# Patient Record
Sex: FEMALE | Race: WHITE
[De-identification: names, ages, dates, MRNs, and addresses within clinical notes are randomized per-mention and may not be internally consistent; named-entity substitution may affect disease eponyms.]

---

## 2017-05-04 ENCOUNTER — HOSPITAL ENCOUNTER (OUTPATIENT)
Dept: HOSPITAL 8 - CVU | Age: 57
Discharge: HOME | End: 2017-05-04
Attending: PHYSICIAN ASSISTANT
Payer: COMMERCIAL

## 2017-05-04 DIAGNOSIS — J45.909: ICD-10-CM

## 2017-05-04 DIAGNOSIS — I50.30: Primary | ICD-10-CM

## 2017-05-04 DIAGNOSIS — I48.91: ICD-10-CM

## 2017-05-04 DIAGNOSIS — I34.0: ICD-10-CM

## 2017-05-04 DIAGNOSIS — I35.0: ICD-10-CM

## 2017-05-04 DIAGNOSIS — I51.7: ICD-10-CM

## 2017-05-04 DIAGNOSIS — E11.9: ICD-10-CM

## 2017-05-04 PROCEDURE — 93306 TTE W/DOPPLER COMPLETE: CPT

## 2017-07-27 ENCOUNTER — HOSPITAL ENCOUNTER (EMERGENCY)
Dept: HOSPITAL 8 - ED | Age: 57
Discharge: HOME | End: 2017-07-27
Payer: COMMERCIAL

## 2017-07-27 VITALS — DIASTOLIC BLOOD PRESSURE: 57 MMHG | SYSTOLIC BLOOD PRESSURE: 108 MMHG

## 2017-07-27 VITALS — BODY MASS INDEX: 41.77 KG/M2 | WEIGHT: 275.58 LBS | HEIGHT: 68 IN

## 2017-07-27 DIAGNOSIS — R73.9: ICD-10-CM

## 2017-07-27 DIAGNOSIS — I48.92: Primary | ICD-10-CM

## 2017-07-27 DIAGNOSIS — E87.1: ICD-10-CM

## 2017-07-27 DIAGNOSIS — I48.91: ICD-10-CM

## 2017-07-27 LAB
BUN SERPL-MCNC: 10 MG/DL (ref 7–18)
IS PT STATUS REG ER OR PRE ER?: YES

## 2017-07-27 PROCEDURE — 85610 PROTHROMBIN TIME: CPT

## 2017-07-27 PROCEDURE — 99152 MOD SED SAME PHYS/QHP 5/>YRS: CPT

## 2017-07-27 PROCEDURE — 83735 ASSAY OF MAGNESIUM: CPT

## 2017-07-27 PROCEDURE — 80048 BASIC METABOLIC PNL TOTAL CA: CPT

## 2017-07-27 PROCEDURE — 99153 MOD SED SAME PHYS/QHP EA: CPT

## 2017-07-27 PROCEDURE — 36415 COLL VENOUS BLD VENIPUNCTURE: CPT

## 2017-07-27 PROCEDURE — 82040 ASSAY OF SERUM ALBUMIN: CPT

## 2017-07-27 PROCEDURE — 85025 COMPLETE CBC W/AUTO DIFF WBC: CPT

## 2017-07-27 PROCEDURE — 71010: CPT

## 2017-07-27 PROCEDURE — 84484 ASSAY OF TROPONIN QUANT: CPT

## 2017-07-27 PROCEDURE — 93005 ELECTROCARDIOGRAM TRACING: CPT

## 2017-08-17 ENCOUNTER — HOSPITAL ENCOUNTER (OUTPATIENT)
Dept: HOSPITAL 8 - CFH | Age: 57
Discharge: HOME | End: 2017-08-17
Attending: INTERNAL MEDICINE
Payer: COMMERCIAL

## 2017-08-17 VITALS — SYSTOLIC BLOOD PRESSURE: 129 MMHG | DIASTOLIC BLOOD PRESSURE: 75 MMHG

## 2017-08-17 DIAGNOSIS — I51.7: ICD-10-CM

## 2017-08-17 DIAGNOSIS — I87.8: ICD-10-CM

## 2017-08-17 DIAGNOSIS — J81.1: ICD-10-CM

## 2017-08-17 DIAGNOSIS — I48.91: Primary | ICD-10-CM

## 2017-08-17 LAB
AST SERPL-CCNC: 65 U/L (ref 15–37)
BUN SERPL-MCNC: 8 MG/DL (ref 7–18)
HCT VFR BLD CALC: 38.8 % (ref 34.6–47.8)
HGB BLD-MCNC: 11.9 G/DL (ref 11.7–16.4)
WBC # BLD AUTO: 10.4 X10^3/UL (ref 3.4–10)

## 2017-08-17 PROCEDURE — 75572 CT HRT W/3D IMAGE: CPT

## 2017-08-17 PROCEDURE — 71020: CPT

## 2017-08-18 ENCOUNTER — HOSPITAL ENCOUNTER (OUTPATIENT)
Dept: HOSPITAL 8 - CACL | Age: 57
Setting detail: OBSERVATION
LOS: 1 days | Discharge: HOME | End: 2017-08-19
Attending: INTERNAL MEDICINE | Admitting: INTERNAL MEDICINE
Payer: COMMERCIAL

## 2017-08-18 VITALS — WEIGHT: 279.55 LBS | HEIGHT: 68 IN | BODY MASS INDEX: 42.37 KG/M2

## 2017-08-18 VITALS — DIASTOLIC BLOOD PRESSURE: 65 MMHG | SYSTOLIC BLOOD PRESSURE: 99 MMHG

## 2017-08-18 DIAGNOSIS — I48.91: Primary | ICD-10-CM

## 2017-08-18 DIAGNOSIS — I48.92: ICD-10-CM

## 2017-08-18 PROCEDURE — 93656 COMPRE EP EVAL ABLTJ ATR FIB: CPT

## 2017-08-18 PROCEDURE — 93312 ECHO TRANSESOPHAGEAL: CPT

## 2017-08-18 PROCEDURE — 93613 INTRACARDIAC EPHYS 3D MAPG: CPT

## 2017-08-18 PROCEDURE — 85025 COMPLETE CBC W/AUTO DIFF WBC: CPT

## 2017-08-18 PROCEDURE — 80053 COMPREHEN METABOLIC PANEL: CPT

## 2017-08-18 PROCEDURE — 93655 ICAR CATH ABLTJ DSCRT ARRHYT: CPT

## 2017-08-18 PROCEDURE — 93005 ELECTROCARDIOGRAM TRACING: CPT

## 2017-08-18 PROCEDURE — 96372 THER/PROPH/DIAG INJ SC/IM: CPT

## 2017-08-18 PROCEDURE — 93325 DOPPLER ECHO COLOR FLOW MAPG: CPT

## 2017-08-18 PROCEDURE — C1730 CATH, EP, 19 OR FEW ELECT: HCPCS

## 2017-08-18 PROCEDURE — G0378 HOSPITAL OBSERVATION PER HR: HCPCS

## 2017-08-18 PROCEDURE — 93308 TTE F-UP OR LMTD: CPT

## 2017-08-18 PROCEDURE — 93662 INTRACARDIAC ECG (ICE): CPT

## 2017-08-18 PROCEDURE — 85730 THROMBOPLASTIN TIME PARTIAL: CPT

## 2017-08-18 PROCEDURE — C1731 CATH, EP, 20 OR MORE ELEC: HCPCS

## 2017-08-18 PROCEDURE — 36415 COLL VENOUS BLD VENIPUNCTURE: CPT

## 2017-08-18 PROCEDURE — C1894 INTRO/SHEATH, NON-LASER: HCPCS

## 2017-08-18 PROCEDURE — 93321 DOPPLER ECHO F-UP/LMTD STD: CPT

## 2017-08-18 PROCEDURE — 93624 EP F-UP STUDY PACG&REC: CPT

## 2017-08-18 PROCEDURE — 85610 PROTHROMBIN TIME: CPT

## 2017-08-18 PROCEDURE — C1732 CATH, EP, DIAG/ABL, 3D/VECT: HCPCS

## 2017-08-18 PROCEDURE — C1766 INTRO/SHEATH,STRBLE,NON-PEEL: HCPCS

## 2017-08-18 PROCEDURE — 82962 GLUCOSE BLOOD TEST: CPT

## 2017-08-18 PROCEDURE — C1893 INTRO/SHEATH, FIXED,NON-PEEL: HCPCS

## 2017-08-18 RX ADMIN — INSULIN DETEMIR SCH UNITS: 100 INJECTION, SOLUTION SUBCUTANEOUS at 21:00

## 2017-08-18 RX ADMIN — INSULIN ASPART SCH UNITS: 100 INJECTION, SOLUTION INTRAVENOUS; SUBCUTANEOUS at 22:46

## 2017-08-18 RX ADMIN — INSULIN DETEMIR SCH UNITS: 100 INJECTION, SOLUTION SUBCUTANEOUS at 22:08

## 2017-08-18 RX ADMIN — HYDROMORPHONE HYDROCHLORIDE PRN MG: 1 INJECTION, SOLUTION INTRAMUSCULAR; INTRAVENOUS; SUBCUTANEOUS at 13:10

## 2017-08-18 RX ADMIN — OXYCODONE HYDROCHLORIDE PRN MG: 5 TABLET ORAL at 22:46

## 2017-08-18 RX ADMIN — HYDROMORPHONE HYDROCHLORIDE PRN MG: 1 INJECTION, SOLUTION INTRAMUSCULAR; INTRAVENOUS; SUBCUTANEOUS at 13:19

## 2017-08-18 RX ADMIN — GABAPENTIN SCH MG: 300 CAPSULE ORAL at 22:13

## 2017-08-19 VITALS — SYSTOLIC BLOOD PRESSURE: 100 MMHG | DIASTOLIC BLOOD PRESSURE: 64 MMHG

## 2017-08-19 VITALS — SYSTOLIC BLOOD PRESSURE: 107 MMHG | DIASTOLIC BLOOD PRESSURE: 70 MMHG

## 2017-08-19 RX ADMIN — OXYCODONE HYDROCHLORIDE PRN MG: 5 TABLET ORAL at 04:54

## 2017-08-19 RX ADMIN — INSULIN ASPART SCH UNITS: 100 INJECTION, SOLUTION INTRAVENOUS; SUBCUTANEOUS at 09:07

## 2017-08-19 RX ADMIN — GABAPENTIN SCH MG: 300 CAPSULE ORAL at 09:06

## 2018-08-03 ENCOUNTER — HOSPITAL ENCOUNTER (OUTPATIENT)
Dept: HOSPITAL 8 - CFH | Age: 58
Discharge: HOME | End: 2018-08-03
Attending: INTERNAL MEDICINE
Payer: COMMERCIAL

## 2018-08-03 DIAGNOSIS — I45.10: ICD-10-CM

## 2018-08-03 DIAGNOSIS — I20.9: Primary | ICD-10-CM

## 2018-08-03 PROCEDURE — A9502 TC99M TETROFOSMIN: HCPCS

## 2018-08-03 PROCEDURE — 78452 HT MUSCLE IMAGE SPECT MULT: CPT

## 2018-08-03 PROCEDURE — C9898 INPNT STAY RADIOLABELED ITEM: HCPCS

## 2018-09-19 ENCOUNTER — HOSPITAL ENCOUNTER (INPATIENT)
Dept: HOSPITAL 8 - ED | Age: 58
LOS: 2 days | Discharge: HOME | DRG: 69 | End: 2018-09-21
Attending: FAMILY MEDICINE | Admitting: FAMILY MEDICINE
Payer: COMMERCIAL

## 2018-09-19 VITALS — WEIGHT: 293 LBS | HEIGHT: 68 IN | BODY MASS INDEX: 44.41 KG/M2

## 2018-09-19 DIAGNOSIS — E66.01: ICD-10-CM

## 2018-09-19 DIAGNOSIS — Z88.2: ICD-10-CM

## 2018-09-19 DIAGNOSIS — M81.0: ICD-10-CM

## 2018-09-19 DIAGNOSIS — Z91.018: ICD-10-CM

## 2018-09-19 DIAGNOSIS — E03.9: ICD-10-CM

## 2018-09-19 DIAGNOSIS — M19.90: ICD-10-CM

## 2018-09-19 DIAGNOSIS — G56.02: ICD-10-CM

## 2018-09-19 DIAGNOSIS — Z79.4: ICD-10-CM

## 2018-09-19 DIAGNOSIS — D50.9: ICD-10-CM

## 2018-09-19 DIAGNOSIS — E55.9: ICD-10-CM

## 2018-09-19 DIAGNOSIS — J45.909: ICD-10-CM

## 2018-09-19 DIAGNOSIS — Z82.5: ICD-10-CM

## 2018-09-19 DIAGNOSIS — R09.02: ICD-10-CM

## 2018-09-19 DIAGNOSIS — Z82.49: ICD-10-CM

## 2018-09-19 DIAGNOSIS — Z96.653: ICD-10-CM

## 2018-09-19 DIAGNOSIS — Z83.3: ICD-10-CM

## 2018-09-19 DIAGNOSIS — G47.33: ICD-10-CM

## 2018-09-19 DIAGNOSIS — Z90.710: ICD-10-CM

## 2018-09-19 DIAGNOSIS — L03.116: ICD-10-CM

## 2018-09-19 DIAGNOSIS — Z88.5: ICD-10-CM

## 2018-09-19 DIAGNOSIS — K21.9: ICD-10-CM

## 2018-09-19 DIAGNOSIS — D63.8: ICD-10-CM

## 2018-09-19 DIAGNOSIS — G45.9: Primary | ICD-10-CM

## 2018-09-19 DIAGNOSIS — R47.81: ICD-10-CM

## 2018-09-19 DIAGNOSIS — I48.91: ICD-10-CM

## 2018-09-19 DIAGNOSIS — E11.42: ICD-10-CM

## 2018-09-19 DIAGNOSIS — Z88.8: ICD-10-CM

## 2018-09-19 DIAGNOSIS — I50.30: ICD-10-CM

## 2018-09-19 DIAGNOSIS — M79.7: ICD-10-CM

## 2018-09-19 DIAGNOSIS — Z80.6: ICD-10-CM

## 2018-09-19 DIAGNOSIS — D68.69: ICD-10-CM

## 2018-09-19 DIAGNOSIS — Z86.718: ICD-10-CM

## 2018-09-19 DIAGNOSIS — G81.94: ICD-10-CM

## 2018-09-19 DIAGNOSIS — Z88.7: ICD-10-CM

## 2018-09-19 LAB
APTT BLD: 47 SECONDS (ref 25–31)
BASOPHILS # BLD AUTO: 0.14 X10^3/UL (ref 0–0.1)
BASOPHILS NFR BLD AUTO: 2 % (ref 0–1)
EOSINOPHIL # BLD AUTO: 0.09 X10^3/UL (ref 0–0.4)
EOSINOPHIL NFR BLD AUTO: 1 % (ref 1–7)
ERYTHROCYTE [DISTWIDTH] IN BLOOD BY AUTOMATED COUNT: 23.1 % (ref 9.6–15.2)
INR PPP: 2.63 (ref 0.93–1.1)
LYMPHOCYTES # BLD AUTO: 1.5 X10^3/UL (ref 1–3.4)
LYMPHOCYTES NFR BLD AUTO: 20 % (ref 22–44)
MCH RBC QN AUTO: 20.9 PG (ref 27–34.8)
MCHC RBC AUTO-ENTMCNC: 30.6 G/DL (ref 32.4–35.8)
MCV RBC AUTO: 68.3 FL (ref 80–100)
MD: (no result)
MONOCYTES # BLD AUTO: 0.68 X10^3/UL (ref 0.2–0.8)
MONOCYTES NFR BLD AUTO: 9 % (ref 2–9)
NEUTROPHILS # BLD AUTO: 5.03 X10^3/UL (ref 1.8–6.8)
NEUTROPHILS NFR BLD AUTO: 68 % (ref 42–75)
PLATELET # BLD AUTO: 369 X10^3/UL (ref 130–400)
PMV BLD AUTO: 8.2 FL (ref 7.4–10.4)
PROTHROMBIN TIME: 26.8 SECONDS (ref 9.6–11.5)
RBC # BLD AUTO: 3.56 X10^6/UL (ref 3.82–5.3)

## 2018-09-19 PROCEDURE — 36430 TRANSFUSION BLD/BLD COMPNT: CPT

## 2018-09-19 PROCEDURE — 83036 HEMOGLOBIN GLYCOSYLATED A1C: CPT

## 2018-09-19 PROCEDURE — 83550 IRON BINDING TEST: CPT

## 2018-09-19 PROCEDURE — 85014 HEMATOCRIT: CPT

## 2018-09-19 PROCEDURE — 71045 X-RAY EXAM CHEST 1 VIEW: CPT

## 2018-09-19 PROCEDURE — 70450 CT HEAD/BRAIN W/O DYE: CPT

## 2018-09-19 PROCEDURE — 86923 COMPATIBILITY TEST ELECTRIC: CPT

## 2018-09-19 PROCEDURE — 85610 PROTHROMBIN TIME: CPT

## 2018-09-19 PROCEDURE — 36415 COLL VENOUS BLD VENIPUNCTURE: CPT

## 2018-09-19 PROCEDURE — 83540 ASSAY OF IRON: CPT

## 2018-09-19 PROCEDURE — 80053 COMPREHEN METABOLIC PANEL: CPT

## 2018-09-19 PROCEDURE — 93005 ELECTROCARDIOGRAM TRACING: CPT

## 2018-09-19 PROCEDURE — 85025 COMPLETE CBC W/AUTO DIFF WBC: CPT

## 2018-09-19 PROCEDURE — 86900 BLOOD TYPING SEROLOGIC ABO: CPT

## 2018-09-19 PROCEDURE — 93880 EXTRACRANIAL BILAT STUDY: CPT

## 2018-09-19 PROCEDURE — 82962 GLUCOSE BLOOD TEST: CPT

## 2018-09-19 PROCEDURE — 85730 THROMBOPLASTIN TIME PARTIAL: CPT

## 2018-09-19 PROCEDURE — 86850 RBC ANTIBODY SCREEN: CPT

## 2018-09-19 PROCEDURE — 80047 BASIC METABLC PNL IONIZED CA: CPT

## 2018-09-19 PROCEDURE — 80061 LIPID PANEL: CPT

## 2018-09-19 PROCEDURE — 82728 ASSAY OF FERRITIN: CPT

## 2018-09-19 PROCEDURE — 70551 MRI BRAIN STEM W/O DYE: CPT

## 2018-09-19 PROCEDURE — 85018 HEMOGLOBIN: CPT

## 2018-09-19 PROCEDURE — P9016 RBC LEUKOCYTES REDUCED: HCPCS

## 2018-09-20 VITALS — SYSTOLIC BLOOD PRESSURE: 94 MMHG | DIASTOLIC BLOOD PRESSURE: 57 MMHG

## 2018-09-20 VITALS — SYSTOLIC BLOOD PRESSURE: 104 MMHG | DIASTOLIC BLOOD PRESSURE: 60 MMHG

## 2018-09-20 VITALS — DIASTOLIC BLOOD PRESSURE: 61 MMHG | SYSTOLIC BLOOD PRESSURE: 93 MMHG

## 2018-09-20 VITALS — DIASTOLIC BLOOD PRESSURE: 70 MMHG | SYSTOLIC BLOOD PRESSURE: 115 MMHG

## 2018-09-20 VITALS — DIASTOLIC BLOOD PRESSURE: 52 MMHG | SYSTOLIC BLOOD PRESSURE: 99 MMHG

## 2018-09-20 VITALS — SYSTOLIC BLOOD PRESSURE: 80 MMHG | DIASTOLIC BLOOD PRESSURE: 44 MMHG

## 2018-09-20 VITALS — DIASTOLIC BLOOD PRESSURE: 49 MMHG | SYSTOLIC BLOOD PRESSURE: 85 MMHG

## 2018-09-20 VITALS — DIASTOLIC BLOOD PRESSURE: 55 MMHG | SYSTOLIC BLOOD PRESSURE: 93 MMHG

## 2018-09-20 VITALS — SYSTOLIC BLOOD PRESSURE: 105 MMHG | DIASTOLIC BLOOD PRESSURE: 61 MMHG

## 2018-09-20 VITALS — SYSTOLIC BLOOD PRESSURE: 95 MMHG | DIASTOLIC BLOOD PRESSURE: 60 MMHG

## 2018-09-20 VITALS — DIASTOLIC BLOOD PRESSURE: 64 MMHG | SYSTOLIC BLOOD PRESSURE: 100 MMHG

## 2018-09-20 VITALS — SYSTOLIC BLOOD PRESSURE: 107 MMHG | DIASTOLIC BLOOD PRESSURE: 61 MMHG

## 2018-09-20 VITALS — DIASTOLIC BLOOD PRESSURE: 52 MMHG | SYSTOLIC BLOOD PRESSURE: 85 MMHG

## 2018-09-20 VITALS — DIASTOLIC BLOOD PRESSURE: 50 MMHG | SYSTOLIC BLOOD PRESSURE: 96 MMHG

## 2018-09-20 LAB
% IRON SATURATION: 3 % (ref 20–55)
ALBUMIN SERPL-MCNC: 2.5 G/DL (ref 3.4–5)
ALP SERPL-CCNC: 109 U/L (ref 45–117)
ALT SERPL-CCNC: 19 U/L (ref 12–78)
ANION GAP SERPL CALC-SCNC: 3 MMOL/L (ref 5–15)
BASOPHILS # BLD AUTO: 0.02 X10^3/UL (ref 0–0.1)
BASOPHILS NFR BLD AUTO: 0 % (ref 0–1)
BILIRUB SERPL-MCNC: 0.8 MG/DL (ref 0.2–1)
CALCIUM SERPL-MCNC: 8 MG/DL (ref 8.5–10.1)
CHLORIDE SERPL-SCNC: 99 MMOL/L (ref 98–107)
CREAT SERPL-MCNC: 0.83 MG/DL (ref 0.55–1.02)
EOSINOPHIL # BLD AUTO: 0.22 X10^3/UL (ref 0–0.4)
EOSINOPHIL NFR BLD AUTO: 3 % (ref 1–7)
ERYTHROCYTE [DISTWIDTH] IN BLOOD BY AUTOMATED COUNT: 23.4 % (ref 9.6–15.2)
EST. AVERAGE GLUCOSE BLD GHB EST-MCNC: 169 MG/DL (ref 0–126)
HBA1C MFR BLD: 7.5 % (ref 4.2–6.3)
IRON LEVEL: 18 MCG/DL (ref 50–170)
LYMPHOCYTES # BLD AUTO: 1.48 X10^3/UL (ref 1–3.4)
LYMPHOCYTES NFR BLD AUTO: 23 % (ref 22–44)
MCH RBC QN AUTO: 21.6 PG (ref 27–34.8)
MCHC RBC AUTO-ENTMCNC: 31.5 G/DL (ref 32.4–35.8)
MCV RBC AUTO: 68.4 FL (ref 80–100)
MD: (no result)
MONOCYTES # BLD AUTO: 0.58 X10^3/UL (ref 0.2–0.8)
MONOCYTES NFR BLD AUTO: 9 % (ref 2–9)
NEUTROPHILS # BLD AUTO: 4.18 X10^3/UL (ref 1.8–6.8)
NEUTROPHILS NFR BLD AUTO: 65 % (ref 42–75)
PLATELET # BLD AUTO: 362 X10^3/UL (ref 130–400)
PMV BLD AUTO: 8.1 FL (ref 7.4–10.4)
PROT SERPL-MCNC: 6.7 G/DL (ref 6.4–8.2)
RBC # BLD AUTO: 3.43 X10^6/UL (ref 3.82–5.3)
TIBC SERPL-MCNC: 551 MCG/DL (ref 250–450)

## 2018-09-20 PROCEDURE — 5A09357 ASSISTANCE WITH RESPIRATORY VENTILATION, LESS THAN 24 CONSECUTIVE HOURS, CONTINUOUS POSITIVE AIRWAY PRESSURE: ICD-10-PCS | Performed by: FAMILY MEDICINE

## 2018-09-20 PROCEDURE — 30233N1 TRANSFUSION OF NONAUTOLOGOUS RED BLOOD CELLS INTO PERIPHERAL VEIN, PERCUTANEOUS APPROACH: ICD-10-PCS | Performed by: FAMILY MEDICINE

## 2018-09-20 RX ADMIN — MAGNESIUM OXIDE SCH MG: 400 TABLET ORAL at 09:00

## 2018-09-20 RX ADMIN — CLINDAMYCIN IN 5 PERCENT DEXTROSE SCH MLS/HR: 12 INJECTION, SOLUTION INTRAVENOUS at 01:27

## 2018-09-20 RX ADMIN — MONTELUKAST SODIUM SCH MG: 10 TABLET ORAL at 20:12

## 2018-09-20 RX ADMIN — PANTOPRAZOLE SODIUM SCH MG: 40 TABLET, DELAYED RELEASE ORAL at 01:45

## 2018-09-20 RX ADMIN — FLUTICASONE FUROATE AND VILANTEROL TRIFENATATE SCH PUFF: 100; 25 POWDER RESPIRATORY (INHALATION) at 10:55

## 2018-09-20 RX ADMIN — OXYCODONE HYDROCHLORIDE PRN MG: 5 TABLET ORAL at 11:44

## 2018-09-20 RX ADMIN — MONTELUKAST SODIUM SCH MG: 10 TABLET ORAL at 01:45

## 2018-09-20 RX ADMIN — LEVOTHYROXINE SODIUM SCH MCG: 112 TABLET ORAL at 09:55

## 2018-09-20 RX ADMIN — INSULIN LISPRO SCH UNITS: 100 INJECTION, SOLUTION INTRAVENOUS; SUBCUTANEOUS at 07:00

## 2018-09-20 RX ADMIN — INSULIN LISPRO SCH UNITS: 100 INJECTION, SOLUTION INTRAVENOUS; SUBCUTANEOUS at 16:00

## 2018-09-20 RX ADMIN — SPIRONOLACTONE SCH MG: 100 TABLET ORAL at 09:54

## 2018-09-20 RX ADMIN — INSULIN LISPRO SCH UNITS: 100 INJECTION, SOLUTION INTRAVENOUS; SUBCUTANEOUS at 11:00

## 2018-09-20 RX ADMIN — OXYCODONE HYDROCHLORIDE PRN MG: 5 TABLET ORAL at 01:27

## 2018-09-20 RX ADMIN — GABAPENTIN SCH MG: 300 CAPSULE ORAL at 01:45

## 2018-09-20 RX ADMIN — GABAPENTIN SCH MG: 300 CAPSULE ORAL at 20:12

## 2018-09-20 RX ADMIN — CLINDAMYCIN IN 5 PERCENT DEXTROSE SCH MLS/HR: 12 INJECTION, SOLUTION INTRAVENOUS at 09:54

## 2018-09-20 RX ADMIN — INSULIN GLARGINE SCH UNITS: 100 INJECTION, SOLUTION SUBCUTANEOUS at 00:30

## 2018-09-20 RX ADMIN — INSULIN GLARGINE SCH UNITS: 100 INJECTION, SOLUTION SUBCUTANEOUS at 20:12

## 2018-09-20 RX ADMIN — INSULIN LISPRO SCH UNITS: 100 INJECTION, SOLUTION INTRAVENOUS; SUBCUTANEOUS at 20:12

## 2018-09-20 RX ADMIN — OXYCODONE HYDROCHLORIDE PRN MG: 5 TABLET ORAL at 20:38

## 2018-09-20 RX ADMIN — OXYCODONE HYDROCHLORIDE PRN MG: 5 TABLET ORAL at 17:11

## 2018-09-20 RX ADMIN — WARFARIN SODIUM SCH MG: 2 TABLET ORAL at 01:46

## 2018-09-20 RX ADMIN — WARFARIN SODIUM SCH MG: 2 TABLET ORAL at 20:11

## 2018-09-20 RX ADMIN — INSULIN LISPRO SCH UNITS: 100 INJECTION, SOLUTION INTRAVENOUS; SUBCUTANEOUS at 01:00

## 2018-09-20 RX ADMIN — PANTOPRAZOLE SODIUM SCH MG: 40 TABLET, DELAYED RELEASE ORAL at 20:12

## 2018-09-20 RX ADMIN — FUROSEMIDE SCH MG: 20 TABLET ORAL at 09:54

## 2018-09-20 RX ADMIN — FEBUXOSTAT SCH MG: 40 TABLET ORAL at 20:09

## 2018-09-20 RX ADMIN — Medication SCH TAB: at 09:55

## 2018-09-20 RX ADMIN — GABAPENTIN SCH MG: 300 CAPSULE ORAL at 09:55

## 2018-09-20 RX ADMIN — RIZATRIPTAN BENZOATE SCH MG: 10 TABLET ORAL at 10:56

## 2018-09-20 RX ADMIN — ERGOCALCIFEROL SCH UNITS: 1.25 CAPSULE, LIQUID FILLED ORAL at 00:30

## 2018-09-20 RX ADMIN — CLINDAMYCIN IN 5 PERCENT DEXTROSE SCH MLS/HR: 12 INJECTION, SOLUTION INTRAVENOUS at 16:27

## 2018-09-20 RX ADMIN — PROMETHAZINE HYDROCHLORIDE SCH MG: 25 SUPPOSITORY RECTAL at 09:00

## 2018-09-20 RX ADMIN — FEBUXOSTAT SCH MG: 40 TABLET ORAL at 01:43

## 2018-09-20 RX ADMIN — OXYCODONE HYDROCHLORIDE PRN MG: 5 TABLET ORAL at 06:45

## 2018-09-20 RX ADMIN — ASPIRIN 81 MG SCH MG: 81 TABLET ORAL at 09:56

## 2018-09-20 RX ADMIN — GABAPENTIN SCH MG: 300 CAPSULE ORAL at 16:27

## 2018-09-21 VITALS — SYSTOLIC BLOOD PRESSURE: 125 MMHG | DIASTOLIC BLOOD PRESSURE: 69 MMHG

## 2018-09-21 VITALS — SYSTOLIC BLOOD PRESSURE: 117 MMHG | DIASTOLIC BLOOD PRESSURE: 63 MMHG

## 2018-09-21 VITALS — DIASTOLIC BLOOD PRESSURE: 59 MMHG | SYSTOLIC BLOOD PRESSURE: 97 MMHG

## 2018-09-21 LAB
CHOL/HDL RATIO: 6.5
HDL CHOL %: 15 % (ref 28–40)
HDL CHOLESTEROL (DIRECT): 20 MG/DL (ref 40–60)
INR PPP: 2.56 (ref 0.93–1.1)
LDL CHOLESTEROL,CALCULATED: 86 MG/DL (ref 54–169)
LDLC/HDLC SERPL: 4.3 {RATIO} (ref 0.5–3)
PROTHROMBIN TIME: 26.1 SECONDS (ref 9.6–11.5)
TRIGL SERPL-MCNC: 120 MG/DL (ref 50–200)
VLDLC SERPL CALC-MCNC: 24 MG/DL (ref 0–25)

## 2018-09-21 RX ADMIN — OXYCODONE HYDROCHLORIDE PRN MG: 5 TABLET ORAL at 08:16

## 2018-09-21 RX ADMIN — MAGNESIUM OXIDE SCH MG: 400 TABLET ORAL at 08:06

## 2018-09-21 RX ADMIN — OXYCODONE HYDROCHLORIDE PRN MG: 5 TABLET ORAL at 03:51

## 2018-09-21 RX ADMIN — ERGOCALCIFEROL SCH UNITS: 1.25 CAPSULE, LIQUID FILLED ORAL at 08:04

## 2018-09-21 RX ADMIN — CLINDAMYCIN IN 5 PERCENT DEXTROSE SCH MLS/HR: 12 INJECTION, SOLUTION INTRAVENOUS at 08:03

## 2018-09-21 RX ADMIN — LEVOTHYROXINE SODIUM SCH MCG: 112 TABLET ORAL at 08:05

## 2018-09-21 RX ADMIN — ASPIRIN 81 MG SCH MG: 81 TABLET ORAL at 08:07

## 2018-09-21 RX ADMIN — GABAPENTIN SCH MG: 300 CAPSULE ORAL at 08:07

## 2018-09-21 RX ADMIN — Medication SCH TAB: at 08:05

## 2018-09-21 RX ADMIN — INSULIN LISPRO SCH UNITS: 100 INJECTION, SOLUTION INTRAVENOUS; SUBCUTANEOUS at 08:01

## 2018-09-21 RX ADMIN — OXYCODONE HYDROCHLORIDE PRN MG: 5 TABLET ORAL at 00:20

## 2018-09-21 RX ADMIN — PROMETHAZINE HYDROCHLORIDE SCH MG: 25 SUPPOSITORY RECTAL at 08:08

## 2018-09-21 RX ADMIN — OXYCODONE HYDROCHLORIDE PRN MG: 5 TABLET ORAL at 12:22

## 2018-09-21 RX ADMIN — INSULIN LISPRO SCH UNITS: 100 INJECTION, SOLUTION INTRAVENOUS; SUBCUTANEOUS at 11:30

## 2018-09-21 RX ADMIN — FUROSEMIDE SCH MG: 20 TABLET ORAL at 08:08

## 2018-09-21 RX ADMIN — RIZATRIPTAN BENZOATE SCH MG: 10 TABLET ORAL at 08:06

## 2018-09-21 RX ADMIN — SPIRONOLACTONE SCH MG: 100 TABLET ORAL at 08:05

## 2018-09-21 RX ADMIN — FLUTICASONE FUROATE AND VILANTEROL TRIFENATATE SCH PUFF: 100; 25 POWDER RESPIRATORY (INHALATION) at 08:03

## 2018-09-21 RX ADMIN — CLINDAMYCIN IN 5 PERCENT DEXTROSE SCH MLS/HR: 12 INJECTION, SOLUTION INTRAVENOUS at 00:20

## 2018-12-13 ENCOUNTER — HOSPITAL ENCOUNTER (OUTPATIENT)
Facility: MEDICAL CENTER | Age: 58
End: 2018-12-13
Attending: PODIATRIST
Payer: COMMERCIAL

## 2018-12-13 PROCEDURE — 87205 SMEAR GRAM STAIN: CPT

## 2018-12-13 PROCEDURE — 87070 CULTURE OTHR SPECIMN AEROBIC: CPT

## 2018-12-14 LAB
GRAM STN SPEC: NORMAL
SIGNIFICANT IND 70042: NORMAL
SITE SITE: NORMAL
SOURCE SOURCE: NORMAL

## 2018-12-16 LAB
BACTERIA WND AEROBE CULT: NORMAL
GRAM STN SPEC: NORMAL
SIGNIFICANT IND 70042: NORMAL
SITE SITE: NORMAL
SOURCE SOURCE: NORMAL

## 2019-04-21 ENCOUNTER — HOSPITAL ENCOUNTER (INPATIENT)
Dept: HOSPITAL 8 - ED | Age: 59
LOS: 1 days | Discharge: HOME | DRG: 563 | End: 2019-04-22
Attending: INTERNAL MEDICINE | Admitting: INTERNAL MEDICINE
Payer: COMMERCIAL

## 2019-04-21 VITALS — BODY MASS INDEX: 44.41 KG/M2 | HEIGHT: 68 IN | WEIGHT: 293 LBS

## 2019-04-21 VITALS — SYSTOLIC BLOOD PRESSURE: 96 MMHG | DIASTOLIC BLOOD PRESSURE: 68 MMHG

## 2019-04-21 DIAGNOSIS — D68.59: ICD-10-CM

## 2019-04-21 DIAGNOSIS — Z90.49: ICD-10-CM

## 2019-04-21 DIAGNOSIS — Z86.73: ICD-10-CM

## 2019-04-21 DIAGNOSIS — E11.40: ICD-10-CM

## 2019-04-21 DIAGNOSIS — Z82.5: ICD-10-CM

## 2019-04-21 DIAGNOSIS — M79.7: ICD-10-CM

## 2019-04-21 DIAGNOSIS — Z79.01: ICD-10-CM

## 2019-04-21 DIAGNOSIS — Z90.710: ICD-10-CM

## 2019-04-21 DIAGNOSIS — G47.33: ICD-10-CM

## 2019-04-21 DIAGNOSIS — S42.202A: Primary | ICD-10-CM

## 2019-04-21 DIAGNOSIS — E03.9: ICD-10-CM

## 2019-04-21 DIAGNOSIS — Z88.2: ICD-10-CM

## 2019-04-21 DIAGNOSIS — E11.65: ICD-10-CM
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
## 2019-04-21 DIAGNOSIS — Z82.49: ICD-10-CM

## 2019-04-21 DIAGNOSIS — E66.01: ICD-10-CM

## 2019-04-21 DIAGNOSIS — Z96.653: ICD-10-CM

## 2019-04-21 DIAGNOSIS — S42.92XA: ICD-10-CM

## 2019-04-21 DIAGNOSIS — Z83.3: ICD-10-CM

## 2019-04-21 DIAGNOSIS — D50.9: ICD-10-CM

## 2019-04-21 DIAGNOSIS — I48.91: ICD-10-CM

## 2019-04-21 DIAGNOSIS — E27.40: ICD-10-CM

## 2019-04-21 DIAGNOSIS — M81.0: ICD-10-CM
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
## 2019-04-21 DIAGNOSIS — G89.4: ICD-10-CM
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
## 2019-04-21 DIAGNOSIS — G45.9: ICD-10-CM
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
## 2019-04-21 DIAGNOSIS — Z88.0: ICD-10-CM

## 2019-04-21 DIAGNOSIS — Z88.7: ICD-10-CM

## 2019-04-21 DIAGNOSIS — J45.909: ICD-10-CM

## 2019-04-21 DIAGNOSIS — I10: ICD-10-CM

## 2019-04-21 DIAGNOSIS — E87.1: ICD-10-CM

## 2019-04-21 DIAGNOSIS — K21.9: ICD-10-CM

## 2019-04-21 DIAGNOSIS — Z88.8: ICD-10-CM

## 2019-04-21 DIAGNOSIS — Z80.6: ICD-10-CM

## 2019-04-21 DIAGNOSIS — D68.51: ICD-10-CM

## 2019-04-21 LAB
ALBUMIN SERPL-MCNC: 3.2 G/DL (ref 3.4–5)
ALP SERPL-CCNC: 139 U/L (ref 45–117)
ALT SERPL-CCNC: 43 U/L (ref 12–78)
ANION GAP SERPL CALC-SCNC: 4 MMOL/L (ref 5–15)
APTT BLD: 36 SECONDS (ref 25–31)
BASOPHILS # BLD AUTO: 0.03 X10^3/UL (ref 0–0.1)
BASOPHILS NFR BLD AUTO: 0 % (ref 0–1)
BILIRUB SERPL-MCNC: 0.4 MG/DL (ref 0.2–1)
CALCIUM SERPL-MCNC: 8.7 MG/DL (ref 8.5–10.1)
CHLORIDE SERPL-SCNC: 102 MMOL/L (ref 98–107)
CREAT SERPL-MCNC: 0.82 MG/DL (ref 0.55–1.02)
EOSINOPHIL # BLD AUTO: 0.1 X10^3/UL (ref 0–0.4)
EOSINOPHIL NFR BLD AUTO: 1 % (ref 1–7)
ERYTHROCYTE [DISTWIDTH] IN BLOOD BY AUTOMATED COUNT: 21.6 % (ref 9.6–15.2)
EST. AVERAGE GLUCOSE BLD GHB EST-MCNC: 220 MG/DL (ref 0–126)
HBA1C MFR BLD: 9.3 % (ref 4.2–6.3)
INR PPP: 2.41 (ref 0.93–1.1)
LYMPHOCYTES # BLD AUTO: 1.2 X10^3/UL (ref 1–3.4)
LYMPHOCYTES NFR BLD AUTO: 16 % (ref 22–44)
MCH RBC QN AUTO: 21.4 PG (ref 27–34.8)
MCHC RBC AUTO-ENTMCNC: 30.2 G/DL (ref 32.4–35.8)
MCV RBC AUTO: 70.8 FL (ref 80–100)
MD: NO
MONOCYTES # BLD AUTO: 0.53 X10^3/UL (ref 0.2–0.8)
MONOCYTES NFR BLD AUTO: 7 % (ref 2–9)
NEUTROPHILS # BLD AUTO: 5.64 X10^3/UL (ref 1.8–6.8)
NEUTROPHILS NFR BLD AUTO: 75 % (ref 42–75)
PLATELET # BLD AUTO: 225 X10^3/UL (ref 130–400)
PMV BLD AUTO: 9.4 FL (ref 7.4–10.4)
PROT SERPL-MCNC: 7.5 G/DL (ref 6.4–8.2)
PROTHROMBIN TIME: 24.5 SECONDS (ref 9.6–11.5)
RBC # BLD AUTO: 4.46 X10^6/UL (ref 3.82–5.3)

## 2019-04-21 PROCEDURE — 85025 COMPLETE CBC W/AUTO DIFF WBC: CPT

## 2019-04-21 PROCEDURE — 83550 IRON BINDING TEST: CPT

## 2019-04-21 PROCEDURE — 82728 ASSAY OF FERRITIN: CPT

## 2019-04-21 PROCEDURE — 85730 THROMBOPLASTIN TIME PARTIAL: CPT

## 2019-04-21 PROCEDURE — 99285 EMERGENCY DEPT VISIT HI MDM: CPT

## 2019-04-21 PROCEDURE — 83540 ASSAY OF IRON: CPT

## 2019-04-21 PROCEDURE — 5A09357 ASSISTANCE WITH RESPIRATORY VENTILATION, LESS THAN 24 CONSECUTIVE HOURS, CONTINUOUS POSITIVE AIRWAY PRESSURE: ICD-10-PCS | Performed by: HOSPITALIST

## 2019-04-21 PROCEDURE — 85610 PROTHROMBIN TIME: CPT

## 2019-04-21 PROCEDURE — 96374 THER/PROPH/DIAG INJ IV PUSH: CPT

## 2019-04-21 PROCEDURE — 83036 HEMOGLOBIN GLYCOSYLATED A1C: CPT

## 2019-04-21 PROCEDURE — 36415 COLL VENOUS BLD VENIPUNCTURE: CPT

## 2019-04-21 PROCEDURE — 80048 BASIC METABOLIC PNL TOTAL CA: CPT

## 2019-04-21 PROCEDURE — 80053 COMPREHEN METABOLIC PANEL: CPT

## 2019-04-21 PROCEDURE — 82962 GLUCOSE BLOOD TEST: CPT

## 2019-04-21 PROCEDURE — 71045 X-RAY EXAM CHEST 1 VIEW: CPT

## 2019-04-21 RX ADMIN — GABAPENTIN SCH MG: 300 CAPSULE ORAL at 22:00

## 2019-04-21 RX ADMIN — LACTOBACILLUS TAB SCH TAB: TAB at 22:01

## 2019-04-21 RX ADMIN — OXYCODONE HYDROCHLORIDE PRN MG: 5 TABLET ORAL at 19:29

## 2019-04-21 RX ADMIN — INSULIN LISPRO SCH UNITS: 100 INJECTION, SOLUTION INTRAVENOUS; SUBCUTANEOUS at 22:03

## 2019-04-21 RX ADMIN — OXYCODONE HYDROCHLORIDE SCH MG: 5 TABLET ORAL at 21:59

## 2019-04-21 RX ADMIN — ACETAMINOPHEN SCH MG: 325 TABLET, FILM COATED ORAL at 17:00

## 2019-04-21 RX ADMIN — KETOROLAC TROMETHAMINE PRN MG: 30 INJECTION, SOLUTION INTRAMUSCULAR at 18:19

## 2019-04-21 RX ADMIN — SODIUM CHLORIDE SCH MLS/HR: 0.9 INJECTION, SOLUTION INTRAVENOUS at 18:32

## 2019-04-21 NOTE — NUR
pt refusing more than 2L o2. pt's family has brought bipap to improve sats. 
report to jovana haynes. pt ready for transport.

## 2019-04-21 NOTE — NUR
PT BIB EMS AFTER MECHANICAL GLF TODAY.  AND SON AT BEDSIDE.  
STATES SHE TRIPPED OVER A RUG AT [facility] AND FELL ONTO LEFT SHOULDER. PAIN TO 
ANTERIOR LEFT SHOULDER RAD TO LEFT ARM AND LEFT NECK. CMS INTACT. CONNECTED TO 
MONITORS. VSS. EDMD ASSESSMENT COMPLETE. AWAITING ORDERS.

## 2019-04-21 NOTE — NUR
during medication administration, pt became verbally abusive toward nurse and 
. pt requested that  tighten coban and  did as asked 
two separate times. pt then became upset with this rn, stating "What's your 
name? As soon as I'm better, I'm gonna punch you in the arm!" pt was told by 
this rn that threats to staff are unacceptable and will not be tolerated. 
charge rn notified and present to speak with pt. pt clearly educated that any 
threats are unacceptable and any further threats will be handled by security. 
pt medicated per mar. no further needs at this time. vss.  at bedside.

## 2019-04-21 NOTE — NUR
pts  (pain management md) stated that pt needed an extra dose of 
gabapentin. md notified and orders received. ptmedicated per mar. pt states she 
needs to go to the restroom. pt currently "woozy" from medications and unsteady 
on feet. pt offered and declined bedpan until she gets more pain medication. 
sling being applied at this time. pt yelling out and made tech stop application 
of sling. pt refusing all interventions until she gets more pain medication. pt 
requesting and provided with ice packs. md notified. awaiting orders.

## 2019-04-22 VITALS — DIASTOLIC BLOOD PRESSURE: 53 MMHG | SYSTOLIC BLOOD PRESSURE: 101 MMHG

## 2019-04-22 VITALS — SYSTOLIC BLOOD PRESSURE: 95 MMHG | DIASTOLIC BLOOD PRESSURE: 58 MMHG

## 2019-04-22 VITALS — SYSTOLIC BLOOD PRESSURE: 104 MMHG | DIASTOLIC BLOOD PRESSURE: 54 MMHG

## 2019-04-22 VITALS — DIASTOLIC BLOOD PRESSURE: 59 MMHG | SYSTOLIC BLOOD PRESSURE: 113 MMHG

## 2019-04-22 LAB
% IRON SATURATION: 6 % (ref 20–55)
ANION GAP SERPL CALC-SCNC: 6 MMOL/L (ref 5–15)
BASOPHILS # BLD AUTO: 0.02 X10^3/UL (ref 0–0.1)
BASOPHILS NFR BLD AUTO: 0 % (ref 0–1)
CALCIUM SERPL-MCNC: 8.2 MG/DL (ref 8.5–10.1)
CHLORIDE SERPL-SCNC: 103 MMOL/L (ref 98–107)
CREAT SERPL-MCNC: 0.93 MG/DL (ref 0.55–1.02)
EOSINOPHIL # BLD AUTO: 0.17 X10^3/UL (ref 0–0.4)
EOSINOPHIL NFR BLD AUTO: 3 % (ref 1–7)
ERYTHROCYTE [DISTWIDTH] IN BLOOD BY AUTOMATED COUNT: 22.4 % (ref 9.6–15.2)
IRON LEVEL: 25 MCG/DL (ref 50–170)
LYMPHOCYTES # BLD AUTO: 1.95 X10^3/UL (ref 1–3.4)
LYMPHOCYTES NFR BLD AUTO: 28 % (ref 22–44)
MCH RBC QN AUTO: 21.9 PG (ref 27–34.8)
MCHC RBC AUTO-ENTMCNC: 30.9 G/DL (ref 32.4–35.8)
MCV RBC AUTO: 71 FL (ref 80–100)
MD: (no result)
MONOCYTES # BLD AUTO: 0.61 X10^3/UL (ref 0.2–0.8)
MONOCYTES NFR BLD AUTO: 9 % (ref 2–9)
NEUTROPHILS # BLD AUTO: 4.32 X10^3/UL (ref 1.8–6.8)
NEUTROPHILS NFR BLD AUTO: 61 % (ref 42–75)
PLATELET # BLD AUTO: 196 X10^3/UL (ref 130–400)
PMV BLD AUTO: 9.5 FL (ref 7.4–10.4)
RBC # BLD AUTO: 4.05 X10^6/UL (ref 3.82–5.3)
TIBC SERPL-MCNC: 405 MCG/DL (ref 250–450)

## 2019-04-22 RX ADMIN — OXYCODONE HYDROCHLORIDE PRN MG: 5 TABLET ORAL at 05:04

## 2019-04-22 RX ADMIN — OXYCODONE HYDROCHLORIDE PRN MG: 5 TABLET ORAL at 14:44

## 2019-04-22 RX ADMIN — HYDROMORPHONE HYDROCHLORIDE PRN MG: 2 INJECTION INTRAMUSCULAR; INTRAVENOUS; SUBCUTANEOUS at 10:38

## 2019-04-22 RX ADMIN — SODIUM CHLORIDE SCH MLS/HR: 0.9 INJECTION, SOLUTION INTRAVENOUS at 05:03

## 2019-04-22 RX ADMIN — ACETAMINOPHEN SCH MG: 325 TABLET, FILM COATED ORAL at 11:40

## 2019-04-22 RX ADMIN — KETOROLAC TROMETHAMINE PRN MG: 30 INJECTION, SOLUTION INTRAMUSCULAR at 00:21

## 2019-04-22 RX ADMIN — INSULIN LISPRO SCH UNITS: 100 INJECTION, SOLUTION INTRAVENOUS; SUBCUTANEOUS at 11:54

## 2019-04-22 RX ADMIN — INSULIN LISPRO SCH UNITS: 100 INJECTION, SOLUTION INTRAVENOUS; SUBCUTANEOUS at 08:05

## 2019-04-22 RX ADMIN — LACTOBACILLUS TAB SCH TAB: TAB at 08:05

## 2019-04-22 RX ADMIN — ACETAMINOPHEN SCH MG: 325 TABLET, FILM COATED ORAL at 05:00

## 2019-04-22 RX ADMIN — GABAPENTIN SCH MG: 300 CAPSULE ORAL at 08:15

## 2019-04-22 RX ADMIN — ACETAMINOPHEN SCH MG: 325 TABLET, FILM COATED ORAL at 00:22

## 2019-04-22 RX ADMIN — OXYCODONE HYDROCHLORIDE SCH MG: 5 TABLET ORAL at 08:16

## 2019-04-22 RX ADMIN — KETOROLAC TROMETHAMINE PRN MG: 30 INJECTION, SOLUTION INTRAMUSCULAR at 14:43

## 2019-04-22 RX ADMIN — HYDROMORPHONE HYDROCHLORIDE PRN MG: 2 INJECTION INTRAMUSCULAR; INTRAVENOUS; SUBCUTANEOUS at 12:01

## 2019-11-15 DIAGNOSIS — Z01.810 PRE-OPERATIVE CARDIOVASCULAR EXAMINATION: ICD-10-CM

## 2019-11-15 DIAGNOSIS — Z01.812 PRE-OPERATIVE LABORATORY EXAMINATION: ICD-10-CM

## 2019-11-15 LAB
ANION GAP SERPL CALC-SCNC: 7 MMOL/L (ref 0–11.9)
BUN SERPL-MCNC: 8 MG/DL (ref 8–22)
CALCIUM SERPL-MCNC: 10.6 MG/DL (ref 8.5–10.5)
CHLORIDE SERPL-SCNC: 101 MMOL/L (ref 96–112)
CO2 SERPL-SCNC: 30 MMOL/L (ref 20–33)
CREAT SERPL-MCNC: 0.61 MG/DL (ref 0.5–1.4)
EKG IMPRESSION: NORMAL
GLUCOSE SERPL-MCNC: 151 MG/DL (ref 65–99)
POTASSIUM SERPL-SCNC: 4.3 MMOL/L (ref 3.6–5.5)
SODIUM SERPL-SCNC: 138 MMOL/L (ref 135–145)

## 2019-11-15 PROCEDURE — 93005 ELECTROCARDIOGRAM TRACING: CPT

## 2019-11-15 PROCEDURE — 36415 COLL VENOUS BLD VENIPUNCTURE: CPT

## 2019-11-15 PROCEDURE — 93010 ELECTROCARDIOGRAM REPORT: CPT | Performed by: INTERNAL MEDICINE

## 2019-11-15 PROCEDURE — 80048 BASIC METABOLIC PNL TOTAL CA: CPT

## 2019-11-15 RX ORDER — GABAPENTIN 300 MG/1
600 CAPSULE ORAL 3 TIMES DAILY
Status: ON HOLD | COMMUNITY
End: 2024-01-05

## 2019-11-15 RX ORDER — CLONAZEPAM 0.25 MG/1
1 TABLET, ORALLY DISINTEGRATING ORAL
Status: ON HOLD | COMMUNITY
End: 2019-11-27

## 2019-11-15 RX ORDER — PROMETHAZINE HYDROCHLORIDE 25 MG/1
25 TABLET ORAL EVERY 6 HOURS PRN
Status: ON HOLD | COMMUNITY
End: 2022-08-22

## 2019-11-15 RX ORDER — RIZATRIPTAN BENZOATE 10 MG/1
10 TABLET, ORALLY DISINTEGRATING ORAL
COMMUNITY
End: 2023-05-12

## 2019-11-27 ENCOUNTER — APPOINTMENT (OUTPATIENT)
Dept: RADIOLOGY | Facility: MEDICAL CENTER | Age: 59
DRG: 483 | End: 2019-11-27
Attending: SURGERY
Payer: COMMERCIAL

## 2019-11-27 ENCOUNTER — HOSPITAL ENCOUNTER (INPATIENT)
Facility: MEDICAL CENTER | Age: 59
LOS: 3 days | DRG: 483 | End: 2019-11-30
Attending: SURGERY | Admitting: SURGERY
Payer: COMMERCIAL

## 2019-11-27 ENCOUNTER — ANESTHESIA (OUTPATIENT)
Dept: SURGERY | Facility: MEDICAL CENTER | Age: 59
DRG: 483 | End: 2019-11-27
Payer: COMMERCIAL

## 2019-11-27 ENCOUNTER — ANESTHESIA EVENT (OUTPATIENT)
Dept: SURGERY | Facility: MEDICAL CENTER | Age: 59
DRG: 483 | End: 2019-11-27
Payer: COMMERCIAL

## 2019-11-27 DIAGNOSIS — Z87.81 HISTORY OF LEFT SHOULDER FRACTURE: ICD-10-CM

## 2019-11-27 PROBLEM — G89.18 POSTOPERATIVE PAIN: Status: ACTIVE | Noted: 2019-11-27

## 2019-11-27 LAB
GLUCOSE BLD-MCNC: 135 MG/DL (ref 65–99)
GLUCOSE BLD-MCNC: 190 MG/DL (ref 65–99)
GLUCOSE BLD-MCNC: 227 MG/DL (ref 65–99)
GLUCOSE BLD-MCNC: 252 MG/DL (ref 65–99)

## 2019-11-27 PROCEDURE — 73020 X-RAY EXAM OF SHOULDER: CPT | Mod: LT

## 2019-11-27 PROCEDURE — 700102 HCHG RX REV CODE 250 W/ 637 OVERRIDE(OP): Performed by: PHYSICIAN ASSISTANT

## 2019-11-27 PROCEDURE — 501838 HCHG SUTURE GENERAL: Performed by: SURGERY

## 2019-11-27 PROCEDURE — 160029 HCHG SURGERY MINUTES - 1ST 30 MINS LEVEL 4: Performed by: SURGERY

## 2019-11-27 PROCEDURE — 0LS40ZZ REPOSITION LEFT UPPER ARM TENDON, OPEN APPROACH: ICD-10-PCS | Performed by: SURGERY

## 2019-11-27 PROCEDURE — A9270 NON-COVERED ITEM OR SERVICE: HCPCS | Performed by: SURGERY

## 2019-11-27 PROCEDURE — 82962 GLUCOSE BLOOD TEST: CPT | Mod: 91

## 2019-11-27 PROCEDURE — 73060 X-RAY EXAM OF HUMERUS: CPT | Mod: LT

## 2019-11-27 PROCEDURE — 160041 HCHG SURGERY MINUTES - EA ADDL 1 MIN LEVEL 4: Performed by: SURGERY

## 2019-11-27 PROCEDURE — 500423 HCHG DRESSING, ABD COMBINE: Performed by: SURGERY

## 2019-11-27 PROCEDURE — 700111 HCHG RX REV CODE 636 W/ 250 OVERRIDE (IP)

## 2019-11-27 PROCEDURE — 770001 HCHG ROOM/CARE - MED/SURG/GYN PRIV*

## 2019-11-27 PROCEDURE — 160048 HCHG OR STATISTICAL LEVEL 1-5: Performed by: SURGERY

## 2019-11-27 PROCEDURE — C1713 ANCHOR/SCREW BN/BN,TIS/BN: HCPCS | Performed by: SURGERY

## 2019-11-27 PROCEDURE — 700101 HCHG RX REV CODE 250: Performed by: ANESTHESIOLOGY

## 2019-11-27 PROCEDURE — 160022 HCHG BLOCK: Performed by: SURGERY

## 2019-11-27 PROCEDURE — A4565 SLINGS: HCPCS | Performed by: SURGERY

## 2019-11-27 PROCEDURE — 94660 CPAP INITIATION&MGMT: CPT

## 2019-11-27 PROCEDURE — 160035 HCHG PACU - 1ST 60 MINS PHASE I: Performed by: SURGERY

## 2019-11-27 PROCEDURE — 502000 HCHG MISC OR IMPLANTS RC 0278: Performed by: SURGERY

## 2019-11-27 PROCEDURE — 700105 HCHG RX REV CODE 258: Performed by: ANESTHESIOLOGY

## 2019-11-27 PROCEDURE — 500026 HCHG ARTHROWAND COVAC: Performed by: SURGERY

## 2019-11-27 PROCEDURE — 0RRK00Z REPLACEMENT OF LEFT SHOULDER JOINT WITH REVERSE BALL AND SOCKET SYNTHETIC SUBSTITUTE, OPEN APPROACH: ICD-10-PCS | Performed by: SURGERY

## 2019-11-27 PROCEDURE — 700111 HCHG RX REV CODE 636 W/ 250 OVERRIDE (IP): Performed by: ANESTHESIOLOGY

## 2019-11-27 PROCEDURE — 160009 HCHG ANES TIME/MIN: Performed by: SURGERY

## 2019-11-27 PROCEDURE — 160036 HCHG PACU - EA ADDL 30 MINS PHASE I: Performed by: SURGERY

## 2019-11-27 PROCEDURE — A9270 NON-COVERED ITEM OR SERVICE: HCPCS | Performed by: PHYSICIAN ASSISTANT

## 2019-11-27 PROCEDURE — 700105 HCHG RX REV CODE 258: Performed by: SURGERY

## 2019-11-27 PROCEDURE — 160002 HCHG RECOVERY MINUTES (STAT): Performed by: SURGERY

## 2019-11-27 PROCEDURE — 0PHG04Z INSERTION OF INTERNAL FIXATION DEVICE INTO LEFT HUMERAL SHAFT, OPEN APPROACH: ICD-10-PCS | Performed by: SURGERY

## 2019-11-27 PROCEDURE — 500864 HCHG NEEDLE, SPINAL 18G: Performed by: SURGERY

## 2019-11-27 PROCEDURE — 700102 HCHG RX REV CODE 250 W/ 637 OVERRIDE(OP): Performed by: SURGERY

## 2019-11-27 PROCEDURE — 700111 HCHG RX REV CODE 636 W/ 250 OVERRIDE (IP): Performed by: SURGERY

## 2019-11-27 PROCEDURE — 0PBB0ZZ EXCISION OF LEFT CLAVICLE, OPEN APPROACH: ICD-10-PCS | Performed by: SURGERY

## 2019-11-27 PROCEDURE — A4450 NON-WATERPROOF TAPE: HCPCS | Performed by: SURGERY

## 2019-11-27 PROCEDURE — 502578 HCHG PACK, TOTAL HIP: Performed by: SURGERY

## 2019-11-27 PROCEDURE — 500562 HCHG FIBERWIRE: Performed by: SURGERY

## 2019-11-27 PROCEDURE — 501445 HCHG STAPLER, SKIN DISP: Performed by: SURGERY

## 2019-11-27 DEVICE — IMPLANTABLE DEVICE: Type: IMPLANTABLE DEVICE | Site: SHOULDER | Status: FUNCTIONAL

## 2019-11-27 DEVICE — CABLE ACC 2.0MM COCR W/CLAMP: Type: IMPLANTABLE DEVICE | Site: SHOULDER | Status: FUNCTIONAL

## 2019-11-27 DEVICE — K-WIRE PERI-LOCK DISPOSABLE 2.0MM X 150MM (6TX6+2TX4+1TX4=48) (6EA/BX): Type: IMPLANTABLE DEVICE | Site: SHOULDER | Status: FUNCTIONAL

## 2019-11-27 RX ORDER — CEFAZOLIN SODIUM 1 G/3ML
INJECTION, POWDER, FOR SOLUTION INTRAMUSCULAR; INTRAVENOUS PRN
Status: DISCONTINUED | OUTPATIENT
Start: 2019-11-27 | End: 2019-11-27 | Stop reason: SURG

## 2019-11-27 RX ORDER — PHENYLEPHRINE HCL IN 0.9% NACL 0.5 MG/5ML
SYRINGE (ML) INTRAVENOUS PRN
Status: DISCONTINUED | OUTPATIENT
Start: 2019-11-27 | End: 2019-11-27 | Stop reason: SURG

## 2019-11-27 RX ORDER — HYDRALAZINE HYDROCHLORIDE 20 MG/ML
5 INJECTION INTRAMUSCULAR; INTRAVENOUS
Status: DISCONTINUED | OUTPATIENT
Start: 2019-11-27 | End: 2019-11-27 | Stop reason: HOSPADM

## 2019-11-27 RX ORDER — OXYCODONE HYDROCHLORIDE 10 MG/1
10 TABLET ORAL
Status: DISCONTINUED | OUTPATIENT
Start: 2019-11-27 | End: 2019-11-28

## 2019-11-27 RX ORDER — DEXAMETHASONE SODIUM PHOSPHATE 4 MG/ML
INJECTION, SOLUTION INTRA-ARTICULAR; INTRALESIONAL; INTRAMUSCULAR; INTRAVENOUS; SOFT TISSUE PRN
Status: DISCONTINUED | OUTPATIENT
Start: 2019-11-27 | End: 2019-11-27 | Stop reason: SURG

## 2019-11-27 RX ORDER — TRANEXAMIC ACID 100 MG/ML
INJECTION, SOLUTION INTRAVENOUS PRN
Status: DISCONTINUED | OUTPATIENT
Start: 2019-11-27 | End: 2019-11-27 | Stop reason: SURG

## 2019-11-27 RX ORDER — ROCURONIUM BROMIDE 10 MG/ML
INJECTION, SOLUTION INTRAVENOUS PRN
Status: DISCONTINUED | OUTPATIENT
Start: 2019-11-27 | End: 2019-11-27 | Stop reason: SURG

## 2019-11-27 RX ORDER — HYDROMORPHONE HYDROCHLORIDE 1 MG/ML
0.2 INJECTION, SOLUTION INTRAMUSCULAR; INTRAVENOUS; SUBCUTANEOUS
Status: DISCONTINUED | OUTPATIENT
Start: 2019-11-27 | End: 2019-11-27 | Stop reason: HOSPADM

## 2019-11-27 RX ORDER — BUDESONIDE AND FORMOTEROL FUMARATE DIHYDRATE 160; 4.5 UG/1; UG/1
1 AEROSOL RESPIRATORY (INHALATION) 2 TIMES DAILY
Status: DISCONTINUED | OUTPATIENT
Start: 2019-11-27 | End: 2019-11-28

## 2019-11-27 RX ORDER — OXYCODONE HCL 5 MG/5 ML
5 SOLUTION, ORAL ORAL
Status: DISCONTINUED | OUTPATIENT
Start: 2019-11-27 | End: 2019-11-27 | Stop reason: HOSPADM

## 2019-11-27 RX ORDER — SUMATRIPTAN 25 MG/1
100 TABLET, FILM COATED ORAL
Status: DISCONTINUED | OUTPATIENT
Start: 2019-11-27 | End: 2019-11-30 | Stop reason: HOSPADM

## 2019-11-27 RX ORDER — SODIUM CHLORIDE, SODIUM LACTATE, POTASSIUM CHLORIDE, CALCIUM CHLORIDE 600; 310; 30; 20 MG/100ML; MG/100ML; MG/100ML; MG/100ML
INJECTION, SOLUTION INTRAVENOUS CONTINUOUS
Status: ACTIVE | OUTPATIENT
Start: 2019-11-27 | End: 2019-11-27

## 2019-11-27 RX ORDER — OXYCODONE HCL 5 MG/5 ML
10 SOLUTION, ORAL ORAL
Status: DISCONTINUED | OUTPATIENT
Start: 2019-11-27 | End: 2019-11-27 | Stop reason: HOSPADM

## 2019-11-27 RX ORDER — AMOXICILLIN 250 MG
1 CAPSULE ORAL
Status: DISCONTINUED | OUTPATIENT
Start: 2019-11-27 | End: 2019-11-30 | Stop reason: HOSPADM

## 2019-11-27 RX ORDER — MEPERIDINE HYDROCHLORIDE 25 MG/ML
12.5 INJECTION INTRAMUSCULAR; INTRAVENOUS; SUBCUTANEOUS
Status: DISCONTINUED | OUTPATIENT
Start: 2019-11-27 | End: 2019-11-27 | Stop reason: HOSPADM

## 2019-11-27 RX ORDER — LIDOCAINE HYDROCHLORIDE 20 MG/ML
INJECTION, SOLUTION EPIDURAL; INFILTRATION; INTRACAUDAL; PERINEURAL PRN
Status: DISCONTINUED | OUTPATIENT
Start: 2019-11-27 | End: 2019-11-27 | Stop reason: SURG

## 2019-11-27 RX ORDER — METOPROLOL TARTRATE 1 MG/ML
INJECTION, SOLUTION INTRAVENOUS PRN
Status: DISCONTINUED | OUTPATIENT
Start: 2019-11-27 | End: 2019-11-27 | Stop reason: SURG

## 2019-11-27 RX ORDER — FEBUXOSTAT 40 MG/1
80 TABLET, FILM COATED ORAL
Status: DISCONTINUED | OUTPATIENT
Start: 2019-11-27 | End: 2019-11-30 | Stop reason: HOSPADM

## 2019-11-27 RX ORDER — HYDROMORPHONE HYDROCHLORIDE 1 MG/ML
0.5 INJECTION, SOLUTION INTRAMUSCULAR; INTRAVENOUS; SUBCUTANEOUS
Status: DISCONTINUED | OUTPATIENT
Start: 2019-11-27 | End: 2019-11-27 | Stop reason: HOSPADM

## 2019-11-27 RX ORDER — LEVOTHYROXINE SODIUM 112 UG/1
112 TABLET ORAL DAILY
Status: DISCONTINUED | OUTPATIENT
Start: 2019-11-28 | End: 2019-11-30 | Stop reason: HOSPADM

## 2019-11-27 RX ORDER — ALBUTEROL SULFATE 90 UG/1
2 AEROSOL, METERED RESPIRATORY (INHALATION) 2 TIMES DAILY PRN
Status: DISCONTINUED | OUTPATIENT
Start: 2019-11-27 | End: 2019-11-30 | Stop reason: HOSPADM

## 2019-11-27 RX ORDER — CLINDAMYCIN PHOSPHATE 150 MG/ML
INJECTION, SOLUTION INTRAVENOUS PRN
Status: DISCONTINUED | OUTPATIENT
Start: 2019-11-27 | End: 2019-11-27 | Stop reason: SURG

## 2019-11-27 RX ORDER — BISACODYL 10 MG
10 SUPPOSITORY, RECTAL RECTAL
Status: DISCONTINUED | OUTPATIENT
Start: 2019-11-27 | End: 2019-11-30 | Stop reason: HOSPADM

## 2019-11-27 RX ORDER — AMOXICILLIN 250 MG
1 CAPSULE ORAL NIGHTLY
Status: DISCONTINUED | OUTPATIENT
Start: 2019-11-27 | End: 2019-11-30 | Stop reason: HOSPADM

## 2019-11-27 RX ORDER — SPIRONOLACTONE 50 MG/1
50 TABLET, FILM COATED ORAL
Status: DISCONTINUED | OUTPATIENT
Start: 2019-11-27 | End: 2019-11-30 | Stop reason: HOSPADM

## 2019-11-27 RX ORDER — BUPIVACAINE HYDROCHLORIDE 5 MG/ML
INJECTION, SOLUTION EPIDURAL; INTRACAUDAL PRN
Status: DISCONTINUED | OUTPATIENT
Start: 2019-11-27 | End: 2019-11-27 | Stop reason: SURG

## 2019-11-27 RX ORDER — OXYCODONE HYDROCHLORIDE 10 MG/1
10 TABLET ORAL EVERY 4 HOURS PRN
Qty: 40 TAB | Refills: 0 | Status: SHIPPED | OUTPATIENT
Start: 2019-11-27 | End: 2019-12-04

## 2019-11-27 RX ORDER — ACETAMINOPHEN 500 MG
1000 TABLET ORAL EVERY 6 HOURS
Status: DISCONTINUED | OUTPATIENT
Start: 2019-11-27 | End: 2019-11-30 | Stop reason: HOSPADM

## 2019-11-27 RX ORDER — OMEPRAZOLE 20 MG/1
20 CAPSULE, DELAYED RELEASE ORAL EVERY EVENING
Status: DISCONTINUED | OUTPATIENT
Start: 2019-11-27 | End: 2019-11-30 | Stop reason: HOSPADM

## 2019-11-27 RX ORDER — DOCUSATE SODIUM 100 MG/1
100 CAPSULE, LIQUID FILLED ORAL 2 TIMES DAILY
Status: DISCONTINUED | OUTPATIENT
Start: 2019-11-27 | End: 2019-11-30 | Stop reason: HOSPADM

## 2019-11-27 RX ORDER — PROMETHAZINE HYDROCHLORIDE 25 MG/1
25 TABLET ORAL EVERY 6 HOURS PRN
Status: DISCONTINUED | OUTPATIENT
Start: 2019-11-27 | End: 2019-11-30 | Stop reason: HOSPADM

## 2019-11-27 RX ORDER — ONDANSETRON 2 MG/ML
4 INJECTION INTRAMUSCULAR; INTRAVENOUS EVERY 4 HOURS PRN
Status: DISCONTINUED | OUTPATIENT
Start: 2019-11-27 | End: 2019-11-30 | Stop reason: HOSPADM

## 2019-11-27 RX ORDER — MELOXICAM 7.5 MG/1
7.5 TABLET ORAL DAILY
Status: DISCONTINUED | OUTPATIENT
Start: 2019-11-27 | End: 2019-11-30 | Stop reason: HOSPADM

## 2019-11-27 RX ORDER — DIPHENHYDRAMINE HYDROCHLORIDE 50 MG/ML
25 INJECTION INTRAMUSCULAR; INTRAVENOUS EVERY 6 HOURS PRN
Status: DISCONTINUED | OUTPATIENT
Start: 2019-11-27 | End: 2019-11-30 | Stop reason: HOSPADM

## 2019-11-27 RX ORDER — DIPHENHYDRAMINE HYDROCHLORIDE 50 MG/ML
12.5 INJECTION INTRAMUSCULAR; INTRAVENOUS
Status: DISCONTINUED | OUTPATIENT
Start: 2019-11-27 | End: 2019-11-27 | Stop reason: HOSPADM

## 2019-11-27 RX ORDER — MAGNESIUM SULFATE HEPTAHYDRATE 40 MG/ML
INJECTION, SOLUTION INTRAVENOUS PRN
Status: DISCONTINUED | OUTPATIENT
Start: 2019-11-27 | End: 2019-11-27 | Stop reason: SURG

## 2019-11-27 RX ORDER — HYDROMORPHONE HYDROCHLORIDE 1 MG/ML
0.4 INJECTION, SOLUTION INTRAMUSCULAR; INTRAVENOUS; SUBCUTANEOUS
Status: DISCONTINUED | OUTPATIENT
Start: 2019-11-27 | End: 2019-11-27 | Stop reason: HOSPADM

## 2019-11-27 RX ORDER — OXYCODONE HYDROCHLORIDE 5 MG/1
10 TABLET ORAL EVERY 6 HOURS PRN
Status: DISCONTINUED | OUTPATIENT
Start: 2019-11-27 | End: 2019-11-28

## 2019-11-27 RX ORDER — ONDANSETRON 2 MG/ML
4 INJECTION INTRAMUSCULAR; INTRAVENOUS
Status: COMPLETED | OUTPATIENT
Start: 2019-11-27 | End: 2019-11-27

## 2019-11-27 RX ORDER — LIDOCAINE 50 MG/G
1-4 PATCH TOPICAL EVERY 12 HOURS
Status: ON HOLD | COMMUNITY
End: 2022-08-22

## 2019-11-27 RX ORDER — ONDANSETRON 2 MG/ML
INJECTION INTRAMUSCULAR; INTRAVENOUS PRN
Status: DISCONTINUED | OUTPATIENT
Start: 2019-11-27 | End: 2019-11-27 | Stop reason: SURG

## 2019-11-27 RX ORDER — GABAPENTIN 300 MG/1
600 CAPSULE ORAL 3 TIMES DAILY
Status: DISCONTINUED | OUTPATIENT
Start: 2019-11-27 | End: 2019-11-30 | Stop reason: HOSPADM

## 2019-11-27 RX ORDER — HALOPERIDOL 5 MG/ML
1 INJECTION INTRAMUSCULAR EVERY 6 HOURS PRN
Status: DISCONTINUED | OUTPATIENT
Start: 2019-11-27 | End: 2019-11-30 | Stop reason: HOSPADM

## 2019-11-27 RX ORDER — OXYCODONE HYDROCHLORIDE 5 MG/1
5 TABLET ORAL
Status: DISCONTINUED | OUTPATIENT
Start: 2019-11-27 | End: 2019-11-28

## 2019-11-27 RX ORDER — SODIUM CHLORIDE, SODIUM LACTATE, POTASSIUM CHLORIDE, CALCIUM CHLORIDE 600; 310; 30; 20 MG/100ML; MG/100ML; MG/100ML; MG/100ML
INJECTION, SOLUTION INTRAVENOUS CONTINUOUS
Status: DISCONTINUED | OUTPATIENT
Start: 2019-11-27 | End: 2019-11-27 | Stop reason: HOSPADM

## 2019-11-27 RX ORDER — OXYCODONE HYDROCHLORIDE 10 MG/1
10 TABLET ORAL EVERY 6 HOURS PRN
Qty: 40 TAB | Refills: 0 | Status: SHIPPED | OUTPATIENT
Start: 2019-11-27 | End: 2019-12-04

## 2019-11-27 RX ORDER — LEVALBUTEROL TARTRATE 45 UG/1
2 AEROSOL, METERED ORAL 4 TIMES DAILY PRN
Status: ON HOLD | COMMUNITY
End: 2024-01-05

## 2019-11-27 RX ORDER — POLYETHYLENE GLYCOL 3350 17 G/17G
1 POWDER, FOR SOLUTION ORAL 2 TIMES DAILY PRN
Status: DISCONTINUED | OUTPATIENT
Start: 2019-11-27 | End: 2019-11-30 | Stop reason: HOSPADM

## 2019-11-27 RX ORDER — DEXMEDETOMIDINE HYDROCHLORIDE 100 UG/ML
INJECTION, SOLUTION INTRAVENOUS PRN
Status: DISCONTINUED | OUTPATIENT
Start: 2019-11-27 | End: 2019-11-27 | Stop reason: SURG

## 2019-11-27 RX ORDER — LIDOCAINE HYDROCHLORIDE 10 MG/ML
INJECTION, SOLUTION EPIDURAL; INFILTRATION; INTRACAUDAL; PERINEURAL
Status: COMPLETED
Start: 2019-11-27 | End: 2019-11-27

## 2019-11-27 RX ORDER — MONTELUKAST SODIUM 10 MG/1
10 TABLET ORAL EVERY EVENING
Status: DISCONTINUED | OUTPATIENT
Start: 2019-11-27 | End: 2019-11-30 | Stop reason: HOSPADM

## 2019-11-27 RX ORDER — DEXAMETHASONE SODIUM PHOSPHATE 4 MG/ML
4 INJECTION, SOLUTION INTRA-ARTICULAR; INTRALESIONAL; INTRAMUSCULAR; INTRAVENOUS; SOFT TISSUE
Status: DISCONTINUED | OUTPATIENT
Start: 2019-11-27 | End: 2019-11-30 | Stop reason: HOSPADM

## 2019-11-27 RX ORDER — SODIUM CHLORIDE, SODIUM GLUCONATE, SODIUM ACETATE, POTASSIUM CHLORIDE AND MAGNESIUM CHLORIDE 526; 502; 368; 37; 30 MG/100ML; MG/100ML; MG/100ML; MG/100ML; MG/100ML
INJECTION, SOLUTION INTRAVENOUS
Status: DISCONTINUED | OUTPATIENT
Start: 2019-11-27 | End: 2019-11-27 | Stop reason: SURG

## 2019-11-27 RX ORDER — PANTOPRAZOLE SODIUM 40 MG/1
40 TABLET, DELAYED RELEASE ORAL EVERY EVENING
COMMUNITY
End: 2023-06-20

## 2019-11-27 RX ORDER — CEFAZOLIN SODIUM 2 G/100ML
2 INJECTION, SOLUTION INTRAVENOUS EVERY 8 HOURS
Status: COMPLETED | OUTPATIENT
Start: 2019-11-27 | End: 2019-11-28

## 2019-11-27 RX ORDER — OXYCODONE HYDROCHLORIDE 10 MG/1
10 TABLET ORAL EVERY 4 HOURS PRN
Qty: 28 TAB | Refills: 0 | Status: SHIPPED | OUTPATIENT
Start: 2019-11-27 | End: 2019-11-27 | Stop reason: SDUPTHER

## 2019-11-27 RX ORDER — HALOPERIDOL 5 MG/ML
1 INJECTION INTRAMUSCULAR
Status: DISCONTINUED | OUTPATIENT
Start: 2019-11-27 | End: 2019-11-27 | Stop reason: HOSPADM

## 2019-11-27 RX ORDER — ENEMA 19; 7 G/133ML; G/133ML
1 ENEMA RECTAL
Status: DISCONTINUED | OUTPATIENT
Start: 2019-11-27 | End: 2019-11-30 | Stop reason: HOSPADM

## 2019-11-27 RX ORDER — HYDROMORPHONE HYDROCHLORIDE 2 MG/ML
INJECTION, SOLUTION INTRAMUSCULAR; INTRAVENOUS; SUBCUTANEOUS PRN
Status: DISCONTINUED | OUTPATIENT
Start: 2019-11-27 | End: 2019-11-27 | Stop reason: SURG

## 2019-11-27 RX ORDER — SCOLOPAMINE TRANSDERMAL SYSTEM 1 MG/1
1 PATCH, EXTENDED RELEASE TRANSDERMAL
Status: DISCONTINUED | OUTPATIENT
Start: 2019-11-27 | End: 2019-11-30 | Stop reason: HOSPADM

## 2019-11-27 RX ADMIN — ONDANSETRON 4 MG: 2 INJECTION INTRAMUSCULAR; INTRAVENOUS at 13:29

## 2019-11-27 RX ADMIN — LIDOCAINE HYDROCHLORIDE 5 ML: 10 INJECTION, SOLUTION EPIDURAL; INFILTRATION; INTRACAUDAL at 09:08

## 2019-11-27 RX ADMIN — Medication 100 MCG: at 11:54

## 2019-11-27 RX ADMIN — FEBUXOSTAT 80 MG: 40 TABLET, FILM COATED ORAL at 20:54

## 2019-11-27 RX ADMIN — HYDROMORPHONE HYDROCHLORIDE 0.5 MG: 1 INJECTION, SOLUTION INTRAMUSCULAR; INTRAVENOUS; SUBCUTANEOUS at 14:57

## 2019-11-27 RX ADMIN — ACETAMINOPHEN 1000 MG: 500 TABLET ORAL at 17:18

## 2019-11-27 RX ADMIN — DEXMEDETOMIDINE HYDROCHLORIDE 20 MCG: 100 INJECTION, SOLUTION INTRAVENOUS at 11:49

## 2019-11-27 RX ADMIN — Medication 100 MCG: at 11:59

## 2019-11-27 RX ADMIN — PHENYLEPHRINE HYDROCHLORIDE 30 MCG/MIN: 10 INJECTION INTRAVENOUS at 12:15

## 2019-11-27 RX ADMIN — CEFAZOLIN 3 G: 330 INJECTION, POWDER, FOR SOLUTION INTRAMUSCULAR; INTRAVENOUS at 10:58

## 2019-11-27 RX ADMIN — FENTANYL CITRATE 50 MCG: 0.05 INJECTION, SOLUTION INTRAMUSCULAR; INTRAVENOUS at 14:50

## 2019-11-27 RX ADMIN — INSULIN HUMAN 5 UNITS: 100 INJECTION, SOLUTION PARENTERAL at 21:09

## 2019-11-27 RX ADMIN — DEXAMETHASONE SODIUM PHOSPHATE 4 MG: 4 INJECTION, SOLUTION INTRA-ARTICULAR; INTRALESIONAL; INTRAMUSCULAR; INTRAVENOUS; SOFT TISSUE at 11:23

## 2019-11-27 RX ADMIN — FENTANYL CITRATE 50 MCG: 0.05 INJECTION, SOLUTION INTRAMUSCULAR; INTRAVENOUS at 14:40

## 2019-11-27 RX ADMIN — ONDANSETRON 4 MG: 2 INJECTION INTRAMUSCULAR; INTRAVENOUS at 14:35

## 2019-11-27 RX ADMIN — BUPIVACAINE HYDROCHLORIDE 18 ML: 5 INJECTION, SOLUTION EPIDURAL; INTRACAUDAL; PERINEURAL at 10:34

## 2019-11-27 RX ADMIN — OMEPRAZOLE 20 MG: 20 CAPSULE, DELAYED RELEASE ORAL at 17:18

## 2019-11-27 RX ADMIN — HYDROMORPHONE HYDROCHLORIDE 1 MG: 2 INJECTION, SOLUTION INTRAMUSCULAR; INTRAVENOUS; SUBCUTANEOUS at 11:00

## 2019-11-27 RX ADMIN — ROCURONIUM BROMIDE 100 MG: 10 INJECTION, SOLUTION INTRAVENOUS at 10:58

## 2019-11-27 RX ADMIN — TRANEXAMIC ACID 1000 MG: 100 INJECTION, SOLUTION INTRAVENOUS at 12:30

## 2019-11-27 RX ADMIN — GABAPENTIN 600 MG: 300 CAPSULE ORAL at 17:20

## 2019-11-27 RX ADMIN — INSULIN HUMAN 3 UNITS: 100 INJECTION, SOLUTION PARENTERAL at 17:35

## 2019-11-27 RX ADMIN — METOPROLOL TARTRATE 2.5 MG: 5 INJECTION, SOLUTION INTRAVENOUS at 11:29

## 2019-11-27 RX ADMIN — MONTELUKAST 10 MG: 10 TABLET, FILM COATED ORAL at 23:15

## 2019-11-27 RX ADMIN — TRANEXAMIC ACID 1000 MG: 100 INJECTION, SOLUTION INTRAVENOUS at 10:58

## 2019-11-27 RX ADMIN — DEXMEDETOMIDINE HYDROCHLORIDE 10 MCG: 100 INJECTION, SOLUTION INTRAVENOUS at 12:18

## 2019-11-27 RX ADMIN — Medication 100 MCG: at 12:12

## 2019-11-27 RX ADMIN — OXYCODONE HYDROCHLORIDE 10 MG: 10 TABLET ORAL at 17:31

## 2019-11-27 RX ADMIN — DEXMEDETOMIDINE HYDROCHLORIDE 10 MCG: 100 INJECTION, SOLUTION INTRAVENOUS at 13:26

## 2019-11-27 RX ADMIN — SODIUM CHLORIDE, SODIUM GLUCONATE, SODIUM ACETATE, POTASSIUM CHLORIDE AND MAGNESIUM CHLORIDE: 526; 502; 368; 37; 30 INJECTION, SOLUTION INTRAVENOUS at 12:47

## 2019-11-27 RX ADMIN — DEXAMETHASONE SODIUM PHOSPHATE 2 MG: 4 INJECTION, SOLUTION INTRA-ARTICULAR; INTRALESIONAL; INTRAMUSCULAR; INTRAVENOUS; SOFT TISSUE at 10:34

## 2019-11-27 RX ADMIN — PROPOFOL 200 MG: 10 INJECTION, EMULSION INTRAVENOUS at 10:58

## 2019-11-27 RX ADMIN — GABAPENTIN 600 MG: 300 CAPSULE ORAL at 23:15

## 2019-11-27 RX ADMIN — LIDOCAINE HYDROCHLORIDE 60 MG: 20 INJECTION, SOLUTION EPIDURAL; INFILTRATION; INTRACAUDAL at 10:58

## 2019-11-27 RX ADMIN — OXYCODONE HYDROCHLORIDE 10 MG: 10 TABLET ORAL at 20:54

## 2019-11-27 RX ADMIN — Medication 100 MCG: at 11:35

## 2019-11-27 RX ADMIN — SUGAMMADEX 200 MG: 100 INJECTION, SOLUTION INTRAVENOUS at 13:29

## 2019-11-27 RX ADMIN — HYDROMORPHONE HYDROCHLORIDE 1 MG: 2 INJECTION, SOLUTION INTRAMUSCULAR; INTRAVENOUS; SUBCUTANEOUS at 13:28

## 2019-11-27 RX ADMIN — Medication 400 MG: at 17:31

## 2019-11-27 RX ADMIN — MAGNESIUM SULFATE IN WATER 4 G: 40 INJECTION, SOLUTION INTRAVENOUS at 11:26

## 2019-11-27 RX ADMIN — CEFAZOLIN SODIUM 2 G: 2 INJECTION, SOLUTION INTRAVENOUS at 18:25

## 2019-11-27 RX ADMIN — CLINDAMYCIN PHOSPHATE 900 MG: 150 INJECTION, SOLUTION INTRAMUSCULAR; INTRAVENOUS at 10:58

## 2019-11-27 RX ADMIN — ACETAMINOPHEN 1000 MG: 500 TABLET ORAL at 23:15

## 2019-11-27 RX ADMIN — SODIUM CHLORIDE, POTASSIUM CHLORIDE, SODIUM LACTATE AND CALCIUM CHLORIDE: 600; 310; 30; 20 INJECTION, SOLUTION INTRAVENOUS at 09:08

## 2019-11-27 RX ADMIN — DEXMEDETOMIDINE HYDROCHLORIDE 10 MCG: 100 INJECTION, SOLUTION INTRAVENOUS at 12:36

## 2019-11-27 RX ADMIN — HYDROMORPHONE HYDROCHLORIDE 0.5 MG: 1 INJECTION, SOLUTION INTRAMUSCULAR; INTRAVENOUS; SUBCUTANEOUS at 14:43

## 2019-11-27 ASSESSMENT — LIFESTYLE VARIABLES
HOW MANY TIMES IN THE PAST YEAR HAVE YOU HAD 5 OR MORE DRINKS IN A DAY: 0
CONSUMPTION TOTAL: NEGATIVE
HAVE YOU EVER FELT YOU SHOULD CUT DOWN ON YOUR DRINKING: NO
TOTAL SCORE: 0
EVER_SMOKED: NEVER
AVERAGE NUMBER OF DAYS PER WEEK YOU HAVE A DRINK CONTAINING ALCOHOL: 0
HAVE PEOPLE ANNOYED YOU BY CRITICIZING YOUR DRINKING: NO
EVER FELT BAD OR GUILTY ABOUT YOUR DRINKING: NO
ON A TYPICAL DAY WHEN YOU DRINK ALCOHOL HOW MANY DRINKS DO YOU HAVE: 0
ALCOHOL_USE: YES
TOTAL SCORE: 0
EVER HAD A DRINK FIRST THING IN THE MORNING TO STEADY YOUR NERVES TO GET RID OF A HANGOVER: NO
TOTAL SCORE: 0

## 2019-11-27 ASSESSMENT — COGNITIVE AND FUNCTIONAL STATUS - GENERAL
PERSONAL GROOMING: A LITTLE
DRESSING REGULAR UPPER BODY CLOTHING: A LITTLE
SUGGESTED CMS G CODE MODIFIER MOBILITY: CK
MOBILITY SCORE: 19
SUGGESTED CMS G CODE MODIFIER DAILY ACTIVITY: CK
CLIMB 3 TO 5 STEPS WITH RAILING: A LITTLE
HELP NEEDED FOR BATHING: A LITTLE
MOVING TO AND FROM BED TO CHAIR: A LITTLE
MOVING FROM LYING ON BACK TO SITTING ON SIDE OF FLAT BED: A LITTLE
WALKING IN HOSPITAL ROOM: A LITTLE
DAILY ACTIVITIY SCORE: 19
STANDING UP FROM CHAIR USING ARMS: A LITTLE
TOILETING: A LITTLE
DRESSING REGULAR LOWER BODY CLOTHING: A LITTLE

## 2019-11-27 ASSESSMENT — COPD QUESTIONNAIRES
IN THE PAST 12 MONTHS DO YOU DO LESS THAN YOU USED TO BECAUSE OF YOUR BREATHING PROBLEMS: STRONGLY AGREE
COPD SCREENING SCORE: 8
DURING THE PAST 4 WEEKS HOW MUCH DID YOU FEEL SHORT OF BREATH: MOST  OR ALL OF THE TIME
HAVE YOU SMOKED AT LEAST 100 CIGARETTES IN YOUR ENTIRE LIFE: NO/DON'T KNOW
DO YOU EVER COUGH UP ANY MUCUS OR PHLEGM?: YES, EVERY DAY

## 2019-11-27 ASSESSMENT — PATIENT HEALTH QUESTIONNAIRE - PHQ9
SUM OF ALL RESPONSES TO PHQ9 QUESTIONS 1 AND 2: 3
6. FEELING BAD ABOUT YOURSELF - OR THAT YOU ARE A FAILURE OR HAVE LET YOURSELF OR YOUR FAMILY DOWN: NOT AL ALL
7. TROUBLE CONCENTRATING ON THINGS, SUCH AS READING THE NEWSPAPER OR WATCHING TELEVISION: SEVERAL DAYS
4. FEELING TIRED OR HAVING LITTLE ENERGY: NEARLY EVERY DAY
1. LITTLE INTEREST OR PLEASURE IN DOING THINGS: MORE THAN HALF THE DAYS
9. THOUGHTS THAT YOU WOULD BE BETTER OFF DEAD, OR OF HURTING YOURSELF: NOT AT ALL
5. POOR APPETITE OR OVEREATING: NEARLY EVERY DAY
8. MOVING OR SPEAKING SO SLOWLY THAT OTHER PEOPLE COULD HAVE NOTICED. OR THE OPPOSITE, BEING SO FIGETY OR RESTLESS THAT YOU HAVE BEEN MOVING AROUND A LOT MORE THAN USUAL: SEVERAL DAYS
3. TROUBLE FALLING OR STAYING ASLEEP OR SLEEPING TOO MUCH: NOT AT ALL
SUM OF ALL RESPONSES TO PHQ QUESTIONS 1-9: 11
2. FEELING DOWN, DEPRESSED, IRRITABLE, OR HOPELESS: SEVERAL DAYS

## 2019-11-27 ASSESSMENT — PAIN SCALES - GENERAL: PAIN_LEVEL: 4

## 2019-11-27 NOTE — ANESTHESIA PROCEDURE NOTES
Airway  Date/Time: 11/27/2019 11:00 AM  Performed by: Wilson Salmeron M.D.  Authorized by: Wilson Salmeron M.D.     Location:  OR  Urgency:  Elective  Difficult Airway: No    Indications for Airway Management:  Anesthesia  Spontaneous Ventilation: absent    Sedation Level:  Deep  Preoxygenated: Yes    Patient Position:  Sniffing  Mask Difficulty Assessment:  2 - vent by mask + OA or adjuvant +/- NMBA  Final Airway Type:  Endotracheal airway  Final Endotracheal Airway:  ETT  Cuffed: Yes    Technique Used for Successful ETT Placement:  Direct laryngoscopy  Insertion Site:  Oral  Blade Type:  Liz  Laryngoscope Blade/Videolaryngoscope Blade Size:  4  ETT Size (mm):  7.0  Measured from:  Lips  ETT to Lips (cm):  22  Placement Verified by: auscultation and capnometry    Cormack-Lehane Classification:  Grade IIa - partial view of glottis  Number of Attempts at Approach:  1

## 2019-11-27 NOTE — OR NURSING
1505-pt sleepy but arousable, moans with pain at times but rates pain 4-5, dressings CDI, post op xray done, discharge criteria met

## 2019-11-27 NOTE — ANESTHESIA PROCEDURE NOTES
Peripheral Block  Date/Time: 11/27/2019 10:32 AM  Performed by: Wilson Salmeron M.D.  Authorized by: Wilson Salmeron M.D.     Patient Location:  Pre-op  Start Time:  11/27/2019 10:32 AM  End Time:  11/27/2019 10:36 AM  Reason for Block: at surgeon's request and post-op pain management    patient identified, IV checked, site marked, risks and benefits discussed, surgical consent, monitors and equipment checked, pre-op evaluation and timeout performed    Patient Position:  Supine  Prep: ChloraPrep    Monitoring:  Heart rate, continuous pulse ox and cardiac monitor  Block Region:  Upper Extremity  Upper Extremity - Block Type:  BRACHIAL PLEXUS block, Interscalene approach    Laterality:  Left  Procedures: ultrasound guided  Image captured, interpreted and electronically stored.  Local Infiltration:  Lidocaine  Strength:  1 %  Dose:  3 ml  Block Type:  Single-shot  Needle Length:  100mm  Needle Gauge:  21 G  Needle Localization:  Ultrasound guidance  Injection Assessment:  Negative aspiration for heme, no paresthesia on injection, incremental injection and local visualized surrounding nerve on ultrasound  Evidence of intravascular injection: No     US Guided Interscalene Brachial Plexus Block   US transducer placed on the neck in oblique plane approximately at the level of C6.  Anterior and Middle Scalene (MSM) muscles identified with nerve trunks identified between the muscles.  Needle inserted lateral to probe and advanced under direct visualization through the MSM into a perineural position.  After negative aspiration, LA injected with ease and visualized surrounding the nerve trunks.

## 2019-11-27 NOTE — ANESTHESIA PREPROCEDURE EVALUATION
Insulin dependent DMII (poor control, HbA1c 9.8, surgeon aware), morbid obesity with BMI 46, hx of afib (currently sinus, has had two ablations). Off Eliquis for over 48 hours    Relevant Problems   ANESTHESIA   (+) Sleep apnea      PULMONARY   (+) Asthma      CARDIAC   (+) Atrial fibrillation (HCC)   (+) Varicose vein       Physical Exam    Airway   Mallampati: II  TM distance: >3 FB  Neck ROM: full       Cardiovascular - normal exam  Rhythm: regular  Rate: normal  (-) murmur     Dental - normal exam         Pulmonary - normal exam  Breath sounds clear to auscultation     Abdominal    Neurological - normal exam                 Anesthesia Plan    ASA 3   ASA physical status 3 criteria: diabetes - poorly controlled and morbid obesity - BMI greater than or equal to 40    Plan - general and peripheral nerve block     Peripheral nerve block will be post-op pain control  Airway plan will be ETT        Induction: intravenous    Postoperative Plan: Postoperative administration of opioids is intended.    Pertinent diagnostic labs and testing reviewed    Informed Consent:    Anesthetic plan and risks discussed with patient.    Use of blood products discussed with: patient whom consented to blood products.

## 2019-11-27 NOTE — ANESTHESIA POSTPROCEDURE EVALUATION
Patient: Jenifer Ramos    Procedure Summary     Date:  11/27/19 Room / Location:  [place] [address] / SURGERY [facility]    Anesthesia Start:  1053 Anesthesia Stop:  1401    Procedures:       ARTHROPLASTY, SHOULDER, TOTAL-REVERSE (Left Shoulder)      EXCISION, CLAVICLE, DISTAL-RESECTION (Shoulder) Diagnosis:  (SHOULDER PAIN LEFT)    Surgeon:  Hugo Beltran M.D. Responsible Provider:  Wilson Salmeron M.D.    Anesthesia Type:  general, peripheral nerve block ASA Status:  3          Final Anesthesia Type: general, peripheral nerve block  Last vitals  BP   Blood Pressure: (!) 99/56    Temp   36.4 °C (97.5 °F)    Pulse   Pulse: 80   Resp   12    SpO2   93 %      Anesthesia Post Evaluation    Patient location during evaluation: PACU  Patient participation: complete - patient participated  Level of consciousness: awake and alert  Pain score: 4    Airway patency: patent  Anesthetic complications: no  Cardiovascular status: hemodynamically stable  Respiratory status: acceptable  Hydration status: euvolemic    PONV: none

## 2019-11-27 NOTE — ANESTHESIA TIME REPORT
Anesthesia Start and Stop Event Times     Date Time Event    11/27/2019 1022 Ready for Procedure     1053 Anesthesia Start     1401 Anesthesia Stop        Responsible Staff  11/27/19    Name Role Begin End    Wilson Salmeron M.D. Anesth 1053 1401        Preop Diagnosis (Free Text):  Pre-op Diagnosis     SHOULDER PAIN LEFT        Preop Diagnosis (Codes):    Post op Diagnosis  Proximal humerus fracture  closed proximal humerus fracture, left    Premium Reason  Non-Premium    Comments:

## 2019-11-28 LAB
GLUCOSE BLD-MCNC: 193 MG/DL (ref 65–99)
GLUCOSE BLD-MCNC: 212 MG/DL (ref 65–99)
GLUCOSE BLD-MCNC: 220 MG/DL (ref 65–99)
GLUCOSE BLD-MCNC: 224 MG/DL (ref 65–99)

## 2019-11-28 PROCEDURE — 770001 HCHG ROOM/CARE - MED/SURG/GYN PRIV*

## 2019-11-28 PROCEDURE — 700102 HCHG RX REV CODE 250 W/ 637 OVERRIDE(OP): Performed by: SURGERY

## 2019-11-28 PROCEDURE — 700111 HCHG RX REV CODE 636 W/ 250 OVERRIDE (IP): Performed by: SURGERY

## 2019-11-28 PROCEDURE — 94660 CPAP INITIATION&MGMT: CPT

## 2019-11-28 PROCEDURE — 82962 GLUCOSE BLOOD TEST: CPT | Mod: 91

## 2019-11-28 PROCEDURE — A9270 NON-COVERED ITEM OR SERVICE: HCPCS | Performed by: SURGERY

## 2019-11-28 RX ORDER — OXYCODONE HYDROCHLORIDE 10 MG/1
10 TABLET ORAL EVERY 6 HOURS PRN
Status: DISCONTINUED | OUTPATIENT
Start: 2019-11-28 | End: 2019-11-28

## 2019-11-28 RX ORDER — OXYCODONE HYDROCHLORIDE 10 MG/1
10 TABLET ORAL EVERY 6 HOURS PRN
Status: DISCONTINUED | OUTPATIENT
Start: 2019-11-28 | End: 2019-11-30 | Stop reason: HOSPADM

## 2019-11-28 RX ORDER — GABAPENTIN 300 MG/1
900 CAPSULE ORAL 3 TIMES DAILY
Status: DISCONTINUED | OUTPATIENT
Start: 2019-11-28 | End: 2019-11-28

## 2019-11-28 RX ORDER — GABAPENTIN 300 MG/1
900 CAPSULE ORAL ONCE
Status: COMPLETED | OUTPATIENT
Start: 2019-11-28 | End: 2019-11-28

## 2019-11-28 RX ADMIN — ACETAMINOPHEN 1000 MG: 500 TABLET ORAL at 05:35

## 2019-11-28 RX ADMIN — OXYCODONE HYDROCHLORIDE 10 MG: 10 TABLET ORAL at 15:42

## 2019-11-28 RX ADMIN — INSULIN HUMAN 3 UNITS: 100 INJECTION, SOLUTION PARENTERAL at 13:03

## 2019-11-28 RX ADMIN — OXYCODONE HYDROCHLORIDE 10 MG: 10 TABLET ORAL at 05:36

## 2019-11-28 RX ADMIN — ACETAMINOPHEN 1000 MG: 500 TABLET ORAL at 18:16

## 2019-11-28 RX ADMIN — INSULIN HUMAN 2 UNITS: 100 INJECTION, SOLUTION PARENTERAL at 17:54

## 2019-11-28 RX ADMIN — FENTANYL CITRATE 50 MCG: 50 INJECTION, SOLUTION INTRAMUSCULAR; INTRAVENOUS at 20:20

## 2019-11-28 RX ADMIN — GABAPENTIN 600 MG: 300 CAPSULE ORAL at 18:16

## 2019-11-28 RX ADMIN — OMEPRAZOLE 20 MG: 20 CAPSULE, DELAYED RELEASE ORAL at 18:16

## 2019-11-28 RX ADMIN — MONTELUKAST 10 MG: 10 TABLET, FILM COATED ORAL at 18:16

## 2019-11-28 RX ADMIN — ACETAMINOPHEN 1000 MG: 500 TABLET ORAL at 22:30

## 2019-11-28 RX ADMIN — LEVOTHYROXINE SODIUM 112 MCG: 112 TABLET ORAL at 05:35

## 2019-11-28 RX ADMIN — INSULIN HUMAN 3 UNITS: 100 INJECTION, SOLUTION PARENTERAL at 08:29

## 2019-11-28 RX ADMIN — GABAPENTIN 900 MG: 300 CAPSULE ORAL at 22:30

## 2019-11-28 RX ADMIN — ACETAMINOPHEN 1000 MG: 500 TABLET ORAL at 11:50

## 2019-11-28 RX ADMIN — GABAPENTIN 600 MG: 300 CAPSULE ORAL at 08:34

## 2019-11-28 RX ADMIN — OXYCODONE HYDROCHLORIDE 10 MG: 10 TABLET ORAL at 08:34

## 2019-11-28 RX ADMIN — INSULIN HUMAN 3 UNITS: 100 INJECTION, SOLUTION PARENTERAL at 20:29

## 2019-11-28 RX ADMIN — FEBUXOSTAT 80 MG: 40 TABLET, FILM COATED ORAL at 20:20

## 2019-11-28 RX ADMIN — OXYCODONE HYDROCHLORIDE 10 MG: 10 TABLET ORAL at 11:50

## 2019-11-28 RX ADMIN — CEFAZOLIN SODIUM 2 G: 2 INJECTION, SOLUTION INTRAVENOUS at 06:36

## 2019-11-28 RX ADMIN — OXYCODONE HYDROCHLORIDE 10 MG: 10 TABLET ORAL at 18:43

## 2019-11-28 NOTE — CARE PLAN
Problem: Safety  Goal: Will remain free from injury  Outcome: PROGRESSING AS EXPECTED     Problem: Infection  Goal: Will remain free from infection  Outcome: PROGRESSING AS EXPECTED     Problem: Venous Thromboembolism (VTW)/Deep Vein Thrombosis (DVT) Prevention:  Goal: Patient will participate in Venous Thrombosis (VTE)/Deep Vein Thrombosis (DVT)Prevention Measures  Outcome: PROGRESSING AS EXPECTED     Problem: Bowel/Gastric:  Goal: Normal bowel function is maintained or improved  Outcome: PROGRESSING AS EXPECTED     Problem: Pain Management  Goal: Pain level will decrease to patient's comfort goal  Outcome: PROGRESSING AS EXPECTED     Problem: Respiratory:  Goal: Respiratory status will improve  Outcome: PROGRESSING AS EXPECTED  Note:   Pt on home Bipap at noc

## 2019-11-28 NOTE — FLOWSHEET NOTE
Respiratory Therapy Update                                                   O2 (LPM): 0 (11/28/19 0900)       Breath Sounds  RUL Breath Sounds: Clear (11/28/19 0840)  RML Breath Sounds: Clear;Diminished (11/28/19 0840)  RLL Breath Sounds: Clear;Diminished (11/28/19 0840)  DANIELLE Breath Sounds: Clear (11/28/19 0840)  LLL Breath Sounds: Clear;Diminished (11/28/19 0840)        Events/Summary/Plan: CPAP off at this time. (11/28/19 0840)

## 2019-11-28 NOTE — CARE PLAN
Problem: Communication  Goal: The ability to communicate needs accurately and effectively will improve  Outcome: PROGRESSING AS EXPECTED     Problem: Safety  Goal: Will remain free from injury  Outcome: PROGRESSING AS EXPECTED  Goal: Will remain free from falls  Outcome: PROGRESSING AS EXPECTED     Problem: Discharge Barriers/Planning  Goal: Patient's continuum of care needs will be met  Outcome: PROGRESSING AS EXPECTED     Problem: Pain Management  Goal: Pain level will decrease to patient's comfort goal  Outcome: PROGRESSING AS EXPECTED

## 2019-11-28 NOTE — RESPIRATORY CARE
COPD EDUCATION by COPD CLINICAL EDUCATOR  11/28/2019 at 10:07 AM by Natalee Pierre     Patient reviewed by COPD education team. Patient does not have a history or diagnosis of COPD and is a non-smoker (never), therefore does not qualify for the COPD program. (Asthma [facility])

## 2019-11-28 NOTE — PROGRESS NOTES
"   Orthopaedic PA Progress Note    Interval changes:did well overnight  ROS - Patient denies any new issues. No chest pain, dyspnea, or fever.  Pain well controlled.    /71   Pulse 72   Temp 36.9 °C (98.4 °F) (Axillary)   Resp 16   Ht 1.676 m (5' 6\")   Wt 49.8 kg (109 lb 12.6 oz)   SpO2 95%     Patient seen and examined  No acute distress  Breathing non labored  RRR  Surgical dressing is clean, dry, and intact. Patient clearly fires forearm flexors, forearm extensors, and moves all five fingers without issue . Sensation is intact to light touch throughout median, ulnar, and radial nerve distributions. Strong and palpable radial pulses with capillary refill less than 2 seconds. No arm or hand discomfort.      Active Hospital Problems    Diagnosis   • Postoperative pain [G89.18]       Assessment/Plan:  Doing well POD#1 S/P Left RTSA with distal clavicle excision and humeral plating  Wt bearing status - NWB ANN  PT/OT-initiated  Wound care:dressing left in place  Drains - 100/70 last two checks  Llamas-no  Sutures/Staples out- 10-14 days post operatively  Antibiotics: complete  DVT Prophylaxis- TEDS/SCDs/Foot pumps.   Future Procedures - none planned  Case Coordination for Discharge Planning - Disposition check drain this afternoon, remove when output decreased, currently titrating oxycodone. Will D/W RN. Pt would like to DC home today if drainage decreases satisafactorily      "

## 2019-11-29 LAB
GLUCOSE BLD-MCNC: 162 MG/DL (ref 65–99)
GLUCOSE BLD-MCNC: 180 MG/DL (ref 65–99)
GLUCOSE BLD-MCNC: 191 MG/DL (ref 65–99)
GLUCOSE BLD-MCNC: 193 MG/DL (ref 65–99)

## 2019-11-29 PROCEDURE — 770001 HCHG ROOM/CARE - MED/SURG/GYN PRIV*

## 2019-11-29 PROCEDURE — A9270 NON-COVERED ITEM OR SERVICE: HCPCS | Performed by: PHYSICIAN ASSISTANT

## 2019-11-29 PROCEDURE — 94660 CPAP INITIATION&MGMT: CPT

## 2019-11-29 PROCEDURE — 700102 HCHG RX REV CODE 250 W/ 637 OVERRIDE(OP): Performed by: SURGERY

## 2019-11-29 PROCEDURE — A9270 NON-COVERED ITEM OR SERVICE: HCPCS | Performed by: SURGERY

## 2019-11-29 PROCEDURE — 82962 GLUCOSE BLOOD TEST: CPT | Mod: 91

## 2019-11-29 PROCEDURE — 700102 HCHG RX REV CODE 250 W/ 637 OVERRIDE(OP): Performed by: PHYSICIAN ASSISTANT

## 2019-11-29 RX ADMIN — GABAPENTIN 600 MG: 300 CAPSULE ORAL at 07:55

## 2019-11-29 RX ADMIN — LEVOTHYROXINE SODIUM 112 MCG: 112 TABLET ORAL at 04:44

## 2019-11-29 RX ADMIN — OMEPRAZOLE 20 MG: 20 CAPSULE, DELAYED RELEASE ORAL at 17:22

## 2019-11-29 RX ADMIN — INSULIN HUMAN 2 UNITS: 100 INJECTION, SOLUTION PARENTERAL at 12:31

## 2019-11-29 RX ADMIN — FEBUXOSTAT 80 MG: 40 TABLET, FILM COATED ORAL at 20:48

## 2019-11-29 RX ADMIN — OXYCODONE HYDROCHLORIDE 15 MG: 10 TABLET ORAL at 07:55

## 2019-11-29 RX ADMIN — OXYCODONE HYDROCHLORIDE 15 MG: 10 TABLET ORAL at 17:23

## 2019-11-29 RX ADMIN — OXYCODONE HYDROCHLORIDE 15 MG: 10 TABLET ORAL at 20:42

## 2019-11-29 RX ADMIN — PROMETHAZINE HYDROCHLORIDE 25 MG: 25 TABLET ORAL at 10:55

## 2019-11-29 RX ADMIN — INSULIN HUMAN 2 UNITS: 100 INJECTION, SOLUTION PARENTERAL at 08:01

## 2019-11-29 RX ADMIN — MONTELUKAST 10 MG: 10 TABLET, FILM COATED ORAL at 17:22

## 2019-11-29 RX ADMIN — ACETAMINOPHEN 1000 MG: 500 TABLET ORAL at 04:44

## 2019-11-29 RX ADMIN — INSULIN HUMAN 2 UNITS: 100 INJECTION, SOLUTION PARENTERAL at 17:33

## 2019-11-29 RX ADMIN — ACETAMINOPHEN 1000 MG: 500 TABLET ORAL at 17:23

## 2019-11-29 RX ADMIN — OXYCODONE HYDROCHLORIDE 15 MG: 10 TABLET ORAL at 14:19

## 2019-11-29 RX ADMIN — ACETAMINOPHEN 1000 MG: 500 TABLET ORAL at 10:56

## 2019-11-29 RX ADMIN — GABAPENTIN 600 MG: 300 CAPSULE ORAL at 17:23

## 2019-11-29 RX ADMIN — INSULIN HUMAN 2 UNITS: 100 INJECTION, SOLUTION PARENTERAL at 20:39

## 2019-11-29 RX ADMIN — OXYCODONE HYDROCHLORIDE 15 MG: 10 TABLET ORAL at 10:55

## 2019-11-29 RX ADMIN — OXYCODONE HYDROCHLORIDE 15 MG: 10 TABLET ORAL at 02:59

## 2019-11-29 NOTE — CARE PLAN
Problem: Communication  Goal: The ability to communicate needs accurately and effectively will improve  Outcome: PROGRESSING AS EXPECTED     Problem: Safety  Goal: Will remain free from injury  Outcome: PROGRESSING AS EXPECTED  Goal: Will remain free from falls  Outcome: PROGRESSING AS EXPECTED  Note:   Bed locked in lowest position with two side rails up. Frequent rounding done     Problem: Pain Management  Goal: Pain level will decrease to patient's comfort goal  Outcome: PROGRESSING AS EXPECTED  Note:   Patient complaining of pain. PRN pain medication is available

## 2019-11-29 NOTE — CARE PLAN
Problem: Safety  Goal: Will remain free from falls  Outcome: PROGRESSING AS EXPECTED   Fall precautions in place. Patient encouraged to use call light to alert staff of needs and before getting out of bed. Patient verbalized understanding. Call light and personal belongings within reach. Hourly rounding in place.     Problem: Respiratory:  Goal: Respiratory status will improve  Outcome: PROGRESSING AS EXPECTED   CPAP in use at night time.     Problem: Pain Management  Goal: Pain level will decrease to patient's comfort goal  Outcome: PROGRESSING SLOWER THAN EXPECTED   Patient reporting uncontrolled pain despite current pharmacological interventions. Paged Ortho on-call and obtained new orders. Patient updated on change to scheduled and PRN pain medications.

## 2019-11-29 NOTE — DIETARY
Brief Nutrition Note: Pt noted to have low BMI, discussed with RN and RN to enter new wt as current wt likely incorrect. Per MD notes, pt with morbid obesity. Wt likely entered as lbs instead of Kg.  No poor PO/wt loss noted in admit screen.     Plan/Rec: RN to get new wts. RD available PRN.

## 2019-11-29 NOTE — OP REPORT
DATE OF SERVICE:  11/27/2019    SURGEON:  Hugo Beltran MD.    ASSISTANT:  Paulino Garnica PA-C    PREOPERATIVE DIAGNOSES:  1.  Left proximal humerus 4-part fracture malunion.  2.  Left shoulder acromioclavicular joint arthropathy.  3.  Left shoulder biceps tendinopathy.  4.  Impending humeral shaft fracture (between stems of the total elbow   arthroplasty and the reverse total shoulder arthroplasty).    POSTOPERATIVE DIAGNOSES:  1.  Left proximal humerus 4-part fracture malunion.  2.  Left shoulder acromioclavicular joint arthropathy.  3.  Left shoulder biceps tendinopathy.  4.  Impending humeral shaft fracture (between stems of the total elbow   arthroplasty and the reverse total shoulder arthroplasty).    PROCEDURES:  1.  Left reverse total shoulder arthroplasty.  2.  Left distal clavicle resection (partial claviculectomy).  3.  Left open biceps tenodesis.  4.  Left humeral diaphysis prophylactic plating for impending fracture from a   weak spot at the diaphysis between the 2 implants.    IMPLANTS:  1.  Integra Titan reverse total shoulder arthroplasty system, size 12 pressfit   stem with a standard body and a 3 mm polyethylene, and a 38x5 mm lateralized   glenosphere with 13 mm baseplate with multiple locking and nonlocking screws.  2.  A 150x4.5 mm cerclage plate from [employer] with 4 cerclage wires.    ANESTHESIOLOGIST:  Wilson Salmeron MD    ANESTHESIA:  General with upper extremity nerve block for pain control.    COMPLICATIONS:  None noted.    DRAINS:  Hemovac x1.    SPECIMENS:  None.    ESTIMATED BLOOD LOSS:  400 mL.    INDICATIONS:  The patient is a 59-year-old female well-known to me through my   outpatient clinic for the above-mentioned diagnosis.  She believes and agrees   she has failed conservative treatment and is a candidate for the   above-mentioned procedure.  Prior to the procedure, she understood the risks,   benefits and alternatives to surgery.  She understood the  risks to include,   but not be limited to, the risk of infection requiring repeat surgery,   bleeding requiring blood transfusion, nerve, blood vessel, tendon injury   requiring repair, chronic pain, periprosthetic fracture, subsidence, loosening   or dislocation requiring revision surgery, DVT, pulmonary embolism, heart   attack, stroke, and even death.  Patient states despite these risks, she   wished to proceed with surgery.  She also understood the high risk my   experience of a humeral shaft fracture the stress riser between the 2   implants, she therefore elected for prophylactic humerus fracture plating.    DESCRIPTION OF PROCEDURE:  Patient was met in the preoperative holding area.    Left upper extremity was initialed as correct operative site.  She had an   opportunity to ask questions, all questions were answered and informed consent   was obtained.     The patient was brought to the operating room and laid supine on the operating   room table.  All bony and dependent prominences were well padded.  Upper   extremity nerve block and general anesthesia were induced without   complication.  Ancef and clindamycin were administered for infection   prophylaxis as well as tranexamic acid for hemostasis.  She was placed in   well-padded beach chair position.  Left upper extremity was prepped and draped   in the usual sterile fashion.  A formal time-out was performed, in which all   parties agreed upon the correct patient, procedure, and operative site.  I   began the procedure by making a curvilinear incision in the anterior aspect of   the shoulder extending proximally over the AC joint and distally to the   distal third of the anterior arm.  I used Bovie cautery to achieve hemostasis.    I identified cephalic vein, retracted medially, cauterized lateral branches   to help mobilize the shoulder.  I released the upper third of pectoralis major   tendon.  Deep to this, I identified the biceps tendon, which was    tendinopathic at the level of the bicipital groove and the fracture malunion.    To reduce pain from biceps tendinopathy, as well as to prevent painful   subluxation of the joint, I performed open biceps tenodesis, tenodesing the   tendon at resting tension of the upper half pectoralis major tendon with 0   Ethibond suture.  I then performed a subscapularis peel-off of the malunited   lesser trochanter.  I used saw approximately 30 degrees of retroversion,   excised the head and the lesser tuberosity.  I used an osteotome to mobilize   the greater tuberosity fragment with the supraspinatus and infraspinatus   tendons still attached to it, tagged it for later repair.  I then placed   glenoid retractors while palpating and protecting the axillary nerve.  I   circumferentially excised the labrum.  I placed the guidewire, drilled for the   PEG baseplate with multiple locking and nonlocking screws, and placed the   glenosphere, which had excellent fixation of the scapula.  I turned my   attention back to the humerus, broached up to a size 12, which had excellent   pressfit and filled the cement restrictor from the total elbow arthroplasty,   stopped at this point and placed the 12 stem standard body reduced the   shoulder, trialed polyethylenes up to a size 3, which had excellent range of   motion and stability.  I then repaired the small fragment of greater   tuberosity, posterior aspect of the implant and repaired subscapularis.  The   anterior holes in the implant with the shoulder reduced.  I took elbow through   a range of motion, there was excellent stability and range of motion.  I then   turned my attention to the humeral diaphysis for the impending shaft   fracture.  I performed brachialis split, identified the radial nerve and   placed plate continuously verifying with the radial nerve was in a safe   location, placed 4 cerclage wires around the diaphysis through the plate   confirmed under fluoroscopy,  bridge the impending fracture site which had   overall alignment without evidence of hardware complication and copiously   irrigated the wound with normal saline.  I then subperiosteally released the   anterior approximately 25% of the deltoid off of the distal clavicle, AC joint   and acromion using oscillating saw to remove approximately 1 cm of the distal   clavicle where there was bone-on-bone arthritis.  I then made drill holes in   the acromion and the distal clavicle and repair the deltoid back to its origin   with #2 FiberWire suture, and then copiously irrigated the wound, placed 1   deep Hemovac drain, closed the skin with 2-0 Monocryl suture, skin stapler,   all covered with Xeroform, 4x4s, Ioban and sling.  The patient awoke from   anesthesia without known complication, was transferred in a stable condition   to PACU where there were no immediate post-term complaints.      POSTOPERATIVE PLAN:  The patient will be admitted for pain control, likely   discharged home on postoperative day #1 or 2 when pain is controlled.       ____________________________________     MD BRADEN Joya / DIVINE    DD:  11/29/2019 11:57:20  DT:  11/29/2019 12:39:03    D#:  [id number]  Job#:  [id number]

## 2019-11-29 NOTE — PROGRESS NOTES
"   Orthopaedic PA Progress Note    Interval changes:L radial nerve palsy, no discomfort, wrist splint placed.     ROS - Patient denies any new issues. No chest pain, dyspnea, or fever.  Pain well controlled.    /63   Pulse 81   Temp 36.4 °C (97.5 °F) (Temporal)   Resp 16   Ht 1.676 m (5' 6\")   Wt 49.8 kg (109 lb 12.6 oz)   SpO2 (!) 86%     Patient seen and examined  No acute distress  Breathing non labored  RRR  L shoulder dressing dry. Sanguinous output HV drain 100mL since 0600  No sensation anatomical snuffbox. Can make fist, difficulty with extending fingers, cannot extend wrist or thumb.     Active Hospital Problems    Diagnosis   • Postoperative pain [G89.18]     Assessment/Plan:  Doing well POD#1 S/P Left RTSA with distal clavicle excision and humeral plating. Diabetic, with residual left radial nerve palsy. Exam findings d/w Dr. Beltran, expected return of function in 1-6 months discussed with patient.  Wt bearing status - SANDRA JUAREZ  PT/OT-initiated  Wound care:dressing left in place  Drains - 100/70 last two checks  Llamas-no  Sutures/Staples out- 10-14 days post operatively  Antibiotics: complete  DVT Prophylaxis- TEDS/SCDs/Foot pumps.   Future Procedures - none planned  Case Coordination for Discharge Planning - Ttitrating oxycodone. Will D/W RN. Pt would like to DC home tomorrow if drainage decreases satisafactorily. Hold anticoag meds, encourage ambulation.    "

## 2019-11-29 NOTE — PROGRESS NOTES
"Patient reporting uncontrolled pain despite current pharmacological interventions and requesting increase in current pain medication dosage. Patient says she thinks it is mostly \"nerve pain\" from nerve block wearing off. Notified Joselin BHAKTA and received orders for Gabapentin, 900 mg, PO, once and Oxycodone, 15 mg, PO, every 3 hours PRN. Received instruction from Joselin to try 900 mg of Gabapentin once and resume 600 mg of Gabapentin 3 times daily in the morning. Read back orders and Joselin verified. Patient updated.   "

## 2019-11-29 NOTE — DISCHARGE PLANNING
Anticipated Discharge Disposition: Home    Action: Upon assessment, pt with no needs, pt to discharge home.    Barriers to Discharge: Medical Clearance    Plan: F/U with medical team, pt        Care Transition Team Assessment      Orientation : Oriented x 4  Information Given By: Patient  Informant's Name: Jenifer Ramos  Who is responsible for making decisions for patient? : Patient    Readmission Evaluation  Is this a readmission?: No    Elopement Risk  Legal Hold: No  Ambulatory or Self Mobile in Wheelchair: Yes  Disoriented: No  Psychiatric Symptoms: None  History of Wandering: No  Elopement this Admit: No  Vocalizing Wanting to Leave: No  Displays Behaviors, Body Language Wanting to Leave: No-Not at Risk for Elopement  Elopement Risk: Not at Risk for Elopement    Interdisciplinary Discharge Planning  Patient or legal guardian wants to designate a caregiver (see row info): No    Vision / Hearing Impairment  Vision Impairment : Yes  Right Eye Vision: Wears Glasses  Left Eye Vision: Wears Glasses  Hearing Impairment : No    Advance Directive  Advance Directive?: None    Domestic Abuse  Have you ever been the victim of abuse or violence?: No  Physical Abuse or Sexual Abuse: No  Verbal Abuse or Emotional Abuse: No    Anticipated Discharge Information  Anticipated discharge disposition: Home  Discharge Address: On File  Discharge Contact Phone Number: On File

## 2019-11-30 VITALS
SYSTOLIC BLOOD PRESSURE: 124 MMHG | RESPIRATION RATE: 17 BRPM | BODY MASS INDEX: 47.09 KG/M2 | OXYGEN SATURATION: 97 % | WEIGHT: 293 LBS | HEART RATE: 70 BPM | TEMPERATURE: 98.4 F | DIASTOLIC BLOOD PRESSURE: 67 MMHG | HEIGHT: 66 IN

## 2019-11-30 LAB
ANION GAP SERPL CALC-SCNC: 7 MMOL/L (ref 0–11.9)
BUN SERPL-MCNC: 11 MG/DL (ref 8–22)
CALCIUM SERPL-MCNC: 8.4 MG/DL (ref 8.5–10.5)
CHLORIDE SERPL-SCNC: 101 MMOL/L (ref 96–112)
CO2 SERPL-SCNC: 28 MMOL/L (ref 20–33)
CREAT SERPL-MCNC: 0.64 MG/DL (ref 0.5–1.4)
ERYTHROCYTE [DISTWIDTH] IN BLOOD BY AUTOMATED COUNT: 64 FL (ref 35.9–50)
GLUCOSE BLD-MCNC: 148 MG/DL (ref 65–99)
GLUCOSE BLD-MCNC: 209 MG/DL (ref 65–99)
GLUCOSE SERPL-MCNC: 183 MG/DL (ref 65–99)
HCT VFR BLD AUTO: 33.7 % (ref 37–47)
HGB BLD-MCNC: 9.8 G/DL (ref 12–16)
MCH RBC QN AUTO: 24.3 PG (ref 27–33)
MCHC RBC AUTO-ENTMCNC: 29.1 G/DL (ref 33.6–35)
MCV RBC AUTO: 83.4 FL (ref 81.4–97.8)
PLATELET # BLD AUTO: 143 K/UL (ref 164–446)
PMV BLD AUTO: 10.9 FL (ref 9–12.9)
POTASSIUM SERPL-SCNC: 3.9 MMOL/L (ref 3.6–5.5)
RBC # BLD AUTO: 4.04 M/UL (ref 4.2–5.4)
SODIUM SERPL-SCNC: 136 MMOL/L (ref 135–145)
WBC # BLD AUTO: 9.1 K/UL (ref 4.8–10.8)

## 2019-11-30 PROCEDURE — 700102 HCHG RX REV CODE 250 W/ 637 OVERRIDE(OP): Performed by: SURGERY

## 2019-11-30 PROCEDURE — A9270 NON-COVERED ITEM OR SERVICE: HCPCS | Performed by: SURGERY

## 2019-11-30 PROCEDURE — 36415 COLL VENOUS BLD VENIPUNCTURE: CPT

## 2019-11-30 PROCEDURE — 80048 BASIC METABOLIC PNL TOTAL CA: CPT

## 2019-11-30 PROCEDURE — 85027 COMPLETE CBC AUTOMATED: CPT

## 2019-11-30 PROCEDURE — 82962 GLUCOSE BLOOD TEST: CPT

## 2019-11-30 PROCEDURE — 94660 CPAP INITIATION&MGMT: CPT

## 2019-11-30 RX ADMIN — ACETAMINOPHEN 1000 MG: 500 TABLET ORAL at 05:48

## 2019-11-30 RX ADMIN — ACETAMINOPHEN 1000 MG: 500 TABLET ORAL at 00:14

## 2019-11-30 RX ADMIN — GABAPENTIN 600 MG: 300 CAPSULE ORAL at 08:26

## 2019-11-30 RX ADMIN — OXYCODONE HYDROCHLORIDE 15 MG: 10 TABLET ORAL at 00:15

## 2019-11-30 RX ADMIN — OXYCODONE HYDROCHLORIDE 15 MG: 10 TABLET ORAL at 08:21

## 2019-11-30 RX ADMIN — OXYCODONE HYDROCHLORIDE 15 MG: 10 TABLET ORAL at 12:07

## 2019-11-30 RX ADMIN — INSULIN HUMAN 3 UNITS: 100 INJECTION, SOLUTION PARENTERAL at 12:06

## 2019-11-30 RX ADMIN — OXYCODONE HYDROCHLORIDE 15 MG: 10 TABLET ORAL at 04:09

## 2019-11-30 RX ADMIN — ACETAMINOPHEN 1000 MG: 500 TABLET ORAL at 12:07

## 2019-11-30 RX ADMIN — GABAPENTIN 600 MG: 300 CAPSULE ORAL at 00:14

## 2019-11-30 RX ADMIN — LEVOTHYROXINE SODIUM 112 MCG: 112 TABLET ORAL at 05:48

## 2019-11-30 NOTE — DISCHARGE SUMMARY
DISCHARGE SUMMARY    PATIENTS NAME: Jenifer Ramos    MRN: [id number]  CSN: [id number]    ADMIT DATE:  11/27/2019  ADMIT MD: Hugo Beltran M.D.    DISCHARGE DATE: 11/30/2019  DISCHARGE DIAGNOSIS:Status post S/P Left RTSA with distal clavicle excision and humeral plating.  DISCHARGE MD: Hugo Beltran M.D.    REASON FOR ADMISSION:Elective left shoulder joint replacement.    PRINCIPAL DIAGNOSIS:Severe arthritis left shoulder    SECONDARY DIAGNOSIS:postoperative radial nerve palsy    PROCEDURES: Left Reverse Total Shoulder Arthroplasty with distal clavicle excision and humeral plating by Dr. Hugo Beltran M.D. on 11/27/2019    CONSULTATIONS: Hugo Beltran M.D.     HOSPITAL COURSE: Patient is a pleasant 59 year old lady who has previously undergone right shoulder and left elbow joint replacement. She consulted Dr. Beltran for Orthopaedics, who felt that the nature of the patient's arthritic musculoskeletal injuries necessitated surgical intervention.  After explaining the indications, risks, benefits, and alternatives the patient wished to proceed with surgery. The patient was taken to the OR for the above mentioned procedure.  Postoperative drainage was moderate yet diminishing steadily. The radial nerve had been exposed during surgery to ensure there would be no entrapment during plating. She has a left radial nerve palsy which is expected to resolve. Jenifer Ramos has done well with mobilization and pain has been well controlled with oral medications. Wound care instructions were given, patient's questions answered, and Jenifer Ramos is ready for discharge to home at this time.     DISCHARGE LOCATION: [place], [place]    DVT PROPHYLAXIS:she may resume her oral anticoagulants  ANTIBIOTICS:complete  MEDICATIONS:   Current Outpatient Medications   Medication Sig Dispense Refill   • apixaban (ELIQUIS) 5mg Tab Take 1 Tab by mouth 2 Times a Day. 1  Tab 0   • oxyCODONE immediate release (ROXICODONE) 10 MG immediate release tablet Take 1 Tab by mouth every 6 hours as needed for up to 7 days. 40 Tab 0   • oxyCODONE immediate release (ROXICODONE) 10 MG immediate release tablet Take 1 Tab by mouth every four hours as needed for Moderate Pain for up to 7 days. 40 Tab 0     WEIGHT BEARING STATUS:no weight bearing left upper extremity. She has a sling and a wrist sp[lint for comfort and to prevent flexion contracture    FOLLOW UP: 10-14 days post operatively with Dr. Hugo Beltran M.D.

## 2019-11-30 NOTE — PROGRESS NOTES
Received bedside report from NOC RN. Patient laying in bed and stated she wishes to get up into the chair. Call light is within reach and no other needs at this time.

## 2019-11-30 NOTE — PROGRESS NOTES
Doing better. Mobilizing  Finger extension improving. Still limited thumb ext  Drain decreasing.   Pull drain and discharge planning

## 2019-11-30 NOTE — DISCHARGE INSTRUCTIONS
Discharge Instructions    Discharged to home by car with relative. Discharged via wheelchair, hospital escort: Yes.  Special equipment needed: Not Applicable    Be sure to schedule a follow-up appointment with your primary care doctor or any specialists as instructed.     Discharge Plan:   Influenza Vaccine Indication: Patient Refuses    I understand that a diet low in cholesterol, fat, and sodium is recommended for good health. Unless I have been given specific instructions below for another diet, I accept this instruction as my diet prescription.   Other diet: Diabetic diet    Special Instructions: Discharge instructions for the Orthopedic Patient    Follow up with Primary Care Physician within 2 weeks of discharge to home, regarding:  Review of medications and diagnostic testing.  Surveillance for medical complications.  Workup and treatment of osteoporosis, if appropriate.     -Is this a Joint Replacement patient? No    -Is this patient being discharged with medication to prevent blood clots?  No    · Is patient discharged on Warfarin / Coumadin?   No       Shoulder Joint Replacement  Shoulder joint replacement is surgery to replace damaged parts of the shoulder joint with artificial parts (prostheses). Two parts may be used to replace this joint:  · The humeral component replaces the head of the upper arm bone (humerus). This is a rounded ball that is attached to a stem that fits into the humerus.  · The glenoid component replaces the socket (glenoid depression).  The prostheses are usually made of metal and plastic. Depending on the damage to your shoulder, the surgeon may replace just the humeral head (hemiarthroplasty) or replace both the humeral head and the glenoid (total shoulder replacement). The surrounding muscles and tendons hold the prosthetic parts in place.  This procedure may be done to relieve joint pain or to treat severe shoulder fractures or arthritis. This surgery may be done if other  non-surgical treatments have not worked.  Tell a health care provider about:  · Any allergies you have.  · All medicines you are taking, including vitamins, herbs, eye drops, creams, and over-the-counter medicines.  · Any problems you or family members have had with anesthetic medicines.  · Any blood disorders you have.  · Any surgeries you have had.  · Any medical conditions you have.  · Whether you are pregnant or may be pregnant.  What are the risks?  Generally, this is a safe procedure. However, problems may occur, including:  · Infection.  · Bleeding.  · Allergic reactions to medicines.  · Damage to other structures, organs, or nerves.  · Fracture of the upper arm bone during or after surgery.  · Instability of the shoulder after surgery.  · Loosening of the glenoid component over time.  · Unusual bone growth.  · Failure of bone healing after surgery.  What happens before the procedure?  Medicines  · Ask your health care provider about:  ¨ Changing or stopping your regular medicines. This is especially important if you are taking diabetes medicines or blood thinners.  ¨ Taking medicines such as aspirin and ibuprofen. These medicines can thin your blood. Do not take these medicines before your procedure if your health care provider instructs you not to.  · You may be given antibiotic medicine to help prevent infection.  Staying hydrated   Follow instructions from your health care provider about hydration, which may include:  · Up to 2 hours before the procedure - you may continue to drink clear liquids, such as water, clear fruit juice, black coffee, and plain tea.  Eating and drinking restrictions   Follow instructions from your health care provider about eating and drinking, which may include:  · 8 hours before the procedure - stop eating heavy meals or foods such as meat, fried foods, or fatty foods.  · 6 hours before the procedure - stop eating light meals or foods, such as toast or cereal.  · 6 hours before  the procedure - stop drinking milk or drinks that contain milk.  · 2 hours before the procedure - stop drinking clear liquids.  General instructions  · Plan to have someone take you home from the hospital or clinic.  · Plan to have someone with you for 24 hours after the procedure. It is also recommended that you have someone to assist you at home for the first few weeks after the procedure.  · Do not use any products that contain nicotine or tobacco, such as cigarettes and e-cigarettes. If you need help quitting, ask your healthcare provider.  · Ask your health care provider how your surgical site will be marked or identified.  What happens during the procedure?  · To reduce your risk of infection:  ¨ Your health care team will wash or sanitize their hands.  ¨ Your skin will be washed with soap.  ¨ Hair may be removed from the surgical area.  · An IV tube will be inserted into one of your veins.  · You will be given one or more of the following:  ¨ A medicine to help you relax (sedative).  ¨ A medicine to make you fall asleep (general anesthetic).  ¨ A medicine that is injected into your shoulder area to numb everything around the injection site (regional anesthetic).  · An incision will be made on the front of the shoulder from the collarbone (clavicle) to the point where the shoulder muscle (deltoid) attaches to the upper arm bone.  · The upper arm bone will be removed from the socket to expose the ball-like end of the upper arm.  · The center cavity of the humerus bone will be cleaned and enlarged to create a hollow area that matches the shape of the implant stem. The top end of the bone will be smoothed so the stem will be level with the bone surface when it is inserted.  · If the ball of the prosthesis is a separate piece, the proper size will be selected and attached.  · If the socket portion of the joint is healthy and the surrounding muscles are in good condition, the surgeon may decide not to replace it.  However, if the socket needs to be replaced:  ¨ The surgeon will prepare the socket surface by removing the remaining damaged cartilage.  ¨ The socket bone will be gently reshaped to fit the implant.  ¨ The glenoid component will be implanted and cemented into position.  · The arm bone, with its new artificial head, will be replaced in the socket. The surgeon will reattach the supporting tendons and close the incision with sutures or stitches.  · A bandage (dressing) will be placed over your incision.  · Your arm will be placed in a sling or immobilizer, and a support pillow will be placed under your elbow.  · Tubes will be placed to remove excess drainage. These are usually removed after a couple of days.  The procedure may vary among health care providers and hospitals.  What happens after the procedure?  · Your blood pressure, heart rate, breathing rate, and blood oxygen level will be monitored until the medicines you were given have worn off.  · Your arm will be numb if you were given a regional anesthetic. This may last until the next day.  · You will be given pain medicine as needed.  · Your arm and shoulder will be stiff and bruised. This will improve over time.  · An icing device will be placed around your shoulder. This helps to control pain and swelling.  · Your arm will be in a sling or immobilizer. You will need to wear this for 2-4 weeks after surgery or as told by your health care provider.  · Your health care team may begin to show you exercises for your shoulder.  · Do not use your arm to push yourself up in bed or from a chair.  · Do not lift anything that is heavier than a cup of coffee.  · Do not drive for 24 hours if you were given a sedative. Ask your health care provider when it is safe for you to drive.  Summary  · Shoulder joint replacement is surgery to replace damaged parts of the shoulder joint with artificial parts (prostheses).  · The surgeon may replace just the humeral head  (hemiarthroplasty) or replace both the humeral head and the glenoid (total shoulder replacement), based on the damage to your shoulder.  · Taking medicine, icing the painful area, and doing exercises as told by your health care provider will help control your shoulder pain, swelling, and stiffness after surgery.  · After the procedure, do not lift anything that is heavier than a cup of coffee and do not use your arm to push yourself up in bed or from a chair.  This information is not intended to replace advice given to you by your health care provider. Make sure you discuss any questions you have with your health care provider.  Document Released: 09/15/2004 Document Revised: 10/02/2017 Document Reviewed: 10/02/2017  [employer] Interactive Patient Education © 2017 [employer]      Depression / Suicide Risk    As you are discharged from this [facility] facility, it is important to learn how to keep safe from harming yourself.    Recognize the warning signs:  · Abrupt changes in personality, positive or negative- including increase in energy   · Giving away possessions  · Change in eating patterns- significant weight changes-  positive or negative  · Change in sleeping patterns- unable to sleep or sleeping all the time   · Unwillingness or inability to communicate  · Depression  · Unusual sadness, discouragement and loneliness  · Talk of wanting to die  · Neglect of personal appearance   · Rebelliousness- reckless behavior  · Withdrawal from people/activities they love  · Confusion- inability to concentrate     If you or a loved one observes any of these behaviors or has concerns about self-harm, here's what you can do:  · Talk about it- your feelings and reasons for harming yourself  · Remove any means that you might use to hurt yourself (examples: pills, rope, extension cords, firearm)  · Get professional help from the community (Mental Health, Substance Abuse, psychological counseling)  · Do not be alone:Call  your Safe Contact- someone whom you trust who will be there for you.  · Call your local CRISIS HOTLINE 251-3290 or 456-920-7167  · Call your local Children's Mobile Crisis Response Team [place] (809) 264-2046 or www.[employer]  · Call the toll free National Suicide Prevention Hotlines   · National Suicide Prevention Lifeline 021-329-PYXI (1270)  · National "[employer] Line Network 800-SUICIDE (924-8626)

## 2021-03-15 DIAGNOSIS — Z23 NEED FOR VACCINATION: ICD-10-CM

## 2022-07-20 ENCOUNTER — HOSPITAL ENCOUNTER (INPATIENT)
Facility: MEDICAL CENTER | Age: 62
LOS: 1 days | DRG: 292 | End: 2022-07-22
Attending: EMERGENCY MEDICINE | Admitting: INTERNAL MEDICINE
Payer: COMMERCIAL

## 2022-07-20 ENCOUNTER — APPOINTMENT (OUTPATIENT)
Dept: RADIOLOGY | Facility: MEDICAL CENTER | Age: 62
DRG: 292 | End: 2022-07-20
Attending: EMERGENCY MEDICINE
Payer: COMMERCIAL

## 2022-07-20 DIAGNOSIS — J45.21 MILD INTERMITTENT ASTHMA WITH ACUTE EXACERBATION: ICD-10-CM

## 2022-07-20 DIAGNOSIS — R06.02 SOB (SHORTNESS OF BREATH): ICD-10-CM

## 2022-07-20 PROBLEM — R07.9 CHEST PAIN: Status: ACTIVE | Noted: 2022-07-20

## 2022-07-20 LAB
ALBUMIN SERPL BCP-MCNC: 3.6 G/DL (ref 3.2–4.9)
ALBUMIN/GLOB SERPL: 0.9 G/DL
ALP SERPL-CCNC: 111 U/L (ref 30–99)
ALT SERPL-CCNC: 10 U/L (ref 2–50)
ANION GAP SERPL CALC-SCNC: 12 MMOL/L (ref 7–16)
AST SERPL-CCNC: 24 U/L (ref 12–45)
BASOPHILS # BLD AUTO: 0.6 % (ref 0–1.8)
BASOPHILS # BLD: 0.05 K/UL (ref 0–0.12)
BILIRUB SERPL-MCNC: 0.8 MG/DL (ref 0.1–1.5)
BUN SERPL-MCNC: 16 MG/DL (ref 8–22)
CALCIUM SERPL-MCNC: 9.5 MG/DL (ref 8.4–10.2)
CHLORIDE SERPL-SCNC: 90 MMOL/L (ref 96–112)
CO2 SERPL-SCNC: 30 MMOL/L (ref 20–33)
COMMENT 1642: NORMAL
CREAT SERPL-MCNC: 0.75 MG/DL (ref 0.5–1.4)
D DIMER PPP IA.FEU-MCNC: 0.6 UG/ML (FEU) (ref 0–0.5)
EKG IMPRESSION: NORMAL
EOSINOPHIL # BLD AUTO: 0.24 K/UL (ref 0–0.51)
EOSINOPHIL NFR BLD: 3 % (ref 0–6.9)
ERYTHROCYTE [DISTWIDTH] IN BLOOD BY AUTOMATED COUNT: 60.1 FL (ref 35.9–50)
FLUAV RNA SPEC QL NAA+PROBE: NEGATIVE
FLUBV RNA SPEC QL NAA+PROBE: NEGATIVE
GFR SERPLBLD CREATININE-BSD FMLA CKD-EPI: 89 ML/MIN/1.73 M 2
GLOBULIN SER CALC-MCNC: 4.1 G/DL (ref 1.9–3.5)
GLUCOSE SERPL-MCNC: 89 MG/DL (ref 65–99)
HCT VFR BLD AUTO: 34 % (ref 37–47)
HGB BLD-MCNC: 9.3 G/DL (ref 12–16)
IMM GRANULOCYTES # BLD AUTO: 0.02 K/UL (ref 0–0.11)
IMM GRANULOCYTES NFR BLD AUTO: 0.2 % (ref 0–0.9)
LYMPHOCYTES # BLD AUTO: 1.62 K/UL (ref 1–4.8)
LYMPHOCYTES NFR BLD: 20 % (ref 22–41)
MAGNESIUM SERPL-MCNC: 2.3 MG/DL (ref 1.5–2.5)
MCH RBC QN AUTO: 20.3 PG (ref 27–33)
MCHC RBC AUTO-ENTMCNC: 27.4 G/DL (ref 33.6–35)
MCV RBC AUTO: 74.2 FL (ref 81.4–97.8)
MONOCYTES # BLD AUTO: 0.73 K/UL (ref 0–0.85)
MONOCYTES NFR BLD AUTO: 9 % (ref 0–13.4)
NEUTROPHILS # BLD AUTO: 5.43 K/UL (ref 2–7.15)
NEUTROPHILS NFR BLD: 67.2 % (ref 44–72)
NRBC # BLD AUTO: 0.02 K/UL
NRBC BLD-RTO: 0.2 /100 WBC
NT-PROBNP SERPL IA-MCNC: 2476 PG/ML (ref 0–125)
PLATELET # BLD AUTO: 291 K/UL (ref 164–446)
PMV BLD AUTO: 10.2 FL (ref 9–12.9)
POTASSIUM SERPL-SCNC: 3.2 MMOL/L (ref 3.6–5.5)
PROT SERPL-MCNC: 7.7 G/DL (ref 6–8.2)
RBC # BLD AUTO: 4.58 M/UL (ref 4.2–5.4)
RSV RNA SPEC QL NAA+PROBE: NEGATIVE
SARS-COV-2 RNA RESP QL NAA+PROBE: NOTDETECTED
SODIUM SERPL-SCNC: 132 MMOL/L (ref 135–145)
SPECIMEN SOURCE: NORMAL
TROPONIN T SERPL-MCNC: 28 NG/L (ref 6–19)
WBC # BLD AUTO: 8.1 K/UL (ref 4.8–10.8)

## 2022-07-20 PROCEDURE — 80053 COMPREHEN METABOLIC PANEL: CPT

## 2022-07-20 PROCEDURE — 96374 THER/PROPH/DIAG INJ IV PUSH: CPT

## 2022-07-20 PROCEDURE — 36415 COLL VENOUS BLD VENIPUNCTURE: CPT

## 2022-07-20 PROCEDURE — 700111 HCHG RX REV CODE 636 W/ 250 OVERRIDE (IP): Performed by: EMERGENCY MEDICINE

## 2022-07-20 PROCEDURE — 85379 FIBRIN DEGRADATION QUANT: CPT

## 2022-07-20 PROCEDURE — 71275 CT ANGIOGRAPHY CHEST: CPT

## 2022-07-20 PROCEDURE — 94640 AIRWAY INHALATION TREATMENT: CPT

## 2022-07-20 PROCEDURE — 83735 ASSAY OF MAGNESIUM: CPT

## 2022-07-20 PROCEDURE — 94760 N-INVAS EAR/PLS OXIMETRY 1: CPT

## 2022-07-20 PROCEDURE — 99220 PR INITIAL OBSERVATION CARE,LEVL III: CPT | Performed by: INTERNAL MEDICINE

## 2022-07-20 PROCEDURE — 85025 COMPLETE CBC W/AUTO DIFF WBC: CPT

## 2022-07-20 PROCEDURE — 700117 HCHG RX CONTRAST REV CODE 255: Performed by: EMERGENCY MEDICINE

## 2022-07-20 PROCEDURE — 84484 ASSAY OF TROPONIN QUANT: CPT

## 2022-07-20 PROCEDURE — 700101 HCHG RX REV CODE 250: Performed by: EMERGENCY MEDICINE

## 2022-07-20 PROCEDURE — 83550 IRON BINDING TEST: CPT

## 2022-07-20 PROCEDURE — 96375 TX/PRO/DX INJ NEW DRUG ADDON: CPT

## 2022-07-20 PROCEDURE — 99285 EMERGENCY DEPT VISIT HI MDM: CPT

## 2022-07-20 PROCEDURE — 93005 ELECTROCARDIOGRAM TRACING: CPT | Performed by: EMERGENCY MEDICINE

## 2022-07-20 PROCEDURE — 700102 HCHG RX REV CODE 250 W/ 637 OVERRIDE(OP): Performed by: EMERGENCY MEDICINE

## 2022-07-20 PROCEDURE — A9270 NON-COVERED ITEM OR SERVICE: HCPCS | Performed by: EMERGENCY MEDICINE

## 2022-07-20 PROCEDURE — 71045 X-RAY EXAM CHEST 1 VIEW: CPT

## 2022-07-20 PROCEDURE — G0378 HOSPITAL OBSERVATION PER HR: HCPCS

## 2022-07-20 PROCEDURE — 0241U HCHG SARS-COV-2 COVID-19 NFCT DS RESP RNA 4 TRGT MIC: CPT

## 2022-07-20 PROCEDURE — 83540 ASSAY OF IRON: CPT

## 2022-07-20 PROCEDURE — C9803 HOPD COVID-19 SPEC COLLECT: HCPCS | Performed by: EMERGENCY MEDICINE

## 2022-07-20 PROCEDURE — 83880 ASSAY OF NATRIURETIC PEPTIDE: CPT

## 2022-07-20 RX ORDER — OXYCODONE AND ACETAMINOPHEN 7.5; 325 MG/1; MG/1
1 TABLET ORAL ONCE
Status: COMPLETED | OUTPATIENT
Start: 2022-07-20 | End: 2022-07-20

## 2022-07-20 RX ORDER — IPRATROPIUM BROMIDE AND ALBUTEROL SULFATE 2.5; .5 MG/3ML; MG/3ML
3 SOLUTION RESPIRATORY (INHALATION) ONCE
Status: DISPENSED | OUTPATIENT
Start: 2022-07-20 | End: 2022-07-21

## 2022-07-20 RX ORDER — POTASSIUM CHLORIDE 20 MEQ/1
40 TABLET, EXTENDED RELEASE ORAL DAILY
Status: DISCONTINUED | OUTPATIENT
Start: 2022-07-21 | End: 2022-07-20

## 2022-07-20 RX ORDER — LEVALBUTEROL INHALATION SOLUTION 0.63 MG/3ML
0.63 SOLUTION RESPIRATORY (INHALATION) ONCE
Status: COMPLETED | OUTPATIENT
Start: 2022-07-20 | End: 2022-07-20

## 2022-07-20 RX ORDER — METHYLPREDNISOLONE SODIUM SUCCINATE 125 MG/2ML
125 INJECTION, POWDER, LYOPHILIZED, FOR SOLUTION INTRAMUSCULAR; INTRAVENOUS ONCE
Status: COMPLETED | OUTPATIENT
Start: 2022-07-20 | End: 2022-07-20

## 2022-07-20 RX ORDER — POTASSIUM CHLORIDE 20 MEQ/1
40 TABLET, EXTENDED RELEASE ORAL ONCE
Status: COMPLETED | OUTPATIENT
Start: 2022-07-20 | End: 2022-07-20

## 2022-07-20 RX ORDER — FUROSEMIDE 10 MG/ML
20 INJECTION INTRAMUSCULAR; INTRAVENOUS ONCE
Status: COMPLETED | OUTPATIENT
Start: 2022-07-20 | End: 2022-07-20

## 2022-07-20 RX ADMIN — FUROSEMIDE 20 MG: 10 INJECTION, SOLUTION INTRAMUSCULAR; INTRAVENOUS at 23:44

## 2022-07-20 RX ADMIN — POTASSIUM CHLORIDE 40 MEQ: 1500 TABLET, EXTENDED RELEASE ORAL at 23:44

## 2022-07-20 RX ADMIN — OXYCODONE AND ACETAMINOPHEN 1 TABLET: 7.5; 325 TABLET ORAL at 21:01

## 2022-07-20 RX ADMIN — METHYLPREDNISOLONE SODIUM SUCCINATE 125 MG: 125 INJECTION, POWDER, FOR SOLUTION INTRAMUSCULAR; INTRAVENOUS at 19:49

## 2022-07-20 RX ADMIN — LEVALBUTEROL HYDROCHLORIDE 0.63 MG: 0.63 SOLUTION RESPIRATORY (INHALATION) at 20:39

## 2022-07-20 RX ADMIN — IOHEXOL 80 ML: 350 INJECTION, SOLUTION INTRAVENOUS at 22:01

## 2022-07-21 ENCOUNTER — APPOINTMENT (OUTPATIENT)
Dept: RADIOLOGY | Facility: MEDICAL CENTER | Age: 62
DRG: 292 | End: 2022-07-21
Attending: INTERNAL MEDICINE
Payer: COMMERCIAL

## 2022-07-21 ENCOUNTER — APPOINTMENT (OUTPATIENT)
Dept: CARDIOLOGY | Facility: MEDICAL CENTER | Age: 62
DRG: 292 | End: 2022-07-21
Attending: INTERNAL MEDICINE
Payer: COMMERCIAL

## 2022-07-21 PROBLEM — R06.00 DYSPNEA: Status: ACTIVE | Noted: 2022-07-21

## 2022-07-21 PROBLEM — D50.9 MICROCYTIC ANEMIA: Status: ACTIVE | Noted: 2022-07-21

## 2022-07-21 PROBLEM — K21.9 GERD (GASTROESOPHAGEAL REFLUX DISEASE): Status: ACTIVE | Noted: 2022-07-21

## 2022-07-21 PROBLEM — R91.1 PULMONARY NODULE: Status: ACTIVE | Noted: 2022-07-21

## 2022-07-21 PROBLEM — E87.6 HYPOKALEMIA: Status: ACTIVE | Noted: 2022-07-21

## 2022-07-21 PROBLEM — E03.9 HYPOTHYROIDISM: Status: ACTIVE | Noted: 2022-07-21

## 2022-07-21 PROBLEM — E87.1 HYPONATREMIA: Status: ACTIVE | Noted: 2022-07-21

## 2022-07-21 PROBLEM — K74.60 CIRRHOSIS (HCC): Status: ACTIVE | Noted: 2022-07-21

## 2022-07-21 PROBLEM — R06.02 SOB (SHORTNESS OF BREATH): Status: ACTIVE | Noted: 2022-07-21

## 2022-07-21 LAB
ANION GAP SERPL CALC-SCNC: 10 MMOL/L (ref 7–16)
BUN SERPL-MCNC: 17 MG/DL (ref 8–22)
CALCIUM SERPL-MCNC: 9.3 MG/DL (ref 8.4–10.2)
CHLORIDE SERPL-SCNC: 92 MMOL/L (ref 96–112)
CO2 SERPL-SCNC: 31 MMOL/L (ref 20–33)
CREAT SERPL-MCNC: 0.78 MG/DL (ref 0.5–1.4)
ERYTHROCYTE [DISTWIDTH] IN BLOOD BY AUTOMATED COUNT: 61.7 FL (ref 35.9–50)
EST. AVERAGE GLUCOSE BLD GHB EST-MCNC: 137 MG/DL
GFR SERPLBLD CREATININE-BSD FMLA CKD-EPI: 85 ML/MIN/1.73 M 2
GLUCOSE BLD STRIP.AUTO-MCNC: 190 MG/DL (ref 65–99)
GLUCOSE BLD STRIP.AUTO-MCNC: 200 MG/DL (ref 65–99)
GLUCOSE BLD STRIP.AUTO-MCNC: 214 MG/DL (ref 65–99)
GLUCOSE BLD STRIP.AUTO-MCNC: 252 MG/DL (ref 65–99)
GLUCOSE SERPL-MCNC: 239 MG/DL (ref 65–99)
HBA1C MFR BLD: 6.4 % (ref 4–5.6)
HCT VFR BLD AUTO: 33.5 % (ref 37–47)
HGB BLD-MCNC: 8.9 G/DL (ref 12–16)
IRON SATN MFR SERPL: 5 % (ref 15–55)
IRON SERPL-MCNC: 19 UG/DL (ref 40–170)
LV EJECT FRACT  99904: 60
LV EJECT FRACT MOD 2C 99903: 59.75
LV EJECT FRACT MOD 4C 99902: 77.93
LV EJECT FRACT MOD BP 99901: 69.22
MCH RBC QN AUTO: 20 PG (ref 27–33)
MCHC RBC AUTO-ENTMCNC: 26.6 G/DL (ref 33.6–35)
MCV RBC AUTO: 75.3 FL (ref 81.4–97.8)
PLATELET # BLD AUTO: 239 K/UL (ref 164–446)
PMV BLD AUTO: 10.6 FL (ref 9–12.9)
POTASSIUM SERPL-SCNC: 4 MMOL/L (ref 3.6–5.5)
PROCALCITONIN SERPL-MCNC: 0.26 NG/ML
RBC # BLD AUTO: 4.45 M/UL (ref 4.2–5.4)
SODIUM SERPL-SCNC: 133 MMOL/L (ref 135–145)
TIBC SERPL-MCNC: 413 UG/DL (ref 250–450)
TROPONIN T SERPL-MCNC: 20 NG/L (ref 6–19)
UIBC SERPL-MCNC: 394 UG/DL (ref 110–370)
WBC # BLD AUTO: 6 K/UL (ref 4.8–10.8)

## 2022-07-21 PROCEDURE — 770020 HCHG ROOM/CARE - TELE (206)

## 2022-07-21 PROCEDURE — A9502 TC99M TETROFOSMIN: HCPCS

## 2022-07-21 PROCEDURE — 93306 TTE W/DOPPLER COMPLETE: CPT

## 2022-07-21 PROCEDURE — 82962 GLUCOSE BLOOD TEST: CPT | Mod: 91

## 2022-07-21 PROCEDURE — 94640 AIRWAY INHALATION TREATMENT: CPT

## 2022-07-21 PROCEDURE — 700102 HCHG RX REV CODE 250 W/ 637 OVERRIDE(OP): Performed by: INTERNAL MEDICINE

## 2022-07-21 PROCEDURE — 94760 N-INVAS EAR/PLS OXIMETRY 1: CPT

## 2022-07-21 PROCEDURE — 93306 TTE W/DOPPLER COMPLETE: CPT | Mod: 26 | Performed by: INTERNAL MEDICINE

## 2022-07-21 PROCEDURE — 96365 THER/PROPH/DIAG IV INF INIT: CPT

## 2022-07-21 PROCEDURE — 85027 COMPLETE CBC AUTOMATED: CPT

## 2022-07-21 PROCEDURE — 96375 TX/PRO/DX INJ NEW DRUG ADDON: CPT

## 2022-07-21 PROCEDURE — A9270 NON-COVERED ITEM OR SERVICE: HCPCS | Performed by: INTERNAL MEDICINE

## 2022-07-21 PROCEDURE — 84484 ASSAY OF TROPONIN QUANT: CPT

## 2022-07-21 PROCEDURE — 700101 HCHG RX REV CODE 250: Performed by: INTERNAL MEDICINE

## 2022-07-21 PROCEDURE — 83036 HEMOGLOBIN GLYCOSYLATED A1C: CPT

## 2022-07-21 PROCEDURE — 99232 SBSQ HOSP IP/OBS MODERATE 35: CPT | Performed by: INTERNAL MEDICINE

## 2022-07-21 PROCEDURE — 80048 BASIC METABOLIC PNL TOTAL CA: CPT

## 2022-07-21 PROCEDURE — 76705 ECHO EXAM OF ABDOMEN: CPT

## 2022-07-21 PROCEDURE — 78452 HT MUSCLE IMAGE SPECT MULT: CPT | Mod: 26 | Performed by: INTERNAL MEDICINE

## 2022-07-21 PROCEDURE — 94660 CPAP INITIATION&MGMT: CPT

## 2022-07-21 PROCEDURE — 700111 HCHG RX REV CODE 636 W/ 250 OVERRIDE (IP): Performed by: INTERNAL MEDICINE

## 2022-07-21 PROCEDURE — 700105 HCHG RX REV CODE 258: Performed by: INTERNAL MEDICINE

## 2022-07-21 PROCEDURE — 93018 CV STRESS TEST I&R ONLY: CPT | Performed by: INTERNAL MEDICINE

## 2022-07-21 PROCEDURE — 84145 PROCALCITONIN (PCT): CPT

## 2022-07-21 RX ORDER — BISACODYL 10 MG
10 SUPPOSITORY, RECTAL RECTAL
Status: DISCONTINUED | OUTPATIENT
Start: 2022-07-21 | End: 2022-07-22 | Stop reason: HOSPADM

## 2022-07-21 RX ORDER — FUROSEMIDE 10 MG/ML
40 INJECTION INTRAMUSCULAR; INTRAVENOUS DAILY
Status: DISCONTINUED | OUTPATIENT
Start: 2022-07-21 | End: 2022-07-22 | Stop reason: HOSPADM

## 2022-07-21 RX ORDER — AMOXICILLIN 250 MG
2 CAPSULE ORAL 2 TIMES DAILY
Status: DISCONTINUED | OUTPATIENT
Start: 2022-07-21 | End: 2022-07-22 | Stop reason: HOSPADM

## 2022-07-21 RX ORDER — ONDANSETRON 2 MG/ML
4 INJECTION INTRAMUSCULAR; INTRAVENOUS EVERY 4 HOURS PRN
Status: DISCONTINUED | OUTPATIENT
Start: 2022-07-21 | End: 2022-07-22 | Stop reason: HOSPADM

## 2022-07-21 RX ORDER — OMEPRAZOLE 20 MG/1
20 CAPSULE, DELAYED RELEASE ORAL DAILY
Status: DISCONTINUED | OUTPATIENT
Start: 2022-07-21 | End: 2022-07-22 | Stop reason: HOSPADM

## 2022-07-21 RX ORDER — LEVALBUTEROL TARTRATE 45 UG/1
2 AEROSOL, METERED ORAL 2 TIMES DAILY PRN
Status: DISCONTINUED | OUTPATIENT
Start: 2022-07-21 | End: 2022-07-21

## 2022-07-21 RX ORDER — ASPIRIN 325 MG
325 TABLET ORAL DAILY
Status: DISCONTINUED | OUTPATIENT
Start: 2022-07-21 | End: 2022-07-22 | Stop reason: HOSPADM

## 2022-07-21 RX ORDER — ENOXAPARIN SODIUM 100 MG/ML
40 INJECTION SUBCUTANEOUS DAILY
Status: DISCONTINUED | OUTPATIENT
Start: 2022-07-21 | End: 2022-07-21

## 2022-07-21 RX ORDER — ASPIRIN 300 MG/1
300 SUPPOSITORY RECTAL DAILY
Status: DISCONTINUED | OUTPATIENT
Start: 2022-07-21 | End: 2022-07-22 | Stop reason: HOSPADM

## 2022-07-21 RX ORDER — OXYCODONE HYDROCHLORIDE AND ACETAMINOPHEN 5; 325 MG/1; MG/1
1 TABLET ORAL EVERY 4 HOURS PRN
Status: DISCONTINUED | OUTPATIENT
Start: 2022-07-21 | End: 2022-07-22 | Stop reason: HOSPADM

## 2022-07-21 RX ORDER — ASPIRIN 81 MG/1
324 TABLET, CHEWABLE ORAL DAILY
Status: DISCONTINUED | OUTPATIENT
Start: 2022-07-21 | End: 2022-07-22 | Stop reason: HOSPADM

## 2022-07-21 RX ORDER — FEBUXOSTAT 40 MG/1
80 TABLET, FILM COATED ORAL
Status: DISCONTINUED | OUTPATIENT
Start: 2022-07-21 | End: 2022-07-22 | Stop reason: HOSPADM

## 2022-07-21 RX ORDER — FUROSEMIDE 40 MG/1
40 TABLET ORAL 2 TIMES DAILY
Status: DISCONTINUED | OUTPATIENT
Start: 2022-07-21 | End: 2022-07-22 | Stop reason: HOSPADM

## 2022-07-21 RX ORDER — ONDANSETRON 4 MG/1
4 TABLET, ORALLY DISINTEGRATING ORAL EVERY 4 HOURS PRN
Status: DISCONTINUED | OUTPATIENT
Start: 2022-07-21 | End: 2022-07-22 | Stop reason: HOSPADM

## 2022-07-21 RX ORDER — METOLAZONE 5 MG/1
5 TABLET ORAL DAILY
COMMUNITY
End: 2023-05-12

## 2022-07-21 RX ORDER — LEVALBUTEROL INHALATION SOLUTION 0.63 MG/3ML
0.63 SOLUTION RESPIRATORY (INHALATION)
Status: DISCONTINUED | OUTPATIENT
Start: 2022-07-21 | End: 2022-07-22

## 2022-07-21 RX ORDER — FEBUXOSTAT 80 MG/1
80 TABLET, FILM COATED ORAL
Status: DISCONTINUED | OUTPATIENT
Start: 2022-07-21 | End: 2022-07-21

## 2022-07-21 RX ORDER — ACETAMINOPHEN 325 MG/1
650 TABLET ORAL EVERY 6 HOURS PRN
Status: DISCONTINUED | OUTPATIENT
Start: 2022-07-21 | End: 2022-07-22 | Stop reason: HOSPADM

## 2022-07-21 RX ORDER — PRAVASTATIN SODIUM 20 MG
10 TABLET ORAL NIGHTLY
Status: DISCONTINUED | OUTPATIENT
Start: 2022-07-21 | End: 2022-07-22 | Stop reason: HOSPADM

## 2022-07-21 RX ORDER — METOLAZONE 5 MG/1
5 TABLET ORAL DAILY
Status: DISCONTINUED | OUTPATIENT
Start: 2022-07-21 | End: 2022-07-22 | Stop reason: HOSPADM

## 2022-07-21 RX ORDER — PROMETHAZINE HYDROCHLORIDE 25 MG/1
12.5-25 TABLET ORAL EVERY 4 HOURS PRN
Status: DISCONTINUED | OUTPATIENT
Start: 2022-07-21 | End: 2022-07-22 | Stop reason: HOSPADM

## 2022-07-21 RX ORDER — LABETALOL HYDROCHLORIDE 5 MG/ML
10 INJECTION, SOLUTION INTRAVENOUS EVERY 4 HOURS PRN
Status: DISCONTINUED | OUTPATIENT
Start: 2022-07-21 | End: 2022-07-22 | Stop reason: HOSPADM

## 2022-07-21 RX ORDER — FUROSEMIDE 20 MG/1
20 TABLET ORAL 2 TIMES DAILY
Status: ON HOLD | COMMUNITY
End: 2023-12-30

## 2022-07-21 RX ORDER — OXYCODONE AND ACETAMINOPHEN 7.5; 325 MG/1; MG/1
1 TABLET ORAL EVERY 4 HOURS PRN
COMMUNITY
End: 2023-05-12

## 2022-07-21 RX ORDER — PROCHLORPERAZINE EDISYLATE 5 MG/ML
5-10 INJECTION INTRAMUSCULAR; INTRAVENOUS EVERY 4 HOURS PRN
Status: DISCONTINUED | OUTPATIENT
Start: 2022-07-21 | End: 2022-07-22 | Stop reason: HOSPADM

## 2022-07-21 RX ORDER — PROMETHAZINE HYDROCHLORIDE 25 MG/1
12.5-25 SUPPOSITORY RECTAL EVERY 4 HOURS PRN
Status: DISCONTINUED | OUTPATIENT
Start: 2022-07-21 | End: 2022-07-22 | Stop reason: HOSPADM

## 2022-07-21 RX ORDER — LEVOTHYROXINE SODIUM 112 UG/1
112 TABLET ORAL DAILY
Status: DISCONTINUED | OUTPATIENT
Start: 2022-07-21 | End: 2022-07-22 | Stop reason: HOSPADM

## 2022-07-21 RX ORDER — POLYETHYLENE GLYCOL 3350 17 G/17G
1 POWDER, FOR SOLUTION ORAL
Status: DISCONTINUED | OUTPATIENT
Start: 2022-07-21 | End: 2022-07-22 | Stop reason: HOSPADM

## 2022-07-21 RX ORDER — POTASSIUM CHLORIDE 20 MEQ/1
40 TABLET, EXTENDED RELEASE ORAL 2 TIMES DAILY
Status: DISCONTINUED | OUTPATIENT
Start: 2022-07-21 | End: 2022-07-22 | Stop reason: HOSPADM

## 2022-07-21 RX ORDER — BUDESONIDE AND FORMOTEROL FUMARATE DIHYDRATE 160; 4.5 UG/1; UG/1
2 AEROSOL RESPIRATORY (INHALATION)
Status: DISCONTINUED | OUTPATIENT
Start: 2022-07-21 | End: 2022-07-21 | Stop reason: ALTCHOICE

## 2022-07-21 RX ORDER — PRAVASTATIN SODIUM 10 MG
10 TABLET ORAL NIGHTLY
COMMUNITY
End: 2023-05-12

## 2022-07-21 RX ORDER — MONTELUKAST SODIUM 10 MG/1
10 TABLET ORAL EVERY EVENING
Status: DISCONTINUED | OUTPATIENT
Start: 2022-07-21 | End: 2022-07-22 | Stop reason: HOSPADM

## 2022-07-21 RX ORDER — OXYCODONE AND ACETAMINOPHEN 7.5; 325 MG/1; MG/1
1 TABLET ORAL EVERY 4 HOURS PRN
Status: DISCONTINUED | OUTPATIENT
Start: 2022-07-21 | End: 2022-07-22 | Stop reason: HOSPADM

## 2022-07-21 RX ORDER — INSULIN LISPRO 100 [IU]/ML
40-80 INJECTION, SOLUTION INTRAVENOUS; SUBCUTANEOUS SEE ADMIN INSTRUCTIONS
Status: ON HOLD | COMMUNITY
End: 2023-12-30

## 2022-07-21 RX ORDER — DEXTROSE MONOHYDRATE 25 G/50ML
25 INJECTION, SOLUTION INTRAVENOUS
Status: DISCONTINUED | OUTPATIENT
Start: 2022-07-21 | End: 2022-07-21

## 2022-07-21 RX ORDER — AMINOPHYLLINE 25 MG/ML
100 INJECTION, SOLUTION INTRAVENOUS
Status: DISCONTINUED | OUTPATIENT
Start: 2022-07-21 | End: 2022-07-22 | Stop reason: HOSPADM

## 2022-07-21 RX ORDER — REGADENOSON 0.08 MG/ML
0.4 INJECTION, SOLUTION INTRAVENOUS
Status: COMPLETED | OUTPATIENT
Start: 2022-07-21 | End: 2022-07-21

## 2022-07-21 RX ORDER — VERAPAMIL HYDROCHLORIDE 80 MG/1
40 TABLET ORAL 4 TIMES DAILY PRN
COMMUNITY
End: 2023-05-12

## 2022-07-21 RX ORDER — GABAPENTIN 300 MG/1
600 CAPSULE ORAL 3 TIMES DAILY
Status: DISCONTINUED | OUTPATIENT
Start: 2022-07-21 | End: 2022-07-22 | Stop reason: HOSPADM

## 2022-07-21 RX ORDER — PANTOPRAZOLE SODIUM 40 MG/1
40 TABLET, DELAYED RELEASE ORAL EVERY EVENING
Status: DISCONTINUED | OUTPATIENT
Start: 2022-07-21 | End: 2022-07-21

## 2022-07-21 RX ORDER — PREDNISONE 20 MG/1
40 TABLET ORAL DAILY
Status: DISCONTINUED | OUTPATIENT
Start: 2022-07-21 | End: 2022-07-22 | Stop reason: HOSPADM

## 2022-07-21 RX ADMIN — LEVALBUTEROL HYDROCHLORIDE 0.63 MG: 0.63 SOLUTION RESPIRATORY (INHALATION) at 07:07

## 2022-07-21 RX ADMIN — POTASSIUM CHLORIDE 40 MEQ: 1500 TABLET, EXTENDED RELEASE ORAL at 14:59

## 2022-07-21 RX ADMIN — OXYCODONE AND ACETAMINOPHEN 1 TABLET: 7.5; 325 TABLET ORAL at 21:12

## 2022-07-21 RX ADMIN — OMEPRAZOLE 20 MG: 20 CAPSULE, DELAYED RELEASE ORAL at 20:55

## 2022-07-21 RX ADMIN — OXYCODONE AND ACETAMINOPHEN 1 TABLET: 7.5; 325 TABLET ORAL at 14:59

## 2022-07-21 RX ADMIN — LEVALBUTEROL HYDROCHLORIDE 0.63 MG: 0.63 SOLUTION RESPIRATORY (INHALATION) at 19:12

## 2022-07-21 RX ADMIN — LEVALBUTEROL HYDROCHLORIDE 0.63 MG: 0.63 SOLUTION RESPIRATORY (INHALATION) at 23:08

## 2022-07-21 RX ADMIN — GABAPENTIN 600 MG: 300 CAPSULE ORAL at 17:23

## 2022-07-21 RX ADMIN — PREDNISONE 40 MG: 20 TABLET ORAL at 15:00

## 2022-07-21 RX ADMIN — FUROSEMIDE 40 MG: 10 INJECTION, SOLUTION INTRAMUSCULAR; INTRAVENOUS at 05:30

## 2022-07-21 RX ADMIN — MONTELUKAST 10 MG: 10 TABLET, FILM COATED ORAL at 20:56

## 2022-07-21 RX ADMIN — PRAVASTATIN SODIUM 10 MG: 20 TABLET ORAL at 20:56

## 2022-07-21 RX ADMIN — SODIUM CHLORIDE 125 MG: 9 INJECTION, SOLUTION INTRAVENOUS at 17:54

## 2022-07-21 RX ADMIN — POTASSIUM CHLORIDE 40 MEQ: 1500 TABLET, EXTENDED RELEASE ORAL at 20:57

## 2022-07-21 RX ADMIN — APIXABAN 5 MG: 5 TABLET, FILM COATED ORAL at 17:23

## 2022-07-21 RX ADMIN — FEBUXOSTAT 80 MG: 40 TABLET, FILM COATED ORAL at 20:55

## 2022-07-21 RX ADMIN — LEVOTHYROXINE SODIUM 112 MCG: 0.11 TABLET ORAL at 05:30

## 2022-07-21 RX ADMIN — APIXABAN 5 MG: 5 TABLET, FILM COATED ORAL at 03:22

## 2022-07-21 RX ADMIN — OXYCODONE AND ACETAMINOPHEN 1 TABLET: 5; 325 TABLET ORAL at 05:30

## 2022-07-21 RX ADMIN — GABAPENTIN 600 MG: 300 CAPSULE ORAL at 03:22

## 2022-07-21 RX ADMIN — LEVALBUTEROL HYDROCHLORIDE 0.63 MG: 0.63 SOLUTION RESPIRATORY (INHALATION) at 14:20

## 2022-07-21 RX ADMIN — REGADENOSON 0.4 MG: 0.08 INJECTION, SOLUTION INTRAVENOUS at 10:00

## 2022-07-21 RX ADMIN — FUROSEMIDE 40 MG: 40 TABLET ORAL at 17:23

## 2022-07-21 ASSESSMENT — LIFESTYLE VARIABLES
TOTAL SCORE: 0
HAVE PEOPLE ANNOYED YOU BY CRITICIZING YOUR DRINKING: NO
CONSUMPTION TOTAL: NEGATIVE
EVER HAD A DRINK FIRST THING IN THE MORNING TO STEADY YOUR NERVES TO GET RID OF A HANGOVER: NO
TOTAL SCORE: 0
HOW MANY TIMES IN THE PAST YEAR HAVE YOU HAD 5 OR MORE DRINKS IN A DAY: 0
ALCOHOL_USE: NO
EVER FELT BAD OR GUILTY ABOUT YOUR DRINKING: NO
HAVE YOU EVER FELT YOU SHOULD CUT DOWN ON YOUR DRINKING: NO
ON A TYPICAL DAY WHEN YOU DRINK ALCOHOL HOW MANY DRINKS DO YOU HAVE: 0
TOTAL SCORE: 0
AVERAGE NUMBER OF DAYS PER WEEK YOU HAVE A DRINK CONTAINING ALCOHOL: 0

## 2022-07-21 ASSESSMENT — PATIENT HEALTH QUESTIONNAIRE - PHQ9
3. TROUBLE FALLING OR STAYING ASLEEP OR SLEEPING TOO MUCH: NEARLY EVERY DAY
1. LITTLE INTEREST OR PLEASURE IN DOING THINGS: NOT AT ALL
8. MOVING OR SPEAKING SO SLOWLY THAT OTHER PEOPLE COULD HAVE NOTICED. OR THE OPPOSITE, BEING SO FIGETY OR RESTLESS THAT YOU HAVE BEEN MOVING AROUND A LOT MORE THAN USUAL: NOT AT ALL
2. FEELING DOWN, DEPRESSED, IRRITABLE, OR HOPELESS: SEVERAL DAYS
SUM OF ALL RESPONSES TO PHQ9 QUESTIONS 1 AND 2: 1
9. THOUGHTS THAT YOU WOULD BE BETTER OFF DEAD, OR OF HURTING YOURSELF: NOT AT ALL
SUM OF ALL RESPONSES TO PHQ QUESTIONS 1-9: 7
6. FEELING BAD ABOUT YOURSELF - OR THAT YOU ARE A FAILURE OR HAVE LET YOURSELF OR YOUR FAMILY DOWN: NOT AL ALL
7. TROUBLE CONCENTRATING ON THINGS, SUCH AS READING THE NEWSPAPER OR WATCHING TELEVISION: NOT AT ALL
4. FEELING TIRED OR HAVING LITTLE ENERGY: NEARLY EVERY DAY
5. POOR APPETITE OR OVEREATING: NOT AT ALL

## 2022-07-21 ASSESSMENT — COGNITIVE AND FUNCTIONAL STATUS - GENERAL
MOBILITY SCORE: 24
DAILY ACTIVITIY SCORE: 24
SUGGESTED CMS G CODE MODIFIER MOBILITY: CH
SUGGESTED CMS G CODE MODIFIER DAILY ACTIVITY: CH

## 2022-07-21 ASSESSMENT — ENCOUNTER SYMPTOMS
NAUSEA: 0
WEAKNESS: 1
DIZZINESS: 0
CHILLS: 0
TINGLING: 0
SHORTNESS OF BREATH: 1
DEPRESSION: 0
CONSTIPATION: 0
SPUTUM PRODUCTION: 0
PALPITATIONS: 0
STRIDOR: 0
FALLS: 0
LOSS OF CONSCIOUSNESS: 0
DIARRHEA: 0
HEADACHES: 0
MYALGIAS: 0
FEVER: 0
VOMITING: 0
COUGH: 0
ABDOMINAL PAIN: 0

## 2022-07-21 ASSESSMENT — FIBROSIS 4 INDEX: FIB4 SCORE: 2.03

## 2022-07-21 ASSESSMENT — COPD QUESTIONNAIRES
DO YOU EVER COUGH UP ANY MUCUS OR PHLEGM?: YES, EVERY DAY
COPD SCREENING SCORE: 6
HAVE YOU SMOKED AT LEAST 100 CIGARETTES IN YOUR ENTIRE LIFE: NO/DON'T KNOW
DURING THE PAST 4 WEEKS HOW MUCH DID YOU FEEL SHORT OF BREATH: SOME OF THE TIME

## 2022-07-21 ASSESSMENT — PAIN DESCRIPTION - PAIN TYPE
TYPE: ACUTE PAIN;CHRONIC PAIN
TYPE: CHRONIC PAIN;ACUTE PAIN
TYPE: ACUTE PAIN;CHRONIC PAIN

## 2022-07-21 NOTE — FLOWSHEET NOTE
07/21/22 0217   Patient History   Pulmonary Diagnosis asthma   Home O2 No   Nocturnal CPAP Yes   Nocturnal CPAP Oxygen liter flow 2   Home Treatments/Frequency Yes   MDI 1/Frequency xopenex PRN   MDI 2/Frequency trelogy QAM   Sleep Apnea Screening   Have you had a sleep study? Yes   Have you been diagnosed with sleep apnea? Yes   Do you use a CPAP/BIPAP/AUTOPAP? Yes   Machine Home Setting   (patient does not know)   COPD Risk Screening   Do you have a history of COPD? No   COPD Population Screener   During the past 4 weeks, how much did you feel short of breath? 1   Do you ever cough up any mucus or phlegm? 2   In the past 12 months, you do less than you used to because of your breathing problems 1   Have you smoked at least 100 cigarettes in your entire life? 0   How old are you? 2   COPD Screening Score 6   COPD Coordinator Recommended Yes   Protocol Pathways   Protocol Pathways Bronchodilator Protocol   Bronchodilator Protocol   Med Order With RCP Yes - Initial Order by MD for Therapy - Follow Order for 24 Hours, Reassess Patient   Is this an exacerbation of COPD/Asthma? Yes - Bronchodilators Q4 hours for 24 hours   Bronchodilator Indications History / Diagnosis   Breath Sounds Criteria 1    Benefit Criteria 1    Pulse Criteria 1    Respiratory Rate Criteria 1    S.O.B. Criteria 2    Criteria Total 6   Point Values 4-6 - QID and PRN   Bronchodilator Goals/Outcome Diminished Wheezing and Volume of Air Movement Increased

## 2022-07-21 NOTE — ASSESSMENT & PLAN NOTE
- Atypical chest pain, there is a slight troponin elevation  -Continue to trend troponin, repeat EKG and monitor on patient on telemetry  - abnormal stress test vs artifact  - echo ef: normal , grade 2 diastolic dysfunction  Cardiology consulted

## 2022-07-21 NOTE — ASSESSMENT & PLAN NOTE
- Patient has been given potassium in the ER  -She will be given IV Lasix, will give increased oral potassium as well  -Repeat BMP in the morning

## 2022-07-21 NOTE — FLOWSHEET NOTE
07/21/22 1422   Vital Signs   Pulse 84   Respiration 16   Respiratory Assessment   Respiratory Pattern Within Normal Limits   Level of Consciousness Alert   Breath Sounds   RUL Breath Sounds Diminished;Fine Crackles   RML Breath Sounds Diminished   RLL Breath Sounds Diminished   DANIELLE Breath Sounds Diminished;Fine Crackles   LLL Breath Sounds Diminished   Oxygen   O2 (LPM) 2   O2 Delivery Device Silicone Nasal Cannula

## 2022-07-21 NOTE — H&P
Hospital Medicine History & Physical Note    Date of Service  7/20/2022    Primary Care Physician  Adrián Duckworth M.D.    Consultants  None    Specialist Names: None    Code Status  Full Code    Chief Complaint  Chief Complaint   Patient presents with   • Sent by MD     New dx of CHF, PCP sent to ED for elevated BNP   • Hypoxemia     Home o2 was in the mid 70's    • Weakness       History of Presenting Illness  Jenifer Ramos is a 62 y.o. female who presented 7/20/2022 with shortness of breath.  Patient states this is been ongoing since December, has had a work-up but does not have a diagnosis.  She did have labs recently, was called because she was told she had evidence of heart failure, apparently an elevated BNP.  Patient did have a FERNANDO in May at [facility] with a preserved ejection fraction.  Patient also complains of upper bilateral chest pain that is described as an ache, moderate in severity, this is worse with exertion.  She also was told her hemoglobin is lower than previous.  She has been complaining of weakness.  She does chronically have lower extremity edema, states this has been worsening, she feels her medications are not working as well as previously.  She states she has a fatty liver but not cirrhosis.  I did discuss the case including labs and imaging with the ER physician.    I discussed the plan of care with patient.    Review of Systems  Review of Systems   Constitutional: Negative for chills, fever and malaise/fatigue.   HENT: Negative for congestion.    Respiratory: Positive for shortness of breath. Negative for cough, sputum production and stridor.    Cardiovascular: Positive for chest pain and leg swelling. Negative for palpitations.   Gastrointestinal: Negative for abdominal pain, constipation, diarrhea, nausea and vomiting.   Genitourinary: Negative for dysuria and urgency.   Musculoskeletal: Negative for falls and myalgias.   Neurological: Positive for weakness. Negative  More alert, denies pain, states nausea improved, taking po dressing remains intact.   for dizziness, tingling, loss of consciousness and headaches.   Psychiatric/Behavioral: Negative for depression and suicidal ideas.   All other systems reviewed and are negative.      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    Surgical History   has a past surgical history that includes hysterectomy, total abdominal (2003); oophorectomy; tonsillectomy; cholecystectomy; fusion, spine, lumbar, plif; other cardiac surgery; other orthopedic surgery (2018); knee arthroplasty total (Left, 2013); knee arthroplasty total (Right, 2013); pr reconstr total shoulder implant (Left, 11/27/2019); and clavicle distal excision (11/27/2019).     Family History  family history includes Heart Disease (age of onset: 75) in her mother; Hypertension in her sister; Lung Disease (age of onset: 69) in her father.   Family history reviewed with patient. There is family history that is pertinent to the chief complaint.     Social History   reports that she has never smoked. She has never used smokeless tobacco. She reports previous alcohol use. She reports that she does not use drugs.    Allergies  Allergies   Allergen Reactions   • Azithromycin Anaphylaxis   • Erythromycin Anaphylaxis   • Other Misc Anaphylaxis     Flu vaccine   • Penicillins Anaphylaxis     As a child   • Pistachio Anaphylaxis   • Sulfa Drugs Anaphylaxis   • Tetraglycine Anaphylaxis   • Tape Rash and Itching     tegaderm ok  Blisters with others   • Atorvastatin      Extreme joint pain   • Chlorhexidine Rash     blistering   • Lamisil [Terbinafine Hcl]      Pancreatitis    • Morphine      Not effective   • Other Drug       Long acting benzodiazepines only single dose otherwise combative    • Physostigmine      Abdomen extreme swelling   • Zanaflex [Tizanidine Hcl]      Falling asleep mid sentence       Medications  Prior to Admission Medications   Prescriptions Last Dose Informant Patient Reported? Taking?   Fluticasone Furoate-Vilanterol (BREO ELLIPTA) 200-25 MCG/INH AEROSOL POWDER, BREATH ACTIVATED  Patient Yes No   Sig: Inhale 1 Puff by mouth every day.   IRON PO  Patient Yes No   Sig: Take 1 Tab by mouth every day.   Insulin Degludec (TRESIBA FLEXTOUCH) 200 UNIT/ML Solution Pen-injector  Patient Yes No   Sig: Inject 276 Units as instructed every evening.   apixaban (ELIQUIS) 5mg Tab   No No   Sig: Take 1 Tab by mouth 2 Times a Day.   febuxostat (ULORIC) 80 MG TABS  Patient Yes No   Sig: Take 80 mg by mouth every bedtime.   gabapentin (NEURONTIN) 300 MG Cap  Patient Yes No   Sig: Take 600 mg by mouth 3 times a day.   insulin aspart (NOVOLOG) 100 UNIT/ML Solution  Patient Yes No   Sig: Inject 40-80 Units as instructed 2 Times a Day.   levalbuterol (XOPENEX HFA) 45 MCG/ACT inhaler  Patient Yes No   Sig: Inhale 2 Puffs by mouth 2 times a day as needed for Shortness of Breath.   levothyroxine (SYNTHROID) 112 MCG Tab  Patient Yes No   Sig: Take 112 mcg by mouth every day.   lidocaine (LIDODERM) 5 % Patch  Patient Yes No   Sig: Apply 1-4 Patches to skin as directed every 12 hours.   montelukast (SINGULAIR) 10 MG TABS  Patient Yes No   Sig: Take 10 mg by mouth every evening.   pantoprazole (PROTONIX) 40 MG Tablet Delayed Response  Patient Yes No   Sig: Take 40 mg by mouth every evening.   promethazine (PHENERGAN) 25 MG Tab  Patient Yes No   Sig: Take 25 mg by mouth every 6 hours as needed for Nausea/Vomiting.   rizatriptan (MAXALT-MLT) 10 MG disintegrating tablet  Patient Yes No   Sig: Take 10 mg by mouth Once PRN for Migraine.   spironolactone (ALDACTONE) 50 MG Tab  Patient Yes No   Sig: Take 50 mg by mouth 1 time daily as needed.       Facility-Administered Medications: None       Physical Exam  Temp:  [36.4 °C (97.5 °F)] 36.4 °C (97.5 °F)  Pulse:  [75-85] 82  Resp:  [24-44] 44  BP: ()/() 98/47  SpO2:  [82 %-98 %] 96 %  Blood Pressure: (!) 98/47   Temperature: 36.4 °C (97.5 °F)   Pulse: 82   Respiration: (!) 44   Pulse Oximetry: 96 %       Physical Exam  Vitals and nursing note reviewed.   Constitutional:       General: She is not in acute distress.     Appearance: She is well-developed. She is obese. She is not diaphoretic.   HENT:      Head: Normocephalic and atraumatic.      Right Ear: External ear normal.      Left Ear: External ear normal.      Nose: Nose normal. No congestion or rhinorrhea.      Mouth/Throat:      Mouth: Mucous membranes are moist.      Pharynx: No oropharyngeal exudate.   Eyes:      General:         Right eye: No discharge.         Left eye: No discharge.      Extraocular Movements: Extraocular movements intact.   Neck:      Trachea: No tracheal deviation.   Cardiovascular:      Rate and Rhythm: Normal rate and regular rhythm.      Heart sounds: Murmur heard.     No friction rub. No gallop.   Pulmonary:      Effort: Pulmonary effort is normal. No respiratory distress.      Breath sounds: No stridor. Decreased breath sounds present. No wheezing or rales.   Chest:      Chest wall: No tenderness.   Abdominal:      General: Bowel sounds are normal. There is no distension.      Palpations: Abdomen is soft.      Tenderness: There is no abdominal tenderness.   Musculoskeletal:         General: No tenderness. Normal range of motion.      Cervical back: Normal range of motion and neck supple.      Right lower leg: Edema present.      Left lower leg: Edema present.   Lymphadenopathy:      Cervical: No cervical adenopathy.   Skin:     General: Skin is warm and dry.      Findings: No erythema or rash.   Neurological:      General: No focal deficit present.      Mental Status: She is alert and oriented to person, place,  and time.      Cranial Nerves: No cranial nerve deficit.   Psychiatric:         Mood and Affect: Mood normal.         Behavior: Behavior normal.         Thought Content: Thought content normal.         Judgment: Judgment normal.         Laboratory:  Recent Labs     07/20/22 1922   WBC 8.1   RBC 4.58   HEMOGLOBIN 9.3*   HEMATOCRIT 34.0*   MCV 74.2*   MCH 20.3*   MCHC 27.4*   RDW 60.1*   PLATELETCT 291   MPV 10.2     Recent Labs     07/20/22 1922   SODIUM 132*   POTASSIUM 3.2*   CHLORIDE 90*   CO2 30   GLUCOSE 89   BUN 16   CREATININE 0.75   CALCIUM 9.5     Recent Labs     07/20/22 1922   ALTSGPT 10   ASTSGOT 24   ALKPHOSPHAT 111*   TBILIRUBIN 0.8   GLUCOSE 89         Recent Labs     07/20/22 1922   NTPROBNP 2476*         Recent Labs     07/20/22 1922   TROPONINT 28*       Imaging:  CT-CTA CHEST PULMONARY ARTERY W/ RECONS   Final Result         1.  No pulmonary embolus appreciated.   2.  Ground glass pulmonary opacities suggests atypical infiltrates or edema.   3.  Enlarged main pulmonary artery, consider pulmonary arterial hypertension.   4.  Mild mediastinal, retroperitoneal, and right hilar adenopathy, workup and evaluation for causes of adenopathy recommended as clinically appropriate.   5.  Cardiomegaly   6.  Nodular hepatic contour most likely related to cirrhosis   7.  Pulmonary nodule, see nodule follow-up recommendations below.      Fleischner Society pulmonary nodule recommendations:   Low Risk: No routine follow-up      High Risk: Optional CT at 12 months      Comments: Nodules less than 6 mm do not require routine follow-up, but certain patients at high risk with suspicious nodule morphology, upper lobe location, or both may warrant 12-month follow-up.      Low Risk - Minimal or absent history of smoking and of other known risk factors.      High Risk - History of smoking or of other known risk factors.      Note: These recommendations do not apply to lung cancer screening, patients with  immunosuppression, or patients with known primary cancer.      Fleischner Society 2017 Guidelines for Management of Incidentally Detected Pulmonary Nodules in Adults         DX-CHEST-PORTABLE (1 VIEW)   Final Result         1.  No acute cardiopulmonary disease.   2.  Cardiomegaly          X-Ray:  I have personally reviewed the images and compared with prior images.    Assessment/Plan:  Justification for Admission Status  I anticipate this patient is appropriate for observation status at this time because Chest pain work-up, Lasix for worsening edema and iron studies    Patient will need a Telemetry bed on MEDICAL service .  The need is secondary to chest pain work-up including trending troponin.    * Chest pain- (present on admission)  Assessment & Plan  - Atypical chest pain, there is a slight troponin elevation  -Continue to trend troponin, repeat EKG and monitor on patient on telemetry  -If no worsening, obtain a stress test in the morning  -I do think patient warrants admission at this time, she does have multiple complaints as well as multiple disease processes however she is at risk for cardiac disease  -At this time, I do not anticipate hospital stay of greater than 2 midnights    Dyspnea- (present on admission)  Assessment & Plan  - Patient has multiple potential causes for dyspnea including asthma exacerbation   -pulmonary edema from possible CHF, she did have an echocardiogram, transesophageal in May at [facility], attempt to get records of this echocardiogram, if records were able to be obtained, I do not think she needs a repeat and she does not want a repeat at this time  -She also has sleep apnea, continue home BiPAP however if this is not well treated could certainly be worsening her symptoms  -I think pneumonia is less likely, await procalcitonin but no antibiotics at this time  -Continuous pulse ox monitoring, adjust oxygen as needed  -Start IV Lasix    Asthma with exacerbation- (present on  admission)  Assessment & Plan  - Certainly contributing to her shortness of breath  -Possibly due to pulmonary edema  -I do not see any sign of pneumonia, obtain procalcitonin  -No antibiotics at this time  -Patient was given IV steroids in the ER, start oral prednisone  -Good sats on low flow oxygen, I am unsure what her oxygen saturation was when she was placed on oxygen    Hypokalemia- (present on admission)  Assessment & Plan  - Patient has been given potassium in the ER  -She will be given IV Lasix, will give increased oral potassium as well  -Repeat BMP in the morning    Hyponatremia- (present on admission)  Assessment & Plan  - Mild, likely due to fluid overload  -Start IV Lasix    Microcytic anemia- (present on admission)  Assessment & Plan  - Patient states her hemoglobin had been stable, now with a recent 0.5 decrease  -I doubt this is increasing her shortness of breath much however we do not have recent labs, if there was a larger drop this certainly could be contributing  -Obtain iron panel    GERD (gastroesophageal reflux disease)- (present on admission)  Assessment & Plan  - Continue home PPI    Hypothyroidism- (present on admission)  Assessment & Plan  - Continue home Synthroid at 112 mcg  -No recent TSH    Cirrhosis (HCC)- (present on admission)  Assessment & Plan  - Patient states she has had a diagnosis of fatty liver but denies cirrhosis  -Cirrhosis is likely noted on CT scan, obtain right upper quadrant ultrasound for further evaluation  -Certainly could potentially be worsening her lower extremity edema    Pulmonary nodule- (present on admission)  Assessment & Plan  - Outpatient follow-up    Atrial fibrillation (HCC)- (present on admission)  Assessment & Plan  - Currently in sinus rhythm  -Continue home Eliquis    DM (diabetes mellitus) (HCC)- (present on admission)  Assessment & Plan  - Patient is well controlled on her home medications  -Currently she is not hyperglycemic, start insulin  sliding scale, she is at high risk of developing hyperglycemia due to steroid use  -Home meds are high-dose, I am not going to start any high-dose scheduled medications at this time for fear of hypoglycemia    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

## 2022-07-21 NOTE — PROGRESS NOTES
Pt refusing to allow RN to take home medications to pharmacy. Pt states she wants her medicatioms to be kept in the room, but denies using them at thi time. MD made aware pt refusing to allow pharmacy to keep meds stored at this time.

## 2022-07-21 NOTE — ED NOTES
PT given here home dose of pain meds for chronic pain. Updated pt that she is currently awaiting a CT scan.  Pt denies any other needs.

## 2022-07-21 NOTE — PROGRESS NOTES
Pt tolerated lizy scan well. C/O slight HA post procedure. No SOB, No CP reported during procedure.

## 2022-07-21 NOTE — FLOWSHEET NOTE
07/21/22 0708   Events/Summary/Plan   Events/Summary/Plan SVN given   Vital Signs   Pulse 80   Respiration 20   Pulse Oximetry 88 %   $ Pulse Oximetry (Spot Check) Yes   Respiratory Assessment   Respiratory Pattern Within Normal Limits   Level of Consciousness Alert   Chest Exam   Work Of Breathing / Effort Within Normal Limits   Breath Sounds   RUL Breath Sounds Crackles   RML Breath Sounds Diminished;Crackles   RLL Breath Sounds Diminished   DANIELLE Breath Sounds Clear   LLL Breath Sounds Diminished   Oxygen   O2 (LPM) 2   O2 Delivery Device Room air w/o2 available  (placed on 2 lpm)   Non-Invasive Ventilation [employer]   Nocturnal CPAP or BIPAP BIPAP - Home Unit  (off unit at this time.)   $ System Evaluation Yes   Settings (If Known) internally  set

## 2022-07-21 NOTE — PROGRESS NOTES
[facility] Medicine Daily Progress Note    Date of Service  7/21/2022    Chief Complaint  Jenifer Ramos is a 62 y.o. female admitted 7/20/2022 with weakness, SOB    Hospital Course  Jenifer Ramos is a 62 y.o. female who presented 7/20/2022 with shortness of breath.  Patient states this is been ongoing since December, has had a work-up but does not have a diagnosis.  She did have labs recently, was called because she was told she had evidence of heart failure, apparently an elevated BNP.  Patient did have a FERNANDO in May at [facility] with a preserved ejection fraction.  Patient also complains of upper bilateral chest pain that is described as an ache, moderate in severity, this is worse with exertion.  She also was told her hemoglobin is lower than previous.  She has been complaining of weakness.  She does chronically have lower extremity edema, states this has been worsening, she feels her medications are not working as well as previously.  She states she has a fatty liver but not cirrhosis.  I did discuss the case including labs and imaging with the ER physician.    Interval Problem Update  I had a very long and multiple discussion with the patient regarding her medical condition, updating ordered test results and imaging result and answering all the questions that she has.  She said her last A1c was 6.4 and she has understanding that we are not going to aggressively treat her blood sugar with insulin at this point.  Due to her possible abnormal stress test result I consulted cardiology who will see her later today or tomorrow.  She stated that she cannot take beta-blockers due to her history of asthma exacerbation with that medication.    I have discussed this patient's plan of care and discharge plan at IDT rounds today with Case Management, Nursing, Nursing leadership, and other members of the IDT team.    Consultants/Specialty  cardiology    Code Status  Full Code    Disposition  Patient is not  medically cleared for discharge.   Anticipate discharge to to home with close outpatient follow-up.  I have placed the appropriate orders for post-discharge needs.    Review of Systems  ROS     Physical Exam  Temp:  [36.4 °C (97.5 °F)-37 °C (98.6 °F)] 36.6 °C (97.9 °F)  Pulse:  [75-85] 83  Resp:  [14-44] 14  BP: ()/() 119/52  SpO2:  [82 %-98 %] 90 %    Physical Exam    Fluids  No intake or output data in the 24 hours ending 07/21/22 1344    Laboratory  Recent Labs     07/20/22 1922 07/21/22  0438   WBC 8.1 6.0   RBC 4.58 4.45   HEMOGLOBIN 9.3* 8.9*   HEMATOCRIT 34.0* 33.5*   MCV 74.2* 75.3*   MCH 20.3* 20.0*   MCHC 27.4* 26.6*   RDW 60.1* 61.7*   PLATELETCT 291 239   MPV 10.2 10.6     Recent Labs     07/20/22 1922 07/21/22  0438   SODIUM 132* 133*   POTASSIUM 3.2* 4.0   CHLORIDE 90* 92*   CO2 30 31   GLUCOSE 89 239*   BUN 16 17   CREATININE 0.75 0.78   CALCIUM 9.5 9.3                   Imaging  US-RUQ   Final Result      1. Echogenic heterogeneous liver, likely hepatocellular disease.         EC-ECHOCARDIOGRAM COMPLETE W/O CONT   Final Result      NM-CARDIAC STRESS TEST   Final Result      CT-CTA CHEST PULMONARY ARTERY W/ RECONS   Final Result         1.  No pulmonary embolus appreciated.   2.  Ground glass pulmonary opacities suggests atypical infiltrates or edema.   3.  Enlarged main pulmonary artery, consider pulmonary arterial hypertension.   4.  Mild mediastinal, retroperitoneal, and right hilar adenopathy, workup and evaluation for causes of adenopathy recommended as clinically appropriate.   5.  Cardiomegaly   6.  Nodular hepatic contour most likely related to cirrhosis   7.  Pulmonary nodule, see nodule follow-up recommendations below.      Fleischner Society pulmonary nodule recommendations:   Low Risk: No routine follow-up      High Risk: Optional CT at 12 months      Comments: Nodules less than 6 mm do not require routine follow-up, but certain patients at high risk with suspicious nodule  morphology, upper lobe location, or both may warrant 12-month follow-up.      Low Risk - Minimal or absent history of smoking and of other known risk factors.      High Risk - History of smoking or of other known risk factors.      Note: These recommendations do not apply to lung cancer screening, patients with immunosuppression, or patients with known primary cancer.      Fleischner Society 2017 Guidelines for Management of Incidentally Detected Pulmonary Nodules in Adults         DX-CHEST-PORTABLE (1 VIEW)   Final Result         1.  No acute cardiopulmonary disease.   2.  Cardiomegaly           Assessment/Plan  * Chest pain- (present on admission)  Assessment & Plan  - Atypical chest pain, there is a slight troponin elevation  -Continue to trend troponin, repeat EKG and monitor on patient on telemetry  - abnormal stress test vs artifact  - echo ef: normal , grade 2 diastolic dysfunction  Cardiology consulted    Dyspnea- (present on admission)  Assessment & Plan  - Patient has multiple potential causes for dyspnea including asthma exacerbation   -pulmonary edema from possible CHF, she did have an echocardiogram, transesophageal in May at [facility], attempt to get records of this echocardiogram, if records were able to be obtained, I do not think she needs a repeat and she does not want a repeat at this time  -She also has sleep apnea, continue home BiPAP however if this is not well treated could certainly be worsening her symptoms  -I think pneumonia is less likely, await procalcitonin but no antibiotics at this time  -Continuous pulse ox monitoring, adjust oxygen as needed  -Start IV Lasix  Need pulmonary outpatient follow-up    Hypokalemia- (present on admission)  Assessment & Plan  - Patient has been given potassium in the ER  -She will be given IV Lasix, will give increased oral potassium as well  -Repeat BMP in the morning    Hyponatremia- (present on admission)  Assessment & Plan  - Mild, likely due to  fluid overload  -Start IV Lasix    Microcytic anemia- (present on admission)  Assessment & Plan  Iron deficiency anemia  On supplement    GERD (gastroesophageal reflux disease)- (present on admission)  Assessment & Plan  - Continue home PPI    Hypothyroidism- (present on admission)  Assessment & Plan  - Continue home Synthroid at 112 mcg  -No recent TSH    Cirrhosis (HCC)- (present on admission)  Assessment & Plan  - Patient states she has had a diagnosis of fatty liver but denies cirrhosis  -Cirrhosis is likely noted on CT scan, obtain right upper quadrant ultrasound for further evaluation  Ultrasound pending    Pulmonary nodule- (present on admission)  Assessment & Plan  - Outpatient follow-up    Atrial fibrillation (HCC)- (present on admission)  Assessment & Plan  - Currently in sinus rhythm  -Continue home Eliquis    DM (diabetes mellitus) (HCC)- (present on admission)  Assessment & Plan  - Patient is well controlled on her home medications  -Currently she is not hyperglycemic, start insulin sliding scale, she is at high risk of developing hyperglycemia due to steroid use  -Home meds are high-dose, I am not going to start any high-dose scheduled medications at this time for fear of hypoglycemia    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 In review of yesterday's note (7/20/2022), there are no changes except as documented above.

## 2022-07-21 NOTE — ED PROVIDER NOTES
ED Provider Note    CHIEF COMPLAINT  Chief Complaint   Patient presents with   • Sent by MD     New dx of CHF, PCP sent to ED for elevated BNP   • Hypoxemia     Home o2 was in the mid 70's    • Weakness   • Blood in Sputum       HPI  Jenifer Ramos is a 62 y.o. female who presents with a chief complaint of shortness of breath, worsening edema in bilateral lower extremities, hypoxemia with O2 sat of 74% at home on BiPAP, generalized weakness, and hemoptysis that has been intermittently and persistently ongoing since November of last year.  She had an chest x-ray ordered and performed in May of this year and was called yesterday by her primary care physician due to concerns for pulmonary edema and heart failure.  She intermittently has taken Lasix and spironolactone but is not having any improvement in her lower extremity swelling.  She has significant and severe shortness of breath with chest pain that radiates between her shoulder blades.  This is also been ongoing since November of past year.  She is now coughing up bloody sputum with blood clots.  She is on Eliquis for history of blood clots in the context of factor V Leiden and notes complete compliance.  She denies fevers, abdominal pain, nausea, vomiting.  She also reports that she has had an anemia that has been intermittent and has ultimately required transfusion.  Her most recent labs were performed this past week at which time her hemoglobin was a little above 9.  Her primary care physician sent her here for evaluation and further work-up with likely hospitalization.    REVIEW OF SYSTEMS  See HPI for further details.  Chest pain.  Shortness of breath.  Hypoxemia.  Weakness.  Anemia.  Hemoptysis.  All other systems are negative.     PAST MEDICAL HISTORY   has a past medical history of Arrhythmia, Arthritis, ASTHMA (4/24/2013), Back pain (4/24/2013), Blood clotting disorder (HCC), Disorder of thyroid, DM (diabetes mellitus) ([facility]) (4/24/2013), Factor V  deficiency (HCC) (04/24/2013), Palpitations (4/24/2013), Pneumonia (1999), Psychiatric problem, Sleep apnea (4/24/2013), Snoring, Urinary incontinence, and Varicose vein (4/24/2013).    SOCIAL HISTORY  Social History     Tobacco Use   • Smoking status: Never Smoker   • Smokeless tobacco: Never Used   Substance and Sexual Activity   • Alcohol use: Not Currently   • Drug use: No   • Sexual activity: Not on file       SURGICAL HISTORY   has a past surgical history that includes hysterectomy, total abdominal (2003); oophorectomy; tonsillectomy; cholecystectomy; fusion, spine, lumbar, plif; other cardiac surgery; other orthopedic surgery (2018); knee arthroplasty total (Left, 2013); knee arthroplasty total (Right, 2013); reconstr total shoulder implant (Left, 11/27/2019); and clavicle distal excision (11/27/2019).    CURRENT MEDICATIONS  Home Medications     Reviewed by Maki Suarez R.N. (Registered Nurse) on 07/20/22 at 1703  Med List Status: Not Addressed   Medication Last Dose Status   apixaban (ELIQUIS) 5mg Tab  Active   febuxostat (ULORIC) 80 MG TABS  Active   Fluticasone Furoate-Vilanterol (BREO ELLIPTA) 200-25 MCG/INH AEROSOL POWDER, BREATH ACTIVATED  Active   gabapentin (NEURONTIN) 300 MG Cap  Active   insulin aspart (NOVOLOG) 100 UNIT/ML Solution  Active   Insulin Degludec (TRESIBA FLEXTOUCH) 200 UNIT/ML Solution Pen-injector  Active   IRON PO  Active   levalbuterol (XOPENEX HFA) 45 MCG/ACT inhaler  Active   levothyroxine (SYNTHROID) 112 MCG Tab  Active   lidocaine (LIDODERM) 5 % Patch  Active   montelukast (SINGULAIR) 10 MG TABS  Active   pantoprazole (PROTONIX) 40 MG Tablet Delayed Response  Active   promethazine (PHENERGAN) 25 MG Tab  Active   rizatriptan (MAXALT-MLT) 10 MG disintegrating tablet  Active   spironolactone (ALDACTONE) 50 MG Tab  Active                ALLERGIES  Allergies   Allergen Reactions   • Azithromycin Anaphylaxis   • Erythromycin Anaphylaxis   • Other Misc Anaphylaxis     Flu vaccine  "  • Penicillins Anaphylaxis     As a child   • Pistachio Anaphylaxis   • Sulfa Drugs Anaphylaxis   • Tetraglycine Anaphylaxis   • Tape Rash and Itching     tegaderm ok  Blisters with others   • Atorvastatin      Extreme joint pain   • Chlorhexidine Rash     blistering   • Lamisil [Terbinafine Hcl]      Pancreatitis    • Morphine      Not effective   • Other Drug      Long acting benzodiazepines only single dose otherwise combative    • Physostigmine      Abdomen extreme swelling   • Zanaflex [Tizanidine Hcl]      Falling asleep mid sentence       PHYSICAL EXAM  VITAL SIGNS: /67   Pulse 85   Temp 36.4 °C (97.5 °F) (Temporal)   Resp (!) 24   Ht 1.727 m (5' 8\")   Wt (!) 144 kg (318 lb)   SpO2 97%   BMI 48.35 kg/m²    Pulse ox interpretation: I interpret this pulse ox as normal.  Constitutional: Alert in no apparent distress.  HENT: No signs of trauma, Bilateral external ears normal, Nose normal.  Moist mucous membranes.  Eyes: Pupils are equal and reactive, Conjunctiva normal, Non-icteric.   Neck: Normal range of motion, No tenderness, Supple, No stridor.   Lymphatic: No lymphadenopathy noted.   Cardiovascular: Regular rate and rhythm, no murmurs. Pulses symmetrical.  Thorax & Lungs: Decreased breath sounds bilaterally, No respiratory distress, faint expiratory wheezing in left lung, No chest tenderness.   Abdomen: Bowel sounds normal, Soft, No tenderness, No masses, No pulsatile masses. No peritoneal signs.  Skin: Warm, Dry, No erythema, No rash.   Back: Normal alignment.  Extremities: Pitting edema in bilateral lower extremities, no cyanosis.  Musculoskeletal: No major deformities noted.   Neurologic: Alert, No focal deficits noted.   Psychiatric: Affect normal, Judgment normal, Mood normal.     DIAGNOSTIC STUDIES / PROCEDURES    LABS  Results for orders placed or performed during the hospital encounter of 07/20/22   EC-ECHOCARDIOGRAM COMPLETE W/O CONT   Result Value Ref Range    Eject.Frac. MOD BP 69.22 "     Eject.Frac. MOD 4C 77.93     Eject.Frac. MOD 2C 59.75     Left Ventrical Ejection Fraction 60    CBC WITH DIFFERENTIAL   Result Value Ref Range    WBC 8.1 4.8 - 10.8 K/uL    RBC 4.58 4.20 - 5.40 M/uL    Hemoglobin 9.3 (L) 12.0 - 16.0 g/dL    Hematocrit 34.0 (L) 37.0 - 47.0 %    MCV 74.2 (L) 81.4 - 97.8 fL    MCH 20.3 (L) 27.0 - 33.0 pg    MCHC 27.4 (L) 33.6 - 35.0 g/dL    RDW 60.1 (H) 35.9 - 50.0 fL    Platelet Count 291 164 - 446 K/uL    MPV 10.2 9.0 - 12.9 fL    Neutrophils-Polys 67.20 44.00 - 72.00 %    Lymphocytes 20.00 (L) 22.00 - 41.00 %    Monocytes 9.00 0.00 - 13.40 %    Eosinophils 3.00 0.00 - 6.90 %    Basophils 0.60 0.00 - 1.80 %    Immature Granulocytes 0.20 0.00 - 0.90 %    Nucleated RBC 0.20 /100 WBC    Neutrophils (Absolute) 5.43 2.00 - 7.15 K/uL    Lymphs (Absolute) 1.62 1.00 - 4.80 K/uL    Monos (Absolute) 0.73 0.00 - 0.85 K/uL    Eos (Absolute) 0.24 0.00 - 0.51 K/uL    Baso (Absolute) 0.05 0.00 - 0.12 K/uL    Immature Granulocytes (abs) 0.02 0.00 - 0.11 K/uL    NRBC (Absolute) 0.02 K/uL   Comp Metabolic Panel   Result Value Ref Range    Sodium 132 (L) 135 - 145 mmol/L    Potassium 3.2 (L) 3.6 - 5.5 mmol/L    Chloride 90 (L) 96 - 112 mmol/L    Co2 30 20 - 33 mmol/L    Anion Gap 12.0 7.0 - 16.0    Glucose 89 65 - 99 mg/dL    Bun 16 8 - 22 mg/dL    Creatinine 0.75 0.50 - 1.40 mg/dL    Calcium 9.5 8.4 - 10.2 mg/dL    AST(SGOT) 24 12 - 45 U/L    ALT(SGPT) 10 2 - 50 U/L    Alkaline Phosphatase 111 (H) 30 - 99 U/L    Total Bilirubin 0.8 0.1 - 1.5 mg/dL    Albumin 3.6 3.2 - 4.9 g/dL    Total Protein 7.7 6.0 - 8.2 g/dL    Globulin 4.1 (H) 1.9 - 3.5 g/dL    A-G Ratio 0.9 g/dL   TROPONIN   Result Value Ref Range    Troponin T 28 (H) 6 - 19 ng/L   D-DIMER   Result Value Ref Range    D-Dimer Screen 0.60 (H) 0.00 - 0.50 ug/mL (FEU)   proBrain Natriuretic Peptide, NT   Result Value Ref Range    NT-proBNP 2476 (H) 0 - 125 pg/mL   DIFFERENTIAL COMMENT   Result Value Ref Range    Comments-Diff see below     ESTIMATED GFR   Result Value Ref Range    GFR (CKD-EPI) 89 >60 mL/min/1.73 m 2   CoV-2, FLU A/B, and RSV by PCR (2-4 Hours CEPHEID) : Collect NP swab in VTM    Specimen: Respirate   Result Value Ref Range    Influenza virus A RNA Negative Negative    Influenza virus B, PCR Negative Negative    RSV, PCR Negative Negative    SARS-CoV-2 by PCR NotDetected     SARS-CoV-2 Source NP Swab    MAGNESIUM   Result Value Ref Range    Magnesium 2.3 1.5 - 2.5 mg/dL   IRON/TOTAL IRON BIND   Result Value Ref Range    Iron 19 (L) 40 - 170 ug/dL    Total Iron Binding 413 250 - 450 ug/dL    Unsat Iron Binding 394 (H) 110 - 370 ug/dL    % Saturation 5 (L) 15 - 55 %   CBC without Differential   Result Value Ref Range    WBC 6.0 4.8 - 10.8 K/uL    RBC 4.45 4.20 - 5.40 M/uL    Hemoglobin 8.9 (L) 12.0 - 16.0 g/dL    Hematocrit 33.5 (L) 37.0 - 47.0 %    MCV 75.3 (L) 81.4 - 97.8 fL    MCH 20.0 (L) 27.0 - 33.0 pg    MCHC 26.6 (L) 33.6 - 35.0 g/dL    RDW 61.7 (H) 35.9 - 50.0 fL    Platelet Count 239 164 - 446 K/uL    MPV 10.6 9.0 - 12.9 fL   Basic Metabolic Panel (BMP)   Result Value Ref Range    Sodium 133 (L) 135 - 145 mmol/L    Potassium 4.0 3.6 - 5.5 mmol/L    Chloride 92 (L) 96 - 112 mmol/L    Co2 31 20 - 33 mmol/L    Glucose 239 (H) 65 - 99 mg/dL    Bun 17 8 - 22 mg/dL    Creatinine 0.78 0.50 - 1.40 mg/dL    Calcium 9.3 8.4 - 10.2 mg/dL    Anion Gap 10.0 7.0 - 16.0   TROPONIN   Result Value Ref Range    Troponin T 20 (H) 6 - 19 ng/L   ESTIMATED GFR   Result Value Ref Range    GFR (CKD-EPI) 85 >60 mL/min/1.73 m 2   PROCALCITONIN   Result Value Ref Range    Procalcitonin 0.26 (H) <0.25 ng/mL   EKG   Result Value Ref Range    Report       [facility] Emergency Dept.    Test Date:  2022  Pt Name:    LEONEL LINDER               Department: [facility]  MRN:        [id number]                      Room:       -ROOM 9  Gender:     Female                       Technician: [id number]  :        [id number]                    Requested By:ERIC MUNSON  Order #:    [id number]                    Reading MD: Eric Munson MD    Measurements  Intervals                                Axis  Rate:       74                           P:          40  NV:         187                          QRS:        161  QRSD:       169                          T:          -10  QT:         441  QTc:        490    Interpretive Statements  Sinus rhythm  Probable left atrial enlargement  Nonspecific intraventricular conduction delay  ST depr, consider ischemia, inferior leads  Baseline wander in lead(s) II,III,aVF  Compared to ECG 11/15/2019 15:44:45  Intraventricular conduction delay now present  Possible ischemia now present  Right bun dle-branch block no longer present  Electronically Signed On 7- 22:27:00 PDT by Eric Munson MD     POCT glucose device results   Result Value Ref Range    POC Glucose, Blood 252 (H) 65 - 99 mg/dL   POCT glucose device results   Result Value Ref Range    POC Glucose, Blood 200 (H) 65 - 99 mg/dL     RADIOLOGY  US-RUQ   Final Result      1. Echogenic heterogeneous liver, likely hepatocellular disease.         EC-ECHOCARDIOGRAM COMPLETE W/O CONT   Final Result      NM-CARDIAC STRESS TEST   Final Result      CT-CTA CHEST PULMONARY ARTERY W/ RECONS   Final Result         1.  No pulmonary embolus appreciated.   2.  Ground glass pulmonary opacities suggests atypical infiltrates or edema.   3.  Enlarged main pulmonary artery, consider pulmonary arterial hypertension.   4.  Mild mediastinal, retroperitoneal, and right hilar adenopathy, workup and evaluation for causes of adenopathy recommended as clinically appropriate.   5.  Cardiomegaly   6.  Nodular hepatic contour most likely related to cirrhosis   7.  Pulmonary nodule, see nodule follow-up recommendations below.      Fleischner Society pulmonary nodule recommendations:   Low Risk: No routine follow-up      High Risk: Optional CT at 12 months      Comments: Nodules less than 6 mm  do not require routine follow-up, but certain patients at high risk with suspicious nodule morphology, upper lobe location, or both may warrant 12-month follow-up.      Low Risk - Minimal or absent history of smoking and of other known risk factors.      High Risk - History of smoking or of other known risk factors.      Note: These recommendations do not apply to lung cancer screening, patients with immunosuppression, or patients with known primary cancer.      Fleischner Society 2017 Guidelines for Management of Incidentally Detected Pulmonary Nodules in Adults         DX-CHEST-PORTABLE (1 VIEW)   Final Result         1.  No acute cardiopulmonary disease.   2.  Cardiomegaly        COURSE & MEDICAL DECISION MAKING  Pertinent Labs & Imaging studies reviewed. (See chart for details)  This is a 62 year old female sent by her PMD due to concerns for CHF exacerbation. Arrives tachypneic but AF with otherwise normal vital signs. She appears well hydrated and non-toxic. Physical exam reveals decreased breath sounds bilaterally with faint expiratory wheezes in the left lung. She does have pitting edema in her bilateral lower extremities.     Patient given a duoneb and dose of solumedrol. She does have IDDM but the benefits of the solumedrol outweigh risks given shortness of breath.    She has no leukocytosis or left shift. She is anemic with H/H of 9.3/34.0. She denies any melena or hematochezia. Metabolic panel demonstrates hyponatremia to 132, hypokalemia to 3.2, chloride of 90. She otherwise has normal renal and liver function. Magnesium obtained because of patient's persistent hypokalemia and is normal.     Patient given a dose of KDur.    Troponin and BNP are both elevated to 28/2476 respectively. Patient given a dose of lasix.    D-dimer obtained given reports of chest pain and hemoptysis; it is elevated to 0.60 and CTA chest was performed which thankfully did not reveal PE but multiple other incidental findings were  noted. Concern for potential atypical infection vs fluid overload. Given physical exam findings, lack of fever, normal WBC count, and elevated BNP will defer antibiotics at this time.    Patient reevaluated at bedside. She is resting comfortably and feels improvement following breathing treatment and solumedrol. We discussed labs/imaging results.    She will benefit from hospitalization for additional workup and management. Discussed with hospitalist and admitted in guarded condition.    FINAL IMPRESSION  1. Elevated troponin  2. Elevated BNP  3. Cirrhosis  4. Shortness of breath      Electronically signed by: Corbin Kemp M.D., 7/20/2022 7:07 PM

## 2022-07-21 NOTE — DIETARY
NUTRITION SERVICES: BMI - Pt with BMI >40 (=Body mass index is 48.35 kg/m².), Class III obesity. Wt stated on admit. Note pt w/ 4+ BLE edema per flowsheets. Weight loss counseling not appropriate in acute care setting.     RECOMMEND - If appropriate at DC please refer to outpatient nutrition services for weight management.   Please obtain measured wt as able.    RD available PRN.

## 2022-07-21 NOTE — ASSESSMENT & PLAN NOTE
- Patient states she has had a diagnosis of fatty liver but denies cirrhosis  -Cirrhosis is likely noted on CT scan, obtain right upper quadrant ultrasound for further evaluation  Ultrasound pending

## 2022-07-21 NOTE — ED TRIAGE NOTES
"Chief Complaint   Patient presents with   • Sent by MD     New dx of CHF, PCP sent to ED for elevated BNP   • Hypoxemia     Home o2 was in the mid 70's    • Weakness   • Blood in Sputum     /67   Pulse 85   Temp 36.4 °C (97.5 °F) (Temporal)   Resp (!) 24   Ht 1.727 m (5' 8\")   Wt (!) 144 kg (318 lb)   SpO2 97%   BMI 48.35 kg/m²     Pt was placed on 2L in triage  "

## 2022-07-21 NOTE — ASSESSMENT & PLAN NOTE
- Certainly contributing to her shortness of breath  -No antibiotics at this time  -Patient was given IV steroids in the ER, start oral prednisone  -Good sats on low flow oxygen, I am unsure what her oxygen saturation was when she was placed on oxygen  -Inhaler and nebulizer

## 2022-07-21 NOTE — ASSESSMENT & PLAN NOTE
- Patient is well controlled on her home medications  -Currently she is not hyperglycemic, start insulin sliding scale, she is at high risk of developing hyperglycemia due to steroid use  -Home meds are high-dose, I am not going to start any high-dose scheduled medications at this time for fear of hypoglycemia

## 2022-07-21 NOTE — ASSESSMENT & PLAN NOTE
- Patient has multiple potential causes for dyspnea including asthma exacerbation   -pulmonary edema from possible CHF, she did have an echocardiogram, transesophageal in May at [facility], attempt to get records of this echocardiogram, if records were able to be obtained, I do not think she needs a repeat and she does not want a repeat at this time  -She also has sleep apnea, continue home BiPAP however if this is not well treated could certainly be worsening her symptoms  -I think pneumonia is less likely, await procalcitonin but no antibiotics at this time  -Continuous pulse ox monitoring, adjust oxygen as needed  -Start IV Lasix  Need pulmonary outpatient follow-up

## 2022-07-22 VITALS
HEIGHT: 68 IN | RESPIRATION RATE: 18 BRPM | BODY MASS INDEX: 44.41 KG/M2 | OXYGEN SATURATION: 94 % | SYSTOLIC BLOOD PRESSURE: 102 MMHG | TEMPERATURE: 97.4 F | DIASTOLIC BLOOD PRESSURE: 50 MMHG | HEART RATE: 88 BPM | WEIGHT: 293 LBS

## 2022-07-22 LAB
ANION GAP SERPL CALC-SCNC: 11 MMOL/L (ref 7–16)
BUN SERPL-MCNC: 22 MG/DL (ref 8–22)
CALCIUM SERPL-MCNC: 9.7 MG/DL (ref 8.4–10.2)
CHLORIDE SERPL-SCNC: 92 MMOL/L (ref 96–112)
CO2 SERPL-SCNC: 32 MMOL/L (ref 20–33)
CREAT SERPL-MCNC: 0.78 MG/DL (ref 0.5–1.4)
GFR SERPLBLD CREATININE-BSD FMLA CKD-EPI: 85 ML/MIN/1.73 M 2
GLUCOSE BLD STRIP.AUTO-MCNC: 114 MG/DL (ref 65–99)
GLUCOSE BLD STRIP.AUTO-MCNC: 226 MG/DL (ref 65–99)
GLUCOSE SERPL-MCNC: 95 MG/DL (ref 65–99)
POTASSIUM SERPL-SCNC: 3.5 MMOL/L (ref 3.6–5.5)
SODIUM SERPL-SCNC: 135 MMOL/L (ref 135–145)

## 2022-07-22 PROCEDURE — 94640 AIRWAY INHALATION TREATMENT: CPT

## 2022-07-22 PROCEDURE — 94760 N-INVAS EAR/PLS OXIMETRY 1: CPT

## 2022-07-22 PROCEDURE — 700102 HCHG RX REV CODE 250 W/ 637 OVERRIDE(OP): Performed by: INTERNAL MEDICINE

## 2022-07-22 PROCEDURE — 94660 CPAP INITIATION&MGMT: CPT

## 2022-07-22 PROCEDURE — 99239 HOSP IP/OBS DSCHRG MGMT >30: CPT | Performed by: INTERNAL MEDICINE

## 2022-07-22 PROCEDURE — 82962 GLUCOSE BLOOD TEST: CPT | Mod: 91

## 2022-07-22 PROCEDURE — 700101 HCHG RX REV CODE 250: Performed by: INTERNAL MEDICINE

## 2022-07-22 PROCEDURE — A9270 NON-COVERED ITEM OR SERVICE: HCPCS | Performed by: INTERNAL MEDICINE

## 2022-07-22 PROCEDURE — 700111 HCHG RX REV CODE 636 W/ 250 OVERRIDE (IP): Performed by: INTERNAL MEDICINE

## 2022-07-22 PROCEDURE — 80048 BASIC METABOLIC PNL TOTAL CA: CPT

## 2022-07-22 RX ORDER — LEVALBUTEROL INHALATION SOLUTION 0.63 MG/3ML
0.63 SOLUTION RESPIRATORY (INHALATION)
Status: DISCONTINUED | OUTPATIENT
Start: 2022-07-22 | End: 2022-07-22 | Stop reason: HOSPADM

## 2022-07-22 RX ADMIN — LEVOTHYROXINE SODIUM 112 MCG: 0.11 TABLET ORAL at 05:37

## 2022-07-22 RX ADMIN — FUROSEMIDE 40 MG: 40 TABLET ORAL at 05:38

## 2022-07-22 RX ADMIN — FUROSEMIDE 40 MG: 10 INJECTION, SOLUTION INTRAMUSCULAR; INTRAVENOUS at 05:36

## 2022-07-22 RX ADMIN — GABAPENTIN 600 MG: 300 CAPSULE ORAL at 05:37

## 2022-07-22 RX ADMIN — PREDNISONE 40 MG: 20 TABLET ORAL at 05:37

## 2022-07-22 RX ADMIN — POTASSIUM CHLORIDE 40 MEQ: 1500 TABLET, EXTENDED RELEASE ORAL at 05:37

## 2022-07-22 RX ADMIN — APIXABAN 5 MG: 5 TABLET, FILM COATED ORAL at 05:38

## 2022-07-22 RX ADMIN — METOLAZONE 5 MG: 5 TABLET ORAL at 05:37

## 2022-07-22 RX ADMIN — OXYCODONE AND ACETAMINOPHEN 1 TABLET: 7.5; 325 TABLET ORAL at 02:42

## 2022-07-22 RX ADMIN — LEVALBUTEROL HYDROCHLORIDE 0.63 MG: 0.63 SOLUTION RESPIRATORY (INHALATION) at 14:39

## 2022-07-22 RX ADMIN — LEVALBUTEROL HYDROCHLORIDE 0.63 MG: 0.63 SOLUTION RESPIRATORY (INHALATION) at 07:41

## 2022-07-22 RX ADMIN — LEVALBUTEROL HYDROCHLORIDE 0.63 MG: 0.63 SOLUTION RESPIRATORY (INHALATION) at 11:08

## 2022-07-22 ASSESSMENT — PAIN DESCRIPTION - PAIN TYPE
TYPE: ACUTE PAIN

## 2022-07-22 ASSESSMENT — CHA2DS2 SCORE
SEX: FEMALE
HYPERTENSION: YES
PRIOR STROKE OR TIA OR THROMBOEMBOLISM: NO
VASCULAR DISEASE: NO
AGE 75 OR GREATER: NO
CHF OR LEFT VENTRICULAR DYSFUNCTION: NO
AGE 65 TO 74: NO
CHA2DS2 VASC SCORE: 3
DIABETES: YES

## 2022-07-22 NOTE — PROGRESS NOTES
Telemetry Shift Summary    Rhythm NSR/ST  HR Range   Ectopy rPAC  Measurements .20/.14/.40        Normal Values  Rhythm SR  HR Range    Measurements 0.12-0.20 / 0.06-0.10  / 0.30-0.52

## 2022-07-22 NOTE — DISCHARGE INSTRUCTIONS
Special Equipment    You are being discharged with the following special equipment:  Oxygen        HF Patient Discharge Instructions  Monitor your weight daily, and maintain a weight chart, to track your weight changes.   Activity as tolerated, unless your Doctor has ordered otherwise. Other activity order: Diabetic/ Low Na.  Follow a low fat, low cholesterol, low salt diet unless instructed otherwise by your Doctor. Read the labels on the back of food products and track your intake of fat, cholesterol and salt.   Fluid Restriction Yes. If a Fluid Restriction has been ordered by your Doctor, measure fluids with a measuring cup to ensure that you are not exceeding the restriction.   No smoking.  Oxygen Yes. If your Doctor has ordered that you wear Oxygen at home, it is important to wear it as ordered.  Did you receive an explanation from staff on the importance of taking each of your medications and why it is necessary to stay on the medications the physician/care provider has ordered? Yes  Do you have any questions concerning how to manage your heart failure and what to do should you have any increased signs and symptoms after you go home? No  Do you feel like your heart failure care team involved you in the care treatment plan and allowed you to make decisions regarding your care while in the hospital and addressed any discharge needs you might have? Yes    See the educational handout provided at discharge for more information on monitoring your daily weight, activity and diet. This also explains more about Heart Failure, symptoms of a flare-up and some of the tests that you have undergone.     Warning Signs of a Flare-Up include:  Swelling in the ankles or lower legs.  Shortness of breath, while at rest, or while doing normal activities.   Shortness of breath at night when in bed, or coughing in bed.   Requiring more pillows to sleep at night, or needing to sit up at night to sleep.  Feeling weak, dizzy or  fatigued.     When to call your Doctor:  Call [facility] about questions regarding the discharge instructions you were given [phone].  (Discharge Unit Med/tele)  Call your Primary Care Physician or Cardiologist if:   You experience any pain radiating to your jaw or neck.  You have any difficulty breathing.  You experience weight gain of 2 lbs in a day or 5 lbs in a week.   You feel any palpitations or irregular heartbeats.  You become dizzy or lose consciousness.   If you have had an angiogram or had a pacemaker or AICD placed, and experience:  Bleeding, drainage or swelling at the surgical / puncture site.  Fever greater than 100.0 F  Shock from internal defibrillator.  Cool and / or numb extremities.

## 2022-07-22 NOTE — FACE TO FACE
Face to Face Note  -  Durable Medical Equipment    Mert Arce M.D. - NPI: [id number]  I certify that this patient is under my care and that they had a durable medical equipment(DME)face to face encounter by myself that meets the physician DME face-to-face encounter requirements with this patient on:    Date of encounter:   Patient:                    MRN:                       YOB: 2024  Jenifer Ramos  [id number]  1960     The encounter with the patient was in whole, or in part, for the following medical condition, which is the primary reason for durable medical equipment:  Asthma    I certify that, based on my findings, the following durable medical equipment is medically necessary:  Oxygen.    HOME O2 Saturation Measurements:(Values must be present for Home Oxygen orders)  Room air sat at rest: 84  Room air sat with amb: 84  With liters of O2: 3, O2 sat at rest with O2: 96  With Liters of O2: 3, O2 sat with amb with O2 : 91  Is the patient mobile?: Yes    My Clinical findings support the need for the above equipment due to:  Hypoxia    Supporting Symptoms: hypoxia    If patient feels more short of breath, they can go up to 6 liters per minute and contact healthcare provider.

## 2022-07-22 NOTE — CARE PLAN
The patient is Stable - Low risk of patient condition declining or worsening    Shift Goals  Clinical Goals: Monitor tele, pain management, ACHS  Patient Goals: Modify diet for morning, pain management, medication dosage adjustments    Progress made toward(s) clinical / shift goals:  Pt Sinus Rhythm on the monitor, checking blood glucose per order protocol. Modified diet to allow for caffeine since stress test complete, per patient wishes.    Patient is not progressing towards the following goals: Pt requiring pharmacological intervention for pain management, wishing for adjustments to Uloric dosage, insists on managing blood glucose with own home insulin. Requiring 2L NC to maintain oxygenation saturation above 90%.      Problem: Pain - Standard  Goal: Alleviation of pain or a reduction in pain to the patient’s comfort goal  7/21/2022 2248 by Rosenda Brito, R.N.  Outcome: Not Progressing  7/21/2022 2248 by Rosenda Brito, R.N.  Outcome: Not Progressing     Problem: Hemodynamics  Goal: Patient's hemodynamics, fluid balance and neurologic status will be stable or improve  Outcome: Not Progressing     Problem: Respiratory  Goal: Patient will achieve/maintain optimum respiratory ventilation and gas exchange  Outcome: Not Progressing

## 2022-07-22 NOTE — FLOWSHEET NOTE
07/22/22 1439   Vital Signs   Pulse 88   Respiration 18   Breath Sounds   RUL Breath Sounds Clear;Diminished   RML Breath Sounds Clear;Diminished   RLL Breath Sounds Diminished   DANIELLE Breath Sounds Clear;Diminished   LLL Breath Sounds Diminished   Oxygen   O2 (LPM) 2   O2 Delivery Device Silicone Nasal Cannula

## 2022-07-22 NOTE — PROGRESS NOTES
12 hour chart pain check complete    Telemetry Shift Summary    Rhythm Sinus Rhythm  HR Range 72-97  Ectopy rare PAC rare PVC  Measurements .20/.14/.44        Normal Values  Rhythm SR  HR Range    Measurements 0.12-0.20 / 0.06-0.10  / 0.30-0.52

## 2022-07-22 NOTE — PROGRESS NOTES
Telemetry Shift Summary     Rhythm: SR  Rate:   Measurements: .18/.18/.38  Ectopy (reported by Monitor Tech): r PAC, r PVC     Normal Values  Rhythm: Sinus  HR:   Measurements: 0.12-0.20/0.06-0.10/0.30-0.52

## 2022-07-22 NOTE — PROGRESS NOTES
Dr. Arce spoke with patient at bedside and wrote discharge orders. Pt educated on importance of follow up appointments, patient educated on heart failure packet  and taking medications as prescribed. Pt verbalized understanding of instructions with no further questions at this time.  Pt escorted off unit by staff via wheelchair on 3 L nasal canula with belongings in hand.

## 2022-07-22 NOTE — CARE PLAN
Problem: Bronchoconstriction  Goal: Improve in air movement and diminished wheezing  Description: Target End Date:  2 to 3 days    1.  Implement inhaled treatments  2.  Evaluate and manage medication effects  Outcome: Progressing      Complete Blood Count + Automated Diff (09.27.21 @ 13:55)    WBC Count: 13.25 K/uL    RBC Count: 4.07 M/uL    Hemoglobin: 10.0 g/dL    Hematocrit: 32.3 %    Mean Cell Volume: 79.4 fL    Mean Cell Hemoglobin: 24.6 pg    Mean Cell Hemoglobin Conc: 31.0 g/dL    Red Cell Distrib Width: 15.9 %    Platelet Count - Automated: 235 K/uL    Auto Neutrophil #: 11.18 K/uL    Auto Lymphocyte #: 1.72 K/uL    Auto Monocyte #: 0.12 K/uL    Auto Eosinophil #: 0.23 K/uL    Auto Basophil #: 0.00 K/uL    Auto Neutrophil %: 84.4: Differential percentages must be correlated with absolute numbers for  clinical significance. %    Auto Lymphocyte %: 13.0 %    Auto Monocyte %: 0.9 %    Auto Eosinophil %: 1.7 %    Auto Basophil %: 0.0 %    Comprehensive Metabolic Panel (09.27.21 @ 13:55)    Sodium, Serum: 135 mmol/L    Potassium, Serum: 3.8 mmol/L    Chloride, Serum: 100 mmol/L    Carbon Dioxide, Serum: 13 mmol/L    Anion Gap, Serum: 22 mmol/L    Blood Urea Nitrogen, Serum: 6 mg/dL    Creatinine, Serum: <0.5 mg/dL    Glucose, Serum: 276 mg/dL    Calcium, Total Serum: 10.1 mg/dL    Protein Total, Serum: 7.2 g/dL    Albumin, Serum: 4.7 g/dL    Bilirubin Total, Serum: 0.2 mg/dL    Alkaline Phosphatase, Serum: 207 U/L    Aspartate Aminotransferase (AST/SGOT): 36 U/L    Alanine Aminotransferase (ALT/SGPT): 11 U/L

## 2022-07-22 NOTE — WOUND TEAM
Wound team consulted for right foot (plantar) wound that had been previously dressed by patient's physician prior to admission to hospital. Per patient, her next appointment with her physician is on Monday 7/25. Dressing intact at this time. Pt to follow up with her doctor. Consult completed. Please re-consult wound team as needed.

## 2022-07-22 NOTE — FLOWSHEET NOTE
07/22/22 0742   Events/Summary/Plan   Events/Summary/Plan SVN and DPI given   Vital Signs   Pulse 74   Respiration 18   Pulse Oximetry 90 %   $ Pulse Oximetry (Spot Check) Yes   Respiratory Assessment   Respiratory Pattern Within Normal Limits   Level of Consciousness Alert   Chest Exam   Work Of Breathing / Effort Shallow   Breath Sounds   RUL Breath Sounds Fine Crackles   RML Breath Sounds Diminished   RLL Breath Sounds Diminished   DANIELLE Breath Sounds Fine Crackles   LLL Breath Sounds Diminished   Secretions   Cough Dry;Non Productive;Strong   How Sputum Obtained Cough on Request   Oxygen   O2 Delivery Device Room air w/o2 available  (placed back on BiPAP after tx)   Non-Invasive Ventilation [facility] [employer]   Nocturnal CPAP or BIPAP CPAP - Home Unit   $ System Evaluation Yes   Settings (If Known) 10   FiO2 or LPM 2

## 2022-07-22 NOTE — DISCHARGE SUMMARY
Discharge Summary    CHIEF COMPLAINT ON ADMISSION  Chief Complaint   Patient presents with   • Sent by MD     New dx of CHF, PCP sent to ED for elevated BNP   • Hypoxemia     Home o2 was in the mid 70's    • Weakness   • Blood in Sputum       Reason for Admission  Low oxygen, weak     Admission Date  7/20/2022    CODE STATUS  Full Code    HPI & HOSPITAL COURSE  Jenifer Ramos is a 62 y.o. female who presented 7/20/2022 with shortness of breath.  Patient states this is been ongoing since December, has had a work-up but does not have a diagnosis.  She did have labs recently, was called because she was told she had evidence of heart failure, apparently an elevated BNP.  Patient did have a FERNANDO in May at [facility] with a preserved ejection fraction.  Patient also complains of upper bilateral chest pain that is described as an ache, moderate in severity, this is worse with exertion.  She also was told her hemoglobin is lower than previous.  She has been complaining of weakness.  She does chronically have lower extremity edema, states this has been worsening, she feels her medications are not working as well as previously.  She states she has a fatty liver but not cirrhosis.  I did discuss the case including labs and imaging with the ER physician.    I had a very long and multiple discussion with the patient regarding her medical condition, updating ordered test results and imaging result and answering all the questions that she has.  She said her last A1c was 6.4 and she has understanding that we are not going to aggressively treat her blood sugar with insulin at this point.  Due to her possible abnormal stress test result.   She stated that she cannot take beta-blockers due to her history of asthma exacerbation with that medication.  Echocardiogram was done and showed normal ejection fraction.  Please see above for detailed report.  I discussed with cardiology Dr. Bender regarding her stress test result and  echocardiogram and she recommended the patient to follow-up with [facility] and no intervention is needed at this time.  She is feeling better today with her shortness of breath.  Satting well on room air at rest.  But she required 3 L of oxygen with activity.  In addition she mentioned that whenever she tried to move around and walk inside the house she got short of breath and her oxygen goes down.  I think she is going to need home O2 with activity from now onward.  She need to follow-up with PCP closely as well as cardiology.  It would be beneficial for her to follow-up with pulmonary as well.  I have multiple long discussion with the patient regarding her lifestyle changes, to be more active instead of being an active and basically sedentary lifestyle.  To encourage her to walk with PT OT including hydrotherapy to help her lose weight not only that we will have her with her chronic back pain I think it will also help her with her medical and physical wellbeing in the long run.  She understand the conversation.  He also get CTA of the chest done and showed no pulmonary embolism but showed some infiltrate.  She is afebrile and hemodynamically stable.  In terms of her iron deficiency anemia she was instructed to continue to take iron's supplement and follow-up with PCP as an outpatient.    Please call [phone] to schedule PCP appointment for patient.    Required specialty appointments include:     As below    Therefore, she is discharged in fair and stable condition to home with close outpatient follow-up.    The patient met 2-midnight criteria for an inpatient stay at the time of discharge.    Discharge Date  07/22/22      FOLLOW UP ITEMS POST DISCHARGE      DISCHARGE DIAGNOSES  Principal Problem:    Chest pain POA: Yes  Active Problems:    Asthma with exacerbation POA: Yes    Factor V deficiency (HCC) POA: Yes      Overview: On Coumadin    DM (diabetes mellitus) (HCC) POA: Yes    Atrial  fibrillation (HCC) POA: Yes    Pulmonary nodule POA: Yes    Cirrhosis (HCC) POA: Yes    Hypothyroidism POA: Yes    GERD (gastroesophageal reflux disease) POA: Yes    Microcytic anemia POA: Yes    Hyponatremia POA: Yes    Hypokalemia POA: Yes    Dyspnea POA: Yes    SOB (shortness of breath) POA: Yes  Resolved Problems:    * No resolved hospital problems. *      FOLLOW UP  No future appointments.  Adrián Duckworth M.D.  [address] [place] [address]  [phone]    Follow up in 1 week(s)      cardiologist from [facility]    Follow up in 1 week(s)        MEDICATIONS ON DISCHARGE     Medication List      CONTINUE taking these medications      Instructions   apixaban 5mg Tabs  Commonly known as: Eliquis   Doctor's comments: Patient has already, patient to restart this Friday pm.  Take 1 Tab by mouth 2 Times a Day.  Dose: 5 mg     febuxostat 80 MG Tabs  Commonly known as: ULORIC   Take 40 mg by mouth at bedtime.  Dose: 40 mg     Fluticasone Furoate-Vilanterol 200-25 MCG/INH Aepb  Commonly known as: BREO   Inhale 1 Puff by mouth every day.  Dose: 1 Puff     furosemide 40 MG Tabs  Commonly known as: LASIX   Take 40 mg by mouth 2 times a day.  Dose: 40 mg     gabapentin 300 MG Caps  Commonly known as: NEURONTIN   Take 600 mg by mouth 2 times a day.  Dose: 600 mg     Insulin Degludec 200 UNIT/ML Sopn   Inject 276 Units under the skin every day.  Dose: 276 Units     insulin lispro 100 UNIT/ML  Commonly known as: HumaLOG   Inject  under the skin 3 times a day before meals.     IRON PO   Take 1 Tab by mouth every day.  Dose: 1 Tablet     levalbuterol 45 MCG/ACT inhaler  Commonly known as: XOPENEX HFA   Inhale 2 Puffs by mouth 2 times a day as needed for Shortness of Breath.  Dose: 2 Puff     levothyroxine 112 MCG Tabs  Commonly known as: SYNTHROID   Take 112 mcg by mouth every day.  Dose: 112 mcg     lidocaine 5 % Ptch  Commonly known as: LIDODERM   Apply 1-4 Patches to skin as directed every 12  hours.  Dose: 1-4 Patch     metOLazone 5 MG Tabs  Commonly known as: ZAROXOLYN   Take 5 mg by mouth every day.  Dose: 5 mg     montelukast 10 MG Tabs  Commonly known as: SINGULAIR   Take 10 mg by mouth every evening.  Dose: 10 mg     NovoLOG 100 UNIT/ML Soln  Generic drug: insulin aspart   Inject 40-80 Units as instructed 2 Times a Day.  Dose: 40-80 Units     oxyCODONE-acetaminophen 7.5-325 MG per tablet  Commonly known as: PERCOCET   Take 1 Tablet by mouth every four hours as needed.  Dose: 1 Tablet     pantoprazole 40 MG Tbec  Commonly known as: PROTONIX   Take 40 mg by mouth every evening.  Dose: 40 mg     pravastatin 10 MG Tabs  Commonly known as: PRAVACHOL   Take 10 mg by mouth every evening.  Dose: 10 mg     promethazine 25 MG Tabs  Commonly known as: PHENERGAN   Take 25 mg by mouth every 6 hours as needed for Nausea/Vomiting.  Dose: 25 mg     rizatriptan 10 MG disintegrating tablet  Commonly known as: MAXALT-MLT   Take 10 mg by mouth Once PRN for Migraine.  Dose: 10 mg     spironolactone 50 MG Tabs  Commonly known as: ALDACTONE   Take 50 mg by mouth 1 time daily as needed.  Dose: 50 mg     verapamil 80 MG Tabs  Commonly known as: ISOPTIN   Take 40 mg by mouth 4 times a day as needed.  Dose: 40 mg            Allergies  Allergies   Allergen Reactions   • Azithromycin Anaphylaxis   • Erythromycin Anaphylaxis   • Other Misc Anaphylaxis     Flu vaccine   • Penicillins Anaphylaxis     As a child   • Pistachio Anaphylaxis   • Sulfa Drugs Anaphylaxis   • Tetraglycine Anaphylaxis   • Tape Rash and Itching     tegaderm ok  Blisters with others   • Albuterol Palpitations   • Atorvastatin      Extreme joint pain   • Chlorhexidine Rash     blistering   • Lamisil [Terbinafine Hcl]      Pancreatitis    • Morphine      Not effective   • Other Drug      Long acting benzodiazepines only single dose otherwise combative    • Physostigmine      Abdomen extreme swelling   • Zanaflex [Tizanidine Hcl]      Falling asleep mid  sentence       DIET  Orders Placed This Encounter   Procedures   • Diet Order Diet: Consistent CHO (Diabetic)     Standing Status:   Standing     Number of Occurrences:   1     Order Specific Question:   Diet:     Answer:   Consistent CHO (Diabetic) [4]       ACTIVITY  As tolerated.  Weight bearing as tolerated    CONSULTATIONS      PROCEDURES      LABORATORY  Lab Results   Component Value Date    SODIUM 135 07/22/2022    POTASSIUM 3.5 (L) 07/22/2022    CHLORIDE 92 (L) 07/22/2022    CO2 32 07/22/2022    GLUCOSE 95 07/22/2022    BUN 22 07/22/2022    CREATININE 0.78 07/22/2022        Lab Results   Component Value Date    WBC 6.0 07/21/2022    HEMOGLOBIN 8.9 (L) 07/21/2022    HEMATOCRIT 33.5 (L) 07/21/2022    PLATELETCT 239 07/21/2022        Total time of the discharge process exceeds 36 minutes.

## 2022-07-22 NOTE — PROGRESS NOTES
Pt refusing to allow home meds to be taken to pharmacy, insists on keeping them at bedside. MD aware. Insists on using home insulin to manage blood glucose. Finger sticks still permissible by patient, results in chart.

## 2022-07-22 NOTE — DISCHARGE PLANNING
Case Management Discharge Planning    Admission Date: 7/20/2022  GMLOS: 3  ALOS: 1    6-Clicks ADL Score: 24  6-Clicks Mobility Score: 24      Anticipated Discharge Dispo:      DME Needed: Yes    Action(s) Taken: Choice obtained, Discussed pt during IDT rounds. Pt needing updated oxygen order. Order placed by MD, sent choice for [facility] for referral to be sent. Pt currently on service with [facility].    1300: KRISTYN RN called [facility], pt has $1000 in collections, pt will need to call [facility] to discuss prior to [facility] bring portable tank. [facility] reviewed pts order, will need corrections. KRISTYN RN reached out to MD and asked for O2 LPM to list as 3L. Pts previous BL 2 L.     1404: KRISTYN RN discussed oxygen with pt and pts , per MR. Ramos son is bring full tank to  pt. Updated oxygen order sent.     Escalations Completed: DME Company    Medically Clear: Yes    Next Steps: Case management to follow up medical team with any other needs    Barriers to Discharge: DME    Is the patient up for discharge tomorrow: No

## 2022-07-22 NOTE — FLOWSHEET NOTE
07/22/22 1109   Events/Summary/Plan   Events/Summary/Plan SVN given   Vital Signs   Pulse 80   Respiration 20   Respiratory Assessment   Respiratory Pattern Within Normal Limits   Level of Consciousness Alert   Breath Sounds   RUL Breath Sounds Diminished;Clear   RML Breath Sounds Diminished   RLL Breath Sounds Diminished   DANIELLE Breath Sounds Diminished;Clear   LLL Breath Sounds Diminished   Oxygen   O2 (LPM) 2   O2 Delivery Device Silicone Nasal Cannula

## 2022-08-09 PROCEDURE — 99358 PROLONG SERVICE W/O CONTACT: CPT | Performed by: PHYSICAL MEDICINE & REHABILITATION

## 2022-08-11 ENCOUNTER — HOSPITAL ENCOUNTER (INPATIENT)
Facility: REHABILITATION | Age: 62
LOS: 11 days | DRG: 640 | End: 2022-08-22
Attending: PHYSICAL MEDICINE & REHABILITATION | Admitting: PHYSICAL MEDICINE & REHABILITATION
Payer: COMMERCIAL

## 2022-08-11 PROBLEM — E86.0 DEHYDRATION: Status: ACTIVE | Noted: 2022-08-11

## 2022-08-11 LAB — GLUCOSE BLD STRIP.AUTO-MCNC: 112 MG/DL (ref 65–99)

## 2022-08-11 PROCEDURE — 82962 GLUCOSE BLOOD TEST: CPT

## 2022-08-11 PROCEDURE — 0241U HCHG SARS-COV-2 COVID-19 NFCT DS RESP RNA 4 TRGT MIC: CPT

## 2022-08-11 PROCEDURE — 770010 HCHG ROOM/CARE - REHAB SEMI PRIVAT*

## 2022-08-11 PROCEDURE — 700102 HCHG RX REV CODE 250 W/ 637 OVERRIDE(OP): Performed by: PHYSICAL MEDICINE & REHABILITATION

## 2022-08-11 PROCEDURE — A9270 NON-COVERED ITEM OR SERVICE: HCPCS | Performed by: PHYSICAL MEDICINE & REHABILITATION

## 2022-08-11 PROCEDURE — 99223 1ST HOSP IP/OBS HIGH 75: CPT | Performed by: PHYSICAL MEDICINE & REHABILITATION

## 2022-08-11 PROCEDURE — 94760 N-INVAS EAR/PLS OXIMETRY 1: CPT

## 2022-08-11 RX ORDER — OMEPRAZOLE 20 MG/1
20 CAPSULE, DELAYED RELEASE ORAL DAILY
Status: DISCONTINUED | OUTPATIENT
Start: 2022-08-12 | End: 2022-08-22 | Stop reason: HOSPADM

## 2022-08-11 RX ORDER — FUROSEMIDE 40 MG/1
40 TABLET ORAL 2 TIMES DAILY
Status: DISCONTINUED | OUTPATIENT
Start: 2022-08-11 | End: 2022-08-15

## 2022-08-11 RX ORDER — TRAZODONE HYDROCHLORIDE 50 MG/1
50 TABLET ORAL
Status: DISCONTINUED | OUTPATIENT
Start: 2022-08-11 | End: 2022-08-22 | Stop reason: HOSPADM

## 2022-08-11 RX ORDER — ONDANSETRON 4 MG/1
4 TABLET, ORALLY DISINTEGRATING ORAL 4 TIMES DAILY PRN
Status: DISCONTINUED | OUTPATIENT
Start: 2022-08-11 | End: 2022-08-22 | Stop reason: HOSPADM

## 2022-08-11 RX ORDER — INSULIN LISPRO 100 [IU]/ML
20-40 INJECTION, SOLUTION INTRAVENOUS; SUBCUTANEOUS
Status: DISCONTINUED | OUTPATIENT
Start: 2022-08-11 | End: 2022-08-12

## 2022-08-11 RX ORDER — OXYCODONE HYDROCHLORIDE 5 MG/1
5 TABLET ORAL
Status: DISCONTINUED | OUTPATIENT
Start: 2022-08-11 | End: 2022-08-22 | Stop reason: HOSPADM

## 2022-08-11 RX ORDER — OXYCODONE HYDROCHLORIDE 10 MG/1
10 TABLET ORAL
Status: DISCONTINUED | OUTPATIENT
Start: 2022-08-11 | End: 2022-08-22 | Stop reason: HOSPADM

## 2022-08-11 RX ORDER — BISACODYL 10 MG
10 SUPPOSITORY, RECTAL RECTAL
Status: DISCONTINUED | OUTPATIENT
Start: 2022-08-11 | End: 2022-08-22 | Stop reason: HOSPADM

## 2022-08-11 RX ORDER — ECHINACEA PURPUREA EXTRACT 125 MG
2 TABLET ORAL PRN
Status: DISCONTINUED | OUTPATIENT
Start: 2022-08-11 | End: 2022-08-22 | Stop reason: HOSPADM

## 2022-08-11 RX ORDER — MONTELUKAST SODIUM 10 MG/1
10 TABLET ORAL EVERY EVENING
Status: DISCONTINUED | OUTPATIENT
Start: 2022-08-11 | End: 2022-08-22 | Stop reason: HOSPADM

## 2022-08-11 RX ORDER — POLYETHYLENE GLYCOL 3350 17 G/17G
1 POWDER, FOR SOLUTION ORAL
Status: DISCONTINUED | OUTPATIENT
Start: 2022-08-11 | End: 2022-08-22 | Stop reason: HOSPADM

## 2022-08-11 RX ORDER — GABAPENTIN 300 MG/1
600 CAPSULE ORAL 2 TIMES DAILY
Status: DISCONTINUED | OUTPATIENT
Start: 2022-08-11 | End: 2022-08-15

## 2022-08-11 RX ORDER — AMOXICILLIN 250 MG
2 CAPSULE ORAL 2 TIMES DAILY
Status: DISCONTINUED | OUTPATIENT
Start: 2022-08-11 | End: 2022-08-22 | Stop reason: HOSPADM

## 2022-08-11 RX ORDER — ONDANSETRON 2 MG/ML
4 INJECTION INTRAMUSCULAR; INTRAVENOUS 4 TIMES DAILY PRN
Status: DISCONTINUED | OUTPATIENT
Start: 2022-08-11 | End: 2022-08-22 | Stop reason: HOSPADM

## 2022-08-11 RX ORDER — ALUMINA, MAGNESIA, AND SIMETHICONE 2400; 2400; 240 MG/30ML; MG/30ML; MG/30ML
20 SUSPENSION ORAL
Status: DISCONTINUED | OUTPATIENT
Start: 2022-08-11 | End: 2022-08-22 | Stop reason: HOSPADM

## 2022-08-11 RX ORDER — PRAVASTATIN SODIUM 20 MG
10 TABLET ORAL NIGHTLY
Status: DISCONTINUED | OUTPATIENT
Start: 2022-08-11 | End: 2022-08-22 | Stop reason: HOSPADM

## 2022-08-11 RX ORDER — FEBUXOSTAT 40 MG/1
40 TABLET, FILM COATED ORAL
Status: DISCONTINUED | OUTPATIENT
Start: 2022-08-11 | End: 2022-08-17

## 2022-08-11 RX ORDER — ACETAMINOPHEN 325 MG/1
650 TABLET ORAL EVERY 4 HOURS PRN
Status: DISCONTINUED | OUTPATIENT
Start: 2022-08-11 | End: 2022-08-22 | Stop reason: HOSPADM

## 2022-08-11 RX ORDER — LEVALBUTEROL INHALATION SOLUTION 1.25 MG/3ML
1.25 SOLUTION RESPIRATORY (INHALATION)
Status: DISCONTINUED | OUTPATIENT
Start: 2022-08-11 | End: 2022-08-22 | Stop reason: HOSPADM

## 2022-08-11 RX ORDER — POLYVINYL ALCOHOL 14 MG/ML
1 SOLUTION/ DROPS OPHTHALMIC PRN
Status: DISCONTINUED | OUTPATIENT
Start: 2022-08-11 | End: 2022-08-22 | Stop reason: HOSPADM

## 2022-08-11 RX ORDER — HYDRALAZINE HYDROCHLORIDE 25 MG/1
25 TABLET, FILM COATED ORAL EVERY 8 HOURS PRN
Status: DISCONTINUED | OUTPATIENT
Start: 2022-08-11 | End: 2022-08-22 | Stop reason: HOSPADM

## 2022-08-11 RX ORDER — LEVOTHYROXINE SODIUM 112 UG/1
112 TABLET ORAL DAILY
Status: DISCONTINUED | OUTPATIENT
Start: 2022-08-12 | End: 2022-08-16

## 2022-08-11 RX ORDER — RIZATRIPTAN BENZOATE 10 MG/1
10 TABLET, ORALLY DISINTEGRATING ORAL
Status: DISCONTINUED | OUTPATIENT
Start: 2022-08-11 | End: 2022-08-22 | Stop reason: HOSPADM

## 2022-08-11 RX ADMIN — GABAPENTIN 600 MG: 300 CAPSULE ORAL at 22:47

## 2022-08-11 RX ADMIN — MONTELUKAST 10 MG: 10 TABLET, FILM COATED ORAL at 21:39

## 2022-08-11 RX ADMIN — FUROSEMIDE 40 MG: 40 TABLET ORAL at 21:39

## 2022-08-11 RX ADMIN — OXYCODONE HYDROCHLORIDE 10 MG: 10 TABLET ORAL at 22:46

## 2022-08-11 RX ADMIN — DOCUSATE SODIUM 50 MG AND SENNOSIDES 8.6 MG 2 TABLET: 8.6; 5 TABLET, FILM COATED ORAL at 21:38

## 2022-08-11 RX ADMIN — PRAVASTATIN SODIUM 10 MG: 20 TABLET ORAL at 21:39

## 2022-08-11 RX ADMIN — APIXABAN 5 MG: 5 TABLET, FILM COATED ORAL at 21:38

## 2022-08-11 RX ADMIN — FEBUXOSTAT 40 MG: 40 TABLET, FILM COATED ORAL at 21:42

## 2022-08-11 RX ADMIN — OXYCODONE HYDROCHLORIDE 10 MG: 10 TABLET ORAL at 13:35

## 2022-08-11 RX ADMIN — GABAPENTIN 600 MG: 300 CAPSULE ORAL at 13:33

## 2022-08-11 RX ADMIN — RIZATRIPTAN BENZOATE 10 MG: 10 TABLET, ORALLY DISINTEGRATING ORAL at 16:11

## 2022-08-11 ASSESSMENT — PATIENT HEALTH QUESTIONNAIRE - PHQ9
SUM OF ALL RESPONSES TO PHQ9 QUESTIONS 1 AND 2: 0
2. FEELING DOWN, DEPRESSED, IRRITABLE, OR HOPELESS: NOT AT ALL
1. LITTLE INTEREST OR PLEASURE IN DOING THINGS: NOT AT ALL
1. LITTLE INTEREST OR PLEASURE IN DOING THINGS: NOT AT ALL
2. FEELING DOWN, DEPRESSED, IRRITABLE, OR HOPELESS: NOT AT ALL
SUM OF ALL RESPONSES TO PHQ9 QUESTIONS 1 AND 2: 0

## 2022-08-11 ASSESSMENT — LIFESTYLE VARIABLES
HAVE YOU EVER FELT YOU SHOULD CUT DOWN ON YOUR DRINKING: NO
ON A TYPICAL DAY WHEN YOU DRINK ALCOHOL HOW MANY DRINKS DO YOU HAVE: 0
EVER FELT BAD OR GUILTY ABOUT YOUR DRINKING: NO
AVERAGE NUMBER OF DAYS PER WEEK YOU HAVE A DRINK CONTAINING ALCOHOL: 0
TOTAL SCORE: 0
HAVE PEOPLE ANNOYED YOU BY CRITICIZING YOUR DRINKING: NO
EVER HAD A DRINK FIRST THING IN THE MORNING TO STEADY YOUR NERVES TO GET RID OF A HANGOVER: NO
ALCOHOL_USE: NO
HOW MANY TIMES IN THE PAST YEAR HAVE YOU HAD 5 OR MORE DRINKS IN A DAY: 0
CONSUMPTION TOTAL: NEGATIVE
TOTAL SCORE: 0
TOTAL SCORE: 0

## 2022-08-11 ASSESSMENT — PAIN DESCRIPTION - PAIN TYPE: TYPE: CHRONIC PAIN

## 2022-08-11 ASSESSMENT — FIBROSIS 4 INDEX: FIB4 SCORE: 2.03

## 2022-08-11 NOTE — FLOWSHEET NOTE
08/11/22 1211   Patient History   Pulmonary Diagnosis MARCI,ASTHMA   Procedures Relevant to Respiratory Status DEHYDRATION   Home O2 Yes   Oxygen liter flow 2   Oxygen at night only No   Nocturnal CPAP Yes   Nocturnal CPAP Pressures 10CM   Nocturnal CPAP Oxygen liter flow 2   Home Treatments/Frequency Yes   SVN 1/Frequency PRN   DPI 1/Frequency DAILY   Sleep Apnea Screening   Have you had a sleep study? Yes   Have you been diagnosed with sleep apnea? Yes   Do you use a CPAP/BIPAP/AUTOPAP? Yes   Machine Home Setting 10   CONSULT COMPLETE

## 2022-08-11 NOTE — FLOWSHEET NOTE
08/11/22 1216   Vital Signs   $ Pulse Oximetry (Spot Check) Yes   Respiratory Assessment   Level of Consciousness Alert   Chest Exam   Work Of Breathing / Effort Within Normal Limits   Breath Sounds   RUL Breath Sounds Diminished   RML Breath Sounds Diminished   RLL Breath Sounds Diminished   DANIELLE Breath Sounds Diminished   LLL Breath Sounds Diminished   Secretions   Cough Non Productive   Oxygen   O2 Delivery Device Silicone Nasal Cannula

## 2022-08-11 NOTE — PROGRESS NOTES
2 RN skin check done with renetta Mas a medium risk does not wish to have a waffle overlay due to pain in back. Picture documented of left diabetic foot ulcer, pt reports it is reoccurring over the last 4 years.

## 2022-08-11 NOTE — PROGRESS NOTES
-----------Non-Face-to-Face------------  Prolonged non-face-to-face time was spent on 8/9/22 from 1525 to 1558 by this reviewer.  This time included medical chart review, medication review, and decision making for the appropriateness of transfer to inpatient rehabilitation.  Please see PM&R Chart Review Consult from below by this provider for detailed summation of the medical chart and the reasoning for current recommendations. In addition to the above, time was spent coordinating care with case management as well as transitional care coordination team in the acceptance and transfer of the patient to the inpatient rehabilitation facility setting. Reviewed faxed records from [facility]. Meets medical criteria. Pending open bed.     Patient is a 62 y.o. with a PMH of shoulder/back pain, DM2, Factor V Leiden on Eliquis, CHF, Hyponatremia, and hypokalemia who presented on 8/2/22 with weakness and falls..  Patient was recently discharged from [facility] on 7/22/22 for SOB and weight gain. Her BNP was elevated but her TTE showed preserved ejection fracture. She was diuresed and discharged on 7/22/22.  She then presented to [facility] after falling at home and in ED found to be dehydrated and having orthostatic hypotension. She was also noted to have ssevere hyponatremia. She was given IV fluids and her sodium has improved. Patient no longer having orthostatic hypotension.     She was previously living at home with 1 step to enter; living independently with her .  She was evaluated by PT and was [place] for transfer and [place] for mobility. Patient was evaluated by OT and was modA for mobility.

## 2022-08-12 PROBLEM — R09.89 LABILE BLOOD PRESSURE: Status: ACTIVE | Noted: 2022-08-12

## 2022-08-12 PROBLEM — D64.9 ANEMIA: Status: ACTIVE | Noted: 2022-08-12

## 2022-08-12 PROBLEM — R60.0 LOWER EXTREMITY EDEMA: Status: ACTIVE | Noted: 2022-08-12

## 2022-08-12 PROBLEM — U07.1 COVID-19: Status: ACTIVE | Noted: 2022-08-12

## 2022-08-12 LAB
25(OH)D3 SERPL-MCNC: 29 NG/ML (ref 30–100)
ALBUMIN SERPL BCP-MCNC: 3.4 G/DL (ref 3.2–4.9)
ALBUMIN/GLOB SERPL: 1.1 G/DL
ALP SERPL-CCNC: 106 U/L (ref 30–99)
ALT SERPL-CCNC: 12 U/L (ref 2–50)
ANION GAP SERPL CALC-SCNC: 9 MMOL/L (ref 7–16)
ANISOCYTOSIS BLD QL SMEAR: ABNORMAL
AST SERPL-CCNC: 25 U/L (ref 12–45)
BASOPHILS # BLD AUTO: 0 % (ref 0–1.8)
BASOPHILS # BLD: 0 K/UL (ref 0–0.12)
BILIRUB SERPL-MCNC: 0.8 MG/DL (ref 0.1–1.5)
BUN SERPL-MCNC: 7 MG/DL (ref 8–22)
CALCIUM SERPL-MCNC: 9.8 MG/DL (ref 8.5–10.5)
CHLORIDE SERPL-SCNC: 103 MMOL/L (ref 96–112)
CO2 SERPL-SCNC: 29 MMOL/L (ref 20–33)
CREAT SERPL-MCNC: 0.68 MG/DL (ref 0.5–1.4)
DACRYOCYTES BLD QL SMEAR: NORMAL
EOSINOPHIL # BLD AUTO: 0.19 K/UL (ref 0–0.51)
EOSINOPHIL NFR BLD: 3.5 % (ref 0–6.9)
ERYTHROCYTE [DISTWIDTH] IN BLOOD BY AUTOMATED COUNT: 62.5 FL (ref 35.9–50)
EST. AVERAGE GLUCOSE BLD GHB EST-MCNC: 123 MG/DL
GFR SERPLBLD CREATININE-BSD FMLA CKD-EPI: 97 ML/MIN/1.73 M 2
GLOBULIN SER CALC-MCNC: 3.2 G/DL (ref 1.9–3.5)
GLUCOSE BLD STRIP.AUTO-MCNC: 138 MG/DL (ref 65–99)
GLUCOSE BLD STRIP.AUTO-MCNC: 140 MG/DL (ref 65–99)
GLUCOSE BLD STRIP.AUTO-MCNC: 142 MG/DL (ref 65–99)
GLUCOSE BLD STRIP.AUTO-MCNC: 155 MG/DL (ref 65–99)
GLUCOSE SERPL-MCNC: 139 MG/DL (ref 65–99)
HBA1C MFR BLD: 5.9 % (ref 4–5.6)
HCT VFR BLD AUTO: 37.6 % (ref 37–47)
HGB BLD-MCNC: 10.3 G/DL (ref 12–16)
HYPOCHROMIA BLD QL SMEAR: ABNORMAL
LYMPHOCYTES # BLD AUTO: 0.99 K/UL (ref 1–4.8)
LYMPHOCYTES NFR BLD: 18.4 % (ref 22–41)
MACROCYTES BLD QL SMEAR: ABNORMAL
MANUAL DIFF BLD: NORMAL
MCH RBC QN AUTO: 21.5 PG (ref 27–33)
MCHC RBC AUTO-ENTMCNC: 27.4 G/DL (ref 33.6–35)
MCV RBC AUTO: 78.7 FL (ref 81.4–97.8)
MICROCYTES BLD QL SMEAR: ABNORMAL
MONOCYTES # BLD AUTO: 0.48 K/UL (ref 0–0.85)
MONOCYTES NFR BLD AUTO: 8.8 % (ref 0–13.4)
MORPHOLOGY BLD-IMP: NORMAL
NEUTROPHILS # BLD AUTO: 3.74 K/UL (ref 2–7.15)
NEUTROPHILS NFR BLD: 69.3 % (ref 44–72)
NRBC # BLD AUTO: 0 K/UL
NRBC BLD-RTO: 0 /100 WBC
OVALOCYTES BLD QL SMEAR: NORMAL
PLATELET # BLD AUTO: 258 K/UL (ref 164–446)
PLATELET BLD QL SMEAR: NORMAL
PMV BLD AUTO: 10.1 FL (ref 9–12.9)
POIKILOCYTOSIS BLD QL SMEAR: NORMAL
POTASSIUM SERPL-SCNC: 4.3 MMOL/L (ref 3.6–5.5)
PROT SERPL-MCNC: 6.6 G/DL (ref 6–8.2)
RBC # BLD AUTO: 4.78 M/UL (ref 4.2–5.4)
RBC BLD AUTO: PRESENT
SODIUM SERPL-SCNC: 141 MMOL/L (ref 135–145)
TSH SERPL DL<=0.005 MIU/L-ACNC: 1.51 UIU/ML (ref 0.38–5.33)
WBC # BLD AUTO: 5.4 K/UL (ref 4.8–10.8)

## 2022-08-12 PROCEDURE — 85025 COMPLETE CBC W/AUTO DIFF WBC: CPT

## 2022-08-12 PROCEDURE — 92523 SPEECH SOUND LANG COMPREHEN: CPT

## 2022-08-12 PROCEDURE — 700102 HCHG RX REV CODE 250 W/ 637 OVERRIDE(OP): Performed by: PHYSICAL MEDICINE & REHABILITATION

## 2022-08-12 PROCEDURE — 82306 VITAMIN D 25 HYDROXY: CPT

## 2022-08-12 PROCEDURE — 85007 BL SMEAR W/DIFF WBC COUNT: CPT

## 2022-08-12 PROCEDURE — 99223 1ST HOSP IP/OBS HIGH 75: CPT | Performed by: HOSPITALIST

## 2022-08-12 PROCEDURE — 94760 N-INVAS EAR/PLS OXIMETRY 1: CPT

## 2022-08-12 PROCEDURE — A9270 NON-COVERED ITEM OR SERVICE: HCPCS | Performed by: PHYSICAL MEDICINE & REHABILITATION

## 2022-08-12 PROCEDURE — 82962 GLUCOSE BLOOD TEST: CPT

## 2022-08-12 PROCEDURE — 97162 PT EVAL MOD COMPLEX 30 MIN: CPT

## 2022-08-12 PROCEDURE — 83036 HEMOGLOBIN GLYCOSYLATED A1C: CPT

## 2022-08-12 PROCEDURE — 97166 OT EVAL MOD COMPLEX 45 MIN: CPT

## 2022-08-12 PROCEDURE — 770010 HCHG ROOM/CARE - REHAB SEMI PRIVAT*

## 2022-08-12 PROCEDURE — 36415 COLL VENOUS BLD VENIPUNCTURE: CPT

## 2022-08-12 PROCEDURE — 84443 ASSAY THYROID STIM HORMONE: CPT

## 2022-08-12 PROCEDURE — 94640 AIRWAY INHALATION TREATMENT: CPT

## 2022-08-12 PROCEDURE — 80053 COMPREHEN METABOLIC PANEL: CPT

## 2022-08-12 PROCEDURE — 97530 THERAPEUTIC ACTIVITIES: CPT

## 2022-08-12 PROCEDURE — 97535 SELF CARE MNGMENT TRAINING: CPT

## 2022-08-12 PROCEDURE — 99233 SBSQ HOSP IP/OBS HIGH 50: CPT | Performed by: PHYSICAL MEDICINE & REHABILITATION

## 2022-08-12 PROCEDURE — 8E0ZXY6 ISOLATION: ICD-10-PCS | Performed by: PHYSICAL MEDICINE & REHABILITATION

## 2022-08-12 RX ORDER — INSULIN LISPRO 100 [IU]/ML
2-12 INJECTION, SOLUTION INTRAVENOUS; SUBCUTANEOUS
Status: DISCONTINUED | OUTPATIENT
Start: 2022-08-12 | End: 2022-08-22 | Stop reason: HOSPADM

## 2022-08-12 RX ORDER — VITAMIN B COMPLEX
1000 TABLET ORAL DAILY
Status: DISCONTINUED | OUTPATIENT
Start: 2022-08-13 | End: 2022-08-19

## 2022-08-12 RX ADMIN — OXYCODONE HYDROCHLORIDE 10 MG: 10 TABLET ORAL at 02:58

## 2022-08-12 RX ADMIN — MONTELUKAST 10 MG: 10 TABLET, FILM COATED ORAL at 21:08

## 2022-08-12 RX ADMIN — DOCUSATE SODIUM 50 MG AND SENNOSIDES 8.6 MG 2 TABLET: 8.6; 5 TABLET, FILM COATED ORAL at 21:08

## 2022-08-12 RX ADMIN — FUROSEMIDE 40 MG: 40 TABLET ORAL at 09:20

## 2022-08-12 RX ADMIN — PRAVASTATIN SODIUM 10 MG: 20 TABLET ORAL at 21:07

## 2022-08-12 RX ADMIN — OXYCODONE HYDROCHLORIDE 10 MG: 10 TABLET ORAL at 09:20

## 2022-08-12 RX ADMIN — FUROSEMIDE 40 MG: 40 TABLET ORAL at 21:08

## 2022-08-12 RX ADMIN — DOCUSATE SODIUM 50 MG AND SENNOSIDES 8.6 MG 2 TABLET: 8.6; 5 TABLET, FILM COATED ORAL at 09:20

## 2022-08-12 RX ADMIN — LEVOTHYROXINE SODIUM 112 MCG: 112 TABLET ORAL at 09:21

## 2022-08-12 RX ADMIN — FEBUXOSTAT 40 MG: 40 TABLET, FILM COATED ORAL at 21:00

## 2022-08-12 RX ADMIN — OXYCODONE HYDROCHLORIDE 10 MG: 10 TABLET ORAL at 21:24

## 2022-08-12 RX ADMIN — OXYCODONE HYDROCHLORIDE 10 MG: 10 TABLET ORAL at 12:34

## 2022-08-12 RX ADMIN — INSULIN LISPRO 2 UNITS: 100 INJECTION, SOLUTION INTRAVENOUS; SUBCUTANEOUS at 21:00

## 2022-08-12 RX ADMIN — APIXABAN 5 MG: 5 TABLET, FILM COATED ORAL at 09:20

## 2022-08-12 RX ADMIN — OMEPRAZOLE 20 MG: 20 CAPSULE, DELAYED RELEASE ORAL at 09:20

## 2022-08-12 RX ADMIN — APIXABAN 5 MG: 5 TABLET, FILM COATED ORAL at 21:08

## 2022-08-12 RX ADMIN — GABAPENTIN 600 MG: 300 CAPSULE ORAL at 11:47

## 2022-08-12 ASSESSMENT — GAIT ASSESSMENTS
DEVIATION: ANTALGIC;STEP TO;BRADYKINETIC;DECREASED HEEL STRIKE;DECREASED TOE OFF
DISTANCE (FEET): 10
ASSISTIVE DEVICE: FRONT WHEEL WALKER
GAIT LEVEL OF ASSIST: MINIMAL ASSIST

## 2022-08-12 ASSESSMENT — BRIEF INTERVIEW FOR MENTAL STATUS (BIMS)
WHAT YEAR IS IT: CORRECT
ASKED TO RECALL BED: YES, NO CUE REQUIRED
ASKED TO RECALL BLUE: YES, NO CUE REQUIRED
WHAT MONTH IS IT: ACCURATE WITHIN 5 DAYS
INITIAL REPETITION OF BED BLUE SOCK - FIRST ATTEMPT: 3
WHAT MONTH IS IT: ACCURATE WITHIN 5 DAYS
INITIAL REPETITION OF BED BLUE SOCK - FIRST ATTEMPT: 3
ASKED TO RECALL BLUE: YES, NO CUE REQUIRED
WHAT DAY OF THE WEEK IS IT: CORRECT
BIMS SUMMARY SCORE: 15
ASKED TO RECALL SOCK: YES, NO CUE REQUIRED
WHAT DAY OF THE WEEK IS IT: CORRECT
ASKED TO RECALL SOCK: YES, NO CUE REQUIRED
ASKED TO RECALL BED: YES, NO CUE REQUIRED
BIMS SUMMARY SCORE: 15
WHAT YEAR IS IT: CORRECT

## 2022-08-12 ASSESSMENT — PAIN DESCRIPTION - PAIN TYPE
TYPE: CHRONIC PAIN

## 2022-08-12 ASSESSMENT — PATIENT HEALTH QUESTIONNAIRE - PHQ9
1. LITTLE INTEREST OR PLEASURE IN DOING THINGS: NOT AT ALL
SUM OF ALL RESPONSES TO PHQ9 QUESTIONS 1 AND 2: 0
2. FEELING DOWN, DEPRESSED, IRRITABLE, OR HOPELESS: NOT AT ALL

## 2022-08-12 ASSESSMENT — ACTIVITIES OF DAILY LIVING (ADL)
BED_CHAIR_WHEELCHAIR_TRANSFER_DESCRIPTION: ADAPTIVE EQUIPMENT;INCREASED TIME;INITIAL PREPARATION FOR TASK;REQUIRES LIFT;SET-UP OF EQUIPMENT;SUPERVISION FOR SAFETY;VERBAL CUEING
TOILETING_LEVEL_OF_ASSIST_DESCRIPTION: ASSIST FOR HYGIENE;ASSIST TO PULL PANTS UP;ASSIST FOR STANDING BALANCE;GRAB BAR;INCREASED TIME;INITIAL PREPARATION FOR TASK;SET-UP OF EQUIPMENT;SUPERVISION FOR SAFETY;VERBAL CUEING
TOILETING: INDEPENDENT
TOILET_TRANSFER_DESCRIPTION: GRAB BAR;INCREASED TIME;INITIAL PREPARATION FOR TASK;REQUIRES LIFT;SET-UP OF EQUIPMENT;SUPERVISION FOR SAFETY;VERBAL CUEING

## 2022-08-12 NOTE — CARE PLAN
Problem: Pain - Standard  Goal: Alleviation of pain or a reduction in pain to the patient’s comfort goal  Note: Medicated per request for back/leg pain, 6/10.Repositioned with pillows for comfort.Will continue to monitor and assess pain level and medicate as needed.     Problem: Fall Risk - Rehab  Goal: Patient will remain free from falls  Note: Sophia Smith Fall risk Assessment Score: 8    Low fall risk interventions   - Call light within reach   - Yellow  socks   - Belongings within reach   - Bed in the lowest position     Use of call light encouraged to make needs known.Will continue to monitor and assess needs and safety.

## 2022-08-12 NOTE — ASSESSMENT & PLAN NOTE
She is chronically on 2 liters of [place] oxygen and remains on it at [facility].  Presented to [facility] with increasing SOB  Trop: unremarkable  Echo (7/21/22): EF 65%, GII-DD, inferior wall hypokinesis  Nuc Stress Test (7/21): mormal left ventricular systolic function, EF 65%; inferior wall hypokinesis  7/20/22 CTA: no PE, but ground glass opacifications with possible edema.  TSH ok (8/12)  BNP: 2476 (7/20) -- no other values in Epic for comparison  On O2 supplementation  Diuresis as able.  Pt to follow up with Cardio as out patient

## 2022-08-12 NOTE — ASSESSMENT & PLAN NOTE
Has hx  Mostly pedal swelling  BNP: 2476 (7/20) -- no other values in Epic for comparison  On Lasix: 40 mg bid and repeat BMP reveals low K at 2.9 thus KCl 40 mEq BID replaced K. She had previously been on Aldactone 50 mg which was stopped on her last admission to [facility].  Plan: recheck BMP in 2-3 days. Add magnesium oxide 400 mg daily.  7/21/2022 echocardiogram shows preserved EF but grade 2 diastolic dysfunction and inability to diagnose right ventricular systolic pressure.  Suspect she may have a component of pulmonary hypertension given her BIPAP qhs for MARCI.  Has IVCF and prior DVTs.  Likely chronic edema from factor V leiden deficiency hypercoagulable state.  Now on eliquis.  8/20 restarted aldactone 50mg po daily with lasix 40 bid.  Repeat BMP on 8/22 to see if needs additional lasix.

## 2022-08-12 NOTE — THERAPY
"Speech Language Pathology   Initial Assessment     Patient Name: Jenifer Ramos  AGE:  62 y.o., SEX:  female  Medical Record #: [id number]  Today's Date: 8/12/2022     Subjective    Per H&P \"Patient is a 62 y.o. with a PMH of shoulder/back pain, DM2, Factor V Leiden on Eliquis, CHF, Hyponatremia, and hypokalemia who presented on 8/2/22 with weakness and falls..  Patient was recently discharged from [facility] on 7/22/22 for SOB and weight gain. Her BNP was elevated but her TTE showed preserved ejection fracture. She was diuresed and discharged on 7/22/22.  She then presented to [facility] after falling at home and in ED found to be dehydrated and having orthostatic hypotension. She was also noted to have severe hyponatremia. She was given IV fluids and her sodium has improved. Patient no longer having orthostatic hypotension.      Patient tolerated transfer to [facility]. She reports she is fine with walker to the bathroom and is upset that she was not given one. Discussed that per CNA she was very heavy assist and had poor balance so she will have to be cleared by therapy. She denies pain. Denies NVD. Denies SOB. She reports they diuresed her by almost 40-60 lbs of water weight.\"     Objective       08/12/22 0901   Evaluation Charges   Charges Yes   SLP Speech Language Evaluation Speech Sound Language Comprehension   SLP Total Time Spent   SLP Individual Total Time Spent (Mins) 60   Precautions   Precautions Fall Risk   Comments 2L O2 at baseline   Prior Level Of Function   Communication Within Functional Limits   Hearing Within Functional Limits for Evaluation   Hearing Aid None   Vision Wears Corrective Lenses;Reading    Patient's Primary Language English   Occupation (Pre-Hospital Vocational) Not Employed   Receptive Language / Auditory Comprehension   Receptive Language / Auditory Comprehension WDL   Expressive Language   Expressive Language (WDL) WDL   Reading Comprehension    Reading Comprehension (WDL) WDL   Written Language " "Expression   Written Language Expression (WDL) WDL   Cognition   Cognitive-Linguistic (WDL) WDL   ABS (Agitated Behavior Scale)   Agitated Behavior Scale Performed No   Cognitive Pattern Assessment   Cognitive Pattern Assessment Used BIMS   Brief Interview for Mental Status (BIMS)   Repetition of Three Words (First Attempt) 3   Temporal Orientation: Year Correct   Temporal Orientation: Month Accurate within 5 days   Temporal Orientation: Day Correct   Recall: \"Sock\" Yes, no cue required   Recall: \"Blue\" Yes, no cue required   Recall: \"Bed\" Yes, no cue required   BIMS Summary Score 15   Social / Pragmatic Communication   Social / Pragmatic Communication X   Attention to Social Cues Minimal (4)   Functional Level of Assist   Comprehension Independent   Expression Independent   Social Interaction Supervision   Social Interaction Description Increased time   Problem Solving Independent   Memory Independent   Outcome Measures   Outcome Measures Utilized SCCAN   SCCAN (Scales of Cognitive and Communicative Ability for Neurorehabilitation)   Oral Expression - Raw Score 19   Oral Expression - Scale Performance Score 100   Orientation - Raw Score 12   Orientation - Scale Performance Score 100   Memory - Raw Score 18   Memory - Scale Performance Score 95   Speech Comprehension - Raw Score 13   Speech Comprehension - Scale Performance Score 100   Reading Comprehension - Raw Score 12   Reading Comprehension - Scale Performance Score 100   Writing - Raw Score 6   Writing - Scale Performance Score 86   Attention - Raw Score 15   Attention - Scale Performance Score 94   Problem Solving - Raw Score 22   Problem Solving - Scale Performance Score 96   SCCAN Total Raw Score 91   SCCAN Degree of Severity Typical Functioning   Current Discharge Plan   Current Discharge Plan Return to Prior Living Situation   Benefit   Therapy Benefit Patient would benefit from Inpatient Rehab Speech-Language Pathology to address above identified " "deficits.   Interdisciplinary Plan of Care Collaboration   IDT Collaboration with  Nursing;Physician   Patient Position at End of Therapy Seated;Call Light within Reach;Tray Table within Reach   Collaboration Comments Nursing administered medications during this evaluation, discussed recommendation for no further ST with MD   Strengths & Barriers   Strengths Able to follow instructions;Alert and oriented;Effective communication skills;Good carryover of learning;Willingly participates in therapeutic activities;Supportive family   Barriers Fatigue;Pain   Speech Language Pathologist Assigned   Assigned SLP / Extension NO ST 8/12       Assessment    Patient is 62 y.o. female with a diagnosis of dehydration and severe hyponatremia.  Additional factors influencing patient status/progress (ie: cognitive factors, co-morbidities, social support, etc): Pain, cognition is typically functioning.      Cognitive evaluation completed using the SCCAN.  Pt achieved an overall score of 91/94 indicating pt's cognition is \"Typically Functioning\".  Pt reported no changes in her cognition since she has been in the hospital.  Pt perseverative during this evaluation on her medications (timing of her medications, dosage of her medications) and frustrations about being in the hospital.  Pt demonstrated good knowledge of her current medications and demonstrated the ability to correctly administer insulin with nursing during this assessment.  No further ST recommended to target cognition at this time.      Plan  Recommend Speech Therapy  0  minutes per day  0  days per week for 0 days for the following treatments:  No further ST warranted for cognition.     Speech Therapy Problems (Active)       There are no active problems.             "

## 2022-08-12 NOTE — IDT DISCHARGE PLANNING
CASE MANAGEMENT INITIAL ASSESSMENT    Admit Date:  8/11/2022     CM reviewed the medical record and will meet with the patient to  discuss role of case management , discharge planning, and  team conference.       Diagnosis: Dehydration [E86.0]    Co-morbidities:   Patient Active Problem List    Diagnosis Date Noted    Dehydration 08/11/2022    Pulmonary nodule 07/21/2022    Cirrhosis (HCC) 07/21/2022    Hypothyroidism 07/21/2022    GERD (gastroesophageal reflux disease) 07/21/2022    Microcytic anemia 07/21/2022    Hyponatremia 07/21/2022    Hypokalemia 07/21/2022    Dyspnea 07/21/2022    SOB (shortness of breath) 07/21/2022    Chest pain 07/20/2022    Postoperative pain 11/27/2019    Atrial fibrillation ([facility]) 01/12/2015    Back pain 04/24/2013    Asthma with exacerbation 04/24/2013    Varicose vein 04/24/2013    Palpitations 04/24/2013    Sleep apnea 04/24/2013    Factor V deficiency ([facility]) 04/24/2013    DM (diabetes mellitus) ([facility]) 04/24/2013     Prior Living Situation:       Prior Level of Function:       Support Systems:  Primary : Demetrio Ramos  Other support systems:        Previous Services Utilized:        Other Information:        Primary Payor Source: Other (Comments) ([employer])  Primary Care Practitioner : Adrián Duckworth    Patient / Family Goal:       Plan:  1. Continue to follow patient through hospitalization and provide discharge planning in collaboration with patient, family, physicians and ancillary services.     2. Utilize community resources to ensure a safe discharge.

## 2022-08-12 NOTE — DIETARY
NUTRITION SERVICES: BMI - Pt with BMI >40 (=Body mass index is 42.74 kg/m².), Class III obesity. Weight loss counseling not appropriate in acute care setting. RECOMMEND - If appropriate at DC please refer to outpatient nutrition services for weight management.

## 2022-08-12 NOTE — ASSESSMENT & PLAN NOTE
Monitor vitals  Note: BP dipped lower with SBP 90 on am of 8/12  BP remains low on diuretics.  1800 fluid restrictions.

## 2022-08-12 NOTE — ASSESSMENT & PLAN NOTE
Hba1c: 5.9 (8/12)  Monitor accuchecks  On Tresiba (home med)  Note: pt has her home meds here at the Rehab  Note: home meds include Tresiab and Lispro 20-40 units bid (per SS)  Diabetic diet

## 2022-08-12 NOTE — THERAPY
Physical Therapy   Initial Evaluation     Patient Name: Jenifer Ramos  Age:  62 y.o., Sex:  female  Medical Record #: [id number]  Today's Date: 8/12/2022     Subjective    Pt up in wc, willing to participate     Objective       08/12/22 1031   PT Charge Group   Charges Yes   PT Therapeutic Activities 1   PT Evaluation PT Evaluation Mod   PT Total Time Spent   PT Individual Total Time Spent (Mins) 60   Prior Living Situation   Prior Services Intermittent Physical Support for ADL Per Family   Housing / Facility 1 Story House   Steps Into Home 1   Equipment Owned Front-Wheel Walker;4-Wheel Walker;Wheelchair   Lives with - Patient's Self Care Capacity Spouse;Adult Children   Prior Level of Functional Mobility   Bed Mobility Independent   Transfer Status Independent   Ambulation Independent   Distance Ambulation (Feet)   (household)   Assistive Devices Used Front-Wheel Walker   Stairs Independent  (1 step access to home)   Comments pt reports functional household independence prior to ~ May of this year   Passive ROM Lower Body   Passive ROM Lower Body X   Gross Passive ROM Gross Passive Range of Motion Impaired,  but Appears Adequate for Functional Mobility.   Active ROM Lower Body    Active ROM Lower Body  X   Gross Active ROM Gross Active Range of Motion Impaired,  but Appears Adequate for Functional Mobility.   Strength Lower Body   Lower Body Strength  X   Gross Strength Generalized Weakness, Equal Bilaterally   Comments Grossly 4- at ankles, 4+ at knees/hips   Sensation Lower Body   Lower Extremity Sensation   X   Rt Lower Extremity Light Touch Impaired   Rt Lower Extremity Proprioception Impaired   Lt Lower Extremity Light Touch Impaired   Lt Lower Extremity Proprioception Impaired   Balance Assessment   Sitting Balance (Static) Fair   Sitting Balance (Dynamic) Fair -   Standing Balance (Static) Poor +   Standing Balance (Dynamic) Poor +   Weight Shift Sitting Fair   Weight Shift Standing Poor   Bed Mobility     Supine to Sit Minimal Assist   Sit to Supine Moderate Assist   Sit to Stand Minimal Assist   Scooting Minimal Assist   Rolling Unable to Participate  (L shoulder pain)   Roll Left and Right   Reason if not Attempted Medical concerns   CARE Score - Roll Left and Right 88   Roll Left and Right Discharge Goal   Discharge Goal 4   Sit to Lying   Assistance Needed Physical assistance   Physical Assistance Level 51%-75%   CARE Score - Sit to Lying 2   Sit to Lying Discharge Goal   Discharge Goal 4   Lying to Sitting on Side of Bed   Assistance Needed Physical assistance   Physical Assistance Level 26%-50%   CARE Score - Lying to Sitting on Side of Bed 3   Lying to Sitting on Side of Bed Discharge Goal   Discharge Goal 4   Sit to Stand   Assistance Needed Adaptive equipment;Physical assistance   Physical Assistance Level 51%-75%   CARE Score - Sit to Stand 2   Sit to Stand Discharge Goal   Discharge Goal 4   Chair/Bed-to-Chair Transfer   Assistance Needed Adaptive equipment;Physical assistance   Physical Assistance Level 51%-75%   CARE Score - Chair/Bed-to-Chair Transfer 2   Chair/Bed-to-Chair Transfer Discharge Goal   Discharge Goal 4   Car Transfer   Reason if not Attempted Safety concerns   CARE Score - Car Transfer 88   Car Transfer Discharge Goal   Discharge Goal 4   Walk 10 Feet   Assistance Needed Adaptive equipment;Physical assistance   Physical Assistance Level 26%-50%   CARE Score - Walk 10 Feet 3   Walk 10 Feet Discharge Goal   Discharge Goal 4   Walk 50 Feet with Two Turns   Reason if not Attempted Safety concerns   CARE Score - Walk 50 Feet with Two Turns 88   Walk 50 Feet with Two Turns Discharge Goal   Discharge Goal 4   Walk 150 Feet   Reason if not Attempted Safety concerns   CARE Score - Walk 150 Feet 88   Walk 150 Feet Discharge Goal   Discharge Goal 88   Walking 10 Feet on Uneven Surfaces   Reason if not Attempted Safety concerns   CARE Score - Walking 10 Feet on Uneven Surfaces 88   Walking 10 Feet  on Uneven Surfaces Discharge Goal   Discharge Goal 88   1 Step (Curb)   Reason if not Attempted Safety concerns   CARE Score - 1 Step (Curb) 88   1 Step (Curb) Discharge Goal   Discharge Goal 4   4 Steps   Reason if not Attempted Activity not applicable   CARE Score - 4 Steps 9   4 Steps Discharge Goal   Discharge Goal 9   12 Steps   Reason if not Attempted Activity not applicable   CARE Score - 12 Steps 9   12 Steps Discharge Goal   Discharge Goal 9   Picking Up Object   Reason if not Attempted Safety concerns   CARE Score - Picking Up Object 88   Picking Up Object Discharge Goal   Discharge Goal 4   Wheel 50 Feet with Two Turns   Reason if not Attempted Activity not applicable   CARE Score - Wheel 50 Feet with Two Turns 9   Wheel 50 Feet with Two Turns Discharge Goal   Discharge Goal 9   Wheel 150 Feet   Reason if not Attempted Activity not applicable   CARE Score - Wheel 150 Feet 9   Wheel 150 Feet Discharge Goal   Discharge Goal 9   Gait Functional Level of Assist    Gait Level Of Assist Minimal Assist   Assistive Device Front Wheel Walker   Distance (Feet) 10   # of Times Distance was Traveled 2   Deviation Antalgic;Step To;Bradykinetic;Decreased Heel Strike;Decreased Toe Off   Wheelchair Functional Level of Assist   Wheelchair Assist Total Assist   Stairs Functional Level of Assist   Level of Assist with Stairs Unable to Participate   Transfer Functional Level of Assist   Bed, Chair, Wheelchair Transfer Moderate Assist   Bed Chair Wheelchair Transfer Description Adaptive equipment;Assist with two limbs;Increased time;Requires lift;Verbal cueing   Problem List    Problems Pain;Impaired Bed Mobility;Impaired Transfers;Impaired Ambulation;Functional ROM Deficit;Functional Strength Deficit;Impaired Balance;Decreased Activity Tolerance   Precautions   Precautions Fall Risk   Current Discharge Plan   Current Discharge Plan Return to Prior Living Situation   Interdisciplinary Plan of Care Collaboration   Patient  Position at End of Therapy In Bed;Call Light within Reach;Tray Table within Reach;Phone within Reach   Benefit   Therapy Benefit Patient Would Benefit from Inpatient Rehabilitation Physical Therapy to Maximize Functional [place] with ADLs, IADLs and Mobility.   Strengths & Barriers   Strengths Able to follow instructions;Adequate strength;Motivated for self care and independence;Pleasant and cooperative;Supportive family;Willingly participates in therapeutic activities   Barriers Decreased endurance;Fatigue;Generalized weakness;Home accessibility;Impaired activity tolerance;Impaired balance;Limited mobility;Pain  (LUE weakness/ pain and prior shoulder replacement)     Pt oriented to rehab/ PT and POC.  Pt ambulated with FWW and min A 10 ft x 2 with wc in tow, bradykinetic throughout and difficulty with LLE advancement/ poor LUE .    Assessment  Patient is 62 y.o. female with a diagnosis of debility, admitted 8/2 with weakness/falls 2* dehydration and orthostasis.  Recent Covid + test as well. Additional factors influencing patient status / progress (ie: cognitive factors, co-morbidities, social support, etc): include recent hospitalization in July 2* hyponatremia/ hypokalemia.  PMH for L shoulder replacement/ pain, back pain, DM, Factor V Leiden, and CHF. Pt reports independent household mobility prior to recent medical events, but son or spouse can assist as needed. Pt presents to rehab with generalized debility/ weakness, LUE pain, limited ROM/flexibility and requires min/mod A for all mobility with FWW for transfers, sit<>stand, and gait, min/mod A for bed mobility and requires significant amount of time to complete mobility tasks. Pt should benefit from rehab PT to maximize strength/ endurance and functional mobility for d/c home with supportive family and anticiapte home health PT    Plan  Recommend Physical Therapy  minutes per day 5-6 days per week for 2 weeks for the following treatments:  PT  "Group Therapy, PT Gait Training, PT Self Care/Home Eval, PT Therapeutic Exercises, PT Neuro Re-Ed/Balance, PT Therapeutic Activity, PT Manual Therapy, and PT Evaluation.    Passport items to be completed:  Get in/out of bed safely, in/out of a vehicle, safely use mobility device, walk or wheel around home/community, navigate up and down stairs, show how to get up/down from the ground, ensure home is accessible, demonstrate HEP, complete caregiver training    Goals:  Long term and short term goals have been discussed with patient and they are in agreement.    Physical Therapy Problems (Active)       Problem: Mobility       Dates: Start: 08/12/22         Goal: STG-Within one week, patient will ambulate household distance CGA with FWW 25 ft x 2       Dates: Start: 08/12/22            Goal: STG-Within one week, patient will ambulate up/down a curb min A with FWW for 4-6\" rise       Dates: Start: 08/12/22               Problem: Mobility Transfers       Dates: Start: 08/12/22         Goal: STG-Within one week, patient will perform bed mobility min A with bed controls as needed       Dates: Start: 08/12/22            Goal: STG-Within one week, patient will sit to stand CGA to FWW       Dates: Start: 08/12/22            Goal: STG-Within one week, patient will transfer bed to chair CGA with FWW       Dates: Start: 08/12/22               Problem: PT-Long Term Goals       Dates: Start: 08/12/22         Goal: LTG-By discharge, patient will ambulate SPV / modified independent with FWW x 50 ft       Dates: Start: 08/12/22            Goal: LTG-By discharge, patient will transfer one surface to another SPV/ modified independent with FWW       Dates: Start: 08/12/22            Goal: LTG-By discharge, patient will transfer in/out of a car SBA/ CGA with FWW       Dates: Start: 08/12/22            Goal: LTG-By discharge, patient will navigate 4-6\" step with FWW and SBA/ CGA       Dates: Start: 08/12/22              "

## 2022-08-12 NOTE — H&P
REHABILITATION HISTORY AND PHYSICAL/POST ADMISSION PHYSICAL EVALUATION    Date of Admission: 8/11/2022  Jenifer Ramos  RH03/02    [facility] Code / Diagnosis to Support: 0016 - Debility (Non-Cardiac, Non-Pulmonary)  Etiologic Diagnosis: Dehydration; hyponatremia    CC: Decreased mobility, decreased balance    HPI:  Patient is a 62 y.o. with a PMH of shoulder/back pain, DM2, Factor V Leiden on Eliquis, CHF, Hyponatremia, and hypokalemia who presented on 8/2/22 with weakness and falls..  Patient was recently discharged from [facility] on 7/22/22 for SOB and weight gain. Her BNP was elevated but her TTE showed preserved ejection fracture. She was diuresed and discharged on 7/22/22.  She then presented to [facility] after falling at home and in ED found to be dehydrated and having orthostatic hypotension. She was also noted to have severe hyponatremia. She was given IV fluids and her sodium has improved. Patient no longer having orthostatic hypotension.      Patient tolerated transfer to [facility]. She reports she is fine with walker to the bathroom and is upset that she was not given one. Discussed that per CNA she was very heavy assist and had poor balance so she will have to be cleared by therapy. She denies pain. Denies NVD. Denies SOB. She reports they diuresed her by almost 40-60 lbs of water weight.    REVIEW OF SYSTEMS:     Comprehensive 14 point ROS was reviewed and all were negative except as noted elsewhere in this document.     PMH:  Past Medical History:   Diagnosis Date    Arrhythmia     hx a-fib    Arthritis     generalized    ASTHMA 4/24/2013    Back pain 4/24/2013    Blood clotting disorder (HCC)     Disorder of thyroid     DM (diabetes mellitus) ([facility]) 4/24/2013    insulin    Factor V deficiency ([facility]) 04/24/2013    Palpitations 4/24/2013    Pneumonia 1999    Psychiatric problem     depression    Sleep apnea 4/24/2013    bipap    Snoring     Urinary incontinence     Varicose vein 4/24/2013       PSH:  Past Surgical  History:   Procedure Laterality Date    PB RECONSTR TOTAL SHOULDER IMPLANT Left 11/27/2019    Procedure: ARTHROPLASTY, SHOULDER, TOTAL-REVERSE;  Surgeon: Hugo Beltran M.D.;  Location: SURGERY [facility];  Service: Orthopedics    CLAVICLE DISTAL EXCISION  11/27/2019    Procedure: EXCISION, CLAVICLE, DISTAL-RESECTION;  Surgeon: Hugo Beltran M.D.;  Location: SURGERY [facility];  Service: Orthopedics    OTHER ORTHOPEDIC SURGERY  2018    elbow    KNEE ARTHROPLASTY TOTAL Left 2013    KNEE ARTHROPLASTY TOTAL Right 2013    HYSTERECTOMY, TOTAL ABDOMINAL  2003    CHOLECYSTECTOMY      FUSION, SPINE, LUMBAR, PLIF      OOPHORECTOMY      OTHER CARDIAC SURGERY      ablation    TONSILLECTOMY         FAMILY HISTORY:  Family History   Problem Relation Age of Onset    Heart Disease Mother 75        atrial fibrillation    Lung Disease Father 69        astma, DM, lukemia    Hypertension Sister          MEDICATIONS:  Current Facility-Administered Medications   Medication Dose    apixaban (ELIQUIS) tablet 5 mg  5 mg    febuxostat (ULORIC) tablet 40 mg  40 mg    furosemide (LASIX) tablet 40 mg  40 mg    gabapentin (NEURONTIN) capsule 600 mg  600 mg    [START ON 8/12/2022] Insulin Degludec SOPN 276 Units  276 Units    [START ON 8/12/2022] levothyroxine (SYNTHROID) tablet 112 mcg  112 mcg    montelukast (SINGULAIR) tablet 10 mg  10 mg    pravastatin (PRAVACHOL) tablet 10 mg  10 mg    rizatriptan (MAXALT-MLT) disintegrating tablet 10 mg  10 mg    Respiratory Therapy Consult      Pharmacy Consult Request ...Pain Management Review 1 Each  1 Each    hydrALAZINE (APRESOLINE) tablet 25 mg  25 mg    acetaminophen (Tylenol) tablet 650 mg  650 mg    senna-docusate (PERICOLACE or SENOKOT S) 8.6-50 MG per tablet 2 Tablet  2 Tablet    And    polyethylene glycol/lytes (MIRALAX) PACKET 1 Packet  1 Packet    And    magnesium hydroxide (MILK OF MAGNESIA) suspension 30 mL  30 mL    And    bisacodyl (DULCOLAX) suppository 10 mg  10  "mg    [START ON 8/12/2022] omeprazole (PRILOSEC) capsule 20 mg  20 mg    artificial tears ophthalmic solution 1 Drop  1 Drop    benzocaine-menthol (Cepacol) lozenge 1 Lozenge  1 Lozenge    mag hydrox-al hydrox-simeth (MAALOX PLUS ES or MYLANTA DS) suspension 20 mL  20 mL    ondansetron (ZOFRAN ODT) dispertab 4 mg  4 mg    Or    ondansetron (ZOFRAN) syringe/vial injection 4 mg  4 mg    traZODone (DESYREL) tablet 50 mg  50 mg    sodium chloride (OCEAN) 0.65 % nasal spray 2 Spray  2 Spray    oxyCODONE immediate-release (ROXICODONE) tablet 5 mg  5 mg    Or    oxyCODONE immediate release (ROXICODONE) tablet 10 mg  10 mg    levalbuterol (XOPENEX) 1.25 MG/3ML nebulizer solution 1.25 mg  1.25 mg    [START ON 8/12/2022] Fluticasone-Umeclidin-Vilant 200-62.5-25 MCG/INH AEPB 1 Puff  1 Puff    insulin lispro (AdmeLOG Solostar) injection PEN  20-40 Units       ALLERGIES:  Azithromycin, Erythromycin, Other misc, Penicillins, Pistachio, Sulfa drugs, Tetraglycine, Tape, Albuterol, Atorvastatin, Chlorhexidine, Lamisil [terbinafine hcl], Morphine, Other drug, Physostigmine, and Zanaflex [tizanidine hcl]    PSYCHOSOCIAL HISTORY:  Living Site:  Home  Living With:  spouse  Caregiver's availability:  Not Applicable  Number of stairs:   1  Substance use history:  Denies tobacco or illicit substances    LEVEL OF FUNCTION PRIOR TO DISABILTY:  She was previously living at home with 1 step to enter; living independently with her .  She reportedly has a sedentary lifestyle.     LEVEL OF FUNCTION PRIOR TO ADMISSION to [facility]:  She was evaluated by PT and was [place] for transfer and [place] for mobility. Patient was evaluated by OT and was modA for mobility.     CURRENT LEVEL OF FUNCTION:   Same as level of function prior to admission to [facility]    PHYSICAL EXAM:     VITAL SIGNS:   height is 1.727 m (5' 8\") and weight is 127 kg (281 lb 1.4 oz) (abnormal). Her oral temperature is 36.6 °C (97.9 °F). " Her blood pressure is 130/60 and her pulse is 74. Her respiration is 18 and oxygen saturation is 96%.     GENERAL: No apparent distress  HEENT: Normocephalic/atraumatic, EOMI, and PERRL  CARDIAC: Regular rate and rhythm, normal S1, S2   LUNGS: Clear to auscultation   ABDOMINAL: bowel sounds present, soft, and nontender    EXTREMITIES: no contractures, spasticity, or edema.  No calf tenderness bilaterally, 2+ bilateral DP/PT pulses.  NEURO:  Mental status:  A&Ox4 (person, place, date, situation) answers questions appropriately; perseverative   Speech: fluent, no aphasia or dysarthria    Motor:  5/5 BUE except L shoulder limited due to previous injury  4/5 B HF   Sensory: intact to light touch through out  DTRs: 2+ in bilateral biceps and patellar tendons      RADIOLOGY:  No recent imaging    LABS:             Pending admission labs      PRIMARY REHAB DIAGNOSIS:    This patient is a 62 y.o. female admitted for acute inpatient rehabilitation with Dehydration.    IMPAIRMENTS:   ADLs/IADLs  Mobility    SECONDARY DIAGNOSIS/MEDICAL CO-MORBIDITIES AFFECTING FUNCTION:  dCHF/HTN  DM2 with hyperglycemia  Cirrhosis   Hyponatremia  Asthma   HLD  Left shoulder injury  Hx of Gout  Hypothyroidism  Morbid Obesity due to excess calories  Factor 5 Leiden/DVT ppx    RELEVANT CHANGES SINCE PREADMISSION EVALUATION:    Status unchanged    The patient's rehabilitation potential is Good  The patient's medical prognosis is fair    PLAN:   Discussion and Recommendations:   1. The patient requires an acute inpatient rehabilitation program with a coordinated program of care at an intensity and frequency not available at a lower level of care. This recommendation is substantiated by the patient's medical physicians who recommend that the patient's intervention and assessment of medical issues needs to be done at an acute level of care for patient's safety and maximum outcome.   2. A coordinated program of care will be supplied by an  interdisciplinary team of physical therapy, occupational therapy, rehab physician, rehab nursing, and, if needed, speech therapy and rehab psychology. Rehab team presents a patient-specific rehabilitation and education program concentrating on prevention of future problems related to accessibility, mobility, skin, bowel, bladder, sexuality, and psychosocial and medical/surgical problems.   3. Need for Rehabilitation Physician: The rehab physician will be evaluating the patient on a multi-weekly basis to help coordinate the program of care. The rehab physician communicates between medical physicians, therapists, and nurses to maximize the patient's potential outcome. Specific areas in which the rehab physician will be providing daily assessment include the following:   A. Assessing the patient's heart rate and blood pressure response (vitals monitoring) to activity and making adjustments in medications or conservative measures as needed.   B. The rehab physician will be assessing the frequency at which the program can be increased to allow the patient to reach optimal functional outcome.   C. The rehab physician will also provide assessments in daily skin care, especially in light of patient's impairments in mobility.   D. The rehab physician will provide special expertise in understanding how to work with functional impairment and recommend appropriate interventions, compensatory techniques, and education that will facilitate the patient's outcome.   4. Rehab R.N.   The rehab RN will be working with patient to carry over in room mobility and activities of daily living when the patient is not in 3 hours of skilled therapy. Rehab nursing will be working in conjunction with rehab physician to address all the medical issues above and continue to assess laboratory work and discuss abnormalities with the treating physicians, assess vitals, and response to activity, and discuss and report abnormalities with the rehab  physician. Rehab RN will also continue daily skin care, supervise bladder/bowel program, instruct in medication administration, and ensure patient safety.   5. Rehab Therapy: Therapies to treat at intensity and frequency of (may change after completion of evaluation by all therapeutic disciplines):       PT:  Physical therapy to address mobility, transfer, gait training and evaluation for adaptive equipment needs 1 and 1/2 hour/day at least 5 days/week for the duration of the ELOS (see below)       OT:  Occupational therapy to address ADLs, self-care, home management training, functional mobility/transfers and assistive device evaluation, and community re-integration 1 and 1/2 hour/day at least 5 days/week for the duration of the ELOS (see below).     Medical management / Rehabilitation Issues/ Adverse Potential as part of rehabilitation plan     REHABILITATION ISSUES/ADVERSE POTENTIAL:  1.  Debility - Patient with CHF exacerbation with fluid overload then over diuresis causing dehydration and orthostatic hypotension. Patient demonstrates functional deficits in strength, balance, coordination, and ADL's. Patient is admitted to [facility] for comprehensive rehabilitation therapy as described below.   Rehabilitation nursing monitors bowel and bladder control, educates on medication administration, co-morbidities and monitors patient safety.    2.  Neurostimulants: None at this time but continue to assess daily for need to initiate should status change.    3.  DVT prophylaxis:  Patient is on Eliquis for anticoagulation upon transfer. Encourage OOB. Monitor daily for signs and symptoms of DVT including but not limited to swelling and pain to prevent the development of DVT that may interfere with therapies.    4.  GI prophylaxis:  On prilosec to prevent gastritis/dyspepsia which may interfere with therapies.    5.  Pain: No issues with pain currently / Controlled with APAP/Gabapentin/Oxycodone  PRN    6.  Nutrition/Dysphagia: Dietician monitors nutrient intake, recommend supplements prn and provide nutrition education to pt/family to promote optimal nutrition for wound healing/recovery.     7.  Bladder/bowel:  Start bowel and bladder program as described below, to prevent constipation, urinary retention (which may lead to UTI), and urinary incontinence (which will impact upon pt's functional independence).   - Post void bladder scans, I&O cath for PVRs >400  - up to commode after meal     8.  Skin/dermal ulcer prophylaxis: Monitor for new skin conditions with q.2 h. turns as required to prevent the development of skin breakdown.     9.  Cognition/Behavior: As needed psychologist provides adjustment counseling to illness and psychosocial barriers that may be potential barriers to rehabilitation.     10. Respiratory therapy: RT performs O2 management prn, breathing retraining, pulmonary hygiene and bronchospasm management prn to optimize participation in therapies.     MEDICAL CO-MORBIDITIES/ADVERSE POTENTIAL AFFECTING FUNCTION:   Debility - Patient with CHF exacerbation with fluid overload then over diuresis causing dehydration and orthostatic hypotension.  -PT and OT for mobility and ADLs    dCHF/HTN - Patient on Lasix 40 mg BID. Will monitor     DM2 with hyperglycemia - Patient on Insulin pre meal and 276 U degludec     Cirrhosis - Noted in problem list from US last month    Hyponatremia - Check AM CMP    Asthma - Patient on Singulair and inhaler    HLD - Patient on Pravastatin 10 mg daily    Left shoulder injury - Patient with replacement in the past.     Hx of Gout - On Uloric on transfer    Hypothyroidism - Patient on Levothyroxine 112 mcg    Morbid Obesity due to excess calories - BMI of 42.7 on admission, meets medical criteria. Dietitian to consult    Factor 5 Leiden/DVT ppx - Patient on Eliquis 5 mg BID at baseline.     I personally performed a complete drug regimen review and no potential  clinically significant medication issues were identified.     Pt was seen today for 72 min, and entire time spent in face-to-face contact was >50% in counseling and coordination of care as detailed in A/P above.        GOALS/EXPECTED LEVEL OF FUNCTION BASED ON CURRENT MEDICAL AND FUNCTIONAL STATUS (may change based on patient's medical status and rate of impairment recovery):  Transfers:   Modified Independent  Mobility/Gait:   Modified Independent  ADL's:   Modified Independent    DISPOSITION: Discharge to pre-morbid independent living setting with the supportive care of patient's signigicant other.    ELOS: 10-14 days

## 2022-08-12 NOTE — FLOWSHEET NOTE
08/12/22 0642   Events/Summary/Plan   Events/Summary/Plan o2 spot check   Vital Signs   Pulse 78   Respiration 12   Pulse Oximetry 98 %   $ Pulse Oximetry (Spot Check) Yes   Chest Exam   Work Of Breathing / Effort Within Normal Limits   Breath Sounds   RUL Breath Sounds Diminished   RML Breath Sounds Diminished   RLL Breath Sounds Diminished   DANIELLE Breath Sounds Diminished   LLL Breath Sounds Diminished   Oxygen   O2 (LPM) 2   O2 Delivery Device Silicone Nasal Cannula;CPAP

## 2022-08-12 NOTE — CONSULTS
DATE OF SERVICE:  8/12/2022    REQUESTING PHYSICIAN:  Orlando Brand MD    CHIEF COMPLAINT / REASON FOR CONSULTATION:   Diabetes  Edema  Covid (+)  Labile BP    HISTORY OF PRESENT ILLNESS:  This is a 63 y/o  with a PMH significant for diabetes, shoulder/back pain, Factor V Leiden on Eliquis, and diastolic heart failure who originally presented to [facility] on 7/20 with for SOB.  Her BNP was elevated but her TTE showed preserved ejection fracture.  She was diuresed and discharged on 7/22/22.  She then presented to [facility] with weakness and falling at home.  In the ED she was found to be dehydrated and having orthostatic hypotension.  She was also noted to have severe hyponatremia.  She was given IV fluids and her sodium improved.  She was no longer found to have orthostatic hypotension.    Because of the patient's weakness and debility, Rehab was consulted, evaluated the patient, and was deemed a good Rehab candidate.  The patient was transferred over to the Rehab facility on 8/11/2022.      The patient denies fever, chills, nausea, vomiting, headaches, blurry vision, or chest pain.    REVIEW OF SYSTEMS: All review of systems are negative pre AMA and CMS criteria except for that stated in the HPI.    PAST MEDICAL HISTORY:  Past Medical History:   Diagnosis Date    Arrhythmia     hx a-fib    Arthritis     generalized    ASTHMA 4/24/2013    Back pain 4/24/2013    Blood clotting disorder (HCC)     Disorder of thyroid     DM (diabetes mellitus) ([facility]) 4/24/2013    insulin    Factor V deficiency (HCC) 04/24/2013    Palpitations 4/24/2013    Pneumonia 1999    Psychiatric problem     depression    Sleep apnea 4/24/2013    bipap    Snoring     Urinary incontinence     Varicose vein 4/24/2013       PAST SURGICAL HISTORY:  Past Surgical History:   Procedure Laterality Date    PB RECONSTR TOTAL SHOULDER IMPLANT Left 11/27/2019    Procedure: ARTHROPLASTY, SHOULDER, TOTAL-REVERSE;  Surgeon: Hugo Beltran M.D.;  Location: SURGERY  [facility];  Service: Orthopedics    CLAVICLE DISTAL EXCISION  11/27/2019    Procedure: EXCISION, CLAVICLE, DISTAL-RESECTION;  Surgeon: Hugo Beltran M.D.;  Location: SURGERY [facility];  Service: Orthopedics    OTHER ORTHOPEDIC SURGERY  2018    elbow    KNEE ARTHROPLASTY TOTAL Left 2013    KNEE ARTHROPLASTY TOTAL Right 2013    HYSTERECTOMY, TOTAL ABDOMINAL  2003    CHOLECYSTECTOMY      FUSION, SPINE, LUMBAR, PLIF      OOPHORECTOMY      OTHER CARDIAC SURGERY      ablation    TONSILLECTOMY         Allergies   Allergen Reactions    Azithromycin Anaphylaxis    Erythromycin Anaphylaxis    Other Misc Anaphylaxis     Flu vaccine    Penicillins Anaphylaxis     As a child    Pistachio Anaphylaxis    Sulfa Drugs Anaphylaxis    Tetraglycine Anaphylaxis    Tape Rash and Itching     tegaderm ok  Blisters with others    Albuterol Palpitations    Atorvastatin      Extreme joint pain    Chlorhexidine Rash     blistering    Lamisil [Terbinafine Hcl]      Pancreatitis     Morphine      Not effective    Other Drug      Long acting benzodiazepines only single dose otherwise combative     Physostigmine      Abdomen extreme swelling    Zanaflex [Tizanidine Hcl]      Falling asleep mid sentence       CURRENT MEDICATIONS:    Current Facility-Administered Medications:     apixaban    febuxostat    furosemide    gabapentin    Insulin Degludec    levothyroxine    montelukast    pravastatin    rizatriptan    Respiratory Therapy Consult    Pharmacy Consult Request    hydrALAZINE    acetaminophen    senna-docusate **AND** polyethylene glycol/lytes **AND** magnesium hydroxide **AND** bisacodyl    omeprazole    artificial tears    benzocaine-menthol    mag hydrox-al hydrox-simeth    ondansetron **OR** ondansetron    traZODone    sodium chloride    oxyCODONE immediate-release **OR** oxyCODONE immediate-release    levalbuterol    Fluticasone-Umeclidin-Vilant    insulin lispro    Social History     Socioeconomic History    Marital  status:    Tobacco Use    Smoking status: Never    Smokeless tobacco: Never   Substance and Sexual Activity    Alcohol use: Not Currently    Drug use: No       FAMILY HISTORY:  was reviewed and is not pertinent to this consultation.    PHYSICAL EXAMINATION:  VITAL SIGNS:  Temp is 98.1, blood pressure is 90//60, heart rate is 79, respiratory rate is 12.  GENERAL:  Patient was lying in bed in no distress.  Obese.  HEENT:  Pupils were equal, round and reactive to light and accomodation.  Oral mucosa was pink and moist.  NECK:  Soft.  Supple.  No JVD.  HEART:  Regular rate and rhythm.  Normal S1 and S2.  There was a murmur noted.  LUNGS:  Are clear to auscultation bilaterally.  ABDOMEN:  Soft, non tender, non distended.  Bowels sound were positive in all four quadrants.  EXTREMITIES:  No clubbing, cyanosis.  There was noted lower extremity edema - mostly pedal.  NEUROLOGIC:  Cranial nerves two through twelve were grossly intact.    LABS:  Lab Results   Component Value Date/Time    SODIUM 141 08/12/2022 06:19 AM    POTASSIUM 4.3 08/12/2022 06:19 AM    CHLORIDE 103 08/12/2022 06:19 AM    CO2 29 08/12/2022 06:19 AM    GLUCOSE 139 (H) 08/12/2022 06:19 AM    BUN 7 (L) 08/12/2022 06:19 AM    CREATININE 0.68 08/12/2022 06:19 AM      Lab Results   Component Value Date/Time    WBC 5.4 08/12/2022 06:19 AM    RBC 4.78 08/12/2022 06:19 AM    HEMOGLOBIN 10.3 (L) 08/12/2022 06:19 AM    HEMATOCRIT 37.6 08/12/2022 06:19 AM    MCV 78.7 (L) 08/12/2022 06:19 AM    MCH 21.5 (L) 08/12/2022 06:19 AM    MCHC 27.4 (L) 08/12/2022 06:19 AM    MPV 10.1 08/12/2022 06:19 AM    NEUTSPOLYS 69.30 08/12/2022 06:19 AM    LYMPHOCYTES 18.40 (L) 08/12/2022 06:19 AM    MONOCYTES 8.80 08/12/2022 06:19 AM    EOSINOPHILS 3.50 08/12/2022 06:19 AM    BASOPHILS 0.00 08/12/2022 06:19 AM    HYPOCHROMIA 1+ 08/12/2022 06:19 AM    ANISOCYTOSIS 2+ (A) 08/12/2022 06:19 AM      No results found for: PROTHROMBTM, INR       COVID-19  Covid (+) on 8/11 at the  Rehab    Anemia  Hb improved to 10.3 (8/12)  Will get Fe levels (was low in the past)  Monitor    DM (diabetes mellitus) (HCC)  Hba1c: 5.9 (8/12)  -138  On Tresiba (home med)  Will start accuchecks  Note: pt has her home meds here at the Rehab  Note: home meds include Tresiab and Lispro 20-40 units bid (per SS)  Cont to monitor    Dyspnea  Presented to [facility] [place] [place] with increasing SOB  Trop: unremarkable  Echo (7/21): EF 65%, GII-DD, inferior wall hypokinesis  Nuc Stress Test (7/21): mormal left ventricular systolic function, EF 65%; inferior wall hypokinesis  CTA: no PE  TSH ok (8/12)  BNP: 2476 (7/20)  On O2 supplementation  Pt to follow up with Cardio as out patient  Note: per D/C sum -- along with her obesity, she has a sedentary lifestyle at home and was encouraged to have more activity and lose weight    Hypothyroidism  TSH: ok (8/12)  On Synthroid    Labile blood pressure  BP labile  2nd to taking Oxycodone 10 mg on a regular basis  2nd to Lasix  Monitor for now    Lower extremity edema  Has hx  BNP: 2476 (7/20)  On Lasix: 40 mg bid  Not on any K+ supplements  K+: 4.3 (8/12)      This case has been discussed with the attending Physiatrist.    Thank you for the consultation.  Will follow the patient with you.

## 2022-08-12 NOTE — THERAPY
Occupational Therapy   Initial Evaluation     Patient Name: Jenifer Ramos  Age:  62 y.o., Sex:  female  Medical Record #: [id number]  Today's Date: 8/12/2022     Subjective    Pt resting in bed upon arrival, agreeable to OT evaluation.      Objective     08/12/22 0701   OT Charge Group   OT Self Care / ADL 2   OT Evaluation OT Evaluation Mod   OT Total Time Spent   OT Individual Total Time Spent (Mins) 75   Prior Living Situation   Prior Services Intermittent Physical Support for ADL Per Family   Housing / Facility 1 Story House   Steps Into Home 1   Steps In Home 0   Bathroom Set up Walk In Shower;Shower Chair   Equipment Owned Front-Wheel Walker;4-Wheel Walker;Tub / Shower Seat;Raised Toilet Seat Without Arms;Hand Held Shower;Sock Aid;Reacher;Oxygen  (adjustable bed)   Lives with - Patient's Self Care Capacity Spouse;Adult Children   Prior Level of ADL Function   Self Feeding Independent   Grooming / Hygiene Independent   Bathing Requires Assist   Dressing Requires Assist   Toileting Independent   Prior Level of IADL Function   Medication Management Independent   Laundry Dependent   Kitchen Mobility Independent   Finances Dependent   Home Management Requires Assist   Shopping Requires Assist   Prior Level Of Mobility Supervision With Device in Home   Driving / Transportation Relatives / Others Provide Transportation   Occupation (Pre-Hospital Vocational) Not Employed   Prior Functioning: Everyday Activities   Self Care Needed some help   Indoor Mobility (Ambulation) Needed some help   Stairs Not applicable   Functional Cognition Unknown   Prior Device Use Walker   Cognition    Level of Consciousness Alert   ABS (Agitated Behavior Scale)   Agitated Behavior Scale Performed No   Cognitive Pattern Assessment   Cognitive Pattern Assessment Used BIMS   Brief Interview for Mental Status (BIMS)   Repetition of Three Words (First Attempt) 3   Temporal Orientation: Year Correct   Temporal Orientation: Month Accurate  "within 5 days   Temporal Orientation: Day Correct   Recall: \"Sock\" Yes, no cue required   Recall: \"Blue\" Yes, no cue required   Recall: \"Bed\" Yes, no cue required   BIMS Summary Score 15   Vision Screen   Vision Not tested   Passive ROM Upper Body   Passive ROM Upper Body Not Tested   Comments LUE visibly impaired at baseline   Active ROM Upper Body   Active ROM Upper Body  X   Comments RUE WFL, LUE impaired at baseline   Strength Upper Body   Upper Body Strength  X   Comments Generalized weakness bilaterally, LUE nonfunctional   Sensation Upper Body   Upper Extremity Sensation  Not Tested   Upper Body Muscle Tone   Upper Body Muscle Tone  WDL   Balance Assessment   Sitting Balance (Static) Fair   Sitting Balance (Dynamic) Poor +   Standing Balance (Static) Poor +   Standing Balance (Dynamic) Poor   Weight Shift Sitting Poor   Weight Shift Standing Fair   Bed Mobility    Supine to Sit Contact Guard Assist  (HOBE all the way, reports she has an adjustable bed at home)   Sit to Supine   (NT - pt up in w/c at end of session)   Sit to Stand Minimal Assist   Scooting Contact Guard Assist  (minimal scooting ability)   Eating   Assistance Needed Set-up / clean-up   Physical Assistance Level No physical assistance   CARE Score - Eating 5   Eating Discharge Goal   Discharge Goal 6   Oral Hygiene   Assistance Needed Set-up / clean-up   Physical Assistance Level No physical assistance   CARE Score - Oral Hygiene 5   Oral Hygiene Discharge Goal   Discharge Goal 6   Shower/Bathe Self   Assistance Needed Physical assistance   Physical Assistance Level 51%-75%   CARE Score - Shower/Bathe Self 2   Shower/Bathe Self Discharge Goal   Discharge Goal 3   Upper Body Dressing   Assistance Needed Physical assistance   Physical Assistance Level 25% or less   CARE Score - Upper Body Dressing 3   Upper Body Dressing Discharge Goal   Discharge Goal 6   Lower Body Dressing   Assistance Needed Physical assistance   Physical Assistance Level 76% " or more   CARE Score - Lower Body Dressing 2   Lower Body Dressing Discharge Goal   Discharge Goal 3   Putting On/Taking Off Footwear   Assistance Needed Physical assistance   Physical Assistance Level Total assistance   CARE Score - Putting On/Taking Off Footwear 1   Putting On/Taking Off Footwear Discharge Goal   Discharge Goal 3   Toileting Hygiene   Assistance Needed Physical assistance   Physical Assistance Level 51%-75%   CARE Score - Toileting Hygiene 2   Toileting Hygiene Discharge Goal   Discharge Goal 4   Toilet Transfer   Assistance Needed Physical assistance   Physical Assistance Level 26%-50%   CARE Score - Toilet Transfer 3   Toilet Transfer Discharge Goal   Discharge Goal 4   Hearing, Speech, and Vision   Ability to Hear Adequate   Ability to See in Adequate Light Adequate   Expression of Ideas and Wants Without difficulty   Understanding Verbal and Non-Verbal Content Understands   Functional Level of Assist   Eating Supervision  (setup)   Eating Description Set-up of equipment or meal/tube feeding   Grooming Supervision   Grooming Description Initial preparation for task;Seated in wheelchair at sink;Increased time   Bathing Maximal Assist  (only able to wash LUE and lower abdomen due to LUE ROM and poor forward flexion to wash LEs)   Bathing Description Grab bar;Hand held shower;Tub bench;Assit with back;Assit wtih lower extremities;Assit with perineal;Increased time;Initial preparation for task;Set-up of equipment;Supervision for safety;Verbal cueing   Upper Body Dressing Minimal Assist  (assist to pull sleeve up LUE)   Upper Body Dressing Description Assit with threading arms through sleeves;Increased time;Initial preparation for task;Set-up of equipment;Supervision for safety;Verbal cueing   Lower Body Dressing Maximal Assist  (assist to thread BLEs through and pull pants up, able to pull pants down)   Lower Body Dressing Description Grab bar;Assist with threading into pant leg;Increased  time;Initial preparation for task;Set-up of equipment;Supervision for safety;Verbal cueing   Toileting Maximal Assist   Toileting Description Assist for hygiene;Assist to pull pants up;Assist for standing balance;Grab bar;Increased time;Initial preparation for task;Set-up of equipment;Supervision for safety;Verbal cueing   Bed, Chair, Wheelchair Transfer Minimal Assist  (stand step with FWW EOB to w/c, bed elevated)   Bed Chair Wheelchair Transfer Description Adaptive equipment;Increased time;Initial preparation for task;Requires lift;Set-up of equipment;Supervision for safety;Verbal cueing   Toilet Transfers Moderate Assist  (increased difficulty standing off low surface, SPT w/c <> toilet via GB)   Toilet Transfer Description Grab bar;Increased time;Initial preparation for task;Requires lift;Set-up of equipment;Supervision for safety;Verbal cueing   Tub / Shower Transfers Moderate Assist  (SPT w/c <> tub bench via GB)   Tub Shower Transfer Description Grab bar;Shower bench;Increased time;Initial preparation for task;Requires lift;Set-up of equipment;Verbal cueing;Supervision for safety   Problem List   Problem List Decreased Active Daily Living Skills;Decreased Homemaking Skills;Decreased Upper Extremity Strength Right;Decreased Upper Extremity Strength Left;Decreased Upper Extremity AROM Left;Decreased Upper Extremity PROM Left;Decreased Functional Mobility;Decreased Activity Tolerance;Impaired Postural Control / Balance   Precautions   Precautions Fall Risk   Comments 2L O2 at baseline   Current Discharge Plan   Current Discharge Plan Return to Prior Living Situation   Benefit    Therapy Benefit Patient Would Benefit from Inpatient Rehab Occupational Therapy to Maximize [place] with ADLs, IADLs and Functional Mobility.   Interdisciplinary Plan of Care Collaboration   IDT Collaboration with  Physical Therapist   Patient Position at End of Therapy Seated;Chair Alarm On;Call Light within Reach;Tray Table  within Reach;Phone within Reach   Collaboration Comments CLOF   Equipment Needs   Assistive Device / DME Grab Bars In Shower / Tub;Grab Bars By Toilet   Adaptive Equipment Long Handled Sponge  (owns a reacher and sock aid)   Strengths & Barriers   Strengths Able to follow instructions;Manages pain appropriately;Motivated for self care and independence;Supportive family;Willingly participates in therapeutic activities   Barriers Decreased endurance;Generalized weakness;Impaired activity tolerance;Impaired balance;Limited mobility     Assessment  Patient is a 62 y.o. female with a diagnosis of dehydration and debility after presenting to [facility] after falling and found to be dehydrated with orthostatic hypotension. Pt had recently been discharged from [facility] [place] on 7/22/22 after presenting with SOB and weight gain and found to have elevated BNP but TTE was normal. Additional factors influencing patient status / progress (ie: cognitive factors, co-morbidities, social support, etc): PMHx includes shoulder/back pain, DM2, Factor V Leiden on Eliquis, CHF, Hyponatremia, and hypokalemia.     Pt lives with her spouse, who is a pain mgmt anesthesiologist, and her adult son in a [facility] in [place]. Pt reports the bathroom has a WIS with a shower chair but no grab bars. Pt reports she required assist for some ADLs and most IADLs at baseline and that her son or  is always home with her.     During OT eval, pt presented with generalized weakness, impaired activity tolerance, limited mobility, impaired balance, impaired LUE function, and required redirection throughout session for being perseverative at times. Pt required maxA for bathing, toileting and LBD secondary to impaired balance, poor forward flexion, impaired LUE function and body habitus. Pt presents below her baseline level of function and will benefit from inpatient occupational therapy services to maximize safety and independence in ADLs, transfers and related functional  mobility prior to d/c home with support of family.     Plan  Recommend Occupational Therapy  minutes per day 5-6 days per week for 10-14 days for the following treatments:  OT Group Therapy, OT Self Care/ADL, OT Manual Ther Technique, OT Neuro Re-Ed/Balance, OT Therapeutic Activity, and OT Therapeutic Exercise.    Passport items to be completed:  Perform bathroom transfers, complete dressing, complete feeding, get ready for the day, prepare a simple meal, participate in household tasks, adapt home for safety needs, demonstrate home exercise program, complete caregiver training     Goals:  Long term and short term goals have been discussed with patient and they are in agreement.    Occupational Therapy Goals (Active)       Problem: Bathing       Dates: Start: 08/12/22         Goal: STG-Within one week, patient will bathe with Lupillo and use of AE PRN.        Dates: Start: 08/12/22               Problem: Dressing       Dates: Start: 08/12/22         Goal: STG-Within one week, patient will dress UB with setup and supervision.        Dates: Start: 08/12/22            Goal: STG-Within one week, patient will dress LB with with modA and use of AE PRN.        Dates: Start: 08/12/22               Problem: Functional Transfers       Dates: Start: 08/12/22         Goal: STG-Within one week, patient will transfer to toilet with Lupillo and use of AE PRN.        Dates: Start: 08/12/22               Problem: OT Long Term Goals       Dates: Start: 08/12/22         Goal: LTG-By discharge, patient will complete basic self care tasks with Lupillo to modified independence and use of AE PRN.        Dates: Start: 08/12/22            Goal: LTG-By discharge, patient will perform bathroom transfers with supervision/SBA to modified independence and use of AE PRN.        Dates: Start: 08/12/22               Problem: Toileting       Dates: Start: 08/12/22         Goal: STG-Within one week, patient will complete toileting tasks with Lupillo and use  of AE PRN.        Dates: Start: 08/12/22

## 2022-08-13 LAB
GLUCOSE BLD STRIP.AUTO-MCNC: 109 MG/DL (ref 65–99)
GLUCOSE BLD STRIP.AUTO-MCNC: 131 MG/DL (ref 65–99)
GLUCOSE BLD STRIP.AUTO-MCNC: 140 MG/DL (ref 65–99)
GLUCOSE BLD STRIP.AUTO-MCNC: 157 MG/DL (ref 65–99)

## 2022-08-13 PROCEDURE — 94760 N-INVAS EAR/PLS OXIMETRY 1: CPT

## 2022-08-13 PROCEDURE — 99232 SBSQ HOSP IP/OBS MODERATE 35: CPT | Performed by: HOSPITALIST

## 2022-08-13 PROCEDURE — 97530 THERAPEUTIC ACTIVITIES: CPT

## 2022-08-13 PROCEDURE — A9270 NON-COVERED ITEM OR SERVICE: HCPCS | Performed by: PHYSICAL MEDICINE & REHABILITATION

## 2022-08-13 PROCEDURE — 82962 GLUCOSE BLOOD TEST: CPT | Mod: 91

## 2022-08-13 PROCEDURE — 770010 HCHG ROOM/CARE - REHAB SEMI PRIVAT*

## 2022-08-13 PROCEDURE — 97116 GAIT TRAINING THERAPY: CPT

## 2022-08-13 PROCEDURE — 97535 SELF CARE MNGMENT TRAINING: CPT

## 2022-08-13 PROCEDURE — 700102 HCHG RX REV CODE 250 W/ 637 OVERRIDE(OP): Performed by: PHYSICAL MEDICINE & REHABILITATION

## 2022-08-13 PROCEDURE — 94640 AIRWAY INHALATION TREATMENT: CPT

## 2022-08-13 RX ADMIN — GABAPENTIN 600 MG: 300 CAPSULE ORAL at 11:33

## 2022-08-13 RX ADMIN — OXYCODONE HYDROCHLORIDE 10 MG: 10 TABLET ORAL at 16:10

## 2022-08-13 RX ADMIN — FUROSEMIDE 40 MG: 40 TABLET ORAL at 21:26

## 2022-08-13 RX ADMIN — GABAPENTIN 600 MG: 300 CAPSULE ORAL at 23:52

## 2022-08-13 RX ADMIN — DOCUSATE SODIUM 50 MG AND SENNOSIDES 8.6 MG 2 TABLET: 8.6; 5 TABLET, FILM COATED ORAL at 09:54

## 2022-08-13 RX ADMIN — LEVOTHYROXINE SODIUM 112 MCG: 112 TABLET ORAL at 09:54

## 2022-08-13 RX ADMIN — DOCUSATE SODIUM 50 MG AND SENNOSIDES 8.6 MG 2 TABLET: 8.6; 5 TABLET, FILM COATED ORAL at 21:22

## 2022-08-13 RX ADMIN — PRAVASTATIN SODIUM 10 MG: 20 TABLET ORAL at 21:21

## 2022-08-13 RX ADMIN — OMEPRAZOLE 20 MG: 20 CAPSULE, DELAYED RELEASE ORAL at 09:54

## 2022-08-13 RX ADMIN — APIXABAN 5 MG: 5 TABLET, FILM COATED ORAL at 21:21

## 2022-08-13 RX ADMIN — ACETAMINOPHEN 650 MG: 325 TABLET ORAL at 16:09

## 2022-08-13 RX ADMIN — MONTELUKAST 10 MG: 10 TABLET, FILM COATED ORAL at 21:21

## 2022-08-13 RX ADMIN — Medication 1000 UNITS: at 09:54

## 2022-08-13 RX ADMIN — FEBUXOSTAT 40 MG: 40 TABLET, FILM COATED ORAL at 21:00

## 2022-08-13 RX ADMIN — GABAPENTIN 600 MG: 300 CAPSULE ORAL at 00:26

## 2022-08-13 RX ADMIN — APIXABAN 5 MG: 5 TABLET, FILM COATED ORAL at 09:54

## 2022-08-13 RX ADMIN — FUROSEMIDE 40 MG: 40 TABLET ORAL at 09:54

## 2022-08-13 RX ADMIN — TRAZODONE HYDROCHLORIDE 50 MG: 50 TABLET ORAL at 21:22

## 2022-08-13 RX ADMIN — OXYCODONE HYDROCHLORIDE 10 MG: 10 TABLET ORAL at 21:21

## 2022-08-13 ASSESSMENT — ENCOUNTER SYMPTOMS
NAUSEA: 0
HALLUCINATIONS: 0
BLURRED VISION: 0
HEADACHES: 0
VOMITING: 0
PALPITATIONS: 0
FEVER: 0
SHORTNESS OF BREATH: 0
DIZZINESS: 0

## 2022-08-13 ASSESSMENT — ACTIVITIES OF DAILY LIVING (ADL)
BED_CHAIR_WHEELCHAIR_TRANSFER_DESCRIPTION: ADAPTIVE EQUIPMENT;INCREASED TIME;SUPERVISION FOR SAFETY;VERBAL CUEING;INITIAL PREPARATION FOR TASK
TOILET_TRANSFER_DESCRIPTION: GRAB BAR;INCREASED TIME;SUPERVISION FOR SAFETY;VERBAL CUEING
BED_CHAIR_WHEELCHAIR_TRANSFER_DESCRIPTION: ADAPTIVE EQUIPMENT;INITIAL PREPARATION FOR TASK;SET-UP OF EQUIPMENT;SUPERVISION FOR SAFETY;VERBAL CUEING

## 2022-08-13 ASSESSMENT — GAIT ASSESSMENTS
GAIT LEVEL OF ASSIST: CONTACT GUARD ASSIST
ASSISTIVE DEVICE: FRONT WHEEL WALKER
DISTANCE (FEET): 10
DEVIATION: ANTALGIC;STEP TO;DECREASED HEEL STRIKE;DECREASED TOE OFF;BRADYKINETIC

## 2022-08-13 ASSESSMENT — PAIN DESCRIPTION - PAIN TYPE: TYPE: CHRONIC PAIN

## 2022-08-13 NOTE — CARE PLAN
The patient is Stable - Low risk of patient condition declining or worsening         Progress made toward(s) clinical / shift goals:    Problem: Skin Integrity  Goal: Skin integrity is maintained or improved  Outcome: Progressing     Problem: Psychosocial  Goal: Patient's level of anxiety will decrease  Outcome: Progressing     Problem: Bladder / Voiding  Goal: Patient will establish and maintain regular urinary output  Outcome: Progressing     Problem: Mobility  Goal: Patient's capacity to carry out activities will improve  Outcome: Progressing

## 2022-08-13 NOTE — THERAPY
Physical Therapy   Daily Treatment     Patient Name: Jenifer Ramos  Age:  62 y.o., Sex:  female  Medical Record #: [id number]  Today's Date: 8/13/2022     Precautions  Precautions: Fall Risk  Comments: 2L O2 at baseline    Subjective    Patient is seated up in chair, is very pleasant. 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 Willing to participate with therapy.     Objective       08/13/22 1301   PT Charge Group   PT Gait Training 3   PT Therapeutic Activities 1   PT Total Time Spent   PT Individual Total Time Spent (Mins) 60   Gait Functional Level of Assist    Gait Level Of Assist Contact Guard Assist   Assistive Device Front Wheel Walker   Distance (Feet) 10   # of Times Distance was Traveled 3   Deviation Antalgic;Step To;Decreased Heel Strike;Decreased Toe Off;Bradykinetic   Transfer Functional Level of Assist   Bed, Chair, Wheelchair Transfer Contact Guard Assist   Bed Chair Wheelchair Transfer Description Adaptive equipment;Increased time;Supervision for safety;Verbal cueing;Initial preparation for task   Toilet Transfers Minimal Assist   Toilet Transfer Description Grab bar;Increased time;Supervision for safety;Verbal cueing   Sitting Lower Body Exercises   Sitting Lower Body Exercises Yes   Long Arc Quad 1 set of 10   Marching 1 set of 10   Comments reviewed two seated leg exercises, limited due to time   Interdisciplinary Plan of Care Collaboration   IDT Collaboration with  Occupational Therapist;Nursing   Patient Position at End of Therapy Seated;Chair Alarm On;Call Light within Reach;Tray Table within Reach;Phone within Reach   Collaboration Comments CLOF, toileting     Brushed her teeth/ washed hands at sink after toileting, requires only set up assist    Assessment    Patient requires seated rest breaks with ambulation due to instability in L knee. Good endurance with gait, will likely be able to tolerate multiple bouts of gait with more time in session.     Strengths: Able  "to follow instructions, Adequate strength, Motivated for self care and independence, Pleasant and cooperative, Supportive family, Willingly participates in therapeutic activities  Barriers: Decreased endurance, Fatigue, Generalized weakness, Home accessibility, Impaired activity tolerance, Impaired balance, Limited mobility, Pain (LUE weakness/ pain and prior shoulder replacement)    Plan    Standing balance, seated/ standing exercises, gait with FWW    Passport items to be completed:  Get in/out of bed safely, in/out of a vehicle, safely use mobility device, walk or wheel around home/community, navigate up and down stairs, show how to get up/down from the ground, ensure home is accessible, demonstrate HEP, complete caregiver training    Physical Therapy Problems (Active)       Problem: Mobility       Dates: Start: 08/12/22         Goal: STG-Within one week, patient will ambulate household distance CGA with FWW 25 ft x 2       Dates: Start: 08/12/22            Goal: STG-Within one week, patient will ambulate up/down a curb min A with FWW for 4-6\" rise       Dates: Start: 08/12/22               Problem: Mobility Transfers       Dates: Start: 08/12/22         Goal: STG-Within one week, patient will perform bed mobility min A with bed controls as needed       Dates: Start: 08/12/22            Goal: STG-Within one week, patient will sit to stand CGA to FWW       Dates: Start: 08/12/22            Goal: STG-Within one week, patient will transfer bed to chair CGA with FWW       Dates: Start: 08/12/22               Problem: PT-Long Term Goals       Dates: Start: 08/12/22         Goal: LTG-By discharge, patient will ambulate SPV / modified independent with FWW x 50 ft       Dates: Start: 08/12/22            Goal: LTG-By discharge, patient will transfer one surface to another SPV/ modified independent with FWW       Dates: Start: 08/12/22            Goal: LTG-By discharge, patient will transfer in/out of a car SBA/ CGA with " "FWW       Dates: Start: 08/12/22            Goal: LTG-By discharge, patient will navigate 4-6\" step with FWW and SBA/ CGA       Dates: Start: 08/12/22              "

## 2022-08-13 NOTE — PROGRESS NOTES
"Rehab Progress Note     Encounter Date: 8/12/2022    CC: Decreased mobility, COVID+    Interval Events (Subjective)  Patient sitting up in room. She reports she is doing well. She was COVID + on screen. Discussed 10 day quarantine and doing exercise in room. Discussed about admission labs including anemia and mild vitamin D deficiency. Discussed would start supplement. Discussed hospitalist consult to manage her CHF and DM. Denies NVD.     Objective:  VITAL SIGNS: /66   Pulse 87   Temp 36.7 °C (98.1 °F) (Oral)   Resp 18   Ht 1.727 m (5' 8\")   Wt (!) 127 kg (281 lb 1.4 oz)   SpO2 96%   BMI 42.74 kg/m²   Gen: NAD  Psych: Mood and affect appropriate  CV: RRR, left hand swelling  Resp: CTAB, no upper airway sounds  Abd: NTND  Neuro: AOx4, following commands    Recent Results (from the past 72 hour(s))   COV-2, FLU A/B, AND RSV BY PCR (2-4 HOURS CEPHEID): Collect NP swab in VTM    Collection Time: 08/11/22 12:00 PM    Specimen: Respirate   Result Value Ref Range    Influenza virus A RNA Negative Negative    Influenza virus B, PCR Negative Negative    RSV, PCR Negative Negative    SARS-CoV-2 by PCR DETECTED (AA)     SARS-CoV-2 Source NP Swab    POCT glucose device results    Collection Time: 08/11/22  5:05 PM   Result Value Ref Range    POC Glucose, Blood 112 (H) 65 - 99 mg/dL   CBC with Differential    Collection Time: 08/12/22  6:19 AM   Result Value Ref Range    WBC 5.4 4.8 - 10.8 K/uL    RBC 4.78 4.20 - 5.40 M/uL    Hemoglobin 10.3 (L) 12.0 - 16.0 g/dL    Hematocrit 37.6 37.0 - 47.0 %    MCV 78.7 (L) 81.4 - 97.8 fL    MCH 21.5 (L) 27.0 - 33.0 pg    MCHC 27.4 (L) 33.6 - 35.0 g/dL    RDW 62.5 (H) 35.9 - 50.0 fL    Platelet Count 258 164 - 446 K/uL    MPV 10.1 9.0 - 12.9 fL    Neutrophils-Polys 69.30 44.00 - 72.00 %    Lymphocytes 18.40 (L) 22.00 - 41.00 %    Monocytes 8.80 0.00 - 13.40 %    Eosinophils 3.50 0.00 - 6.90 %    Basophils 0.00 0.00 - 1.80 %    Nucleated RBC 0.00 /100 WBC    Neutrophils " (Absolute) 3.74 2.00 - 7.15 K/uL    Lymphs (Absolute) 0.99 (L) 1.00 - 4.80 K/uL    Monos (Absolute) 0.48 0.00 - 0.85 K/uL    Eos (Absolute) 0.19 0.00 - 0.51 K/uL    Baso (Absolute) 0.00 0.00 - 0.12 K/uL    NRBC (Absolute) 0.00 K/uL    Hypochromia 1+     Anisocytosis 2+ (A)     Macrocytosis 1+     Microcytosis 1+    Comp Metabolic Panel (CMP)    Collection Time: 08/12/22  6:19 AM   Result Value Ref Range    Sodium 141 135 - 145 mmol/L    Potassium 4.3 3.6 - 5.5 mmol/L    Chloride 103 96 - 112 mmol/L    Co2 29 20 - 33 mmol/L    Anion Gap 9.0 7.0 - 16.0    Glucose 139 (H) 65 - 99 mg/dL    Bun 7 (L) 8 - 22 mg/dL    Creatinine 0.68 0.50 - 1.40 mg/dL    Calcium 9.8 8.5 - 10.5 mg/dL    AST(SGOT) 25 12 - 45 U/L    ALT(SGPT) 12 2 - 50 U/L    Alkaline Phosphatase 106 (H) 30 - 99 U/L    Total Bilirubin 0.8 0.1 - 1.5 mg/dL    Albumin 3.4 3.2 - 4.9 g/dL    Total Protein 6.6 6.0 - 8.2 g/dL    Globulin 3.2 1.9 - 3.5 g/dL    A-G Ratio 1.1 g/dL   HEMOGLOBIN A1C    Collection Time: 08/12/22  6:19 AM   Result Value Ref Range    Glycohemoglobin 5.9 (H) 4.0 - 5.6 %    Est Avg Glucose 123 mg/dL   TSH with Reflex to FT4    Collection Time: 08/12/22  6:19 AM   Result Value Ref Range    TSH 1.510 0.380 - 5.330 uIU/mL   Vitamin D, 25-hydroxy (blood)    Collection Time: 08/12/22  6:19 AM   Result Value Ref Range    25-Hydroxy   Vitamin D 25 29 (L) 30 - 100 ng/mL   ESTIMATED GFR    Collection Time: 08/12/22  6:19 AM   Result Value Ref Range    GFR (CKD-EPI) 97 >60 mL/min/1.73 m 2   MORPHOLOGY    Collection Time: 08/12/22  6:19 AM   Result Value Ref Range    RBC Morphology Present     Poikilocytosis 1+     Ovalocytes 1+     Tear Drop Cells 1+    PERIPHERAL SMEAR REVIEW    Collection Time: 08/12/22  6:19 AM   Result Value Ref Range    Peripheral Smear Review see below    DIFFERENTIAL MANUAL    Collection Time: 08/12/22  6:19 AM   Result Value Ref Range    Manual Diff Status PERFORMED    PLATELET ESTIMATE    Collection Time: 08/12/22  6:19 AM    Result Value Ref Range    Plt Estimation Normal    POCT glucose device results    Collection Time: 08/12/22  8:08 AM   Result Value Ref Range    POC Glucose, Blood 138 (H) 65 - 99 mg/dL   POCT glucose device results    Collection Time: 08/12/22 11:46 AM   Result Value Ref Range    POC Glucose, Blood 140 (H) 65 - 99 mg/dL   POCT glucose device results    Collection Time: 08/12/22  5:27 PM   Result Value Ref Range    POC Glucose, Blood 142 (H) 65 - 99 mg/dL       Current Facility-Administered Medications   Medication Frequency    insulin lispro (AdmeLOG,HumaLOG) injection 4X/DAY ACHS    apixaban (ELIQUIS) tablet 5 mg BID    febuxostat (ULORIC) tablet 40 mg QHS    furosemide (LASIX) tablet 40 mg BID    gabapentin (NEURONTIN) capsule 600 mg BID    Insulin Degludec SOPN 276 Units DAILY    levothyroxine (SYNTHROID) tablet 112 mcg DAILY    montelukast (SINGULAIR) tablet 10 mg Q EVENING    pravastatin (PRAVACHOL) tablet 10 mg Nightly    rizatriptan (MAXALT-MLT) disintegrating tablet 10 mg Once PRN    Respiratory Therapy Consult Continuous RT    Pharmacy Consult Request ...Pain Management Review 1 Each PHARMACY TO DOSE    hydrALAZINE (APRESOLINE) tablet 25 mg Q8HRS PRN    acetaminophen (Tylenol) tablet 650 mg Q4HRS PRN    senna-docusate (PERICOLACE or SENOKOT S) 8.6-50 MG per tablet 2 Tablet BID    And    polyethylene glycol/lytes (MIRALAX) PACKET 1 Packet QDAY PRN    And    magnesium hydroxide (MILK OF MAGNESIA) suspension 30 mL QDAY PRN    And    bisacodyl (DULCOLAX) suppository 10 mg QDAY PRN    omeprazole (PRILOSEC) capsule 20 mg DAILY    artificial tears ophthalmic solution 1 Drop PRN    benzocaine-menthol (Cepacol) lozenge 1 Lozenge Q2HRS PRN    mag hydrox-al hydrox-simeth (MAALOX PLUS ES or MYLANTA DS) suspension 20 mL Q2HRS PRN    ondansetron (ZOFRAN ODT) dispertab 4 mg 4X/DAY PRN    Or    ondansetron (ZOFRAN) syringe/vial injection 4 mg 4X/DAY PRN    traZODone (DESYREL) tablet 50 mg QHS PRN    sodium chloride  (OCEAN) 0.65 % nasal spray 2 Spray PRN    oxyCODONE immediate-release (ROXICODONE) tablet 5 mg Q3HRS PRN    Or    oxyCODONE immediate release (ROXICODONE) tablet 10 mg Q3HRS PRN    levalbuterol (XOPENEX) 1.25 MG/3ML nebulizer solution 1.25 mg Q4H PRN (RT)    Fluticasone-Umeclidin-Vilant 200-62.5-25 MCG/INH AEPB 1 Puff DAILY       Orders Placed This Encounter   Procedures    Diet Order Diet: Consistent CHO (Diabetic)     Standing Status:   Standing     Number of Occurrences:   1     Order Specific Question:   Diet:     Answer:   Consistent CHO (Diabetic) [4]       Assessment:  Active Hospital Problems    Diagnosis     Anemia     Lower extremity edema     Labile blood pressure     COVID-19     Dyspnea     Hypothyroidism     DM (diabetes mellitus) ([facility])        Medical Decision Making and Plan:   Debility - Patient with CHF exacerbation with fluid overload then over diuresis causing dehydration and orthostatic hypotension.  -PT and OT for mobility and ADLs     dCHF/HTN - Patient on Lasix 40 mg BID. Will monitor   -Consult hospitalist     DM2 with hyperglycemia - Patient on Insulin pre meal and 276 U degludec  -Consult hospitalist     Cirrhosis - Noted in problem list from US last month     Hyponatremia - Check AM CMP - repeat WNL. Consult hospitalist     Asthma - Patient on Singulair and inhaler     HLD - Patient on Pravastatin 10 mg daily     Left shoulder injury - Patient with replacement in the past.      Hx of Gout - On Uloric on transfer     Hypothyroidism - Patient on Levothyroxine 112 mcg     Vitamin D deficiency - mild at 29. Start 1000 U     COVID + - on screen. No s/s. Special isolation.     Morbid Obesity due to excess calories - BMI of 42.7 on admission, meets medical criteria. Dietitian to consult     Factor 5 Leiden/DVT ppx - Patient on Eliquis 5 mg BID at baseline.     Total time:  36 minutes.  I spent greater than 50% of the time for patient care and coordination on this date, including unit/floor time,  and face-to-face time with the patient as per assessment and plan above.  Discussion included COVID 19+, DM2, CHF, admission labs, and consult hospitalist. Patient's case was discussed face to face with Hospitalist on [facility] floor.     Doron Brand M.D.

## 2022-08-13 NOTE — PROGRESS NOTES
Received shift report and assumed care of patient.  Patient awake, calm and stable, currently positioned in bed for comfort and safety; call light within reach.  Patient states that her pain is tolerable at this time. Patient is on 2L 02 NC. Will continue to monitor.

## 2022-08-13 NOTE — THERAPY
"Occupational Therapy  Daily Treatment     Patient Name: Jenifer Ramos  Age:  62 y.o., Sex:  female  Medical Record #: [id number]  Today's Date: 8/13/2022     Precautions  Precautions: (P) Fall Risk  Comments: (P) 2L O2 at baseline         Subjective    \"I get a lot of help from my  and son at home. They help me with a lot.\"    \"Yeah, lets make sure we don't forget my seatbelt. In case I fall out of my chair and can hang myself. I don't plan on going out that way. As long as I know I can get home.\" Comments were reported to pt's nurse Avila for follow up. Pt was left with positive attitude at end of session.     Objective       08/13/22 1001   OT Charge Group   OT Self Care / ADL 2   OT Therapy Activity 2   OT Total Time Spent   OT Individual Total Time Spent (Mins) 60   Precautions   Precautions Fall Risk   Comments 2L O2 at baseline   Vitals   O2 (LPM) 2   O2 Delivery Device Silicone Nasal Cannula   Pain   Intervention Repositioned;Rest   Pain 0 - 10 Group   Location Knee;Arm   Location Orientation Left   Description Aching   Comfort Goal Comfort with Movement;Perform Activity   Therapist Pain Assessment Nurse Notified;Post Activity Pain Same as Prior to Activity   Cognition    Level of Consciousness Alert   Functional Level of Assist   Upper Body Dressing Contact Guard Assist   Upper Body Dressing Description Assist with pulling shirt over head;Increased time;Initial preparation for task;Assist device equipment;Set-up of equipment;Supervision for safety;Verbal cueing  (Pt completed clothing retrieval with reacher at Min A level for balance from bag on floor with FWW for support. Incidental touching for adjustment once donned on L-side with pullover shirt.)   Lower Body Dressing Moderate Assist   Lower Body Dressing Description Assistive devices;Reacher;Assist with threading into pant leg;Increased time;Initial preparation for task;Set-up of equipment;Supervision for safety;Verbal cueing  (Educated on use " of reacher with Min A for threading seated EOB. Assist for hip hike on L-side only. Pt able to complete all doffing of pant, sock, shoes. Total A for donning sock/shoes due to end of session.)   Bed, Chair, Wheelchair Transfer Minimal Assist   Bed Chair Wheelchair Transfer Description Adaptive equipment;Initial preparation for task;Set-up of equipment;Supervision for safety;Verbal cueing  (FWW stand step wc<>EOB)   Interdisciplinary Plan of Care Collaboration   IDT Collaboration with  Nursing   Collaboration Comments re: CLOF, pain in LLE and LUE, and subjective comments.     Pt engaged in w/c mobility education short distances in room to promote ind with room accessibility, overall strength, and endurance for self-care. Pt utilized RUE only on push rim due to LUE neurological and pain limitations with cues for sequencing BLE and RUE for tight turns and break operation. Placed a supportive pillow under LUE in w/c with mild relief reported.     Pt completed short in room functional mobility with FWW at CGA level for ~10-12ft with 1x seated rest break due to fatigue, but was not SOB on 2L O2.    Discussed having positive self-talk during stay to motivate pt in participation of self-care tasks, as pt would only discuss her limitations with all tasks attempted. Pt very receptive to change in directive by end of session, and left in positive and happy mindset.    Assessment    Pt required multiple breaks due to fatigue between tasks, but open to engagement in ADLs and mobility, and tolerated session fairly. Pt required cues to return to task at times due to verbosity as well.  Strengths: Able to follow instructions, Manages pain appropriately, Motivated for self care and independence, Supportive family, Willingly participates in therapeutic activities  Barriers: Decreased endurance, Generalized weakness, Impaired activity tolerance, Impaired balance, Limited mobility    Plan    Trial use of sock aide and LHSH, overall  strength and endurance training, standing balance/tolerance.    Occupational Therapy Goals (Active)       Problem: Bathing       Dates: Start: 08/12/22         Goal: STG-Within one week, patient will bathe with Lupillo and use of AE PRN.        Dates: Start: 08/12/22               Problem: Dressing       Dates: Start: 08/12/22         Goal: STG-Within one week, patient will dress UB with setup and supervision.        Dates: Start: 08/12/22            Goal: STG-Within one week, patient will dress LB with with modA and use of AE PRN.        Dates: Start: 08/12/22               Problem: Functional Transfers       Dates: Start: 08/12/22         Goal: STG-Within one week, patient will transfer to toilet with Lupillo and use of AE PRN.        Dates: Start: 08/12/22               Problem: OT Long Term Goals       Dates: Start: 08/12/22         Goal: LTG-By discharge, patient will complete basic self care tasks with Lupillo to modified independence and use of AE PRN.        Dates: Start: 08/12/22            Goal: LTG-By discharge, patient will perform bathroom transfers with supervision/SBA to modified independence and use of AE PRN.        Dates: Start: 08/12/22               Problem: Toileting       Dates: Start: 08/12/22         Goal: STG-Within one week, patient will complete toileting tasks with Lupillo and use of AE PRN.        Dates: Start: 08/12/22

## 2022-08-13 NOTE — PROGRESS NOTES
[facility] Medicine Daily Progress Note    Date of Service  8/13/2022    Chief Complaint:  Diabetes  Edema  Covid (+)  Labile BP    Interval History:  Had some sweatiness earlier this am but BS were ok -- ? Etiology.    Review of Systems  Review of Systems   Constitutional:  Negative for fever.   Eyes:  Negative for blurred vision.   Respiratory:  Negative for shortness of breath.    Cardiovascular:  Negative for palpitations.   Gastrointestinal:  Negative for nausea and vomiting.   Neurological:  Negative for dizziness and headaches.   Psychiatric/Behavioral:  Negative for hallucinations.       Physical Exam  Temp:  [36.7 °C (98 °F)-36.7 °C (98.1 °F)] 36.7 °C (98 °F)  Pulse:  [65-87] 76  Resp:  [12-18] 12  BP: (105-110)/(57-66) 110/57  SpO2:  [94 %-96 %] 96 %    Physical Exam  Vitals and nursing note reviewed.   Constitutional:       General: She is not in acute distress.  HENT:      Mouth/Throat:      Mouth: Mucous membranes are moist.      Pharynx: Oropharynx is clear.   Eyes:      General: No scleral icterus.  Neck:      Vascular: No JVD.   Cardiovascular:      Rate and Rhythm: Normal rate and regular rhythm.      Heart sounds: Murmur heard.   Pulmonary:      Effort: Pulmonary effort is normal.      Breath sounds: No wheezing or rales.   Abdominal:      General: Bowel sounds are normal.      Palpations: Abdomen is soft.   Musculoskeletal:      Cervical back: No rigidity.      Right lower leg: Edema present.      Left lower leg: Edema present.      Comments: Mostly pedal swelling.   Skin:     General: Skin is warm and dry.   Neurological:      Mental Status: She is alert and oriented to person, place, and time.   Psychiatric:         Mood and Affect: Mood normal.         Behavior: Behavior normal.       Fluids    Intake/Output Summary (Last 24 hours) at 8/13/2022 1127  Last data filed at 8/13/2022 0900  Gross per 24 hour   Intake 100 ml   Output --   Net 100 ml       Laboratory  Recent Labs     08/12/22  0619   WBC  5.4   RBC 4.78   HEMOGLOBIN 10.3*   HEMATOCRIT 37.6   MCV 78.7*   MCH 21.5*   MCHC 27.4*   RDW 62.5*   PLATELETCT 258   MPV 10.1     Recent Labs     08/12/22  0619   SODIUM 141   POTASSIUM 4.3   CHLORIDE 103   CO2 29   GLUCOSE 139*   BUN 7*   CREATININE 0.68   CALCIUM 9.8                   Imaging    Assessment/Plan  COVID-19  Assessment & Plan  Covid (+) on 8/11 at the Rehab  Asymptomatic    Labile blood pressure  Assessment & Plan  BP better recently  Note: BP dipped lower with SBP 90 on am of 8/12  2nd to taking Oxycodone 10 mg on a regular basis  2nd to Lasix  Encouraging fluid intake  Note: suggested lowering Lasix dose but pt would like to keep the Lasix bid for now  Monitor for now    Lower extremity edema  Assessment & Plan  Has hx  Mostly pedal swelling  BNP: 2476 (7/20) -- no other values in Epic for comparison  On Lasix: 40 mg bid  Not on any K+ supplements  Note: suggested lowering Lasix dose but pt would like to keep the Lasix bid for now  K+: 4.3 (8/12)    Anemia  Assessment & Plan  Hb improved to 10.3 (8/12)  Will get Fe levels (was low in the past)  Monitor    Dyspnea- (present on admission)  Assessment & Plan  Presented to [facility] with increasing SOB  Trop: unremarkable  Echo (7/21): EF 65%, GII-DD, inferior wall hypokinesis  Nuc Stress Test (7/21): mormal left ventricular systolic function, EF 65%; inferior wall hypokinesis  CTA: no PE  TSH ok (8/12)  BNP: 2476 (7/20) -- no other values in Epic for comparison  On O2 supplementation  Pt to follow up with Cardio as out patient  Note: per D/C sum -- along with her obesity, she has a sedentary lifestyle at home and was encouraged to have more activity and lose weight    Hypothyroidism- (present on admission)  Assessment & Plan  TSH: ok (8/12)  On Synthroid    DM (diabetes mellitus) (HCC)- (present on admission)  Assessment & Plan  Hba1c: 5.9 (8/12)  -155  On Tresiba (home med)  Note: pt has her home meds here at the Rehab  Note: home meds include  Tresiab and Lispro 20-40 units bid (per SS)  Cont to monitor

## 2022-08-13 NOTE — FLOWSHEET NOTE
08/13/22 0729   Events/Summary/Plan   Events/Summary/Plan o2 spot check,cpap,dpi   Vital Signs   Pulse 76   Respiration 12   Pulse Oximetry 96 %   $ Pulse Oximetry (Spot Check) Yes   Respiratory Assessment   Level of Consciousness Alert   Chest Exam   Work Of Breathing / Effort Within Normal Limits   Breath Sounds   RUL Breath Sounds Diminished   RML Breath Sounds Diminished   RLL Breath Sounds Diminished   DANIELLE Breath Sounds Diminished   LLL Breath Sounds Diminished   Oxygen   O2 (LPM) 2   O2 Delivery Device Silicone Nasal Cannula

## 2022-08-14 LAB
GLUCOSE BLD STRIP.AUTO-MCNC: 120 MG/DL (ref 65–99)
GLUCOSE BLD STRIP.AUTO-MCNC: 126 MG/DL (ref 65–99)
GLUCOSE BLD STRIP.AUTO-MCNC: 133 MG/DL (ref 65–99)
GLUCOSE BLD STRIP.AUTO-MCNC: 151 MG/DL (ref 65–99)

## 2022-08-14 PROCEDURE — A9270 NON-COVERED ITEM OR SERVICE: HCPCS | Performed by: PHYSICAL MEDICINE & REHABILITATION

## 2022-08-14 PROCEDURE — 99232 SBSQ HOSP IP/OBS MODERATE 35: CPT | Performed by: HOSPITALIST

## 2022-08-14 PROCEDURE — 94760 N-INVAS EAR/PLS OXIMETRY 1: CPT

## 2022-08-14 PROCEDURE — 97116 GAIT TRAINING THERAPY: CPT

## 2022-08-14 PROCEDURE — 82962 GLUCOSE BLOOD TEST: CPT | Mod: 91

## 2022-08-14 PROCEDURE — 94640 AIRWAY INHALATION TREATMENT: CPT

## 2022-08-14 PROCEDURE — 770010 HCHG ROOM/CARE - REHAB SEMI PRIVAT*

## 2022-08-14 PROCEDURE — 700102 HCHG RX REV CODE 250 W/ 637 OVERRIDE(OP): Performed by: PHYSICAL MEDICINE & REHABILITATION

## 2022-08-14 PROCEDURE — 97530 THERAPEUTIC ACTIVITIES: CPT

## 2022-08-14 PROCEDURE — 97110 THERAPEUTIC EXERCISES: CPT

## 2022-08-14 RX ADMIN — DOCUSATE SODIUM 50 MG AND SENNOSIDES 8.6 MG 2 TABLET: 8.6; 5 TABLET, FILM COATED ORAL at 09:59

## 2022-08-14 RX ADMIN — APIXABAN 5 MG: 5 TABLET, FILM COATED ORAL at 20:33

## 2022-08-14 RX ADMIN — FUROSEMIDE 40 MG: 40 TABLET ORAL at 20:32

## 2022-08-14 RX ADMIN — OXYCODONE HYDROCHLORIDE 10 MG: 10 TABLET ORAL at 20:32

## 2022-08-14 RX ADMIN — FEBUXOSTAT 40 MG: 40 TABLET, FILM COATED ORAL at 21:00

## 2022-08-14 RX ADMIN — TRAZODONE HYDROCHLORIDE 50 MG: 50 TABLET ORAL at 20:33

## 2022-08-14 RX ADMIN — GABAPENTIN 600 MG: 300 CAPSULE ORAL at 11:54

## 2022-08-14 RX ADMIN — FUROSEMIDE 40 MG: 40 TABLET ORAL at 09:59

## 2022-08-14 RX ADMIN — MONTELUKAST 10 MG: 10 TABLET, FILM COATED ORAL at 20:32

## 2022-08-14 RX ADMIN — APIXABAN 5 MG: 5 TABLET, FILM COATED ORAL at 09:59

## 2022-08-14 RX ADMIN — PRAVASTATIN SODIUM 10 MG: 20 TABLET ORAL at 20:33

## 2022-08-14 RX ADMIN — Medication 1000 UNITS: at 10:00

## 2022-08-14 RX ADMIN — OMEPRAZOLE 20 MG: 20 CAPSULE, DELAYED RELEASE ORAL at 09:59

## 2022-08-14 RX ADMIN — OXYCODONE HYDROCHLORIDE 10 MG: 10 TABLET ORAL at 09:58

## 2022-08-14 RX ADMIN — LEVOTHYROXINE SODIUM 112 MCG: 112 TABLET ORAL at 10:00

## 2022-08-14 RX ADMIN — DOCUSATE SODIUM 50 MG AND SENNOSIDES 8.6 MG 2 TABLET: 8.6; 5 TABLET, FILM COATED ORAL at 20:32

## 2022-08-14 RX ADMIN — HYDRALAZINE HYDROCHLORIDE 25 MG: 25 TABLET, FILM COATED ORAL at 09:59

## 2022-08-14 ASSESSMENT — PAIN DESCRIPTION - PAIN TYPE
TYPE: CHRONIC PAIN
TYPE: CHRONIC PAIN

## 2022-08-14 ASSESSMENT — ENCOUNTER SYMPTOMS
DIZZINESS: 0
FEVER: 0
BLURRED VISION: 0
COUGH: 0
NERVOUS/ANXIOUS: 0
DIARRHEA: 0

## 2022-08-14 ASSESSMENT — ACTIVITIES OF DAILY LIVING (ADL)
BED_CHAIR_WHEELCHAIR_TRANSFER_DESCRIPTION: ADAPTIVE EQUIPMENT;INCREASED TIME;SET-UP OF EQUIPMENT;SUPERVISION FOR SAFETY
TOILET_TRANSFER_DESCRIPTION: GRAB BAR;ADAPTIVE EQUIPMENT;INCREASED TIME;SUPERVISION FOR SAFETY

## 2022-08-14 ASSESSMENT — GAIT ASSESSMENTS
DISTANCE (FEET): 20
ASSISTIVE DEVICE: FRONT WHEEL WALKER
DEVIATION: ANTALGIC;BRADYKINETIC;DECREASED HEEL STRIKE;DECREASED TOE OFF
GAIT LEVEL OF ASSIST: CONTACT GUARD ASSIST

## 2022-08-14 NOTE — CARE PLAN
Problem: Knowledge Deficit - Standard  Goal: Patient and family/care givers will demonstrate understanding of plan of care, disease process/condition, diagnostic tests and medications  Outcome: Progressing     Problem: Skin Integrity  Goal: Skin integrity is maintained or improved  Outcome: Progressing   The patient is Stable - Low risk of patient condition declining or worsening    Shift Goals  Patient Goals: sleep    Progress made toward(s) clinical / shift goals:  patient is resting in bed no distress noted    Patient is not progressing towards the following goals:

## 2022-08-14 NOTE — CARE PLAN
Problem: Knowledge Deficit - Standard  Goal: Patient and family/care givers will demonstrate understanding of plan of care, disease process/condition, diagnostic tests and medications  Outcome: Progressing   Pt education given regarding plan of care, pt shows some understanding, will continue to reinforce education and continue to monitor.         Problem: Fall Risk - Rehab  Goal: Patient will remain free from falls  Outcome: Progressing   Pt education given regarding fall precautions AND safety measures, pt shows good understanding, has not attempted to self transfer this shift, will continue to reinforce education and continue to monitor.

## 2022-08-14 NOTE — FLOWSHEET NOTE
08/14/22 0946   Events/Summary/Plan   Events/Summary/Plan spot check done dpi given does not want mdi at this time, does need help with bipap setup   Skin Inspection Respiratory Device Intact   Vital Signs   Pulse 94   Respiration 18   Pulse Oximetry 98 %   $ Pulse Oximetry (Spot Check) Yes   Respiratory Assessment   Respiratory Pattern Within Normal Limits   Level of Consciousness Alert   Chest Exam   Work Of Breathing / Effort Within Normal Limits   Breath Sounds   RUL Breath Sounds Diminished   RML Breath Sounds Diminished   RLL Breath Sounds Diminished   DANIELLE Breath Sounds Diminished   LLL Breath Sounds Diminished   Oxygen   O2 (LPM) 2   O2 Delivery Device Nasal Cannula   Non-Invasive Ventilation [facility]   Nocturnal CPAP or BIPAP BIPAP - Home Unit  (pt did not wear bipap last night becasue she needed help setting it up.)   FiO2 or LPM 2

## 2022-08-14 NOTE — PROGRESS NOTES
[facility] Medicine Daily Progress Note    Date of Service  8/14/2022    Chief Complaint:  Diabetes  Edema  Covid (+)  Labile BP    Interval History:  No complaints.  Doing ok.    Review of Systems  Review of Systems   Constitutional:  Negative for fever.   Eyes:  Negative for blurred vision.   Respiratory:  Negative for cough.    Cardiovascular:  Negative for chest pain.   Gastrointestinal:  Negative for diarrhea.   Musculoskeletal:  Negative for joint pain.   Neurological:  Negative for dizziness.   Psychiatric/Behavioral:  The patient is not nervous/anxious.       Physical Exam  Temp:  [36.5 °C (97.7 °F)-36.7 °C (98 °F)] 36.5 °C (97.7 °F)  Pulse:  [68-94] 94  Resp:  [16-18] 18  BP: (101-119)/(60-72) 101/60  SpO2:  [97 %-98 %] 98 %    Physical Exam  Vitals and nursing note reviewed.   Constitutional:       Appearance: She is not diaphoretic.   HENT:      Mouth/Throat:      Pharynx: No oropharyngeal exudate or posterior oropharyngeal erythema.   Eyes:      Extraocular Movements: Extraocular movements intact.   Neck:      Vascular: No carotid bruit or JVD.   Cardiovascular:      Rate and Rhythm: Normal rate and regular rhythm.      Heart sounds: Murmur heard.   Pulmonary:      Effort: Pulmonary effort is normal.      Breath sounds: No wheezing or rales.   Abdominal:      General: There is no distension.      Palpations: Abdomen is soft.      Tenderness: There is no abdominal tenderness.   Musculoskeletal:      Right lower leg: Edema present.      Left lower leg: Edema present.      Comments: Mostly pedal swelling.   Skin:     General: Skin is warm and dry.   Neurological:      Mental Status: She is alert and oriented to person, place, and time.   Psychiatric:         Mood and Affect: Mood normal.         Behavior: Behavior normal.       Fluids    Intake/Output Summary (Last 24 hours) at 8/14/2022 1214  Last data filed at 8/14/2022 0016  Gross per 24 hour   Intake 120 ml   Output --   Net 120 ml  "        Laboratory  Recent Labs     08/12/22  0619   WBC 5.4   RBC 4.78   HEMOGLOBIN 10.3*   HEMATOCRIT 37.6   MCV 78.7*   MCH 21.5*   MCHC 27.4*   RDW 62.5*   PLATELETCT 258   MPV 10.1       Recent Labs     08/12/22  0619   SODIUM 141   POTASSIUM 4.3   CHLORIDE 103   CO2 29   GLUCOSE 139*   BUN 7*   CREATININE 0.68   CALCIUM 9.8                     Imaging    Assessment/Plan  COVID-19  Assessment & Plan  Covid (+) on 8/11 at the Rehab  Asymptomatic    Labile blood pressure  Assessment & Plan  BP ok recently  Note: BP dipped lower with SBP 90 on am of 8/12  2nd to taking Oxycodone 10 mg on a regular basis  2nd to Lasix  Encouraging fluid intake  Note: suggested lowering Lasix dose but pt would like to keep the Lasix bid for now  Monitor for now    Lower extremity edema  Assessment & Plan  Has hx  Mostly pedal swelling  BNP: 2476 (7/20) -- no other values in Epic for comparison  On Lasix: 40 mg bid  Not on any K+ supplements  Note: suggested lowering Lasix dose but pt would like to keep the Lasix bid for now  K+: 4.3 (8/12)    Anemia  Assessment & Plan  Hb improved to 10.3 (8/12)  Will get Fe levels (was low in the past)  Monitor    Dyspnea- (present on admission)  Assessment & Plan  Presented to [facility] with increasing SOB  Trop: unremarkable  Echo (7/21): EF 65%, GII-DD, inferior wall hypokinesis  Nuc Stress Test (7/21): mormal left ventricular systolic function, EF 65%; inferior wall hypokinesis  CTA: no PE  TSH ok (8/12)  BNP: 2476 (7/20) -- no other values in Epic for comparison  On O2 supplementation  Pt to follow up with Cardio as out patient  Note: per D/C sum -- along with her obesity, she has a sedentary lifestyle at home and was encouraged to have more activity and lose weight  Per Cardiology note (5/5/22): \"She has been short of breath progressively over the past year. She thinks is been especially severe over the past few months.                                                  When she tries to exert " "herself she becomes profoundly short of breath and she notes in the pulse elevates.\"     Hypothyroidism- (present on admission)  Assessment & Plan  TSH: ok (8/12)  On Synthroid    DM (diabetes mellitus) (HCC)- (present on admission)  Assessment & Plan  Hba1c: 5.9 (8/12)  BS ok  On Tresiba (home med)  Note: pt has her home meds here at the Rehab  Note: home meds include Tresiab and Lispro 20-40 units bid (per SS)  Cont to monitor      "

## 2022-08-14 NOTE — THERAPY
Physical Therapy   Daily Treatment     Patient Name: Jenifer Ramos  Age:  62 y.o., Sex:  female  Medical Record #: [id number]  Today's Date: 8/14/2022     Precautions  Precautions: Fall Risk  Comments: 2L O2 at baseline    Subjective    Patient pleasant and motivated to participate in therapy. Reports soreness behind L knee, no pain to the touch or redness/warmth.      Objective       08/14/22 1301   PT Charge Group   PT Gait Training 1   PT Therapeutic Exercise 1   PT Therapeutic Activities 2   PT Total Time Spent   PT Individual Total Time Spent (Mins) 60   Gait Functional Level of Assist    Gait Level Of Assist Contact Guard Assist  (WC in tow by this therapist)   Assistive Device Front Wheel Walker   Distance (Feet) 20  (+ 2 x 15)   # of Times Distance was Traveled 2   Deviation Antalgic;Bradykinetic;Decreased Heel Strike;Decreased Toe Off  (increased time spent in double stance)   Transfer Functional Level of Assist   Bed, Chair, Wheelchair Transfer Contact Guard Assist   Bed Chair Wheelchair Transfer Description Adaptive equipment;Increased time;Set-up of equipment;Supervision for safety  (Stand step with FWW)   Toilet Transfers Minimal Assist   Toilet Transfer Description Grab bar;Adaptive equipment;Increased time;Supervision for safety  (grab bar used WC to toilet; FWW used for toilet to WC)   Sitting Lower Body Exercises   Long Arc Quad 1 set of 10;Bilateral  (AAROM with gait belt for L LE due to hamstring pain/tightness; used gait belt for passive knee extension stretch as well)   Bed Mobility    Sit to Supine Moderate Assist  (B LE management)   Sit to Stand Minimal Assist   Interdisciplinary Plan of Care Collaboration   IDT Collaboration with  Nursing   Patient Position at End of Therapy In Bed;Bed Alarm On;Call Light within Reach;Tray Table within Reach;Phone within Reach   Collaboration Comments Collaborated with RN re: meds prior to therapy       Assessment    Patient pleasant and motivated to  "participate in gait training this afternoon. No evidence of L knee instability with gait but patient reports it is difficult to achieve full knee extension in stance due pain/soreness. She requires significant rest breaks between bouts of walking due to decreased endurance/activity tolerance as well as cuing to stay on task throughout treatment.     Strengths: Able to follow instructions, Adequate strength, Motivated for self care and independence, Pleasant and cooperative, Supportive family, Willingly participates in therapeutic activities  Barriers: Decreased endurance, Fatigue, Generalized weakness, Home accessibility, Impaired activity tolerance, Impaired balance, Limited mobility, Pain (LUE weakness/ pain and prior shoulder replacement)    Plan    Standing balance, seated/ standing exercises, gait with FWW     Passport items to be completed:  Get in/out of bed safely, in/out of a vehicle, safely use mobility device, walk or wheel around home/community, navigate up and down stairs, show how to get up/down from the ground, ensure home is accessible, demonstrate HEP, complete caregiver training    Physical Therapy Problems (Active)       Problem: Mobility       Dates: Start: 08/12/22         Goal: STG-Within one week, patient will ambulate household distance CGA with FWW 25 ft x 2       Dates: Start: 08/12/22            Goal: STG-Within one week, patient will ambulate up/down a curb min A with FWW for 4-6\" rise       Dates: Start: 08/12/22               Problem: Mobility Transfers       Dates: Start: 08/12/22         Goal: STG-Within one week, patient will perform bed mobility min A with bed controls as needed       Dates: Start: 08/12/22            Goal: STG-Within one week, patient will sit to stand CGA to FWW       Dates: Start: 08/12/22            Goal: STG-Within one week, patient will transfer bed to chair CGA with FWW       Dates: Start: 08/12/22               Problem: PT-Long Term Goals       Dates: Start: " "08/12/22         Goal: LTG-By discharge, patient will ambulate SPV / modified independent with FWW x 50 ft       Dates: Start: 08/12/22            Goal: LTG-By discharge, patient will transfer one surface to another SPV/ modified independent with FWW       Dates: Start: 08/12/22            Goal: LTG-By discharge, patient will transfer in/out of a car SBA/ CGA with FWW       Dates: Start: 08/12/22            Goal: LTG-By discharge, patient will navigate 4-6\" step with FWW and SBA/ CGA       Dates: Start: 08/12/22              "

## 2022-08-14 NOTE — PROGRESS NOTES
CONSENT FOR CONVALESCENT PLASMA TRANSFUSION OBTAINED FROM PATIENT'S SPOUSE(
DERIK ARMENDARIZ) VIA PHONE, VERIFIED WITH ARNOLDO PAULINO. Patient refused HS insulin sliding scale - FS of 157.

## 2022-08-15 LAB
GLUCOSE BLD STRIP.AUTO-MCNC: 129 MG/DL (ref 65–99)
GLUCOSE BLD STRIP.AUTO-MCNC: 144 MG/DL (ref 65–99)
GLUCOSE BLD STRIP.AUTO-MCNC: 90 MG/DL (ref 65–99)
GLUCOSE BLD STRIP.AUTO-MCNC: 93 MG/DL (ref 65–99)
GLUCOSE BLD STRIP.AUTO-MCNC: 96 MG/DL (ref 65–99)

## 2022-08-15 PROCEDURE — 97535 SELF CARE MNGMENT TRAINING: CPT

## 2022-08-15 PROCEDURE — 770010 HCHG ROOM/CARE - REHAB SEMI PRIVAT*

## 2022-08-15 PROCEDURE — 700102 HCHG RX REV CODE 250 W/ 637 OVERRIDE(OP): Performed by: PHYSICAL MEDICINE & REHABILITATION

## 2022-08-15 PROCEDURE — 97116 GAIT TRAINING THERAPY: CPT

## 2022-08-15 PROCEDURE — 94640 AIRWAY INHALATION TREATMENT: CPT

## 2022-08-15 PROCEDURE — 97110 THERAPEUTIC EXERCISES: CPT

## 2022-08-15 PROCEDURE — A9270 NON-COVERED ITEM OR SERVICE: HCPCS | Performed by: PHYSICAL MEDICINE & REHABILITATION

## 2022-08-15 PROCEDURE — 82962 GLUCOSE BLOOD TEST: CPT | Mod: 91

## 2022-08-15 PROCEDURE — 99232 SBSQ HOSP IP/OBS MODERATE 35: CPT | Performed by: PHYSICAL MEDICINE & REHABILITATION

## 2022-08-15 PROCEDURE — 97530 THERAPEUTIC ACTIVITIES: CPT

## 2022-08-15 PROCEDURE — 94760 N-INVAS EAR/PLS OXIMETRY 1: CPT

## 2022-08-15 PROCEDURE — 99232 SBSQ HOSP IP/OBS MODERATE 35: CPT | Performed by: HOSPITALIST

## 2022-08-15 RX ORDER — GABAPENTIN 300 MG/1
600 CAPSULE ORAL 2 TIMES DAILY
Status: DISCONTINUED | OUTPATIENT
Start: 2022-08-15 | End: 2022-08-22 | Stop reason: HOSPADM

## 2022-08-15 RX ORDER — FUROSEMIDE 40 MG/1
40 TABLET ORAL
Status: DISCONTINUED | OUTPATIENT
Start: 2022-08-15 | End: 2022-08-20

## 2022-08-15 RX ORDER — LORATADINE 10 MG/1
10 TABLET ORAL
Status: DISCONTINUED | OUTPATIENT
Start: 2022-08-15 | End: 2022-08-22 | Stop reason: HOSPADM

## 2022-08-15 RX ADMIN — LORATADINE 10 MG: 10 TABLET ORAL at 18:43

## 2022-08-15 RX ADMIN — GABAPENTIN 600 MG: 300 CAPSULE ORAL at 08:59

## 2022-08-15 RX ADMIN — Medication 1000 UNITS: at 08:59

## 2022-08-15 RX ADMIN — DOCUSATE SODIUM 50 MG AND SENNOSIDES 8.6 MG 2 TABLET: 8.6; 5 TABLET, FILM COATED ORAL at 20:58

## 2022-08-15 RX ADMIN — FUROSEMIDE 40 MG: 40 TABLET ORAL at 08:59

## 2022-08-15 RX ADMIN — FUROSEMIDE 40 MG: 40 TABLET ORAL at 15:54

## 2022-08-15 RX ADMIN — OMEPRAZOLE 20 MG: 20 CAPSULE, DELAYED RELEASE ORAL at 08:59

## 2022-08-15 RX ADMIN — LEVOTHYROXINE SODIUM 112 MCG: 112 TABLET ORAL at 08:59

## 2022-08-15 RX ADMIN — MONTELUKAST 10 MG: 10 TABLET, FILM COATED ORAL at 21:00

## 2022-08-15 RX ADMIN — PRAVASTATIN SODIUM 10 MG: 20 TABLET ORAL at 21:00

## 2022-08-15 RX ADMIN — GABAPENTIN 600 MG: 300 CAPSULE ORAL at 00:51

## 2022-08-15 RX ADMIN — DOCUSATE SODIUM 50 MG AND SENNOSIDES 8.6 MG 2 TABLET: 8.6; 5 TABLET, FILM COATED ORAL at 08:59

## 2022-08-15 RX ADMIN — OXYCODONE 5 MG: 5 TABLET ORAL at 18:42

## 2022-08-15 RX ADMIN — FEBUXOSTAT 40 MG: 40 TABLET, FILM COATED ORAL at 21:03

## 2022-08-15 RX ADMIN — APIXABAN 5 MG: 5 TABLET, FILM COATED ORAL at 08:59

## 2022-08-15 RX ADMIN — APIXABAN 5 MG: 5 TABLET, FILM COATED ORAL at 20:59

## 2022-08-15 RX ADMIN — GABAPENTIN 600 MG: 300 CAPSULE ORAL at 20:59

## 2022-08-15 ASSESSMENT — GAIT ASSESSMENTS
DISTANCE (FEET): 20
DEVIATION: ANTALGIC;BRADYKINETIC;DECREASED HEEL STRIKE;DECREASED TOE OFF
GAIT LEVEL OF ASSIST: CONTACT GUARD ASSIST
ASSISTIVE DEVICE: FRONT WHEEL WALKER

## 2022-08-15 ASSESSMENT — ACTIVITIES OF DAILY LIVING (ADL)
BED_CHAIR_WHEELCHAIR_TRANSFER_DESCRIPTION: ADAPTIVE EQUIPMENT;INCREASED TIME;SET-UP OF EQUIPMENT;SUPERVISION FOR SAFETY;VERBAL CUEING
BED_CHAIR_WHEELCHAIR_TRANSFER_DESCRIPTION: ADAPTIVE EQUIPMENT;INCREASED TIME;SET-UP OF EQUIPMENT;VERBAL CUEING
TOILET_TRANSFER_DESCRIPTION: GRAB BAR;INCREASED TIME;SET-UP OF EQUIPMENT
BED_CHAIR_WHEELCHAIR_TRANSFER_DESCRIPTION: ADAPTIVE EQUIPMENT;INCREASED TIME;SET-UP OF EQUIPMENT
TOILET_TRANSFER_DESCRIPTION: ADAPTIVE EQUIPMENT;GRAB BAR;INCREASED TIME;INITIAL PREPARATION FOR TASK;SET-UP OF EQUIPMENT;SUPERVISION FOR SAFETY;VERBAL CUEING
TOILETING_LEVEL_OF_ASSIST_DESCRIPTION: ASSIST TO PULL PANTS UP;GRAB BAR;INCREASED TIME;INITIAL PREPARATION FOR TASK;SET-UP OF EQUIPMENT;SUPERVISION FOR SAFETY;VERBAL CUEING
TOILETING_LEVEL_OF_ASSIST_DESCRIPTION: ASSIST TO PULL PANTS UP;ASSIST FOR STANDING BALANCE;GRAB BAR;INCREASED TIME;INITIAL PREPARATION FOR TASK;SET-UP OF EQUIPMENT;SUPERVISION FOR SAFETY;VERBAL CUEING

## 2022-08-15 ASSESSMENT — ENCOUNTER SYMPTOMS
VOMITING: 0
NERVOUS/ANXIOUS: 0
CHILLS: 0
FEVER: 0
DIARRHEA: 0
SHORTNESS OF BREATH: 0
ABDOMINAL PAIN: 0
NAUSEA: 0

## 2022-08-15 NOTE — THERAPY
Occupational Therapy  Daily Treatment     Patient Name: Jenifer Ramos  Age:  62 y.o., Sex:  female  Medical Record #: [id number]  Today's Date: 8/15/2022     Precautions  Precautions: Fall Risk  Comments: 2L O2 at baseline         Subjective    Pt resting in bed upon arrival, agreeable to OT session.      Objective     08/15/22 1431   OT Charge Group   OT Self Care / ADL 1   OT Therapy Activity 1   OT Total Time Spent   OT Individual Total Time Spent (Mins) 30   Functional Level of Assist   Grooming Standby Assist;Standing  (standing at sink with FWW for hand hygiene)   Grooming Description Adaptive equipment;Increased time;Set-up of equipment;Standing at sink;Supervision for safety;Verbal cueing   Lower Body Dressing   (totalA to don/doff shoes seated EOB)   Toileting Minimal Assist  (assist to pull pants up in back, verbal cues for encouragement)   Toileting Description Assist to pull pants up;Assist for standing balance;Grab bar;Increased time;Initial preparation for task;Set-up of equipment;Supervision for safety;Verbal cueing  (has toileting aid from home to assist with hygiene)   Bed, Chair, Wheelchair Transfer Minimal Assist   Bed Chair Wheelchair Transfer Description Adaptive equipment;Increased time;Initial preparation for task;Set-up of equipment;Requires lift;Supervision for safety;Verbal cueing   Toilet Transfers Minimal Assist  (anxious with FWW approach)   Toilet Transfer Description Adaptive equipment;Grab bar;Increased time;Requires lift;Set-up of equipment;Supervision for safety;Verbal cueing   Bed Mobility    Supine to Sit Standby Assist  (increased time and encouragement, HOBE and use of bed rail)   Sit to Supine Moderate Assist  (assist for LEs)   Interdisciplinary Plan of Care Collaboration   Patient Position at End of Therapy In Bed;Call Light within Reach;Tray Table within Reach;Phone within Reach     Increased time for bed mobility supine -> sit with encouragement to trial sitting up on  "her own with use of bed features.      Functional mobility with FWW bedside <> bathroom with FWW, CGA and increased time due to generalized weakness and knee instability.     Assessment    Pt tolerated OT session well focused on mobility to/from the bathroom with FWW and toileting. This morning, pt was frustrated she has not been able to ambulate to the bathroom outside of therapy and has to take the wheelchair. Educated pt that she has not been stable enough to ambulate without therapy and that nursing will do what they are most comfortable with for her safety. This afternoon, pt was fairly unsteady and felt like her knees were going to buckle and pt stated \"I understand now why I can't walk to the bathroom\" referring to outside of therapy sessions. Pt can be self-limiting needing encouragement while at the same time stating \"I need to practice this if I'm going to go home.\"     Strengths: Able to follow instructions, Manages pain appropriately, Motivated for self care and independence, Supportive family, Willingly participates in therapeutic activities  Barriers: Decreased endurance, Generalized weakness, Impaired activity tolerance, Impaired balance, Limited mobility    Plan    Trial use of sock aide and LHSH, overall strength and endurance training, standing balance/tolerance.    Passport items to be completed:  Perform bathroom transfers, complete dressing, complete feeding, get ready for the day, prepare a simple meal, participate in household tasks, adapt home for safety needs, demonstrate home exercise program, complete caregiver training     Occupational Therapy Goals (Active)       Problem: Bathing       Dates: Start: 08/12/22         Goal: STG-Within one week, patient will bathe with Lupillo and use of AE PRN.        Dates: Start: 08/12/22               Problem: Dressing       Dates: Start: 08/12/22         Goal: STG-Within one week, patient will dress UB with setup and supervision.        Dates: Start: " 08/12/22            Goal: STG-Within one week, patient will dress LB with with modA and use of AE PRN.        Dates: Start: 08/12/22               Problem: Functional Transfers       Dates: Start: 08/12/22         Goal: STG-Within one week, patient will transfer to toilet with Lupillo and use of AE PRN.        Dates: Start: 08/12/22               Problem: OT Long Term Goals       Dates: Start: 08/12/22         Goal: LTG-By discharge, patient will complete basic self care tasks with Lupillo to modified independence and use of AE PRN.        Dates: Start: 08/12/22            Goal: LTG-By discharge, patient will perform bathroom transfers with supervision/SBA to modified independence and use of AE PRN.        Dates: Start: 08/12/22               Problem: Toileting       Dates: Start: 08/12/22         Goal: STG-Within one week, patient will complete toileting tasks with Lupillo and use of AE PRN.        Dates: Start: 08/12/22

## 2022-08-15 NOTE — FLOWSHEET NOTE
08/15/22 0832   Events/Summary/Plan   Events/Summary/Plan SpO2 check, DPI given   Vital Signs   Pulse 86   Respiration 16   Pulse Oximetry 96 %   $ Pulse Oximetry (Spot Check) Yes   Respiratory Assessment   Respiratory Pattern Within Normal Limits   Level of Consciousness Alert   Chest Exam   Work Of Breathing / Effort Within Normal Limits   Oxygen   O2 (LPM) 2   O2 Delivery Device Nasal Cannula   Non-Invasive Ventilation MARCI Group   Nocturnal CPAP or BIPAP CPAP - Home Unit   Settings (If Known) BiPAP IPAP-14,  EPAP-10   FiO2 or LPM 2

## 2022-08-15 NOTE — THERAPY
Physical Therapy   Daily Treatment     Patient Name: Jenifer Ramos  Age:  62 y.o., Sex:  female  Medical Record #: [id number]  Today's Date: 8/15/2022     Precautions  Precautions: Fall Risk  Comments: 2L O2 at baseline    Subjective    Pt up in wc, willing to participate     Objective       08/15/22 1100   PT Charge Group   PT Gait Training 2   PT Therapeutic Exercise 1   PT Therapeutic Activities 1   PT Total Time Spent   PT Individual Total Time Spent (Mins) 60   Gait Functional Level of Assist    Gait Level Of Assist Contact Guard Assist  (wc in tow)   Assistive Device Front Wheel Walker   Distance (Feet) 20   # of Times Distance was Traveled 4   Deviation Antalgic;Bradykinetic;Decreased Heel Strike;Decreased Toe Off   Transfer Functional Level of Assist   Bed, Chair, Wheelchair Transfer Contact Guard Assist   Bed Chair Wheelchair Transfer Description Adaptive equipment;Increased time;Set-up of equipment;Verbal cueing   Toilet Transfers Contact Guard Assist   Toilet Transfer Description Grab bar;Increased time;Set-up of equipment   Standing Lower Body Exercises   Standing Lower Body Exercises Yes   Hip Flexion 1 set of 10   Hip Extension 1 set of 10   Hip Abduction 1 set of 10   Comments pt completed standing exercises as  forward stepping/ side stepping/ back stepping with UE support at bed rail.   Bed Mobility    Sit to Supine Minimal Assist   Sit to Stand Contact Guard Assist       Assessment    Pt demonstrated improved activity tolerance and increased speed of movement with all mobility tasks today compared to initial assessment but remains with generalized debility/ weakness and LLE / LUE [ain.  Strengths: Able to follow instructions, Adequate strength, Motivated for self care and independence, Pleasant and cooperative, Supportive family, Willingly participates in therapeutic activities  Barriers: Decreased endurance, Fatigue, Generalized weakness, Home accessibility, Impaired activity tolerance,  "Impaired balance, Limited mobility, Pain (LUE weakness/ pain and prior shoulder replacement)    Plan    Standing balance, seated/ standing exercises, gait with FWW     Passport items to be completed:  Get in/out of bed safely, in/out of a vehicle, safely use mobility device, walk or wheel around home/community, navigate up and down stairs, show how to get up/down from the ground, ensure home is accessible, demonstrate HEP, complete caregiver training        Physical Therapy Problems (Active)       Problem: Mobility       Dates: Start: 08/12/22         Goal: STG-Within one week, patient will ambulate household distance CGA with FWW 25 ft x 2       Dates: Start: 08/12/22            Goal: STG-Within one week, patient will ambulate up/down a curb min A with FWW for 4-6\" rise       Dates: Start: 08/12/22               Problem: Mobility Transfers       Dates: Start: 08/12/22         Goal: STG-Within one week, patient will perform bed mobility min A with bed controls as needed       Dates: Start: 08/12/22            Goal: STG-Within one week, patient will sit to stand CGA to FWW       Dates: Start: 08/12/22            Goal: STG-Within one week, patient will transfer bed to chair CGA with FWW       Dates: Start: 08/12/22               Problem: PT-Long Term Goals       Dates: Start: 08/12/22         Goal: LTG-By discharge, patient will ambulate SPV / modified independent with FWW x 50 ft       Dates: Start: 08/12/22            Goal: LTG-By discharge, patient will transfer one surface to another SPV/ modified independent with FWW       Dates: Start: 08/12/22            Goal: LTG-By discharge, patient will transfer in/out of a car SBA/ CGA with FWW       Dates: Start: 08/12/22            Goal: LTG-By discharge, patient will navigate 4-6\" step with FWW and SBA/ CGA       Dates: Start: 08/12/22              "

## 2022-08-15 NOTE — THERAPY
Physical Therapy   Daily Treatment     Patient Name: Jenifer Ramos  Age:  62 y.o., Sex:  female  Medical Record #: [id number]  Today's Date: 8/15/2022     Precautions  Precautions: Fall Risk  Comments: 2L O2 at baseline    Subjective    Pt up in wc, appears tired but willing to participate     Objective       08/15/22 1300   PT Charge Group   PT Therapeutic Exercise 2   PT Total Time Spent   PT Individual Total Time Spent (Mins) 30   Transfer Functional Level of Assist   Bed, Chair, Wheelchair Transfer Minimal Assist   Bed Chair Wheelchair Transfer Description Adaptive equipment;Increased time;Set-up of equipment   Sitting Lower Body Exercises   Ankle Pumps 3 sets of 10   Hip Abduction 3 sets of 10   Hip Adduction 3 sets of 10   Long Arc Quad 3 sets of 10   Marching 3 sets of 10   Hamstring Curl 3 sets of 10   Bed Mobility    Sit to Supine Moderate Assist   Sit to Stand Minimal Assist       Assessment    Pt fatigued this pm but completed strength training exercises as noted. Required assist for sit<>stand and BLE's with sit>supine due to fatigue.    Strengths: Able to follow instructions, Adequate strength, Motivated for self care and independence, Pleasant and cooperative, Supportive family, Willingly participates in therapeutic activities  Barriers: Decreased endurance, Fatigue, Generalized weakness, Home accessibility, Impaired activity tolerance, Impaired balance, Limited mobility, Pain (LUE weakness/ pain and prior shoulder replacement)    Plan    Standing balance, seated/ standing exercises, gait with FWW     Passport items to be completed:  Get in/out of bed safely, in/out of a vehicle, safely use mobility device, walk or wheel around home/community, navigate up and down stairs, show how to get up/down from the ground, ensure home is accessible, demonstrate HEP, complete caregiver training      Physical Therapy Problems (Active)       Problem: Mobility       Dates: Start: 08/12/22         Goal: STG-Within  Placed punctal plugs BLL. "one week, patient will ambulate household distance CGA with FWW 25 ft x 2       Dates: Start: 08/12/22            Goal: STG-Within one week, patient will ambulate up/down a curb min A with FWW for 4-6\" rise       Dates: Start: 08/12/22               Problem: Mobility Transfers       Dates: Start: 08/12/22         Goal: STG-Within one week, patient will perform bed mobility min A with bed controls as needed       Dates: Start: 08/12/22            Goal: STG-Within one week, patient will sit to stand CGA to FWW       Dates: Start: 08/12/22            Goal: STG-Within one week, patient will transfer bed to chair CGA with FWW       Dates: Start: 08/12/22               Problem: PT-Long Term Goals       Dates: Start: 08/12/22         Goal: LTG-By discharge, patient will ambulate SPV / modified independent with FWW x 50 ft       Dates: Start: 08/12/22            Goal: LTG-By discharge, patient will transfer one surface to another SPV/ modified independent with FWW       Dates: Start: 08/12/22            Goal: LTG-By discharge, patient will transfer in/out of a car SBA/ CGA with FWW       Dates: Start: 08/12/22            Goal: LTG-By discharge, patient will navigate 4-6\" step with FWW and SBA/ CGA       Dates: Start: 08/12/22              "

## 2022-08-15 NOTE — CARE PLAN
Problem: Knowledge Deficit - Standard  Goal: Patient and family/care givers will demonstrate understanding of plan of care, disease process/condition, diagnostic tests and medications  Outcome: Progressing     Problem: Skin Integrity  Goal: Skin integrity is maintained or improved  Outcome: Progressing   The patient is Stable - Low risk of patient condition declining or worsening    Shift Goals  Patient Goals: sleep well, pain control    Progress made toward(s) clinical / shift goals:  Patient is sleeping in bed no distress noted    Patient is not progressing towards the following goals:

## 2022-08-15 NOTE — THERAPY
Occupational Therapy  Daily Treatment     Patient Name: Jenifer Ramos  Age:  62 y.o., Sex:  female  Medical Record #: [id number]  Today's Date: 8/15/2022     Precautions  Precautions: Fall Risk  Comments: 2L O2 at baseline         Subjective    Pt heading to bathroom with CNA upon arrival, agreeable to OT session.      Objective     08/15/22 0701   OT Charge Group   OT Self Care / ADL 1   OT Therapy Activity 3   OT Total Time Spent   OT Individual Total Time Spent (Mins) 60   Functional Level of Assist   Grooming Supervision;Seated  (setup for oral care and hand hygiene seated at sink)   Grooming Description Increased time;Initial preparation for task;Seated in wheelchair at sink   Toileting Minimal Assist  (assist to pull pants up in back)   Toileting Description Assist to pull pants up;Grab bar;Increased time;Initial preparation for task;Set-up of equipment;Supervision for safety;Verbal cueing   Bed, Chair, Wheelchair Transfer Contact Guard Assist   Bed Chair Wheelchair Transfer Description Adaptive equipment;Increased time;Set-up of equipment;Supervision for safety;Verbal cueing   Toilet Transfers Minimal Assist  (x3 during session, variable between Lupillo-CGA)   Toilet Transfer Description Adaptive equipment;Grab bar;Increased time;Initial preparation for task;Set-up of equipment;Supervision for safety;Verbal cueing   Sitting Lower Body Exercises   Sit to Stand 1 set of 10  (from EOB with FWW and CGA. Verbal cues for sequencing and increased time between each stand due to impaired endurance)   Interdisciplinary Plan of Care Collaboration   Patient Position at End of Therapy Seated;Self Releasing Lap Belt Applied;Call Light within Reach;Tray Table within Reach;Phone within Reach     Functional mobility with FWW and CGA, w/c follow from bedside <> toilet with two toilet transfers completed in bathroom for transfer training.      Functional mobility ~10' after 1x10 STS training.     Assessment    Pt requires  increased time for all mobility, transfers and STS as well as verbal cues for attention to tasks as pt is tangential. Discussed focusing on what she can do vs cannot do as pt can be hyper focused on her limitations. Demo'd decreased GRIS this session compared to previous session with this therapist. LUE impairment and LLE pain are barriers to pt's mobility and transfers at this time.   Strengths: Able to follow instructions, Manages pain appropriately, Motivated for self care and independence, Supportive family, Willingly participates in therapeutic activities  Barriers: Decreased endurance, Generalized weakness, Impaired activity tolerance, Impaired balance, Limited mobility    Plan    Trial use of sock aide and LHSH, overall strength and endurance training, standing balance/tolerance.    Passport items to be completed:  Perform bathroom transfers, complete dressing, complete feeding, get ready for the day, prepare a simple meal, participate in household tasks, adapt home for safety needs, demonstrate home exercise program, complete caregiver training     Occupational Therapy Goals (Active)       Problem: Bathing       Dates: Start: 08/12/22         Goal: STG-Within one week, patient will bathe with Lupillo and use of AE PRN.        Dates: Start: 08/12/22               Problem: Dressing       Dates: Start: 08/12/22         Goal: STG-Within one week, patient will dress UB with setup and supervision.        Dates: Start: 08/12/22            Goal: STG-Within one week, patient will dress LB with with modA and use of AE PRN.        Dates: Start: 08/12/22               Problem: Functional Transfers       Dates: Start: 08/12/22         Goal: STG-Within one week, patient will transfer to toilet with Lupillo and use of AE PRN.        Dates: Start: 08/12/22               Problem: OT Long Term Goals       Dates: Start: 08/12/22         Goal: LTG-By discharge, patient will complete basic self care tasks with Lupillo to modified  independence and use of AE PRN.        Dates: Start: 08/12/22            Goal: LTG-By discharge, patient will perform bathroom transfers with supervision/SBA to modified independence and use of AE PRN.        Dates: Start: 08/12/22               Problem: Toileting       Dates: Start: 08/12/22         Goal: STG-Within one week, patient will complete toileting tasks with Lupillo and use of AE PRN.        Dates: Start: 08/12/22

## 2022-08-15 NOTE — PROGRESS NOTES
[facility] Medicine Daily Progress Note    Date of Service  8/15/2022    Chief Complaint:  Diabetes  Edema  Covid (+)  Labile BP    Interval History:  No complaints.  Doing ok.    Review of Systems  Review of Systems   Constitutional:  Negative for chills and fever.   Respiratory:  Negative for shortness of breath.    Cardiovascular:  Negative for chest pain.   Gastrointestinal:  Negative for abdominal pain, diarrhea, nausea and vomiting.   Psychiatric/Behavioral:  The patient is not nervous/anxious.       Physical Exam  Temp:  [36.7 °C (98 °F)-36.8 °C (98.2 °F)] 36.8 °C (98.2 °F)  Pulse:  [68-86] 86  Resp:  [16-18] 16  BP: ()/(54-61) 101/61  SpO2:  [95 %-98 %] 96 %    Physical Exam  Vitals and nursing note reviewed.   Constitutional:       Appearance: Normal appearance.   HENT:      Head: Atraumatic.   Eyes:      Conjunctiva/sclera: Conjunctivae normal.      Pupils: Pupils are equal, round, and reactive to light.   Neck:      Vascular: No JVD.   Cardiovascular:      Rate and Rhythm: Normal rate and regular rhythm.      Heart sounds: Murmur heard.   Pulmonary:      Effort: Pulmonary effort is normal.      Breath sounds: No stridor. No wheezing or rales.   Abdominal:      General: There is no distension.      Palpations: Abdomen is soft.      Tenderness: There is no abdominal tenderness.   Musculoskeletal:      Cervical back: Normal range of motion and neck supple.      Right lower leg: Edema present.      Left lower leg: Edema present.      Comments: Mostly pedal swelling.   Skin:     General: Skin is warm and dry.      Findings: No rash.   Neurological:      Mental Status: She is alert and oriented to person, place, and time.   Psychiatric:         Mood and Affect: Mood normal.         Behavior: Behavior normal.       Fluids    Intake/Output Summary (Last 24 hours) at 8/15/2022 1210  Last data filed at 8/15/2022 1000  Gross per 24 hour   Intake 360 ml   Output --   Net 360 ml         Laboratory                 "            Imaging    Assessment/Plan  COVID-19  Assessment & Plan  Covid (+) on 8/11 at the Rehab  Asymptomatic    Labile blood pressure  Assessment & Plan  BP ok recently  Note: BP dipped lower with SBP 90 on am of 8/12  2nd to taking Oxycodone 10 mg on a regular basis  2nd to Lasix  Encouraging fluid intake  Note: suggested lowering Lasix dose but pt would like to keep the Lasix bid for now  Monitor for now    Lower extremity edema  Assessment & Plan  Has hx  Mostly pedal swelling  BNP: 2476 (7/20) -- no other values in Epic for comparison  On Lasix: 40 mg bid  Not on any K+ supplements  Note: suggested lowering Lasix dose but pt would like to keep the Lasix bid for now  K+: 4.3 (8/12)    Anemia  Assessment & Plan  Hb improved to 10.3 (8/12)  Will get Fe levels (was low in the past)  Monitor    Dyspnea- (present on admission)  Assessment & Plan  Presented to [facility] with increasing SOB  Trop: unremarkable  Echo (7/21): EF 65%, GII-DD, inferior wall hypokinesis  Nuc Stress Test (7/21): mormal left ventricular systolic function, EF 65%; inferior wall hypokinesis  CTA: no PE  TSH ok (8/12)  BNP: 2476 (7/20) -- no other values in Epic for comparison  On O2 supplementation  Pt to follow up with Cardio as out patient  Note: per D/C sum -- along with her obesity, she has a sedentary lifestyle at home and was encouraged to have more activity and lose weight  Per Cardiology note (5/5/22): \"She has been short of breath progressively over the past year. She thinks is been especially severe over the past few months.                                                  When she tries to exert herself she becomes profoundly short of breath and she notes in the pulse elevates.\"     Hypothyroidism- (present on admission)  Assessment & Plan  TSH: ok (8/12)  On Synthroid    DM (diabetes mellitus) (HCC)- (present on admission)  Assessment & Plan  Hba1c: 5.9 (8/12)  BS ok  On Tresiba (home med)  Note: pt has her home meds here at the " Rehab  Note: home meds include Tresiab and Lispro 20-40 units bid (per SS)  Cont to monitor

## 2022-08-15 NOTE — PROGRESS NOTES
"Rehab Progress Note     Encounter Date: 8/15/2022    CC: Decreased mobility, COVID+    Interval Events (Subjective)  Patient sitting up in room. She reports that she has worsened allergies. Discussed would schedule Claritin, she would prefer PRN as she does not always need it. Otherwise she would like more frequent blood sugar checks if her sugars are below 90 because she reports she has hypoglycemia easily. Denies NVD.     Objective:  VITAL SIGNS: /61   Pulse 86   Temp 36.8 °C (98.2 °F) (Oral)   Resp 16   Ht 1.727 m (5' 8\")   Wt (!) 127 kg (281 lb 1.4 oz)   SpO2 96%   BMI 42.74 kg/m²   Gen: NAD  Psych: Mood and affect appropriate  CV: RRR, no edema  Resp: CTAB, no upper airway sounds  Abd: NTND  Neuro: AOx4, following commands    Recent Results (from the past 72 hour(s))   POCT glucose device results    Collection Time: 08/12/22  5:27 PM   Result Value Ref Range    POC Glucose, Blood 142 (H) 65 - 99 mg/dL   POCT glucose device results    Collection Time: 08/12/22  9:06 PM   Result Value Ref Range    POC Glucose, Blood 155 (H) 65 - 99 mg/dL   POCT glucose device results    Collection Time: 08/13/22  8:07 AM   Result Value Ref Range    POC Glucose, Blood 109 (H) 65 - 99 mg/dL   POCT glucose device results    Collection Time: 08/13/22 11:31 AM   Result Value Ref Range    POC Glucose, Blood 140 (H) 65 - 99 mg/dL   POCT glucose device results    Collection Time: 08/13/22  5:58 PM   Result Value Ref Range    POC Glucose, Blood 131 (H) 65 - 99 mg/dL   POCT glucose device results    Collection Time: 08/13/22  9:30 PM   Result Value Ref Range    POC Glucose, Blood 157 (H) 65 - 99 mg/dL   POCT glucose device results    Collection Time: 08/14/22  8:07 AM   Result Value Ref Range    POC Glucose, Blood 120 (H) 65 - 99 mg/dL   POCT glucose device results    Collection Time: 08/14/22 11:56 AM   Result Value Ref Range    POC Glucose, Blood 151 (H) 65 - 99 mg/dL   POCT glucose device results    Collection Time: " 08/14/22  5:14 PM   Result Value Ref Range    POC Glucose, Blood 126 (H) 65 - 99 mg/dL   POCT glucose device results    Collection Time: 08/14/22  8:36 PM   Result Value Ref Range    POC Glucose, Blood 133 (H) 65 - 99 mg/dL   POCT glucose device results    Collection Time: 08/15/22  7:43 AM   Result Value Ref Range    POC Glucose, Blood 90 65 - 99 mg/dL   POCT glucose device results    Collection Time: 08/15/22 10:58 AM   Result Value Ref Range    POC Glucose, Blood 144 (H) 65 - 99 mg/dL       Current Facility-Administered Medications   Medication Frequency    gabapentin (NEURONTIN) capsule 600 mg BID    furosemide (LASIX) tablet 40 mg BID DIURETIC    [START ON 8/16/2022] Insulin Degludec SOPN 120 Units DAILY    loratadine (CLARITIN) tablet 10 mg QDAY PRN    insulin lispro (AdmeLOG,HumaLOG) injection 4X/DAY ACHS    vitamin D3 (cholecalciferol) tablet 1,000 Units DAILY    apixaban (ELIQUIS) tablet 5 mg BID    febuxostat (ULORIC) tablet 40 mg QHS    levothyroxine (SYNTHROID) tablet 112 mcg DAILY    montelukast (SINGULAIR) tablet 10 mg Q EVENING    pravastatin (PRAVACHOL) tablet 10 mg Nightly    rizatriptan (MAXALT-MLT) disintegrating tablet 10 mg Once PRN    Respiratory Therapy Consult Continuous RT    hydrALAZINE (APRESOLINE) tablet 25 mg Q8HRS PRN    acetaminophen (Tylenol) tablet 650 mg Q4HRS PRN    senna-docusate (PERICOLACE or SENOKOT S) 8.6-50 MG per tablet 2 Tablet BID    And    polyethylene glycol/lytes (MIRALAX) PACKET 1 Packet QDAY PRN    And    magnesium hydroxide (MILK OF MAGNESIA) suspension 30 mL QDAY PRN    And    bisacodyl (DULCOLAX) suppository 10 mg QDAY PRN    omeprazole (PRILOSEC) capsule 20 mg DAILY    artificial tears ophthalmic solution 1 Drop PRN    benzocaine-menthol (Cepacol) lozenge 1 Lozenge Q2HRS PRN    mag hydrox-al hydrox-simeth (MAALOX PLUS ES or MYLANTA DS) suspension 20 mL Q2HRS PRN    ondansetron (ZOFRAN ODT) dispertab 4 mg 4X/DAY PRN    Or    ondansetron (ZOFRAN) syringe/vial  injection 4 mg 4X/DAY PRN    traZODone (DESYREL) tablet 50 mg QHS PRN    sodium chloride (OCEAN) 0.65 % nasal spray 2 Spray PRN    oxyCODONE immediate-release (ROXICODONE) tablet 5 mg Q3HRS PRN    Or    oxyCODONE immediate release (ROXICODONE) tablet 10 mg Q3HRS PRN    levalbuterol (XOPENEX) 1.25 MG/3ML nebulizer solution 1.25 mg Q4H PRN (RT)    Fluticasone-Umeclidin-Vilant 200-62.5-25 MCG/INH AEPB 1 Puff DAILY       Orders Placed This Encounter   Procedures    Diet Order Diet: Consistent CHO (Diabetic)     Standing Status:   Standing     Number of Occurrences:   1     Order Specific Question:   Diet:     Answer:   Consistent CHO (Diabetic) [4]       Assessment:  Active Hospital Problems    Diagnosis     Anemia     Lower extremity edema     Labile blood pressure     COVID-19     Dyspnea     Hypothyroidism     DM (diabetes mellitus) ([facility])        Medical Decision Making and Plan:   Debility - Patient with CHF exacerbation with fluid overload then over diuresis causing dehydration and orthostatic hypotension.  -PT and OT for mobility and ADLs     dCHF/HTN - Patient on Lasix 40 mg BID. Will monitor   -Consult hospitalist     DM2 with hyperglycemia - Patient on Insulin pre meal and 276 U degludec  -Consult hospitalist. Patient reduced her Degludec to 120 U    Cirrhosis - Noted in problem list from US last month     Hyponatremia - Check AM CMP - repeat WNL. Consult hospitalist     Asthma - Patient on Singulair and inhaler. Add PRN Claritin     HLD - Patient on Pravastatin 10 mg daily     Left shoulder injury - Patient with replacement in the past.      Hx of Gout - On Uloric on transfer     Hypothyroidism - Patient on Levothyroxine 112 mcg     Vitamin D deficiency - mild at 29. Start 1000 U     COVID + - on screen. No s/s. Special isolation.     Morbid Obesity due to excess calories - BMI of 42.7 on admission, meets medical criteria. Dietitian to consult     Factor 5 Leiden/DVT ppx - Patient on Eliquis 5 mg BID at  baseline.     Total time:  26 minutes.  I spent greater than 50% of the time for patient care, counseling, and coordination on this date, including unit/floor time, and face-to-face time with the patient as per interval events and assessment and plan above. Topics discussed included allergies, PRN Claritin, low blood sugars, and reduce U and increase checks.     Doron Brand M.D.

## 2022-08-16 LAB
FLUAV RNA SPEC QL NAA+PROBE: NEGATIVE
FLUBV RNA SPEC QL NAA+PROBE: NEGATIVE
GLUCOSE BLD STRIP.AUTO-MCNC: 102 MG/DL (ref 65–99)
GLUCOSE BLD STRIP.AUTO-MCNC: 114 MG/DL (ref 65–99)
GLUCOSE BLD STRIP.AUTO-MCNC: 121 MG/DL (ref 65–99)
GLUCOSE BLD STRIP.AUTO-MCNC: 134 MG/DL (ref 65–99)
GLUCOSE BLD STRIP.AUTO-MCNC: 83 MG/DL (ref 65–99)
RSV RNA SPEC QL NAA+PROBE: NEGATIVE
SARS-COV-2 RNA RESP QL NAA+PROBE: DETECTED
SPECIMEN SOURCE: ABNORMAL

## 2022-08-16 PROCEDURE — 97535 SELF CARE MNGMENT TRAINING: CPT

## 2022-08-16 PROCEDURE — 94640 AIRWAY INHALATION TREATMENT: CPT

## 2022-08-16 PROCEDURE — 700102 HCHG RX REV CODE 250 W/ 637 OVERRIDE(OP): Performed by: PHYSICAL MEDICINE & REHABILITATION

## 2022-08-16 PROCEDURE — A9270 NON-COVERED ITEM OR SERVICE: HCPCS | Performed by: PHYSICAL MEDICINE & REHABILITATION

## 2022-08-16 PROCEDURE — 97116 GAIT TRAINING THERAPY: CPT

## 2022-08-16 PROCEDURE — 770010 HCHG ROOM/CARE - REHAB SEMI PRIVAT*

## 2022-08-16 PROCEDURE — 97110 THERAPEUTIC EXERCISES: CPT

## 2022-08-16 PROCEDURE — 82962 GLUCOSE BLOOD TEST: CPT

## 2022-08-16 PROCEDURE — 99232 SBSQ HOSP IP/OBS MODERATE 35: CPT | Performed by: HOSPITALIST

## 2022-08-16 PROCEDURE — 99233 SBSQ HOSP IP/OBS HIGH 50: CPT | Performed by: PHYSICAL MEDICINE & REHABILITATION

## 2022-08-16 PROCEDURE — 94760 N-INVAS EAR/PLS OXIMETRY 1: CPT

## 2022-08-16 RX ORDER — NYSTATIN 100000 [USP'U]/G
POWDER TOPICAL 2 TIMES DAILY
Status: DISCONTINUED | OUTPATIENT
Start: 2022-08-16 | End: 2022-08-22 | Stop reason: HOSPADM

## 2022-08-16 RX ORDER — LEVOTHYROXINE SODIUM 112 UG/1
112 TABLET ORAL DAILY
Status: DISCONTINUED | OUTPATIENT
Start: 2022-08-16 | End: 2022-08-22 | Stop reason: HOSPADM

## 2022-08-16 RX ADMIN — GABAPENTIN 600 MG: 300 CAPSULE ORAL at 21:11

## 2022-08-16 RX ADMIN — NYSTATIN: 100000 POWDER TOPICAL at 15:38

## 2022-08-16 RX ADMIN — PRAVASTATIN SODIUM 10 MG: 20 TABLET ORAL at 21:12

## 2022-08-16 RX ADMIN — GABAPENTIN 600 MG: 300 CAPSULE ORAL at 08:09

## 2022-08-16 RX ADMIN — APIXABAN 5 MG: 5 TABLET, FILM COATED ORAL at 08:09

## 2022-08-16 RX ADMIN — Medication 1000 UNITS: at 08:08

## 2022-08-16 RX ADMIN — APIXABAN 5 MG: 5 TABLET, FILM COATED ORAL at 21:11

## 2022-08-16 RX ADMIN — LEVOTHYROXINE SODIUM 112 MCG: 112 TABLET ORAL at 08:09

## 2022-08-16 RX ADMIN — OXYCODONE HYDROCHLORIDE 10 MG: 10 TABLET ORAL at 15:37

## 2022-08-16 RX ADMIN — OMEPRAZOLE 20 MG: 20 CAPSULE, DELAYED RELEASE ORAL at 08:08

## 2022-08-16 RX ADMIN — FUROSEMIDE 40 MG: 40 TABLET ORAL at 15:38

## 2022-08-16 RX ADMIN — ACETAMINOPHEN 650 MG: 325 TABLET ORAL at 21:12

## 2022-08-16 RX ADMIN — FEBUXOSTAT 40 MG: 40 TABLET, FILM COATED ORAL at 21:15

## 2022-08-16 RX ADMIN — FUROSEMIDE 40 MG: 40 TABLET ORAL at 05:43

## 2022-08-16 RX ADMIN — MONTELUKAST 10 MG: 10 TABLET, FILM COATED ORAL at 21:12

## 2022-08-16 ASSESSMENT — GAIT ASSESSMENTS
DEVIATION: BRADYKINETIC;DECREASED HEEL STRIKE;DECREASED TOE OFF
GAIT LEVEL OF ASSIST: CONTACT GUARD ASSIST
GAIT LEVEL OF ASSIST: CONTACT GUARD ASSIST
ASSISTIVE DEVICE: FRONT WHEEL WALKER
DISTANCE (FEET): 25
DEVIATION: TRENDELENBERG;BRADYKINETIC;DECREASED HEEL STRIKE;DECREASED TOE OFF
ASSISTIVE DEVICE: FRONT WHEEL WALKER

## 2022-08-16 ASSESSMENT — ENCOUNTER SYMPTOMS
CHILLS: 0
NERVOUS/ANXIOUS: 0
DIZZINESS: 0
FEVER: 0
HEADACHES: 0
STRIDOR: 0
VOMITING: 0
COUGH: 0
ABDOMINAL PAIN: 0
PALPITATIONS: 0
NAUSEA: 0
SHORTNESS OF BREATH: 0

## 2022-08-16 ASSESSMENT — FIBROSIS 4 INDEX: FIB4 SCORE: 1.79

## 2022-08-16 ASSESSMENT — ACTIVITIES OF DAILY LIVING (ADL)
TOILETING_LEVEL_OF_ASSIST_DESCRIPTION: ASSIST TO PULL PANTS UP;ASSIST FOR STANDING BALANCE;GRAB BAR;INCREASED TIME;INITIAL PREPARATION FOR TASK;SET-UP OF EQUIPMENT;SUPERVISION FOR SAFETY;VERBAL CUEING
TUB_SHOWER_TRANSFER_DESCRIPTION: ADAPTIVE EQUIPMENT;GRAB BAR;SHOWER BENCH;INCREASED TIME;INITIAL PREPARATION FOR TASK;SET-UP OF EQUIPMENT;SUPERVISION FOR SAFETY;VERBAL CUEING
TOILET_TRANSFER_DESCRIPTION: ADAPTIVE EQUIPMENT;GRAB BAR;INCREASED TIME;SET-UP OF EQUIPMENT;SUPERVISION FOR SAFETY;VERBAL CUEING
BED_CHAIR_WHEELCHAIR_TRANSFER_DESCRIPTION: ADAPTIVE EQUIPMENT;INCREASED TIME;INITIAL PREPARATION FOR TASK;SET-UP OF EQUIPMENT;SUPERVISION FOR SAFETY;VERBAL CUEING
BED_CHAIR_WHEELCHAIR_TRANSFER_DESCRIPTION: ADAPTIVE EQUIPMENT;INCREASED TIME;SET-UP OF EQUIPMENT

## 2022-08-16 NOTE — CARE PLAN
The patient is Watcher - Medium risk of patient condition declining or worsening    Shift Goals  Clinical Goals: Safety      Problem: Skin Integrity  Goal: Skin integrity is maintained or improved  Note: Patient has ulcer on bottom of left foot, dry, open to air, foot elevated w/ pillow when in bed, photo uploaded.      Problem: Bladder / Voiding  Goal: Patient will establish and maintain regular urinary output  Note: Patient has been continent of bladder, no dysuria noted, will continue to monitor.

## 2022-08-16 NOTE — CARE PLAN
"  Problem: Mobility  Goal: STG-Within one week, patient will ambulate up/down a curb min A with FWW for 4-6\" rise  Outcome: Not Met     Problem: Mobility  Goal: STG-Within one week, patient will ambulate household distance CGA with FWW 25 ft x 2  Outcome: Progressing     Problem: Mobility Transfers  Goal: STG-Within one week, patient will perform bed mobility min A with bed controls as needed  Outcome: Progressing  Goal: STG-Within one week, patient will sit to stand CGA to FWW  Outcome: Progressing  Goal: STG-Within one week, patient will transfer bed to chair CGA with FWW  Outcome: Progressing     "

## 2022-08-16 NOTE — PROGRESS NOTES
[facility] Medicine Daily Progress Note    Date of Service  8/16/2022    Chief Complaint:  Diabetes  Edema  Covid (+)  Labile BP    HPI:  Giovanna Ramos is a 62-year-old female with a history of diabetes, factor V [place] on Eliquis, diastolic heart failure who originally presented to [facility] on 7/20 with shortness of breath.  She was discharged 7/22 but then had a fall at her home and presented to [facility].  There she was noted to have severe hyponatremia.  She was given IV fluids with some sodium improvement.  Due to ongoing physical debility she was accepted to [facility] rehab on 8/11/2022.  On 8/11 she was noted to be positive for COVID 19.  Her last sodium 8/12: 141.    Interval History:  8/16/2022 vital signs stable was on CPAP overnight now down to 2 L nasal cannula with SPO2 98.    Review of Systems  Review of Systems   Constitutional:  Positive for malaise/fatigue. Negative for chills and fever.   Respiratory:  Negative for cough, shortness of breath and stridor.    Cardiovascular:  Positive for leg swelling. Negative for chest pain and palpitations.   Gastrointestinal:  Negative for abdominal pain, nausea and vomiting.   Neurological:  Negative for dizziness and headaches.   Psychiatric/Behavioral:  The patient is not nervous/anxious.       Physical Exam  Temp:  [36.6 °C (97.9 °F)-36.9 °C (98.4 °F)] 36.6 °C (97.9 °F)  Pulse:  [] 81  Resp:  [16-19] 18  BP: ()/(58-83) 116/58  SpO2:  [94 %-98 %] 95 %    Physical Exam  Vitals and nursing note reviewed.   Constitutional:       Appearance: Normal appearance. She is obese.   HENT:      Head: Atraumatic.      Mouth/Throat:      Mouth: Mucous membranes are moist.   Eyes:      Conjunctiva/sclera: Conjunctivae normal.   Neck:      Vascular: No JVD.   Cardiovascular:      Rate and Rhythm: Normal rate and regular rhythm.      Heart sounds: Murmur heard.   Pulmonary:      Effort: Pulmonary effort is normal.      Breath sounds: No stridor. No  wheezing or rales.   Abdominal:      General: There is no distension.      Palpations: Abdomen is soft.      Tenderness: There is no abdominal tenderness.   Musculoskeletal:      Cervical back: Normal range of motion and neck supple.      Right lower leg: Edema present.      Left lower leg: Edema present.      Comments: Chronic left arm weakness.   Skin:     General: Skin is warm and dry.      Findings: No rash.      Comments: Left anterior shoulder/axilla healed scar   Neurological:      Mental Status: She is alert and oriented to person, place, and time.   Psychiatric:         Mood and Affect: Mood normal.         Behavior: Behavior normal.       Fluids    Intake/Output Summary (Last 24 hours) at 8/16/2022 1457  Last data filed at 8/16/2022 1400  Gross per 24 hour   Intake 1500 ml   Output --   Net 1500 ml       Laboratory                            Imaging    Assessment/Plan  COVID-19  Assessment & Plan  Covid (+) on 8/11 at the Rehab  Asymptomatic    Labile blood pressure  Assessment & Plan  Monitor vitals  Note: BP dipped lower with SBP 90 on am of 8/12  Watch blood pressure around patient taking her oxycodone 10 mg on a regular basis  Encouraging fluid intake    Lower extremity edema  Assessment & Plan  Has hx  Mostly pedal swelling  BNP: 2476 (7/20) -- no other values in Epic for comparison  On Lasix: 40 mg bid and will need to monitor a BMP  7/21/2022 echocardiogram shows preserved EF but grade 2 diastolic dysfunction and inability to diagnose right ventricular systolic pressure.  Suspect she may have a component of pulmonary hypertension but unknown.    Anemia  Assessment & Plan  Hb improved to 10.3 (8/12)  Monitor CBC  No dyspnea    Dyspnea- (present on admission)  Assessment & Plan  Presented to [facility] with increasing SOB  Trop: unremarkable  Echo (7/21/22): EF 65%, GII-DD, inferior wall hypokinesis  Nuc Stress Test (7/21): mormal left ventricular systolic function, EF 65%; inferior wall hypokinesis  7/20/22  CTA: no PE, but ground glass opacifications with possible edema.  TSH ok (8/12)  BNP: 2476 (7/20) -- no other values in Epic for comparison  On O2 supplementation  Pt to follow up with Cardio as out patient    Hypothyroidism- (present on admission)  Assessment & Plan  TSH:1.51 (8/12)  On Synthroid 112 mcg daily for active management    DM (diabetes mellitus) (HCC)- (present on admission)  Assessment & Plan  Hba1c: 5.9 (8/12)  Monitor accuchecks  On Tresiba (home med)  Note: pt has her home meds here at the Rehab  Note: home meds include Tresiab and Lispro 20-40 units bid (per SS)  Diabetic diet

## 2022-08-16 NOTE — CARE PLAN
Problem: Dressing  Goal: STG-Within one week, patient will dress UB with setup and supervision.   Outcome: Progressing  Note: SBA and verbal cues, limited by LUE impairment        Problem: Bathing  Goal: STG-Within one week, patient will bathe with Lupillo and use of AE PRN.   Outcome: Met     Problem: Dressing  Goal: STG-Within one week, patient will dress LB with with modA and use of AE PRN.   Outcome: Met  Note: modA overall between pants and socks/shoes      Problem: Toileting  Goal: STG-Within one week, patient will complete toileting tasks with Lupillo and use of AE PRN.   Outcome: Met     Problem: Functional Transfers  Goal: STG-Within one week, patient will transfer to toilet with Lupillo and use of AE PRN.   Outcome: Met  Note: CGA-Lupillo FWW level

## 2022-08-16 NOTE — THERAPY
Occupational Therapy  Daily Treatment     Patient Name: Jenifer Ramos  Age:  62 y.o., Sex:  female  Medical Record #: [id number]  Today's Date: 8/16/2022     Precautions  Precautions: Fall Risk  Comments: 2L O2 at baseline         Subjective    Pt resting in bed upon arrival, agreeable to OT session.      Objective     08/16/22 1001   OT Charge Group   OT Self Care / ADL 6   OT Total Time Spent   OT Individual Total Time Spent (Mins) 90   Vitals   Pulse 100   Patient BP Position Sitting   Blood Pressure (!) 94/83   Pulse Oximetry 96 %   O2 (LPM) 2   O2 Delivery Device Nasal Cannula   Vitals Comments Post shower/dressing   Functional Level of Assist   Eating Supervision  (setup)   Eating Description Set-up of equipment or meal/tube feeding   Grooming Supervision;Seated   Grooming Description Seated in wheelchair at sink;Increased time   Bathing Minimal Assist  (assist for foot and perineal, improved forward flexion to wash LEs this date)   Bathing Description Grab bar;Hand held shower;Tub bench;Assit with back;Assit wtih lower extremities;Assit with perineal;Increased time;Initial preparation for task;Supervision for safety;Verbal cueing;Set up for wound protection   Upper Body Dressing Stand by Assist  (verbal cues for sequencing secondary to LUE impairment)   Upper Body Dressing Description Increased time;Initial preparation for task;Supervision for safety;Verbal cueing   Lower Body Dressing Moderate Assist  (assist to pull pants up on L side, use of reacher to thread legs through, totalA for socks/shoes)   Lower Body Dressing Description Assistive devices;Grab bar;Reacher;Increased time;Initial preparation for task;Set-up of equipment;Supervision for safety;Verbal cueing   Toileting Minimal Assist  (assist to pull pants up from ground and up on L side)   Toileting Description Assist to pull pants up;Assist for standing balance;Grab bar;Increased time;Initial preparation for task;Set-up of equipment;Supervision  for safety;Verbal cueing   Bed, Chair, Wheelchair Transfer Contact Guard Assist  (FWW)   Bed Chair Wheelchair Transfer Description Adaptive equipment;Increased time;Initial preparation for task;Set-up of equipment;Supervision for safety;Verbal cueing   Toilet Transfers Contact Guard Assist  (FWW)   Toilet Transfer Description Adaptive equipment;Grab bar;Increased time;Set-up of equipment;Supervision for safety;Verbal cueing   Tub / Shower Transfers Contact Guard Assist  (FWW into shower, SPT to w/c after shower)   Tub Shower Transfer Description Adaptive equipment;Grab bar;Shower bench;Increased time;Initial preparation for task;Set-up of equipment;Supervision for safety;Verbal cueing   Bed Mobility    Supine to Sit Standby Assist   Interdisciplinary Plan of Care Collaboration   IDT Collaboration with  Nursing;Physician   Patient Position at End of Therapy Seated;Self Releasing Lap Belt Applied;Call Light within Reach;Tray Table within Reach;Phone within Reach   Collaboration Comments re: redness between abdomen and leg     Assessment    Pt tolerated OT session well and demleeann's overall progress compared to initial ADL assessment. Is familiar with use of reacher from using one at home for LBD and was able to thread her legs through both the underwear and pants but continues to require assistance to pull up on the L side due to LUE impairment. Is motivated and attempts to perform as much as she can on her own but is very anxious most of the time requiring redirection.     Strengths: Able to follow instructions, Manages pain appropriately, Motivated for self care and independence, Supportive family, Willingly participates in therapeutic activities  Barriers: Decreased endurance, Generalized weakness, Impaired activity tolerance, Impaired balance, Limited mobility    Plan    Trial use of sock aide and LHSH, overall strength and endurance training, standing balance/tolerance.    Passport items to be completed:  Perform  bathroom transfers, complete dressing, complete feeding, get ready for the day, prepare a simple meal, participate in household tasks, adapt home for safety needs, demonstrate home exercise program, complete caregiver training     Occupational Therapy Goals (Active)       Problem: Bathing       Dates: Start: 08/12/22         Goal: STG-Within one week, patient will bathe with Lupillo and use of AE PRN.        Dates: Start: 08/12/22               Problem: Dressing       Dates: Start: 08/12/22         Goal: STG-Within one week, patient will dress UB with setup and supervision.        Dates: Start: 08/12/22            Goal: STG-Within one week, patient will dress LB with with modA and use of AE PRN.        Dates: Start: 08/12/22               Problem: Functional Transfers       Dates: Start: 08/12/22         Goal: STG-Within one week, patient will transfer to toilet with Lupillo and use of AE PRN.        Dates: Start: 08/12/22               Problem: OT Long Term Goals       Dates: Start: 08/12/22         Goal: LTG-By discharge, patient will complete basic self care tasks with Lupillo to modified independence and use of AE PRN.        Dates: Start: 08/12/22            Goal: LTG-By discharge, patient will perform bathroom transfers with supervision/SBA to modified independence and use of AE PRN.        Dates: Start: 08/12/22               Problem: Toileting       Dates: Start: 08/12/22         Goal: STG-Within one week, patient will complete toileting tasks with Lupillo and use of AE PRN.        Dates: Start: 08/12/22

## 2022-08-16 NOTE — CARE PLAN
"The patient is Stable - Low risk of patient condition declining or worsening    Shift Goals  Clinical Goals: Safety  Patient Goals: sleep well, pain control    Progress made toward(s) clinical / shift goals:    Problem: Bladder / Voiding  Goal: Patient will establish and maintain regular urinary output  Outcome: Progressing  Note: Patient has been continent of bladder so far this shift     Problem: Infection  Goal: Patient will remain free from infection  Outcome: Progressing  Note: No s/s of infection present      Patient is not progressing towards the following goals:    Problem: Fall Risk - Rehab  Goal: Patient will remain free from falls  Outcome: Not Met  Note: Sophia Smith Fall risk Assessment Score: 14    Moderate fall risk Interventions  - Bed and strip alarm   - Yellow sign by the door   - Yellow wrist band \"Fall risk\"  - Fall risk education provided     "

## 2022-08-16 NOTE — PROGRESS NOTES
"Rehab Progress Note     Encounter Date: 8/16/2022    CC: Decreased mobility, COVID+    Interval Events (Subjective)  Patient sitting up in room. She reports therapy is going well. She reports her allergies were improved with the medications. She reports a rash in skin fold. Discussed starting Nystatin powder.  Otherwise she brought in maxalt for her headaches and reviewed will approve.  Denies NVD. Discussed would have IDT later today to discuss discharge planning.     IDT Team Meeting 8/16/2022    IDoron M.D., was present and led the interdisciplinary team conference on 8/16/2022.  I led the IDT conference and agree with the IDT conference documentation and plan of care as noted below.     RN:  Diet Regular   % Meal     Pain Oxycodone at time   Sleep    Bowel Continent   Bladder Continent   In's & Out's    Refusing bowel medications    PT: Variable depending on time of day  Bed Mobility    Transfers CGA-Lupillo   Mobility CGA-modA   Stairs    Limited by bariatric equipment  Left shoulder pain    OT: L Arm limiting  Eating    Grooming    Bathing Lupillo   UB Dressing    LB Dressing modA   Toileting Lupillo   Shower & Transfer Lupillo   Limited by arm; endurance  Limited by anxiety    CM:  Continues to work on disposition and DME needs.      DC/Disposition:  8/25/22    Objective:  VITAL SIGNS: BP (!) 94/83   Pulse 100   Temp 36.9 °C (98.4 °F) (Oral)   Resp 16   Ht 1.727 m (5' 8\")   Wt 120 kg (265 lb 3.4 oz)   SpO2 96%   BMI 40.33 kg/m²   Gen: NAD  Psych: Mood and affect appropriate  CV: RRR, no edema  Resp: CTAB, no upper airway sounds  Abd: NTND  Neuro: AOx4, following commands  Unchanged from 8/15/22    Recent Results (from the past 72 hour(s))   POCT glucose device results    Collection Time: 08/13/22  5:58 PM   Result Value Ref Range    POC Glucose, Blood 131 (H) 65 - 99 mg/dL   POCT glucose device results    Collection Time: 08/13/22  9:30 PM   Result Value Ref Range    POC Glucose, Blood 157 (H) " 65 - 99 mg/dL   POCT glucose device results    Collection Time: 08/14/22  8:07 AM   Result Value Ref Range    POC Glucose, Blood 120 (H) 65 - 99 mg/dL   POCT glucose device results    Collection Time: 08/14/22 11:56 AM   Result Value Ref Range    POC Glucose, Blood 151 (H) 65 - 99 mg/dL   POCT glucose device results    Collection Time: 08/14/22  5:14 PM   Result Value Ref Range    POC Glucose, Blood 126 (H) 65 - 99 mg/dL   POCT glucose device results    Collection Time: 08/14/22  8:36 PM   Result Value Ref Range    POC Glucose, Blood 133 (H) 65 - 99 mg/dL   POCT glucose device results    Collection Time: 08/15/22  7:43 AM   Result Value Ref Range    POC Glucose, Blood 90 65 - 99 mg/dL   POCT glucose device results    Collection Time: 08/15/22 10:58 AM   Result Value Ref Range    POC Glucose, Blood 144 (H) 65 - 99 mg/dL   POCT glucose device results    Collection Time: 08/15/22  5:17 PM   Result Value Ref Range    POC Glucose, Blood 93 65 - 99 mg/dL   POCT glucose device results    Collection Time: 08/15/22  5:47 PM   Result Value Ref Range    POC Glucose, Blood 96 65 - 99 mg/dL   POCT glucose device results    Collection Time: 08/15/22  8:54 PM   Result Value Ref Range    POC Glucose, Blood 129 (H) 65 - 99 mg/dL   POCT glucose device results    Collection Time: 08/16/22  7:16 AM   Result Value Ref Range    POC Glucose, Blood 83 65 - 99 mg/dL   POCT glucose device results    Collection Time: 08/16/22  8:07 AM   Result Value Ref Range    POC Glucose, Blood 114 (H) 65 - 99 mg/dL   POCT glucose device results    Collection Time: 08/16/22 11:02 AM   Result Value Ref Range    POC Glucose, Blood 134 (H) 65 - 99 mg/dL       Current Facility-Administered Medications   Medication Frequency    levothyroxine (SYNTHROID) tablet 112 mcg DAILY    gabapentin (NEURONTIN) capsule 600 mg BID    furosemide (LASIX) tablet 40 mg BID DIURETIC    Insulin Degludec SOPN 120 Units DAILY    loratadine (CLARITIN) tablet 10 mg QDAY PRN     menthol (Halls) lozenge 1 Lozenge Q2HRS PRN    insulin lispro (AdmeLOG,HumaLOG) injection 4X/DAY ACHS    vitamin D3 (cholecalciferol) tablet 1,000 Units DAILY    apixaban (ELIQUIS) tablet 5 mg BID    febuxostat (ULORIC) tablet 40 mg QHS    montelukast (SINGULAIR) tablet 10 mg Q EVENING    pravastatin (PRAVACHOL) tablet 10 mg Nightly    rizatriptan (MAXALT-MLT) disintegrating tablet 10 mg Once PRN    Respiratory Therapy Consult Continuous RT    hydrALAZINE (APRESOLINE) tablet 25 mg Q8HRS PRN    acetaminophen (Tylenol) tablet 650 mg Q4HRS PRN    senna-docusate (PERICOLACE or SENOKOT S) 8.6-50 MG per tablet 2 Tablet BID    And    polyethylene glycol/lytes (MIRALAX) PACKET 1 Packet QDAY PRN    And    magnesium hydroxide (MILK OF MAGNESIA) suspension 30 mL QDAY PRN    And    bisacodyl (DULCOLAX) suppository 10 mg QDAY PRN    omeprazole (PRILOSEC) capsule 20 mg DAILY    artificial tears ophthalmic solution 1 Drop PRN    mag hydrox-al hydrox-simeth (MAALOX PLUS ES or MYLANTA DS) suspension 20 mL Q2HRS PRN    ondansetron (ZOFRAN ODT) dispertab 4 mg 4X/DAY PRN    Or    ondansetron (ZOFRAN) syringe/vial injection 4 mg 4X/DAY PRN    traZODone (DESYREL) tablet 50 mg QHS PRN    sodium chloride (OCEAN) 0.65 % nasal spray 2 Spray PRN    oxyCODONE immediate-release (ROXICODONE) tablet 5 mg Q3HRS PRN    Or    oxyCODONE immediate release (ROXICODONE) tablet 10 mg Q3HRS PRN    levalbuterol (XOPENEX) 1.25 MG/3ML nebulizer solution 1.25 mg Q4H PRN (RT)    Fluticasone-Umeclidin-Vilant 200-62.5-25 MCG/INH AEPB 1 Puff DAILY       Orders Placed This Encounter   Procedures    Diet Order Diet: Consistent CHO (Diabetic)     Standing Status:   Standing     Number of Occurrences:   1     Order Specific Question:   Diet:     Answer:   Consistent CHO (Diabetic) [4]       Assessment:  Active Hospital Problems    Diagnosis     Anemia     Lower extremity edema     Labile blood pressure     COVID-19     Dyspnea     Hypothyroidism     DM (diabetes  mellitus) (HCC)        Medical Decision Making and Plan:   Debility - Patient with CHF exacerbation with fluid overload then over diuresis causing dehydration and orthostatic hypotension.  -PT and OT for mobility and ADLs     dCHF/HTN - Patient on Lasix 40 mg BID. Will monitor   -Consult hospitalist     DM2 with hyperglycemia - Patient on Insulin pre meal and 276 U degludec  -Consult hospitalist. Patient reduced her Degludec to 120 U    Cirrhosis - Noted in problem list from US last month     Hyponatremia - Check AM CMP - repeat WNL. Consult hospitalist     Asthma - Patient on Singulair and inhaler. Add PRN Claritin     HLD - Patient on Pravastatin 10 mg daily     Left shoulder injury - Patient with replacement in the past.      Hx of Gout - On Uloric on transfer     Migraines - Patient brought in maxalt, available PRN    Hypothyroidism - Patient on Levothyroxine 112 mcg     Skin rash - in pannus fold. Start Nystatin Powder TID    Vitamin D deficiency - mild at 29. Start 1000 U     COVID + - on screen. No s/s. Special isolation.     Morbid Obesity due to excess calories - BMI of 42.7 on admission, meets medical criteria. Dietitian to consult     Factor 5 Leiden/DVT ppx - Patient on Eliquis 5 mg BID at baseline.     Total time:  36 minutes.  I spent greater than 50% of the time for patient care, counseling, and coordination on this date, including unit/floor time, and face-to-face time with the patient as per interval events and assessment and plan above. Topics discussed included discharge planning, maxalt for migraines, skin rash and start Nystatin. Patient was discussed separately in IDT today; please see details above.    Doron Brand M.D.

## 2022-08-16 NOTE — THERAPY
Physical Therapy   Daily Treatment     Patient Name: Jenifer Ramos  Age:  62 y.o., Sex:  female  Medical Record #: [id number]  Today's Date: 8/16/2022     Precautions  Precautions: Fall Risk, Other (See Comments)  Comments: 2L O2 at baseline; strict droplet & contact precautions    Subjective    Patient agreeable to PT; received sitting in w/c at bedside.     Objective       08/16/22 1301   PT Charge Group   PT Gait Training 2   PT Total Time Spent   PT Individual Total Time Spent (Mins) 30   Precautions   Precautions Fall Risk;Other (See Comments)   Comments 2L O2 at baseline; strict droplet & contact precautions   Vitals   O2 (LPM) 2   O2 Delivery Device Nasal Cannula   Gait Functional Level of Assist    Gait Level Of Assist Contact Guard Assist   Assistive Device Front Wheel Walker   Distance (Feet)   (2x 25 ft and 1x 45 ft; indoors)   # of Times Distance was Traveled 1   Deviation Trendelenberg;Bradykinetic;Decreased Heel Strike;Decreased Toe Off  (CGA at L hand (Dycem on FWW as well) for ; oxygen line management; cueing for improving endurance on last rep)   Bed Mobility    Sit to Stand Contact Guard Assist   Scooting Contact Guard Assist   Interdisciplinary Plan of Care Collaboration   Patient Position at End of Therapy Seated;Chair Alarm On;Self Releasing Lap Belt Applied;Call Light within Reach;Tray Table within Reach;Phone within Reach       Assessment    Patient has improving endurance during second rep; good verbalization of safety awareness and benefits from some cueing/A with oxygen line management, setup, and general safety; no SOB noted.    Strengths: Able to follow instructions, Adequate strength, Motivated for self care and independence, Pleasant and cooperative, Supportive family, Willingly participates in therapeutic activities  Barriers: Decreased endurance, Fatigue, Generalized weakness, Home accessibility, Impaired activity tolerance, Impaired balance, Limited mobility, Pain (DRAKEE  "weakness/ pain and prior shoulder replacement)    Plan    Standing balance, seated/ standing exercises, gait with FWW    Passport items to be completed:  Get in/out of bed safely, in/out of a vehicle, safely use mobility device, walk or wheel around home/community, navigate up and down stairs, show how to get up/down from the ground, ensure home is accessible, demonstrate HEP, complete caregiver training    Physical Therapy Problems (Active)       Problem: Mobility       Dates: Start: 08/12/22         Goal: STG-Within one week, patient will ambulate household distance CGA with FWW 25 ft x 2       Dates: Start: 08/12/22            Goal: STG-Within one week, patient will ambulate up/down a curb min A with FWW for 4-6\" rise       Dates: Start: 08/12/22               Problem: Mobility Transfers       Dates: Start: 08/12/22         Goal: STG-Within one week, patient will perform bed mobility min A with bed controls as needed       Dates: Start: 08/12/22            Goal: STG-Within one week, patient will sit to stand CGA to FWW       Dates: Start: 08/12/22            Goal: STG-Within one week, patient will transfer bed to chair CGA with FWW       Dates: Start: 08/12/22               Problem: PT-Long Term Goals       Dates: Start: 08/12/22         Goal: LTG-By discharge, patient will ambulate SPV / modified independent with FWW x 50 ft       Dates: Start: 08/12/22            Goal: LTG-By discharge, patient will transfer one surface to another SPV/ modified independent with FWW       Dates: Start: 08/12/22            Goal: LTG-By discharge, patient will transfer in/out of a car SBA/ CGA with FWW       Dates: Start: 08/12/22            Goal: LTG-By discharge, patient will navigate 4-6\" step with FWW and SBA/ CGA       Dates: Start: 08/12/22              "

## 2022-08-16 NOTE — FLOWSHEET NOTE
08/16/22 0839   Events/Summary/Plan   Events/Summary/Plan SpO2 check, DPI given   Vital Signs   Pulse 72   Respiration 16   Pulse Oximetry 98 %   $ Pulse Oximetry (Spot Check) Yes   Oxygen   O2 (LPM) 2   O2 Delivery Device Nasal Cannula   Non-Invasive Ventilation [facility]   Nocturnal CPAP or BIPAP BIPAP - Home Unit   FiO2 or LPM 2

## 2022-08-16 NOTE — CARE PLAN
"The patient is Watcher - Medium risk of patient condition declining or worsening    Shift Goals  Clinical Goals: Safety, blood sugar management      Problem: Bowel Elimination  Goal: Patient will participate in bowel management program  Note: Patient refused scheduled bowel meds, LBM 8/15, no abdominal discomfort, will continue to monitor.      Problem: Fall Risk - Rehab  Goal: Patient will remain free from falls  Note: Sophia Smith Fall risk Assessment Score: 13    Moderate fall risk Interventions  - Bed and strip alarm   - Yellow sign by the door   - Yellow wrist band \"Fall risk\"  - Do not leave patient unattended in the bathroom  - Fall risk education provided     "

## 2022-08-16 NOTE — THERAPY
Physical Therapy   Daily Treatment     Patient Name: Jenifer Ramos  Age:  62 y.o., Sex:  female  Medical Record #: [id number]  Today's Date: 8/16/2022     Precautions  Precautions: Fall Risk  Comments: 2L O2 at baseline    Subjective    Pt up in wc, willing to participate     Objective       08/16/22 0831   PT Charge Group   PT Gait Training 2   PT Therapeutic Exercise 2   PT Total Time Spent   PT Individual Total Time Spent (Mins) 60   Vitals   Pulse 80   Pulse Oximetry 98 %   O2 (LPM) 2   Gait Functional Level of Assist    Gait Level Of Assist Contact Guard Assist  (wc in tow by this PT)   Assistive Device Front Wheel Walker   Distance (Feet) 25  (in addition to 20 ft x 2 bed<>bathroom)   # of Times Distance was Traveled 4   Deviation Bradykinetic;Decreased Heel Strike;Decreased Toe Off   Transfer Functional Level of Assist   Bed, Chair, Wheelchair Transfer Contact Guard Assist   Bed Chair Wheelchair Transfer Description Adaptive equipment;Increased time;Set-up of equipment   Standing Lower Body Exercises   Comments forward/backward and side stepping 2 x 10 with UE support at bed rail and cues for anterior/posterior and lateral wt shifting   Bed Mobility    Supine to Sit Contact Guard Assist   Sit to Supine Minimal Assist   Sit to Stand Contact Guard Assist       Assessment    Pt demonstrated improved gait endurance and only CGA throughout sit<>stand training. Pt continues to require assist for bed mobility to lift LLE onto bed.     Strengths: Able to follow instructions, Adequate strength, Motivated for self care and independence, Pleasant and cooperative, Supportive family, Willingly participates in therapeutic activities  Barriers: Decreased endurance, Fatigue, Generalized weakness, Home accessibility, Impaired activity tolerance, Impaired balance, Limited mobility, Pain (LUE weakness/ pain and prior shoulder replacement)    Plan    Standing balance, seated/ standing exercises, gait with W     Passport  "items to be completed:  Get in/out of bed safely, in/out of a vehicle, safely use mobility device, walk or wheel around home/community, navigate up and down stairs, show how to get up/down from the ground, ensure home is accessible, demonstrate HEP, complete caregiver training      Physical Therapy Problems (Active)       Problem: Mobility       Dates: Start: 08/12/22         Goal: STG-Within one week, patient will ambulate household distance CGA with FWW 25 ft x 2       Dates: Start: 08/12/22            Goal: STG-Within one week, patient will ambulate up/down a curb min A with FWW for 4-6\" rise       Dates: Start: 08/12/22               Problem: Mobility Transfers       Dates: Start: 08/12/22         Goal: STG-Within one week, patient will perform bed mobility min A with bed controls as needed       Dates: Start: 08/12/22            Goal: STG-Within one week, patient will sit to stand CGA to FWW       Dates: Start: 08/12/22            Goal: STG-Within one week, patient will transfer bed to chair CGA with FWW       Dates: Start: 08/12/22               Problem: PT-Long Term Goals       Dates: Start: 08/12/22         Goal: LTG-By discharge, patient will ambulate SPV / modified independent with FWW x 50 ft       Dates: Start: 08/12/22            Goal: LTG-By discharge, patient will transfer one surface to another SPV/ modified independent with FWW       Dates: Start: 08/12/22            Goal: LTG-By discharge, patient will transfer in/out of a car SBA/ CGA with FWW       Dates: Start: 08/12/22            Goal: LTG-By discharge, patient will navigate 4-6\" step with FWW and SBA/ CGA       Dates: Start: 08/12/22              "

## 2022-08-17 LAB
ANION GAP SERPL CALC-SCNC: 10 MMOL/L (ref 7–16)
BUN SERPL-MCNC: 8 MG/DL (ref 8–22)
CALCIUM SERPL-MCNC: 9.4 MG/DL (ref 8.5–10.5)
CHLORIDE SERPL-SCNC: 97 MMOL/L (ref 96–112)
CO2 SERPL-SCNC: 32 MMOL/L (ref 20–33)
CREAT SERPL-MCNC: 0.62 MG/DL (ref 0.5–1.4)
GFR SERPLBLD CREATININE-BSD FMLA CKD-EPI: 99 ML/MIN/1.73 M 2
GLUCOSE BLD STRIP.AUTO-MCNC: 105 MG/DL (ref 65–99)
GLUCOSE BLD STRIP.AUTO-MCNC: 111 MG/DL (ref 65–99)
GLUCOSE BLD STRIP.AUTO-MCNC: 166 MG/DL (ref 65–99)
GLUCOSE BLD STRIP.AUTO-MCNC: 167 MG/DL (ref 65–99)
GLUCOSE BLD STRIP.AUTO-MCNC: 91 MG/DL (ref 65–99)
GLUCOSE SERPL-MCNC: 90 MG/DL (ref 65–99)
POTASSIUM SERPL-SCNC: 2.9 MMOL/L (ref 3.6–5.5)
SODIUM SERPL-SCNC: 139 MMOL/L (ref 135–145)

## 2022-08-17 PROCEDURE — 700102 HCHG RX REV CODE 250 W/ 637 OVERRIDE(OP): Performed by: PHYSICAL MEDICINE & REHABILITATION

## 2022-08-17 PROCEDURE — 99233 SBSQ HOSP IP/OBS HIGH 50: CPT | Performed by: HOSPITALIST

## 2022-08-17 PROCEDURE — A9270 NON-COVERED ITEM OR SERVICE: HCPCS | Performed by: HOSPITALIST

## 2022-08-17 PROCEDURE — 97530 THERAPEUTIC ACTIVITIES: CPT

## 2022-08-17 PROCEDURE — 97112 NEUROMUSCULAR REEDUCATION: CPT

## 2022-08-17 PROCEDURE — 97110 THERAPEUTIC EXERCISES: CPT

## 2022-08-17 PROCEDURE — 700102 HCHG RX REV CODE 250 W/ 637 OVERRIDE(OP): Performed by: HOSPITALIST

## 2022-08-17 PROCEDURE — 99232 SBSQ HOSP IP/OBS MODERATE 35: CPT | Performed by: PHYSICAL MEDICINE & REHABILITATION

## 2022-08-17 PROCEDURE — 36415 COLL VENOUS BLD VENIPUNCTURE: CPT

## 2022-08-17 PROCEDURE — 770010 HCHG ROOM/CARE - REHAB SEMI PRIVAT*

## 2022-08-17 PROCEDURE — 82962 GLUCOSE BLOOD TEST: CPT | Mod: 91

## 2022-08-17 PROCEDURE — A9270 NON-COVERED ITEM OR SERVICE: HCPCS | Performed by: PHYSICAL MEDICINE & REHABILITATION

## 2022-08-17 PROCEDURE — 80048 BASIC METABOLIC PNL TOTAL CA: CPT

## 2022-08-17 PROCEDURE — 97116 GAIT TRAINING THERAPY: CPT

## 2022-08-17 RX ORDER — FEBUXOSTAT 40 MG/1
40 TABLET, FILM COATED ORAL
Status: DISCONTINUED | OUTPATIENT
Start: 2022-08-17 | End: 2022-08-22 | Stop reason: HOSPADM

## 2022-08-17 RX ORDER — POTASSIUM CHLORIDE 20 MEQ/1
40 TABLET, EXTENDED RELEASE ORAL 2 TIMES DAILY
Status: DISCONTINUED | OUTPATIENT
Start: 2022-08-17 | End: 2022-08-20

## 2022-08-17 RX ADMIN — ALUMINUM HYDROXIDE, MAGNESIUM HYDROXIDE, AND DIMETHICONE 20 ML: 400; 400; 40 SUSPENSION ORAL at 21:40

## 2022-08-17 RX ADMIN — POTASSIUM CHLORIDE 40 MEQ: 20 TABLET, EXTENDED RELEASE ORAL at 11:28

## 2022-08-17 RX ADMIN — APIXABAN 5 MG: 5 TABLET, FILM COATED ORAL at 08:26

## 2022-08-17 RX ADMIN — FUROSEMIDE 40 MG: 40 TABLET ORAL at 05:51

## 2022-08-17 RX ADMIN — NYSTATIN: 100000 POWDER TOPICAL at 09:00

## 2022-08-17 RX ADMIN — MONTELUKAST 10 MG: 10 TABLET, FILM COATED ORAL at 21:45

## 2022-08-17 RX ADMIN — APIXABAN 5 MG: 5 TABLET, FILM COATED ORAL at 21:44

## 2022-08-17 RX ADMIN — FEBUXOSTAT 40 MG: 40 TABLET, FILM COATED ORAL at 21:44

## 2022-08-17 RX ADMIN — Medication 1000 UNITS: at 08:26

## 2022-08-17 RX ADMIN — OMEPRAZOLE 20 MG: 20 CAPSULE, DELAYED RELEASE ORAL at 08:26

## 2022-08-17 RX ADMIN — FUROSEMIDE 40 MG: 40 TABLET ORAL at 18:01

## 2022-08-17 RX ADMIN — POTASSIUM CHLORIDE 40 MEQ: 20 TABLET, EXTENDED RELEASE ORAL at 21:44

## 2022-08-17 RX ADMIN — LORATADINE 10 MG: 10 TABLET ORAL at 09:22

## 2022-08-17 RX ADMIN — GABAPENTIN 600 MG: 300 CAPSULE ORAL at 08:26

## 2022-08-17 RX ADMIN — GABAPENTIN 600 MG: 300 CAPSULE ORAL at 21:44

## 2022-08-17 RX ADMIN — PRAVASTATIN SODIUM 10 MG: 20 TABLET ORAL at 21:45

## 2022-08-17 RX ADMIN — LEVOTHYROXINE SODIUM 112 MCG: 112 TABLET ORAL at 05:51

## 2022-08-17 RX ADMIN — INSULIN LISPRO 2 UNITS: 100 INJECTION, SOLUTION INTRAVENOUS; SUBCUTANEOUS at 11:00

## 2022-08-17 RX ADMIN — OXYCODONE HYDROCHLORIDE 10 MG: 10 TABLET ORAL at 13:21

## 2022-08-17 RX ADMIN — NYSTATIN: 100000 POWDER TOPICAL at 21:48

## 2022-08-17 ASSESSMENT — GAIT ASSESSMENTS
GAIT LEVEL OF ASSIST: CONTACT GUARD ASSIST
DEVIATION: TRENDELENBERG;DECREASED BASE OF SUPPORT;BRADYKINETIC;DECREASED HEEL STRIKE;DECREASED TOE OFF
ASSISTIVE DEVICE: FRONT WHEEL WALKER
DISTANCE (FEET): 35

## 2022-08-17 ASSESSMENT — PAIN DESCRIPTION - PAIN TYPE
TYPE: ACUTE PAIN
TYPE: ACUTE PAIN

## 2022-08-17 ASSESSMENT — ENCOUNTER SYMPTOMS
CHILLS: 0
ROS GI COMMENTS: EATING WELL
FEVER: 0
SHORTNESS OF BREATH: 0

## 2022-08-17 ASSESSMENT — ACTIVITIES OF DAILY LIVING (ADL): BED_CHAIR_WHEELCHAIR_TRANSFER_DESCRIPTION: ADAPTIVE EQUIPMENT;SET-UP OF EQUIPMENT;SUPERVISION FOR SAFETY;VERBAL CUEING

## 2022-08-17 NOTE — THERAPY
"Occupational Therapy  Daily Treatment     Patient Name: Jenifer Ramos  Age:  62 y.o., Sex:  female  Medical Record #: [id number]  Today's Date: 8/17/2022     Precautions  Precautions: Fall Risk, Other (See Comments)  Comments: 2L O2 at baseline; strict droplet & contact precautions         Subjective    Pt seated in w/c upon arrival. Pt hyperverbose throughout session.    Pt stated \"I still cant do things that I normally would be able to do at home\". When prompted further pt concern unable to complete meal preparation d/t decreased L hand strength. Pt also expressed frustration with inability to complete toileting independently d/t limited ability to manage clothing pre and post void on L side.    Pt also noted increased fatigue today.     Objective       08/17/22 0931   OT Charge Group   OT Therapy Activity 1   OT Therapeutic Exercise  3   OT Total Time Spent   OT Individual Total Time Spent (Mins) 60   Sitting Upper Body Exercises   Front Arm Raise 3 sets of 15;Right ;Other Resistance (See Comments)  (Pink theraband)   Side Arm Raise 3 sets of 15;Right ;Other Resistance (See Comments)  (Pink theraband)   Bicep Curls 3 sets of 15;Right ;Other Resistance (See Comments)  (Pink theraband)   Hand Strengthening   Hand Strengthening Left Pinch;Gross Grasp Left  (Pt completed gross grasp 4 sets fo 15 wtih Blue foam block; completed lateral pinching 4 sets of 15)   Interdisciplinary Plan of Care Collaboration   Patient Position at End of Therapy Seated;Self Releasing Lap Belt Applied;Call Light within Reach;Tray Table within Reach     Utilized empathetic listening and interviewing techniques to discuss important activities completed at home that pt does not feel able to complete at this time as noted above.     Encouraged pt to cont to work with blue foam block while in room.    Assessment    Pt tolerated session focusing on RUE strengthening and L hand strengthening. Pt willing to participate in all activities given " and motivated to work on LUE as she feels it is a barrier to completing many tasks with increased independence. Pt would benefit from cont work on overall endurance as she took long breaks between activities/exercises.     Strengths: Able to follow instructions, Manages pain appropriately, Motivated for self care and independence, Supportive family, Willingly participates in therapeutic activities  Barriers: Decreased endurance, Generalized weakness, Impaired activity tolerance, Impaired balance, Limited mobility    Plan    Trial use of sock aide and LHSH, overall strength and endurance training, standing balance/tolerance.       Occupational Therapy Goals (Active)       Problem: Dressing       Dates: Start: 08/12/22         Goal: STG-Within one week, patient will dress UB with setup and supervision.        Dates: Start: 08/12/22         Goal Note filed on 08/16/22 1158 by Analia Salmon, OT       SBA and verbal cues, limited by LUE impairment                 Goal: STG-Within one week, patient will dress LB with Lupillo and use of AE PRN.        Dates: Start: 08/16/22               Problem: OT Long Term Goals       Dates: Start: 08/12/22         Goal: LTG-By discharge, patient will complete basic self care tasks with Lupillo to modified independence and use of AE PRN.        Dates: Start: 08/12/22            Goal: LTG-By discharge, patient will perform bathroom transfers with supervision/SBA to modified independence and use of AE PRN.        Dates: Start: 08/12/22               Problem: Toileting       Dates: Start: 08/16/22         Goal: STG-Within one week, patient will complete toileting tasks with SBA and use of AE PRN.        Dates: Start: 08/16/22

## 2022-08-17 NOTE — THERAPY
Physical Therapy   Daily Treatment     Patient Name: Jenifer Ramos  Age:  62 y.o., Sex:  female  Medical Record #: [id number]  Today's Date: 8/17/2022     Precautions  Precautions: Fall Risk, Other (See Comments)  Comments: 2L O2 at baseline; strict droplet & contact precautions    Subjective    Pt was seated in w/c upon arrival and agreeable to treatment.       Objective       08/17/22 1331   PT Charge Group   PT Gait Training 2   PT Neuromuscular Re-Education / Balance 1   PT Therapeutic Activities 1   PT Total Time Spent   PT Individual Total Time Spent (Mins) 60   Precautions   Precautions Fall Risk;Other (See Comments)   Vitals   Pulse 75   Room Air Oximetry 96   O2 (LPM) 2   O2 Delivery Device Nasal Cannula   Gait Functional Level of Assist    Gait Level Of Assist Contact Guard Assist   Assistive Device Front Wheel Walker   Distance (Feet) 35  (x 2, x 20 feet x 2)   # of Times Distance was Traveled 2   Deviation Trendelenberg;Decreased Base Of Support;Bradykinetic;Decreased Heel Strike;Decreased Toe Off   Transfer Functional Level of Assist   Bed, Chair, Wheelchair Transfer Contact Guard Assist   Bed Chair Wheelchair Transfer Description Adaptive equipment;Set-up of equipment;Supervision for safety;Verbal cueing   Interdisciplinary Plan of Care Collaboration   Patient Position at End of Therapy Seated;Call Light within Reach;Tray Table within Reach;Phone within Reach     Discussion of POC, goals.  Static balance training with FWW with focus on 4 point foot and increased body awareness with one UE and B UE support.  Education provided on balance systems and strategies.  STS training with focus on sequencing x 5 reps.     Assessment    Pt hyperverbose at times, but redirectable.  Pt limited in walking distance this session d/t fatigue.  Pt limited in balance strategies and reactions d/t sensory deficits.   Strengths: Able to follow instructions, Adequate strength, Motivated for self care and independence,  "Pleasant and cooperative, Supportive family, Willingly participates in therapeutic activities  Barriers: Decreased endurance, Fatigue, Generalized weakness, Home accessibility, Impaired activity tolerance, Impaired balance, Limited mobility, Pain (LUE weakness/ pain and prior shoulder replacement)    Plan       Standing balance, seated/ standing exercises, gait with FWW     Passport items to be completed:  Get in/out of bed safely, in/out of a vehicle, safely use mobility device, walk or wheel around home/community, navigate up and down stairs, show how to get up/down from the ground, ensure home is accessible, demonstrate HEP, complete caregiver training       Physical Therapy Problems (Active)       Problem: Mobility       Dates: Start: 08/12/22         Goal: STG-Within one week, patient will ambulate household distance CGA with FWW 25 ft x 2       Dates: Start: 08/12/22            Goal: STG-Within one week, patient will ambulate up/down a curb min A with FWW for 4-6\" rise       Dates: Start: 08/12/22               Problem: Mobility Transfers       Dates: Start: 08/12/22         Goal: STG-Within one week, patient will perform bed mobility min A with bed controls as needed       Dates: Start: 08/12/22            Goal: STG-Within one week, patient will sit to stand CGA to FWW       Dates: Start: 08/12/22            Goal: STG-Within one week, patient will transfer bed to chair CGA with FWW       Dates: Start: 08/12/22               Problem: PT-Long Term Goals       Dates: Start: 08/12/22         Goal: LTG-By discharge, patient will ambulate SPV / modified independent with FWW x 50 ft       Dates: Start: 08/12/22            Goal: LTG-By discharge, patient will transfer one surface to another SPV/ modified independent with FWW       Dates: Start: 08/12/22            Goal: LTG-By discharge, patient will transfer in/out of a car SBA/ CGA with FWW       Dates: Start: 08/12/22            Goal: LTG-By discharge, patient " "will navigate 4-6\" step with FWW and SBA/ CGA       Dates: Start: 08/12/22              "

## 2022-08-17 NOTE — CARE PLAN
Problem: VTE Prevention  Goal: Patient will remain free from venous thromboembolism (VTE)  Outcome: Progressing  Note: Pt is up and walking, participates in therapies, and up to table room for all meals.    The patient is Watcher - Medium risk of patient condition declining or worsening    Shift Goals  Clinical Goals: Safety, blood sugar management  Patient Goals: sleep well, pain control    Progress made toward(s) clinical / shift goals:  no s/s dvt    Patient is not progressing towards the following goals:

## 2022-08-17 NOTE — THERAPY
Occupational Therapy  Daily Treatment     Patient Name: Jenifer Ramos  Age:  62 y.o., Sex:  female  Medical Record #: [id number]  Today's Date: 8/17/2022     Precautions  Precautions: Fall Risk, Other (See Comments)  Comments: 2L O2 at baseline; strict droplet & contact precautions         Subjective    Pt seated in w/c upon arrival, requesting to work on her LUE ROM.      Objective     08/17/22 0831   OT Charge Group   OT Therapy Activity 2   OT Total Time Spent   OT Individual Total Time Spent (Mins) 30   Sleep/Wake Cycle   Sleep & Rest Awake   Sitting Upper Body Exercises   Comments Pt completed 1x10 AAROM LUE elbow flexion/extension and wrist flexion/extension followed by 1x20 AROM of same exercises plus pronation/supination and finger flexion/extension for strengthening and AROM. Pt was unable to tolerate any shoulder PROM due to pain.   Interdisciplinary Plan of Care Collaboration   Patient Position at End of Therapy Seated;Self Releasing Lap Belt Applied;Call Light within Reach;Tray Table within Reach;Phone within Reach     Positioned LUE with pillow seated in w/c for improved comfort.     Educated pt on Passport to [place] program, role of each discipline, and goals of rehab.     Assessment    Pt is unable to tolerate any PROM or AROM of the L shoulder but elbow, wrist and finger AROM is WFL, though very weak. Pt also has dense tissue (scar tissue?) along the scar on the anterior aspect of her humerus from an old surgery that she reports is painful and limiting for her ROM.   Strengths: Able to follow instructions, Manages pain appropriately, Motivated for self care and independence, Supportive family, Willingly participates in therapeutic activities  Barriers: Decreased endurance, Generalized weakness, Impaired activity tolerance, Impaired balance, Limited mobility    Plan    Trial use of sock aide and LHSH, overall strength and endurance training, standing balance/tolerance.    Passport items to be  completed:  Perform bathroom transfers, complete dressing, complete feeding, get ready for the day, prepare a simple meal, participate in household tasks, adapt home for safety needs, demonstrate home exercise program, complete caregiver training     Occupational Therapy Goals (Active)       Problem: Dressing       Dates: Start: 08/12/22         Goal: STG-Within one week, patient will dress UB with setup and supervision.        Dates: Start: 08/12/22         Goal Note filed on 08/16/22 1158 by Analia Salmon, OT       SBA and verbal cues, limited by LUE impairment                 Goal: STG-Within one week, patient will dress LB with Lupillo and use of AE PRN.        Dates: Start: 08/16/22               Problem: OT Long Term Goals       Dates: Start: 08/12/22         Goal: LTG-By discharge, patient will complete basic self care tasks with Lupillo to modified independence and use of AE PRN.        Dates: Start: 08/12/22            Goal: LTG-By discharge, patient will perform bathroom transfers with supervision/SBA to modified independence and use of AE PRN.        Dates: Start: 08/12/22               Problem: Toileting       Dates: Start: 08/16/22         Goal: STG-Within one week, patient will complete toileting tasks with SBA and use of AE PRN.        Dates: Start: 08/16/22

## 2022-08-17 NOTE — THERAPY
"Occupational Therapy  Daily Treatment     Patient Name: Jenifer Ramos  Age:  62 y.o., Sex:  female  Medical Record #: [id number]  Today's Date: 8/17/2022     Precautions  Precautions: Fall Risk, Other (See Comments)  Comments: 2L O2 at baseline; strict droplet & contact precautions         Subjective    \"I leave tomorrow. They weren't giving me enough Potassium here so I can't stay any longer. That's the plan.. Well yes, they fixed my potassium, but I'm still leaving.\"      Objective       08/17/22 1431   OT Charge Group   OT Therapeutic Exercise  2   OT Total Time Spent   OT Individual Total Time Spent (Mins) 30   Sitting Upper Body Exercises   Comments UE body weight exercise. horizontal abd x10, vertical abd x10, combo vert and horiz 3 sets of 10.   Sitting Lower Body Exercises   Sit to Stand   (1 set of 5 FWW cues for posture and for reaching for arm rest for controlled sitting)   Interdisciplinary Plan of Care Collaboration   IDT Collaboration with  Physical Therapist   Patient Position at End of Therapy Seated;Self Releasing Lap Belt Applied;Call Light within Reach;Tray Table within Reach;Phone within Reach   Collaboration Comments hand off of care from PT       Assessment    Pt was tangential but motivated to participate. Pt thinks she is going home tomorrow, but the chart says 8 days to expected d/c.     Strengths: Able to follow instructions, Manages pain appropriately, Motivated for self care and independence, Supportive family, Willingly participates in therapeutic activities  Barriers: Decreased endurance, Generalized weakness, Impaired activity tolerance, Impaired balance, Limited mobility    Plan    Trial use of sock aide and LHSH, overall strength and endurance training, standing balance/tolerance.    Occupational Therapy Goals (Active)       Problem: Dressing       Dates: Start: 08/12/22         Goal: STG-Within one week, patient will dress UB with setup and supervision.        Dates: Start: " 08/12/22         Goal Note filed on 08/16/22 1158 by Analia Salmon OT       SBA and verbal cues, limited by LUE impairment                 Goal: STG-Within one week, patient will dress LB with Lupillo and use of AE PRN.        Dates: Start: 08/16/22               Problem: OT Long Term Goals       Dates: Start: 08/12/22         Goal: LTG-By discharge, patient will complete basic self care tasks with Lupillo to modified independence and use of AE PRN.        Dates: Start: 08/12/22            Goal: LTG-By discharge, patient will perform bathroom transfers with supervision/SBA to modified independence and use of AE PRN.        Dates: Start: 08/12/22               Problem: Toileting       Dates: Start: 08/16/22         Goal: STG-Within one week, patient will complete toileting tasks with SBA and use of AE PRN.        Dates: Start: 08/16/22

## 2022-08-17 NOTE — PROGRESS NOTES
[facility] Medicine Daily Progress Note    Date of Service  8/17/2022    Chief Complaint  Jenifer Ramos is a 62 y.o. female admitted 8/11/2022 with shortness of breath.    Hospital Course  Ms. Ramos is a 62-year-old female with a history of diabetes, factor V [place] on Eliquis, diastolic heart failure who originally presented to [facility] on 7/20 with shortness of breath.  She was discharged 7/22 but then had a fall at her home and presented to [facility].  There she was noted to have severe hyponatremia.  She was given IV fluids with some sodium improvement.  Due to ongoing physical debility she was accepted to [facility] rehab on 8/11/2022.  On 8/11 she was noted to be positive for COVID 19.  Her last sodium 8/12: 141.    Interval Problem Update  8/16/2022 vital signs stable was on CPAP overnight now down to 2 L nasal cannula with SPO2 98.  8/17: Ms. Ramos was evaluated and examined at rehab. She has been on Lasix 40 mEq BID and K went down to 2.9 today. KCl 40 mEq BID has been ordered and repeat BMP ordered for the morning. She remains on 2 liters nasal cannula which is her baseline. COVID isolations in place.    I have discussed this patient's plan of care and discharge plan at IDT rounds today with Case Management, Nursing, Nursing leadership, and other members of the IDT team.    Consultants/Specialty  physiatry    Code Status  Full Code    Disposition  Patient is not medically cleared for discharge.   Anticipate discharge to to home with close outpatient follow-up.  I have placed the appropriate orders for post-discharge needs.    Review of Systems  Review of Systems   Constitutional:  Negative for chills and fever.   Respiratory:  Negative for shortness of breath.    Cardiovascular:  Negative for chest pain.        Less swelling of her legs   Gastrointestinal:         Eating well   Neurological:         Generally tired   All other systems reviewed and are negative.     Physical Exam  Temp:   [36.6 °C (97.8 °F)-36.9 °C (98.5 °F)] 36.6 °C (97.8 °F)  Pulse:  [] 74  Resp:  [16-19] 18  BP: ()/(58-83) 108/68  SpO2:  [95 %-99 %] 99 %    Physical Exam  Vitals and nursing note reviewed.   Constitutional:       General: She is not in acute distress.     Appearance: She is obese. She is not toxic-appearing.   HENT:      Head: Normocephalic.      Mouth/Throat:      Mouth: Mucous membranes are dry.      Pharynx: Oropharynx is clear.   Eyes:      General: No scleral icterus.     Conjunctiva/sclera: Conjunctivae normal.   Cardiovascular:      Rate and Rhythm: Normal rate and regular rhythm.   Pulmonary:      Effort: Pulmonary effort is normal.      Breath sounds: Normal breath sounds.      Comments: 2 liters nasal cannula oxygen  Abdominal:      General: There is no distension.   Musculoskeletal:      Cervical back: Normal range of motion and neck supple.      Comments: Mild lower extremity swelling without pitting edema.    Skin:     General: Skin is warm and dry.      Coloration: Skin is pale.   Neurological:      General: No focal deficit present.      Mental Status: She is alert and oriented to person, place, and time.   Psychiatric:      Comments: Flat affect and mood       Fluids    Intake/Output Summary (Last 24 hours) at 8/17/2022 0825  Last data filed at 8/17/2022 0551  Gross per 24 hour   Intake 1560 ml   Output --   Net 1560 ml       Laboratory      Recent Labs     08/17/22  0624   SODIUM 139   POTASSIUM 2.9*   CHLORIDE 97   CO2 32   GLUCOSE 90   BUN 8   CREATININE 0.62   CALCIUM 9.4                   Imaging  No orders to display        Assessment/Plan  Lower extremity edema  Assessment & Plan  Has hx  Mostly pedal swelling  BNP: 2476 (7/20) -- no other values in Epic for comparison  On Lasix: 40 mg bid and repeat BMP reveals low K at 2.9 thus KCl 40 mEq BID was ordered. She had previously been on Aldactone 50 mg which was stopped on her last admission to [facility]. Check  BMP on 8/18.  7/21/2022 echocardiogram shows preserved EF but grade 2 diastolic dysfunction and inability to diagnose right ventricular systolic pressure.  Suspect she may have a component of pulmonary hypertension but unknown.    COVID-19  Assessment & Plan  Covid (+) on 8/11 at the Rehab  Asymptomatic    Labile blood pressure  Assessment & Plan  Monitor vitals  Note: BP dipped lower with SBP 90 on am of 8/12  BP remains low.  Her home Aldactone has been held  Encouraging fluid intake    Anemia  Assessment & Plan  Hb improved to 10.3 (8/12)  Outpatient follow up is appropriate    Dyspnea- (present on admission)  Assessment & Plan  She is chronically on 2 liters of [place] oxygen and remains on it at [facility].  Presented to [facility] with increasing SOB  Trop: unremarkable  Echo (7/21/22): EF 65%, GII-DD, inferior wall hypokinesis  Nuc Stress Test (7/21): mormal left ventricular systolic function, EF 65%; inferior wall hypokinesis  7/20/22 CTA: no PE, but ground glass opacifications with possible edema.  TSH ok (8/12)  BNP: 2476 (7/20) -- no other values in Epic for comparison  On O2 supplementation  Pt to follow up with Cardio as out patient    Hypothyroidism- (present on admission)  Assessment & Plan  TSH:1.51 (8/12)  On Synthroid 112 mcg daily for active management    DM (diabetes mellitus) (HCC)- (present on admission)  Assessment & Plan  Hba1c: 5.9 (8/12)  Monitor accuchecks  On Tresiba (home med)  Note: pt has her home meds here at the Rehab  Note: home meds include Tresiab and Lispro 20-40 units bid (per SS)  Diabetic diet       VTE prophylaxis: therapeutic anticoagulation with Eliquis     I have performed a physical exam and reviewed and updated ROS and Plan today (8/17/2022). In review of yesterday's note (8/16/2022), there are no changes except as documented above.

## 2022-08-17 NOTE — CARE PLAN
"The patient is Stable - Low risk of patient condition declining or worsening    Shift Goals  Clinical Goals: Safety, blood sugar management  Patient Goals: sleep well, pain control    Progress made toward(s) clinical / shift goals:      Problem: Bladder / Voiding  Goal: Patient will establish and maintain regular urinary output  Outcome: Progressing  Note: Patient has been continent of bladder so far this shift     Problem: Infection  Goal: Patient will remain free from infection  Outcome: Progressing  Note: No s/s of infection present      Patient is not progressing towards the following goals:    Problem: Bowel Elimination  Goal: Patient will participate in bowel management program  Outcome: Not Progressing  Note: Patient reported having loose BMs and refused Pericolace.     Problem: Fall Risk - Rehab  Goal: Patient will remain free from falls  Outcome: Not Met  Note: Sophia Smith Fall risk Assessment Score: 14    Moderate fall risk Interventions  - Bed and strip alarm   - Yellow sign by the door   - Yellow wrist band \"Fall risk\"  - Fall risk education provided     "

## 2022-08-17 NOTE — PROGRESS NOTES
"Rehab Progress Note     Encounter Date: 8/17/2022    CC: Decreased mobility, COVID+    Interval Events (Subjective)  Patient sitting up in room. She reports therapy is going well. She reports her rash is a little better. Reviewed AM labs and patient with hypokalemia. Discussed with hospitalist and starting on K supplement. Denies NVD. Denies SOB.     IDT Team Meeting 8/16/2022  DC/Disposition:  8/25/22    Objective:  VITAL SIGNS: /68   Pulse 74   Temp 37 °C (98.6 °F)   Resp 18   Ht 1.727 m (5' 8\")   Wt 120 kg (265 lb 3.4 oz)   SpO2 99%   BMI 40.33 kg/m²   Gen: NAD  Psych: Mood and affect appropriate  CV: RRR, no edema  Resp: CTAB, no upper airway sounds  Abd: NTND  Neuro: AOx4, following commands    Recent Results (from the past 72 hour(s))   POCT glucose device results    Collection Time: 08/14/22  5:14 PM   Result Value Ref Range    POC Glucose, Blood 126 (H) 65 - 99 mg/dL   POCT glucose device results    Collection Time: 08/14/22  8:36 PM   Result Value Ref Range    POC Glucose, Blood 133 (H) 65 - 99 mg/dL   POCT glucose device results    Collection Time: 08/15/22  7:43 AM   Result Value Ref Range    POC Glucose, Blood 90 65 - 99 mg/dL   POCT glucose device results    Collection Time: 08/15/22 10:58 AM   Result Value Ref Range    POC Glucose, Blood 144 (H) 65 - 99 mg/dL   POCT glucose device results    Collection Time: 08/15/22  5:17 PM   Result Value Ref Range    POC Glucose, Blood 93 65 - 99 mg/dL   POCT glucose device results    Collection Time: 08/15/22  5:47 PM   Result Value Ref Range    POC Glucose, Blood 96 65 - 99 mg/dL   POCT glucose device results    Collection Time: 08/15/22  8:54 PM   Result Value Ref Range    POC Glucose, Blood 129 (H) 65 - 99 mg/dL   POCT glucose device results    Collection Time: 08/16/22  7:16 AM   Result Value Ref Range    POC Glucose, Blood 83 65 - 99 mg/dL   POCT glucose device results    Collection Time: 08/16/22  8:07 AM   Result Value Ref Range    POC Glucose, " Blood 114 (H) 65 - 99 mg/dL   POCT glucose device results    Collection Time: 08/16/22 11:02 AM   Result Value Ref Range    POC Glucose, Blood 134 (H) 65 - 99 mg/dL   POCT glucose device results    Collection Time: 08/16/22  5:07 PM   Result Value Ref Range    POC Glucose, Blood 102 (H) 65 - 99 mg/dL   POCT glucose device results    Collection Time: 08/16/22  9:17 PM   Result Value Ref Range    POC Glucose, Blood 121 (H) 65 - 99 mg/dL   Basic Metabolic Panel    Collection Time: 08/17/22  6:24 AM   Result Value Ref Range    Sodium 139 135 - 145 mmol/L    Potassium 2.9 (L) 3.6 - 5.5 mmol/L    Chloride 97 96 - 112 mmol/L    Co2 32 20 - 33 mmol/L    Glucose 90 65 - 99 mg/dL    Bun 8 8 - 22 mg/dL    Creatinine 0.62 0.50 - 1.40 mg/dL    Calcium 9.4 8.5 - 10.5 mg/dL    Anion Gap 10.0 7.0 - 16.0   ESTIMATED GFR    Collection Time: 08/17/22  6:24 AM   Result Value Ref Range    GFR (CKD-EPI) 99 >60 mL/min/1.73 m 2   POCT glucose device results    Collection Time: 08/17/22  8:25 AM   Result Value Ref Range    POC Glucose, Blood 91 65 - 99 mg/dL   POCT glucose device results    Collection Time: 08/17/22  9:16 AM   Result Value Ref Range    POC Glucose, Blood 166 (H) 65 - 99 mg/dL   POCT glucose device results    Collection Time: 08/17/22 11:27 AM   Result Value Ref Range    POC Glucose, Blood 167 (H) 65 - 99 mg/dL       Current Facility-Administered Medications   Medication Frequency    potassium chloride SA (Kdur) tablet 40 mEq BID    levothyroxine (SYNTHROID) tablet 112 mcg DAILY    nystatin (MYCOSTATIN) powder BID    gabapentin (NEURONTIN) capsule 600 mg BID    furosemide (LASIX) tablet 40 mg BID DIURETIC    Insulin Degludec SOPN 120 Units DAILY    loratadine (CLARITIN) tablet 10 mg QDAY PRN    menthol (Halls) lozenge 1 Lozenge Q2HRS PRN    insulin lispro (AdmeLOG,HumaLOG) injection 4X/DAY ACHS    vitamin D3 (cholecalciferol) tablet 1,000 Units DAILY    apixaban (ELIQUIS) tablet 5 mg BID    febuxostat (ULORIC) tablet 40 mg  QHS    montelukast (SINGULAIR) tablet 10 mg Q EVENING    pravastatin (PRAVACHOL) tablet 10 mg Nightly    rizatriptan (MAXALT-MLT) disintegrating tablet 10 mg Once PRN    Respiratory Therapy Consult Continuous RT    hydrALAZINE (APRESOLINE) tablet 25 mg Q8HRS PRN    acetaminophen (Tylenol) tablet 650 mg Q4HRS PRN    senna-docusate (PERICOLACE or SENOKOT S) 8.6-50 MG per tablet 2 Tablet BID    And    polyethylene glycol/lytes (MIRALAX) PACKET 1 Packet QDAY PRN    And    magnesium hydroxide (MILK OF MAGNESIA) suspension 30 mL QDAY PRN    And    bisacodyl (DULCOLAX) suppository 10 mg QDAY PRN    omeprazole (PRILOSEC) capsule 20 mg DAILY    artificial tears ophthalmic solution 1 Drop PRN    mag hydrox-al hydrox-simeth (MAALOX PLUS ES or MYLANTA DS) suspension 20 mL Q2HRS PRN    ondansetron (ZOFRAN ODT) dispertab 4 mg 4X/DAY PRN    Or    ondansetron (ZOFRAN) syringe/vial injection 4 mg 4X/DAY PRN    traZODone (DESYREL) tablet 50 mg QHS PRN    sodium chloride (OCEAN) 0.65 % nasal spray 2 Spray PRN    oxyCODONE immediate-release (ROXICODONE) tablet 5 mg Q3HRS PRN    Or    oxyCODONE immediate release (ROXICODONE) tablet 10 mg Q3HRS PRN    levalbuterol (XOPENEX) 1.25 MG/3ML nebulizer solution 1.25 mg Q4H PRN (RT)    Fluticasone-Umeclidin-Vilant 200-62.5-25 MCG/INH AEPB 1 Puff DAILY       Orders Placed This Encounter   Procedures    Diet Order Diet: Consistent CHO (Diabetic)     Standing Status:   Standing     Number of Occurrences:   1     Order Specific Question:   Diet:     Answer:   Consistent CHO (Diabetic) [4]       Assessment:  Active Hospital Problems    Diagnosis     Anemia     Lower extremity edema     Labile blood pressure     COVID-19     Dyspnea     Hypothyroidism     DM (diabetes mellitus) ([facility])        Medical Decision Making and Plan:   Debility - Patient with CHF exacerbation with fluid overload then over diuresis causing dehydration and orthostatic hypotension.  -PT and OT for mobility and ADLs     dCHF/HTN -  Patient on Lasix 40 mg BID. Will monitor   -Consult hospitalist     DM2 with hyperglycemia - Patient on Insulin pre meal and 276 U degludec  -Consult hospitalist. Patient reduced her Degludec to 120 U. Patient would like to increase blood sugar checks     Cirrhosis - Noted in problem list from US last month     Hyponatremia - Check AM CMP - repeat WNL. Consult hospitalist     Hypokalemia - 2.9 on 8/17/22. Start K supplement. Repeat CMP    Asthma - Patient on Singulair and inhaler. Add PRN Claritin     HLD - Patient on Pravastatin 10 mg daily     Left shoulder injury - Patient with replacement in the past.      Hx of Gout - On Uloric on transfer     Migraines - Patient brought in [facility], available PRN    Hypothyroidism - Patient on Levothyroxine 112 mcg     Skin rash - in pannus fold. Start Nystatin Powder TID    Vitamin D deficiency - mild at 29. Start 1000 U     COVID + - on screen. No s/s. Special isolation.     Morbid Obesity due to excess calories - BMI of 42.7 on admission, meets medical criteria. Dietitian to consult     Factor 5 Leiden/DVT ppx - Patient on Eliquis 5 mg BID at baseline.     Total time:  26 minutes.  I spent greater than 50% of the time for patient care, counseling, and coordination on this date, including unit/floor time, and face-to-face time with the patient as per interval events and assessment and plan above. Topics discussed included more frequent blood sugar checks, hypokalemia, start supplement, and improving rash.     Doron Brand M.D.

## 2022-08-18 LAB
ANION GAP SERPL CALC-SCNC: 11 MMOL/L (ref 7–16)
BUN SERPL-MCNC: 9 MG/DL (ref 8–22)
CALCIUM SERPL-MCNC: 9.6 MG/DL (ref 8.5–10.5)
CHLORIDE SERPL-SCNC: 98 MMOL/L (ref 96–112)
CO2 SERPL-SCNC: 32 MMOL/L (ref 20–33)
CREAT SERPL-MCNC: 0.74 MG/DL (ref 0.5–1.4)
GFR SERPLBLD CREATININE-BSD FMLA CKD-EPI: 90 ML/MIN/1.73 M 2
GLUCOSE BLD STRIP.AUTO-MCNC: 115 MG/DL (ref 65–99)
GLUCOSE BLD STRIP.AUTO-MCNC: 126 MG/DL (ref 65–99)
GLUCOSE BLD STRIP.AUTO-MCNC: 129 MG/DL (ref 65–99)
GLUCOSE BLD STRIP.AUTO-MCNC: 144 MG/DL (ref 65–99)
GLUCOSE BLD STRIP.AUTO-MCNC: 93 MG/DL (ref 65–99)
GLUCOSE SERPL-MCNC: 91 MG/DL (ref 65–99)
POTASSIUM SERPL-SCNC: 3.2 MMOL/L (ref 3.6–5.5)
SODIUM SERPL-SCNC: 141 MMOL/L (ref 135–145)

## 2022-08-18 PROCEDURE — 97535 SELF CARE MNGMENT TRAINING: CPT

## 2022-08-18 PROCEDURE — A9270 NON-COVERED ITEM OR SERVICE: HCPCS | Performed by: PHYSICAL MEDICINE & REHABILITATION

## 2022-08-18 PROCEDURE — 82962 GLUCOSE BLOOD TEST: CPT

## 2022-08-18 PROCEDURE — 36415 COLL VENOUS BLD VENIPUNCTURE: CPT

## 2022-08-18 PROCEDURE — A9270 NON-COVERED ITEM OR SERVICE: HCPCS | Performed by: HOSPITALIST

## 2022-08-18 PROCEDURE — 80048 BASIC METABOLIC PNL TOTAL CA: CPT

## 2022-08-18 PROCEDURE — 700102 HCHG RX REV CODE 250 W/ 637 OVERRIDE(OP): Performed by: PHYSICAL MEDICINE & REHABILITATION

## 2022-08-18 PROCEDURE — 97110 THERAPEUTIC EXERCISES: CPT

## 2022-08-18 PROCEDURE — 97116 GAIT TRAINING THERAPY: CPT

## 2022-08-18 PROCEDURE — 99233 SBSQ HOSP IP/OBS HIGH 50: CPT | Performed by: HOSPITALIST

## 2022-08-18 PROCEDURE — 94660 CPAP INITIATION&MGMT: CPT

## 2022-08-18 PROCEDURE — 700102 HCHG RX REV CODE 250 W/ 637 OVERRIDE(OP): Performed by: HOSPITALIST

## 2022-08-18 PROCEDURE — 770010 HCHG ROOM/CARE - REHAB SEMI PRIVAT*

## 2022-08-18 PROCEDURE — 97530 THERAPEUTIC ACTIVITIES: CPT

## 2022-08-18 PROCEDURE — 94640 AIRWAY INHALATION TREATMENT: CPT

## 2022-08-18 PROCEDURE — 99232 SBSQ HOSP IP/OBS MODERATE 35: CPT | Performed by: PHYSICAL MEDICINE & REHABILITATION

## 2022-08-18 RX ORDER — LANOLIN ALCOHOL/MO/W.PET/CERES
400 CREAM (GRAM) TOPICAL
Status: DISCONTINUED | OUTPATIENT
Start: 2022-08-18 | End: 2022-08-22 | Stop reason: HOSPADM

## 2022-08-18 RX ORDER — LANOLIN ALCOHOL/MO/W.PET/CERES
400 CREAM (GRAM) TOPICAL DAILY
Status: DISCONTINUED | OUTPATIENT
Start: 2022-08-18 | End: 2022-08-22 | Stop reason: HOSPADM

## 2022-08-18 RX ADMIN — ACETAMINOPHEN 650 MG: 325 TABLET ORAL at 20:24

## 2022-08-18 RX ADMIN — GABAPENTIN 600 MG: 300 CAPSULE ORAL at 20:24

## 2022-08-18 RX ADMIN — FUROSEMIDE 40 MG: 40 TABLET ORAL at 17:32

## 2022-08-18 RX ADMIN — Medication 1000 UNITS: at 09:05

## 2022-08-18 RX ADMIN — MONTELUKAST 10 MG: 10 TABLET, FILM COATED ORAL at 20:23

## 2022-08-18 RX ADMIN — MAGNESIUM OXIDE TAB 400 MG (241.3 MG ELEMENTAL MG) 400 MG: 400 (241.3 MG) TAB at 17:32

## 2022-08-18 RX ADMIN — OXYCODONE 5 MG: 5 TABLET ORAL at 20:25

## 2022-08-18 RX ADMIN — POTASSIUM CHLORIDE 40 MEQ: 20 TABLET, EXTENDED RELEASE ORAL at 09:04

## 2022-08-18 RX ADMIN — OMEPRAZOLE 20 MG: 20 CAPSULE, DELAYED RELEASE ORAL at 09:05

## 2022-08-18 RX ADMIN — POTASSIUM CHLORIDE 40 MEQ: 20 TABLET, EXTENDED RELEASE ORAL at 20:24

## 2022-08-18 RX ADMIN — OXYCODONE HYDROCHLORIDE 10 MG: 10 TABLET ORAL at 09:41

## 2022-08-18 RX ADMIN — NYSTATIN: 100000 POWDER TOPICAL at 09:36

## 2022-08-18 RX ADMIN — LEVOTHYROXINE SODIUM 112 MCG: 112 TABLET ORAL at 06:03

## 2022-08-18 RX ADMIN — PRAVASTATIN SODIUM 10 MG: 20 TABLET ORAL at 20:25

## 2022-08-18 RX ADMIN — GABAPENTIN 600 MG: 300 CAPSULE ORAL at 09:04

## 2022-08-18 RX ADMIN — NYSTATIN: 100000 POWDER TOPICAL at 20:23

## 2022-08-18 RX ADMIN — OXYCODONE 5 MG: 5 TABLET ORAL at 00:03

## 2022-08-18 RX ADMIN — FUROSEMIDE 40 MG: 40 TABLET ORAL at 06:03

## 2022-08-18 RX ADMIN — FEBUXOSTAT 40 MG: 40 TABLET, FILM COATED ORAL at 20:24

## 2022-08-18 RX ADMIN — APIXABAN 5 MG: 5 TABLET, FILM COATED ORAL at 20:23

## 2022-08-18 RX ADMIN — APIXABAN 5 MG: 5 TABLET, FILM COATED ORAL at 09:05

## 2022-08-18 ASSESSMENT — GAIT ASSESSMENTS
DISTANCE (FEET): 50
GAIT LEVEL OF ASSIST: CONTACT GUARD ASSIST
ASSISTIVE DEVICE: FRONT WHEEL WALKER
DEVIATION: TRENDELENBERG;BRADYKINETIC;DECREASED HEEL STRIKE;DECREASED TOE OFF

## 2022-08-18 ASSESSMENT — ENCOUNTER SYMPTOMS
COUGH: 0
ROS GI COMMENTS: EATING WELL
CHILLS: 0
SHORTNESS OF BREATH: 0
FEVER: 0

## 2022-08-18 ASSESSMENT — ACTIVITIES OF DAILY LIVING (ADL)
TOILET_TRANSFER_DESCRIPTION: ADAPTIVE EQUIPMENT;GRAB BAR;SET-UP OF EQUIPMENT;VERBAL CUEING
BED_CHAIR_WHEELCHAIR_TRANSFER_DESCRIPTION: ADAPTIVE EQUIPMENT;INCREASED TIME;SET-UP OF EQUIPMENT;VERBAL CUEING

## 2022-08-18 ASSESSMENT — PAIN DESCRIPTION - PAIN TYPE
TYPE: ACUTE PAIN
TYPE: ACUTE PAIN

## 2022-08-18 NOTE — FLOWSHEET NOTE
08/18/22 0749   Events/Summary/Plan   Events/Summary/Plan O2 check, MDI Trelegy pt home med given rinse after   Vital Signs   Pulse 78   Respiration 18   Pulse Oximetry 94 %   Respiratory Assessment   Respiratory Pattern Within Normal Limits   Level of Consciousness Alert   Chest Exam   Work Of Breathing / Effort Within Normal Limits   Breath Sounds   RUL Breath Sounds Diminished   RML Breath Sounds Diminished   RLL Breath Sounds Diminished   DANIELLE Breath Sounds Diminished   LLL Breath Sounds Diminished   Secretions   Cough Non Productive   Oxygen   O2 (LPM) 2   O2 Delivery Device Silicone Nasal Cannula   Pt wore home BIPAP

## 2022-08-18 NOTE — CARE PLAN
"The patient is Stable - Low risk of patient condition declining or worsening    Shift Goals  Clinical Goals: Safety, blood sugar management  Patient Goals: sleep well, pain control    Progress made toward(s) clinical / shift goals:      Problem: Bowel Elimination  Goal: Patient will participate in bowel management program  Outcome: Progressing  Note: Patient has been continent of bladder this shift     Problem: Infection  Goal: Patient will remain free from infection  Outcome: Progressing  Note: No s/s of infection present      Patient is not progressing towards the following goals:    Problem: Skin Integrity  Goal: Skin integrity is maintained or improved  Outcome: Not Progressing  Note: Nystatin applied to pink left groin and interdry cloth to abdominal folds.     Problem: Fall Risk - Rehab  Goal: Patient will remain free from falls  Outcome: Not Met  Note: Cort[place] Luis Fall risk Assessment Score: 14    Moderate fall risk Interventions  - Bed and strip alarm   - Yellow sign by the door   - Yellow wrist band \"Fall risk\"  - Fall risk education provided     "

## 2022-08-18 NOTE — CARE PLAN
Problem: Bladder / Voiding  Goal: Patient will establish and maintain bladder regimen  Outcome: Progressing  Note: Patient is continent of bladder this shift.  Will continue to monitor.    The patient is Stable - Low risk of patient condition declining or worsening    Shift Goals  Clinical Goals: Safety, blood sugar management  Patient Goals: sleep well, pain control    Progress made toward(s) clinical / shift goals:  patient continent     Patient is not progressing towards the following goals:

## 2022-08-18 NOTE — PROGRESS NOTES
Received shift report and assumed care of patient.  Patient awake, calm and stable, currently positioned in bed for comfort and safety; call light within reach.  States pain is 2/10 at this time.  Will continue to monitor.

## 2022-08-18 NOTE — PROGRESS NOTES
[facility] Medicine Daily Progress Note    Date of Service  8/18/2022    Chief Complaint  Jenifer Ramos is a 62 y.o. female admitted 8/11/2022 with shortness of breath.    Hospital Course  Ms. Ramos is a 62-year-old female with a history of diabetes, factor V [place] on Eliquis, diastolic heart failure who originally presented to [facility] on 7/20 with shortness of breath.  She was discharged 7/22 but then had a fall at her home and presented to [facility].  There she was noted to have severe hyponatremia.  She was given IV fluids with some sodium improvement.  Due to ongoing physical debility she was accepted to [facility] rehab on 8/11/2022.  On 8/11 she was noted to be positive for COVID 19.  Her last sodium 8/12: 141.    Interval Problem Update  8/16/2022 vital signs stable was on CPAP overnight now down to 2 L nasal cannula with SPO2 98.  8/17: Ms. Ramos was evaluated and examined at rehab. She has been on Lasix 40 mEq BID and K went down to 2.9 today. KCl 40 mEq BID has been ordered and repeat BMP ordered for the morning. She remains on 2 liters nasal cannula which is her baseline. COVID isolations in place.  8/18: Ms. Ramos was seen and evaluated at rehab. Potassium supplementation was initiated yesterday and her K has come  up to 3.2. This is appropriate to follow up with in the next 2-3 days with BMP and magnesium level. Her blood pressure has been low hundreds. She remains on 2 liters of oxygen. She notes muscle cramping. Magnesium oxide ordered.     I have discussed this patient's plan of care and discharge plan at IDT rounds today with Case Management, Nursing, Nursing leadership, and other members of the IDT team.    Consultants/Specialty  physiatry    Code Status  Full Code    Review of Systems  Review of Systems   Constitutional:  Negative for chills and fever.   Respiratory:  Negative for cough and shortness of breath.    Cardiovascular:  Negative for chest pain.        Less swelling of  her legs   Gastrointestinal:         Eating well   Musculoskeletal:         +muscle cramps.   Neurological:         Generally tired   All other systems reviewed and are negative.     Physical Exam  Temp:  [36.6 °C (97.8 °F)-37 °C (98.6 °F)] 36.6 °C (97.9 °F)  Pulse:  [72-90] 78  Resp:  [18] 18  BP: (106-115)/(54-70) 106/59  SpO2:  [93 %-97 %] 94 %    Physical Exam  Vitals and nursing note reviewed.   Constitutional:       General: She is not in acute distress.     Appearance: She is obese. She is not ill-appearing or toxic-appearing.   HENT:      Head: Normocephalic.      Mouth/Throat:      Mouth: Mucous membranes are moist.      Pharynx: Oropharynx is clear.   Eyes:      General: No scleral icterus.     Conjunctiva/sclera: Conjunctivae normal.   Cardiovascular:      Rate and Rhythm: Normal rate and regular rhythm.   Pulmonary:      Effort: Pulmonary effort is normal.      Breath sounds: Normal breath sounds.      Comments: 2 liters nasal cannula oxygen  Abdominal:      General: There is no distension.      Tenderness: There is no abdominal tenderness.   Musculoskeletal:      Cervical back: Normal range of motion and neck supple.      Comments: Mild lower extremity swelling without pitting edema.    Skin:     General: Skin is warm and dry.      Coloration: Skin is pale.   Neurological:      General: No focal deficit present.      Mental Status: She is alert and oriented to person, place, and time.   Psychiatric:         Mood and Affect: Mood normal.         Behavior: Behavior normal.       Fluids    Intake/Output Summary (Last 24 hours) at 8/18/2022 0812  Last data filed at 8/18/2022 0603  Gross per 24 hour   Intake 720 ml   Output 300 ml   Net 420 ml         Laboratory      Recent Labs     08/17/22  0624 08/18/22  0602   SODIUM 139 141   POTASSIUM 2.9* 3.2*   CHLORIDE 97 98   CO2 32 32   GLUCOSE 90 91   BUN 8 9   CREATININE 0.62 0.74   CALCIUM 9.4 9.6                     Imaging  No orders to display           Assessment/Plan  Lower extremity edema  Assessment & Plan  Has hx  Mostly pedal swelling  BNP: 2476 (7/20) -- no other values in Epic for comparison  On Lasix: 40 mg bid and repeat BMP reveals low K at 2.9 thus KCl 40 mEq BID was ordered. She had previously been on Aldactone 50 mg which was stopped on her last admission to [facility]. BMP on 8/18 K 3.2.   Plan: recheck BMP in 2-3 days. Add magnesium oxide 400 mg daily.  7/21/2022 echocardiogram shows preserved EF but grade 2 diastolic dysfunction and inability to diagnose right ventricular systolic pressure.  Suspect she may have a component of pulmonary hypertension but unknown.    COVID-19  Assessment & Plan  Covid (+) on 8/11 at the Rehab  Asymptomatic    Labile blood pressure  Assessment & Plan  Monitor vitals  Note: BP dipped lower with SBP 90 on am of 8/12  BP remains low.  Her home Aldactone has been held  Encouraging fluid intake    Anemia  Assessment & Plan  Hb improved to 10.3 (8/12)  Outpatient follow up is appropriate    Dyspnea- (present on admission)  Assessment & Plan  She is chronically on 2 liters of [place] oxygen and remains on it at [facility].  Presented to [facility] with increasing SOB  Trop: unremarkable  Echo (7/21/22): EF 65%, GII-DD, inferior wall hypokinesis  Nuc Stress Test (7/21): mormal left ventricular systolic function, EF 65%; inferior wall hypokinesis  7/20/22 CTA: no PE, but ground glass opacifications with possible edema.  TSH ok (8/12)  BNP: 2476 (7/20) -- no other values in Epic for comparison  On O2 supplementation  Pt to follow up with Cardio as out patient    Hypothyroidism- (present on admission)  Assessment & Plan  TSH:1.51 (8/12)  On Synthroid 112 mcg daily for active management    DM (diabetes mellitus) (HCC)- (present on admission)  Assessment & Plan  Hba1c: 5.9 (8/12)  Monitor accuchecks  On Tresiba (home med)  Note: pt has her home meds here at the Rehab  Note: home meds include Tresiab and Lispro 20-40 units  bid (per SS)  Diabetic diet       VTE prophylaxis: therapeutic anticoagulation with Eliquis     I have performed a physical exam and reviewed and updated ROS and Plan today (8/18/2022). In review of yesterday's note (8/17/2022), there are no changes except as documented above.

## 2022-08-18 NOTE — THERAPY
Occupational Therapy  Daily Treatment     Patient Name: Jenifer Ramos  Age:  62 y.o., Sex:  female  Medical Record #: [id number]  Today's Date: 8/18/2022     Precautions  Precautions: Fall Risk, Other (See Comments)  Comments: 2L O2 at baseline; strict droplet & contact precautions         Subjective    Pt sitting up in w/c finishing breakfast upon arrival, agreeable to OT session. Pt seen from 8:30-9:00 and 10:00-11:00, see below for details.      Objective     08/18/22 0831   OT Charge Group   OT Self Care / ADL 1   OT Therapy Activity 5   OT Total Time Spent   OT Individual Total Time Spent (Mins) 90   Functional Level of Assist   Eating Supervision  (increased time due to difficulty using LUE. Educated pt on self-feeding AE, see note)   Eating Description Increased time;Adaptive equipment;Verbal cueing   Interdisciplinary Plan of Care Collaboration   IDT Collaboration with  Physician   Patient Position at End of Therapy Seated;Call Light within Reach;Tray Table within Reach;Phone within Reach   Collaboration Comments discussed with hospitalist pt's request to go on magnesium supplement     8:30-9:00: Assessed pt's self feeding as she reports difficulty scooping food due to LUE impairment and difficulty stabilizing foods that require two hands such as spreading jam on toast or cutting with a knife and fork. Educated pt on possible AE items such as a plate guard and built-up utensils. Also educated pt on adaptive cutting boards and adaptive knives as pt reports a desire to be able to return to meal prep at home but is limited by her UE function. Educated pt on joint protection strategies with examples discussed for various daily activities as pt reports arthritis in her hands being a limiting factor.     10:00-11:00: Reviewed AE discussed in previous session and showed pt examples online on [employer] available for purchase. Also recommended pt acquire a bidet to aid in toileting hygiene efficiency and thoroughness  "and showed pt examples online. Pt then completed functional mobility in room with FWW and CGA ~25' x3 with a seated rest break between each set. Pt was able to perform STS from w/c with FWW with light CGA. Pt then completed standing tolerance/endurance activity standing at elevated bed to organize and fold her clothes to put in the closet.     Assessment    Pt tolerated OT sessions well and was receptive to all education provided. Was steady during her functional mobility only requiring light CGA to close SBA but can still on tolerate short distances with rest breaks between due to general debility. Was only able to tolerate static standing during clothing organization task for about 2-3 min at a time before becoming fatigued and needing a seated rest break. Is motivated and involved in her POC asking \"what are the goals for this next week of therapy?\" And stating she wants to go home as soon as possible.     Strengths: Able to follow instructions, Manages pain appropriately, Motivated for self care and independence, Supportive family, Willingly participates in therapeutic activities  Barriers: Decreased endurance, Generalized weakness, Impaired activity tolerance, Impaired balance, Limited mobility    Plan    Trial use of sock aide and LHSH, overall strength and endurance training, standing balance/tolerance.    Passport items to be completed:  Perform bathroom transfers, complete dressing, complete feeding, get ready for the day, prepare a simple meal, participate in household tasks, adapt home for safety needs, demonstrate home exercise program, complete caregiver training     Occupational Therapy Goals (Active)       Problem: Dressing       Dates: Start: 08/12/22         Goal: STG-Within one week, patient will dress UB with setup and supervision.        Dates: Start: 08/12/22         Goal Note filed on 08/16/22 1158 by Analia Salmon OT       SBA and verbal cues, limited by LUE impairment                 " Goal: STG-Within one week, patient will dress LB with Lupillo and use of AE PRN.        Dates: Start: 08/16/22               Problem: OT Long Term Goals       Dates: Start: 08/12/22         Goal: LTG-By discharge, patient will complete basic self care tasks with Lupillo to modified independence and use of AE PRN.        Dates: Start: 08/12/22            Goal: LTG-By discharge, patient will perform bathroom transfers with supervision/SBA to modified independence and use of AE PRN.        Dates: Start: 08/12/22               Problem: Toileting       Dates: Start: 08/16/22         Goal: STG-Within one week, patient will complete toileting tasks with SBA and use of AE PRN.        Dates: Start: 08/16/22

## 2022-08-18 NOTE — THERAPY
Physical Therapy   Daily Treatment     Patient Name: Jenifer Ramos  Age:  62 y.o., Sex:  female  Medical Record #: [id number]  Today's Date: 8/18/2022     Precautions  Precautions: Fall Risk, Other (See Comments)  Comments: 2L O2 at baseline; strict droplet & contact precautions    Subjective    Pt received in bathroom, willing to participate     Objective       08/18/22 1401   PT Charge Group   PT Gait Training 3   PT Therapeutic Exercise 3   PT Total Time Spent   PT Individual Total Time Spent (Mins) 90   Gait Functional Level of Assist    Gait Level Of Assist Contact Guard Assist   Assistive Device Front Wheel Walker   Distance (Feet) 50  (in addition to 25 ft x 4 and 20 ft x 2)   # of Times Distance was Traveled 2   Deviation Trendelenberg;Bradykinetic;Decreased Heel Strike;Decreased Toe Off   Wheelchair Functional Level of Assist   Wheelchair Assist Minimal Assist   Distance Wheelchair (Feet or Distance) 15   Wheelchair Description   (RUE/ BLE's)   Transfer Functional Level of Assist   Bed, Chair, Wheelchair Transfer Contact Guard Assist   Bed Chair Wheelchair Transfer Description Adaptive equipment;Increased time;Set-up of equipment;Verbal cueing   Toilet Transfers Contact Guard Assist   Toilet Transfer Description Adaptive equipment;Grab bar;Set-up of equipment;Verbal cueing   Sitting Lower Body Exercises   Ankle Pumps 3 sets of 10   Hip Flexion 3 sets of 10   Hip Abduction 3 sets of 10   Hip Adduction 3 sets of 10   Long Arc Quad 3 sets of 10   Hamstring Curl 3 sets of 10   Standing Lower Body Exercises   Comments forward/backward and side stepping 3 x 10 with UE support at bed rail and cues for anterior/posterior and lateral wt shifting. Tactile cues for LLE terminal knee ext with stance   Bed Mobility    Sit to Supine Standby Assist   Sit to Stand Contact Guard Assist       Assessment    Pt tolerated 90 minute tx session well, improved overall gait endurance and increased distances today. Pt remains  "with LLE weakness/ knee instability with standing exercises but improved stability with gait, recommend that pt begin using FWW to/from bathroom with nursing as tolerated.    Strengths: Able to follow instructions, Adequate strength, Motivated for self care and independence, Pleasant and cooperative, Supportive family, Willingly participates in therapeutic activities  Barriers: Decreased endurance, Fatigue, Generalized weakness, Home accessibility, Impaired activity tolerance, Impaired balance, Limited mobility, Pain (LUE weakness/ pain and prior shoulder replacement)    Plan    Standing balance, seated/ standing exercises, gait with FWW     Passport items to be completed:  Get in/out of bed safely, in/out of a vehicle, safely use mobility device, walk or wheel around home/community, navigate up and down stairs, show how to get up/down from the ground, ensure home is accessible, demonstrate HEP, complete caregiver training    Physical Therapy Problems (Active)       Problem: Mobility       Dates: Start: 08/12/22         Goal: STG-Within one week, patient will ambulate household distance CGA with FWW 25 ft x 2       Dates: Start: 08/12/22            Goal: STG-Within one week, patient will ambulate up/down a curb min A with FWW for 4-6\" rise       Dates: Start: 08/12/22               Problem: Mobility Transfers       Dates: Start: 08/12/22         Goal: STG-Within one week, patient will perform bed mobility min A with bed controls as needed       Dates: Start: 08/12/22            Goal: STG-Within one week, patient will sit to stand CGA to FWW       Dates: Start: 08/12/22            Goal: STG-Within one week, patient will transfer bed to chair CGA with FWW       Dates: Start: 08/12/22               Problem: PT-Long Term Goals       Dates: Start: 08/12/22         Goal: LTG-By discharge, patient will ambulate SPV / modified independent with FWW x 50 ft       Dates: Start: 08/12/22            Goal: LTG-By discharge, " "patient will transfer one surface to another SPV/ modified independent with FWW       Dates: Start: 08/12/22            Goal: LTG-By discharge, patient will transfer in/out of a car SBA/ CGA with FWW       Dates: Start: 08/12/22            Goal: LTG-By discharge, patient will navigate 4-6\" step with FWW and SBA/ CGA       Dates: Start: 08/12/22              "

## 2022-08-19 PROBLEM — I27.20 PULMONARY HYPERTENSION (HCC): Status: ACTIVE | Noted: 2022-08-19

## 2022-08-19 LAB
GLUCOSE BLD STRIP.AUTO-MCNC: 125 MG/DL (ref 65–99)
GLUCOSE BLD STRIP.AUTO-MCNC: 136 MG/DL (ref 65–99)
GLUCOSE BLD STRIP.AUTO-MCNC: 84 MG/DL (ref 65–99)
GLUCOSE BLD STRIP.AUTO-MCNC: 96 MG/DL (ref 65–99)

## 2022-08-19 PROCEDURE — 99232 SBSQ HOSP IP/OBS MODERATE 35: CPT | Performed by: PHYSICAL MEDICINE & REHABILITATION

## 2022-08-19 PROCEDURE — 770010 HCHG ROOM/CARE - REHAB SEMI PRIVAT*

## 2022-08-19 PROCEDURE — A9270 NON-COVERED ITEM OR SERVICE: HCPCS | Performed by: HOSPITALIST

## 2022-08-19 PROCEDURE — 94760 N-INVAS EAR/PLS OXIMETRY 1: CPT

## 2022-08-19 PROCEDURE — 82962 GLUCOSE BLOOD TEST: CPT | Mod: 91

## 2022-08-19 PROCEDURE — 99232 SBSQ HOSP IP/OBS MODERATE 35: CPT | Performed by: HOSPITALIST

## 2022-08-19 PROCEDURE — A9270 NON-COVERED ITEM OR SERVICE: HCPCS | Performed by: PHYSICAL MEDICINE & REHABILITATION

## 2022-08-19 PROCEDURE — 97116 GAIT TRAINING THERAPY: CPT

## 2022-08-19 PROCEDURE — 700102 HCHG RX REV CODE 250 W/ 637 OVERRIDE(OP): Performed by: PHYSICAL MEDICINE & REHABILITATION

## 2022-08-19 PROCEDURE — 97535 SELF CARE MNGMENT TRAINING: CPT

## 2022-08-19 PROCEDURE — 97110 THERAPEUTIC EXERCISES: CPT

## 2022-08-19 PROCEDURE — 700102 HCHG RX REV CODE 250 W/ 637 OVERRIDE(OP): Performed by: HOSPITALIST

## 2022-08-19 RX ORDER — VITAMIN B COMPLEX
2000 TABLET ORAL DAILY
Status: DISCONTINUED | OUTPATIENT
Start: 2022-08-20 | End: 2022-08-22 | Stop reason: HOSPADM

## 2022-08-19 RX ADMIN — NYSTATIN: 100000 POWDER TOPICAL at 21:40

## 2022-08-19 RX ADMIN — PRAVASTATIN SODIUM 10 MG: 20 TABLET ORAL at 21:36

## 2022-08-19 RX ADMIN — OXYCODONE HYDROCHLORIDE 10 MG: 10 TABLET ORAL at 21:45

## 2022-08-19 RX ADMIN — Medication 1000 UNITS: at 07:58

## 2022-08-19 RX ADMIN — POLYETHYLENE GLYCOL 3350 1 PACKET: 17 POWDER, FOR SOLUTION ORAL at 21:45

## 2022-08-19 RX ADMIN — MONTELUKAST 10 MG: 10 TABLET, FILM COATED ORAL at 21:36

## 2022-08-19 RX ADMIN — POTASSIUM CHLORIDE 40 MEQ: 20 TABLET, EXTENDED RELEASE ORAL at 21:35

## 2022-08-19 RX ADMIN — OXYCODONE HYDROCHLORIDE 10 MG: 10 TABLET ORAL at 05:57

## 2022-08-19 RX ADMIN — MAGNESIUM OXIDE TAB 400 MG (241.3 MG ELEMENTAL MG) 400 MG: 400 (241.3 MG) TAB at 07:58

## 2022-08-19 RX ADMIN — NYSTATIN: 100000 POWDER TOPICAL at 08:00

## 2022-08-19 RX ADMIN — FUROSEMIDE 40 MG: 40 TABLET ORAL at 05:56

## 2022-08-19 RX ADMIN — LEVOTHYROXINE SODIUM 112 MCG: 112 TABLET ORAL at 05:57

## 2022-08-19 RX ADMIN — POTASSIUM CHLORIDE 40 MEQ: 20 TABLET, EXTENDED RELEASE ORAL at 07:57

## 2022-08-19 RX ADMIN — APIXABAN 5 MG: 5 TABLET, FILM COATED ORAL at 21:35

## 2022-08-19 RX ADMIN — OXYCODONE HYDROCHLORIDE 10 MG: 10 TABLET ORAL at 16:46

## 2022-08-19 RX ADMIN — FUROSEMIDE 40 MG: 40 TABLET ORAL at 16:46

## 2022-08-19 RX ADMIN — GABAPENTIN 600 MG: 300 CAPSULE ORAL at 21:35

## 2022-08-19 RX ADMIN — GABAPENTIN 600 MG: 300 CAPSULE ORAL at 07:57

## 2022-08-19 RX ADMIN — DOCUSATE SODIUM 50 MG AND SENNOSIDES 8.6 MG 2 TABLET: 8.6; 5 TABLET, FILM COATED ORAL at 21:35

## 2022-08-19 RX ADMIN — FEBUXOSTAT 40 MG: 40 TABLET, FILM COATED ORAL at 21:35

## 2022-08-19 RX ADMIN — OMEPRAZOLE 20 MG: 20 CAPSULE, DELAYED RELEASE ORAL at 07:58

## 2022-08-19 RX ADMIN — APIXABAN 5 MG: 5 TABLET, FILM COATED ORAL at 07:58

## 2022-08-19 RX ADMIN — DOCUSATE SODIUM 50 MG AND SENNOSIDES 8.6 MG 2 TABLET: 8.6; 5 TABLET, FILM COATED ORAL at 07:57

## 2022-08-19 ASSESSMENT — ENCOUNTER SYMPTOMS
COUGH: 0
SHORTNESS OF BREATH: 0
CHILLS: 0
ROS GI COMMENTS: EATING WELL
FEVER: 0

## 2022-08-19 ASSESSMENT — FIBROSIS 4 INDEX: FIB4 SCORE: 1.79

## 2022-08-19 ASSESSMENT — ACTIVITIES OF DAILY LIVING (ADL)
TUB_SHOWER_TRANSFER_DESCRIPTION: ADAPTIVE EQUIPMENT;GRAB BAR;SHOWER BENCH;INCREASED TIME;INITIAL PREPARATION FOR TASK;SET-UP OF EQUIPMENT;SUPERVISION FOR SAFETY;VERBAL CUEING
BED_CHAIR_WHEELCHAIR_TRANSFER_DESCRIPTION: ADAPTIVE EQUIPMENT;INCREASED TIME;SET-UP OF EQUIPMENT
TOILET_TRANSFER_DESCRIPTION: ADAPTIVE EQUIPMENT;GRAB BAR;INCREASED TIME;INITIAL PREPARATION FOR TASK;SET-UP OF EQUIPMENT;SUPERVISION FOR SAFETY;VERBAL CUEING
TOILETING_LEVEL_OF_ASSIST_DESCRIPTION: GRAB BAR;INCREASED TIME;INITIAL PREPARATION FOR TASK;SET-UP OF EQUIPMENT;SUPERVISION FOR SAFETY;VERBAL CUEING

## 2022-08-19 ASSESSMENT — PATIENT HEALTH QUESTIONNAIRE - PHQ9
SUM OF ALL RESPONSES TO PHQ9 QUESTIONS 1 AND 2: 0
1. LITTLE INTEREST OR PLEASURE IN DOING THINGS: NOT AT ALL
2. FEELING DOWN, DEPRESSED, IRRITABLE, OR HOPELESS: NOT AT ALL

## 2022-08-19 ASSESSMENT — GAIT ASSESSMENTS
DEVIATION: TRENDELENBERG;BRADYKINETIC;DECREASED TOE OFF
ASSISTIVE DEVICE: FRONT WHEEL WALKER
ASSISTIVE DEVICE: FRONT WHEEL WALKER
DISTANCE (FEET): 50
DISTANCE (FEET): 50
GAIT LEVEL OF ASSIST: CONTACT GUARD ASSIST
DEVIATION: TRENDELENBERG;BRADYKINETIC;DECREASED HEEL STRIKE;DECREASED TOE OFF
GAIT LEVEL OF ASSIST: CONTACT GUARD ASSIST

## 2022-08-19 NOTE — PROGRESS NOTES
"Rehab Progress Note     Encounter Date: 8/18/2022    CC: Decreased mobility, COVID+    Interval Events (Subjective)  Patient sitting up in room. She reports therapy is going well. She reports her endurance is improving. She reports she normally takes magnesium supplement for muscle spasms. She reports she normally takes it only 1 time per day but if her spasms are bad enough she takes it twice. Discussed would schedule and have as needed. Denies NVD.     IDT Team Meeting 8/16/2022  DC/Disposition:  8/25/22    Objective:  VITAL SIGNS: /50   Pulse 86   Temp 37.1 °C (98.7 °F) (Oral)   Resp 19   Ht 1.727 m (5' 8\")   Wt 120 kg (265 lb 3.4 oz)   SpO2 99%   BMI 40.33 kg/m²   Gen: NAD  Psych: Mood and affect appropriate  CV: RRR, no edema  Resp: CTAB, no upper airway sounds  Abd: NTND  Neuro: AOx4, following commands  Unchanged from 8/17/22 including no visible muscle spasms in legs    Recent Results (from the past 72 hour(s))   POCT glucose device results    Collection Time: 08/16/22  7:16 AM   Result Value Ref Range    POC Glucose, Blood 83 65 - 99 mg/dL   POCT glucose device results    Collection Time: 08/16/22  8:07 AM   Result Value Ref Range    POC Glucose, Blood 114 (H) 65 - 99 mg/dL   POCT glucose device results    Collection Time: 08/16/22 11:02 AM   Result Value Ref Range    POC Glucose, Blood 134 (H) 65 - 99 mg/dL   POCT glucose device results    Collection Time: 08/16/22  5:07 PM   Result Value Ref Range    POC Glucose, Blood 102 (H) 65 - 99 mg/dL   POCT glucose device results    Collection Time: 08/16/22  9:17 PM   Result Value Ref Range    POC Glucose, Blood 121 (H) 65 - 99 mg/dL   Basic Metabolic Panel    Collection Time: 08/17/22  6:24 AM   Result Value Ref Range    Sodium 139 135 - 145 mmol/L    Potassium 2.9 (L) 3.6 - 5.5 mmol/L    Chloride 97 96 - 112 mmol/L    Co2 32 20 - 33 mmol/L    Glucose 90 65 - 99 mg/dL    Bun 8 8 - 22 mg/dL    Creatinine 0.62 0.50 - 1.40 mg/dL    Calcium 9.4 8.5 - " 10.5 mg/dL    Anion Gap 10.0 7.0 - 16.0   ESTIMATED GFR    Collection Time: 08/17/22  6:24 AM   Result Value Ref Range    GFR (CKD-EPI) 99 >60 mL/min/1.73 m 2   POCT glucose device results    Collection Time: 08/17/22  8:25 AM   Result Value Ref Range    POC Glucose, Blood 91 65 - 99 mg/dL   POCT glucose device results    Collection Time: 08/17/22  9:16 AM   Result Value Ref Range    POC Glucose, Blood 166 (H) 65 - 99 mg/dL   POCT glucose device results    Collection Time: 08/17/22 11:27 AM   Result Value Ref Range    POC Glucose, Blood 167 (H) 65 - 99 mg/dL   POCT glucose device results    Collection Time: 08/17/22  4:54 PM   Result Value Ref Range    POC Glucose, Blood 111 (H) 65 - 99 mg/dL   POCT glucose device results    Collection Time: 08/17/22  9:37 PM   Result Value Ref Range    POC Glucose, Blood 105 (H) 65 - 99 mg/dL   Basic Metabolic Panel    Collection Time: 08/18/22  6:02 AM   Result Value Ref Range    Sodium 141 135 - 145 mmol/L    Potassium 3.2 (L) 3.6 - 5.5 mmol/L    Chloride 98 96 - 112 mmol/L    Co2 32 20 - 33 mmol/L    Glucose 91 65 - 99 mg/dL    Bun 9 8 - 22 mg/dL    Creatinine 0.74 0.50 - 1.40 mg/dL    Calcium 9.6 8.5 - 10.5 mg/dL    Anion Gap 11.0 7.0 - 16.0   ESTIMATED GFR    Collection Time: 08/18/22  6:02 AM   Result Value Ref Range    GFR (CKD-EPI) 90 >60 mL/min/1.73 m 2   POCT glucose device results    Collection Time: 08/18/22  7:43 AM   Result Value Ref Range    POC Glucose, Blood 93 65 - 99 mg/dL   POCT glucose device results    Collection Time: 08/18/22  9:07 AM   Result Value Ref Range    POC Glucose, Blood 126 (H) 65 - 99 mg/dL   POCT glucose device results    Collection Time: 08/18/22 11:48 AM   Result Value Ref Range    POC Glucose, Blood 129 (H) 65 - 99 mg/dL   POCT glucose device results    Collection Time: 08/18/22  5:32 PM   Result Value Ref Range    POC Glucose, Blood 115 (H) 65 - 99 mg/dL   POCT glucose device results    Collection Time: 08/18/22  8:20 PM   Result Value Ref  Range    POC Glucose, Blood 144 (H) 65 - 99 mg/dL       Current Facility-Administered Medications   Medication Frequency    magnesium oxide tablet 400 mg DAILY    potassium chloride SA (Kdur) tablet 40 mEq BID    febuxostat (ULORIC) tablet 40 mg QHS    levothyroxine (SYNTHROID) tablet 112 mcg DAILY    nystatin (MYCOSTATIN) powder BID    gabapentin (NEURONTIN) capsule 600 mg BID    furosemide (LASIX) tablet 40 mg BID DIURETIC    Insulin Degludec SOPN 120 Units DAILY    loratadine (CLARITIN) tablet 10 mg QDAY PRN    menthol (Halls) lozenge 1 Lozenge Q2HRS PRN    insulin lispro (AdmeLOG,HumaLOG) injection 4X/DAY ACHS    vitamin D3 (cholecalciferol) tablet 1,000 Units DAILY    apixaban (ELIQUIS) tablet 5 mg BID    montelukast (SINGULAIR) tablet 10 mg Q EVENING    pravastatin (PRAVACHOL) tablet 10 mg Nightly    rizatriptan (MAXALT-MLT) disintegrating tablet 10 mg Once PRN    Respiratory Therapy Consult Continuous RT    hydrALAZINE (APRESOLINE) tablet 25 mg Q8HRS PRN    acetaminophen (Tylenol) tablet 650 mg Q4HRS PRN    senna-docusate (PERICOLACE or SENOKOT S) 8.6-50 MG per tablet 2 Tablet BID    And    polyethylene glycol/lytes (MIRALAX) PACKET 1 Packet QDAY PRN    And    magnesium hydroxide (MILK OF MAGNESIA) suspension 30 mL QDAY PRN    And    bisacodyl (DULCOLAX) suppository 10 mg QDAY PRN    omeprazole (PRILOSEC) capsule 20 mg DAILY    artificial tears ophthalmic solution 1 Drop PRN    mag hydrox-al hydrox-simeth (MAALOX PLUS ES or MYLANTA DS) suspension 20 mL Q2HRS PRN    ondansetron (ZOFRAN ODT) dispertab 4 mg 4X/DAY PRN    Or    ondansetron (ZOFRAN) syringe/vial injection 4 mg 4X/DAY PRN    traZODone (DESYREL) tablet 50 mg QHS PRN    sodium chloride (OCEAN) 0.65 % nasal spray 2 Spray PRN    oxyCODONE immediate-release (ROXICODONE) tablet 5 mg Q3HRS PRN    Or    oxyCODONE immediate release (ROXICODONE) tablet 10 mg Q3HRS PRN    levalbuterol (XOPENEX) 1.25 MG/3ML nebulizer solution 1.25 mg Q4H PRN (RT)     Fluticasone-Umeclidin-Vilant 200-62.5-25 MCG/INH AEPB 1 Puff DAILY       Orders Placed This Encounter   Procedures    Diet Order Diet: Consistent CHO (Diabetic)     Standing Status:   Standing     Number of Occurrences:   1     Order Specific Question:   Diet:     Answer:   Consistent CHO (Diabetic) [4]       Assessment:  Active Hospital Problems    Diagnosis     Anemia     Lower extremity edema     Labile blood pressure     COVID-19     Dyspnea     Hypothyroidism     DM (diabetes mellitus) ([facility])        Medical Decision Making and Plan:   Debility - Patient with CHF exacerbation with fluid overload then over diuresis causing dehydration and orthostatic hypotension.  -PT and OT for mobility and ADLs     dCHF/HTN - Patient on Lasix 40 mg BID. Will monitor   -Consult hospitalist     DM2 with hyperglycemia - Patient on Insulin pre meal and 276 U degludec  -Consult hospitalist. Patient reduced her Degludec to 120 U. Patient would like to increase blood sugar checks     Cirrhosis - Noted in problem list from US last month     Hyponatremia - Check AM CMP - repeat WNL. Consult hospitalist     Hypokalemia - 2.9 on 8/17/22. Start K supplement. Repeat CMP    Asthma - Patient on Singulair and inhaler. Add PRN Claritin     HLD - Patient on Pravastatin 10 mg daily     Left shoulder injury - Patient with replacement in the past.      Hx of Gout - On Uloric on transfer     Migraines - Patient brought in [facility], available PRN    Muscle spasms - Patient on Magnesium oxide 1-2 times per day. Will schedule and add PRN    Hypothyroidism - Patient on Levothyroxine 112 mcg     Skin rash - in pannus fold. Start Nystatin Powder TID    Vitamin D deficiency - mild at 29. Start 1000 U     COVID + - on screen. No s/s. Special isolation.     Morbid Obesity due to excess calories - BMI of 42.7 on admission, meets medical criteria. Dietitian to consult     Factor 5 Leiden/DVT ppx - Patient on Eliquis 5 mg BID at baseline.     Total time:  26  minutes.  I spent greater than 50% of the time for patient care, counseling, and coordination on this date, including unit/floor time, and face-to-face time with the patient as per interval events and assessment and plan above. Topics discussed included discharge planning, muscle spasms, schedule Magnesium, and PRN magnesium.     Doron Brand M.D.

## 2022-08-19 NOTE — FLOWSHEET NOTE
08/19/22 0941   Events/Summary/Plan   Events/Summary/Plan o2 spot check,cpap   Vital Signs   Pulse 70   Respiration 16   Pulse Oximetry 98 %   $ Pulse Oximetry (Spot Check) Yes   Respiratory Assessment   Level of Consciousness Alert   Chest Exam   Work Of Breathing / Effort Within Normal Limits   Oxygen   O2 (LPM) 2   O2 Delivery Device Silicone Nasal Cannula

## 2022-08-19 NOTE — THERAPY
Occupational Therapy  Daily Treatment     Patient Name: Jenifer Ramos  Age:  62 y.o., Sex:  female  Medical Record #: [id number]  Today's Date: 8/19/2022     Precautions  Precautions: Fall Risk, Other (See Comments)  Comments: 2L O2 at baseline; strict droplet & contact precautions         Subjective    Pt up in w/c upon arrival, ready for OT session.      Objective     08/19/22 1001   OT Charge Group   OT Self Care / ADL 6   OT Total Time Spent   OT Individual Total Time Spent (Mins) 90   Functional Level of Assist   Grooming Supervision;Seated   Grooming Description Initial preparation for task;Seated in wheelchair at sink   Bathing Minimal Assist  (assist to wash/rinse perineal)   Bathing Description Adaptive equipment;Grab bar;Hand held shower;Tub bench;Assit with perineal;Increased time;Initial preparation for task;Set-up of equipment;Supervision for safety;Verbal cueing   Upper Body Dressing Supervision   Upper Body Dressing Description Increased time;Initial preparation for task;Verbal cueing   Lower Body Dressing Minimal Assist  (able to manage underwear and pants with increaed time and AE, assist for shoes)   Lower Body Dressing Description Assistive devices;Dressing stick;Grab bar;Reacher;Sock aid;Increased time;Initial preparation for task;Set-up of equipment;Supervision for safety;Verbal cueing   Toileting Standby Assist   Toileting Description Grab bar;Increased time;Initial preparation for task;Set-up of equipment;Supervision for safety;Verbal cueing   Toilet Transfers Standby Assist  (FWW)   Toilet Transfer Description Adaptive equipment;Grab bar;Increased time;Initial preparation for task;Set-up of equipment;Supervision for safety;Verbal cueing   Tub / Shower Transfers Contact Guard Assist  (FWW into shower, SPT to w/c after)   Tub Shower Transfer Description Adaptive equipment;Grab bar;Shower bench;Increased time;Initial preparation for task;Set-up of equipment;Supervision for safety;Verbal  cueing   Interdisciplinary Plan of Care Collaboration   IDT Collaboration with  Physician;Physical Therapist   Patient Position at End of Therapy Seated;Call Light within Reach;Tray Table within Reach;Phone within Reach   Collaboration Comments re: CLOF, progress, d/c planning     Functional mobility ~ 40' with ~1-2 min static stand midway with FWW and SBA.     Assessment    Pt tolerated OT session well and continues to make slow but steady progress with ADLs and functional mobility given increased time, encouragement and use of AE. Pt was unsuccessful using shoe horn but was able to perform all other LBD tasks herself with sock aid, dressing stick and reacher. Pt is supposed to come out of COVID iso on Sunday - encouraged pt to have her family in for therapy to initiate FT in preparation for d/c next week.     Strengths: Able to follow instructions, Manages pain appropriately, Motivated for self care and independence, Supportive family, Willingly participates in therapeutic activities  Barriers: Decreased endurance, Generalized weakness, Impaired activity tolerance, Impaired balance, Limited mobility    Plan    ADLs, overall strength and endurance training, standing balance/tolerance, functional mobility     Passport items to be completed:  Perform bathroom transfers, complete dressing, complete feeding, get ready for the day, prepare a simple meal, participate in household tasks, adapt home for safety needs, demonstrate home exercise program, complete caregiver training     Occupational Therapy Goals (Active)       Problem: Dressing       Dates: Start: 08/12/22         Goal: STG-Within one week, patient will dress UB with setup and supervision.        Dates: Start: 08/12/22         Goal Note filed on 08/16/22 1158 by Analia Salmon, OT       SBA and verbal cues, limited by LUE impairment                 Goal: STG-Within one week, patient will dress LB with Lupillo and use of AE PRN.        Dates: Start: 08/16/22                Problem: OT Long Term Goals       Dates: Start: 08/12/22         Goal: LTG-By discharge, patient will complete basic self care tasks with Lupillo to modified independence and use of AE PRN.        Dates: Start: 08/12/22            Goal: LTG-By discharge, patient will perform bathroom transfers with supervision/SBA to modified independence and use of AE PRN.        Dates: Start: 08/12/22               Problem: Toileting       Dates: Start: 08/16/22         Goal: STG-Within one week, patient will complete toileting tasks with SBA and use of AE PRN.        Dates: Start: 08/16/22

## 2022-08-19 NOTE — PROGRESS NOTES
"Rehab Progress Note     Encounter Date: 8/19/2022    CC: Decreased mobility, COVID+    Interval Events (Subjective)  Patient sitting up in room. She reports therapy is going well. She is looking forward to getting out of the room. Per therapy she is actually making progress faster than expected so may be able to get training done early next week and possible earlier discharge. Denies NVD.     IDT Team Meeting 8/16/2022  DC/Disposition:  8/25/22    Objective:  VITAL SIGNS: /56   Pulse 70   Temp 36.4 °C (97.6 °F) (Oral)   Resp 16   Ht 1.727 m (5' 8\")   Wt 120 kg (265 lb 3.4 oz)   SpO2 98%   BMI 40.33 kg/m²   Gen: NAD  Psych: Mood and affect appropriate  CV: RRR, no edema  Resp: CTAB, no upper airway sounds  Abd: NTND  Neuro: AOx4, pushing wheelchair BUE    Recent Results (from the past 72 hour(s))   POCT glucose device results    Collection Time: 08/16/22  5:07 PM   Result Value Ref Range    POC Glucose, Blood 102 (H) 65 - 99 mg/dL   POCT glucose device results    Collection Time: 08/16/22  9:17 PM   Result Value Ref Range    POC Glucose, Blood 121 (H) 65 - 99 mg/dL   Basic Metabolic Panel    Collection Time: 08/17/22  6:24 AM   Result Value Ref Range    Sodium 139 135 - 145 mmol/L    Potassium 2.9 (L) 3.6 - 5.5 mmol/L    Chloride 97 96 - 112 mmol/L    Co2 32 20 - 33 mmol/L    Glucose 90 65 - 99 mg/dL    Bun 8 8 - 22 mg/dL    Creatinine 0.62 0.50 - 1.40 mg/dL    Calcium 9.4 8.5 - 10.5 mg/dL    Anion Gap 10.0 7.0 - 16.0   ESTIMATED GFR    Collection Time: 08/17/22  6:24 AM   Result Value Ref Range    GFR (CKD-EPI) 99 >60 mL/min/1.73 m 2   POCT glucose device results    Collection Time: 08/17/22  8:25 AM   Result Value Ref Range    POC Glucose, Blood 91 65 - 99 mg/dL   POCT glucose device results    Collection Time: 08/17/22  9:16 AM   Result Value Ref Range    POC Glucose, Blood 166 (H) 65 - 99 mg/dL   POCT glucose device results    Collection Time: 08/17/22 11:27 AM   Result Value Ref Range    POC " Glucose, Blood 167 (H) 65 - 99 mg/dL   POCT glucose device results    Collection Time: 08/17/22  4:54 PM   Result Value Ref Range    POC Glucose, Blood 111 (H) 65 - 99 mg/dL   POCT glucose device results    Collection Time: 08/17/22  9:37 PM   Result Value Ref Range    POC Glucose, Blood 105 (H) 65 - 99 mg/dL   Basic Metabolic Panel    Collection Time: 08/18/22  6:02 AM   Result Value Ref Range    Sodium 141 135 - 145 mmol/L    Potassium 3.2 (L) 3.6 - 5.5 mmol/L    Chloride 98 96 - 112 mmol/L    Co2 32 20 - 33 mmol/L    Glucose 91 65 - 99 mg/dL    Bun 9 8 - 22 mg/dL    Creatinine 0.74 0.50 - 1.40 mg/dL    Calcium 9.6 8.5 - 10.5 mg/dL    Anion Gap 11.0 7.0 - 16.0   ESTIMATED GFR    Collection Time: 08/18/22  6:02 AM   Result Value Ref Range    GFR (CKD-EPI) 90 >60 mL/min/1.73 m 2   POCT glucose device results    Collection Time: 08/18/22  7:43 AM   Result Value Ref Range    POC Glucose, Blood 93 65 - 99 mg/dL   POCT glucose device results    Collection Time: 08/18/22  9:07 AM   Result Value Ref Range    POC Glucose, Blood 126 (H) 65 - 99 mg/dL   POCT glucose device results    Collection Time: 08/18/22 11:48 AM   Result Value Ref Range    POC Glucose, Blood 129 (H) 65 - 99 mg/dL   POCT glucose device results    Collection Time: 08/18/22  5:32 PM   Result Value Ref Range    POC Glucose, Blood 115 (H) 65 - 99 mg/dL   POCT glucose device results    Collection Time: 08/18/22  8:20 PM   Result Value Ref Range    POC Glucose, Blood 144 (H) 65 - 99 mg/dL   POCT glucose device results    Collection Time: 08/19/22  7:54 AM   Result Value Ref Range    POC Glucose, Blood 84 65 - 99 mg/dL   POCT glucose device results    Collection Time: 08/19/22 11:52 AM   Result Value Ref Range    POC Glucose, Blood 125 (H) 65 - 99 mg/dL       Current Facility-Administered Medications   Medication Frequency    [START ON 8/20/2022] vitamin D3 (cholecalciferol) tablet 2,000 Units DAILY    Insulin Degludec SOPN 100 Units DAILY    magnesium oxide  tablet 400 mg DAILY    magnesium oxide tablet 400 mg QDAY PRN    potassium chloride SA (Kdur) tablet 40 mEq BID    febuxostat (ULORIC) tablet 40 mg QHS    levothyroxine (SYNTHROID) tablet 112 mcg DAILY    nystatin (MYCOSTATIN) powder BID    gabapentin (NEURONTIN) capsule 600 mg BID    furosemide (LASIX) tablet 40 mg BID DIURETIC    loratadine (CLARITIN) tablet 10 mg QDAY PRN    menthol ([place]) lozenge 1 Lozenge Q2HRS PRN    insulin lispro (AdmeLOG,HumaLOG) injection 4X/DAY ACHS    apixaban (ELIQUIS) tablet 5 mg BID    montelukast (SINGULAIR) tablet 10 mg Q EVENING    pravastatin (PRAVACHOL) tablet 10 mg Nightly    rizatriptan (MAXALT-MLT) disintegrating tablet 10 mg Once PRN    Respiratory Therapy Consult Continuous RT    hydrALAZINE (APRESOLINE) tablet 25 mg Q8HRS PRN    acetaminophen (Tylenol) tablet 650 mg Q4HRS PRN    senna-docusate (PERICOLACE or SENOKOT S) 8.6-50 MG per tablet 2 Tablet BID    And    polyethylene glycol/lytes (MIRALAX) PACKET 1 Packet QDAY PRN    And    magnesium hydroxide (MILK OF MAGNESIA) suspension 30 mL QDAY PRN    And    bisacodyl (DULCOLAX) suppository 10 mg QDAY PRN    omeprazole (PRILOSEC) capsule 20 mg DAILY    artificial tears ophthalmic solution 1 Drop PRN    mag hydrox-al hydrox-simeth (MAALOX PLUS ES or MYLANTA DS) suspension 20 mL Q2HRS PRN    ondansetron (ZOFRAN ODT) dispertab 4 mg 4X/DAY PRN    Or    ondansetron (ZOFRAN) syringe/vial injection 4 mg 4X/DAY PRN    traZODone (DESYREL) tablet 50 mg QHS PRN    sodium chloride (OCEAN) 0.65 % nasal spray 2 Spray PRN    oxyCODONE immediate-release (ROXICODONE) tablet 5 mg Q3HRS PRN    Or    oxyCODONE immediate release (ROXICODONE) tablet 10 mg Q3HRS PRN    levalbuterol (XOPENEX) 1.25 MG/3ML nebulizer solution 1.25 mg Q4H PRN (RT)    Fluticasone-Umeclidin-Vilant 200-62.5-25 MCG/INH AEPB 1 Puff DAILY       Orders Placed This Encounter   Procedures    Diet Order Diet: Consistent CHO (Diabetic)     Standing Status:   Standing     Number of  Occurrences:   1     Order Specific Question:   Diet:     Answer:   Consistent CHO (Diabetic) [4]       Assessment:  Active Hospital Problems    Diagnosis     Anemia     Lower extremity edema     Labile blood pressure     COVID-19     Dyspnea     Hypothyroidism     DM (diabetes mellitus) ([facility])        Medical Decision Making and Plan:   Debility - Patient with CHF exacerbation with fluid overload then over diuresis causing dehydration and orthostatic hypotension.  -PT and OT for mobility and ADLs     dCHF/HTN - Patient on Lasix 40 mg BID. Will monitor   -Consult hospitalist     DM2 with hyperglycemia - Patient on Insulin pre meal and 276 U degludec  -Consult hospitalist. Patient reduced her Degludec to 120 U. Patient would like to increase blood sugar checks     Cirrhosis - Noted in problem list from US last month     Hyponatremia - Check AM CMP - repeat WNL. Consult hospitalist. Repeat 8/20/22     Hypokalemia - 2.9 on 8/17/22. Start K supplement. Repeat CMP - 8/20/22    Asthma - Patient on Singulair and inhaler. Add PRN Claritin     HLD - Patient on Pravastatin 10 mg daily     Left shoulder injury - Patient with replacement in the past.      Hx of Gout - On Uloric on transfer     Migraines - Patient brought in [facility], available PRN    Muscle spasms - Patient on Magnesium oxide 1-2 times per day. Will schedule and add PRN. Recheck level    Hypothyroidism - Patient on Levothyroxine 112 mcg     Skin rash - in pannus fold. Start Nystatin Powder TID    Vitamin D deficiency - mild at 29. Start 1000 U     COVID + - on screen. No s/s. Special isolation.     Morbid Obesity due to excess calories - BMI of 42.7 on admission, meets medical criteria. Dietitian to consult     Factor 5 Leiden/DVT ppx - Patient on Eliquis 5 mg BID at baseline.     Total time:  26 minutes.  I spent greater than 50% of the time for patient care, counseling, and coordination on this date, including unit/floor time, and face-to-face time with the  patient as per interval events and assessment and plan above. Topics discussed included discharge planning, improving skin rash, recheck K level, and check Mg level.     Doron Brand M.D.

## 2022-08-19 NOTE — CARE PLAN
"The patient is Stable - Low risk of patient condition declining or worsening    Shift Goals  Clinical Goals: Safety, blood sugar management  Patient Goals: sleep well, pain control    Progress made toward(s) clinical / shift goals:      Problem: Bladder / Voiding  Goal: Patient will establish and maintain regular urinary output  Outcome: Progressing  Note: Patient had been continent of bladder this shift     Patient is not progressing towards the following goals:    Problem: Infection  Goal: Patient will remain free from infection  Outcome: Not Met  Note: Patient isolated for COVID     Problem: Fall Risk - Rehab  Goal: Patient will remain free from falls  Outcome: Not Met  Note: Sophia Smith Fall risk Assessment Score: 14    Moderate fall risk Interventions  - Bed and strip alarm   - Yellow sign by the door   - Yellow wrist band \"Fall risk\"  - Room near to the nurse station  - Do not leave patient unattended in the bathroom  - Fall risk education provided     "

## 2022-08-19 NOTE — PROGRESS NOTES
FSBS 84 mg/dL . Pt. refused her scheduled long acting insulin Degludec (120 units). Hospitalist was notified. Will monitor.

## 2022-08-19 NOTE — THERAPY
Physical Therapy   Daily Treatment     Patient Name: Jenifer Ramos  Age:  62 y.o., Sex:  female  Medical Record #: [id number]  Today's Date: 8/19/2022     Precautions  Precautions: Fall Risk, Other (See Comments)  Comments: 2L O2 at baseline; strict droplet & contact precautions    Subjective    Pt up in wc, ready for PT     Objective       08/19/22 0931   PT Charge Group   PT Gait Training 1   PT Therapeutic Exercise 1   PT Total Time Spent   PT Individual Total Time Spent (Mins) 30   Gait Functional Level of Assist    Gait Level Of Assist Contact Guard Assist   Assistive Device Front Wheel Walker   Distance (Feet) 50   # of Times Distance was Traveled 3   Deviation Trendelenberg;Bradykinetic;Decreased Heel Strike;Decreased Toe Off   Sitting Lower Body Exercises   Ankle Pumps 1 set of 15   Hip Flexion 1 set of 15   Hip Abduction 1 set of 15   Hip Adduction 1 set of 15   Long Arc Quad 1 set of 15   Hamstring Curl 1 set of 15       Assessment    Pt tolerated gait and strength training well, improving overall activity tolerance. Requires cues for attention to O2 tubing and L terminal knee ext with standing/ gait training.    Strengths: Able to follow instructions, Adequate strength, Motivated for self care and independence, Pleasant and cooperative, Supportive family, Willingly participates in therapeutic activities  Barriers: Decreased endurance, Fatigue, Generalized weakness, Home accessibility, Impaired activity tolerance, Impaired balance, Limited mobility, Pain (LUE weakness/ pain and prior shoulder replacement)    Plan    Standing balance, seated/ standing exercises, gait with FWW     Passport items to be completed:  Get in/out of bed safely, in/out of a vehicle, safely use mobility device, walk or wheel around home/community, navigate up and down stairs, show how to get up/down from the ground, ensure home is accessible, demonstrate HEP, complete caregiver training      Physical Therapy Problems (Active)   "     Problem: Mobility       Dates: Start: 08/12/22         Goal: STG-Within one week, patient will ambulate household distance CGA with FWW 25 ft x 2       Dates: Start: 08/12/22            Goal: STG-Within one week, patient will ambulate up/down a curb min A with FWW for 4-6\" rise       Dates: Start: 08/12/22               Problem: Mobility Transfers       Dates: Start: 08/12/22         Goal: STG-Within one week, patient will perform bed mobility min A with bed controls as needed       Dates: Start: 08/12/22            Goal: STG-Within one week, patient will sit to stand CGA to FWW       Dates: Start: 08/12/22            Goal: STG-Within one week, patient will transfer bed to chair CGA with FWW       Dates: Start: 08/12/22               Problem: PT-Long Term Goals       Dates: Start: 08/12/22         Goal: LTG-By discharge, patient will ambulate SPV / modified independent with FWW x 50 ft       Dates: Start: 08/12/22            Goal: LTG-By discharge, patient will transfer one surface to another SPV/ modified independent with FWW       Dates: Start: 08/12/22            Goal: LTG-By discharge, patient will transfer in/out of a car SBA/ CGA with FWW       Dates: Start: 08/12/22            Goal: LTG-By discharge, patient will navigate 4-6\" step with FWW and SBA/ CGA       Dates: Start: 08/12/22              "

## 2022-08-19 NOTE — PROGRESS NOTES
[facility] Medicine Daily Progress Note    Date of Service  8/19/2022    Chief Complaint  Jenifer Ramos is a 62 y.o. female admitted 8/11/2022 with shortness of breath.    Hospital Course  Ms. Ramos is a 62-year-old female with a history of diabetes, factor V [place] on Eliquis, diastolic heart failure who originally presented to [facility] on 7/20 with shortness of breath.  She was discharged 7/22 but then had a fall at her home and presented to [facility].  There she was noted to have severe hyponatremia.  She was given IV fluids with some sodium improvement.  Due to ongoing physical debility she was accepted to [facility] rehab on 8/11/2022.  On 8/11 she was noted to be positive for COVID 19.  Her last sodium 8/12: 141.    Interval Problem Update  8/16/2022 vital signs stable was on CPAP overnight now down to 2 L nasal cannula with SPO2 98.  8/17: Ms. Ramos was evaluated and examined at rehab. She has been on Lasix 40 mEq BID and K went down to 2.9 today. KCl 40 mEq BID has been ordered and repeat BMP ordered for the morning. She remains on 2 liters nasal cannula which is her baseline. COVID isolations in place.  8/18: Ms. Ramos was seen and evaluated at rehab. Potassium supplementation was initiated yesterday and her K has come  up to 3.2. This is appropriate to follow up with in the next 2-3 days with BMP and magnesium level. Her blood pressure has been low hundreds. She remains on 2 liters of oxygen. She notes muscle cramping. Magnesium oxide ordered.   8/19: Patient was working with physical therapy ambulating with front wheel walker well.  No dizziness.  I have decreased patient's Lantus from 120 units to 100 units daily A1c was 5.9%.  Blood sugars low 84 this morning but acceptable.  Patient does wear BiPAP nightly and has severe pulmonary hypertension leading to diastolic heart failure.  I placed her on 1800 cc fluid restrictions.  She remains on Lasix 40 twice daily.  I have ordered a BMP  magnesium level in a.m.  Patient remains on 2 L/min nasal cannula for COVID positive from 811.  Another reason for fluid restrictions.    I have discussed this patient's plan of care and discharge plan at IDT rounds today with Case Management, Nursing, Nursing leadership, and other members of the IDT team.    Consultants/Specialty  physiatry    Code Status  Full Code    Review of Systems  Review of Systems   Constitutional:  Negative for chills and fever.   Respiratory:  Negative for cough and shortness of breath.    Cardiovascular:  Negative for chest pain.        Less swelling of her legs   Gastrointestinal:         Eating well   Musculoskeletal:         +muscle cramps.   Neurological:         Generally tired   All other systems reviewed and are negative.     Physical Exam  Temp:  [36.4 °C (97.6 °F)-37.1 °C (98.7 °F)] 36.4 °C (97.6 °F)  Pulse:  [62-86] 70  Resp:  [12-19] 16  BP: (100-102)/(48-56) 100/56  SpO2:  [96 %-99 %] 98 %    Physical Exam  Vitals and nursing note reviewed.   Constitutional:       General: She is not in acute distress.     Appearance: She is obese. She is not ill-appearing or toxic-appearing.   HENT:      Head: Normocephalic.      Mouth/Throat:      Mouth: Mucous membranes are moist.      Pharynx: Oropharynx is clear.   Eyes:      General: No scleral icterus.     Conjunctiva/sclera: Conjunctivae normal.   Cardiovascular:      Rate and Rhythm: Normal rate and regular rhythm.   Pulmonary:      Effort: Pulmonary effort is normal.      Breath sounds: Normal breath sounds.      Comments: 2 liters nasal cannula oxygen  Abdominal:      General: There is no distension.      Tenderness: There is no abdominal tenderness.   Musculoskeletal:      Cervical back: Normal range of motion and neck supple.      Comments: Mild lower extremity swelling without pitting edema.    Skin:     General: Skin is warm and dry.      Coloration: Skin is pale.   Neurological:      General: No focal deficit present.       Mental Status: She is alert and oriented to person, place, and time.   Psychiatric:         Mood and Affect: Mood normal.         Behavior: Behavior normal.       Fluids    Intake/Output Summary (Last 24 hours) at 8/19/2022 1544  Last data filed at 8/19/2022 0700  Gross per 24 hour   Intake 480 ml   Output 850 ml   Net -370 ml       Laboratory      Recent Labs     08/17/22  0624 08/18/22  0602   SODIUM 139 141   POTASSIUM 2.9* 3.2*   CHLORIDE 97 98   CO2 32 32   GLUCOSE 90 91   BUN 8 9   CREATININE 0.62 0.74   CALCIUM 9.4 9.6                   Imaging  No orders to display          Assessment/Plan  COVID-19- (present on admission)  Assessment & Plan  Covid (+) on 8/11 at the Rehab  Asymptomatic    Labile blood pressure  Assessment & Plan  Monitor vitals  Note: BP dipped lower with SBP 90 on am of 8/12  BP remains low.  Her home Aldactone has been held  1800 fluid restrictions.    Lower extremity edema  Assessment & Plan  Has hx  Mostly pedal swelling  BNP: 2476 (7/20) -- no other values in Epic for comparison  On Lasix: 40 mg bid and repeat BMP reveals low K at 2.9 thus KCl 40 mEq BID was ordered. She had previously been on Aldactone 50 mg which was stopped on her last admission to [facility]. BMP on 8/18 K 3.2.   Plan: recheck BMP in 2-3 days. Add magnesium oxide 400 mg daily.  7/21/2022 echocardiogram shows preserved EF but grade 2 diastolic dysfunction and inability to diagnose right ventricular systolic pressure.  Suspect she may have a component of pulmonary hypertension given her BIPAP qhs for MARCI.    Anemia  Assessment & Plan  Hb improved to 10.3 (8/12)  Outpatient follow up is appropriate    Dyspnea- (present on admission)  Assessment & Plan  She is chronically on 2 liters of [place] oxygen and remains on it at [facility].  Presented to [facility] with increasing SOB  Trop: unremarkable  Echo (7/21/22): EF 65%, GII-DD, inferior wall hypokinesis  Nuc Stress Test (7/21): mormal left ventricular systolic  function, EF 65%; inferior wall hypokinesis  7/20/22 CTA: no PE, but ground glass opacifications with possible edema.  TSH ok (8/12)  BNP: 2476 (7/20) -- no other values in Epic for comparison  On O2 supplementation  Pt to follow up with Cardio as out patient    Hypothyroidism- (present on admission)  Assessment & Plan  TSH:1.51 (8/12)  On Synthroid 112 mcg daily for active management    DM (diabetes mellitus) (HCC)- (present on admission)  Assessment & Plan  Hba1c: 5.9 (8/12)  Monitor accuchecks  On Tresiba (home med)  Note: pt has her home meds here at the Rehab  Note: home meds include Tresiab and Lispro 20-40 units bid (per SS)  Diabetic diet    Sleep apnea- (present on admission)  Assessment & Plan  On  Home BIPAP and O2.       VTE prophylaxis: therapeutic anticoagulation with Eliquis     I have performed a physical exam and reviewed and updated ROS and Plan today (8/19/2022). In review of yesterday's note (8/18/2022), there are no changes except as documented above.

## 2022-08-19 NOTE — THERAPY
Physical Therapy   Daily Treatment     Patient Name: Jenifer Ramos  Age:  62 y.o., Sex:  female  Medical Record #: [id number]  Today's Date: 8/19/2022     Precautions  Precautions: Fall Risk, Other (See Comments)  Comments: 2L O2 at baseline; strict droplet & contact precautions    Subjective    Pt up in wc, reports increased L shoulder and upper back pain, requesting supine exercises initially to rest back.     Objective       08/19/22 1331   PT Charge Group   PT Gait Training 2   PT Therapeutic Exercise 2   PT Total Time Spent   PT Individual Total Time Spent (Mins) 60   Gait Functional Level of Assist    Gait Level Of Assist Contact Guard Assist   Assistive Device Front Wheel Walker   Distance (Feet) 50   # of Times Distance was Traveled 4   Deviation Trendelenberg;Bradykinetic;Decreased Toe Off   Transfer Functional Level of Assist   Bed, Chair, Wheelchair Transfer Contact Guard Assist   Bed Chair Wheelchair Transfer Description Adaptive equipment;Increased time;Set-up of equipment   Supine Lower Body Exercise   Supine Lower Body Exercises Yes   Hip Flexion 2 sets of 10  (manual resistance for leg press / extension)   Hip Abduction 2 sets of 10   Hip Adduction  2 sets of 10   Ankle Pumps 2 sets of 10   Gluteal Isometrics 2 sets of 10   Quadriceps Isometrics 2 sets of 10   Bed Mobility    Supine to Sit Standby Assist   Sit to Supine Standby Assist   Sit to Stand Standby Assist   Scooting Standby Assist       Assessment    Pt continues to improve gait endurance and activity tolerance, progressing well.    Strengths: Able to follow instructions, Adequate strength, Motivated for self care and independence, Pleasant and cooperative, Supportive family, Willingly participates in therapeutic activities  Barriers: Decreased endurance, Fatigue, Generalized weakness, Home accessibility, Impaired activity tolerance, Impaired balance, Limited mobility, Pain (LUE weakness/ pain and prior shoulder  "replacement)    Plan    Standing balance, seated/ standing exercises, gait with FWW. Family training with son Monday when out of covid isolation for transfers/ gait/ curb step and car as able.     Passport items to be completed:  Get in/out of bed safely, in/out of a vehicle, safely use mobility device, walk or wheel around home/community, navigate up and down stairs, show how to get up/down from the ground, ensure home is accessible, demonstrate HEP, complete caregiver training        Physical Therapy Problems (Active)       Problem: Mobility       Dates: Start: 08/12/22         Goal: STG-Within one week, patient will ambulate household distance CGA with FWW 25 ft x 2       Dates: Start: 08/12/22            Goal: STG-Within one week, patient will ambulate up/down a curb min A with FWW for 4-6\" rise       Dates: Start: 08/12/22               Problem: Mobility Transfers       Dates: Start: 08/12/22         Goal: STG-Within one week, patient will perform bed mobility min A with bed controls as needed       Dates: Start: 08/12/22            Goal: STG-Within one week, patient will sit to stand CGA to FWW       Dates: Start: 08/12/22            Goal: STG-Within one week, patient will transfer bed to chair CGA with FWW       Dates: Start: 08/12/22               Problem: PT-Long Term Goals       Dates: Start: 08/12/22         Goal: LTG-By discharge, patient will ambulate SPV / modified independent with FWW x 50 ft       Dates: Start: 08/12/22            Goal: LTG-By discharge, patient will transfer one surface to another SPV/ modified independent with FWW       Dates: Start: 08/12/22            Goal: LTG-By discharge, patient will transfer in/out of a car SBA/ CGA with FWW       Dates: Start: 08/12/22            Goal: LTG-By discharge, patient will navigate 4-6\" step with FWW and SBA/ CGA       Dates: Start: 08/12/22              "

## 2022-08-19 NOTE — DISCHARGE PLANNING
CM spoke with patient's , gave him an update from IDT and planned discharge date of 8/25/22. He is prepared to take her home then.

## 2022-08-19 NOTE — ASSESSMENT & PLAN NOTE
Patient on home BIPAP for MARCI.  Continue lasix 40 bid  Fluid restrictions especially with +COVID 19.  D/w patient that there are newer treatments for pulmonary hypertension like flolan, tadalafil, but would need pulmonologist to prescribe.  She I getting a new pulmonologist. Encouraged pulmonary hypertension specialist.  She follows with Dr Jef Montoya outpatient for cardiology

## 2022-08-20 LAB
ANION GAP SERPL CALC-SCNC: 10 MMOL/L (ref 7–16)
ANISOCYTOSIS BLD QL SMEAR: ABNORMAL
BASOPHILS # BLD AUTO: 0 % (ref 0–1.8)
BASOPHILS # BLD: 0 K/UL (ref 0–0.12)
BUN SERPL-MCNC: 11 MG/DL (ref 8–22)
CALCIUM SERPL-MCNC: 9.8 MG/DL (ref 8.5–10.5)
CHLORIDE SERPL-SCNC: 99 MMOL/L (ref 96–112)
CO2 SERPL-SCNC: 28 MMOL/L (ref 20–33)
CREAT SERPL-MCNC: 0.71 MG/DL (ref 0.5–1.4)
EOSINOPHIL # BLD AUTO: 0.12 K/UL (ref 0–0.51)
EOSINOPHIL NFR BLD: 1.8 % (ref 0–6.9)
ERYTHROCYTE [DISTWIDTH] IN BLOOD BY AUTOMATED COUNT: 60.4 FL (ref 35.9–50)
GFR SERPLBLD CREATININE-BSD FMLA CKD-EPI: 95 ML/MIN/1.73 M 2
GLUCOSE BLD STRIP.AUTO-MCNC: 114 MG/DL (ref 65–99)
GLUCOSE BLD STRIP.AUTO-MCNC: 123 MG/DL (ref 65–99)
GLUCOSE BLD STRIP.AUTO-MCNC: 133 MG/DL (ref 65–99)
GLUCOSE BLD STRIP.AUTO-MCNC: 142 MG/DL (ref 65–99)
GLUCOSE SERPL-MCNC: 110 MG/DL (ref 65–99)
HCT VFR BLD AUTO: 37.9 % (ref 37–47)
HGB BLD-MCNC: 10.5 G/DL (ref 12–16)
LYMPHOCYTES # BLD AUTO: 1.42 K/UL (ref 1–4.8)
LYMPHOCYTES NFR BLD: 21.9 % (ref 22–41)
MAGNESIUM SERPL-MCNC: 1.8 MG/DL (ref 1.5–2.5)
MANUAL DIFF BLD: NORMAL
MCH RBC QN AUTO: 21.9 PG (ref 27–33)
MCHC RBC AUTO-ENTMCNC: 27.7 G/DL (ref 33.6–35)
MCV RBC AUTO: 79 FL (ref 81.4–97.8)
MICROCYTES BLD QL SMEAR: ABNORMAL
MONOCYTES # BLD AUTO: 0.4 K/UL (ref 0–0.85)
MONOCYTES NFR BLD AUTO: 6.1 % (ref 0–13.4)
MORPHOLOGY BLD-IMP: NORMAL
NEUTROPHILS # BLD AUTO: 4.56 K/UL (ref 2–7.15)
NEUTROPHILS NFR BLD: 70.2 % (ref 44–72)
NRBC # BLD AUTO: 0 K/UL
NRBC BLD-RTO: 0 /100 WBC
OVALOCYTES BLD QL SMEAR: NORMAL
PLATELET # BLD AUTO: 270 K/UL (ref 164–446)
PLATELET BLD QL SMEAR: NORMAL
PMV BLD AUTO: 10.3 FL (ref 9–12.9)
POIKILOCYTOSIS BLD QL SMEAR: NORMAL
POLYCHROMASIA BLD QL SMEAR: NORMAL
POTASSIUM SERPL-SCNC: 4.2 MMOL/L (ref 3.6–5.5)
RBC # BLD AUTO: 4.8 M/UL (ref 4.2–5.4)
RBC BLD AUTO: PRESENT
SODIUM SERPL-SCNC: 137 MMOL/L (ref 135–145)
STOMATOCYTES BLD QL SMEAR: NORMAL
TARGETS BLD QL SMEAR: NORMAL
WBC # BLD AUTO: 6.5 K/UL (ref 4.8–10.8)

## 2022-08-20 PROCEDURE — 700102 HCHG RX REV CODE 250 W/ 637 OVERRIDE(OP): Performed by: HOSPITALIST

## 2022-08-20 PROCEDURE — 94760 N-INVAS EAR/PLS OXIMETRY 1: CPT

## 2022-08-20 PROCEDURE — 85007 BL SMEAR W/DIFF WBC COUNT: CPT

## 2022-08-20 PROCEDURE — 85025 COMPLETE CBC W/AUTO DIFF WBC: CPT

## 2022-08-20 PROCEDURE — A9270 NON-COVERED ITEM OR SERVICE: HCPCS | Performed by: HOSPITALIST

## 2022-08-20 PROCEDURE — 770010 HCHG ROOM/CARE - REHAB SEMI PRIVAT*

## 2022-08-20 PROCEDURE — 83735 ASSAY OF MAGNESIUM: CPT

## 2022-08-20 PROCEDURE — 36415 COLL VENOUS BLD VENIPUNCTURE: CPT

## 2022-08-20 PROCEDURE — 94640 AIRWAY INHALATION TREATMENT: CPT

## 2022-08-20 PROCEDURE — 82962 GLUCOSE BLOOD TEST: CPT | Mod: 91

## 2022-08-20 PROCEDURE — A9270 NON-COVERED ITEM OR SERVICE: HCPCS | Performed by: PHYSICAL MEDICINE & REHABILITATION

## 2022-08-20 PROCEDURE — 700102 HCHG RX REV CODE 250 W/ 637 OVERRIDE(OP): Performed by: PHYSICAL MEDICINE & REHABILITATION

## 2022-08-20 PROCEDURE — 99232 SBSQ HOSP IP/OBS MODERATE 35: CPT | Performed by: HOSPITALIST

## 2022-08-20 PROCEDURE — 80048 BASIC METABOLIC PNL TOTAL CA: CPT

## 2022-08-20 PROCEDURE — 700101 HCHG RX REV CODE 250: Performed by: PHYSICAL MEDICINE & REHABILITATION

## 2022-08-20 RX ORDER — FUROSEMIDE 40 MG/1
40 TABLET ORAL
Status: DISCONTINUED | OUTPATIENT
Start: 2022-08-20 | End: 2022-08-22 | Stop reason: HOSPADM

## 2022-08-20 RX ORDER — SPIRONOLACTONE 25 MG/1
50 TABLET ORAL
Status: DISCONTINUED | OUTPATIENT
Start: 2022-08-21 | End: 2022-08-22 | Stop reason: HOSPADM

## 2022-08-20 RX ORDER — FUROSEMIDE 40 MG/1
40 TABLET ORAL
Status: DISCONTINUED | OUTPATIENT
Start: 2022-08-21 | End: 2022-08-20

## 2022-08-20 RX ORDER — SPIRONOLACTONE 25 MG/1
25 TABLET ORAL
Status: DISCONTINUED | OUTPATIENT
Start: 2022-08-20 | End: 2022-08-20

## 2022-08-20 RX ADMIN — MAGNESIUM OXIDE TAB 400 MG (241.3 MG ELEMENTAL MG) 400 MG: 400 (241.3 MG) TAB at 10:21

## 2022-08-20 RX ADMIN — GABAPENTIN 600 MG: 300 CAPSULE ORAL at 10:21

## 2022-08-20 RX ADMIN — NYSTATIN 1 APPLICATION: 100000 POWDER TOPICAL at 10:20

## 2022-08-20 RX ADMIN — APIXABAN 5 MG: 5 TABLET, FILM COATED ORAL at 20:57

## 2022-08-20 RX ADMIN — NYSTATIN: 100000 POWDER TOPICAL at 20:58

## 2022-08-20 RX ADMIN — OXYCODONE HYDROCHLORIDE 10 MG: 10 TABLET ORAL at 21:05

## 2022-08-20 RX ADMIN — GABAPENTIN 600 MG: 300 CAPSULE ORAL at 20:58

## 2022-08-20 RX ADMIN — PRAVASTATIN SODIUM 10 MG: 20 TABLET ORAL at 20:57

## 2022-08-20 RX ADMIN — DOCUSATE SODIUM 50 MG AND SENNOSIDES 8.6 MG 2 TABLET: 8.6; 5 TABLET, FILM COATED ORAL at 10:21

## 2022-08-20 RX ADMIN — Medication 2000 UNITS: at 10:21

## 2022-08-20 RX ADMIN — MONTELUKAST 10 MG: 10 TABLET, FILM COATED ORAL at 20:57

## 2022-08-20 RX ADMIN — FUROSEMIDE 40 MG: 40 TABLET ORAL at 06:09

## 2022-08-20 RX ADMIN — FUROSEMIDE 40 MG: 40 TABLET ORAL at 17:43

## 2022-08-20 RX ADMIN — APIXABAN 5 MG: 5 TABLET, FILM COATED ORAL at 10:21

## 2022-08-20 RX ADMIN — OMEPRAZOLE 20 MG: 20 CAPSULE, DELAYED RELEASE ORAL at 10:21

## 2022-08-20 RX ADMIN — SPIRONOLACTONE 25 MG: 25 TABLET, FILM COATED ORAL at 10:29

## 2022-08-20 RX ADMIN — LEVOTHYROXINE SODIUM 112 MCG: 112 TABLET ORAL at 06:09

## 2022-08-20 RX ADMIN — FEBUXOSTAT 40 MG: 40 TABLET, FILM COATED ORAL at 20:57

## 2022-08-20 ASSESSMENT — ENCOUNTER SYMPTOMS
SHORTNESS OF BREATH: 0
FEVER: 0
COUGH: 0
CHILLS: 0
ROS GI COMMENTS: EATING WELL

## 2022-08-20 ASSESSMENT — PATIENT HEALTH QUESTIONNAIRE - PHQ9
SUM OF ALL RESPONSES TO PHQ9 QUESTIONS 1 AND 2: 0
2. FEELING DOWN, DEPRESSED, IRRITABLE, OR HOPELESS: NOT AT ALL
1. LITTLE INTEREST OR PLEASURE IN DOING THINGS: NOT AT ALL

## 2022-08-20 ASSESSMENT — FIBROSIS 4 INDEX
FIB4 SCORE: 1.79
FIB4 SCORE: 1.79

## 2022-08-20 NOTE — CARE PLAN
The patient is Stable - Low risk of patient condition declining or worsening    Shift Goals  Clinical Goals: safety/sleep  Patient Goals: sleep/pain management    Progress made toward(s) clinical / shift goals:        Problem: Knowledge Deficit - Standard  Goal: Patient and family/care givers will demonstrate understanding of plan of care, disease process/condition, diagnostic tests and medications  Outcome: Progressing, pt knowledgeable about medications, active role in cares     Problem: Skin Integrity  Goal: Skin integrity is maintained or improved  Outcome: Progressing, nystatin and skin care provided, resolving.      Problem: Respiratory  Goal: Patient will understand use and administration of respiratory medications to improve respiratory function  Outcome: Progressing, 2l nc post covid, home bipap use. Pt denies sob     Problem: Bowel Elimination  Goal: Patient will participate in bowel management program  Outcome: Progressing, last bm 8/16, pt agreeable to miralax provided hs. + bowel sounds, abdomen distended, denies n/v

## 2022-08-20 NOTE — PROGRESS NOTES
FSBS at lunch 125. Pt. requested her long acting insulin. Hospitalist was notified. 100 units of long acting was given.

## 2022-08-20 NOTE — PROGRESS NOTES
[facility] Medicine Daily Progress Note    Date of Service  8/20/2022    Chief Complaint  Jenifer Ramos is a 62 y.o. female admitted 8/11/2022 with shortness of breath.    Hospital Course  Ms. Ramos is a 62-year-old female with a history of diabetes, factor V Leyden on Eliquis, diastolic heart failure who originally presented to [facility] on 7/20 with shortness of breath.  She was discharged 7/22 but then had a fall at her home and presented to [facility].  There she was noted to have severe hyponatremia.  She was given IV fluids with some sodium improvement.  Due to ongoing physical debility she was accepted to [facility] rehab on 8/11/2022.  On 8/11 she was noted to be positive for COVID 19.  Her last sodium 8/12: 141.    Interval Problem Update  8/16/2022 vital signs stable was on CPAP overnight now down to 2 L nasal cannula with SPO2 98.  8/17: Ms. Ramos was evaluated and examined at rehab. She has been on Lasix 40 mEq BID and K went down to 2.9 today. KCl 40 mEq BID has been ordered and repeat BMP ordered for the morning. She remains on 2 liters nasal cannula which is her baseline. COVID isolations in place.  8/18: Ms. Ramos was seen and evaluated at rehab. Potassium supplementation was initiated yesterday and her K has come  up to 3.2. This is appropriate to follow up with in the next 2-3 days with BMP and magnesium level. Her blood pressure has been low hundreds. She remains on 2 liters of oxygen. She notes muscle cramping. Magnesium oxide ordered.   8/19: Patient was working with physical therapy ambulating with front wheel walker well.  No dizziness.  I have decreased patient's Lantus from 120 units to 100 units daily A1c was 5.9%.  Blood sugars low 84 this morning but acceptable.  Patient does wear BiPAP nightly and has severe pulmonary hypertension leading to diastolic heart failure.  I placed her on 1800 cc fluid restrictions.  She remains on Lasix 40 twice daily.  I have ordered a BMP  magnesium level in a.m.  Patient remains on 2 L/min nasal cannula for COVID positive from 811.  Another reason for fluid restrictions.  8/20: Patient able to lie fairly level supine without shortness of breath.  She remains on 2 L/min nasal cannula she does have CPAP at home.  I have added spironolactone back which is her home medication 50 mg a day in addition to her Lasix 40 twice daily.  Patient states she was consistently taking Lasix twice daily per her cardiologist discussed with patient.  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    I have discussed this patient's plan of care and discharge plan at IDT rounds today with Case Management, Nursing, Nursing leadership, and other members of the IDT team.    Consultants/Specialty  physiatry    Code Status  Full Code    Review of Systems  Review of Systems   Constitutional:  Negative for chills and fever.   Respiratory:  Negative for cough and shortness of breath.    Cardiovascular:  Negative for chest pain.        Less swelling of her legs   Gastrointestinal:         Eating well   Musculoskeletal:         +muscle cramps.   Neurological:         Generally tired   All other systems reviewed and are negative.     Physical Exam  Temp:  [36.8 °C (98.2 °F)-36.9 °C (98.5 °F)] 36.8 °C (98.2 °F)  Pulse:  [71-87] 87  Resp:  [12-19] 16  BP: (106-113)/(46-68) 113/68  SpO2:  [93 %-97 %] 97 %    Physical Exam  Vitals and nursing note reviewed.   Constitutional:       General: She is not in acute distress.     Appearance: She is obese. She is not ill-appearing or toxic-appearing.   HENT:      Head: Normocephalic.      Mouth/Throat:      Mouth: Mucous membranes are moist.      Pharynx: Oropharynx is clear.   Eyes:      General: No scleral icterus.     Conjunctiva/sclera: Conjunctivae normal.   Cardiovascular:      Rate and Rhythm: Normal rate and regular rhythm.   Pulmonary:      Effort:  Pulmonary effort is normal.      Breath sounds: Normal breath sounds.      Comments: 2 liters nasal cannula oxygen  Abdominal:      General: There is no distension.      Tenderness: There is no abdominal tenderness.   Musculoskeletal:      Cervical back: Normal range of motion and neck supple.      Comments: Mild lower extremity swelling without pitting edema.    Skin:     General: Skin is warm and dry.      Coloration: Skin is pale.   Neurological:      General: No focal deficit present.      Mental Status: She is alert and oriented to person, place, and time.   Psychiatric:         Mood and Affect: Mood normal.         Behavior: Behavior normal.       Fluids    Intake/Output Summary (Last 24 hours) at 8/20/2022 1518  Last data filed at 8/20/2022 0600  Gross per 24 hour   Intake --   Output 1400 ml   Net -1400 ml       Laboratory  Recent Labs     08/20/22  0652   WBC 6.5   RBC 4.80   HEMOGLOBIN 10.5*   HEMATOCRIT 37.9   MCV 79.0*   MCH 21.9*   MCHC 27.7*   RDW 60.4*   PLATELETCT 270   MPV 10.3     Recent Labs     08/18/22  0602 08/20/22  0652   SODIUM 141 137   POTASSIUM 3.2* 4.2   CHLORIDE 98 99   CO2 32 28   GLUCOSE 91 110*   BUN 9 11   CREATININE 0.74 0.71   CALCIUM 9.6 9.8                   Imaging  No orders to display          Assessment/Plan  Pulmonary hypertension (HCC)  Assessment & Plan  Patient on home BIPAP for MARCI.  Continue lasix 40 bid  Fluid restrictions especially with +COVID 19.  D/w patient that there are newer treatments for pulmonary hypertension like flolan, tadalafil, but would need pulmonologist to prescribe.  She I getting a new pulmonologist.    COVID-19- (present on admission)  Assessment & Plan  Covid (+) on 8/11 at the Rehab  Asymptomatic    Labile blood pressure  Assessment & Plan  Monitor vitals  Note: BP dipped lower with SBP 90 on am of 8/12  BP remains low.  Her home Aldactone has been held  1800 fluid restrictions.    Lower extremity edema  Assessment & Plan  Has hx  Mostly pedal  swelling  BNP: 2476 (7/20) -- no other values in Epic for comparison  On Lasix: 40 mg bid and repeat BMP reveals low K at 2.9 thus KCl 40 mEq BID replaced K. She had previously been on Aldactone 50 mg which was stopped on her last admission to [facility].  Plan: recheck BMP in 2-3 days. Add magnesium oxide 400 mg daily.  7/21/2022 echocardiogram shows preserved EF but grade 2 diastolic dysfunction and inability to diagnose right ventricular systolic pressure.  Suspect she may have a component of pulmonary hypertension given her BIPAP qhs for MARCI.  Has IVCF and prior DVTs.  Likely chronic edema from factor V leiden deficiency hypercoagulable state.  Now on eliquis.  8/20 restarted aldactone 50mg po daily with lasix 40 bid.  Repeat BMP on 8/22 to see if needs additional lasix.    Anemia  Assessment & Plan  Hb improved to 10.3 (8/12)  Outpatient follow up is appropriate    Dyspnea- (present on admission)  Assessment & Plan  She is chronically on 2 liters of [place] oxygen and remains on it at [facility]  [place] [facility] [place]  Trop: unremarkable  Echo (7/21/22): EF 65%, GII-DD, inferior wall hypokinesis  Nuc Stress Test (7/21): mormal left ventricular systolic function, EF 65%; inferior wall hypokinesis  7/20/22 CTA: no PE, but ground glass opacifications with possible edema.  TSH ok (8/12)  BNP: 2476 (7/20) -- no other values in Epic for comparison  On O2 supplementation  Pt to follow up with Cardio as out patient    Hypothyroidism- (present on admission)  Assessment & Plan  TSH:1.51 (8/12)  On Synthroid 112 mcg daily for active management    DM (diabetes mellitus) (HCC)- (present on admission)  Assessment & Plan  Hba1c: 5.9 (8/12)  Monitor accuchecks  On Tresiba (home med)  Note: pt has her home meds here at the Rehab  Note: home meds include Tresiab and Lispro 20-40 units bid (per SS)  Diabetic diet    Sleep apnea- (present on admission)  Assessment & Plan  On  Home BIPAP and O2.       VTE  prophylaxis: therapeutic anticoagulation with Eliquis     I have performed a physical exam and reviewed and updated ROS and Plan today (8/20/2022). In review of yesterday's note (8/19/2022), there are no changes except as documented above.

## 2022-08-20 NOTE — CARE PLAN
"  Problem: Fall Risk - Rehab  Goal: Patient will remain free from falls  Note: Sophia Smith Fall risk Assessment Score: 14     Moderate fall risk Interventions  - Bed and strip alarm   - Yellow sign by the door   - Yellow wrist band \"Fall risk\"  - Room near to the nurse station  - Do not leave patient unattended in the bathroom  - Fall risk education provided     The patient is Stable - Low risk of patient condition declining or worsening    Shift Goals  Clinical Goals: Safety  Patient Goals: sleep well, pain control  "

## 2022-08-20 NOTE — FLOWSHEET NOTE
08/20/22 0847   Events/Summary/Plan   Events/Summary/Plan o2 spot check   Vital Signs   Pulse 87   Respiration 16   Pulse Oximetry 97 %   $ Pulse Oximetry (Spot Check) Yes   Chest Exam   Work Of Breathing / Effort Within Normal Limits   Oxygen   O2 (LPM) 2   O2 Delivery Device Silicone Nasal Cannula

## 2022-08-21 LAB
GLUCOSE BLD STRIP.AUTO-MCNC: 110 MG/DL (ref 65–99)
GLUCOSE BLD STRIP.AUTO-MCNC: 146 MG/DL (ref 65–99)
GLUCOSE BLD STRIP.AUTO-MCNC: 152 MG/DL (ref 65–99)
GLUCOSE BLD STRIP.AUTO-MCNC: 97 MG/DL (ref 65–99)

## 2022-08-21 PROCEDURE — 700102 HCHG RX REV CODE 250 W/ 637 OVERRIDE(OP): Performed by: HOSPITALIST

## 2022-08-21 PROCEDURE — 700102 HCHG RX REV CODE 250 W/ 637 OVERRIDE(OP): Performed by: PHYSICAL MEDICINE & REHABILITATION

## 2022-08-21 PROCEDURE — 97530 THERAPEUTIC ACTIVITIES: CPT

## 2022-08-21 PROCEDURE — 770010 HCHG ROOM/CARE - REHAB SEMI PRIVAT*

## 2022-08-21 PROCEDURE — A9270 NON-COVERED ITEM OR SERVICE: HCPCS | Performed by: HOSPITALIST

## 2022-08-21 PROCEDURE — 94640 AIRWAY INHALATION TREATMENT: CPT

## 2022-08-21 PROCEDURE — A9270 NON-COVERED ITEM OR SERVICE: HCPCS | Performed by: PHYSICAL MEDICINE & REHABILITATION

## 2022-08-21 PROCEDURE — 82962 GLUCOSE BLOOD TEST: CPT | Mod: 91

## 2022-08-21 PROCEDURE — 94760 N-INVAS EAR/PLS OXIMETRY 1: CPT

## 2022-08-21 PROCEDURE — 97140 MANUAL THERAPY 1/> REGIONS: CPT

## 2022-08-21 PROCEDURE — 700101 HCHG RX REV CODE 250: Performed by: PHYSICAL MEDICINE & REHABILITATION

## 2022-08-21 PROCEDURE — 97110 THERAPEUTIC EXERCISES: CPT

## 2022-08-21 PROCEDURE — 99233 SBSQ HOSP IP/OBS HIGH 50: CPT | Performed by: PHYSICAL MEDICINE & REHABILITATION

## 2022-08-21 PROCEDURE — 97116 GAIT TRAINING THERAPY: CPT

## 2022-08-21 PROCEDURE — 99232 SBSQ HOSP IP/OBS MODERATE 35: CPT | Performed by: HOSPITALIST

## 2022-08-21 RX ADMIN — PRAVASTATIN SODIUM 10 MG: 20 TABLET ORAL at 22:12

## 2022-08-21 RX ADMIN — MENTHOL 4 G: 10 GEL TOPICAL at 12:53

## 2022-08-21 RX ADMIN — NYSTATIN 1 APPLICATION: 100000 POWDER TOPICAL at 22:14

## 2022-08-21 RX ADMIN — Medication 2000 UNITS: at 07:40

## 2022-08-21 RX ADMIN — FEBUXOSTAT 40 MG: 40 TABLET, FILM COATED ORAL at 22:13

## 2022-08-21 RX ADMIN — FUROSEMIDE 40 MG: 40 TABLET ORAL at 17:20

## 2022-08-21 RX ADMIN — DOCUSATE SODIUM 50 MG AND SENNOSIDES 8.6 MG 2 TABLET: 8.6; 5 TABLET, FILM COATED ORAL at 07:40

## 2022-08-21 RX ADMIN — LEVOTHYROXINE SODIUM 112 MCG: 112 TABLET ORAL at 05:17

## 2022-08-21 RX ADMIN — GABAPENTIN 600 MG: 300 CAPSULE ORAL at 22:12

## 2022-08-21 RX ADMIN — DOCUSATE SODIUM 50 MG AND SENNOSIDES 8.6 MG 2 TABLET: 8.6; 5 TABLET, FILM COATED ORAL at 22:12

## 2022-08-21 RX ADMIN — MAGNESIUM OXIDE TAB 400 MG (241.3 MG ELEMENTAL MG) 400 MG: 400 (241.3 MG) TAB at 07:40

## 2022-08-21 RX ADMIN — SPIRONOLACTONE 50 MG: 25 TABLET, FILM COATED ORAL at 05:17

## 2022-08-21 RX ADMIN — GABAPENTIN 600 MG: 300 CAPSULE ORAL at 07:40

## 2022-08-21 RX ADMIN — APIXABAN 5 MG: 5 TABLET, FILM COATED ORAL at 07:40

## 2022-08-21 RX ADMIN — OXYCODONE HYDROCHLORIDE 10 MG: 10 TABLET ORAL at 12:47

## 2022-08-21 RX ADMIN — FUROSEMIDE 40 MG: 40 TABLET ORAL at 05:17

## 2022-08-21 RX ADMIN — OMEPRAZOLE 20 MG: 20 CAPSULE, DELAYED RELEASE ORAL at 07:40

## 2022-08-21 RX ADMIN — APIXABAN 5 MG: 5 TABLET, FILM COATED ORAL at 22:12

## 2022-08-21 RX ADMIN — NYSTATIN 1 APPLICATION: 100000 POWDER TOPICAL at 07:44

## 2022-08-21 RX ADMIN — MONTELUKAST 10 MG: 10 TABLET, FILM COATED ORAL at 22:12

## 2022-08-21 ASSESSMENT — PAIN DESCRIPTION - PAIN TYPE: TYPE: ACUTE PAIN

## 2022-08-21 ASSESSMENT — GAIT ASSESSMENTS
GAIT LEVEL OF ASSIST: CONTACT GUARD ASSIST
DISTANCE (FEET): 80
ASSISTIVE DEVICE: FRONT WHEEL WALKER
DEVIATION: TRENDELENBERG;BRADYKINETIC;DECREASED TOE OFF

## 2022-08-21 ASSESSMENT — ENCOUNTER SYMPTOMS
NAUSEA: 0
ROS GI COMMENTS: EATING WELL
DIZZINESS: 0
ABDOMINAL PAIN: 0
CHILLS: 0
SHORTNESS OF BREATH: 0
CONSTIPATION: 1
FEVER: 0
COUGH: 0
NERVOUS/ANXIOUS: 0

## 2022-08-21 ASSESSMENT — ACTIVITIES OF DAILY LIVING (ADL): TOILET_TRANSFER_DESCRIPTION: GRAB BAR;INCREASED TIME;SET-UP OF EQUIPMENT

## 2022-08-21 ASSESSMENT — FIBROSIS 4 INDEX: FIB4 SCORE: 1.71

## 2022-08-21 NOTE — CARE PLAN
The patient is Stable - Low risk of patient condition declining or worsening    Shift Goals  Clinical Goals: safety  Patient Goals: sleep    Problem: Self Care  Goal: Patient will have the ability to perform ADLs independently or with assistance  Outcome: Progressing: Pt demonstrates ability to brush teeth, comb hair, perform post toileting hygiene with adaptive device.      Problem: Mobility  Goal: Patient's capacity to carry out activities will improve  Outcome: Progressing: Pt ambulating, with FWW and SBA, to and from bathroom.     Problem: Pain - Standard  Goal: Alleviation of pain or a reduction in pain to the patient’s comfort goal  Outcome: Progressing: Oxy 10 mg administered once for 7/10 LUE pain. Pt asleep upon reassessment.

## 2022-08-21 NOTE — FLOWSHEET NOTE
08/21/22 0806   Vital Signs   $ Pulse Oximetry (Spot Check) Yes   Respiratory Assessment   Level of Consciousness Alert   Chest Exam   Work Of Breathing / Effort Within Normal Limits   Breath Sounds   RUL Breath Sounds Clear   RML Breath Sounds Clear   RLL Breath Sounds Clear   DANIELLE Breath Sounds Clear   LLL Breath Sounds Clear   Oxygen   O2 Delivery Device Silicone Nasal Cannula;CPAP

## 2022-08-21 NOTE — PROGRESS NOTES
[facility] Medicine Daily Progress Note    Date of Service  8/21/2022    Chief Complaint  Jenifer Ramos is a 62 y.o. female admitted 8/11/2022 with shortness of breath.    Hospital Course  Ms. Ramos is a 62-year-old female with a history of diabetes, factor V Leyden on Eliquis, diastolic heart failure who originally presented to [facility] on 7/20 with shortness of breath.  She was discharged 7/22 but then had a fall at her home and presented to [facility].  There she was noted to have severe hyponatremia.  She was given IV fluids with some sodium improvement.  Due to ongoing physical debility she was accepted to [facility] rehab on 8/11/2022.  On 8/11 she was noted to be positive for COVID 19.  Her last sodium 8/12: 141.    Interval Problem Update  8/16/2022 vital signs stable was on CPAP overnight now down to 2 L nasal cannula with SPO2 98.  8/17: Ms. Ramos was evaluated and examined at rehab. She has been on Lasix 40 mEq BID and K went down to 2.9 today. KCl 40 mEq BID has been ordered and repeat BMP ordered for the morning. She remains on 2 liters nasal cannula which is her baseline. COVID isolations in place.  8/18: Ms. Ramos was seen and evaluated at rehab. Potassium supplementation was initiated yesterday and her K has come  up to 3.2. This is appropriate to follow up with in the next 2-3 days with BMP and magnesium level. Her blood pressure has been low hundreds. She remains on 2 liters of oxygen. She notes muscle cramping. Magnesium oxide ordered.   8/19: Patient was working with physical therapy ambulating with front wheel walker well.  No dizziness.  I have decreased patient's Lantus from 120 units to 100 units daily A1c was 5.9%.  Blood sugars low 84 this morning but acceptable.  Patient does wear BiPAP nightly and has severe pulmonary hypertension leading to diastolic heart failure.  I placed her on 1800 cc fluid restrictions.  She remains on Lasix 40 twice daily.  I have ordered a BMP  "magnesium level in a.m.  Patient remains on 2 L/min nasal cannula for COVID positive from 811.  Another reason for fluid restrictions.  8/20: Patient able to lie fairly level supine without shortness of breath.  She remains on 2 L/min nasal cannula she does have CPAP at home.  I have added spironolactone back which is her home medication 50 mg a day in addition to her Lasix 40 twice daily.  Patient states she was consistently taking Lasix twice daily per her cardiologist discussed with patient.  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  8/21:    Consultants/Specialty  physiatry    Code Status  Full Code    Review of Systems  Review of Systems   Constitutional:  Negative for chills, fever and malaise/fatigue.   HENT:  Negative for congestion.    Respiratory:  Negative for cough and shortness of breath.    Cardiovascular:  Positive for leg swelling (\"improving\"). Negative for chest pain.        Less swelling of her legs   Gastrointestinal:  Positive for constipation. Negative for abdominal pain and nausea.        Eating well   Genitourinary:  Negative for dysuria.   Musculoskeletal:         +muscle cramps.   Neurological:  Negative for dizziness.        Generally tired   Psychiatric/Behavioral:  The patient is not nervous/anxious.    All other systems reviewed and are negative.     Physical Exam  Temp:  [36.2 °C (97.2 °F)] 36.2 °C (97.2 °F)  Pulse:  [64-76] 68  Resp:  [12-18] 12  BP: (104-151)/(57-66) 115/57  SpO2:  [98 %-100 %] 98 %    Physical Exam  Vitals and nursing note reviewed.   Constitutional:       General: She is not in acute distress.     Appearance: She is obese. She is not ill-appearing or toxic-appearing.   HENT:      Head: Normocephalic.      Mouth/Throat:      Mouth: Mucous membranes are moist.      Pharynx: Oropharynx is clear.   Eyes:      General: No scleral icterus.     Conjunctiva/sclera: Conjunctivae " normal.   Cardiovascular:      Rate and Rhythm: Normal rate and regular rhythm.      Heart sounds: No murmur heard.  Pulmonary:      Effort: Pulmonary effort is normal.      Breath sounds: Normal breath sounds.      Comments: 2 liters nasal cannula oxygen  Abdominal:      General: There is no distension.      Tenderness: There is no abdominal tenderness.   Musculoskeletal:      Cervical back: Normal range of motion and neck supple.      Right lower leg: Edema present.      Left lower leg: Edema present.      Comments: Mild lower extremity swelling without pitting edema.    Skin:     General: Skin is warm and dry.      Coloration: Skin is pale.   Neurological:      General: No focal deficit present.      Mental Status: She is alert and oriented to person, place, and time.   Psychiatric:         Mood and Affect: Mood normal.         Behavior: Behavior normal.       Fluids    Intake/Output Summary (Last 24 hours) at 8/21/2022 1418  Last data filed at 8/21/2022 1159  Gross per 24 hour   Intake 1040 ml   Output 1250 ml   Net -210 ml       Laboratory  Recent Labs     08/20/22  0652   WBC 6.5   RBC 4.80   HEMOGLOBIN 10.5*   HEMATOCRIT 37.9   MCV 79.0*   MCH 21.9*   MCHC 27.7*   RDW 60.4*   PLATELETCT 270   MPV 10.3     Recent Labs     08/20/22  0652   SODIUM 137   POTASSIUM 4.2   CHLORIDE 99   CO2 28   GLUCOSE 110*   BUN 11   CREATININE 0.71   CALCIUM 9.8                   Imaging  No orders to display          Assessment/Plan  Pulmonary hypertension (HCC)  Assessment & Plan  Patient on home BIPAP for MARCI.  Continue lasix 40 bid  Fluid restrictions especially with +COVID 19.  D/w patient that there are newer treatments for pulmonary hypertension like flolan, tadalafil, but would need pulmonologist to prescribe.  She I getting a new pulmonologist. Encouraged pulmonary hypertension specialist.  She follows with Dr Jef Montoya outpatient for cardiology    COVID-19- (present on admission)  Assessment & Plan  Covid (+) on  8/11 at the Rehab  Asymptomatic    Labile blood pressure  Assessment & Plan  Monitor vitals  Note: BP dipped lower with SBP 90 on am of 8/12  BP remains low on diuretics.  1800 fluid restrictions.    Lower extremity edema  Assessment & Plan  Has hx  Mostly pedal swelling  BNP: 2476 (7/20) -- no other values in Epic for comparison  On Lasix: 40 mg bid and repeat BMP reveals low K at 2.9 thus KCl 40 mEq BID replaced K. She had previously been on Aldactone 50 mg which was stopped on her last admission to [facility].  Plan: recheck BMP in 2-3 days. Add magnesium oxide 400 mg daily.  7/21/2022 echocardiogram shows preserved EF but grade 2 diastolic dysfunction and inability to diagnose right ventricular systolic pressure.  Suspect she may have a component of pulmonary hypertension given her BIPAP qhs for MARCI.  Has IVCF and prior DVTs.  Likely chronic edema from factor V leiden deficiency hypercoagulable state.  Now on eliquis.  8/20 restarted aldactone 50mg po daily with lasix 40 bid.  Repeat BMP on 8/22 to see if needs additional lasix.    Anemia  Assessment & Plan  Hb improved to 10.3 (8/12)  8/21 Hgb:10.5  Outpatient follow up is appropriate    Dyspnea- (present on admission)  Assessment & Plan  She is chronically on 2 liters of [place] oxygen and remains on it at [facility].  [place] [facility] [place]  Trop: unremarkable  Echo (7/21/22): EF 65%, GII-DD, inferior wall hypokinesis  Nuc Stress Test (7/21): mormal left ventricular systolic function, EF 65%; inferior wall hypokinesis  7/20/22 CTA: no PE, but ground glass opacifications with possible edema.  TSH ok (8/12)  BNP: 2476 (7/20) -- no other values in Epic for comparison  On O2 supplementation  Diuresis as able.  Pt to follow up with Cardio as out patient    Hypothyroidism- (present on admission)  Assessment & Plan  TSH:1.51 (8/12)  On Synthroid 112 mcg daily for active management    DM (diabetes mellitus) (HCC)- (present on  admission)  Assessment & Plan  Hba1c: 5.9 (8/12)  Monitor accuchecks  On Tresiba (home med)  Note: pt has her home meds here at the Rehab  Note: home meds include Tresiab and Lispro 20-40 units bid (per SS)  Diabetic diet    Sleep apnea- (present on admission)  Assessment & Plan  On  Home BIPAP and O2.       VTE prophylaxis: therapeutic anticoagulation with Eliquis     I have performed a physical exam and reviewed and updated ROS and Plan today (8/21/2022). In review of yesterday's note (8/20/2022), there are no changes except as documented above.

## 2022-08-21 NOTE — CARE PLAN
The patient is Stable - Low risk of patient condition declining or worsening    Shift Goals  Clinical Goals: safety  Patient Goals: sleep/pain management    Problem: Diabetes Management  Goal: Patient's ability to maintain appropriate glucose levels will be maintained or improve  Outcome: Progressing: FSBG today = 114, 133, 123.      Problem: Pain - Standard  Goal: Alleviation of pain or a reduction in pain to the patient’s comfort goal  Outcome: Progressing: pt denies need for PRN pain medication.

## 2022-08-21 NOTE — PROGRESS NOTES
"Rehab Progress Note     Encounter Date: 8/21/2022    CC: Decreased mobility, COVID recovered    Interval Events (Subjective)  Patient sitting up in room. She is now considered COVID recovered so can leave the room. She reports it was great to get out of the room. Her  is going to come in to visit. Her son will be in tomorrow for family training. She continues to use oxygen. Denies SOB. Denies NVD.     IDT Team Meeting 8/16/2022  DC/Disposition:  8/25/22    Objective:  VITAL SIGNS: /57   Pulse 68   Temp 36.2 °C (97.2 °F) (Temporal)   Resp 12   Ht 1.727 m (5' 8\")   Wt 123 kg (271 lb 2.7 oz)   SpO2 98%   BMI 41.23 kg/m²   Gen: NAD  Psych: Mood and affect appropriate  CV: RRR, no edema  Resp: CTAB, no upper airway sounds  Abd: NTND  Neuro: AOx4, following commands    Recent Results (from the past 72 hour(s))   POCT glucose device results    Collection Time: 08/18/22 11:48 AM   Result Value Ref Range    POC Glucose, Blood 129 (H) 65 - 99 mg/dL   POCT glucose device results    Collection Time: 08/18/22  5:32 PM   Result Value Ref Range    POC Glucose, Blood 115 (H) 65 - 99 mg/dL   POCT glucose device results    Collection Time: 08/18/22  8:20 PM   Result Value Ref Range    POC Glucose, Blood 144 (H) 65 - 99 mg/dL   POCT glucose device results    Collection Time: 08/19/22  7:54 AM   Result Value Ref Range    POC Glucose, Blood 84 65 - 99 mg/dL   POCT glucose device results    Collection Time: 08/19/22 11:52 AM   Result Value Ref Range    POC Glucose, Blood 125 (H) 65 - 99 mg/dL   POCT glucose device results    Collection Time: 08/19/22  5:34 PM   Result Value Ref Range    POC Glucose, Blood 96 65 - 99 mg/dL   POCT glucose device results    Collection Time: 08/19/22  9:38 PM   Result Value Ref Range    POC Glucose, Blood 136 (H) 65 - 99 mg/dL   Basic Metabolic Panel    Collection Time: 08/20/22  6:52 AM   Result Value Ref Range    Sodium 137 135 - 145 mmol/L    Potassium 4.2 3.6 - 5.5 mmol/L    Chloride " 99 96 - 112 mmol/L    Co2 28 20 - 33 mmol/L    Glucose 110 (H) 65 - 99 mg/dL    Bun 11 8 - 22 mg/dL    Creatinine 0.71 0.50 - 1.40 mg/dL    Calcium 9.8 8.5 - 10.5 mg/dL    Anion Gap 10.0 7.0 - 16.0   CBC WITH DIFFERENTIAL    Collection Time: 08/20/22  6:52 AM   Result Value Ref Range    WBC 6.5 4.8 - 10.8 K/uL    RBC 4.80 4.20 - 5.40 M/uL    Hemoglobin 10.5 (L) 12.0 - 16.0 g/dL    Hematocrit 37.9 37.0 - 47.0 %    MCV 79.0 (L) 81.4 - 97.8 fL    MCH 21.9 (L) 27.0 - 33.0 pg    MCHC 27.7 (L) 33.6 - 35.0 g/dL    RDW 60.4 (H) 35.9 - 50.0 fL    Platelet Count 270 164 - 446 K/uL    MPV 10.3 9.0 - 12.9 fL    Neutrophils-Polys 70.20 44.00 - 72.00 %    Lymphocytes 21.90 (L) 22.00 - 41.00 %    Monocytes 6.10 0.00 - 13.40 %    Eosinophils 1.80 0.00 - 6.90 %    Basophils 0.00 0.00 - 1.80 %    Nucleated RBC 0.00 /100 WBC    Neutrophils (Absolute) 4.56 2.00 - 7.15 K/uL    Lymphs (Absolute) 1.42 1.00 - 4.80 K/uL    Monos (Absolute) 0.40 0.00 - 0.85 K/uL    Eos (Absolute) 0.12 0.00 - 0.51 K/uL    Baso (Absolute) 0.00 0.00 - 0.12 K/uL    NRBC (Absolute) 0.00 K/uL    Anisocytosis 1+     Microcytosis 1+    MAGNESIUM    Collection Time: 08/20/22  6:52 AM   Result Value Ref Range    Magnesium 1.8 1.5 - 2.5 mg/dL   ESTIMATED GFR    Collection Time: 08/20/22  6:52 AM   Result Value Ref Range    GFR (CKD-EPI) 95 >60 mL/min/1.73 m 2   MORPHOLOGY    Collection Time: 08/20/22  6:52 AM   Result Value Ref Range    RBC Morphology Present     Polychromia 1+     Poikilocytosis 1+     Ovalocytes 1+     Target Cells 1+     Stomatocytes 1+    PERIPHERAL SMEAR REVIEW    Collection Time: 08/20/22  6:52 AM   Result Value Ref Range    Peripheral Smear Review see below    DIFFERENTIAL MANUAL    Collection Time: 08/20/22  6:52 AM   Result Value Ref Range    Manual Diff Status PERFORMED    PLATELET ESTIMATE    Collection Time: 08/20/22  6:52 AM   Result Value Ref Range    Plt Estimation Normal    POCT glucose device results    Collection Time: 08/20/22  7:53  AM   Result Value Ref Range    POC Glucose, Blood 114 (H) 65 - 99 mg/dL   POCT glucose device results    Collection Time: 08/20/22 11:36 AM   Result Value Ref Range    POC Glucose, Blood 133 (H) 65 - 99 mg/dL   POCT glucose device results    Collection Time: 08/20/22  5:42 PM   Result Value Ref Range    POC Glucose, Blood 123 (H) 65 - 99 mg/dL   POCT glucose device results    Collection Time: 08/20/22  8:51 PM   Result Value Ref Range    POC Glucose, Blood 142 (H) 65 - 99 mg/dL   POCT glucose device results    Collection Time: 08/21/22  7:36 AM   Result Value Ref Range    POC Glucose, Blood 97 65 - 99 mg/dL       Current Facility-Administered Medications   Medication Frequency    diclofenac sodium (Voltaren) 1 % gel 4 g 4X/DAY PRN    furosemide (LASIX) tablet 40 mg BID DIURETIC    spironolactone (ALDACTONE) tablet 50 mg Q DAY    vitamin D3 (cholecalciferol) tablet 2,000 Units DAILY    Insulin Degludec SOPN 100 Units DAILY    magnesium oxide tablet 400 mg DAILY    magnesium oxide tablet 400 mg QDAY PRN    febuxostat (ULORIC) tablet 40 mg QHS    levothyroxine (SYNTHROID) tablet 112 mcg DAILY    nystatin (MYCOSTATIN) powder BID    gabapentin (NEURONTIN) capsule 600 mg BID    loratadine (CLARITIN) tablet 10 mg QDAY PRN    menthol (Halls) lozenge 1 Lozenge Q2HRS PRN    insulin lispro (AdmeLOG,HumaLOG) injection 4X/DAY ACHS    apixaban (ELIQUIS) tablet 5 mg BID    montelukast (SINGULAIR) tablet 10 mg Q EVENING    pravastatin (PRAVACHOL) tablet 10 mg Nightly    rizatriptan (MAXALT-MLT) disintegrating tablet 10 mg Once PRN    Respiratory Therapy Consult Continuous RT    hydrALAZINE (APRESOLINE) tablet 25 mg Q8HRS PRN    acetaminophen (Tylenol) tablet 650 mg Q4HRS PRN    senna-docusate (PERICOLACE or SENOKOT S) 8.6-50 MG per tablet 2 Tablet BID    And    polyethylene glycol/lytes (MIRALAX) PACKET 1 Packet QDAY PRN    And    magnesium hydroxide (MILK OF MAGNESIA) suspension 30 mL QDAY PRN    And    bisacodyl (DULCOLAX)  suppository 10 mg QDAY PRN    omeprazole (PRILOSEC) capsule 20 mg DAILY    artificial tears ophthalmic solution 1 Drop PRN    mag hydrox-al hydrox-simeth (MAALOX PLUS ES or MYLANTA DS) suspension 20 mL Q2HRS PRN    ondansetron (ZOFRAN ODT) dispertab 4 mg 4X/DAY PRN    Or    ondansetron (ZOFRAN) syringe/vial injection 4 mg 4X/DAY PRN    traZODone (DESYREL) tablet 50 mg QHS PRN    sodium chloride (OCEAN) 0.65 % nasal spray 2 Spray PRN    oxyCODONE immediate-release (ROXICODONE) tablet 5 mg Q3HRS PRN    Or    oxyCODONE immediate release (ROXICODONE) tablet 10 mg Q3HRS PRN    levalbuterol (XOPENEX) 1.25 MG/3ML nebulizer solution 1.25 mg Q4H PRN (RT)    Fluticasone-Umeclidin-Vilant 200-62.5-25 MCG/INH AEPB 1 Puff DAILY       Orders Placed This Encounter   Procedures    Diet Order Diet: Consistent CHO (Diabetic); Fluid modifications: (optional): 1800 ml Fluid Restriction     Standing Status:   Standing     Number of Occurrences:   1     Order Specific Question:   Diet:     Answer:   Consistent CHO (Diabetic) [4]     Order Specific Question:   Fluid modifications: (optional)     Answer:   1800 ml Fluid Restriction [10]       Assessment:  Active Hospital Problems    Diagnosis     Anemia     Lower extremity edema     Labile blood pressure     COVID-19     Dyspnea     Hypothyroidism     DM (diabetes mellitus) ([facility])        Medical Decision Making and Plan:   Debility - Patient with CHF exacerbation with fluid overload then over diuresis causing dehydration and orthostatic hypotension.  -PT and OT for mobility and ADLs     dCHF/HTN - Patient on Lasix 40 mg BID. Will monitor   -Consult hospitalist. Started on Aldactone and increased. Lasix changed to twice daily diuretic timing     DM2 with hyperglycemia - Patient on Insulin pre meal and 276 U degludec  -Consult hospitalist. Patient reduced her Degludec to 120 U. Patient would like to increase blood sugar checks     Cirrhosis - Noted in problem list from US last month      Hyponatremia - Check AM CMP - repeat WNL. Consult hospitalist. Repeat 8/20/22     Hypokalemia - 2.9 on 8/17/22. Start K supplement. Repeat CMP - 8/20/22    Asthma - Patient on Singulair and inhaler. Add PRN Claritin     HLD - Patient on Pravastatin 10 mg daily     Left shoulder injury - Patient with replacement in the past.      Hx of Gout - On Uloric on transfer     Migraines - Patient brought in [facility], available PRN    Muscle spasms - Patient on Magnesium oxide 1-2 times per day. Will schedule and add PRN. Recheck level - wnl    Hypothyroidism - Patient on Levothyroxine 112 mcg     Skin rash - in pannus fold. Start Nystatin Powder TID    Vitamin D deficiency - mild at 29. Start 1000 U     COVID + - on screen. No s/s. Special isolation.  COVID recovered 8/21/22     Morbid Obesity due to excess calories - BMI of 42.7 on admission, meets medical criteria. Dietitian to consult     Factor 5 Leiden/DVT ppx - Patient on Eliquis 5 mg BID at baseline.     Total time:  36 minutes.  I spent greater than 50% of the time for patient care, counseling, and coordination on this date, including unit/floor time, and face-to-face time with the patient as per interval events and assessment and plan above. Topics discussed included ongoing muscle spasms, Mg normal, continue supplement, COVID recovered and discharge planning. May be able to discharge early.     Doron Brand M.D.

## 2022-08-21 NOTE — THERAPY
"Occupational Therapy  Daily Treatment     Patient Name: Jenifer Ramos  Age:  62 y.o., Sex:  female  Medical Record #: [id number]  Today's Date: 8/21/2022     Precautions  Precautions: Fall Risk  Comments: 2L O2 at baseline; contact precautions lifte 8/21 per nursing         Subjective    \"I don't why I am so tired today\"     Objective       08/21/22 1031   OT Charge Group   OT Manual Therapy Technique 1   OT Therapeutic Exercise  1   OT Total Time Spent   OT Individual Total Time Spent (Mins) 30   Standing Upper Body Exercises   Comments at W standing RUE PROM - shoulder flex/extension, internal/external rotation. Pt able to safely perform self-ROM at R shoulder with shoulder pendulum swings/circles   Interdisciplinary Plan of Care Collaboration   Patient Position at End of Therapy Seated;Call Light within Reach;Tray Table within Reach     Functional mobility in room at [facility] - CGA ~25ft    Assessment    Pt with RUE pain/tightness therefore session focused on ROM and edu on daily mobility of RUE to prevent contractures with use of self-ROM/PROM exercises that family could assist with. Pt verbalized good understanding of exercises and of surrounding musculature with impact on surrounding lymphatic system/brachial plexus nerves    Strengths: Able to follow instructions, Manages pain appropriately, Motivated for self care and independence, Supportive family, Willingly participates in therapeutic activities  Barriers: Decreased endurance, Generalized weakness, Impaired activity tolerance, Impaired balance, Limited mobility    Plan    Refer to Primary OT POC/goals     Occupational Therapy Goals (Active)       Problem: Dressing       Dates: Start: 08/12/22         Goal: STG-Within one week, patient will dress UB with setup and supervision.        Dates: Start: 08/12/22         Goal Note filed on 08/16/22 1158 by Analia Salmon OT       SBA and verbal cues, limited by LUE impairment                 Goal: STG-Within " one week, patient will dress LB with Lupillo and use of AE PRN.        Dates: Start: 08/16/22               Problem: OT Long Term Goals       Dates: Start: 08/12/22         Goal: LTG-By discharge, patient will complete basic self care tasks with Lupillo to modified independence and use of AE PRN.        Dates: Start: 08/12/22            Goal: LTG-By discharge, patient will perform bathroom transfers with supervision/SBA to modified independence and use of AE PRN.        Dates: Start: 08/12/22               Problem: Toileting       Dates: Start: 08/16/22         Goal: STG-Within one week, patient will complete toileting tasks with SBA and use of AE PRN.        Dates: Start: 08/16/22

## 2022-08-21 NOTE — THERAPY
Physical Therapy   Daily Treatment     Patient Name: Jenifer Ramos  Age:  62 y.o., Sex:  female  Medical Record #: [id number]  Today's Date: 8/21/2022     Precautions  Precautions: Fall Risk  Comments: 2L O2 at baseline; contact precautions lifte 8/21 per nursing    Subjective    Patient frustrated family training was not completed earlier and is waiting on FT on Monday for d/c; upset about call light restrictions when she feels she is capable of getting self to bathroom     Objective       08/21/22 0831   PT Charge Group   PT Gait Training 1   PT Therapeutic Activities 3   Precautions   Precautions Fall Risk   Comments 2L O2 at baseline; contact precautions lifte 8/21 per nursing   Gait Functional Level of Assist    Gait Level Of Assist Contact Guard Assist   Assistive Device Front Wheel Walker   Distance (Feet) 80   # of Times Distance was Traveled 1   Deviation Trendelenberg;Bradykinetic;Decreased Toe Off   Stairs Functional Level of Assist   Level of Assist with Stairs Contact Guard Assist   # of Stairs Climbed 2  (2in using FWW)   Stairs Description Limited by fatigue;Verbal cueing;Oxygen   Transfer Functional Level of Assist   Bed, Chair, Wheelchair Transfer Contact Guard Assist  (total assist to don shoes prior to transfer)   Bed Chair Wheelchair Transfer Description Initial preparation for task;Set-up of equipment;Supervision for safety;Increased time;Other (comment)  (using FWW for SPT)   Toilet Transfers Standby Assist   Toilet Transfer Description Grab bar;Increased time;Set-up of equipment  (using grab bar to transfer w/c to toilet)   Strengths & Barriers   Strengths Effective communication skills;Good insight into deficits/needs;Pleasant and cooperative;Willingly participates in therapeutic activities;Supportive family   Barriers Decreased endurance;Generalized weakness;Impaired activity tolerance       Assessment    Patient ambulation distance self-limited by overall fatigue, requires CGA for  "balance and assist to manage O2 concentrator, able to negotiate 2in stair using FWW with CGA and assist to manage O2 tank, verbal cues for FWW placement    Strengths: Effective communication skills, Good insight into deficits/needs, Pleasant and cooperative, Willingly participates in therapeutic activities, Supportive family  Barriers: Decreased endurance, Generalized weakness, Impaired activity tolerance    Plan    Standing balance, seated/ standing exercises, gait with FWW. Family training with son Monday when out of covid isolation for transfers/ gait/ curb step and car as able.       Passport items to be completed:   demonstrate HEP, complete caregiver training    Physical Therapy Problems (Active)       Problem: Mobility       Dates: Start: 08/12/22         Goal: STG-Within one week, patient will ambulate household distance CGA with FWW 25 ft x 2       Dates: Start: 08/12/22            Goal: STG-Within one week, patient will ambulate up/down a curb min A with FWW for 4-6\" rise       Dates: Start: 08/12/22               Problem: Mobility Transfers       Dates: Start: 08/12/22         Goal: STG-Within one week, patient will perform bed mobility min A with bed controls as needed       Dates: Start: 08/12/22            Goal: STG-Within one week, patient will sit to stand CGA to FWW       Dates: Start: 08/12/22            Goal: STG-Within one week, patient will transfer bed to chair CGA with FWW       Dates: Start: 08/12/22               Problem: PT-Long Term Goals       Dates: Start: 08/12/22         Goal: LTG-By discharge, patient will ambulate SPV / modified independent with FWW x 50 ft       Dates: Start: 08/12/22            Goal: LTG-By discharge, patient will transfer one surface to another SPV/ modified independent with FWW       Dates: Start: 08/12/22            Goal: LTG-By discharge, patient will transfer in/out of a car SBA/ CGA with FWW       Dates: Start: 08/12/22            Goal: LTG-By discharge, " "patient will navigate 4-6\" step with FWW and SBA/ CGA       Dates: Start: 08/12/22              "

## 2022-08-22 VITALS
OXYGEN SATURATION: 96 % | HEIGHT: 68 IN | DIASTOLIC BLOOD PRESSURE: 64 MMHG | HEART RATE: 75 BPM | SYSTOLIC BLOOD PRESSURE: 108 MMHG | BODY MASS INDEX: 40.93 KG/M2 | TEMPERATURE: 98.2 F | WEIGHT: 270.06 LBS | RESPIRATION RATE: 16 BRPM

## 2022-08-22 LAB
ANION GAP SERPL CALC-SCNC: 7 MMOL/L (ref 7–16)
BUN SERPL-MCNC: 10 MG/DL (ref 8–22)
CALCIUM SERPL-MCNC: 9.5 MG/DL (ref 8.5–10.5)
CHLORIDE SERPL-SCNC: 99 MMOL/L (ref 96–112)
CO2 SERPL-SCNC: 33 MMOL/L (ref 20–33)
CREAT SERPL-MCNC: 0.73 MG/DL (ref 0.5–1.4)
GFR SERPLBLD CREATININE-BSD FMLA CKD-EPI: 91 ML/MIN/1.73 M 2
GLUCOSE BLD STRIP.AUTO-MCNC: 118 MG/DL (ref 65–99)
GLUCOSE BLD STRIP.AUTO-MCNC: 88 MG/DL (ref 65–99)
GLUCOSE SERPL-MCNC: 92 MG/DL (ref 65–99)
POTASSIUM SERPL-SCNC: 3.5 MMOL/L (ref 3.6–5.5)
SODIUM SERPL-SCNC: 139 MMOL/L (ref 135–145)

## 2022-08-22 PROCEDURE — 97530 THERAPEUTIC ACTIVITIES: CPT

## 2022-08-22 PROCEDURE — 700102 HCHG RX REV CODE 250 W/ 637 OVERRIDE(OP): Performed by: HOSPITALIST

## 2022-08-22 PROCEDURE — A9270 NON-COVERED ITEM OR SERVICE: HCPCS | Performed by: HOSPITALIST

## 2022-08-22 PROCEDURE — 99232 SBSQ HOSP IP/OBS MODERATE 35: CPT | Performed by: HOSPITALIST

## 2022-08-22 PROCEDURE — 700102 HCHG RX REV CODE 250 W/ 637 OVERRIDE(OP): Performed by: PHYSICAL MEDICINE & REHABILITATION

## 2022-08-22 PROCEDURE — 82962 GLUCOSE BLOOD TEST: CPT | Mod: 91

## 2022-08-22 PROCEDURE — 80048 BASIC METABOLIC PNL TOTAL CA: CPT

## 2022-08-22 PROCEDURE — 97116 GAIT TRAINING THERAPY: CPT

## 2022-08-22 PROCEDURE — 36415 COLL VENOUS BLD VENIPUNCTURE: CPT

## 2022-08-22 PROCEDURE — A9270 NON-COVERED ITEM OR SERVICE: HCPCS | Performed by: PHYSICAL MEDICINE & REHABILITATION

## 2022-08-22 PROCEDURE — 97110 THERAPEUTIC EXERCISES: CPT

## 2022-08-22 PROCEDURE — 99239 HOSP IP/OBS DSCHRG MGMT >30: CPT | Performed by: PHYSICAL MEDICINE & REHABILITATION

## 2022-08-22 RX ORDER — POTASSIUM CHLORIDE 20 MEQ/1
10 TABLET, EXTENDED RELEASE ORAL DAILY
Status: DISCONTINUED | OUTPATIENT
Start: 2022-08-22 | End: 2022-08-22 | Stop reason: HOSPADM

## 2022-08-22 RX ADMIN — POTASSIUM CHLORIDE 10 MEQ: 1500 TABLET, EXTENDED RELEASE ORAL at 09:54

## 2022-08-22 RX ADMIN — MAGNESIUM OXIDE TAB 400 MG (241.3 MG ELEMENTAL MG) 400 MG: 400 (241.3 MG) TAB at 09:56

## 2022-08-22 RX ADMIN — APIXABAN 5 MG: 5 TABLET, FILM COATED ORAL at 09:56

## 2022-08-22 RX ADMIN — SPIRONOLACTONE 50 MG: 25 TABLET, FILM COATED ORAL at 05:23

## 2022-08-22 RX ADMIN — Medication 2000 UNITS: at 09:55

## 2022-08-22 RX ADMIN — OMEPRAZOLE 20 MG: 20 CAPSULE, DELAYED RELEASE ORAL at 09:56

## 2022-08-22 RX ADMIN — OXYCODONE HYDROCHLORIDE 10 MG: 10 TABLET ORAL at 00:14

## 2022-08-22 RX ADMIN — LEVOTHYROXINE SODIUM 112 MCG: 112 TABLET ORAL at 05:23

## 2022-08-22 RX ADMIN — GABAPENTIN 600 MG: 300 CAPSULE ORAL at 09:55

## 2022-08-22 RX ADMIN — FUROSEMIDE 40 MG: 40 TABLET ORAL at 05:23

## 2022-08-22 ASSESSMENT — GAIT ASSESSMENTS
DEVIATION: ANTALGIC;BRADYKINETIC;DECREASED TOE OFF
GAIT LEVEL OF ASSIST: CONTACT GUARD ASSIST
ASSISTIVE DEVICE: FRONT WHEEL WALKER
DISTANCE (FEET): 70

## 2022-08-22 ASSESSMENT — ACTIVITIES OF DAILY LIVING (ADL)
TOILETING_LEVEL_OF_ASSIST_DESCRIPTION: GRAB BAR;INCREASED TIME;INITIAL PREPARATION FOR TASK;SET-UP OF EQUIPMENT;SUPERVISION FOR SAFETY;VERBAL CUEING
TUB_SHOWER_TRANSFER_DESCRIPTION: ADAPTIVE EQUIPMENT;GRAB BAR;INCREASED TIME;SET-UP OF EQUIPMENT;SUPERVISION FOR SAFETY;VERBAL CUEING
BED_CHAIR_WHEELCHAIR_TRANSFER_DESCRIPTION: INCREASED TIME;SET-UP OF EQUIPMENT
TOILET_TRANSFER_DESCRIPTION: GRAB BAR;INCREASED TIME
TOILETING_LEVEL_OF_ASSIST: REQUIRES SUPERVISION WITH TOILETING
TOILET_TRANSFER_LEVEL_OF_ASSIST: REQUIRES SUPERVISION WITH TOILET TRANSFER
SHOWER_TRANSFER_LEVEL_OF_ASSIST: REQUIRES PHYSICAL ASSIST WITH SHOWER TRANSFER

## 2022-08-22 ASSESSMENT — ENCOUNTER SYMPTOMS
NERVOUS/ANXIOUS: 0
ABDOMINAL PAIN: 0
SPEECH CHANGE: 0
SHORTNESS OF BREATH: 0
STRIDOR: 0
DIZZINESS: 0
COUGH: 0
NAUSEA: 0
FEVER: 0

## 2022-08-22 ASSESSMENT — PAIN DESCRIPTION - PAIN TYPE
TYPE: ACUTE PAIN
TYPE: ACUTE PAIN

## 2022-08-22 ASSESSMENT — FIBROSIS 4 INDEX: FIB4 SCORE: 1.71

## 2022-08-22 NOTE — DISCHARGE SUMMARY
Rehab Discharge Summary    Admission Date: 8/11/2022    Discharge Date: 8/22/2022    Attending Provider: Doron Brand MD/PhD    Admission Diagnosis:   Active Hospital Problems    Diagnosis     Pulmonary hypertension (HCC)     Anemia     Lower extremity edema     Labile blood pressure     COVID-19     Dyspnea     Hypothyroidism     DM (diabetes mellitus) (HCC)     Sleep apnea        Discharge Diagnosis:  Active Hospital Problems    Diagnosis     Pulmonary hypertension (HCC)     Anemia     Lower extremity edema     Labile blood pressure     COVID-19     Dyspnea     Hypothyroidism     DM (diabetes mellitus) (HCC)     Sleep apnea        HPI per H&P:  Patient is a 62 y.o. with a PMH of shoulder/back pain, DM2, Factor V Leiden on Eliquis, CHF, Hyponatremia, and hypokalemia who presented on 8/2/22 with weakness and falls..  Patient was recently discharged from [facility] on 7/22/22 for SOB and weight gain. Her BNP was elevated but her TTE showed preserved ejection fracture. She was diuresed and discharged on 7/22/22.  She then presented to [facility] after falling at home and in ED found to be dehydrated and having orthostatic hypotension. She was also noted to have severe hyponatremia. She was given IV fluids and her sodium has improved. Patient no longer having orthostatic hypotension.     Patient was admitted to [facility] on 8/11/2022.     Hospital Course by Problem List:  Debility - Patient with CHF exacerbation with fluid overload then over diuresis causing dehydration and orthostatic hypotension. Patient underwent acute inpatient rehabilitation from 8/11/22 to 8/22/22 with good improvement in mobility and ADLs.      dCHF/HTN - Patient on Lasix 40 mg BID. Will monitor   -Consult hospitalist. Started on Aldactone and increased. Lasix changed to twice daily diuretic timing     DM2 with hyperglycemia - Patient on Insulin pre meal and 276 U degludec  -Consult hospitalist. Patient reduced her Degludec  to 120 U. Patient would like to increase blood sugar checks      Cirrhosis - Noted in problem list from US last month     Hyponatremia - Check AM CMP - repeat WNL. Consult hospitalist. Repeat 8/20/22     Hypokalemia - 2.9 on 8/17/22. Start K supplement. Repeat CMP - 8/20/22     Asthma - Patient on Singulair and inhaler. Add PRN Claritin     HLD - Patient on Pravastatin 10 mg daily     Left shoulder injury - Patient with replacement in the past.      Hx of Gout - On Uloric on transfer     Migraines - Patient brought in [facility], available PRN     Muscle spasms - Patient on Magnesium oxide 1-2 times per day. Will schedule and add PRN. Recheck level - wnl     Hypothyroidism - Patient on Levothyroxine 112 mcg     Skin rash - in pannus fold. Start Nystatin Powder TID     Vitamin D deficiency - mild at 29. Start 1000 U      COVID + - on screen. No s/s. Special isolation.  COVID recovered 8/21/22      Morbid Obesity due to excess calories - BMI of 42.7 on admission, meets medical criteria. Dietitian to consult     Factor 5 Leiden/DVT ppx - Patient on Eliquis 5 mg BID at baseline.     Functional Status at Discharge  Eating:  Supervision (increased time due to difficulty using LUE. Educated pt on self-feeding AE, see note)  Eating Description:  Increased time, Adaptive equipment, Verbal cueing  Grooming:  Supervision, Seated  Grooming Description:  Initial preparation for task, Seated in wheelchair at sink  Bathing:  Minimal Assist (assist to wash/rinse perineal)  Bathing Description:  Adaptive equipment, Grab bar, Hand held shower, Tub bench, Assit with perineal, Increased time, Initial preparation for task, Set-up of equipment, Supervision for safety, Verbal cueing  Upper Body Dressing:  Supervision  Upper Body Dressing Description:  Increased time, Initial preparation for task, Verbal cueing  Lower Body Dressing:  Minimal Assist (able to manage underwear and pants with increaed time and AE, assist for shoes)  Lower Body  Dressing Description:  Assistive devices, Dressing stick, Grab bar, Reacher, Sock aid, Increased time, Initial preparation for task, Set-up of equipment, Supervision for safety, Verbal cueing     Walk:  Contact Guard Assist  Distance Walked:  70 (in addition to 20 ft x 2 bed<>bathroom)  Number of Times Distance Was Traveled:  2  Assistive Device:  Front Wheel Walker  Gait Deviation:  Antalgic, Bradykinetic, Decreased Toe Off  Wheelchair:  Minimal Assist  Distance Propelled:  15   Wheelchair Description:   (RUE/ BLE's)  Stairs Contact Guard Assist  Stairs Description Extra time, Limited by fatigue, Requires incidental assist     Comprehension:  Independent  Comprehension Description:     Expression:  Independent  Expression Description:     Social Interaction:  Supervision  Social Interaction Description:  Increased time  Problem Solving:  Independent  Problem Solving Description:     Memory:  Independent  Memory Description:          Doron JUARES M.D., personally performed a complete drug regimen review and no potential clinically significant medication issues were identified.   Discharge Medication:     Medication List        CONTINUE taking these medications        Instructions   apixaban 5mg Tabs  Commonly known as: Eliquis   Doctor's comments: Patient has already, patient to restart this Friday pm.  Take 1 Tab by mouth 2 Times a Day.  Dose: 5 mg     febuxostat 80 MG Tabs  Commonly known as: ULORIC   Take 40 mg by mouth at bedtime.  Dose: 40 mg     Fluticasone Furoate-Vilanterol 200-25 MCG/INH Aepb  Commonly known as: BREO   Inhale 1 Puff by mouth every day.  Dose: 1 Puff     furosemide 40 MG Tabs  Commonly known as: LASIX   Take 40 mg by mouth 2 times a day.  Dose: 40 mg     gabapentin 300 MG Caps  Commonly known as: NEURONTIN   Take 600 mg by mouth 2 times a day.  Dose: 600 mg     Insulin Degludec 200 UNIT/ML Sopn   Inject 276 Units under the skin every day.  Dose: 276 Units     insulin lispro  100 UNIT/ML  Commonly known as: HumaLOG   Inject  under the skin 3 times a day before meals.     IRON PO   Take 1 Tab by mouth every day.  Dose: 1 Tablet     levalbuterol 45 MCG/ACT inhaler  Commonly known as: XOPENEX HFA   Inhale 2 Puffs by mouth 2 times a day as needed for Shortness of Breath.  Dose: 2 Puff     levothyroxine 112 MCG Tabs  Commonly known as: SYNTHROID   Take 112 mcg by mouth every day.  Dose: 112 mcg     metOLazone 5 MG Tabs  Commonly known as: ZAROXOLYN   Take 5 mg by mouth every day.  Dose: 5 mg     montelukast 10 MG Tabs  Commonly known as: SINGULAIR   Take 10 mg by mouth every evening.  Dose: 10 mg     oxyCODONE-acetaminophen 7.5-325 MG per tablet  Commonly known as: PERCOCET   Take 1 Tablet by mouth every four hours as needed.  Dose: 1 Tablet     pantoprazole 40 MG Tbec  Commonly known as: PROTONIX   Take 40 mg by mouth every evening.  Dose: 40 mg     pravastatin 10 MG Tabs  Commonly known as: PRAVACHOL   Take 10 mg by mouth every evening.  Dose: 10 mg     rizatriptan 10 MG disintegrating tablet  Commonly known as: MAXALT-MLT   Take 10 mg by mouth Once PRN for Migraine.  Dose: 10 mg     spironolactone 50 MG Tabs  Commonly known as: ALDACTONE   Take 50 mg by mouth 1 time daily as needed.  Dose: 50 mg     verapamil 80 MG Tabs  Commonly known as: ISOPTIN   Take 40 mg by mouth 4 times a day as needed.  Dose: 40 mg            STOP taking these medications      lidocaine 5 % Ptch  Commonly known as: LIDODERM     NovoLOG 100 UNIT/ML Soln  Generic drug: insulin aspart     promethazine 25 MG Tabs  Commonly known as: PHENERGAN              Discharge Diet:  Regular    Discharge Activity:  As tolerated     Disposition:  Patient to discharge home with family support and community resources.     Equipment:  FWW    Follow-up & Discharge Instructions:  Follow up with your primary care provider (PCP) within 7-10 days of discharge to review your medications and take over your care.     If you develop chest  pain, fever, chills, change in neurologic function (weakness, sensation changes, vision changes), or other concerning sxs, seek immediate medical attention or call 911.      Condition on Discharge:  Good    More than 33 minutes was spent on discharging this patient, including face-to-face time, prescription management, and the dictation of this note.    Doron Brand M.D.    Date of Service: 8/22/2022

## 2022-08-22 NOTE — THERAPY
Occupational Therapy  Daily Treatment     Patient Name: Jenifer Ramos  Age:  62 y.o., Sex:  female  Medical Record #: [id number]  Today's Date: 8/22/2022     Precautions  Precautions: Fall Risk  Comments: 2L O2 at baseline; contact precautions lifte 8/21 per nursing         Subjective    Pt sitting up in w/c upon arrival, son arrived at start of session for FT. Pt expressed she wants to go home today, informed her she needs to discuss further with Dr. Brand.      Objective     08/22/22 1031   OT Charge Group   OT Therapy Activity 2   OT Total Time Spent   OT Individual Total Time Spent (Mins) 30   Functional Level of Assist   Tub / Shower Transfers Contact Guard Assist  (WIS txfer training with pt's son via step back in/fwd out over threshold with use of FWW for stabilization)   Tub Shower Transfer Description Adaptive equipment;Grab bar;Increased time;Set-up of equipment;Supervision for safety;Verbal cueing   Interdisciplinary Plan of Care Collaboration   IDT Collaboration with  Family / Caregiver;Physician;Physical Therapist   Patient Position at End of Therapy Seated;Family / Friend in Room;Other (Comments)  (pt in gym at end of session for PT)   Collaboration Comments Pt's son present for FT; d/c planning discussed with Dr. Brand and PT     Reviewed pt's ADL CLOF with pt's son, self-PROM/AROM exercises for UE s, educated on fall prevention with handout given. Pt also completed functional mobility household distance to access WIS and return to main gym with FWW and SBA-CGA.     Assessment    Pt tolerated OT session well focused on FT. Pt's son reported she is functioning better than she did before she went to the hospital and that they will be able to care for pt at this level at home. Pt and her son had no further questions or concerns regarding d/c.     Strengths: Able to follow instructions, Manages pain appropriately, Motivated for self care and independence, Supportive family, Willingly participates  in therapeutic activities  Barriers: Decreased endurance, Generalized weakness, Impaired activity tolerance, Impaired balance, Limited mobility    Plan    ADLs, overall strength and endurance training, standing balance/tolerance, functional mobility     Passport items to be completed:  Perform bathroom transfers, complete dressing, complete feeding, get ready for the day, prepare a simple meal, participate in household tasks, adapt home for safety needs, demonstrate home exercise program, complete caregiver training     Occupational Therapy Goals (Active)       Problem: Dressing       Dates: Start: 08/12/22         Goal: STG-Within one week, patient will dress UB with setup and supervision.        Dates: Start: 08/12/22         Goal Note filed on 08/16/22 1158 by Analia Salmon, OT       SBA and verbal cues, limited by LUE impairment                 Goal: STG-Within one week, patient will dress LB with Lupillo and use of AE PRN.        Dates: Start: 08/16/22               Problem: OT Long Term Goals       Dates: Start: 08/12/22         Goal: LTG-By discharge, patient will complete basic self care tasks with Lupillo to modified independence and use of AE PRN.        Dates: Start: 08/12/22            Goal: LTG-By discharge, patient will perform bathroom transfers with supervision/SBA to modified independence and use of AE PRN.        Dates: Start: 08/12/22               Problem: Toileting       Dates: Start: 08/16/22         Goal: STG-Within one week, patient will complete toileting tasks with SBA and use of AE PRN.        Dates: Start: 08/16/22

## 2022-08-22 NOTE — CARE PLAN
"  Problem: Knowledge Deficit - Standard  Goal: Patient and family/care givers will demonstrate understanding of plan of care, disease process/condition, diagnostic tests and medications  Outcome: Progressing  Note: Pt agrees with plan of care tonight regarding medications and safety.  Will continue to monitor patient.      Problem: Fall Risk - Rehab  Goal: Patient will remain free from falls  Outcome: Progressing  Note: Sophia Smith Fall risk Assessment Score:  14      Moderate fall risk Interventions  - Bed and strip alarm   - Yellow sign by the door   - Yellow wrist band \"Fall risk\"  - Room near to the nurse station  - Do not leave patient unattended in the bathroom  - Fall risk education provided           The patient is Stable - Low risk of patient condition declining or worsening    Shift Goals  Clinical Goals: Safety  Patient Goals: Sleep well    Progress made toward(s) clinical / shift goals:  progressing        "

## 2022-08-22 NOTE — DISCHARGE PLANNING
Patient does not want to stay until Thursday, wants to go today. Dr. Brand said okay. Ordered home health from [facility]. Ordered a bariatric FWW from [facility] but she does not qualify. Patient states that she has everything that she needs. Her son will pick her up this afternoon. No further interventions needed.

## 2022-08-22 NOTE — PROGRESS NOTES
Received bedside shift report from Radha HORN RN regarding patient and assumed care. Patient awake, calm and stable, currently positioned in bed for comfort and safety; call light within reach. Denies pain or discomfort at this time. Will continue to monitor.

## 2022-08-22 NOTE — PROGRESS NOTES
Patient discharged to home per order.  Reviewed all discharge instructions, appointments and discharge medications with patient and her son.They verbalize understanding.  Discharge paperwork completed, signed copies in chart. Patient alert, calm, stable: no change in status from morning assessment.  Patient left facitlity at 1525 accompanied by family and rehab staff.  Have enjoyed working with this pleasant patient.

## 2022-08-22 NOTE — DISCHARGE INSTRUCTIONS
Occupational Therapy Discharge Instructions for Jenifer Ramos    8/22/2022    Level of Assist Required for Eating: Able to Complete Eating without Assist  Level of Assist Required for Grooming: Able to Complete Grooming without Assist  Level of Assist Required for Dressing: Requires Supervision with Dressing  Equipment for Dressing: Sock Aid, Reacher, Dressing Stick, Long Handled Shoe Horn  Level of Assist Required for Toileting: Requires Supervision with Toileting  Level of Assist Required for Toilet Transfer: Requires Supervision with Toilet Transfer  Equipment for Toilet Transfer: Raised Toilet Seat with Arms, Grab Bars by Toilet  Level of Assist Required for Bathing: Requires Physical Assist with Bathing (assist to wash/rinse bottom)  Equipment for Bathing: Shower Chair, Grab Bars in Tub / Shower, Hand Held Shower Head  Level of Assist Required for Shower Transfer: Requires Physical Assist with Shower Transfer (contact guard steadying assistance)  Equipment for Shower Transfer: Shower Chair, Grab Bars in Tub / Shower  Level of Assist Required for Home Mgmt: Requires Physical Assist with Home Management  Level of Assist Required for Meal Prep: Requires Physical Assist with Meal Preparation  Driving: Please Contact Physician Prior to Driving  Home Exercise Program: None Issued

## 2022-08-22 NOTE — PROGRESS NOTES
[facility] Medicine Daily Progress Note    Date of Service  8/22/2022    Chief Complaint  Jenifer Ramos is a 62 y.o. female admitted 8/11/2022 with shortness of breath.    Hospital Course  Ms. Ramos is a 62-year-old female with a history of diabetes, factor V Leyden on Eliquis, diastolic heart failure who originally presented to [facility] on 7/20 with shortness of breath.  She was discharged 7/22 but then had a fall at her home and presented to [facility].  There she was noted to have severe hyponatremia.  She was given IV fluids with some sodium improvement.  Due to ongoing physical debility she was accepted to [facility] rehab on 8/11/2022.  On 8/11 she was noted to be positive for COVID 19.  Her last sodium 8/12: 141.    Interval Problem Update  8/16/2022 vital signs stable was on CPAP overnight now down to 2 L nasal cannula with SPO2 98.  8/17: Ms. Ramos was evaluated and examined at rehab. She has been on Lasix 40 mEq BID and K went down to 2.9 today. KCl 40 mEq BID has been ordered and repeat BMP ordered for the morning. She remains on 2 liters nasal cannula which is her baseline. COVID isolations in place.  8/18: Ms. Ramos was seen and evaluated at rehab. Potassium supplementation was initiated yesterday and her K has come  up to 3.2. This is appropriate to follow up with in the next 2-3 days with BMP and magnesium level. Her blood pressure has been low hundreds. She remains on 2 liters of oxygen. She notes muscle cramping. Magnesium oxide ordered.   8/19: Patient was working with physical therapy ambulating with front wheel walker well.  No dizziness.  I have decreased patient's Lantus from 120 units to 100 units daily A1c was 5.9%.  Blood sugars low 84 this morning but acceptable.  Patient does wear BiPAP nightly and has severe pulmonary hypertension leading to diastolic heart failure.  I placed her on 1800 cc fluid restrictions.  She remains on Lasix 40 twice daily.  I have ordered a BMP  "magnesium level in a.m.  Patient remains on 2 L/min nasal cannula for COVID positive from 811.  Another reason for fluid restrictions.  8/20: Patient able to lie fairly level supine without shortness of breath.  She remains on 2 L/min nasal cannula she does have CPAP at home.  I have added spironolactone back which is her home medication 50 mg a day in addition to her Lasix 40 twice daily.  Patient states she was consistently taking Lasix twice daily per her cardiologist discussed with patient.  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  8/21:  at bedside.  We discussed the need for her to follow up with a pulmonary hypertension specialist out of the hospital. Leg edema, \"better.\"  Breathing at baseline and on baseline supplemental oxygen.    8/22: K:3.5, supplementing 10mEq daily (on aldactone and lasix). Still with ongoing leg edema.  Speech clear and in full sentences.      Consultants/Specialty  physiatry    Code Status  Full Code    Review of Systems  Review of Systems   Constitutional:  Negative for fever and malaise/fatigue.   Respiratory:  Negative for cough, shortness of breath and stridor.    Cardiovascular:  Positive for leg swelling (\"improving\"). Negative for chest pain.   Gastrointestinal:  Negative for abdominal pain and nausea.   Genitourinary:  Negative for dysuria.   Neurological:  Negative for dizziness and speech change.   Psychiatric/Behavioral:  The patient is not nervous/anxious.    All other systems reviewed and are negative.     Physical Exam  Temp:  [36.4 °C (97.6 °F)-36.9 °C (98.4 °F)] 36.8 °C (98.2 °F)  Pulse:  [63-80] 75  Resp:  [16-20] 16  BP: ()/(57-68) 108/64  SpO2:  [96 %] 96 %    Physical Exam  Vitals and nursing note reviewed.   Constitutional:       General: She is not in acute distress.     Appearance: She is obese. She is not ill-appearing or toxic-appearing.   HENT:      " Head: Normocephalic.      Mouth/Throat:      Mouth: Mucous membranes are moist.      Pharynx: Oropharynx is clear.   Eyes:      General: No scleral icterus.     Conjunctiva/sclera: Conjunctivae normal.   Cardiovascular:      Rate and Rhythm: Normal rate and regular rhythm.      Heart sounds: No murmur heard.  Pulmonary:      Effort: Pulmonary effort is normal.      Breath sounds: Normal breath sounds.      Comments: 2 liters nasal cannula oxygen  Abdominal:      General: There is no distension.      Tenderness: There is no abdominal tenderness.   Musculoskeletal:      Cervical back: Normal range of motion and neck supple.      Right lower leg: Edema present.      Left lower leg: Edema present.      Comments: Mild lower extremity swelling without pitting edema.    Skin:     General: Skin is warm and dry.      Coloration: Skin is pale.   Neurological:      General: No focal deficit present.      Mental Status: She is alert and oriented to person, place, and time.   Psychiatric:         Mood and Affect: Mood normal.         Behavior: Behavior normal.       Fluids    Intake/Output Summary (Last 24 hours) at 8/22/2022 1412  Last data filed at 8/22/2022 1130  Gross per 24 hour   Intake 1560 ml   Output 950 ml   Net 610 ml       Laboratory  Recent Labs     08/20/22  0652   WBC 6.5   RBC 4.80   HEMOGLOBIN 10.5*   HEMATOCRIT 37.9   MCV 79.0*   MCH 21.9*   MCHC 27.7*   RDW 60.4*   PLATELETCT 270   MPV 10.3     Recent Labs     08/20/22  0652 08/22/22  0542   SODIUM 137 139   POTASSIUM 4.2 3.5*   CHLORIDE 99 99   CO2 28 33   GLUCOSE 110* 92   BUN 11 10   CREATININE 0.71 0.73   CALCIUM 9.8 9.5                   Imaging  No orders to display          Assessment/Plan  Pulmonary hypertension (HCC)  Assessment & Plan  Patient on home BIPAP for MARCI.  Continue lasix 40 bid  Fluid restrictions especially with +COVID 19.  D/w patient that there are newer treatments for pulmonary hypertension like flolan, tadalafil, but would need  pulmonologist to prescribe.  She I getting a new pulmonologist. Encouraged pulmonary hypertension specialist.  She follows with Dr Jef Montoya outpatient for cardiology    COVID-19- (present on admission)  Assessment & Plan  Covid (+) on 8/11 at the Rehab  Asymptomatic    Labile blood pressure  Assessment & Plan  Monitor vitals  Note: BP dipped lower with SBP 90 on am of 8/12  BP remains low on diuretics.  1800 fluid restrictions.    Lower extremity edema  Assessment & Plan  Has hx  Mostly pedal swelling  BNP: 2476 (7/20) -- no other values in Epic for comparison  On Lasix: 40 mg bid and repeat BMP reveals low K at 2.9 thus KCl 40 mEq BID replaced K. She had previously been on Aldactone 50 mg which was stopped on her last admission to [facility].  Plan: recheck BMP in 2-3 days. Add magnesium oxide 400 mg daily.  7/21/2022 echocardiogram shows preserved EF but grade 2 diastolic dysfunction and inability to diagnose right ventricular systolic pressure.  Suspect she may have a component of pulmonary hypertension given her BIPAP qhs for MARCI.  Has IVCF and prior DVTs.  Likely chronic edema from factor V leiden deficiency hypercoagulable state.  Now on eliquis.  8/20 restarted aldactone 50mg po daily with lasix 40 bid.  Repeat BMP on 8/22 to see if needs additional lasix.    Anemia  Assessment & Plan  Hb improved to 10.3 (8/12)  8/21 Hgb:10.5  Outpatient follow up is appropriate    Dyspnea- (present on admission)  Assessment & Plan  She is chronically on 2 liters of [place] oxygen and remains on it at [facility].  Presented to [facility] with increasing SOB  Trop: unremarkable  Echo (7/21/22): EF 65%, GII-DD, inferior wall hypokinesis  Nuc Stress Test (7/21): mormal left ventricular systolic function, EF 65%; inferior wall hypokinesis  7/20/22 CTA: no PE, but ground glass opacifications with possible edema.  TSH ok (8/12)  BNP: 2476 (7/20) -- no other values in Epic for comparison  On O2 supplementation  Diuresis  as able.  Pt to follow up with Cardio as out patient    Hypothyroidism- (present on admission)  Assessment & Plan  TSH:1.51 (8/12)  On Synthroid 112 mcg daily for active management    DM (diabetes mellitus) (HCC)- (present on admission)  Assessment & Plan  Hba1c: 5.9 (8/12)  Monitor accuchecks  On Tresiba (home med)  Note: pt has her home meds here at the Rehab  Note: home meds include Tresiab and Lispro 20-40 units bid (per SS)  Diabetic diet    Sleep apnea- (present on admission)  Assessment & Plan  On  Home BIPAP and O2.       VTE prophylaxis: therapeutic anticoagulation with Eliquis     I have performed a physical exam and reviewed and updated ROS and Plan today (8/22/2022). In review of yesterday's note (8/21/2022), there are no changes except as documented above.

## 2022-08-22 NOTE — CARE PLAN
Problem: Dressing  Goal: STG-Within one week, patient will dress UB with setup and supervision.   Outcome: Met  Goal: STG-Within one week, patient will dress LB with Lupillo and use of AE PRN.   Outcome: Met     Problem: OT Long Term Goals  Goal: LTG-By discharge, patient will complete basic self care tasks with Lupillo to modified independence and use of AE PRN.   Outcome: Met     Problem: Toileting  Goal: STG-Within one week, patient will complete toileting tasks with SBA and use of AE PRN.   Outcome: Met     Problem: OT Long Term Goals  Goal: LTG-By discharge, patient will perform bathroom transfers with supervision/SBA to modified independence and use of AE PRN.   Outcome: Discharged - Not Met  Note: SBA for toilet transfer, CGA for WIS

## 2022-08-22 NOTE — THERAPY
"Physical Therapy   Daily Treatment     Patient Name: Jenifer Ramos  Age:  62 y.o., Sex:  female  Medical Record #: [id number]  Today's Date: 8/22/2022     Precautions  Precautions: Fall Risk  Comments: 2L O2 at baseline; contact precautions lifte 8/21 per nursing    Subjective    Pt present in therapy gym, son Obed present for family training     Objective       08/22/22 1101   PT Charge Group   PT Therapeutic Activities 2   PT Total Time Spent   PT Individual Total Time Spent (Mins) 30   Stairs Functional Level of Assist   Level of Assist with Stairs Contact Guard Assist   # of Stairs Climbed 1  (4\" curb step)   Stairs Description Extra time;Limited by fatigue;Requires incidental assist     Pt completed short outdoor ambulation ~ 10 ft x 2 wc<>car with CGA, min A for LLE management into car by PT, pt completed second bout of short distance outdoor ambulation with son to assist at gait belt for car transfer, pt able to manage LLE without assist after providing recline to seat back. Son assisted with curb step as noted to simulate front entry to home.    Assessment    Pt's son able to assist with basic household mobility, set-up with FWW and gait belt. Son reports pt is not far from baseline level of function and willing to take patient home at any time.    Strengths: Effective communication skills, Good insight into deficits/needs, Pleasant and cooperative, Willingly participates in therapeutic activities, Supportive family  Barriers: Decreased endurance, Generalized weakness, Impaired activity tolerance    Plan    Cont with gait endurance/ standing tolerance. Balance and review seated HEP.    Physical Therapy Problems (Active)       Problem: Mobility       Dates: Start: 08/12/22         Goal: STG-Within one week, patient will ambulate household distance CGA with FWW 25 ft x 2       Dates: Start: 08/12/22            Goal: STG-Within one week, patient will ambulate up/down a curb min A with FWW for 4-6\" rise  " "     Dates: Start: 08/12/22               Problem: Mobility Transfers       Dates: Start: 08/12/22         Goal: STG-Within one week, patient will perform bed mobility min A with bed controls as needed       Dates: Start: 08/12/22            Goal: STG-Within one week, patient will sit to stand CGA to FWW       Dates: Start: 08/12/22            Goal: STG-Within one week, patient will transfer bed to chair CGA with FWW       Dates: Start: 08/12/22               Problem: PT-Long Term Goals       Dates: Start: 08/12/22         Goal: LTG-By discharge, patient will ambulate SPV / modified independent with FWW x 50 ft       Dates: Start: 08/12/22            Goal: LTG-By discharge, patient will transfer one surface to another SPV/ modified independent with FWW       Dates: Start: 08/12/22            Goal: LTG-By discharge, patient will transfer in/out of a car SBA/ CGA with FWW       Dates: Start: 08/12/22            Goal: LTG-By discharge, patient will navigate 4-6\" step with FWW and SBA/ CGA       Dates: Start: 08/12/22              "

## 2022-08-22 NOTE — THERAPY
"Physical Therapy   Daily Treatment     Patient Name: Jenifer Ramos  Age:  62 y.o., Sex:  female  Medical Record #: [id number]  Today's Date: 8/22/2022     Precautions  Precautions: Fall Risk  Comments: 2L O2 at baseline; contact precautions lifte 8/21 per nursing    Subjective    Pt up in , willing to participate, pleased to be out of covid isolation     Objective       08/22/22 0831   PT Charge Group   PT Gait Training 2   PT Therapeutic Exercise 1   PT Therapeutic Activities 1   PT Total Time Spent   PT Individual Total Time Spent (Mins) 60   Gait Functional Level of Assist    Gait Level Of Assist Contact Guard Assist   Assistive Device Front Wheel Walker   Distance (Feet) 70  (in addition to 20 ft x 2 bed<>bathroom)   # of Times Distance was Traveled 2   Deviation Antalgic;Bradykinetic;Decreased Toe Off   Stairs Functional Level of Assist   Level of Assist with Stairs Contact Guard Assist   # of Stairs Climbed 2  (4\" curb step and 6\" curb step)   Stairs Description Extra time;Verbal cueing;Walker;Requires incidental assist   Transfer Functional Level of Assist   Bed, Chair, Wheelchair Transfer Standby Assist   Bed Chair Wheelchair Transfer Description Increased time;Set-up of equipment   Toilet Transfers Standby Assist   Toilet Transfer Description Grab bar;Increased time   Sitting Lower Body Exercises   Nustep Resistance Level 1  (5 minutes x 2 with BLE's/ RUE.)   Bed Mobility    Sit to Stand Standby Assist       Assessment    Pt maintains O2 sats at93-98%  and HR from  bpm on 2L throughout gait/ transfers/ curb steps and general endurance training as noted. Remains generally deconditioned but improving slowly overall with functional independence at FWW level.    Strengths: Effective communication skills, Good insight into deficits/needs, Pleasant and cooperative, Willingly participates in therapeutic activities, Supportive family  Barriers: Decreased endurance, Generalized weakness, Impaired activity " "tolerance    Plan    Family training with son for car transfers/ curb step and general safety education.      Physical Therapy Problems (Active)       Problem: Mobility       Dates: Start: 08/12/22         Goal: STG-Within one week, patient will ambulate household distance CGA with FWW 25 ft x 2       Dates: Start: 08/12/22            Goal: STG-Within one week, patient will ambulate up/down a curb min A with FWW for 4-6\" rise       Dates: Start: 08/12/22               Problem: Mobility Transfers       Dates: Start: 08/12/22         Goal: STG-Within one week, patient will perform bed mobility min A with bed controls as needed       Dates: Start: 08/12/22            Goal: STG-Within one week, patient will sit to stand CGA to FWW       Dates: Start: 08/12/22            Goal: STG-Within one week, patient will transfer bed to chair CGA with FWW       Dates: Start: 08/12/22               Problem: PT-Long Term Goals       Dates: Start: 08/12/22         Goal: LTG-By discharge, patient will ambulate SPV / modified independent with FWW x 50 ft       Dates: Start: 08/12/22            Goal: LTG-By discharge, patient will transfer one surface to another SPV/ modified independent with FWW       Dates: Start: 08/12/22            Goal: LTG-By discharge, patient will transfer in/out of a car SBA/ CGA with FWW       Dates: Start: 08/12/22            Goal: LTG-By discharge, patient will navigate 4-6\" step with FWW and SBA/ CGA       Dates: Start: 08/12/22              "

## 2022-11-09 NOTE — PROGRESS NOTES
Patient reports cramping of muscles in arms and legs   [No studies available for review at this time.] : No studies available for review at this time. [FreeTextEntry1] : Laboratory and radiology studies that were personally reviewed at today's visit are summarized in above and below:\par 10/29/22:  DUPLEX LE and UE bilaterally - negative for DVT\par 12/30/2020:  CT CAP: renal cysts, no inflammatory changes. \par 8/1/2019:  CT sinus - nasal septal deviation, \par 11/12/20:  MRI left knee: mild chondromalacia of the PF compartment. \par CXR:  5/2019: WNL \par Echo (8/2019):  no evidence of cardiac sarcoid

## 2022-12-14 NOTE — PROGRESS NOTES
Received shift report and assumed care of patient.  Patient awake, calm and stable, currently positioned in bed for comfort and safety; call light within reach.  States pain to left arm is 6/10 but tolerable at this time.  Will continue to monitor.    Detail Level: Zone

## 2023-05-12 ENCOUNTER — APPOINTMENT (OUTPATIENT)
Dept: RADIOLOGY | Facility: MEDICAL CENTER | Age: 63
DRG: 308 | End: 2023-05-12
Attending: EMERGENCY MEDICINE
Payer: COMMERCIAL

## 2023-05-12 ENCOUNTER — HOSPITAL ENCOUNTER (INPATIENT)
Facility: MEDICAL CENTER | Age: 63
LOS: 2 days | DRG: 308 | End: 2023-05-14
Attending: EMERGENCY MEDICINE | Admitting: HOSPITALIST
Payer: COMMERCIAL

## 2023-05-12 DIAGNOSIS — I48.0 PAROXYSMAL ATRIAL FIBRILLATION (HCC): ICD-10-CM

## 2023-05-12 PROBLEM — J96.01 ACUTE RESPIRATORY FAILURE WITH HYPOXIA (HCC): Status: ACTIVE | Noted: 2023-05-12

## 2023-05-12 PROBLEM — E87.8 ELECTROLYTE ABNORMALITY: Status: ACTIVE | Noted: 2023-05-12

## 2023-05-12 PROBLEM — I50.33 ACUTE ON CHRONIC DIASTOLIC HEART FAILURE (HCC): Status: ACTIVE | Noted: 2023-05-12

## 2023-05-12 PROBLEM — I48.91 A-FIB (HCC): Status: ACTIVE | Noted: 2023-05-12

## 2023-05-12 LAB
ALBUMIN SERPL BCP-MCNC: 2.7 G/DL (ref 3.2–4.9)
ALBUMIN/GLOB SERPL: 1 G/DL
ALP SERPL-CCNC: 140 U/L (ref 30–99)
ALT SERPL-CCNC: 7 U/L (ref 2–50)
ANION GAP SERPL CALC-SCNC: 9 MMOL/L (ref 7–16)
ANISOCYTOSIS BLD QL SMEAR: ABNORMAL
APPEARANCE UR: CLEAR
APTT PPP: 26.9 SEC (ref 24.7–36)
AST SERPL-CCNC: 12 U/L (ref 12–45)
BACTERIA #/AREA URNS HPF: ABNORMAL /HPF
BASOPHILS # BLD AUTO: 0 % (ref 0–1.8)
BASOPHILS # BLD: 0 K/UL (ref 0–0.12)
BILIRUB SERPL-MCNC: 0.2 MG/DL (ref 0.1–1.5)
BILIRUB UR QL STRIP.AUTO: NEGATIVE
BUN SERPL-MCNC: 7 MG/DL (ref 8–22)
CALCIUM ALBUM COR SERPL-MCNC: 7.8 MG/DL (ref 8.5–10.5)
CALCIUM SERPL-MCNC: 6.8 MG/DL (ref 8.5–10.5)
CHLORIDE SERPL-SCNC: 109 MMOL/L (ref 96–112)
CO2 SERPL-SCNC: 22 MMOL/L (ref 20–33)
COLOR UR: ABNORMAL
CREAT SERPL-MCNC: 0.67 MG/DL (ref 0.5–1.4)
D DIMER PPP IA.FEU-MCNC: 1.01 UG/ML (FEU) (ref 0–0.5)
EKG IMPRESSION: NORMAL
EKG IMPRESSION: NORMAL
EOSINOPHIL # BLD AUTO: 0.12 K/UL (ref 0–0.51)
EOSINOPHIL NFR BLD: 1.8 % (ref 0–6.9)
EPI CELLS #/AREA URNS HPF: ABNORMAL /HPF
ERYTHROCYTE [DISTWIDTH] IN BLOOD BY AUTOMATED COUNT: 54.8 FL (ref 35.9–50)
FLUAV RNA SPEC QL NAA+PROBE: NEGATIVE
FLUBV RNA SPEC QL NAA+PROBE: NEGATIVE
GFR SERPLBLD CREATININE-BSD FMLA CKD-EPI: 97 ML/MIN/1.73 M 2
GLOBULIN SER CALC-MCNC: 2.7 G/DL (ref 1.9–3.5)
GLUCOSE SERPL-MCNC: 177 MG/DL (ref 65–99)
GLUCOSE UR STRIP.AUTO-MCNC: >=1000 MG/DL
GRAN CASTS #/AREA URNS LPF: ABNORMAL /LPF
HCT VFR BLD AUTO: 36.6 % (ref 37–47)
HGB BLD-MCNC: 10.3 G/DL (ref 12–16)
HYALINE CASTS #/AREA URNS LPF: ABNORMAL /LPF
INR PPP: 1.2 (ref 0.87–1.13)
KETONES UR STRIP.AUTO-MCNC: ABNORMAL MG/DL
LACTATE SERPL-SCNC: 3 MMOL/L (ref 0.5–2)
LACTATE SERPL-SCNC: 3.5 MMOL/L (ref 0.5–2)
LEUKOCYTE ESTERASE UR QL STRIP.AUTO: ABNORMAL
LIPASE SERPL-CCNC: 12 U/L (ref 11–82)
LYMPHOCYTES # BLD AUTO: 1.11 K/UL (ref 1–4.8)
LYMPHOCYTES NFR BLD: 16.5 % (ref 22–41)
MAGNESIUM SERPL-MCNC: 1.4 MG/DL (ref 1.5–2.5)
MANUAL DIFF BLD: NORMAL
MCH RBC QN AUTO: 21.7 PG (ref 27–33)
MCHC RBC AUTO-ENTMCNC: 28.1 G/DL (ref 33.6–35)
MCV RBC AUTO: 77.1 FL (ref 81.4–97.8)
MICRO URNS: ABNORMAL
MICROCYTES BLD QL SMEAR: ABNORMAL
MONOCYTES # BLD AUTO: 0.17 K/UL (ref 0–0.85)
MONOCYTES NFR BLD AUTO: 2.6 % (ref 0–13.4)
MORPHOLOGY BLD-IMP: NORMAL
NEUTROPHILS # BLD AUTO: 5.3 K/UL (ref 2–7.15)
NEUTROPHILS NFR BLD: 79.1 % (ref 44–72)
NITRITE UR QL STRIP.AUTO: NEGATIVE
NRBC # BLD AUTO: 0 K/UL
NRBC BLD-RTO: 0 /100 WBC
NT-PROBNP SERPL IA-MCNC: 1731 PG/ML (ref 0–125)
PH UR STRIP.AUTO: 5.5 [PH] (ref 5–8)
PHOSPHATE SERPL-MCNC: 1.8 MG/DL (ref 2.5–4.5)
PLATELET # BLD AUTO: 215 K/UL (ref 164–446)
PLATELET BLD QL SMEAR: NORMAL
PMV BLD AUTO: 10.8 FL (ref 9–12.9)
POTASSIUM SERPL-SCNC: 2.9 MMOL/L (ref 3.6–5.5)
PROCALCITONIN SERPL-MCNC: 0.09 NG/ML
PROT SERPL-MCNC: 5.4 G/DL (ref 6–8.2)
PROT UR QL STRIP: 100 MG/DL
PROTHROMBIN TIME: 15.1 SEC (ref 12–14.6)
RBC # BLD AUTO: 4.75 M/UL (ref 4.2–5.4)
RBC # URNS HPF: ABNORMAL /HPF
RBC BLD AUTO: PRESENT
RBC UR QL AUTO: NEGATIVE
RSV RNA SPEC QL NAA+PROBE: NEGATIVE
SARS-COV-2 RNA RESP QL NAA+PROBE: NOTDETECTED
SODIUM SERPL-SCNC: 140 MMOL/L (ref 135–145)
SP GR UR STRIP.AUTO: 1.02
SPECIMEN SOURCE: NORMAL
TROPONIN T SERPL-MCNC: 23 NG/L (ref 6–19)
TSH SERPL DL<=0.005 MIU/L-ACNC: 4.45 UIU/ML (ref 0.38–5.33)
UROBILINOGEN UR STRIP.AUTO-MCNC: 1 MG/DL
WBC # BLD AUTO: 6.7 K/UL (ref 4.8–10.8)
WBC #/AREA URNS HPF: ABNORMAL /HPF

## 2023-05-12 PROCEDURE — 96374 THER/PROPH/DIAG INJ IV PUSH: CPT

## 2023-05-12 PROCEDURE — 85610 PROTHROMBIN TIME: CPT

## 2023-05-12 PROCEDURE — 96376 TX/PRO/DX INJ SAME DRUG ADON: CPT

## 2023-05-12 PROCEDURE — 99223 1ST HOSP IP/OBS HIGH 75: CPT | Performed by: HOSPITALIST

## 2023-05-12 PROCEDURE — 87040 BLOOD CULTURE FOR BACTERIA: CPT

## 2023-05-12 PROCEDURE — 83880 ASSAY OF NATRIURETIC PEPTIDE: CPT

## 2023-05-12 PROCEDURE — 84100 ASSAY OF PHOSPHORUS: CPT

## 2023-05-12 PROCEDURE — 83690 ASSAY OF LIPASE: CPT

## 2023-05-12 PROCEDURE — C9803 HOPD COVID-19 SPEC COLLECT: HCPCS | Performed by: EMERGENCY MEDICINE

## 2023-05-12 PROCEDURE — 80053 COMPREHEN METABOLIC PANEL: CPT

## 2023-05-12 PROCEDURE — 0241U HCHG SARS-COV-2 COVID-19 NFCT DS RESP RNA 4 TRGT MIC: CPT

## 2023-05-12 PROCEDURE — 85730 THROMBOPLASTIN TIME PARTIAL: CPT

## 2023-05-12 PROCEDURE — 84145 PROCALCITONIN (PCT): CPT

## 2023-05-12 PROCEDURE — 700102 HCHG RX REV CODE 250 W/ 637 OVERRIDE(OP): Performed by: HOSPITALIST

## 2023-05-12 PROCEDURE — 93005 ELECTROCARDIOGRAM TRACING: CPT | Performed by: EMERGENCY MEDICINE

## 2023-05-12 PROCEDURE — 700102 HCHG RX REV CODE 250 W/ 637 OVERRIDE(OP): Performed by: EMERGENCY MEDICINE

## 2023-05-12 PROCEDURE — 700105 HCHG RX REV CODE 258: Performed by: EMERGENCY MEDICINE

## 2023-05-12 PROCEDURE — 93005 ELECTROCARDIOGRAM TRACING: CPT

## 2023-05-12 PROCEDURE — 99285 EMERGENCY DEPT VISIT HI MDM: CPT

## 2023-05-12 PROCEDURE — 81001 URINALYSIS AUTO W/SCOPE: CPT

## 2023-05-12 PROCEDURE — 84484 ASSAY OF TROPONIN QUANT: CPT

## 2023-05-12 PROCEDURE — 84443 ASSAY THYROID STIM HORMONE: CPT

## 2023-05-12 PROCEDURE — 71045 X-RAY EXAM CHEST 1 VIEW: CPT

## 2023-05-12 PROCEDURE — A9270 NON-COVERED ITEM OR SERVICE: HCPCS | Performed by: HOSPITALIST

## 2023-05-12 PROCEDURE — 700111 HCHG RX REV CODE 636 W/ 250 OVERRIDE (IP): Performed by: EMERGENCY MEDICINE

## 2023-05-12 PROCEDURE — 83735 ASSAY OF MAGNESIUM: CPT

## 2023-05-12 PROCEDURE — 85379 FIBRIN DEGRADATION QUANT: CPT

## 2023-05-12 PROCEDURE — 87086 URINE CULTURE/COLONY COUNT: CPT

## 2023-05-12 PROCEDURE — 85025 COMPLETE CBC W/AUTO DIFF WBC: CPT

## 2023-05-12 PROCEDURE — 83605 ASSAY OF LACTIC ACID: CPT

## 2023-05-12 PROCEDURE — 36415 COLL VENOUS BLD VENIPUNCTURE: CPT

## 2023-05-12 PROCEDURE — 770020 HCHG ROOM/CARE - TELE (206)

## 2023-05-12 PROCEDURE — A9270 NON-COVERED ITEM OR SERVICE: HCPCS | Performed by: EMERGENCY MEDICINE

## 2023-05-12 PROCEDURE — 85007 BL SMEAR W/DIFF WBC COUNT: CPT

## 2023-05-12 RX ORDER — ROSUVASTATIN CALCIUM 10 MG/1
10 TABLET, COATED ORAL EVERY EVENING
COMMUNITY
End: 2023-06-20

## 2023-05-12 RX ORDER — ONDANSETRON 4 MG/1
4 TABLET, ORALLY DISINTEGRATING ORAL EVERY 4 HOURS PRN
Status: DISCONTINUED | OUTPATIENT
Start: 2023-05-12 | End: 2023-05-14 | Stop reason: HOSPADM

## 2023-05-12 RX ORDER — DILTIAZEM HYDROCHLORIDE 5 MG/ML
5 INJECTION INTRAVENOUS ONCE
Status: COMPLETED | OUTPATIENT
Start: 2023-05-12 | End: 2023-05-12

## 2023-05-12 RX ORDER — MAGNESIUM SULFATE HEPTAHYDRATE 40 MG/ML
4 INJECTION, SOLUTION INTRAVENOUS ONCE
Status: COMPLETED | OUTPATIENT
Start: 2023-05-12 | End: 2023-05-13

## 2023-05-12 RX ORDER — POTASSIUM CHLORIDE 20 MEQ/1
40 TABLET, EXTENDED RELEASE ORAL ONCE
Status: COMPLETED | OUTPATIENT
Start: 2023-05-12 | End: 2023-05-12

## 2023-05-12 RX ORDER — METOPROLOL TARTRATE 1 MG/ML
5 INJECTION, SOLUTION INTRAVENOUS
Status: DISCONTINUED | OUTPATIENT
Start: 2023-05-12 | End: 2023-05-14 | Stop reason: HOSPADM

## 2023-05-12 RX ORDER — PROMETHAZINE HYDROCHLORIDE 25 MG/1
25 TABLET ORAL EVERY 6 HOURS PRN
Status: ON HOLD | COMMUNITY
End: 2024-01-05

## 2023-05-12 RX ORDER — SODIUM CHLORIDE, SODIUM LACTATE, POTASSIUM CHLORIDE, AND CALCIUM CHLORIDE .6; .31; .03; .02 G/100ML; G/100ML; G/100ML; G/100ML
1000 INJECTION, SOLUTION INTRAVENOUS ONCE
Status: COMPLETED | OUTPATIENT
Start: 2023-05-12 | End: 2023-05-12

## 2023-05-12 RX ORDER — MONTELUKAST SODIUM 10 MG/1
10 TABLET ORAL NIGHTLY
Status: DISCONTINUED | OUTPATIENT
Start: 2023-05-12 | End: 2023-05-13

## 2023-05-12 RX ORDER — CLINDAMYCIN HYDROCHLORIDE 300 MG/1
600 CAPSULE ORAL ONCE
COMMUNITY
End: 2023-06-20

## 2023-05-12 RX ORDER — ACETAMINOPHEN 325 MG/1
650 TABLET ORAL EVERY 6 HOURS PRN
Status: DISCONTINUED | OUTPATIENT
Start: 2023-05-12 | End: 2023-05-14 | Stop reason: HOSPADM

## 2023-05-12 RX ORDER — DILTIAZEM HYDROCHLORIDE 5 MG/ML
10 INJECTION INTRAVENOUS ONCE
Status: DISCONTINUED | OUTPATIENT
Start: 2023-05-12 | End: 2023-05-12

## 2023-05-12 RX ORDER — ONDANSETRON 2 MG/ML
4 INJECTION INTRAMUSCULAR; INTRAVENOUS EVERY 4 HOURS PRN
Status: DISCONTINUED | OUTPATIENT
Start: 2023-05-12 | End: 2023-05-14 | Stop reason: HOSPADM

## 2023-05-12 RX ORDER — GABAPENTIN 300 MG/1
600 CAPSULE ORAL ONCE
Status: COMPLETED | OUTPATIENT
Start: 2023-05-12 | End: 2023-05-12

## 2023-05-12 RX ORDER — FLUTICASONE FUROATE, UMECLIDINIUM BROMIDE AND VILANTEROL TRIFENATATE 100; 62.5; 25 UG/1; UG/1; UG/1
1 POWDER RESPIRATORY (INHALATION) EVERY EVENING
Status: ON HOLD | COMMUNITY
End: 2024-01-05

## 2023-05-12 RX ORDER — LABETALOL HYDROCHLORIDE 5 MG/ML
10 INJECTION, SOLUTION INTRAVENOUS EVERY 4 HOURS PRN
Status: DISCONTINUED | OUTPATIENT
Start: 2023-05-12 | End: 2023-05-14 | Stop reason: HOSPADM

## 2023-05-12 RX ORDER — DILTIAZEM HYDROCHLORIDE 5 MG/ML
20 INJECTION INTRAVENOUS ONCE
Status: COMPLETED | OUTPATIENT
Start: 2023-05-12 | End: 2023-05-12

## 2023-05-12 RX ORDER — CARBAMAZEPINE 200 MG/1
200 TABLET ORAL 3 TIMES DAILY
Status: ON HOLD | COMMUNITY
End: 2024-01-05

## 2023-05-12 RX ORDER — RIMEGEPANT SULFATE 75 MG/75MG
75 TABLET, ORALLY DISINTEGRATING ORAL
COMMUNITY

## 2023-05-12 RX ORDER — CALCIUM GLUCONATE 20 MG/ML
2 INJECTION, SOLUTION INTRAVENOUS ONCE
Status: COMPLETED | OUTPATIENT
Start: 2023-05-12 | End: 2023-05-13

## 2023-05-12 RX ORDER — OXYCODONE HYDROCHLORIDE 5 MG/1
5-15 TABLET ORAL EVERY 4 HOURS PRN
Status: ON HOLD | COMMUNITY
End: 2024-01-05

## 2023-05-12 RX ORDER — POTASSIUM CHLORIDE 750 MG/1
40-60 TABLET, EXTENDED RELEASE ORAL EVERY EVENING
Status: ON HOLD | COMMUNITY
End: 2024-01-05

## 2023-05-12 RX ADMIN — MONTELUKAST 10 MG: 10 TABLET, FILM COATED ORAL at 22:03

## 2023-05-12 RX ADMIN — GABAPENTIN 600 MG: 300 CAPSULE ORAL at 22:03

## 2023-05-12 RX ADMIN — APIXABAN 5 MG: 5 TABLET, FILM COATED ORAL at 22:03

## 2023-05-12 RX ADMIN — SODIUM CHLORIDE, POTASSIUM CHLORIDE, SODIUM LACTATE AND CALCIUM CHLORIDE 1000 ML: 600; 310; 30; 20 INJECTION, SOLUTION INTRAVENOUS at 19:22

## 2023-05-12 RX ADMIN — POTASSIUM CHLORIDE 40 MEQ: 1500 TABLET, EXTENDED RELEASE ORAL at 23:45

## 2023-05-12 RX ADMIN — DILTIAZEM HYDROCHLORIDE 5 MG: 5 INJECTION INTRAVENOUS at 21:19

## 2023-05-12 RX ADMIN — DILTIAZEM HYDROCHLORIDE 20 MG: 5 INJECTION INTRAVENOUS at 20:48

## 2023-05-12 ASSESSMENT — ENCOUNTER SYMPTOMS
DEPRESSION: 0
HEADACHES: 0
SORE THROAT: 0
FALLS: 0
CLAUDICATION: 0
SINUS PAIN: 0
BRUISES/BLEEDS EASILY: 0
HEARTBURN: 0
CHILLS: 0
FEVER: 0
PHOTOPHOBIA: 0
POLYDIPSIA: 0
HEMOPTYSIS: 0
VOMITING: 0
COUGH: 0
BLOOD IN STOOL: 0
EYE PAIN: 0
PND: 0
WEAKNESS: 1
STRIDOR: 0
MYALGIAS: 0
TREMORS: 0
DOUBLE VISION: 0
BACK PAIN: 0
DIAPHORESIS: 0
BLURRED VISION: 0
SPUTUM PRODUCTION: 0
PALPITATIONS: 0
DIZZINESS: 1
CONSTIPATION: 0
ORTHOPNEA: 0
WHEEZING: 0
SHORTNESS OF BREATH: 1
FLANK PAIN: 0
ABDOMINAL PAIN: 0
HALLUCINATIONS: 0
DIARRHEA: 0
NAUSEA: 0
NECK PAIN: 0
TINGLING: 0

## 2023-05-12 ASSESSMENT — CHA2DS2 SCORE
AGE 65 TO 74: NO
HYPERTENSION: YES
CHA2DS2 VASC SCORE: 4
AGE 75 OR GREATER: NO
SEX: FEMALE
PRIOR STROKE OR TIA OR THROMBOEMBOLISM: NO
DIABETES: YES
VASCULAR DISEASE: YES
CHF OR LEFT VENTRICULAR DYSFUNCTION: NO

## 2023-05-12 ASSESSMENT — PAIN DESCRIPTION - PAIN TYPE: TYPE: ACUTE PAIN

## 2023-05-12 ASSESSMENT — FIBROSIS 4 INDEX
FIB4 SCORE: 1.37
FIB4 SCORE: 1.37
FIB4 SCORE: 1.74

## 2023-05-12 ASSESSMENT — LIFESTYLE VARIABLES
SUBSTANCE_ABUSE: 0
DO YOU DRINK ALCOHOL: NO

## 2023-05-13 LAB
ALBUMIN SERPL BCP-MCNC: 3.5 G/DL (ref 3.2–4.9)
ALBUMIN/GLOB SERPL: 1.1 G/DL
ALP SERPL-CCNC: 177 U/L (ref 30–99)
ALT SERPL-CCNC: 9 U/L (ref 2–50)
ANION GAP SERPL CALC-SCNC: 11 MMOL/L (ref 7–16)
AST SERPL-CCNC: 17 U/L (ref 12–45)
BASOPHILS # BLD AUTO: 0.5 % (ref 0–1.8)
BASOPHILS # BLD: 0.03 K/UL (ref 0–0.12)
BILIRUB SERPL-MCNC: 0.2 MG/DL (ref 0.1–1.5)
BUN SERPL-MCNC: 8 MG/DL (ref 8–22)
CALCIUM ALBUM COR SERPL-MCNC: 9.7 MG/DL (ref 8.5–10.5)
CALCIUM SERPL-MCNC: 9.3 MG/DL (ref 8.5–10.5)
CHLORIDE SERPL-SCNC: 99 MMOL/L (ref 96–112)
CHOLEST SERPL-MCNC: 145 MG/DL (ref 100–199)
CO2 SERPL-SCNC: 24 MMOL/L (ref 20–33)
CREAT SERPL-MCNC: 0.72 MG/DL (ref 0.5–1.4)
EOSINOPHIL # BLD AUTO: 0.09 K/UL (ref 0–0.51)
EOSINOPHIL NFR BLD: 1.6 % (ref 0–6.9)
ERYTHROCYTE [DISTWIDTH] IN BLOOD BY AUTOMATED COUNT: 54.8 FL (ref 35.9–50)
EST. AVERAGE GLUCOSE BLD GHB EST-MCNC: 246 MG/DL
GFR SERPLBLD CREATININE-BSD FMLA CKD-EPI: 93 ML/MIN/1.73 M 2
GLOBULIN SER CALC-MCNC: 3.1 G/DL (ref 1.9–3.5)
GLUCOSE BLD STRIP.AUTO-MCNC: 198 MG/DL (ref 65–99)
GLUCOSE BLD STRIP.AUTO-MCNC: 207 MG/DL (ref 65–99)
GLUCOSE BLD STRIP.AUTO-MCNC: 231 MG/DL (ref 65–99)
GLUCOSE BLD STRIP.AUTO-MCNC: 237 MG/DL (ref 65–99)
GLUCOSE BLD STRIP.AUTO-MCNC: 280 MG/DL (ref 65–99)
GLUCOSE SERPL-MCNC: 245 MG/DL (ref 65–99)
HBA1C MFR BLD: 10.2 % (ref 4–5.6)
HCT VFR BLD AUTO: 41.8 % (ref 37–47)
HDLC SERPL-MCNC: 29 MG/DL
HGB BLD-MCNC: 11.8 G/DL (ref 12–16)
IMM GRANULOCYTES # BLD AUTO: 0.03 K/UL (ref 0–0.11)
IMM GRANULOCYTES NFR BLD AUTO: 0.5 % (ref 0–0.9)
LDLC SERPL CALC-MCNC: 99 MG/DL
LYMPHOCYTES # BLD AUTO: 1.46 K/UL (ref 1–4.8)
LYMPHOCYTES NFR BLD: 25.6 % (ref 22–41)
MCH RBC QN AUTO: 21.7 PG (ref 27–33)
MCHC RBC AUTO-ENTMCNC: 28.2 G/DL (ref 33.6–35)
MCV RBC AUTO: 77 FL (ref 81.4–97.8)
MONOCYTES # BLD AUTO: 0.52 K/UL (ref 0–0.85)
MONOCYTES NFR BLD AUTO: 9.1 % (ref 0–13.4)
NEUTROPHILS # BLD AUTO: 3.58 K/UL (ref 2–7.15)
NEUTROPHILS NFR BLD: 62.7 % (ref 44–72)
NRBC # BLD AUTO: 0 K/UL
NRBC BLD-RTO: 0 /100 WBC
NT-PROBNP SERPL IA-MCNC: 2015 PG/ML (ref 0–125)
PLATELET # BLD AUTO: 179 K/UL (ref 164–446)
PMV BLD AUTO: 10.9 FL (ref 9–12.9)
POTASSIUM SERPL-SCNC: 4.3 MMOL/L (ref 3.6–5.5)
PROT SERPL-MCNC: 6.6 G/DL (ref 6–8.2)
RBC # BLD AUTO: 5.43 M/UL (ref 4.2–5.4)
SODIUM SERPL-SCNC: 134 MMOL/L (ref 135–145)
TRIGL SERPL-MCNC: 87 MG/DL (ref 0–149)
TROPONIN T SERPL-MCNC: 24 NG/L (ref 6–19)
WBC # BLD AUTO: 5.7 K/UL (ref 4.8–10.8)

## 2023-05-13 PROCEDURE — 700101 HCHG RX REV CODE 250: Performed by: HOSPITALIST

## 2023-05-13 PROCEDURE — 700102 HCHG RX REV CODE 250 W/ 637 OVERRIDE(OP): Performed by: STUDENT IN AN ORGANIZED HEALTH CARE EDUCATION/TRAINING PROGRAM

## 2023-05-13 PROCEDURE — 700102 HCHG RX REV CODE 250 W/ 637 OVERRIDE(OP): Performed by: HOSPITALIST

## 2023-05-13 PROCEDURE — 770020 HCHG ROOM/CARE - TELE (206)

## 2023-05-13 PROCEDURE — A9270 NON-COVERED ITEM OR SERVICE: HCPCS | Performed by: EMERGENCY MEDICINE

## 2023-05-13 PROCEDURE — 82962 GLUCOSE BLOOD TEST: CPT

## 2023-05-13 PROCEDURE — 80061 LIPID PANEL: CPT

## 2023-05-13 PROCEDURE — 84484 ASSAY OF TROPONIN QUANT: CPT

## 2023-05-13 PROCEDURE — 700111 HCHG RX REV CODE 636 W/ 250 OVERRIDE (IP)

## 2023-05-13 PROCEDURE — 83880 ASSAY OF NATRIURETIC PEPTIDE: CPT

## 2023-05-13 PROCEDURE — 700111 HCHG RX REV CODE 636 W/ 250 OVERRIDE (IP): Performed by: HOSPITALIST

## 2023-05-13 PROCEDURE — 83036 HEMOGLOBIN GLYCOSYLATED A1C: CPT

## 2023-05-13 PROCEDURE — 80053 COMPREHEN METABOLIC PANEL: CPT

## 2023-05-13 PROCEDURE — A9270 NON-COVERED ITEM OR SERVICE: HCPCS | Performed by: STUDENT IN AN ORGANIZED HEALTH CARE EDUCATION/TRAINING PROGRAM

## 2023-05-13 PROCEDURE — 36415 COLL VENOUS BLD VENIPUNCTURE: CPT

## 2023-05-13 PROCEDURE — 85025 COMPLETE CBC W/AUTO DIFF WBC: CPT

## 2023-05-13 PROCEDURE — 700111 HCHG RX REV CODE 636 W/ 250 OVERRIDE (IP): Performed by: STUDENT IN AN ORGANIZED HEALTH CARE EDUCATION/TRAINING PROGRAM

## 2023-05-13 PROCEDURE — A9270 NON-COVERED ITEM OR SERVICE: HCPCS | Performed by: HOSPITALIST

## 2023-05-13 PROCEDURE — 700102 HCHG RX REV CODE 250 W/ 637 OVERRIDE(OP): Performed by: EMERGENCY MEDICINE

## 2023-05-13 PROCEDURE — 99233 SBSQ HOSP IP/OBS HIGH 50: CPT | Performed by: STUDENT IN AN ORGANIZED HEALTH CARE EDUCATION/TRAINING PROGRAM

## 2023-05-13 RX ORDER — CARBAMAZEPINE 200 MG/1
200 TABLET ORAL 3 TIMES DAILY
Status: DISCONTINUED | OUTPATIENT
Start: 2023-05-13 | End: 2023-05-14 | Stop reason: HOSPADM

## 2023-05-13 RX ORDER — MONTELUKAST SODIUM 10 MG/1
10 TABLET ORAL EVERY EVENING
Status: DISCONTINUED | OUTPATIENT
Start: 2023-05-13 | End: 2023-05-14 | Stop reason: HOSPADM

## 2023-05-13 RX ORDER — LEVALBUTEROL TARTRATE 45 UG/1
2 AEROSOL, METERED ORAL 4 TIMES DAILY PRN
Status: DISCONTINUED | OUTPATIENT
Start: 2023-05-13 | End: 2023-05-13

## 2023-05-13 RX ORDER — LEVOTHYROXINE SODIUM 112 UG/1
112 TABLET ORAL DAILY
Status: DISCONTINUED | OUTPATIENT
Start: 2023-05-13 | End: 2023-05-14 | Stop reason: HOSPADM

## 2023-05-13 RX ORDER — FEBUXOSTAT 40 MG/1
40 TABLET, FILM COATED ORAL
Status: DISCONTINUED | OUTPATIENT
Start: 2023-05-13 | End: 2023-05-14 | Stop reason: HOSPADM

## 2023-05-13 RX ORDER — GABAPENTIN 300 MG/1
600 CAPSULE ORAL 2 TIMES DAILY
Status: DISCONTINUED | OUTPATIENT
Start: 2023-05-13 | End: 2023-05-13

## 2023-05-13 RX ORDER — OXYCODONE HYDROCHLORIDE 5 MG/1
5-10 TABLET ORAL EVERY 4 HOURS PRN
Status: DISCONTINUED | OUTPATIENT
Start: 2023-05-13 | End: 2023-05-14 | Stop reason: HOSPADM

## 2023-05-13 RX ORDER — ROSUVASTATIN CALCIUM 10 MG/1
10 TABLET, COATED ORAL EVERY EVENING
Status: DISCONTINUED | OUTPATIENT
Start: 2023-05-13 | End: 2023-05-14 | Stop reason: HOSPADM

## 2023-05-13 RX ORDER — FUROSEMIDE 10 MG/ML
40 INJECTION INTRAMUSCULAR; INTRAVENOUS ONCE
Status: COMPLETED | OUTPATIENT
Start: 2023-05-13 | End: 2023-05-13

## 2023-05-13 RX ORDER — VERAPAMIL HYDROCHLORIDE 80 MG/1
80 TABLET ORAL EVERY 8 HOURS
Status: DISCONTINUED | OUTPATIENT
Start: 2023-05-13 | End: 2023-05-14 | Stop reason: HOSPADM

## 2023-05-13 RX ORDER — PROMETHAZINE HYDROCHLORIDE 25 MG/1
25 TABLET ORAL EVERY 12 HOURS
Status: DISCONTINUED | OUTPATIENT
Start: 2023-05-13 | End: 2023-05-14 | Stop reason: HOSPADM

## 2023-05-13 RX ORDER — OMEPRAZOLE 20 MG/1
40 CAPSULE, DELAYED RELEASE ORAL EVERY EVENING
Status: DISCONTINUED | OUTPATIENT
Start: 2023-05-13 | End: 2023-05-14 | Stop reason: HOSPADM

## 2023-05-13 RX ORDER — GABAPENTIN 300 MG/1
600 CAPSULE ORAL 3 TIMES DAILY
Status: DISCONTINUED | OUTPATIENT
Start: 2023-05-13 | End: 2023-05-14 | Stop reason: HOSPADM

## 2023-05-13 RX ORDER — HYDROMORPHONE HYDROCHLORIDE 1 MG/ML
0.5 INJECTION, SOLUTION INTRAMUSCULAR; INTRAVENOUS; SUBCUTANEOUS ONCE
Status: COMPLETED | OUTPATIENT
Start: 2023-05-13 | End: 2023-05-13

## 2023-05-13 RX ADMIN — INSULIN HUMAN 4 UNITS: 100 INJECTION, SOLUTION PARENTERAL at 18:36

## 2023-05-13 RX ADMIN — LEVOTHYROXINE SODIUM 112 MCG: 0.11 TABLET ORAL at 06:06

## 2023-05-13 RX ADMIN — FEBUXOSTAT 40 MG: 40 TABLET ORAL at 21:47

## 2023-05-13 RX ADMIN — GABAPENTIN 600 MG: 300 CAPSULE ORAL at 11:59

## 2023-05-13 RX ADMIN — INSULIN GLARGINE-YFGN 100 UNITS: 100 INJECTION, SOLUTION SUBCUTANEOUS at 08:55

## 2023-05-13 RX ADMIN — INSULIN HUMAN 4 UNITS: 100 INJECTION, SOLUTION PARENTERAL at 08:55

## 2023-05-13 RX ADMIN — APIXABAN 5 MG: 5 TABLET, FILM COATED ORAL at 06:06

## 2023-05-13 RX ADMIN — VERAPAMIL HYDROCHLORIDE 80 MG: 80 TABLET ORAL at 15:05

## 2023-05-13 RX ADMIN — POTASSIUM PHOSPHATE, MONOBASIC AND POTASSIUM PHOSPHATE, DIBASIC 30 MMOL: 224; 236 INJECTION, SOLUTION, CONCENTRATE INTRAVENOUS at 04:35

## 2023-05-13 RX ADMIN — HYDROMORPHONE HYDROCHLORIDE 0.5 MG: 1 INJECTION, SOLUTION INTRAMUSCULAR; INTRAVENOUS; SUBCUTANEOUS at 02:43

## 2023-05-13 RX ADMIN — GABAPENTIN 600 MG: 300 CAPSULE ORAL at 18:36

## 2023-05-13 RX ADMIN — CARBAMAZEPINE 200 MG: 200 TABLET ORAL at 11:59

## 2023-05-13 RX ADMIN — INSULIN HUMAN 3 UNITS: 100 INJECTION, SOLUTION PARENTERAL at 21:47

## 2023-05-13 RX ADMIN — VERAPAMIL HYDROCHLORIDE 80 MG: 80 TABLET ORAL at 06:06

## 2023-05-13 RX ADMIN — CARBAMAZEPINE 200 MG: 200 TABLET ORAL at 18:35

## 2023-05-13 RX ADMIN — CARBAMAZEPINE 200 MG: 200 TABLET ORAL at 06:06

## 2023-05-13 RX ADMIN — CEFTRIAXONE SODIUM 1000 MG: 10 INJECTION, POWDER, FOR SOLUTION INTRAVENOUS at 00:12

## 2023-05-13 RX ADMIN — OMEPRAZOLE 40 MG: 20 CAPSULE, DELAYED RELEASE ORAL at 18:36

## 2023-05-13 RX ADMIN — FUROSEMIDE 40 MG: 10 INJECTION, SOLUTION INTRAMUSCULAR; INTRAVENOUS at 15:05

## 2023-05-13 RX ADMIN — PROMETHAZINE HYDROCHLORIDE 25 MG: 25 TABLET ORAL at 18:36

## 2023-05-13 RX ADMIN — MAGNESIUM SULFATE HEPTAHYDRATE 4 G: 40 INJECTION, SOLUTION INTRAVENOUS at 00:16

## 2023-05-13 RX ADMIN — MONTELUKAST 10 MG: 10 TABLET, FILM COATED ORAL at 18:35

## 2023-05-13 RX ADMIN — UMECLIDINIUM BROMIDE AND VILANTEROL TRIFENATATE 1 PUFF: 62.5; 25 POWDER RESPIRATORY (INHALATION) at 12:00

## 2023-05-13 RX ADMIN — CALCIUM GLUCONATE 2 G: 20 INJECTION, SOLUTION INTRAVENOUS at 00:37

## 2023-05-13 RX ADMIN — INSULIN HUMAN 4 UNITS: 100 INJECTION, SOLUTION PARENTERAL at 13:34

## 2023-05-13 RX ADMIN — VERAPAMIL HYDROCHLORIDE 80 MG: 80 TABLET ORAL at 21:56

## 2023-05-13 RX ADMIN — ROSUVASTATIN 10 MG: 10 TABLET, FILM COATED ORAL at 18:44

## 2023-05-13 RX ADMIN — APIXABAN 5 MG: 5 TABLET, FILM COATED ORAL at 18:35

## 2023-05-13 ASSESSMENT — LIFESTYLE VARIABLES
TOTAL SCORE: 0
HAVE YOU EVER FELT YOU SHOULD CUT DOWN ON YOUR DRINKING: NO
DOES PATIENT WANT TO STOP DRINKING: NO
CONSUMPTION TOTAL: NEGATIVE
HOW MANY TIMES IN THE PAST YEAR HAVE YOU HAD 5 OR MORE DRINKS IN A DAY: 0
AVERAGE NUMBER OF DAYS PER WEEK YOU HAVE A DRINK CONTAINING ALCOHOL: 0
HAVE PEOPLE ANNOYED YOU BY CRITICIZING YOUR DRINKING: NO
EVER HAD A DRINK FIRST THING IN THE MORNING TO STEADY YOUR NERVES TO GET RID OF A HANGOVER: NO
TOTAL SCORE: 0
TOTAL SCORE: 0
ALCOHOL_USE: NO
ON A TYPICAL DAY WHEN YOU DRINK ALCOHOL HOW MANY DRINKS DO YOU HAVE: 0
EVER FELT BAD OR GUILTY ABOUT YOUR DRINKING: NO

## 2023-05-13 ASSESSMENT — COGNITIVE AND FUNCTIONAL STATUS - GENERAL
SUGGESTED CMS G CODE MODIFIER DAILY ACTIVITY: CJ
MOVING FROM LYING ON BACK TO SITTING ON SIDE OF FLAT BED: A LITTLE
TURNING FROM BACK TO SIDE WHILE IN FLAT BAD: A LOT
MOVING TO AND FROM BED TO CHAIR: A LITTLE
TOILETING: A LITTLE
STANDING UP FROM CHAIR USING ARMS: A LITTLE
SUGGESTED CMS G CODE MODIFIER MOBILITY: CK
WALKING IN HOSPITAL ROOM: A LITTLE
MOBILITY SCORE: 16
DAILY ACTIVITIY SCORE: 20
CLIMB 3 TO 5 STEPS WITH RAILING: A LOT
HELP NEEDED FOR BATHING: A LITTLE
DRESSING REGULAR LOWER BODY CLOTHING: A LOT

## 2023-05-13 ASSESSMENT — FIBROSIS 4 INDEX: FIB4 SCORE: 2.06

## 2023-05-13 ASSESSMENT — PAIN DESCRIPTION - PAIN TYPE: TYPE: ACUTE PAIN;CHRONIC PAIN

## 2023-05-13 NOTE — PROGRESS NOTES
4 Eyes Skin Assessment Completed by RAMA Batista and RAMA Schmidt.    Head WDL  Ears WDL  Nose WDL  Mouth WDL  Neck WDL  Breast/Chest Redness and Excoriation  Shoulder Blades WDL  Spine WDL  (R) Arm/Elbow/Hand Bruising and Edema  (L) Arm/Elbow/Hand Bruising and Edema  Abdomen Scar, hernia  Groin Redness and Excoriation  Scrotum/Coccyx/Buttocks WDL  (R) Leg Edema  (L) Leg Scar and Edema  (R) Heel/Foot/Toe Dry  (L) Heel/Foot/Toe Dry          Devices In Places Tele Box, Blood Pressure Cuff, Pulse Ox, and Nasal Cannula      Interventions In Place NC W/Ear Foams, Heel Float Boots, Waffle Overlay, TAP System, Pillows, Q2 Turns, and Pressure Redistribution Mattress    Possible Skin Injury Yes    Pictures Uploaded Into Epic N/A  Wound Consult Placed N/A  RN Wound Prevention Protocol Ordered Yes      PT refusing heel mepilex and sacral mepilex at this time

## 2023-05-13 NOTE — ASSESSMENT & PLAN NOTE
Patient states that her diabetes is uncontrolled recently  Start on insulin sliding scale with serial Accu-Checks  Hemoglobin A1c is elevated at 10.5  Hypoglycemic protocol in place

## 2023-05-13 NOTE — PROGRESS NOTES
Assumed care of patient at bedside report from day shift RN. Updated on POC. Patient currently A & O x 4; on 2 L O2 NC; up PT placed on monitor, monitor room notified; PT refusing heel and sacral mepilex; Call light within reach. Whiteboard updated. Fall precautions in place. Bed locked and in lowest position. All questions answered. No other needs indicated at this time.

## 2023-05-13 NOTE — ED NOTES
Bedside report received at bedside. Pt aaox4. Safely in bed. Labs drawn and sent to lab for analysis.

## 2023-05-13 NOTE — ASSESSMENT & PLAN NOTE
Decompensated likely due to recurrent A-fib and missing several doses of Lasix due to low blood pressures  I have started IV Lasix  Repeat cardiac echo

## 2023-05-13 NOTE — H&P
Hospital Medicine History & Physical Note    Date of Service  5/12/2023    Primary Care Physician  Adrián Duckworth M.D.    Consultants      Specialist Names:     Code Status  Full Code    Chief Complaint  Chief Complaint   Patient presents with    Weakness     X2 DAYS       History of Presenting Illness  Jenifer Ramos is a 63 y.o. female who presented 5/12/2023 with past medical atrial fibrillation, diastolic heart failure, diabetes, history of asthma, factor V Leiden deficiency, mitral stenosis, history of stroke who comes into the emergency room with complaints of palpitations and shortness of breath for the past 1 week.  Patient is noticing her heart rate being greater than 110 but her blood pressure was low.  Patient has been feeling weak for the past 2 days.  She was taking her home diuretics on a consistent basis.  She complains about increased frequency of urination.  Patient is not eating well at home.  She denies a high salt intake.  She denies any fever, nausea, vomiting, diarrhea.    Chest x-ray interpreted by me found increased intravascular congestion and bilateral pulmonary edema  EKG interpreted by me found atrial fibrillation with RVR, LVH, ST depressions    I discussed the plan of care with patient.    Review of Systems  Review of Systems   Constitutional:  Positive for malaise/fatigue. Negative for chills, diaphoresis and fever.   HENT:  Negative for congestion, ear discharge, ear pain, hearing loss, nosebleeds, sinus pain, sore throat and tinnitus.    Eyes:  Negative for blurred vision, double vision, photophobia and pain.   Respiratory:  Positive for shortness of breath. Negative for cough, hemoptysis, sputum production, wheezing and stridor.    Cardiovascular:  Positive for leg swelling. Negative for chest pain, palpitations, orthopnea, claudication and PND.   Gastrointestinal:  Negative for abdominal pain, blood in stool, constipation, diarrhea, heartburn, melena, nausea and vomiting.    Genitourinary:  Negative for dysuria, flank pain, frequency, hematuria and urgency.   Musculoskeletal:  Negative for back pain, falls, joint pain, myalgias and neck pain.   Skin:  Negative for itching and rash.   Neurological:  Positive for dizziness and weakness. Negative for tingling, tremors and headaches.   Endo/Heme/Allergies:  Negative for environmental allergies and polydipsia. Does not bruise/bleed easily.   Psychiatric/Behavioral:  Negative for depression, hallucinations, substance abuse and suicidal ideas.        Past Medical History   has a past medical history of Arrhythmia, Arthritis, ASTHMA (4/24/2013), Back pain (4/24/2013), Blood clotting disorder ([facility]), Disorder of thyroid, DM (diabetes mellitus) ([facility]) (4/24/2013), Factor V deficiency ([facility]) (04/24/2013), Palpitations (4/24/2013), Pneumonia (1999), Psychiatric problem, Sleep apnea (4/24/2013), Snoring, Urinary incontinence, and Varicose vein (4/24/2013).    Surgical History   has a past surgical history that includes hysterectomy, total abdominal (2003); oophorectomy; tonsillectomy; cholecystectomy; fusion, spine, lumbar, plif; other cardiac surgery; other orthopedic surgery (2018); knee arthroplasty total (Left, 2013); knee arthroplasty total (Right, 2013); pr reconstr total shoulder implant (Left, 11/27/2019); and clavicle distal excision (11/27/2019).     Family History  family history includes Heart Disease (age of onset: 75) in her mother; Hypertension in her sister; Lung Disease (age of onset: 69) in her father.   Family history reviewed with patient. There is no family history that is pertinent to the chief complaint.     Social History   reports that she has never smoked. She has never used smokeless tobacco. She reports that she does not currently use alcohol. She reports that she does not use drugs.    Allergies  Allergies   Allergen Reactions    Azithromycin Anaphylaxis    Erythromycin Anaphylaxis    Other Misc Anaphylaxis     Flu  vaccine    Penicillins Anaphylaxis     As a child    Pistachio Anaphylaxis    Sulfa Drugs Anaphylaxis    Tetraglycine Anaphylaxis    Other Drug Unspecified     Long acting benzodiazepines only single dose otherwise combative     Physostigmine Unspecified     Abdomen extreme swelling    Tape Rash and Itching     tegaderm ok  Blisters with others    Zanaflex [Tizanidine Hcl] Unspecified     Falling asleep mid sentence    Albuterol Palpitations    Atorvastatin Unspecified     Extreme joint pain    Chlorhexidine Rash     blistering    Lamisil [Terbinafine Hcl] Unspecified     Pancreatitis     Morphine Unspecified     Not effective       Medications  Prior to Admission Medications   Prescriptions Last Dose Informant Patient Reported? Taking?   IRON PO  Patient Yes No   Sig: Take 1 Tab by mouth every day.   Insulin Degludec 200 UNIT/ML Solution Pen-injector  Patient Yes No   Sig: Inject 276 Units under the skin every day.   apixaban (ELIQUIS) 5mg Tab   No No   Sig: Take 1 Tab by mouth 2 Times a Day.   febuxostat (ULORIC) 80 MG Tab  Patient Yes No   Sig: Take 40 mg by mouth at bedtime.   furosemide (LASIX) 40 MG Tab   Yes No   Sig: Take 40 mg by mouth 2 times a day.   gabapentin (NEURONTIN) 300 MG Cap  Patient Yes No   Sig: Take 600 mg by mouth 2 times a day.   insulin lispro (HUMALOG) 100 UNIT/ML   Yes No   Sig: Inject  under the skin 3 times a day before meals.   levalbuterol (XOPENEX HFA) 45 MCG/ACT inhaler  Patient Yes No   Sig: Inhale 2 Puffs by mouth 2 times a day as needed for Shortness of Breath.   levothyroxine (SYNTHROID) 112 MCG Tab  Patient Yes No   Sig: Take 112 mcg by mouth every day.   metOLazone (ZAROXOLYN) 5 MG Tab   Yes No   Sig: Take 5 mg by mouth every day.   montelukast (SINGULAIR) 10 MG TABS  Patient Yes No   Sig: Take 10 mg by mouth every evening.   oxyCODONE-acetaminophen (PERCOCET) 7.5-325 MG per tablet   Yes No   Sig: Take 1 Tablet by mouth every four hours as needed.   pantoprazole (PROTONIX) 40  MG Tablet Delayed Response  Patient Yes No   Sig: Take 40 mg by mouth every evening.   pravastatin (PRAVACHOL) 10 MG Tab   Yes No   Sig: Take 10 mg by mouth every evening.   rizatriptan (MAXALT-MLT) 10 MG disintegrating tablet  Patient Yes No   Sig: Take 10 mg by mouth Once PRN for Migraine.   spironolactone (ALDACTONE) 50 MG Tab  Patient Yes No   Sig: Take 50 mg by mouth 1 time daily as needed.   verapamil (ISOPTIN) 80 MG Tab   Yes No   Sig: Take 40 mg by mouth 4 times a day as needed.      Facility-Administered Medications: None       Physical Exam  Temp:  [36.6 °C (97.8 °F)] 36.6 °C (97.8 °F)  Pulse:  [107-145] 116  Resp:  [12-26] 22  BP: (100-136)/(61-71) 100/68  SpO2:  [91 %-95 %] 94 %  Blood Pressure : 100/68   Temperature: 36.6 °C (97.8 °F)   Pulse: (!) 116   Respiration: (!) 22   Pulse Oximetry: 94 %       Physical Exam  Vitals and nursing note reviewed.   Constitutional:       General: She is not in acute distress.     Appearance: Normal appearance. She is not ill-appearing, toxic-appearing or diaphoretic.   HENT:      Head: Normocephalic and atraumatic.      Nose: No congestion or rhinorrhea.      Mouth/Throat:      Pharynx: No oropharyngeal exudate or posterior oropharyngeal erythema.   Eyes:      General: No scleral icterus.  Neck:      Vascular: JVD present. No carotid bruit.   Cardiovascular:      Rate and Rhythm: Normal rate and regular rhythm.      Pulses: Normal pulses.      Heart sounds: Normal heart sounds. No murmur heard.     No friction rub. No gallop.   Pulmonary:      Effort: Pulmonary effort is normal. No respiratory distress.      Breath sounds: No stridor. Rales present. No wheezing or rhonchi.   Abdominal:      General: Abdomen is flat. There is no distension.      Palpations: There is no mass.      Tenderness: There is no abdominal tenderness. There is no left CVA tenderness, guarding or rebound.      Hernia: No hernia is present.   Musculoskeletal:         General: No swelling. Normal  range of motion.      Cervical back: No rigidity. No muscular tenderness.      Right lower leg: Edema present.      Left lower leg: Edema present.   Lymphadenopathy:      Cervical: No cervical adenopathy.   Skin:     General: Skin is warm and dry.      Capillary Refill: Capillary refill takes more than 3 seconds.      Coloration: Skin is not jaundiced or pale.      Findings: No bruising or erythema.   Neurological:      Mental Status: She is alert.         Laboratory:  Recent Labs     05/12/23  1840   WBC 6.7   RBC 4.75   HEMOGLOBIN 10.3*   HEMATOCRIT 36.6*   MCV 77.1*   MCH 21.7*   MCHC 28.1*   RDW 54.8*   PLATELETCT 215   MPV 10.8     Recent Labs     05/12/23  1840   SODIUM 140   POTASSIUM 2.9*   CHLORIDE 109   CO2 22   GLUCOSE 177*   BUN 7*   CREATININE 0.67   CALCIUM 6.8*     Recent Labs     05/12/23  1840   ALTSGPT 7   ASTSGOT 12   ALKPHOSPHAT 140*   TBILIRUBIN 0.2   LIPASE 12   GLUCOSE 177*     Recent Labs     05/12/23  1840   APTT 26.9   INR 1.20*     Recent Labs     05/12/23  1840   NTPROBNP 1731*         Recent Labs     05/12/23  1840   TROPONINT 23*       Imaging:  DX-CHEST-PORTABLE (1 VIEW)   Final Result      1.  Enlarged cardiac silhouette with changes of vascular congestion/edema.          X-Ray:  I have personally reviewed the images and compared with prior images.  EKG:  I have personally reviewed the images and compared with prior images.    Assessment/Plan:  Justification for Admission Status  I anticipate this patient will require at least two midnights for appropriate medical management, necessitating inpatient admission because A-fib with RVR    Patient will need a Telemetry bed on MEDICAL service .  The need is secondary to A-fib with RVR.    * A-fib (HCC)- (present on admission)  Assessment & Plan  With RVR  Valvular A-fib with mitral stenosis  Continue home verapamil  Continue home Eliquis  Monitor inflammatory  Repeat cardiac echo  Patient states that she had a bad reaction to digoxin in the  past  She also had a bad reaction to metoprolol in the past      Electrolyte abnormality  Assessment & Plan  Unknown etiology  Possibly from nutritional deficiency    Acute respiratory failure with hypoxia ([facility])  Assessment & Plan  On 2 L of O2 above baseline    Acute on chronic diastolic heart failure ([facility])  Assessment & Plan  Decompensated  Start IV Lasix once blood pressure improves  Repeat cardiac echo    Pulmonary hypertension ([facility])- (present on admission)  Assessment & Plan  Volume overloaded      Hypothyroidism- (present on admission)  Assessment & Plan  Continue home thyroid medications    DM (diabetes mellitus) ([facility])- (present on admission)  Assessment & Plan  Patient states that her diabetes is uncontrolled recently  Start on insulin sliding scale with serial Accu-Checks  Check hemoglobin A1c  Hypoglycemic protocol in place          Factor V deficiency ([facility])- (present on admission)  Assessment & Plan  Continue home Eliquis        VTE prophylaxis: therapeutic anticoagulation with Eliquis

## 2023-05-13 NOTE — ED PROVIDER NOTES
ED Provider Note    CHIEF COMPLAINT  Chief Complaint   Patient presents with    Weakness     X2 DAYS       EXTERNAL RECORDS REVIEWED  Outpatient Notes patient has a history of atrial fibrillation diabetes as well as sleep apnea and stroke.    HPI/ROS  LIMITATION TO HISTORY   Select: : None  OUTSIDE HISTORIAN(S):  Family who states that is been a couple of days of her not doing well and her heart rate has been increasing.    Jenifer Ramos is a 63 y.o. female who presents to the emergency department with chief complaint of generalized weakness and tachycardia.  She states her blood pressure has been so low at home she has been unable to take her Lasix which is led to worsening shortness of breath.  She was found to be hypoxic by EMS and was started on nasal cannula.  She was so weak she could not get out of her recliner at home.  Found to be tachycardic in the 140s.  She states she is felt hot but no actual fevers or chills.  No dysuria no flank pain she still making urine.  No productive cough no chest pain.  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    SURGICAL HISTORY   has a past surgical history that includes hysterectomy, total abdominal (2003); oophorectomy; tonsillectomy; cholecystectomy; fusion, spine, lumbar, plif; other cardiac surgery; other orthopedic surgery (2018); knee arthroplasty total (Left, 2013); knee arthroplasty total (Right, 2013); reconstr total shoulder implant (Left, 11/27/2019); and clavicle distal excision (11/27/2019).    FAMILY HISTORY  Family History   Problem  Relation Age of Onset    Heart Disease Mother 75        atrial fibrillation    Lung Disease Father 69        astma, DM, lukemia    Hypertension Sister        SOCIAL HISTORY  Social History     Tobacco Use    Smoking status: Never    Smokeless tobacco: Never   Substance and Sexual Activity    Alcohol use: Not Currently    Drug use: No    Sexual activity: Not on file       CURRENT MEDICATIONS  Home Medications       Reviewed by Margarita Romero R.N. (Registered Nurse) on 05/12/23 at 1844  Med List Status: Not Addressed     Medication Last Dose Status   apixaban (ELIQUIS) 5mg Tab  Active   febuxostat (ULORIC) 80 MG Tab  Active   Fluticasone Furoate-Vilanterol (BREO) 200-25 MCG/INH AEROSOL POWDER, BREATH ACTIVATED  Active   furosemide (LASIX) 40 MG Tab  Active   gabapentin (NEURONTIN) 300 MG Cap  Active   Insulin Degludec 200 UNIT/ML Solution Pen-injector  Active   insulin lispro (HUMALOG) 100 UNIT/ML  Active   IRON PO  Active   levalbuterol (XOPENEX HFA) 45 MCG/ACT inhaler  Active   levothyroxine (SYNTHROID) 112 MCG Tab  Active   metOLazone (ZAROXOLYN) 5 MG Tab  Active   montelukast (SINGULAIR) 10 MG TABS  Active   oxyCODONE-acetaminophen (PERCOCET) 7.5-325 MG per tablet  Active   pantoprazole (PROTONIX) 40 MG Tablet Delayed Response  Active   pravastatin (PRAVACHOL) 10 MG Tab  Active   rizatriptan (MAXALT-MLT) 10 MG disintegrating tablet  Active   spironolactone (ALDACTONE) 50 MG Tab  Active   verapamil (ISOPTIN) 80 MG Tab  Active                    ALLERGIES  Allergies   Allergen Reactions    Azithromycin Anaphylaxis    Erythromycin Anaphylaxis    Other Misc Anaphylaxis     Flu vaccine    Penicillins Anaphylaxis     As a child    Pistachio Anaphylaxis    Sulfa Drugs Anaphylaxis    Tetraglycine Anaphylaxis    Tape Rash and Itching     tegaderm ok  Blisters with others    Albuterol Palpitations    Atorvastatin      Extreme joint pain    Chlorhexidine Rash     blistering    Lamisil [Terbinafine Hcl]      Pancreatitis   "   Morphine      Not effective    Other Drug      Long acting benzodiazepines only single dose otherwise combative     Physostigmine      Abdomen extreme swelling    Zanaflex [Tizanidine Hcl]      Falling asleep mid sentence       PHYSICAL EXAM  VITAL SIGNS: /61   Pulse (!) 145   Temp 36.6 °C (97.8 °F) (Temporal)   Resp 18   Ht 1.727 m (5' 8\")   Wt (!) 127 kg (280 lb)   SpO2 91%   BMI 42.57 kg/m²    Pulse Ox Interpretation:   Pulse Ox is normal on nasal cannula  Constitutional: Alert in no apparent distress.  HENT: Normocephalic atraumatic, MMM  Eyes: PER, Conjunctiva normal, Non-icteric.   Neck: Normal range of motion, No tenderness, Supple, No stridor.   Cardiovascular: Irregular irregular tachycardic, no murmurs.   Thorax & Lungs: Diminished breath sounds bilateral lower lungs, No chest tenderness.   Abdomen: Bowel sounds normal, Soft, No tenderness, No pulsatile masses. No peritoneal signs.  Skin: Warm, Dry, No erythema, No rash.   Back: No bony tenderness, No CVA tenderness.   Extremities/MSK: Intact equal distal pulses, 2+ pitting edema bilateral lower extremities  Neurologic: Alert and oriented x3, No focal deficits noted.       DIAGNOSTIC STUDIES / PROCEDURES  EKG  I have independently interpreted this EKG  Results for orders placed or performed during the hospital encounter of 23   EKG   Result Value Ref Range    Report       [facility] Emergency Dept.    Test Date:  2023  Pt Name:    LEONEL LINDER               Department: ER  MRN:        [id number]                      Room:        21  Gender:     Female                       Technician: [id number]  :        [id number]                   Requested By:ER TRIAGE PROTOCOL  Order #:    [id number]                    Reading MD: Mena López MD    Measurements  Intervals                                Axis  Rate:       128                          P:  NH:                                      QRS:        216  QRSD:     "   155                          T:          24  QT:         361  QTc:        527    Interpretive Statements  irregular irregular tachy concern afib, RBBB  Compared to ECG 07/20/2022 20:18:21  afib vs flutter  Electronically Signed On 5- 19:06:22 PDT by Mena López MD           LABS  Labs Reviewed   CBC WITH DIFFERENTIAL - Abnormal; Notable for the following components:       Result Value    Hemoglobin 10.3 (*)     Hematocrit 36.6 (*)     MCV 77.1 (*)     MCH 21.7 (*)     MCHC 28.1 (*)     RDW 54.8 (*)     Neutrophils-Polys 79.10 (*)     Lymphocytes 16.50 (*)     All other components within normal limits    Narrative:     Biotin intake of greater than 5 mg per day may interfere with  troponin levels, causing false low values.  Indicate which anticoagulants the patient is on:->UNKNOWN   COMP METABOLIC PANEL - Abnormal; Notable for the following components:    Potassium 2.9 (*)     Glucose 177 (*)     Bun 7 (*)     Calcium 6.8 (*)     Alkaline Phosphatase 140 (*)     Albumin 2.7 (*)     Total Protein 5.4 (*)     All other components within normal limits    Narrative:     Biotin intake of greater than 5 mg per day may interfere with  troponin levels, causing false low values.  Indicate which anticoagulants the patient is on:->UNKNOWN   LACTIC ACID - Abnormal; Notable for the following components:    Lactic Acid 3.0 (*)     All other components within normal limits    Narrative:     Biotin intake of greater than 5 mg per day may interfere with  troponin levels, causing false low values.  Indicate which anticoagulants the patient is on:->UNKNOWN   LACTIC ACID - Abnormal; Notable for the following components:    Lactic Acid 3.5 (*)     All other components within normal limits   URINALYSIS - Abnormal; Notable for the following components:    Glucose >=1000 (*)     Ketones Trace (*)     Protein 100 (*)     Leukocyte Esterase Trace (*)     All other components within normal limits    Narrative:     Indication for  culture:->Evaluation for sepsis without a  clear source of infection   MAGNESIUM - Abnormal; Notable for the following components:    Magnesium 1.4 (*)     All other components within normal limits    Narrative:     Biotin intake of greater than 5 mg per day may interfere with  troponin levels, causing false low values.  Indicate which anticoagulants the patient is on:->UNKNOWN   PHOSPHORUS - Abnormal; Notable for the following components:    Phosphorus 1.8 (*)     All other components within normal limits    Narrative:     Biotin intake of greater than 5 mg per day may interfere with  troponin levels, causing false low values.  Indicate which anticoagulants the patient is on:->UNKNOWN   TROPONIN - Abnormal; Notable for the following components:    Troponin T 23 (*)     All other components within normal limits    Narrative:     Biotin intake of greater than 5 mg per day may interfere with  troponin levels, causing false low values.  Indicate which anticoagulants the patient is on:->UNKNOWN   PROTHROMBIN TIME - Abnormal; Notable for the following components:    PT 15.1 (*)     INR 1.20 (*)     All other components within normal limits    Narrative:     Biotin intake of greater than 5 mg per day may interfere with  troponin levels, causing false low values.  Indicate which anticoagulants the patient is on:->UNKNOWN   PROBRAIN NATRIURETIC PEPTIDE, NT - Abnormal; Notable for the following components:    NT-proBNP 1731 (*)     All other components within normal limits    Narrative:     Biotin intake of greater than 5 mg per day may interfere with  troponin levels, causing false low values.  Indicate which anticoagulants the patient is on:->UNKNOWN   URINE MICROSCOPIC (W/UA) - Abnormal; Notable for the following components:    WBC 20-50 (*)     RBC 2-5 (*)     Bacteria Moderate (*)     Epithelial Cells Moderate (*)     Hyaline Cast 6-10 (*)     All other components within normal limits    Narrative:     Indication for  culture:->Evaluation for sepsis without a  clear source of infection   CORRECTED CALCIUM - Abnormal; Notable for the following components:    Correct Calcium 7.8 (*)     All other components within normal limits    Narrative:     Biotin intake of greater than 5 mg per day may interfere with  troponin levels, causing false low values.  Indicate which anticoagulants the patient is on:->UNKNOWN   D-DIMER - Abnormal; Notable for the following components:    D-Dimer 1.01 (*)     All other components within normal limits   LIPASE    Narrative:     Biotin intake of greater than 5 mg per day may interfere with  troponin levels, causing false low values.  Indicate which anticoagulants the patient is on:->UNKNOWN   COV-2, FLU A/B, AND RSV BY PCR ([facility])   APTT    Narrative:     Biotin intake of greater than 5 mg per day may interfere with  troponin levels, causing false low values.  Indicate which anticoagulants the patient is on:->UNKNOWN   DIFFERENTIAL MANUAL    Narrative:     Biotin intake of greater than 5 mg per day may interfere with  troponin levels, causing false low values.  Indicate which anticoagulants the patient is on:->UNKNOWN   PERIPHERAL SMEAR REVIEW    Narrative:     Biotin intake of greater than 5 mg per day may interfere with  troponin levels, causing false low values.  Indicate which anticoagulants the patient is on:->UNKNOWN   PLATELET ESTIMATE    Narrative:     Biotin intake of greater than 5 mg per day may interfere with  troponin levels, causing false low values.  Indicate which anticoagulants the patient is on:->UNKNOWN   MORPHOLOGY    Narrative:     Biotin intake of greater than 5 mg per day may interfere with  troponin levels, causing false low values.  Indicate which anticoagulants the patient is on:->UNKNOWN   ESTIMATED GFR    Narrative:     Biotin intake of greater than 5 mg per day may interfere with  troponin levels, causing false low values.  Indicate which anticoagulants the patient is  "on:->UNKNOWN   PROCALCITONIN   TSH WITH REFLEX TO FT4   LACTIC ACID   BLOOD CULTURE    Narrative:     1 of 2 for Blood Culture x 2 sites order. Per Hospital  Policy: Only change Specimen Src: to \"Line\" if specified by  physician order.   BLOOD CULTURE    Narrative:     2 of 2 blood culture x2  Sites order. Per Hospital Policy:  Only change Specimen Src: to \"Line\" if specified by physician  order.   URINE CULTURE(NEW)    Narrative:     Indication for culture:->Evaluation for sepsis without a  clear source of infection   TROPONIN   CBC WITH DIFFERENTIAL   COMP METABOLIC PANEL   LIPID PROFILE   PROBRAIN NATRIURETIC PEPTIDE, NT   TROPONIN         RADIOLOGY  I have independently interpreted the diagnostic imaging associated with this visit and am waiting the final reading from the radiologist.   My preliminary interpretation is as follows:     CXR - CM with edema and no effusion     Radiologist interpretation:     DX-CHEST-PORTABLE (1 VIEW)   Final Result      1.  Enlarged cardiac silhouette with changes of vascular congestion/edema.      EC-ECHOCARDIOGRAM COMPLETE W/O CONT    (Results Pending)         COURSE & MEDICAL DECISION MAKING    ED Observation Status? Yes; I am placing the patient in to an observation status due to a diagnostic uncertainty as well as therapeutic intensity. Patient placed in observation status at 6:49 PM, 5/12/2023.     Observation plan is as follows: Laboratory analysis sepsis protocol    Upon Reevaluation, the patient's condition has: not improved; and will be escalated to hospitalization.    Patient discharged from ED Observation status at 9:17 PM  (Time) 05/12/23  (Date).     INITIAL ASSESSMENT, COURSE AND PLAN  Care Narrative: Patient presents emergency department with tachycardia and questionable hypotension at home this is either sepsis or she is in A-fib with RVR that is uncontrolled because she was feeling tired and obtained her medications.  She has signs of fluid overload and concern " for pulmonary edema with her hypoxia.  Could also evaluate for pneumonia.  She randy l be given a very gentle fluid bolus and not doing the full bolus in the setting of A-fib and CHF so do not Further volume overload or if this is sepsis.      Laboratory and Alysis responded as well as imaging and this is more consistent with A-fib RVR and fluid overload rather than sepsis.  His she does have a mildly elevated lactic acid that is likely secondary to demand from her A-fib RVR.  She was given about 150 cc of fluids and otherwise fluids were held.  Plan to be a Dilt trial to improve her heart rate    9:17 PM  Responded to dilt push of 20 mg.  Plan is for repeat dilt at 5 mg.    DISPOSITION AND DISCUSSIONS  Had a long discussion with the patient and her  at the bedside they understand the need for hospitalization but they are very concerned that she has had prior hospitalizations that go so well.  I wrote for nighttime medications including Eliquis so she gets her medications.  They understand the plan for admission for fluid overload and A-fib RVR.    9:43 PM  Spoke with Dr. Junior with the medicine service and he has accepted the patient for hospitalization.    I have discussed management of the patient with the following physicians and DIANA's:  binh    Discussion of management with other QHP or appropriate source(s): Pharmacy for meds        Decision tools and prescription drugs considered including, but not limited to: HEART Score patient is not having chest pain so this was not performed .    FINAL DIAGNOSIS  A-fib RVR  Fluid overload with hypoxia  Lactic acidosis  History of prior stroke       Electronically signed by: Mena López M.D., 5/12/2023 6:49 PM

## 2023-05-13 NOTE — ASSESSMENT & PLAN NOTE
Patient has new oxygen needs on admission  Chest x-ray does show pulmonary vascular congestion with some pulmonary edema consistent with volume overload she also has noted to have increased lower extremity edema  Of note patient had recently skipped several days of her Lasix due to low blood pressures  I have given her IV Lasix we will continue to monitor her O2 sats and volume status  Monitor I's and O's

## 2023-05-13 NOTE — CARE PLAN
The patient is Stable - Low risk of patient condition declining or worsening    Shift Goals  Clinical Goals: New PIV, IV meds, admit profile, safe ambulation in room  Patient Goals: Rest    Progress made toward(s) clinical / shift goals:      Problem: Knowledge Deficit - Standard  Goal: Patient and family/care givers will demonstrate understanding of plan of care, disease process/condition, diagnostic tests and medications  Outcome: Progressing  Pt educated on meds and POC, all questions answered.      Problem: Pain - Standard  Goal: Alleviation of pain or a reduction in pain to the patient’s comfort goal  Outcome: Progressing  Pt uses pain scale to rate pain and discusses PRN pain meds used at home. RN and pt discuss prn pain med Rx by on-call hospitalist.      Problem: Fall Risk  Goal: Patient will remain free from falls  Outcome: Progressing   Pt high fall risk. All high risk fall precautions completed except no room available near nursing station. Pt uses call light to call for help.    Patient is not progressing towards the following goals:

## 2023-05-13 NOTE — ED NOTES
Med Rec complete per patient/spouse  Allergies reviewed  Preferred Pharmacy: [facility]

## 2023-05-13 NOTE — ED TRIAGE NOTES
"Chief Complaint   Patient presents with    Weakness     X2 DAYS     Pt JAMES STRAUSS from home for complaints of progressive weakness that has been ongoing for the last 3 days. Pt states she was so weak today she could not get out of her recliner.     /61   Pulse (!) 145   Resp 18   Ht 1.727 m (5' 8\")   Wt (!) 127 kg (280 lb)   SpO2 91%       " Fall with Harm Risk

## 2023-05-13 NOTE — DIETARY
NUTRITION SERVICES: BMI - Pt with BMI >40 (=Body mass index is 43.01 kg/m².), Class III obesity. Wt via stand-up scale assessment. Weight loss counseling not appropriate in acute care setting.     RD attempted to visit for DM education, pt with other providers at attempt. Noted A1c 5.9% on 8/12/22 and 6.4% on 7/21/22. RD to order new A1c for more recent reflection of glycemic control.     RECOMMEND - If appropriate at DC please refer to outpatient nutrition services for weight management.

## 2023-05-13 NOTE — ASSESSMENT & PLAN NOTE
Patient has a complex history of atrial fibs atrial flutter status post cardioversion and multiple ablations.  She had recently been placed on a heart monitor and had 0 ectopy and thus it had been discontinued prematurely   Patient states that she is very sensitive to A-fib and feels poorly when she is in it I suspect that this is likely contributing to her current symptoms  Continue home verapamil but increased to every 8 hour  Continue home Eliquis  Patient declines use of metoprolol as well as digoxin and amiodarone she would rather follow-up with her primary cardiologist for further care  She has been diuresed with improvement in her volume overload and breathing, she remains in A-fib but rate is improving

## 2023-05-14 ENCOUNTER — APPOINTMENT (OUTPATIENT)
Dept: CARDIOLOGY | Facility: MEDICAL CENTER | Age: 63
DRG: 308 | End: 2023-05-14
Attending: HOSPITALIST
Payer: COMMERCIAL

## 2023-05-14 VITALS
HEIGHT: 68 IN | HEART RATE: 100 BPM | DIASTOLIC BLOOD PRESSURE: 72 MMHG | RESPIRATION RATE: 18 BRPM | OXYGEN SATURATION: 97 % | SYSTOLIC BLOOD PRESSURE: 105 MMHG | WEIGHT: 289.68 LBS | TEMPERATURE: 97.5 F | BODY MASS INDEX: 43.9 KG/M2

## 2023-05-14 LAB
ALBUMIN SERPL BCP-MCNC: 3.2 G/DL (ref 3.2–4.9)
ALBUMIN/GLOB SERPL: 1 G/DL
ALP SERPL-CCNC: 159 U/L (ref 30–99)
ALT SERPL-CCNC: 8 U/L (ref 2–50)
ANION GAP SERPL CALC-SCNC: 9 MMOL/L (ref 7–16)
AST SERPL-CCNC: 14 U/L (ref 12–45)
BACTERIA UR CULT: NORMAL
BASOPHILS # BLD AUTO: 0.4 % (ref 0–1.8)
BASOPHILS # BLD: 0.02 K/UL (ref 0–0.12)
BILIRUB SERPL-MCNC: 0.3 MG/DL (ref 0.1–1.5)
BUN SERPL-MCNC: 8 MG/DL (ref 8–22)
CALCIUM ALBUM COR SERPL-MCNC: 9.5 MG/DL (ref 8.5–10.5)
CALCIUM SERPL-MCNC: 8.9 MG/DL (ref 8.5–10.5)
CHLORIDE SERPL-SCNC: 100 MMOL/L (ref 96–112)
CO2 SERPL-SCNC: 29 MMOL/L (ref 20–33)
CREAT SERPL-MCNC: 0.68 MG/DL (ref 0.5–1.4)
EOSINOPHIL # BLD AUTO: 0.17 K/UL (ref 0–0.51)
EOSINOPHIL NFR BLD: 3.5 % (ref 0–6.9)
ERYTHROCYTE [DISTWIDTH] IN BLOOD BY AUTOMATED COUNT: 54.2 FL (ref 35.9–50)
GFR SERPLBLD CREATININE-BSD FMLA CKD-EPI: 96 ML/MIN/1.73 M 2
GLOBULIN SER CALC-MCNC: 3.1 G/DL (ref 1.9–3.5)
GLUCOSE BLD STRIP.AUTO-MCNC: 166 MG/DL (ref 65–99)
GLUCOSE BLD STRIP.AUTO-MCNC: 167 MG/DL (ref 65–99)
GLUCOSE BLD STRIP.AUTO-MCNC: 216 MG/DL (ref 65–99)
GLUCOSE SERPL-MCNC: 187 MG/DL (ref 65–99)
HCT VFR BLD AUTO: 39.2 % (ref 37–47)
HGB BLD-MCNC: 11.1 G/DL (ref 12–16)
IMM GRANULOCYTES # BLD AUTO: 0.01 K/UL (ref 0–0.11)
IMM GRANULOCYTES NFR BLD AUTO: 0.2 % (ref 0–0.9)
LYMPHOCYTES # BLD AUTO: 1.48 K/UL (ref 1–4.8)
LYMPHOCYTES NFR BLD: 30.5 % (ref 22–41)
MAGNESIUM SERPL-MCNC: 2 MG/DL (ref 1.5–2.5)
MCH RBC QN AUTO: 21.6 PG (ref 27–33)
MCHC RBC AUTO-ENTMCNC: 28.3 G/DL (ref 33.6–35)
MCV RBC AUTO: 76.1 FL (ref 81.4–97.8)
MONOCYTES # BLD AUTO: 0.45 K/UL (ref 0–0.85)
MONOCYTES NFR BLD AUTO: 9.3 % (ref 0–13.4)
MORPHOLOGY BLD-IMP: NORMAL
NEUTROPHILS # BLD AUTO: 2.73 K/UL (ref 2–7.15)
NEUTROPHILS NFR BLD: 56.1 % (ref 44–72)
NRBC # BLD AUTO: 0 K/UL
NRBC BLD-RTO: 0 /100 WBC
PLATELET # BLD AUTO: 174 K/UL (ref 164–446)
PMV BLD AUTO: 10 FL (ref 9–12.9)
POTASSIUM SERPL-SCNC: 3.7 MMOL/L (ref 3.6–5.5)
PROT SERPL-MCNC: 6.3 G/DL (ref 6–8.2)
RBC # BLD AUTO: 5.15 M/UL (ref 4.2–5.4)
SIGNIFICANT IND 70042: NORMAL
SITE SITE: NORMAL
SODIUM SERPL-SCNC: 138 MMOL/L (ref 135–145)
SOURCE SOURCE: NORMAL
WBC # BLD AUTO: 4.9 K/UL (ref 4.8–10.8)

## 2023-05-14 PROCEDURE — 80053 COMPREHEN METABOLIC PANEL: CPT

## 2023-05-14 PROCEDURE — A9270 NON-COVERED ITEM OR SERVICE: HCPCS | Performed by: EMERGENCY MEDICINE

## 2023-05-14 PROCEDURE — 700102 HCHG RX REV CODE 250 W/ 637 OVERRIDE(OP): Performed by: HOSPITALIST

## 2023-05-14 PROCEDURE — 82962 GLUCOSE BLOOD TEST: CPT | Mod: 91

## 2023-05-14 PROCEDURE — A9270 NON-COVERED ITEM OR SERVICE: HCPCS | Performed by: HOSPITALIST

## 2023-05-14 PROCEDURE — A9270 NON-COVERED ITEM OR SERVICE: HCPCS | Performed by: STUDENT IN AN ORGANIZED HEALTH CARE EDUCATION/TRAINING PROGRAM

## 2023-05-14 PROCEDURE — 83735 ASSAY OF MAGNESIUM: CPT

## 2023-05-14 PROCEDURE — 700111 HCHG RX REV CODE 636 W/ 250 OVERRIDE (IP): Performed by: STUDENT IN AN ORGANIZED HEALTH CARE EDUCATION/TRAINING PROGRAM

## 2023-05-14 PROCEDURE — 700102 HCHG RX REV CODE 250 W/ 637 OVERRIDE(OP): Performed by: STUDENT IN AN ORGANIZED HEALTH CARE EDUCATION/TRAINING PROGRAM

## 2023-05-14 PROCEDURE — 700101 HCHG RX REV CODE 250: Performed by: HOSPITALIST

## 2023-05-14 PROCEDURE — 85025 COMPLETE CBC W/AUTO DIFF WBC: CPT

## 2023-05-14 PROCEDURE — 36415 COLL VENOUS BLD VENIPUNCTURE: CPT

## 2023-05-14 PROCEDURE — 99239 HOSP IP/OBS DSCHRG MGMT >30: CPT | Performed by: STUDENT IN AN ORGANIZED HEALTH CARE EDUCATION/TRAINING PROGRAM

## 2023-05-14 PROCEDURE — 700102 HCHG RX REV CODE 250 W/ 637 OVERRIDE(OP): Performed by: EMERGENCY MEDICINE

## 2023-05-14 PROCEDURE — 700111 HCHG RX REV CODE 636 W/ 250 OVERRIDE (IP): Performed by: HOSPITALIST

## 2023-05-14 RX ORDER — FUROSEMIDE 10 MG/ML
20 INJECTION INTRAMUSCULAR; INTRAVENOUS
Status: DISCONTINUED | OUTPATIENT
Start: 2023-05-14 | End: 2023-05-14 | Stop reason: HOSPADM

## 2023-05-14 RX ORDER — VERAPAMIL HYDROCHLORIDE 80 MG/1
80 TABLET ORAL EVERY 8 HOURS
Qty: 90 TABLET | Refills: 0 | Status: SHIPPED | OUTPATIENT
Start: 2023-05-14 | End: 2023-06-13

## 2023-05-14 RX ADMIN — APIXABAN 5 MG: 5 TABLET, FILM COATED ORAL at 05:59

## 2023-05-14 RX ADMIN — VERAPAMIL HYDROCHLORIDE 80 MG: 80 TABLET ORAL at 13:25

## 2023-05-14 RX ADMIN — CARBAMAZEPINE 200 MG: 200 TABLET ORAL at 13:38

## 2023-05-14 RX ADMIN — LEVOTHYROXINE SODIUM 112 MCG: 0.11 TABLET ORAL at 05:59

## 2023-05-14 RX ADMIN — CEFTRIAXONE SODIUM 1000 MG: 10 INJECTION, POWDER, FOR SOLUTION INTRAVENOUS at 05:52

## 2023-05-14 RX ADMIN — CARBAMAZEPINE 200 MG: 200 TABLET ORAL at 05:59

## 2023-05-14 RX ADMIN — INSULIN HUMAN 4 UNITS: 100 INJECTION, SOLUTION PARENTERAL at 13:23

## 2023-05-14 RX ADMIN — VERAPAMIL HYDROCHLORIDE 80 MG: 80 TABLET ORAL at 06:24

## 2023-05-14 RX ADMIN — PROMETHAZINE HYDROCHLORIDE 25 MG: 25 TABLET ORAL at 05:59

## 2023-05-14 RX ADMIN — INSULIN GLARGINE-YFGN 100 UNITS: 100 INJECTION, SOLUTION SUBCUTANEOUS at 06:27

## 2023-05-14 RX ADMIN — GABAPENTIN 600 MG: 300 CAPSULE ORAL at 13:38

## 2023-05-14 RX ADMIN — FUROSEMIDE 20 MG: 10 INJECTION, SOLUTION INTRAVENOUS at 09:59

## 2023-05-14 RX ADMIN — INSULIN HUMAN 3 UNITS: 100 INJECTION, SOLUTION PARENTERAL at 09:59

## 2023-05-14 RX ADMIN — GABAPENTIN 600 MG: 300 CAPSULE ORAL at 05:59

## 2023-05-14 ASSESSMENT — ENCOUNTER SYMPTOMS
BACK PAIN: 1
ABDOMINAL PAIN: 0
NAUSEA: 1
SHORTNESS OF BREATH: 0
HEADACHES: 1
FEVER: 0
VOMITING: 0
PALPITATIONS: 1
NERVOUS/ANXIOUS: 1
BLURRED VISION: 0
DOUBLE VISION: 0
WHEEZING: 0
SORE THROAT: 0
WEAKNESS: 1

## 2023-05-14 NOTE — CARE PLAN
The patient is Stable - Low risk of patient condition declining or worsening    Shift Goals  Clinical Goals: Monitor HR  Patient Goals: rest    Problem: Knowledge Deficit - Standard  Goal: Patient and family/care givers will demonstrate understanding of plan of care, disease process/condition, diagnostic tests and medications  Outcome: Progressing     Problem: Pain - Standard  Goal: Alleviation of pain or a reduction in pain to the patient’s comfort goal  Outcome: Progressing     Problem: Fall Risk  Goal: Patient will remain free from falls  Outcome: Progressing     Problem: Skin Integrity  Goal: Skin integrity is maintained or improved  Outcome: Progressing     Problem: Care Map:  Day 1 Optimal Outcome for the Heart Failure Patient  Goal: Day 1:  Optimal Care of the heart failure patient  Outcome: Progressing

## 2023-05-14 NOTE — DISCHARGE INSTRUCTIONS
Take medications as directed new medication is verapamil you will take this every 8 hours (3 times daily)  Recommend he follow-up with your cardiologist as soon as possible for further evaluation regarding your recurrent atrial fibrillation  Recommend that you take your diuretics as scheduled and do not skip days  Your blood sugars are poorly controlled evidenced by elevated hemoglobin A1c of 10.5, recommend closer monitoring of your diet as well as follow-up with your primary care doctor or endocrinologist for further medication adjustments as needed  If you develop any increasing shortness of breath, chest pain or palpitations seek medical attention

## 2023-05-14 NOTE — DIETARY
Nutrition Services: Diabetes Education Consult   Day 2 of admit.  Jenfier Ramos is a 63 y.o. female with admitting DX of A-fib.    RD able to visit pt at bedside to provide diabetes diet education. RD discussed consistent CHO meal pattern, and provided handout reinforcing topics discussed. Pt demonstrated appropriate readiness and adequate evidence of learning. RD able to answer all questions to patient's satisfaction.     No other education needs identified at this time. Consider referral to outpatient nutrition services for continuation of education as indicated or per pt preferences.     Please re-consult RD as indicated.

## 2023-05-14 NOTE — DISCHARGE SUMMARY
Discharge Summary    CHIEF COMPLAINT ON ADMISSION  Chief Complaint   Patient presents with    Weakness     X2 DAYS       Reason for Admission  EMS     Admission Date  5/12/2023    CODE STATUS  Full Code    HPI & HOSPITAL COURSE  Jenifer Ramos is a 63 y.o. female with a past medical atrial fibrillation status post multiple cardioversions and ablations, diastolic heart failure, diabetes, history of asthma, factor V Leiden deficiency, mitral stenosis, history of stroke who comes into the emergency room with complaints of acute onset of weakness palpitations and shortness of breath.  Patient was unable to get out of her chair and had slid down and felt extremely unwell EMS was called and patient was noted to be tachycardic with low blood pressure in the 70s.  Of note patient had been recently seen by her  doctor and was noted to have low blood pressure and for this she had held her Lasix for the last several days.  She denied any other specific symptoms  Chest x-ray at time of admission did show some evidence of increased intravascular congestion and bilateral pulmonary edema thus she was started on IV Lasix as her blood pressure did improve.  EKG showed atrial fibrillation with rapid ventricular response she was treated with verapamil for this due to reported side effects from beta-blockers.  Patient reports that she is very sensitive to her atrial fibrillation and atrial flutter and that she becomes very symptomatic.  She had been out of it for some time and recently had cardiac monitoring done which showed no evidence of arrhythmia.  We discussed options for rate and rhythm control however patient reports reacting to poorly to metoprolol amiodarone and digoxin thus her home verapamil was increased to 80 mg every 8 hours with improvement in her rate however she continues to be in atrial fibrillation.  Her O2 needs and edema did improve with IV Lasix she will resume her home oral Lasix on discharge recommend that  she follows up with her primary care doctor and her cardiologist very closely for further evaluation and monitoring of her blood pressure, volume status.  Patient was very eager for discharge and had declined a lot of the therapy options thus repeat echocardiogram and cardiology consultation was not done as patient preferred to follow-up with her private cardiologist.  She had been started on Rocephin for possible UTI however she denies any urinary tract and symptoms and her urine culture was negative thus antibiotics were discontinued.  At the time of discharge patient reports feeling better she is hemodynamically stable on room air and eager for discharge.      Therefore, she is discharged in fair and stable condition to home with close outpatient follow-up.    The patient met 2-midnight criteria for an inpatient stay at the time of discharge.    Discharge Date  5/14/2023    FOLLOW UP ITEMS POST DISCHARGE  Cardiology for evaluation of rate and rhythm control  Electrolytes  Volume status  Blood pressure    DISCHARGE DIAGNOSES  Principal Problem:    A-fib (HCC) (POA: Yes)  Active Problems:    Factor V deficiency (HCC) (POA: Yes)      Overview: On Coumadin    DM (diabetes mellitus) (HCC) (POA: Yes)    Hypothyroidism (POA: Yes)    Pulmonary hypertension (HCC) (POA: Yes)    Acute on chronic diastolic heart failure (HCC) (POA: Unknown)    Acute respiratory failure with hypoxia (HCC) (POA: Unknown)    Electrolyte abnormality (POA: Unknown)  Resolved Problems:    * No resolved hospital problems. *      FOLLOW UP  No future appointments.  Adrián Duckworth M.D.  [address] [place] [id number]  [phone]    Schedule an appointment as soon as possible for a visit in 1 week(s)  Hospital follow-up      MEDICATIONS ON DISCHARGE     Medication List        START taking these medications        Instructions   verapamil 80 MG Tabs  Commonly known as: ISOPTIN   Take 1 Tablet by mouth every 8 hours for 30  days.  Dose: 80 mg            CONTINUE taking these medications        Instructions   apixaban 5mg Tabs  Commonly known as: Eliquis   Doctor's comments: Patient has already, patient to restart this Friday pm.  Take 1 Tab by mouth 2 Times a Day.  Dose: 5 mg     aspirin EC 81 MG Tbec  Commonly known as: ECOTRIN   Take 81 mg by mouth every 48 hours as needed.  Dose: 81 mg     carBAMazepine 200 MG Tabs  Commonly known as: TEGRETOL   Take 200 mg by mouth 3 times a day.  Dose: 200 mg     clindamycin 300 MG Caps  Commonly known as: CLEOCIN   Take 600 mg by mouth one time. Prior to dental appointment  Dose: 600 mg     febuxostat 40 MG Tabs  Commonly known as: ULORIC   Take 40 mg by mouth at bedtime.  Dose: 40 mg     furosemide 20 MG Tabs  Commonly known as: LASIX   Take 40-60 mg by mouth every day. 40 mg daily, may take additional 20 mg in the evening prn.  Dose: 40-60 mg     gabapentin 300 MG Caps  Commonly known as: NEURONTIN   Take 600 mg by mouth 2 times a day. 600 mg = 2 capsules, may take additional 2 capsules in the afternoon as needed.  Dose: 600 mg     Insulin Degludec 200 UNIT/ML Sopn   Inject 200 Units under the skin every day.  Dose: 200 Units     insulin lispro 100 UNIT/ML  Commonly known as: HumaLOG   Inject 40-80 Units under the skin see administration instructions. Four to six times daily prn per scale:  150 - 200 = 40 units  200 - 300 = 60 units  Over 400 = 80 units  Dose: 40-80 Units     levalbuterol 45 MCG/ACT inhaler  Commonly known as: XOPENEX HFA   Inhale 2 Puffs 4 times a day as needed for Shortness of Breath.  Dose: 2 Puff     levothyroxine 112 MCG Tabs  Commonly known as: SYNTHROID   Take 112 mcg by mouth every day.  Dose: 112 mcg     montelukast 10 MG Tabs  Commonly known as: SINGULAIR   Take 10 mg by mouth every evening.  Dose: 10 mg     Nurtec 75 MG Tbdp  Generic drug: Rimegepant Sulfate   Take 75 mg by mouth 1 time a day as needed.  Dose: 75 mg     oxyCODONE immediate-release 5 MG Tabs  Commonly  known as: ROXICODONE   Take 5 mg by mouth every four hours as needed for Severe Pain.  Dose: 5 mg     pantoprazole 40 MG Tbec  Commonly known as: PROTONIX   Take 40 mg by mouth every evening.  Dose: 40 mg     potassium chloride SA 10 MEQ Tbcr  Commonly known as: K-DUR   Take 40-60 mEq by mouth every day. 40 mg daily, may take additional 20 mg in the evening prn. Taken with Lasix  Dose: 40-60 mEq     promethazine 25 MG Tabs  Commonly known as: PHENERGAN   Take 25 mg by mouth every 12 hours.  Dose: 25 mg     rosuvastatin 10 MG Tabs  Commonly known as: CRESTOR   Take 10 mg by mouth every evening.  Dose: 10 mg     Trelegy Ellipta 100-62.5-25 MCG/ACT Aepb inhalation  Generic drug: fluticasone-umeclidin-vilant   Inhale 1 Inhalation. every day.  Dose: 1 Inhalation.              Allergies  Allergies   Allergen Reactions    Azithromycin Anaphylaxis    Erythromycin Anaphylaxis    Other Misc Anaphylaxis     Flu vaccine    Penicillins Anaphylaxis     As a child    Pistachio Anaphylaxis    Sulfa Drugs Anaphylaxis    Tetraglycine Anaphylaxis    Other Drug Unspecified     Long acting benzodiazepines only single dose otherwise combative     Physostigmine Unspecified     Abdomen extreme swelling    Tape Rash and Itching     tegaderm ok  Blisters with others    Zanaflex [Tizanidine Hcl] Unspecified     Falling asleep mid sentence    Albuterol Palpitations    Atorvastatin Unspecified     Extreme joint pain    Chlorhexidine Rash     blistering    Lamisil [Terbinafine Hcl] Unspecified     Pancreatitis     Morphine Unspecified     Not effective       DIET  Orders Placed This Encounter   Procedures    Diet Order Diet: 2 Gram Sodium; Second Modifier: (optional): Consistent CHO (Diabetic)     Standing Status:   Standing     Number of Occurrences:   1     Order Specific Question:   Diet:     Answer:   2 Gram Sodium [7]     Order Specific Question:   Second Modifier: (optional)     Answer:   Consistent CHO (Diabetic) [4]       ACTIVITY  As  tolerated.      CONSULTATIONS  N/A    PROCEDURES  N/A    LABORATORY  Lab Results   Component Value Date    SODIUM 138 05/14/2023    POTASSIUM 3.7 05/14/2023    CHLORIDE 100 05/14/2023    CO2 29 05/14/2023    GLUCOSE 187 (H) 05/14/2023    BUN 8 05/14/2023    CREATININE 0.68 05/14/2023        Lab Results   Component Value Date    WBC 4.9 05/14/2023    HEMOGLOBIN 11.1 (L) 05/14/2023    HEMATOCRIT 39.2 05/14/2023    PLATELETCT 174 05/14/2023        Total time of the discharge process exceeds 45 minutes.   2

## 2023-05-14 NOTE — PROGRESS NOTES
MONITOR SUMMARY:    RHYTHM: Afib  HEART RATE: 94-99bpm  ECTOPY: rPVC  MEASUREMENT: -/.12/-    Monitor Strip unable to upload to Epic; per monitor tech

## 2023-05-14 NOTE — HOSPITAL COURSE
Jenifer Ramos is a 63 y.o. female who presented 5/12/2023 with past medical atrial fibrillation status post multiple cardioversions and ablations, diastolic heart failure, diabetes, history of asthma, factor V Leiden deficiency, mitral stenosis, history of stroke who comes into the emergency room with complaints of acute onset of weakness palpitations and shortness of breath.  Patient was unable to get out of her chair and had slid down and felt extremely unwell EMS was called and patient was noted to be tachycardic with low blood pressure in the 70s.  Of note patient had been recently seen by her primary care doctor and was noted to have low blood pressure and for this she had held her Lasix.  She denied any other specific symptoms  Chest x-ray at time of admission did show some evidence of increased intravascular congestion and bilateral pulmonary edema thus she was started on IV Lasix.  EKG showed atrial fibrillation with rapid ventricular response she was treated with verapamil for this due to reported side effects from beta-blockers.

## 2023-05-14 NOTE — CARE PLAN
The patient is Stable - Low risk of patient condition declining or worsening    Shift Goals  Clinical Goals: monitor cardioresp status/ I&Os; safety; ivabx; BG control  Patient Goals: sleep well  Family Goals: mei; n/a at this time    Progress made toward(s) clinical / shift goals:  progressing       Problem: Fall Risk  Goal: Patient will remain free from falls  Outcome: Progressing     Problem: Skin Integrity  Goal: Skin integrity is maintained or improved  Outcome: Progressing     Problem: Care Map:  Day 2 Optimal Outcome for the Heart Failure Patient  Goal: Day 2:  Optimal Care of the heart failure patient  Outcome: Progressing     Problem: Hemodynamics  Goal: Patient's hemodynamics, fluid balance and neurologic status will be stable or improve  Outcome: Progressing     Problem: Urinary Elimination  Goal: Establish and maintain regular urinary output  Outcome: Progressing     Problem: Gastrointestinal Irritability  Goal: Nausea and vomiting will be absent or improve  Outcome: Progressing     Problem: Infection - Standard  Goal: Patient will remain free from infection  Outcome: Progressing       Patient is not progressing towards the following goals:

## 2023-05-14 NOTE — PROGRESS NOTES
[facility] Medicine Daily Progress Note    Date of Service  5/13/2023    Chief Complaint  Jenifer Ramos is a 63 y.o. female admitted 5/12/2023 with weakness and feeling unwell    Hospital Course  Jenifer Ramos is a 63 y.o. female who presented 5/12/2023 with past medical atrial fibrillation status post multiple cardioversions and ablations, diastolic heart failure, diabetes, history of asthma, factor V Leiden deficiency, mitral stenosis, history of stroke who comes into the emergency room with complaints of acute onset of weakness palpitations and shortness of breath.  Patient was unable to get out of her chair and had slid down and felt extremely unwell EMS was called and patient was noted to be tachycardic with low blood pressure in the 70s.  Of note patient had been recently seen by her primary care doctor and was noted to have low blood pressure and for this she had held her Lasix.  She denied any other specific symptoms  Chest x-ray at time of admission did show some evidence of increased intravascular congestion and bilateral pulmonary edema thus she was started on IV Lasix.  EKG showed atrial fibrillation with rapid ventricular response she was treated with verapamil for this due to reported side effects from beta-blockers.    Interval Problem Update  Patient seen and examined at bedside her  is present she continues to feel unwell but does feel better  She states that she thought she was having a stroke but notes that she is very sensitive to being in atrial fibrillation and does believe that this is the cause of her symptoms  -She is in atrial fibrillation with heart rates up to about 110, she declines any treatment with beta-blockers amiodarone or digoxin she has been started on verapamil with improvement  -Her blood pressure is improved, she does have some Rales as well as lower extremity edema I have added IV Lasix  -Does have some electrolyte abnormalities I have replaced potassium and  magnesium  -She is on Rocephin for urinary tract infection we will continue pending urine cultures  -She is requiring oxygen which she does not at baseline    Patient is very eager for discharge however she understands that she is not quite at her baseline and is agreeable to staying overnight for further evaluation.    I have discussed this patient's plan of care and discharge plan at IDT rounds today with Case Management, Nursing, Nursing leadership, and other members of the IDT team.    Consultants/Specialty  N/A    Code Status  Full Code    Disposition  The patient is not medically cleared for discharge to home or a post-acute facility.  Anticipate discharge to: home with close outpatient follow-up    I have placed the appropriate orders for post-discharge needs.    Review of Systems  Review of Systems   Constitutional:  Positive for malaise/fatigue. Negative for fever.   HENT:  Negative for congestion and sore throat.    Eyes:  Negative for blurred vision and double vision.   Respiratory:  Negative for shortness of breath and wheezing.    Cardiovascular:  Positive for palpitations and leg swelling. Negative for chest pain.   Gastrointestinal:  Positive for nausea. Negative for abdominal pain and vomiting.   Genitourinary:  Negative for dysuria and urgency.   Musculoskeletal:  Positive for back pain and joint pain.   Neurological:  Positive for weakness and headaches.   Psychiatric/Behavioral:  Negative for suicidal ideas. The patient is nervous/anxious.         Physical Exam  Temp:  [36.1 °C (97 °F)-36.7 °C (98.1 °F)] 36.4 °C (97.5 °F)  Pulse:  [] 100  Resp:  [16-18] 18  BP: (101-133)/(63-78) 105/72  SpO2:  [92 %-97 %] 97 %    Physical Exam  Vitals and nursing note reviewed.   Constitutional:       Appearance: She is obese. She is ill-appearing. She is not toxic-appearing.   HENT:      Head: Normocephalic and atraumatic.      Mouth/Throat:      Pharynx: Oropharynx is clear.   Eyes:       Conjunctiva/sclera: Conjunctivae normal.   Cardiovascular:      Rate and Rhythm: Tachycardia present. Rhythm irregular.      Heart sounds: Normal heart sounds.   Pulmonary:      Breath sounds: Wheezing and rales present.   Abdominal:      General: Bowel sounds are normal.      Palpations: Abdomen is soft.   Musculoskeletal:         General: Normal range of motion.      Cervical back: Neck supple.      Right lower leg: Edema present.      Left lower leg: Edema present.   Skin:     General: Skin is warm.   Neurological:      General: No focal deficit present.      Mental Status: She is alert and oriented to person, place, and time. Mental status is at baseline.   Psychiatric:         Mood and Affect: Mood normal.         Behavior: Behavior normal.         Thought Content: Thought content normal.         Fluids  No intake or output data in the 24 hours ending 05/14/23 1352    Laboratory  Recent Labs     05/12/23 1840 05/13/23 0313 05/14/23  0335   WBC 6.7 5.7 4.9   RBC 4.75 5.43* 5.15   HEMOGLOBIN 10.3* 11.8* 11.1*   HEMATOCRIT 36.6* 41.8 39.2   MCV 77.1* 77.0* 76.1*   MCH 21.7* 21.7* 21.6*   MCHC 28.1* 28.2* 28.3*   RDW 54.8* 54.8* 54.2*   PLATELETCT 215 179 174   MPV 10.8 10.9 10.0     Recent Labs     05/12/23 1840 05/13/23 0313 05/14/23  0335   SODIUM 140 134* 138   POTASSIUM 2.9* 4.3 3.7   CHLORIDE 109 99 100   CO2 22 24 29   GLUCOSE 177* 245* 187*   BUN 7* 8 8   CREATININE 0.67 0.72 0.68   CALCIUM 6.8* 9.3 8.9     Recent Labs     05/12/23 1840   APTT 26.9   INR 1.20*         Recent Labs     05/13/23  0313   TRIGLYCERIDE 87   HDL 29*   LDL 99       Imaging  DX-CHEST-PORTABLE (1 VIEW)   Final Result      1.  Enlarged cardiac silhouette with changes of vascular congestion/edema.      EC-ECHOCARDIOGRAM COMPLETE W/O CONT    (Results Pending)        Assessment/Plan  * A-fib (HCC)- (present on admission)  Assessment & Plan  Patient has a complex history of atrial fibs atrial flutter status post cardioversion and  multiple ablations.  She had recently been placed on a heart monitor and had 0 ectopy and thus it had been discontinued prematurely   Patient states that she is very sensitive to A-fib and feels poorly when she is in it I suspect that this is likely contributing to her current symptoms  Continue home verapamil but increased to every 8 hour  Continue home Eliquis  Patient declines use of metoprolol as well as digoxin and amiodarone she would rather follow-up with her primary cardiologist for further care  She has been diuresed with improvement in her volume overload and breathing, she remains in A-fib but rate is improving    Electrolyte abnormality  Assessment & Plan   multiple electrolyte abnormalities  Potassium and magnesium replacement ordered    Acute respiratory failure with hypoxia ([facility])  Assessment & Plan  On 2 L of O2 above baseline  Improving with IV Lasix    Acute on chronic diastolic heart failure ([facility])  Assessment & Plan  Decompensated likely due to recurrent A-fib and missing several doses of Lasix due to low blood pressures  I have started IV Lasix  Repeat cardiac echo    Pulmonary hypertension ([facility])- (present on admission)  Assessment & Plan  Patient has new oxygen needs on admission  Chest x-ray does show pulmonary vascular congestion with some pulmonary edema consistent with volume overload she also has noted to have increased lower extremity edema  Of note patient had recently skipped several days of her Lasix due to low blood pressures  I have given her IV Lasix we will continue to monitor her O2 sats and volume status  Monitor I's and O's        Hypothyroidism- (present on admission)  Assessment & Plan  Continue home thyroid medications    DM (diabetes mellitus) ([facility])- (present on admission)  Assessment & Plan  Patient states that her diabetes is uncontrolled recently  Start on insulin sliding scale with serial Accu-Checks  Hemoglobin A1c is elevated at 10.5  Hypoglycemic protocol in  place          Factor V deficiency (HCC)- (present on admission)  Assessment & Plan  Continue home Eliquis         VTE prophylaxis: therapeutic anticoagulation with Eliquis    I have performed a physical exam and reviewed and updated ROS and Plan today (5/13/2023). In review of yesterday's note (5/12/2023), there are no changes except as documented above.    I spent 55 minutes reviewing patient's charts, labs vitals and patient examination as well as extensive conversation with patient and her  at bedside

## 2023-05-17 LAB
BACTERIA BLD CULT: NORMAL
SIGNIFICANT IND 70042: NORMAL
SITE SITE: NORMAL
SOURCE SOURCE: NORMAL

## 2023-05-18 LAB
BACTERIA BLD CULT: NORMAL
SIGNIFICANT IND 70042: NORMAL
SITE SITE: NORMAL
SOURCE SOURCE: NORMAL

## 2023-06-13 NOTE — CARE PLAN
The patient is Stable - Low risk of patient condition declining or worsening    Shift Goals  Clinical Goals: sleep/safety  Patient Goals: sleep    Progress made toward(s) clinical / shift goals:        Problem: Skin Integrity  Goal: Patient's skin integrity will be maintained or improve  Outcome: Progressing, encouraged pt to assist with skin inspection and application of nystatin powder, pt agreeable      Problem: Diabetes Management  Goal: Patient's ability to maintain appropriate glucose levels will be maintained or improve  Outcome: Progressing, bg wnl, accuchecks ac/hs     Problem: Pain - Standard  Goal: Alleviation of pain or a reduction in pain to the patient’s comfort goal  Outcome: Progressing, medicated hs for right hand/wrist and generalized pain pt resting comfortably           Rhofade Pregnancy And Lactation Text: This medication has not been assigned a Pregnancy Risk Category. It is unknown if the medication is excreted in breast milk.

## 2023-06-20 ENCOUNTER — HOSPITAL ENCOUNTER (INPATIENT)
Facility: MEDICAL CENTER | Age: 63
LOS: 2 days | DRG: 551 | End: 2023-06-22
Attending: EMERGENCY MEDICINE | Admitting: INTERNAL MEDICINE
Payer: MEDICARE

## 2023-06-20 ENCOUNTER — APPOINTMENT (OUTPATIENT)
Dept: RADIOLOGY | Facility: MEDICAL CENTER | Age: 63
DRG: 551 | End: 2023-06-20
Attending: EMERGENCY MEDICINE
Payer: MEDICARE

## 2023-06-20 DIAGNOSIS — S80.00XA CONTUSION OF KNEE AND LOWER LEG, UNSPECIFIED LATERALITY, INITIAL ENCOUNTER: ICD-10-CM

## 2023-06-20 DIAGNOSIS — S32.000A COMPRESSION FRACTURE OF LUMBAR VERTEBRA, UNSPECIFIED LUMBAR VERTEBRAL LEVEL, INITIAL ENCOUNTER (HCC): ICD-10-CM

## 2023-06-20 DIAGNOSIS — S09.90XA CLOSED HEAD INJURY, INITIAL ENCOUNTER: ICD-10-CM

## 2023-06-20 DIAGNOSIS — S80.10XA CONTUSION OF KNEE AND LOWER LEG, UNSPECIFIED LATERALITY, INITIAL ENCOUNTER: ICD-10-CM

## 2023-06-20 LAB
ALBUMIN SERPL BCP-MCNC: 3.7 G/DL (ref 3.2–4.9)
ALBUMIN/GLOB SERPL: 1.3 G/DL
ALP SERPL-CCNC: 176 U/L (ref 30–99)
ALT SERPL-CCNC: 11 U/L (ref 2–50)
ANION GAP SERPL CALC-SCNC: 9 MMOL/L (ref 7–16)
ANISOCYTOSIS BLD QL SMEAR: ABNORMAL
APTT PPP: 28.4 SEC (ref 24.7–36)
AST SERPL-CCNC: 20 U/L (ref 12–45)
BASOPHILS # BLD AUTO: 0 % (ref 0–1.8)
BILIRUB SERPL-MCNC: 0.4 MG/DL (ref 0.1–1.5)
BUN SERPL-MCNC: 8 MG/DL (ref 8–22)
CALCIUM ALBUM COR SERPL-MCNC: 9.2 MG/DL (ref 8.5–10.5)
CALCIUM SERPL-MCNC: 9 MG/DL (ref 8.5–10.5)
CHLORIDE SERPL-SCNC: 93 MMOL/L (ref 96–112)
CO2 SERPL-SCNC: 30 MMOL/L (ref 20–33)
CREAT SERPL-MCNC: 0.68 MG/DL (ref 0.5–1.4)
EKG IMPRESSION: NORMAL
EOSINOPHIL NFR BLD: 0 % (ref 0–6.9)
ERYTHROCYTE [DISTWIDTH] IN BLOOD BY AUTOMATED COUNT: 52.9 FL (ref 35.9–50)
GFR SERPLBLD CREATININE-BSD FMLA CKD-EPI: 96 ML/MIN/1.73 M 2
GLOBULIN SER CALC-MCNC: 2.8 G/DL (ref 1.9–3.5)
GLUCOSE BLD STRIP.AUTO-MCNC: 237 MG/DL (ref 65–99)
GLUCOSE SERPL-MCNC: 241 MG/DL (ref 65–99)
HCT VFR BLD AUTO: 35 % (ref 37–47)
HGB BLD-MCNC: 10.4 G/DL (ref 12–16)
HYPOCHROMIA BLD QL SMEAR: ABNORMAL
INR PPP: 1.34 (ref 0.87–1.13)
LYMPHOCYTES # BLD AUTO: 0.82 K/UL (ref 1–4.8)
LYMPHOCYTES NFR BLD: 21 % (ref 22–41)
MANUAL DIFF BLD: NORMAL
MCH RBC QN AUTO: 22 PG (ref 27–33)
MCHC RBC AUTO-ENTMCNC: 29.7 G/DL (ref 32.2–35.5)
MCV RBC AUTO: 74 FL (ref 81.4–97.8)
MICROCYTES BLD QL SMEAR: ABNORMAL
MONOCYTES # BLD AUTO: 0.24 K/UL (ref 0–0.85)
MONOCYTES NFR BLD AUTO: 6 % (ref 0–13.4)
MORPHOLOGY BLD-IMP: NORMAL
NEUTROPHILS # BLD AUTO: 2.85 K/UL (ref 1.82–7.42)
NEUTROPHILS NFR BLD: 73 % (ref 44–72)
NRBC # BLD AUTO: 0 K/UL
NRBC BLD-RTO: 0 /100 WBC (ref 0–0.2)
OVALOCYTES BLD QL SMEAR: NORMAL
PLATELET # BLD AUTO: 138 K/UL (ref 164–446)
PLATELET BLD QL SMEAR: NORMAL
PMV BLD AUTO: 9.9 FL (ref 9–12.9)
POIKILOCYTOSIS BLD QL SMEAR: NORMAL
POLYCHROMASIA BLD QL SMEAR: NORMAL
POTASSIUM SERPL-SCNC: 3.6 MMOL/L (ref 3.6–5.5)
PROT SERPL-MCNC: 6.5 G/DL (ref 6–8.2)
PROTHROMBIN TIME: 16.4 SEC (ref 12–14.6)
RBC # BLD AUTO: 4.73 M/UL (ref 4.2–5.4)
RBC BLD AUTO: PRESENT
SODIUM SERPL-SCNC: 132 MMOL/L (ref 135–145)
STOMATOCYTES BLD QL SMEAR: NORMAL
WBC # BLD AUTO: 3.9 K/UL (ref 4.8–10.8)

## 2023-06-20 PROCEDURE — 71045 X-RAY EXAM CHEST 1 VIEW: CPT

## 2023-06-20 PROCEDURE — 80053 COMPREHEN METABOLIC PANEL: CPT

## 2023-06-20 PROCEDURE — 700102 HCHG RX REV CODE 250 W/ 637 OVERRIDE(OP): Performed by: INTERNAL MEDICINE

## 2023-06-20 PROCEDURE — 99285 EMERGENCY DEPT VISIT HI MDM: CPT

## 2023-06-20 PROCEDURE — 99223 1ST HOSP IP/OBS HIGH 75: CPT | Performed by: INTERNAL MEDICINE

## 2023-06-20 PROCEDURE — 700101 HCHG RX REV CODE 250: Performed by: INTERNAL MEDICINE

## 2023-06-20 PROCEDURE — 72131 CT LUMBAR SPINE W/O DYE: CPT

## 2023-06-20 PROCEDURE — 85730 THROMBOPLASTIN TIME PARTIAL: CPT

## 2023-06-20 PROCEDURE — 700111 HCHG RX REV CODE 636 W/ 250 OVERRIDE (IP): Mod: JZ | Performed by: EMERGENCY MEDICINE

## 2023-06-20 PROCEDURE — 93005 ELECTROCARDIOGRAM TRACING: CPT | Performed by: EMERGENCY MEDICINE

## 2023-06-20 PROCEDURE — 770020 HCHG ROOM/CARE - TELE (206)

## 2023-06-20 PROCEDURE — 82962 GLUCOSE BLOOD TEST: CPT

## 2023-06-20 PROCEDURE — 85007 BL SMEAR W/DIFF WBC COUNT: CPT

## 2023-06-20 PROCEDURE — 73030 X-RAY EXAM OF SHOULDER: CPT | Mod: LT

## 2023-06-20 PROCEDURE — 96375 TX/PRO/DX INJ NEW DRUG ADDON: CPT

## 2023-06-20 PROCEDURE — 36415 COLL VENOUS BLD VENIPUNCTURE: CPT

## 2023-06-20 PROCEDURE — 96374 THER/PROPH/DIAG INJ IV PUSH: CPT

## 2023-06-20 PROCEDURE — 73590 X-RAY EXAM OF LOWER LEG: CPT | Mod: RT

## 2023-06-20 PROCEDURE — A9270 NON-COVERED ITEM OR SERVICE: HCPCS | Performed by: INTERNAL MEDICINE

## 2023-06-20 PROCEDURE — 85610 PROTHROMBIN TIME: CPT

## 2023-06-20 PROCEDURE — 70450 CT HEAD/BRAIN W/O DYE: CPT

## 2023-06-20 PROCEDURE — 85025 COMPLETE CBC W/AUTO DIFF WBC: CPT

## 2023-06-20 RX ORDER — BISACODYL 10 MG
10 SUPPOSITORY, RECTAL RECTAL
Status: DISCONTINUED | OUTPATIENT
Start: 2023-06-20 | End: 2023-06-22 | Stop reason: HOSPADM

## 2023-06-20 RX ORDER — FLUTICASONE PROPIONATE 44 UG/1
2 AEROSOL, METERED RESPIRATORY (INHALATION)
Status: DISCONTINUED | OUTPATIENT
Start: 2023-06-21 | End: 2023-06-21

## 2023-06-20 RX ORDER — HYDROMORPHONE HYDROCHLORIDE 1 MG/ML
1 INJECTION, SOLUTION INTRAMUSCULAR; INTRAVENOUS; SUBCUTANEOUS ONCE
Status: COMPLETED | OUTPATIENT
Start: 2023-06-20 | End: 2023-06-20

## 2023-06-20 RX ORDER — GABAPENTIN 300 MG/1
600 CAPSULE ORAL 3 TIMES DAILY
Status: DISCONTINUED | OUTPATIENT
Start: 2023-06-20 | End: 2023-06-22 | Stop reason: HOSPADM

## 2023-06-20 RX ORDER — MONTELUKAST SODIUM 10 MG/1
10 TABLET ORAL EVERY EVENING
Status: DISCONTINUED | OUTPATIENT
Start: 2023-06-20 | End: 2023-06-22 | Stop reason: HOSPADM

## 2023-06-20 RX ORDER — HYDROMORPHONE HYDROCHLORIDE 1 MG/ML
1 INJECTION, SOLUTION INTRAMUSCULAR; INTRAVENOUS; SUBCUTANEOUS
Status: DISCONTINUED | OUTPATIENT
Start: 2023-06-20 | End: 2023-06-22 | Stop reason: HOSPADM

## 2023-06-20 RX ORDER — FUROSEMIDE 40 MG/1
40 TABLET ORAL DAILY
Status: DISCONTINUED | OUTPATIENT
Start: 2023-06-21 | End: 2023-06-22 | Stop reason: HOSPADM

## 2023-06-20 RX ORDER — FEBUXOSTAT 40 MG/1
40 TABLET, FILM COATED ORAL
Status: DISCONTINUED | OUTPATIENT
Start: 2023-06-20 | End: 2023-06-22 | Stop reason: HOSPADM

## 2023-06-20 RX ORDER — ROSUVASTATIN CALCIUM 20 MG/1
20 TABLET, COATED ORAL EVERY EVENING
Status: ON HOLD | COMMUNITY
End: 2024-01-05

## 2023-06-20 RX ORDER — CARBAMAZEPINE 200 MG/1
200 TABLET ORAL 3 TIMES DAILY
Status: DISCONTINUED | OUTPATIENT
Start: 2023-06-20 | End: 2023-06-22 | Stop reason: HOSPADM

## 2023-06-20 RX ORDER — OMEPRAZOLE 20 MG/1
20 CAPSULE, DELAYED RELEASE ORAL DAILY
Status: DISCONTINUED | OUTPATIENT
Start: 2023-06-20 | End: 2023-06-22 | Stop reason: HOSPADM

## 2023-06-20 RX ORDER — LIDOCAINE 50 MG/G
2 PATCH TOPICAL EVERY 24 HOURS
Status: DISCONTINUED | OUTPATIENT
Start: 2023-06-20 | End: 2023-06-22 | Stop reason: HOSPADM

## 2023-06-20 RX ORDER — OXYCODONE HYDROCHLORIDE 5 MG/1
5 TABLET ORAL
Status: DISCONTINUED | OUTPATIENT
Start: 2023-06-20 | End: 2023-06-22

## 2023-06-20 RX ORDER — LORAZEPAM 2 MG/ML
0.5 INJECTION INTRAMUSCULAR
Status: COMPLETED | OUTPATIENT
Start: 2023-06-20 | End: 2023-06-21

## 2023-06-20 RX ORDER — ONDANSETRON 4 MG/1
4 TABLET, ORALLY DISINTEGRATING ORAL EVERY 4 HOURS PRN
Status: DISCONTINUED | OUTPATIENT
Start: 2023-06-20 | End: 2023-06-22 | Stop reason: HOSPADM

## 2023-06-20 RX ORDER — PANTOPRAZOLE SODIUM 40 MG/1
40 TABLET, DELAYED RELEASE ORAL EVERY EVENING
COMMUNITY

## 2023-06-20 RX ORDER — ONDANSETRON 2 MG/ML
4 INJECTION INTRAMUSCULAR; INTRAVENOUS ONCE
Status: COMPLETED | OUTPATIENT
Start: 2023-06-20 | End: 2023-06-20

## 2023-06-20 RX ORDER — POLYETHYLENE GLYCOL 3350 17 G/17G
1 POWDER, FOR SOLUTION ORAL
Status: DISCONTINUED | OUTPATIENT
Start: 2023-06-20 | End: 2023-06-22 | Stop reason: HOSPADM

## 2023-06-20 RX ORDER — VERAPAMIL HYDROCHLORIDE 80 MG/1
80 TABLET ORAL
Status: ON HOLD | COMMUNITY
End: 2024-01-05

## 2023-06-20 RX ORDER — POTASSIUM CHLORIDE 20 MEQ/1
40 TABLET, EXTENDED RELEASE ORAL DAILY
Status: DISCONTINUED | OUTPATIENT
Start: 2023-06-21 | End: 2023-06-22 | Stop reason: HOSPADM

## 2023-06-20 RX ORDER — LIDOCAINE 50 MG/G
1 PATCH TOPICAL EVERY 24 HOURS
Status: ON HOLD | COMMUNITY
End: 2024-01-05

## 2023-06-20 RX ORDER — ROSUVASTATIN CALCIUM 20 MG/1
20 TABLET, COATED ORAL EVERY EVENING
Status: DISCONTINUED | OUTPATIENT
Start: 2023-06-20 | End: 2023-06-22 | Stop reason: HOSPADM

## 2023-06-20 RX ORDER — HYDROXYZINE HYDROCHLORIDE 25 MG/1
25 TABLET, FILM COATED ORAL
Status: ON HOLD | COMMUNITY
End: 2024-01-05

## 2023-06-20 RX ORDER — LEVALBUTEROL TARTRATE 45 UG/1
2 AEROSOL, METERED ORAL 4 TIMES DAILY PRN
Status: DISCONTINUED | OUTPATIENT
Start: 2023-06-20 | End: 2023-06-20

## 2023-06-20 RX ORDER — INSULIN LISPRO 100 [IU]/ML
40-80 INJECTION, SOLUTION INTRAVENOUS; SUBCUTANEOUS
Status: DISCONTINUED | OUTPATIENT
Start: 2023-06-21 | End: 2023-06-21

## 2023-06-20 RX ORDER — PANTOPRAZOLE SODIUM 40 MG/1
40 TABLET, DELAYED RELEASE ORAL EVERY EVENING
Status: DISCONTINUED | OUTPATIENT
Start: 2023-06-20 | End: 2023-06-20

## 2023-06-20 RX ORDER — MAGNESIUM OXIDE 400 MG/1
400 TABLET ORAL DAILY
COMMUNITY

## 2023-06-20 RX ORDER — ONDANSETRON 2 MG/ML
4 INJECTION INTRAMUSCULAR; INTRAVENOUS EVERY 4 HOURS PRN
Status: DISCONTINUED | OUTPATIENT
Start: 2023-06-20 | End: 2023-06-22 | Stop reason: HOSPADM

## 2023-06-20 RX ORDER — LEVALBUTEROL INHALATION SOLUTION 0.63 MG/3ML
0.63 SOLUTION RESPIRATORY (INHALATION)
Status: DISCONTINUED | OUTPATIENT
Start: 2023-06-20 | End: 2023-06-22 | Stop reason: HOSPADM

## 2023-06-20 RX ORDER — ACETAMINOPHEN 325 MG/1
650 TABLET ORAL EVERY 6 HOURS PRN
Status: DISCONTINUED | OUTPATIENT
Start: 2023-06-20 | End: 2023-06-22 | Stop reason: HOSPADM

## 2023-06-20 RX ORDER — OXYCODONE HYDROCHLORIDE 10 MG/1
10 TABLET ORAL
Status: DISCONTINUED | OUTPATIENT
Start: 2023-06-20 | End: 2023-06-22

## 2023-06-20 RX ORDER — LEVOTHYROXINE SODIUM 112 UG/1
112 TABLET ORAL DAILY
Status: DISCONTINUED | OUTPATIENT
Start: 2023-06-21 | End: 2023-06-22 | Stop reason: HOSPADM

## 2023-06-20 RX ADMIN — OMEPRAZOLE 20 MG: 20 CAPSULE, DELAYED RELEASE ORAL at 23:18

## 2023-06-20 RX ADMIN — OXYCODONE HYDROCHLORIDE 10 MG: 10 TABLET ORAL at 21:14

## 2023-06-20 RX ADMIN — FEBUXOSTAT 40 MG: 40 TABLET, FILM COATED ORAL at 21:50

## 2023-06-20 RX ADMIN — LIDOCAINE 2 PATCH: 140 PATCH CUTANEOUS at 21:24

## 2023-06-20 RX ADMIN — ROSUVASTATIN CALCIUM 20 MG: 20 TABLET, FILM COATED ORAL at 21:14

## 2023-06-20 RX ADMIN — CARBAMAZEPINE 200 MG: 200 TABLET ORAL at 21:14

## 2023-06-20 RX ADMIN — HYDROMORPHONE HYDROCHLORIDE 1 MG: 1 INJECTION, SOLUTION INTRAMUSCULAR; INTRAVENOUS; SUBCUTANEOUS at 17:47

## 2023-06-20 RX ADMIN — APIXABAN 5 MG: 5 TABLET, FILM COATED ORAL at 21:50

## 2023-06-20 RX ADMIN — ONDANSETRON 4 MG: 2 INJECTION INTRAMUSCULAR; INTRAVENOUS at 17:47

## 2023-06-20 RX ADMIN — GABAPENTIN 600 MG: 300 CAPSULE ORAL at 21:14

## 2023-06-20 RX ADMIN — INSULIN GLARGINE-YFGN 150 UNITS: 100 INJECTION, SOLUTION SUBCUTANEOUS at 23:19

## 2023-06-20 RX ADMIN — MONTELUKAST 10 MG: 10 TABLET, FILM COATED ORAL at 21:14

## 2023-06-20 ASSESSMENT — COGNITIVE AND FUNCTIONAL STATUS - GENERAL
PERSONAL GROOMING: A LITTLE
DAILY ACTIVITIY SCORE: 16
STANDING UP FROM CHAIR USING ARMS: A LITTLE
TURNING FROM BACK TO SIDE WHILE IN FLAT BAD: A LITTLE
CLIMB 3 TO 5 STEPS WITH RAILING: A LOT
WALKING IN HOSPITAL ROOM: A LITTLE
DRESSING REGULAR UPPER BODY CLOTHING: A LITTLE
SUGGESTED CMS G CODE MODIFIER DAILY ACTIVITY: CK
MOVING TO AND FROM BED TO CHAIR: A LITTLE
DRESSING REGULAR LOWER BODY CLOTHING: A LOT
MOBILITY SCORE: 17
SUGGESTED CMS G CODE MODIFIER MOBILITY: CK
HELP NEEDED FOR BATHING: A LOT
MOVING FROM LYING ON BACK TO SITTING ON SIDE OF FLAT BED: A LITTLE
TOILETING: A LOT

## 2023-06-20 ASSESSMENT — LIFESTYLE VARIABLES
TOTAL SCORE: 0
DOES PATIENT WANT TO STOP DRINKING: NO
TOTAL SCORE: 0
HOW MANY TIMES IN THE PAST YEAR HAVE YOU HAD 5 OR MORE DRINKS IN A DAY: 0
ON A TYPICAL DAY WHEN YOU DRINK ALCOHOL HOW MANY DRINKS DO YOU HAVE: 0
EVER HAD A DRINK FIRST THING IN THE MORNING TO STEADY YOUR NERVES TO GET RID OF A HANGOVER: NO
HAVE YOU EVER FELT YOU SHOULD CUT DOWN ON YOUR DRINKING: NO
EVER FELT BAD OR GUILTY ABOUT YOUR DRINKING: NO
CONSUMPTION TOTAL: NEGATIVE
AVERAGE NUMBER OF DAYS PER WEEK YOU HAVE A DRINK CONTAINING ALCOHOL: 0
TOTAL SCORE: 0
ALCOHOL_USE: NO
HAVE PEOPLE ANNOYED YOU BY CRITICIZING YOUR DRINKING: NO

## 2023-06-20 ASSESSMENT — CHA2DS2 SCORE
PRIOR STROKE OR TIA OR THROMBOEMBOLISM: NO
CHF OR LEFT VENTRICULAR DYSFUNCTION: YES
AGE 65 TO 74: YES
AGE 75 OR GREATER: NO
SEX: FEMALE
VASCULAR DISEASE: NO
CHA2DS2 VASC SCORE: 4
HYPERTENSION: NO
DIABETES: YES

## 2023-06-20 ASSESSMENT — FIBROSIS 4 INDEX
FIB4 SCORE: 2.84
FIB4 SCORE: 1.85

## 2023-06-20 ASSESSMENT — PAIN DESCRIPTION - PAIN TYPE
TYPE: ACUTE PAIN
TYPE: ACUTE PAIN

## 2023-06-20 NOTE — ED NOTES
Report received from Heidy ONTIVEROS. Pt settled into [facility]. Refuses taylor. Continues to have to pee as we are unable to get pt up at this time due to pt being so weak she's unable to even sit up herself in bed.

## 2023-06-20 NOTE — ED NOTES
Lab called needing recollect for GRN 27. PIV unable to give adequate blood. Lab messages asking to collect new blue top.

## 2023-06-20 NOTE — ED TRIAGE NOTES
Pt BIBA via [facility]. TBI activated.    Chief Complaint   Patient presents with    GLF     Pt fell backwards after tripping on walker. -LOC, + head trauma, + eliquis     ABCs intact on pt 2LPM baseline. A+Ox4. Skin PWD. - laceration/deformity.    Pt to CT

## 2023-06-21 ENCOUNTER — APPOINTMENT (OUTPATIENT)
Dept: RADIOLOGY | Facility: MEDICAL CENTER | Age: 63
DRG: 551 | End: 2023-06-21
Attending: STUDENT IN AN ORGANIZED HEALTH CARE EDUCATION/TRAINING PROGRAM
Payer: MEDICARE

## 2023-06-21 PROBLEM — Z86.73 HISTORY OF STROKE: Status: ACTIVE | Noted: 2023-06-21

## 2023-06-21 PROBLEM — J96.21 ACUTE ON CHRONIC RESPIRATORY FAILURE WITH HYPOXIA (HCC): Status: ACTIVE | Noted: 2023-05-12

## 2023-06-21 PROBLEM — E66.01 MORBID OBESITY (HCC): Status: ACTIVE | Noted: 2023-06-21

## 2023-06-21 PROBLEM — D69.6 THROMBOCYTOPENIA (HCC): Status: ACTIVE | Noted: 2023-06-21

## 2023-06-21 LAB
BASE EXCESS BLDA CALC-SCNC: 7 MMOL/L (ref -4–3)
BODY TEMPERATURE: 36.4 CENTIGRADE
GLUCOSE BLD STRIP.AUTO-MCNC: 117 MG/DL (ref 65–99)
GLUCOSE BLD STRIP.AUTO-MCNC: 118 MG/DL (ref 65–99)
GLUCOSE BLD STRIP.AUTO-MCNC: 175 MG/DL (ref 65–99)
GLUCOSE BLD STRIP.AUTO-MCNC: 204 MG/DL (ref 65–99)
GLUCOSE BLD STRIP.AUTO-MCNC: 97 MG/DL (ref 65–99)
HCO3 BLDA-SCNC: 33 MMOL/L (ref 17–25)
INHALED O2 FLOW RATE: 2 L/MIN
INHALED O2 FLOW RATE: 2 L/MIN (ref 2–10)
PCO2 BLDA: 54.3 MMHG (ref 26–37)
PCO2 TEMP ADJ BLDA: 52.9 MMHG (ref 26–37)
PH BLDA: 7.4 [PH] (ref 7.4–7.5)
PH TEMP ADJ BLDA: 7.41 [PH] (ref 7.4–7.5)
PO2 BLDA: 37.1 MMHG (ref 64–87)
PO2 TEMP ADJ BLDA: 35.6 MMHG (ref 64–87)
SAO2 % BLDA: 64.9 % (ref 93–99)

## 2023-06-21 PROCEDURE — 700101 HCHG RX REV CODE 250: Performed by: INTERNAL MEDICINE

## 2023-06-21 PROCEDURE — A9270 NON-COVERED ITEM OR SERVICE: HCPCS | Performed by: STUDENT IN AN ORGANIZED HEALTH CARE EDUCATION/TRAINING PROGRAM

## 2023-06-21 PROCEDURE — A9270 NON-COVERED ITEM OR SERVICE: HCPCS | Mod: JZ | Performed by: INTERNAL MEDICINE

## 2023-06-21 PROCEDURE — 700111 HCHG RX REV CODE 636 W/ 250 OVERRIDE (IP): Performed by: INTERNAL MEDICINE

## 2023-06-21 PROCEDURE — 70551 MRI BRAIN STEM W/O DYE: CPT

## 2023-06-21 PROCEDURE — 700102 HCHG RX REV CODE 250 W/ 637 OVERRIDE(OP): Performed by: INTERNAL MEDICINE

## 2023-06-21 PROCEDURE — 700102 HCHG RX REV CODE 250 W/ 637 OVERRIDE(OP): Performed by: STUDENT IN AN ORGANIZED HEALTH CARE EDUCATION/TRAINING PROGRAM

## 2023-06-21 PROCEDURE — 82962 GLUCOSE BLOOD TEST: CPT | Mod: 91

## 2023-06-21 PROCEDURE — 94660 CPAP INITIATION&MGMT: CPT

## 2023-06-21 PROCEDURE — 94640 AIRWAY INHALATION TREATMENT: CPT

## 2023-06-21 PROCEDURE — 770001 HCHG ROOM/CARE - MED/SURG/GYN PRIV*

## 2023-06-21 PROCEDURE — 82803 BLOOD GASES ANY COMBINATION: CPT

## 2023-06-21 PROCEDURE — 99233 SBSQ HOSP IP/OBS HIGH 50: CPT | Performed by: STUDENT IN AN ORGANIZED HEALTH CARE EDUCATION/TRAINING PROGRAM

## 2023-06-21 RX ORDER — INSULIN LISPRO 100 [IU]/ML
40 INJECTION, SOLUTION INTRAVENOUS; SUBCUTANEOUS
Status: DISCONTINUED | OUTPATIENT
Start: 2023-06-21 | End: 2023-06-22 | Stop reason: HOSPADM

## 2023-06-21 RX ORDER — HYDROXYZINE HYDROCHLORIDE 25 MG/1
25 TABLET, FILM COATED ORAL
Status: DISCONTINUED | OUTPATIENT
Start: 2023-06-21 | End: 2023-06-22 | Stop reason: HOSPADM

## 2023-06-21 RX ORDER — MAGNESIUM OXIDE 400 MG/1
400 TABLET ORAL DAILY
Status: DISCONTINUED | OUTPATIENT
Start: 2023-06-21 | End: 2023-06-22 | Stop reason: HOSPADM

## 2023-06-21 RX ORDER — NYSTATIN 100000 [USP'U]/G
POWDER TOPICAL 3 TIMES DAILY
Status: DISCONTINUED | OUTPATIENT
Start: 2023-06-21 | End: 2023-06-22 | Stop reason: HOSPADM

## 2023-06-21 RX ORDER — INSULIN LISPRO 100 [IU]/ML
2-9 INJECTION, SOLUTION INTRAVENOUS; SUBCUTANEOUS
Status: DISCONTINUED | OUTPATIENT
Start: 2023-06-21 | End: 2023-06-22 | Stop reason: HOSPADM

## 2023-06-21 RX ORDER — FLUTICASONE PROPIONATE 44 UG/1
2 AEROSOL, METERED RESPIRATORY (INHALATION)
Status: DISCONTINUED | OUTPATIENT
Start: 2023-06-22 | End: 2023-06-22 | Stop reason: HOSPADM

## 2023-06-21 RX ORDER — VERAPAMIL HYDROCHLORIDE 80 MG/1
80 TABLET ORAL 2 TIMES DAILY
Status: DISCONTINUED | OUTPATIENT
Start: 2023-06-21 | End: 2023-06-22 | Stop reason: HOSPADM

## 2023-06-21 RX ADMIN — INSULIN LISPRO 2 UNITS: 100 INJECTION, SOLUTION INTRAVENOUS; SUBCUTANEOUS at 11:00

## 2023-06-21 RX ADMIN — UMECLIDINIUM BROMIDE AND VILANTEROL TRIFENATATE 1 PUFF: 62.5; 25 POWDER RESPIRATORY (INHALATION) at 07:00

## 2023-06-21 RX ADMIN — INSULIN LISPRO 3 UNITS: 100 INJECTION, SOLUTION INTRAVENOUS; SUBCUTANEOUS at 21:03

## 2023-06-21 RX ADMIN — GABAPENTIN 600 MG: 300 CAPSULE ORAL at 06:34

## 2023-06-21 RX ADMIN — HYDROXYZINE HYDROCHLORIDE 25 MG: 25 TABLET, FILM COATED ORAL at 00:40

## 2023-06-21 RX ADMIN — HYDROMORPHONE HYDROCHLORIDE 1 MG: 1 INJECTION, SOLUTION INTRAMUSCULAR; INTRAVENOUS; SUBCUTANEOUS at 20:52

## 2023-06-21 RX ADMIN — GABAPENTIN 600 MG: 300 CAPSULE ORAL at 13:15

## 2023-06-21 RX ADMIN — APIXABAN 5 MG: 5 TABLET, FILM COATED ORAL at 06:34

## 2023-06-21 RX ADMIN — FUROSEMIDE 40 MG: 40 TABLET ORAL at 11:26

## 2023-06-21 RX ADMIN — CARBAMAZEPINE 200 MG: 200 TABLET ORAL at 06:34

## 2023-06-21 RX ADMIN — CARBAMAZEPINE 200 MG: 200 TABLET ORAL at 20:52

## 2023-06-21 RX ADMIN — INSULIN LISPRO 40 UNITS: 100 INJECTION, SOLUTION INTRAVENOUS; SUBCUTANEOUS at 08:56

## 2023-06-21 RX ADMIN — CARBAMAZEPINE 200 MG: 200 TABLET ORAL at 13:15

## 2023-06-21 RX ADMIN — NYSTATIN: 100000 POWDER TOPICAL at 17:31

## 2023-06-21 RX ADMIN — OMEPRAZOLE 20 MG: 20 CAPSULE, DELAYED RELEASE ORAL at 20:52

## 2023-06-21 RX ADMIN — ROSUVASTATIN CALCIUM 20 MG: 20 TABLET, FILM COATED ORAL at 17:30

## 2023-06-21 RX ADMIN — Medication 400 MG: at 06:34

## 2023-06-21 RX ADMIN — ONDANSETRON 4 MG: 2 INJECTION INTRAMUSCULAR; INTRAVENOUS at 00:39

## 2023-06-21 RX ADMIN — MONTELUKAST 10 MG: 10 TABLET, FILM COATED ORAL at 17:30

## 2023-06-21 RX ADMIN — GABAPENTIN 600 MG: 300 CAPSULE ORAL at 20:52

## 2023-06-21 RX ADMIN — OXYCODONE HYDROCHLORIDE 10 MG: 10 TABLET ORAL at 06:34

## 2023-06-21 RX ADMIN — FEBUXOSTAT 40 MG: 40 TABLET, FILM COATED ORAL at 21:07

## 2023-06-21 RX ADMIN — LIDOCAINE 2 PATCH: 140 PATCH CUTANEOUS at 19:36

## 2023-06-21 RX ADMIN — LORAZEPAM 0.5 MG: 2 INJECTION INTRAMUSCULAR; INTRAVENOUS at 21:38

## 2023-06-21 RX ADMIN — VERAPAMIL HYDROCHLORIDE 80 MG: 80 TABLET ORAL at 06:33

## 2023-06-21 RX ADMIN — APIXABAN 5 MG: 5 TABLET, FILM COATED ORAL at 17:30

## 2023-06-21 RX ADMIN — OXYCODONE HYDROCHLORIDE 10 MG: 10 TABLET ORAL at 03:27

## 2023-06-21 RX ADMIN — HYDROMORPHONE HYDROCHLORIDE 1 MG: 1 INJECTION, SOLUTION INTRAMUSCULAR; INTRAVENOUS; SUBCUTANEOUS at 23:46

## 2023-06-21 RX ADMIN — HYDROXYZINE HYDROCHLORIDE 25 MG: 25 TABLET, FILM COATED ORAL at 21:07

## 2023-06-21 RX ADMIN — INSULIN LISPRO 40 UNITS: 100 INJECTION, SOLUTION INTRAVENOUS; SUBCUTANEOUS at 11:25

## 2023-06-21 RX ADMIN — LEVOTHYROXINE SODIUM 112 MCG: 0.11 TABLET ORAL at 06:34

## 2023-06-21 RX ADMIN — POTASSIUM CHLORIDE 40 MEQ: 1500 TABLET, EXTENDED RELEASE ORAL at 06:34

## 2023-06-21 ASSESSMENT — ENCOUNTER SYMPTOMS
NERVOUS/ANXIOUS: 0
HEARTBURN: 0
FLANK PAIN: 0
HEADACHES: 0
POLYDIPSIA: 0
BLURRED VISION: 0
CHILLS: 0
FEVER: 0
BACK PAIN: 1
VOMITING: 0
FALLS: 1
FOCAL WEAKNESS: 0
HEMOPTYSIS: 0
PALPITATIONS: 0
PHOTOPHOBIA: 0
SPEECH CHANGE: 0
ORTHOPNEA: 0
HALLUCINATIONS: 0
COUGH: 0
TREMORS: 0
NAUSEA: 0
WEIGHT LOSS: 0
SPUTUM PRODUCTION: 0
DOUBLE VISION: 0
BRUISES/BLEEDS EASILY: 0
NECK PAIN: 0

## 2023-06-21 ASSESSMENT — PATIENT HEALTH QUESTIONNAIRE - PHQ9
1. LITTLE INTEREST OR PLEASURE IN DOING THINGS: NOT AT ALL
1. LITTLE INTEREST OR PLEASURE IN DOING THINGS: NOT AT ALL
SUM OF ALL RESPONSES TO PHQ9 QUESTIONS 1 AND 2: 0
2. FEELING DOWN, DEPRESSED, IRRITABLE, OR HOPELESS: NOT AT ALL
SUM OF ALL RESPONSES TO PHQ9 QUESTIONS 1 AND 2: 0
2. FEELING DOWN, DEPRESSED, IRRITABLE, OR HOPELESS: NOT AT ALL

## 2023-06-21 ASSESSMENT — LIFESTYLE VARIABLES: SUBSTANCE_ABUSE: 0

## 2023-06-21 ASSESSMENT — FIBROSIS 4 INDEX: FIB4 SCORE: 2.84

## 2023-06-21 ASSESSMENT — PAIN DESCRIPTION - PAIN TYPE: TYPE: ACUTE PAIN

## 2023-06-21 NOTE — ED NOTES
Med rec updated and complete. Allergies reviewed.  Confirmed name and date of birth. Interviewed pt/family at bedside. No antibiotic use in last 30 days.  Pt injects 160 units of   degludec 200 mg/ml every afternoon.    Pt has a PRN HUMALOG as needed.      Home pharmacy    [facility]   [phone]

## 2023-06-21 NOTE — PROGRESS NOTES
-Received report from nightshift RN  -Assumed patients care.  -Assessment performed and done  -VSS  -Patient is alert and oriented x 4  -In no apparent distress  -Denies chest pain. SOB and N/V  -Reports 3/10 pain generalized  -Telemetry box on. Rate and rhythm verified.   -Patient and visitors educated on the use of call light, communicating with the staff on patient needs and concerns.   -Provided time to answer pt questions and address concerns.    -Proper hand hygiene before and after patient care to ensure patient will remain free from infection.     -Patient educated on POC for the day, upcoming tests, and medication information.   -Patient educated on safety precautions and safety equipment. Bed in low position, call light within reach, and hourly rounding conducted.    -Will continue to answer questions and educate patient and visitors as necessary  -No further needs at this time, will continue to monitor.

## 2023-06-21 NOTE — ED NOTES
Humidifier placed onto oxygen source. Pt position in bed adjusted. Given glass of water. No other needs.

## 2023-06-21 NOTE — PROGRESS NOTES
4 Eyes Skin Assessment Completed by RAMA flores and AMY walls.    Head WDL  Ears WDL  Nose WDL  Mouth WDL  Neck WDL  Breast/Chest Redness R breast  Shoulder Blades Bruising R shoulder  Spine WDL  (R) Arm/Elbow/Hand WDL  (L) Arm/Elbow/Hand Scar  Abdomen Scar  Groin Redness and Excoriation  Scrotum/Coccyx/Buttocks Redness and Blanching  (R) Leg Scar, Scab, and Edema  (L) Leg Scar and Edema  (R) Heel/Foot/Toe Redness and Blanching  (L) Heel/Foot/Toe Redness and Non-Blanching          Devices In Places Tele Box, Blood Pressure Cuff, Pulse Ox, and Nasal Cannula      Interventions In Place NC W/Ear Foams, Heel Float Boots, and Pillows    Possible Skin Injury Yes    Pictures Uploaded Into Epic Yes  Wound Consult Placed Yes  RN Wound Prevention Protocol Ordered Yes

## 2023-06-21 NOTE — ED NOTES
Bedside report received from RAMA Del Cid. Pt AAOx4, GCS 15, breathing even and unlabored on 2 L oxygen via nasal canula. Pt hooked up with cardiac monitor.

## 2023-06-21 NOTE — ASSESSMENT & PLAN NOTE
Placed on 3 L in the ER  On the chest x-ray there is cardiomegaly and mild interstitial edema  Continue home dose of Lasix.  Blood pressure is soft, will not escalate at this time  Wean off oxygen as tolerated

## 2023-06-21 NOTE — THERAPY
Physical Therapy Contact Note    Patient Name: Jenifer Ramos  Age:  63 y.o., Sex:  female  Medical Record #: [id number]  Today's Date: 6/21/2023    Discussed missed therapy with RN       06/21/23 1101   Interdisciplinary Plan of Care Collaboration   IDT Collaboration with  Nursing   Collaboration Comments Hold, awaiting neurosx consult. Will complete evaluation when plan is clear and pt is appropriate for OOB.

## 2023-06-21 NOTE — ASSESSMENT & PLAN NOTE
Presented with worsening of chronic lower back pain and CT of L-spine suggestive for possible acute fracture  Ordered MRI to evaluate for candidacy for kyphoplasty  Spinal surgery Dr. Bravo will see the patient.  Pain control  Fall precautions  PT OT evaluation

## 2023-06-21 NOTE — H&P
"Hospital Medicine History & Physical Note    Date of Service  6/20/2023    Primary Care Physician  Adrián Duckworth M.D.    Consultants  ERP consulted with Dr. Bravo/spinal surgery    Code Status  Full code    Chief Complaint  Chief Complaint   Patient presents with    GLF     Pt fell backwards after tripping on walker. -LOC, + head trauma, + eliquis       History of Presenting Illness  Jenifer Ramos is a 63 y.o. female with past medical history of severe obesity, paroxysmal A-fib status post 2 ablations, chronic anticoagulation with Eliquis, factor V Leiden, diastolic heart failure, asthma, hypothyroidism pulmonary hypertension, thyroid disorder, CVA 1 year ago, sleep apnea, diabetes on insulin, osteoarthritis, debility, peripheral neuropathy, who presented 6/20/2023 with complaints of acute on chronic lower back pain worsening with movement up to 10 out of 10 after she felt dizzy, tripped and fell earlier today.  She had some head trauma, but denies loss of consciousness.  She states after the stroke she has difficulties with coordination and has to have eyes open every time she is moving with her walker.  Last week or so she has been having difficulties functioning, and her arms and legs were \"doing funny things\", demonstrating flapping movements with her hands.  Her  is a retired anesthesiologist, and her DPOA, presented at bedside.  He noted that she is less mobile than usual in the last 4 days.  At this time she was placed on 2 L nasal cannula.  According to her she is using oxygen only at night with CPAP.  In blood work there is anemia with hemoglobin 10.4, which is about baseline.  Platelets went down to 138.  Mild hyponatremia 132, blood sugar 241.  X-ray of right tibia and fibula are negative for fracture.  Left shoulder x-ray negative.  Chest x-ray showed cardiomegaly with mild interstitial edema  CT of L-spine showed T12 L4 deformity, recommended MRI of lumbar spine.  Apparently patient " already had kyphoplasty at L1 and L2 levels.  Status post posterior fusion and laminectomy L4 S1.  CT head showed age indeterminant right occipital lobe infarct, likely subacute.  No acute hemorrhage.    I discussed the plan of care with patient, family, and ERP .    Review of Systems  Review of Systems   Constitutional:  Positive for malaise/fatigue. Negative for chills, fever and weight loss.   HENT:  Negative for ear pain, hearing loss and tinnitus.    Eyes:  Negative for blurred vision, double vision and photophobia.   Respiratory:  Negative for cough, hemoptysis and sputum production.    Cardiovascular:  Negative for chest pain, palpitations and orthopnea.   Gastrointestinal:  Negative for heartburn, nausea and vomiting.   Genitourinary:  Negative for dysuria, flank pain, frequency and hematuria.   Musculoskeletal:  Positive for back pain, falls and joint pain. Negative for neck pain.   Skin:  Negative for itching and rash.   Neurological:  Negative for tremors, speech change, focal weakness and headaches.   Endo/Heme/Allergies:  Negative for environmental allergies and polydipsia. Does not bruise/bleed easily.   Psychiatric/Behavioral:  Negative for hallucinations and substance abuse. The patient is not nervous/anxious.        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    Surgical History   has a past surgical history that includes hysterectomy, total abdominal (2003); oophorectomy; tonsillectomy; cholecystectomy; fusion, spine, lumbar, plif; other cardiac surgery; other orthopedic surgery (2018); knee arthroplasty total (Left, 2013); knee arthroplasty  total (Right, 2013); pr reconstr total shoulder implant (Left, 11/27/2019); and clavicle distal excision (11/27/2019).     Family History  family history includes Heart Disease (age of onset: 75) in her mother; Hypertension in her sister; Lung Disease (age of onset: 69) in her father.   Family history reviewed with patient. There is no family history that is pertinent to the chief complaint.     Social History   reports that she has never smoked. She has never used smokeless tobacco. She reports that she does not currently use alcohol. She reports that she does not use drugs.    Allergies  Allergies   Allergen Reactions    Azithromycin Anaphylaxis    Erythromycin Anaphylaxis    Other Misc Anaphylaxis     Flu vaccine    Penicillins Anaphylaxis     As a child    Pistachio Anaphylaxis    Sulfa Drugs Anaphylaxis    Tetraglycine Anaphylaxis    Other Drug Unspecified     Long acting benzodiazepines only single dose otherwise combative     Physostigmine Unspecified     Abdomen extreme swelling    Tape Rash and Itching     tegaderm ok  Blisters with others    Zanaflex [Tizanidine Hcl] Unspecified     Falling asleep mid sentence    Albuterol Palpitations    Atorvastatin Unspecified     Extreme joint pain    Chlorhexidine Rash     blistering    Lamisil [Terbinafine Hcl] Unspecified     Pancreatitis     Morphine Unspecified     Not effective       Medications  Prior to Admission Medications   Prescriptions Last Dose Informant Patient Reported? Taking?   Insulin Degludec 200 UNIT/ML Solution Pen-injector  Patient, Significant Other Yes No   Sig: Inject 200 Units under the skin every day.   Rimegepant Sulfate (NURTEC) 75 MG TABLET DISPERSIBLE  Patient, Significant Other Yes No   Sig: Take 75 mg by mouth 1 time a day as needed.   apixaban (ELIQUIS) 5mg Tab  Patient, Significant Other No No   Sig: Take 1 Tab by mouth 2 Times a Day.   aspirin EC (ECOTRIN) 81 MG Tablet Delayed Response  Patient, Significant Other Yes No   Sig: Take  81 mg by mouth every 48 hours as needed.   carBAMazepine (TEGRETOL) 200 MG Tab  Patient, Significant Other Yes No   Sig: Take 200 mg by mouth 3 times a day.   clindamycin (CLEOCIN) 300 MG Cap  Patient, Significant Other Yes No   Sig: Take 600 mg by mouth one time. Prior to dental appointment   febuxostat (ULORIC) 40 MG Tab  Patient, Significant Other Yes No   Sig: Take 40 mg by mouth at bedtime.   fluticasone-umeclidin-vilant (TRELEGY ELLIPTA) 100-62.5-25 MCG/ACT AEROSOL POWDER, BREATH ACTIVATED inhalation  Patient, Significant Other Yes No   Sig: Inhale 1 Inhalation. every day.   furosemide (LASIX) 20 MG Tab  Patient, Significant Other Yes No   Sig: Take 40-60 mg by mouth every day. 40 mg daily, may take additional 20 mg in the evening prn.   gabapentin (NEURONTIN) 300 MG Cap  Patient, Significant Other Yes No   Sig: Take 600 mg by mouth 2 times a day. 600 mg = 2 capsules, may take additional 2 capsules in the afternoon as needed.   insulin lispro (HUMALOG) 100 UNIT/ML  Patient, Significant Other Yes No   Sig: Inject 40-80 Units under the skin see administration instructions. Four to six times daily prn per scale:  150 - 200 = 40 units  200 - 300 = 60 units  Over 400 = 80 units   levalbuterol (XOPENEX HFA) 45 MCG/ACT inhaler  Patient, Significant Other Yes No   Sig: Inhale 2 Puffs 4 times a day as needed for Shortness of Breath.   levothyroxine (SYNTHROID) 112 MCG Tab  Patient, Significant Other Yes No   Sig: Take 112 mcg by mouth every day.   montelukast (SINGULAIR) 10 MG TABS  Patient, Significant Other Yes No   Sig: Take 10 mg by mouth every evening.   oxyCODONE immediate-release (ROXICODONE) 5 MG Tab  Patient, Significant Other Yes No   Sig: Take 5 mg by mouth every four hours as needed for Severe Pain.   pantoprazole (PROTONIX) 40 MG Tablet Delayed Response  Patient, Significant Other Yes No   Sig: Take 40 mg by mouth every evening.   potassium chloride SA (K-DUR) 10 MEQ Tab CR  Patient, Significant Other Yes  No   Sig: Take 40-60 mEq by mouth every day. 40 mg daily, may take additional 20 mg in the evening prn. Taken with Lasix   promethazine (PHENERGAN) 25 MG Tab  Patient, Significant Other Yes No   Sig: Take 25 mg by mouth every 12 hours.   rosuvastatin (CRESTOR) 10 MG Tab  Patient, Significant Other Yes No   Sig: Take 10 mg by mouth every evening.      Facility-Administered Medications: None       Physical Exam  Temp:  [36.7 °C (98 °F)] 36.7 °C (98 °F)  Pulse:  [75-83] 83  Resp:  [18] 18  BP: (104-117)/(53-61) 110/57  SpO2:  [91 %-95 %] 91 %  Blood Pressure : 110/57   Temperature: 36.7 °C (98 °F)   Pulse: 83   Respiration: 18   Pulse Oximetry: 91 %       Physical Exam  Vitals and nursing note reviewed.   Constitutional:       General: She is not in acute distress.     Appearance: She is obese. She is ill-appearing.   HENT:      Head: Normocephalic.      Nose: Nose normal.      Mouth/Throat:      Mouth: Mucous membranes are moist.   Eyes:      Extraocular Movements: Extraocular movements intact.      Pupils: Pupils are equal, round, and reactive to light.   Cardiovascular:      Rate and Rhythm: Normal rate and regular rhythm.      Heart sounds: Heart sounds are distant.   Pulmonary:      Effort: Pulmonary effort is normal.      Breath sounds: Decreased breath sounds present.   Abdominal:      General: Abdomen is flat. There is no distension.      Tenderness: There is no abdominal tenderness.   Musculoskeletal:         General: No swelling or deformity. Normal range of motion.      Cervical back: Normal range of motion and neck supple.      Right lower leg: Edema present.      Left lower leg: Edema present.   Skin:     General: Skin is warm and dry.   Neurological:      General: No focal deficit present.      Mental Status: She is alert and oriented to person, place, and time.      Comments: Generalized weakness   Psychiatric:         Mood and Affect: Mood normal.         Behavior: Behavior normal.          Laboratory:  Recent Labs     06/20/23  1502   WBC 3.9*   RBC 4.73   HEMOGLOBIN 10.4*   HEMATOCRIT 35.0*   MCV 74.0*   MCH 22.0*   MCHC 29.7*   RDW 52.9*   PLATELETCT 138*   MPV 9.9     Recent Labs     06/20/23  1502   SODIUM 132*   POTASSIUM 3.6   CHLORIDE 93*   CO2 30   GLUCOSE 241*   BUN 8   CREATININE 0.68   CALCIUM 9.0     Recent Labs     06/20/23  1502   ALTSGPT 11   ASTSGOT 20   ALKPHOSPHAT 176*   TBILIRUBIN 0.4   GLUCOSE 241*     Recent Labs     06/20/23  1619   APTT 28.4   INR 1.34*     No results for input(s): NTPROBNP in the last 72 hours.      No results for input(s): TROPONINT in the last 72 hours.    Imaging:  DX-TIBIA AND FIBULA RIGHT   Final Result      Negative for right tibial or fibular fracture      DX-SHOULDER 2+ LEFT   Final Result      1.  No radiographic evidence of acute traumatic injury.   2.  Reverse shoulder arthroplasty without evidence of hardware complication.   3.  Osteopenia.      DX-CHEST-PORTABLE (1 VIEW)   Final Result      1.  Cardiomegaly with mild interstitial edema.      CT-LSPINE W/O PLUS RECONS   Final Result      1.  Age indeterminate superior endplate compression deformities of T12-L4. If there is strong clinical suspicion for an acute fracture, MRI of the lumbar spine is recommended for further evaluation.   2.  Postsurgical changes consistent with percutaneous vertebral augmentation at L1 and L2.   3.  Status post posterior fusion and laminectomy from L4 to S1.   4.  Multilevel degenerative changes of the lumbar spine.      CT-HEAD W/O   Final Result      1.  Small, age indeterminate right occipital lobe infarct, likely subacute.   2.  No acute intracranial hemorrhage.   3.  White matter disease likely representing microvascular ischemic change.             X-Ray:  I have personally reviewed the images and compared with prior images.    Assessment/Plan:  Justification for Admission Status  I anticipate this patient will require at least two midnights for  appropriate medical management, necessitating inpatient admission because L-spine fractures, rule out stroke    Patient will need a Med/Surg bed on EMERGENCY service .  The need is secondary to spine fractures.    * Lumbar compression fracture, closed, initial encounter ([facility])- (present on admission)  Assessment & Plan  Presented with worsening of chronic lower back pain and CT of L-spine suggestive for possible acute fracture  Ordered MRI to evaluate for candidacy for kyphoplasty  Spinal surgery Dr. Bravo will see the patient.  Pain control  Fall precautions  PT OT evaluation    History of stroke  Assessment & Plan  Patient and  reports worsening of ability to walk in the last week, without definite gross motor deficit today.  CT head without contrast: Small age-indeterminate right occipital lobe infarct, likely subacute  Plan: Evaluated with MRI of the brain for acute stroke   continue anticoagulation with apixaban, statin    Thrombocytopenia ([facility])  Assessment & Plan  Please count 138.  History of cirrhosis noted  Monitor    Morbid obesity ([facility])  Assessment & Plan  BMI 43  Increased morbidity and nursing care    Acute on chronic respiratory failure with hypoxia ([facility])  Assessment & Plan  Placed on 3 L in the ER  On the chest x-ray there is cardiomegaly and mild interstitial edema  Continue home dose of Lasix.  Blood pressure is soft, will not escalate at this time  Wean off oxygen as tolerated    Hyponatremia- (present on admission)  Assessment & Plan  Mild 132  Monitor    GERD (gastroesophageal reflux disease)- (present on admission)  Assessment & Plan  Continue PPI    Hypothyroidism- (present on admission)  Assessment & Plan  Continue levothyroxine    Atrial fibrillation ([facility])- (present on admission)  Assessment & Plan  Status post ablations  Continue apixaban and verapamil    DM (diabetes mellitus) ([facility])- (present on admission)  Assessment & Plan  Continue glargine 150 units and customized insulin sliding  scale  Hypoglycemia protocol    Factor V deficiency ([facility])- (present on admission)  Assessment & Plan  Continue Eliquis for now    Sleep apnea- (present on admission)  Assessment & Plan  Continue CPAP with oxygen        VTE prophylaxis: therapeutic anticoagulation with Eliquis

## 2023-06-21 NOTE — ED NOTES
Pt up to restroom with two person assist. Pants and underwear soiled. Pt supplied with new gown and new linens. Large difficulty in mobility and ability to move independently with walker.

## 2023-06-21 NOTE — ASSESSMENT & PLAN NOTE
Patient and  reports worsening of ability to walk in the last week, without definite gross motor deficit today.  CT head without contrast: Small age-indeterminate right occipital lobe infarct, likely subacute  Plan: Evaluated with MRI of the brain for acute stroke   continue anticoagulation with apixaban, statin

## 2023-06-21 NOTE — ED PROVIDER NOTES
ED Provider Note    CHIEF COMPLAINT  Chief Complaint   Patient presents with    GLF     Pt fell backwards after tripping on walker. -LOC, + head trauma, + eliquis       EXTERNAL RECORDS REVIEWED  Reviewed outpatient clinic visits, previous laboratory studies medication list hospitalization records    HPI/ROS  LIMITATION TO HISTORY   None  OUTSIDE HISTORIAN(S):  EMS provided additional history    Jenifer Ramos is a 63 y.o. female who presents for evaluation of apparent syncope versus more likely mechanical slip and fall.  The patient is a complex medical history including underlying atrial fibrillation she is on anticoagulant therapy.  She fell backwards and struck the back of her head and low back.  She does take Eliquis twice daily.She was triaged as a code TBI due to age and anticoagulant use.  Denies loss of consciousness.  No neck pain or focal numbness weakness tingling to the arms legs or face but she has headaches and severe low back pain.  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    SURGICAL HISTORY   has a past surgical history that includes hysterectomy, total abdominal (2003); oophorectomy; tonsillectomy; cholecystectomy; fusion, spine, lumbar, plif; other cardiac surgery; other orthopedic surgery (2018); knee arthroplasty total (Left, 2013); knee arthroplasty total (Right, 2013); reconstr total shoulder implant (Left, 11/27/2019); and clavicle distal excision (11/27/2019).    FAMILY HISTORY  Family History   Problem Relation Age of Onset    Heart  Disease Mother 75        atrial fibrillation    Lung Disease Father 69        astma, DM, lukemia    Hypertension Sister        SOCIAL HISTORY  Social History     Tobacco Use    Smoking status: Never    Smokeless tobacco: Never   Substance and Sexual Activity    Alcohol use: Not Currently    Drug use: No    Sexual activity: Not on file       CURRENT MEDICATIONS  Home Medications       Reviewed by Gemma Krause R.N. (Registered Nurse) on 06/20/23 at 1417  Med List Status: Partial     Medication Last Dose Status   apixaban (ELIQUIS) 5mg Tab  Active   aspirin EC (ECOTRIN) 81 MG Tablet Delayed Response  Active   carBAMazepine (TEGRETOL) 200 MG Tab  Active   clindamycin (CLEOCIN) 300 MG Cap  Active   febuxostat (ULORIC) 40 MG Tab  Active   fluticasone-umeclidin-vilant (TRELEGY ELLIPTA) 100-62.5-25 MCG/ACT AEROSOL POWDER, BREATH ACTIVATED inhalation  Active   furosemide (LASIX) 20 MG Tab  Active   gabapentin (NEURONTIN) 300 MG Cap  Active   Insulin Degludec 200 UNIT/ML Solution Pen-injector  Active   insulin lispro (HUMALOG) 100 UNIT/ML  Active   levalbuterol (XOPENEX HFA) 45 MCG/ACT inhaler  Active   levothyroxine (SYNTHROID) 112 MCG Tab  Active   montelukast (SINGULAIR) 10 MG TABS  Active   oxyCODONE immediate-release (ROXICODONE) 5 MG Tab  Active   pantoprazole (PROTONIX) 40 MG Tablet Delayed Response  Active   potassium chloride SA (K-DUR) 10 MEQ Tab CR  Active   promethazine (PHENERGAN) 25 MG Tab  Active   Rimegepant Sulfate (NURTEC) 75 MG TABLET DISPERSIBLE  Active   rosuvastatin (CRESTOR) 10 MG Tab  Active                    ALLERGIES  Allergies   Allergen Reactions    Azithromycin Anaphylaxis    Erythromycin Anaphylaxis    Other Misc Anaphylaxis     Flu vaccine    Penicillins Anaphylaxis     As a child    Pistachio Anaphylaxis    Sulfa Drugs Anaphylaxis    Tetraglycine Anaphylaxis    Other Drug Unspecified     Long acting benzodiazepines only single dose otherwise combative     Physostigmine Unspecified      "Abdomen extreme swelling    Tape Rash and Itching     tegaderm ok  Blisters with others    Zanaflex [Tizanidine Hcl] Unspecified     Falling asleep mid sentence    Albuterol Palpitations    Atorvastatin Unspecified     Extreme joint pain    Chlorhexidine Rash     blistering    Lamisil [Terbinafine Hcl] Unspecified     Pancreatitis     Morphine Unspecified     Not effective       PHYSICAL EXAM  VITAL SIGNS: /53   Pulse 78   Resp 18   Ht 1.727 m (5' 8\")   Wt (!) 128 kg (282 lb)   SpO2 94%   BMI 42.88 kg/m²    Pulse ox interpretation: I interpret this pulse ox as normal.  Constitutional: Alert and oriented x 3, moderate distress  HEENT: Atraumatic normocephalic, pupils are equal round reactive to light extraocular movements are intact. The nares is clear, external ears are normal, mouth shows moist mucous membranes normal dentition for age  Neck: Supple, no JVD no tracheal deviation no midline bony tenderness  Cardiovascular: Regular rate and rhythm no murmur rub or gallop 2+ pulses peripherally x4  Thorax & Lungs: No respiratory distress, no wheezes rales or rhonchi, No chest tenderness.   GI: Soft nontender nondistended positive bowel sounds, no peritoneal signs  Back: No midline thoracic bony tenderness point tenderness at L1-3 and 4 without step-off  Skin: Warm dry no acute rash or lesion  Musculoskeletal: Moving all extremities with full range and 5 of 5 strength no acute  deformity mild tenderness noted over the mid anterior right tibia without gross deformity compartments are soft  Neurologic: Cranial nerves III through XII are grossly intact no sensory deficit no cerebellar dysfunction ECS 15 and a stroke scale score of 0 no groin sensation changes no bowel or bladder incontinence  Psychiatric: Anxious        DIAGNOSTIC STUDIES / PROCEDURES      LABS  Results for orders placed or performed during the hospital encounter of 06/20/23   CBC WITH DIFFERENTIAL   Result Value Ref Range    WBC 3.9 (L) 4.8 - " 10.8 K/uL    RBC 4.73 4.20 - 5.40 M/uL    Hemoglobin 10.4 (L) 12.0 - 16.0 g/dL    Hematocrit 35.0 (L) 37.0 - 47.0 %    MCV 74.0 (L) 81.4 - 97.8 fL    MCH 22.0 (L) 27.0 - 33.0 pg    MCHC 29.7 (L) 32.2 - 35.5 g/dL    RDW 52.9 (H) 35.9 - 50.0 fL    Platelet Count 138 (L) 164 - 446 K/uL    MPV 9.9 9.0 - 12.9 fL    Neutrophils-Polys 73.00 (H) 44.00 - 72.00 %    Lymphocytes 21.00 (L) 22.00 - 41.00 %    Monocytes 6.00 0.00 - 13.40 %    Eosinophils 0.00 0.00 - 6.90 %    Basophils 0.00 0.00 - 1.80 %    Nucleated RBC 0.00 0.00 - 0.20 /100 WBC    Neutrophils (Absolute) 2.85 1.82 - 7.42 K/uL    Lymphs (Absolute) 0.82 (L) 1.00 - 4.80 K/uL    Monos (Absolute) 0.24 0.00 - 0.85 K/uL    NRBC (Absolute) 0.00 K/uL    Hypochromia 2+ (A)     Anisocytosis 1+     Microcytosis 2+ (A)    Prothrombin Time   Result Value Ref Range    PT 16.4 (H) 12.0 - 14.6 sec    INR 1.34 (H) 0.87 - 1.13   APTT   Result Value Ref Range    APTT 28.4 24.7 - 36.0 sec   Comp Metabolic Panel   Result Value Ref Range    Sodium 132 (L) 135 - 145 mmol/L    Potassium 3.6 3.6 - 5.5 mmol/L    Chloride 93 (L) 96 - 112 mmol/L    Co2 30 20 - 33 mmol/L    Anion Gap 9.0 7.0 - 16.0    Glucose 241 (H) 65 - 99 mg/dL    Bun 8 8 - 22 mg/dL    Creatinine 0.68 0.50 - 1.40 mg/dL    Calcium 9.0 8.5 - 10.5 mg/dL    AST(SGOT) 20 12 - 45 U/L    ALT(SGPT) 11 2 - 50 U/L    Alkaline Phosphatase 176 (H) 30 - 99 U/L    Total Bilirubin 0.4 0.1 - 1.5 mg/dL    Albumin 3.7 3.2 - 4.9 g/dL    Total Protein 6.5 6.0 - 8.2 g/dL    Globulin 2.8 1.9 - 3.5 g/dL    A-G Ratio 1.3 g/dL   MORPHOLOGY   Result Value Ref Range    RBC Morphology Present     Polychromia 1+     Poikilocytosis 1+     Ovalocytes 1+     Stomatocytes 1+    PERIPHERAL SMEAR REVIEW   Result Value Ref Range    Peripheral Smear Review see below    DIFFERENTIAL MANUAL   Result Value Ref Range    Manual Diff Status PERFORMED    PLATELET ESTIMATE   Result Value Ref Range    Plt Estimation Decreased    ESTIMATED GFR   Result Value Ref Range     GFR (CKD-EPI) 96 >60 mL/min/1.73 m 2   CORRECTED CALCIUM   Result Value Ref Range    Correct Calcium 9.2 8.5 - 10.5 mg/dL   EKG   Result Value Ref Range    Report       [facility] Emergency Dept.    Test Date:  2023  Pt Name:    LEONEL LINDER               Department: ER  MRN:        [id number]                      Room:        14 H  Gender:     Female                       Technician: [id number]  :        [id number]                   Requested By:ESTHER RUFFIN  Order #:    [id number]                    Reading MD:    Measurements  Intervals                                Axis  Rate:       76                           P:          30  AK:         212                          QRS:        175  QRSD:       168                          T:          -13  QT:         414  QTc:        466    Interpretive Statements  Sinus rhythm  Borderline prolonged AK interval  Nonspecific intraventricular conduction delay  Minimal ST depression, inferior leads  Baseline wander in lead(s) V1  Compared to ECG 2023 21:10:29  Intraventricular conduction delay now present  ST (T wave) deviation now present  Atrial flutter no longer present  2:1 AV b lock no longer present  Right bundle-branch block no longer present        EKG twelve-lead interpretation by me rate 76 sinus rhythm nonspecific intraventricular conduction delay no acute ST segment elevation or ischemic morphology.  No evidence of heart block intervals otherwise normal    RADIOLOGY  I have independently interpreted the diagnostic imaging associated with this visit and am waiting the final reading from the radiologist.   My preliminary interpretation is as follows: CT scan of the head with no acute process radiograph of the left shoulder without fracture or dislocation likely acute lumbar compression fractures  Radiologist interpretation:   DX-TIBIA AND FIBULA RIGHT   Final Result      Negative for right tibial or fibular fracture      DX-SHOULDER  2+ LEFT   Final Result      1.  No radiographic evidence of acute traumatic injury.   2.  Reverse shoulder arthroplasty without evidence of hardware complication.   3.  Osteopenia.      DX-CHEST-PORTABLE (1 VIEW)   Final Result      1.  Cardiomegaly with mild interstitial edema.      CT-LSPINE W/O PLUS RECONS   Final Result      1.  Age indeterminate superior endplate compression deformities of T12-L4. If there is strong clinical suspicion for an acute fracture, MRI of the lumbar spine is recommended for further evaluation.   2.  Postsurgical changes consistent with percutaneous vertebral augmentation at L1 and L2.   3.  Status post posterior fusion and laminectomy from L4 to S1.   4.  Multilevel degenerative changes of the lumbar spine.      CT-HEAD W/O   Final Result      1.  Small, age indeterminate right occipital lobe infarct, likely subacute.   2.  No acute intracranial hemorrhage.   3.  White matter disease likely representing microvascular ischemic change.             COURSE & MEDICAL DECISION MAKING    ED Observation Status? No; Patient does not meet criteria for ED Observation.     INITIAL ASSESSMENT, COURSE AND PLAN  Care Narrative:     This is a very pleasant 63-year-old female with extensive comorbidities and debility who presents with syncope versus more likely mechanical slip and fall with head and back injury.  She was triaged as a code TBI based upon our criteria.  I immediately evaluated the patient.  She was not seizing and had normal sensorium.  She had headache and low back pain with no bowel or bladder incontinence or motor or sensory complaints or deficits to the upper or lower extremities.  She went down for definitive imaging including CT scan of the head which did not demonstrate any acute process however lumbosacral CT does demonstrate several compression fractures of indeterminate age.  She does have exquisite tenderness there which appears to be new and I suspect these are new.  She is  neurologically intact.  Radiology has recommended MRI.  Given her comorbidities we attempted to ambulate the patient but she had too much pain and debility to be able to move.  I will recommend hospitalization with a hospital surgery for pain control and they can get an MRI of her lumbosacral spine in the morning.      ADDITIONAL PROBLEM LIST    DISPOSITION AND DISCUSSIONS  I have discussed management of the patient with the following physicians and DIANA's: Discussed case with admitting hospitalist well as spine surgeon Dr. Bravo    Discussion of management with other [facility] or appropriate source(s): None    Escalation of care considered, and ultimately not performed: We will get MRI of the lumbosacral spine tomorrow    Barriers to care at this time, including but not limited to: None    Decision tools and prescription drugs considered including, but not limited to: None    FINAL DIAGNOSIS  Closed head injury on anticoagulant therapy  Acute lumbar compression fractures, multiple levels  Severe debility  Right leg contusion       Electronically signed by: Miguel Burrell M.D., 6/20/2023 5:40 PM

## 2023-06-21 NOTE — THERAPY
Occupational Therapy Contact Note    Patient Name: Jenifer Ramos  Age:  63 y.o., Sex:  female  Medical Record #: [id number]  Today's Date: 6/21/2023    OT consult rec'd. Pt pending NSGY consult. Will hold OT eval and will re-attempt as appropriate/able.

## 2023-06-21 NOTE — DIETARY
NUTRITION SERVICES: BMI - Pt with BMI >40 (=Body mass index is 43.34 kg/m².), Class III obesity. Weight is via stand up scale and pt is -0.1 L fluids per I/O. Weight loss counseling not appropriate in acute care setting. RECOMMEND - If appropriate at DC please refer to outpatient nutrition services for weight management.

## 2023-06-21 NOTE — ED NOTES
Bedside report given to Jessica ONTIVEROS. Pt continues to rest in [facility] on 2L nc.

## 2023-06-21 NOTE — ED NOTES
Pt requesting gown. Done.   Pt requesting oxygen placed onto humidifier- Message sent to RT.   Xray at bedside.

## 2023-06-22 ENCOUNTER — HOSPITAL ENCOUNTER (INPATIENT)
Dept: RADIOLOGY | Facility: MEDICAL CENTER | Age: 63
DRG: 551 | End: 2023-06-22
Attending: STUDENT IN AN ORGANIZED HEALTH CARE EDUCATION/TRAINING PROGRAM | Admitting: INTERNAL MEDICINE
Payer: MEDICARE

## 2023-06-22 ENCOUNTER — APPOINTMENT (OUTPATIENT)
Dept: RADIOLOGY | Facility: MEDICAL CENTER | Age: 63
DRG: 551 | End: 2023-06-22
Payer: MEDICARE

## 2023-06-22 VITALS
HEART RATE: 84 BPM | WEIGHT: 283.95 LBS | TEMPERATURE: 97.5 F | BODY MASS INDEX: 43.04 KG/M2 | HEIGHT: 68 IN | RESPIRATION RATE: 18 BRPM | OXYGEN SATURATION: 94 % | DIASTOLIC BLOOD PRESSURE: 74 MMHG | SYSTOLIC BLOOD PRESSURE: 113 MMHG

## 2023-06-22 LAB
ANION GAP SERPL CALC-SCNC: 9 MMOL/L (ref 7–16)
BASE EXCESS BLDA CALC-SCNC: 4 MMOL/L (ref -4–3)
BODY TEMPERATURE: 36.5 CENTIGRADE
BUN SERPL-MCNC: 6 MG/DL (ref 8–22)
CALCIUM SERPL-MCNC: 8.9 MG/DL (ref 8.5–10.5)
CHLORIDE SERPL-SCNC: 97 MMOL/L (ref 96–112)
CO2 SERPL-SCNC: 29 MMOL/L (ref 20–33)
CREAT SERPL-MCNC: 0.52 MG/DL (ref 0.5–1.4)
ERYTHROCYTE [DISTWIDTH] IN BLOOD BY AUTOMATED COUNT: 54.2 FL (ref 35.9–50)
GFR SERPLBLD CREATININE-BSD FMLA CKD-EPI: 103 ML/MIN/1.73 M 2
GLUCOSE BLD STRIP.AUTO-MCNC: 125 MG/DL (ref 65–99)
GLUCOSE BLD STRIP.AUTO-MCNC: 133 MG/DL (ref 65–99)
GLUCOSE BLD STRIP.AUTO-MCNC: 142 MG/DL (ref 65–99)
GLUCOSE SERPL-MCNC: 134 MG/DL (ref 65–99)
HCO3 BLDA-SCNC: 29 MMOL/L (ref 17–25)
HCT VFR BLD AUTO: 35.1 % (ref 37–47)
HGB BLD-MCNC: 10.1 G/DL (ref 12–16)
INHALED O2 FLOW RATE: ABNORMAL L/MIN
MCH RBC QN AUTO: 21.8 PG (ref 27–33)
MCHC RBC AUTO-ENTMCNC: 28.8 G/DL (ref 32.2–35.5)
MCV RBC AUTO: 75.6 FL (ref 81.4–97.8)
PCO2 BLDA: 43.9 MMHG (ref 26–37)
PH BLDA: 7.43 [PH] (ref 7.4–7.5)
PLATELET # BLD AUTO: 147 K/UL (ref 164–446)
PMV BLD AUTO: 10.3 FL (ref 9–12.9)
PO2 BLDA: 64.1 MMHG (ref 64–87)
POTASSIUM SERPL-SCNC: 4.1 MMOL/L (ref 3.6–5.5)
RBC # BLD AUTO: 4.64 M/UL (ref 4.2–5.4)
SAO2 % BLDA: 90.5 % (ref 93–99)
SODIUM SERPL-SCNC: 135 MMOL/L (ref 135–145)
WBC # BLD AUTO: 4.4 K/UL (ref 4.8–10.8)

## 2023-06-22 PROCEDURE — 85027 COMPLETE CBC AUTOMATED: CPT

## 2023-06-22 PROCEDURE — 82803 BLOOD GASES ANY COMBINATION: CPT

## 2023-06-22 PROCEDURE — 99239 HOSP IP/OBS DSCHRG MGMT >30: CPT | Performed by: STUDENT IN AN ORGANIZED HEALTH CARE EDUCATION/TRAINING PROGRAM

## 2023-06-22 PROCEDURE — A9270 NON-COVERED ITEM OR SERVICE: HCPCS | Performed by: STUDENT IN AN ORGANIZED HEALTH CARE EDUCATION/TRAINING PROGRAM

## 2023-06-22 PROCEDURE — 82962 GLUCOSE BLOOD TEST: CPT

## 2023-06-22 PROCEDURE — 71100 X-RAY EXAM RIBS UNI 2 VIEWS: CPT

## 2023-06-22 PROCEDURE — 36415 COLL VENOUS BLD VENIPUNCTURE: CPT

## 2023-06-22 PROCEDURE — 80048 BASIC METABOLIC PNL TOTAL CA: CPT

## 2023-06-22 PROCEDURE — A9270 NON-COVERED ITEM OR SERVICE: HCPCS | Mod: JZ | Performed by: INTERNAL MEDICINE

## 2023-06-22 PROCEDURE — 700102 HCHG RX REV CODE 250 W/ 637 OVERRIDE(OP): Performed by: STUDENT IN AN ORGANIZED HEALTH CARE EDUCATION/TRAINING PROGRAM

## 2023-06-22 PROCEDURE — 700102 HCHG RX REV CODE 250 W/ 637 OVERRIDE(OP): Mod: JZ | Performed by: INTERNAL MEDICINE

## 2023-06-22 PROCEDURE — 94660 CPAP INITIATION&MGMT: CPT

## 2023-06-22 RX ORDER — OXYCODONE HYDROCHLORIDE 5 MG/1
5 TABLET ORAL EVERY 4 HOURS PRN
Status: DISCONTINUED | OUTPATIENT
Start: 2023-06-22 | End: 2023-06-22 | Stop reason: HOSPADM

## 2023-06-22 RX ADMIN — CARBAMAZEPINE 200 MG: 200 TABLET ORAL at 05:21

## 2023-06-22 RX ADMIN — OXYCODONE HYDROCHLORIDE 5 MG: 5 TABLET ORAL at 11:05

## 2023-06-22 RX ADMIN — POTASSIUM CHLORIDE 40 MEQ: 1500 TABLET, EXTENDED RELEASE ORAL at 05:21

## 2023-06-22 RX ADMIN — NYSTATIN: 100000 POWDER TOPICAL at 08:29

## 2023-06-22 RX ADMIN — OXYCODONE HYDROCHLORIDE 10 MG: 10 TABLET ORAL at 04:07

## 2023-06-22 RX ADMIN — LEVOTHYROXINE SODIUM 112 MCG: 0.11 TABLET ORAL at 05:21

## 2023-06-22 RX ADMIN — GABAPENTIN 600 MG: 300 CAPSULE ORAL at 05:21

## 2023-06-22 RX ADMIN — UMECLIDINIUM BROMIDE AND VILANTEROL TRIFENATATE 1 PUFF: 62.5; 25 POWDER RESPIRATORY (INHALATION) at 08:29

## 2023-06-22 RX ADMIN — APIXABAN 5 MG: 5 TABLET, FILM COATED ORAL at 05:21

## 2023-06-22 RX ADMIN — VERAPAMIL HYDROCHLORIDE 80 MG: 80 TABLET ORAL at 05:21

## 2023-06-22 RX ADMIN — OXYCODONE HYDROCHLORIDE 10 MG: 10 TABLET ORAL at 01:24

## 2023-06-22 NOTE — PROGRESS NOTES
Telemetry Shift Summary     Rhythm:SR to ST   HR:   Ectopy: r pac    Measurements: 0.19/0.15/0.41              Normal values:   Rhythm: SR  HR:   Measurements: 0.12-0.20/0.08-0.10/0.30-0.52

## 2023-06-22 NOTE — PROGRESS NOTES
Monitor summary:  Rhythm: SR  HR: 75  Ectopy: none  0.19/0.10/0.42            Normal values:   Rhythm: SR  HR:   Measurements: 0.12-0.20/0.08-0.10/0.30-0.52

## 2023-06-22 NOTE — PROGRESS NOTES
-Received report from nightshift RN  -Assumed patients care.  -Assessment performed and done  -VSS  -Patient is alert and oriented x  -In no apparent distress  -Denies chest pain. SOB and N/V  -Reports 5/10 pain, reports rib and bilateral leg pain.  -Telemetry box on. Rate and rhythm verified.   -Patient and visitors educated on the use of call light, communicating with the staff on patient needs and concerns.   -Provided time to answer pt questions and address concerns.    -Proper hand hygiene before and after patient care to ensure patient will remain free from infection.     -Patient educated on POC for the day, upcoming tests, and medication information.   -Patient educated on safety precautions and safety equipment. Bed in low position, call light within reach, and hourly rounding conducted.    -Will continue to answer questions and educate patient and visitors as necessary  -No further needs at this time, will continue to monitor.

## 2023-06-22 NOTE — PROGRESS NOTES
"Hospital Medicine Daily Progress Note    Date of Service  6/21/2023    Chief Complaint  Jenifer Ramos is a 63 y.o. female admitted 6/20/2023 with back pain ground-level fall    Hospital Course  Jenifer Ramos is a 63 y.o. female with past medical history of severe obesity, paroxysmal A-fib status post 2 ablations, chronic anticoagulation with Eliquis, factor V Leiden, diastolic heart failure, asthma, hypothyroidism pulmonary hypertension, thyroid disorder, CVA 1 year ago, sleep apnea, diabetes on insulin, osteoarthritis, debility, peripheral neuropathy, who presented 6/20/2023 with complaints of acute on chronic lower back pain worsening with movement up to 10 out of 10 after she felt dizzy, tripped and fell earlier today.  She had some head trauma, but denies loss of consciousness.  She states after the stroke she has difficulties with coordination and has to have eyes open every time she is moving with her walker.  Last week or so she has been having difficulties functioning, and her arms and legs were \"doing funny things\", demonstrating flapping movements with her hands.  Her  is a retired anesthesiologist, and her DPOA, presented at bedside.  He noted that she is less mobile than usual in the last 4 days.  At this time she was placed on 2 L nasal cannula.  According to her she is using oxygen only at night with CPAP.  In blood work there is anemia with hemoglobin 10.4, which is about baseline.  Platelets went down to 138.  Mild hyponatremia 132, blood sugar 241.  X-ray of right tibia and fibula are negative for fracture.  Left shoulder x-ray negative.  Chest x-ray showed cardiomegaly with mild interstitial edema  CT of L-spine showed T12 L4 deformity, recommended MRI of lumbar spine.  Apparently patient already had kyphoplasty at L1 and L2 levels.  Status post posterior fusion and laminectomy L4 S1.  CT head showed age indeterminant right occipital lobe infarct, likely subacute.  No acute " hemorrhage.       Interval Problem Update  6/21/2023:  Patient seen and evaluated bedside patient somewhat somnolent appreciate respiratory therapy support patient had ABG performed today which showed she was hypercarbic and hypoxic patient does have obstructive sleep apnea uses CPAP likely also has component of obesity hypoventilation syndrome.  Patient had improvement with initiation of CPAP patient should be wearing her CPAP anytime while she is asleep.  Appreciate Dr. Granado and support from spinal surgery.  Patient pending MRI of thoracic and lumbar spine.  This will further delineate need for possible intervention.  Patient continues on apixaban.  Reevaluate patient in the morning.  Possible repeat ABG in the morning.    I have discussed this patient's plan of care and discharge plan at IDT rounds today with Case Management, Nursing, Nursing leadership, and other members of the IDT team.    Consultants/Specialty  neurosurgery    Code Status  Full Code    Disposition  The patient is not medically cleared for discharge to home or a post-acute facility.  Anticipate discharge to: home with organized home healthcare and close outpatient follow-up    I have placed the appropriate orders for post-discharge needs.    Review of Systems  Review of Systems   Constitutional:  Positive for malaise/fatigue. Negative for chills, fever and weight loss.   HENT:  Negative for ear pain, hearing loss and tinnitus.    Eyes:  Negative for blurred vision, double vision and photophobia.   Respiratory:  Negative for cough, hemoptysis and sputum production.    Cardiovascular:  Negative for chest pain, palpitations and orthopnea.   Gastrointestinal:  Negative for heartburn, nausea and vomiting.   Genitourinary:  Negative for dysuria, flank pain, frequency and hematuria.   Musculoskeletal:  Positive for back pain, falls and joint pain. Negative for neck pain.   Skin:  Negative for itching and rash.   Neurological:  Negative for tremors,  speech change, focal weakness and headaches.   Endo/Heme/Allergies:  Negative for environmental allergies and polydipsia. Does not bruise/bleed easily.   Psychiatric/Behavioral:  Negative for hallucinations and substance abuse. The patient is not nervous/anxious.         Physical Exam  Temp:  [36.3 °C (97.3 °F)-36.8 °C (98.2 °F)] 36.3 °C (97.3 °F)  Pulse:  [75-87] 77  Resp:  [16-18] 16  BP: (101-136)/(58-71) 129/70  SpO2:  [91 %-95 %] 95 %    Physical Exam  Vitals and nursing note reviewed.   Constitutional:       General: She is not in acute distress.     Appearance: She is obese. She is ill-appearing.   HENT:      Head: Normocephalic.      Nose: Nose normal.      Mouth/Throat:      Mouth: Mucous membranes are moist.   Eyes:      Extraocular Movements: Extraocular movements intact.      Pupils: Pupils are equal, round, and reactive to light.   Cardiovascular:      Rate and Rhythm: Normal rate and regular rhythm.      Heart sounds: Heart sounds are distant.   Pulmonary:      Effort: Pulmonary effort is normal.      Breath sounds: Decreased breath sounds present.   Abdominal:      General: Abdomen is flat. There is no distension.      Tenderness: There is no abdominal tenderness.   Musculoskeletal:         General: No swelling or deformity. Normal range of motion.      Cervical back: Normal range of motion and neck supple.      Right lower leg: Edema present.      Left lower leg: Edema present.   Skin:     General: Skin is warm and dry.   Neurological:      General: No focal deficit present.      Mental Status: She is alert and oriented to person, place, and time.      Comments: Generalized weakness   Psychiatric:         Mood and Affect: Mood normal.         Behavior: Behavior normal.         Fluids    Intake/Output Summary (Last 24 hours) at 6/21/2023 1912  Last data filed at 6/20/2023 2300  Gross per 24 hour   Intake --   Output 100 ml   Net -100 ml       Laboratory  Recent Labs     06/20/23  1502   WBC 3.9*   RBC  4.73   HEMOGLOBIN 10.4*   HEMATOCRIT 35.0*   MCV 74.0*   MCH 22.0*   MCHC 29.7*   RDW 52.9*   PLATELETCT 138*   MPV 9.9     Recent Labs     06/20/23  1502   SODIUM 132*   POTASSIUM 3.6   CHLORIDE 93*   CO2 30   GLUCOSE 241*   BUN 8   CREATININE 0.68   CALCIUM 9.0     Recent Labs     06/20/23  1619   APTT 28.4   INR 1.34*               Imaging  DX-TIBIA AND FIBULA RIGHT   Final Result      Negative for right tibial or fibular fracture      DX-SHOULDER 2+ LEFT   Final Result      1.  No radiographic evidence of acute traumatic injury.   2.  Reverse shoulder arthroplasty without evidence of hardware complication.   3.  Osteopenia.      DX-CHEST-PORTABLE (1 VIEW)   Final Result      1.  Cardiomegaly with mild interstitial edema.      CT-LSPINE W/O PLUS RECONS   Final Result      1.  Age indeterminate superior endplate compression deformities of T12-L4. If there is strong clinical suspicion for an acute fracture, MRI of the lumbar spine is recommended for further evaluation.   2.  Postsurgical changes consistent with percutaneous vertebral augmentation at L1 and L2.   3.  Status post posterior fusion and laminectomy from L4 to S1.   4.  Multilevel degenerative changes of the lumbar spine.      CT-HEAD W/O   Final Result      1.  Small, age indeterminate right occipital lobe infarct, likely subacute.   2.  No acute intracranial hemorrhage.   3.  White matter disease likely representing microvascular ischemic change.         MR-BRAIN-W/O    (Results Pending)   MR-LUMBAR SPINE-W/O    (Results Pending)        Assessment/Plan  * Lumbar compression fracture, closed, initial encounter (HCC)- (present on admission)  Assessment & Plan  Presented with worsening of chronic lower back pain and CT of L-spine suggestive for possible acute fracture  Ordered MRI to evaluate for candidacy for kyphoplasty  Spinal surgery Dr. Bravo will see the patient.  Pain control  Fall precautions  PT OT evaluation    History of stroke  Assessment &  Plan  Patient and  reports worsening of ability to walk in the last week, without definite gross motor deficit today.  CT head without contrast: Small age-indeterminate right occipital lobe infarct, likely subacute  Plan: Evaluated with MRI of the brain for acute stroke   continue anticoagulation with apixaban, statin    Thrombocytopenia (HCC)  Assessment & Plan  Please count 138.  History of cirrhosis noted  Monitor    Morbid obesity ([facility])  Assessment & Plan  BMI 43  Increased morbidity and nursing care    Acute on chronic respiratory failure with hypoxia ([facility])  Assessment & Plan  Placed on 3 L in the ER  On the chest x-ray there is cardiomegaly and mild interstitial edema  Continue home dose of Lasix.  Blood pressure is soft, will not escalate at this time  Wean off oxygen as tolerated    Hyponatremia- (present on admission)  Assessment & Plan  Mild 132  Monitor    GERD (gastroesophageal reflux disease)- (present on admission)  Assessment & Plan  Continue PPI    Hypothyroidism- (present on admission)  Assessment & Plan  Continue levothyroxine    Atrial fibrillation ([facility])- (present on admission)  Assessment & Plan  Status post ablations  Continue apixaban and verapamil    DM (diabetes mellitus) ([facility])- (present on admission)  Assessment & Plan  Continue glargine 150 units and customized insulin sliding scale  Hypoglycemia protocol    Factor V deficiency ([facility])- (present on admission)  Assessment & Plan  Continue Eliquis for now    Sleep apnea- (present on admission)  Assessment & Plan  Continue CPAP with oxygen       Greater than 51 minutes spent prepping to see patient (e.g. review of tests) obtaining and/or reviewing separately obtained history. Performing a medically appropriate examination and/ evaluation.  Counseling and educating the patient/family/caregiver.  Ordering medications, tests, or procedures.  Referring and communicating with other health care professionals.  Documenting clinical information  in EPIC.  Independently interpreting results and communicating results to patient/family/caregiver.  Care coordination.    Please note that this dictation was created using voice recognition software. I have made every reasonable attempt to correct obvious errors, but I expect that there are errors of grammar and possibly context that I did not discover before finalizing the note.    VTE prophylaxis: SCDs/TEDs and therapeutic anticoagulation with Eliquis    I have performed a physical exam and reviewed and updated ROS and Plan today (6/21/2023). In review of yesterday's note (6/20/2023), there are no changes except as documented above.

## 2023-06-22 NOTE — CARE PLAN
The patient is Stable - Low risk of patient condition declining or worsening    Shift Goals  Clinical Goals: Hemodynamically stable  Patient Goals: comfort/rest    Progress made toward(s) clinical / shift goals:  Made sure cpap is placed when she is sleeping, still waiting for MRI and patient pain level maintained tolerable.       Problem: Pain - Standard  Goal: Alleviation of pain or a reduction in pain to the patient’s comfort goal  Outcome: Progressing     Problem: Knowledge Deficit - Standard  Goal: Patient and family/care givers will demonstrate understanding of plan of care, disease process/condition, diagnostic tests and medications  Outcome: Progressing     Problem: Fall Risk  Goal: Patient will remain free from falls  Outcome: Progressing     Problem: Respiratory  Goal: Patient will achieve/maintain optimum respiratory ventilation and gas exchange  Outcome: Progressing       Patient is not progressing towards the following goals: N/A

## 2023-06-22 NOTE — CARE PLAN
Pt A/O x 4; V/S stable; afebrile; on CPAP overnight with 2L Oxygen; O2 sat above 90%; Pt complained of back pain; and leg pain; Lidocaine patch attached to Left foot; IV Dilaudid given as ordered; MRI done at 2300; awaiting for results; sugar elevated given insulin as ordered; per protocol; Pt refused to use commode; can ambulate to the bathroom using a walker and 2 assist; explained and educate on the use of front wheel walker; impaired skin noted; turn every 2 to 3 hours; safety measures in placed; advised to use call light;       The patient is Watcher - Medium risk of patient condition declining or worsening    Shift Goals  Clinical Goals: Hemodynamically stable  Patient Goals: comfort/ rest    Progress made toward(s) clinical / shift goals:    Problem: Pain - Standard  Goal: Alleviation of pain or a reduction in pain to the patient’s comfort goal  Outcome: Progressing     Problem: Knowledge Deficit - Standard  Goal: Patient and family/care givers will demonstrate understanding of plan of care, disease process/condition, diagnostic tests and medications  Outcome: Progressing     Problem: Fall Risk  Goal: Patient will remain free from falls  Outcome: Progressing     Problem: Respiratory  Goal: Patient will achieve/maintain optimum respiratory ventilation and gas exchange  Outcome: Progressing       Patient is not progressing towards the following goals:

## 2023-06-22 NOTE — CONSULTS
Spine Surgery Consult Note      6/22/2023    CC: Back pain  HPI: 63 y.o. female status post ground-level fall approximately 2 days ago.  Has a history of prior lumbar fusion by Dr. Austin approximately 15 years ago per the patient.  Developed increased back pain after ground-level fall.  Does not have any rating pain to lower extremities.  No new lower extremity weakness.  Does have a history of neuropathy.  Denies groin numbness or incontinence.  CT scan discussed below.    Past Medical History:   Diagnosis Date    Arrhythmia     hx a-fib    Arthritis     generalized    ASTHMA 4/24/2013    Back pain 4/24/2013    Blood clotting disorder (HCC)     Disorder of thyroid     DM (diabetes mellitus) ([facility]) 4/24/2013    insulin    Factor V deficiency ([facility]) 04/24/2013    Palpitations 4/24/2013    Pneumonia 1999    Psychiatric problem     depression    Sleep apnea 4/24/2013    bipap    Snoring     Urinary incontinence     Varicose vein 4/24/2013     Past Surgical History:   Procedure Laterality Date    PB RECONSTR TOTAL SHOULDER IMPLANT Left 11/27/2019    Procedure: ARTHROPLASTY, SHOULDER, TOTAL-REVERSE;  Surgeon: Hugo Beltran M.D.;  Location: SURGERY [facility];  Service: Orthopedics    CLAVICLE DISTAL EXCISION  11/27/2019    Procedure: EXCISION, CLAVICLE, DISTAL-RESECTION;  Surgeon: uHgo Beltran M.D.;  Location: SURGERY [facility];  Service: Orthopedics    OTHER ORTHOPEDIC SURGERY  2018    elbow    KNEE ARTHROPLASTY TOTAL Left 2013    KNEE ARTHROPLASTY TOTAL Right 2013    HYSTERECTOMY, TOTAL ABDOMINAL  2003    CHOLECYSTECTOMY      FUSION, SPINE, LUMBAR, PLIF      OOPHORECTOMY      OTHER CARDIAC SURGERY      ablation    TONSILLECTOMY         Medications  No current facility-administered medications on file prior to encounter.     Current Outpatient Medications on File Prior to Encounter   Medication Sig Dispense Refill    apixaban (ELIQUIS) 5mg Tab Take 5 mg by mouth 2 times a day.       pantoprazole (PROTONIX) 40 MG Tablet Delayed Response Take 40 mg by mouth every day.      rosuvastatin (CRESTOR) 20 MG Tab Take 20 mg by mouth every evening.      hydrOXYzine HCl (ATARAX) 25 MG Tab Take 25 mg by mouth at bedtime.      magnesium oxide (MAG-OX) 400 MG Tab tablet Take 400 mg by mouth every day.      verapamil (ISOPTIN) 80 MG Tab Take 80 mg by mouth 2 times a day.      lidocaine (LIDODERM) 5 % Patch Place 1 Patch on the skin every 24 hours. * top of left foot*      promethazine (PHENERGAN) 25 MG Tab Take 25 mg by mouth every 6 hours as needed for Nausea/Vomiting.      Rimegepant Sulfate (NURTEC) 75 MG TABLET DISPERSIBLE Take 75 mg by mouth 1 time a day as needed. Indications: Migraine Headache      oxyCODONE immediate-release (ROXICODONE) 5 MG Tab Take 5-15 mg by mouth every four hours as needed. * depending on pain level    15 mg = qhs Scheduled      carBAMazepine (TEGRETOL) 200 MG Tab Take 200 mg by mouth 3 times a day.      aspirin EC (ECOTRIN) 81 MG Tablet Delayed Response Take 81 mg by mouth every day.      potassium chloride SA (K-DUR) 10 MEQ Tab CR Take 40-60 mEq by mouth every day. * take an extra 20 meq if an extra dose of lasix is taken*      fluticasone-umeclidin-vilant (TRELEGY ELLIPTA) 100-62.5-25 MCG/ACT AEROSOL POWDER, BREATH ACTIVATED inhalation Inhale 1 Inhalation. every day.      furosemide (LASIX) 20 MG Tab Take 40-60 mg by mouth every day. * take an extra 20 mg ( mid day) if weight is > 285 lbs      insulin lispro (HUMALOG) 100 UNIT/ML Inject 40-80 Units under the skin see administration instructions.  FSBS 4-6times daily prn  > 250 = 40 units  > 325 = 60 units  > 400  = 80 units      levalbuterol (XOPENEX HFA) 45 MCG/ACT inhaler Inhale 2 Puffs 4 times a day as needed for Shortness of Breath.      Insulin Degludec 200 UNIT/ML Solution Pen-injector Inject 160 Units under the skin every day.      gabapentin (NEURONTIN) 300 MG Cap Take 600 mg by mouth 3 times a day. * may take an extra  "300 mg  ( 600 mg) if needed      febuxostat (ULORIC) 40 MG Tab Take 40 mg by mouth at bedtime.      montelukast (SINGULAIR) 10 MG TABS Take 10 mg by mouth every evening.      levothyroxine (SYNTHROID) 112 MCG Tab Take 112 mcg by mouth every morning on an empty stomach.         Allergies  Azithromycin, Erythromycin, Other misc, Penicillins, Pistachio, Sulfa drugs, Tetraglycine, Other drug, Physostigmine, Tape, Zanaflex [tizanidine hcl], Albuterol, Atorvastatin, Chlorhexidine, Lamisil [terbinafine hcl], and Morphine    ROS   All other systems were reviewed and found to be negative    Family History   Problem Relation Age of Onset    Heart Disease Mother 75        atrial fibrillation    Lung Disease Father 69        astma, DM, lukemia    Hypertension Sister        Social History     Socioeconomic History    Marital status:    Tobacco Use    Smoking status: Never    Smokeless tobacco: Never   Substance and Sexual Activity    Alcohol use: Not Currently    Drug use: No       Physical Exam  Vitals  /54   Pulse 86   Temp 36.5 °C (97.7 °F) (Temporal)   Resp 18   Ht 1.727 m (5' 8\")   Wt (!) 129 kg (283 lb 15.2 oz)   SpO2 90%   General: Well Developed, Well Nourished, no acute distress  HEENT: Normocephalic, atraumatic  Eyes: Anicteric  Skin: Intact, no open wounds  Neuro: Affect appropriate  Vascular: Capillary refill <2 seconds        HF  KE  TA  Peroneals  EHL  PF  Right  5  5  5        5     5   5  Left  5  5  5        5     5   5    SILT L2-S1 bilaterally except: Decreased sensation symmetric below the knees  2+ Achilles and patellar reflexes  <3 beats of clonus BLE      Radiographs:  I independently reviewed imaging below agree with the results    [id number] (W/O CXR)   Final Result         1.  Normal rib series.   2.  Cardiomegaly   3.  Atherosclerosis      MR-BRAIN-W/O   Final Result      1.  Mild cerebral atrophy and chronic microvascular ischemic type changes.   2.  Chronic small right " occipital infarct.   3.  No acute intracranial abnormality.      DX-TIBIA AND FIBULA RIGHT   Final Result      Negative for right tibial or fibular fracture      DX-SHOULDER 2+ LEFT   Final Result      1.  No radiographic evidence of acute traumatic injury.   2.  Reverse shoulder arthroplasty without evidence of hardware complication.   3.  Osteopenia.      DX-CHEST-PORTABLE (1 VIEW)   Final Result      1.  Cardiomegaly with mild interstitial edema.      CT-LSPINE W/O PLUS RECONS   Final Result      1.  Age indeterminate superior endplate compression deformities of T12-L4. If there is strong clinical suspicion for an acute fracture, MRI of the lumbar spine is recommended for further evaluation.   2.  Postsurgical changes consistent with percutaneous vertebral augmentation at L1 and L2.   3.  Status post posterior fusion and laminectomy from L4 to S1.   4.  Multilevel degenerative changes of the lumbar spine.      CT-HEAD W/O   Final Result      1.  Small, age indeterminate right occipital lobe infarct, likely subacute.   2.  No acute intracranial hemorrhage.   3.  White matter disease likely representing microvascular ischemic change.         MR-LUMBAR SPINE-W/O    (Results Pending)         Laboratory Values  Lab Results   Component Value Date/Time    WBC 4.4 (L) 06/22/2023 06:14 AM    RBC 4.64 06/22/2023 06:14 AM    HEMOGLOBIN 10.1 (L) 06/22/2023 06:14 AM    HEMATOCRIT 35.1 (L) 06/22/2023 06:14 AM    MCV 75.6 (L) 06/22/2023 06:14 AM    MCH 21.8 (L) 06/22/2023 06:14 AM    MCHC 28.8 (L) 06/22/2023 06:14 AM    MPV 10.3 06/22/2023 06:14 AM    NEUTSPOLYS 73.00 (H) 06/20/2023 03:02 PM    LYMPHOCYTES 21.00 (L) 06/20/2023 03:02 PM    MONOCYTES 6.00 06/20/2023 03:02 PM    EOSINOPHILS 0.00 06/20/2023 03:02 PM    BASOPHILS 0.00 06/20/2023 03:02 PM    HYPOCHROMIA 2+ (A) 06/20/2023 03:02 PM    ANISOCYTOSIS 1+ 06/20/2023 03:02 PM        Lab Results   Component Value Date/Time    SODIUM 135 06/22/2023 06:14 AM    POTASSIUM 4.1  06/22/2023 06:14 AM    CHLORIDE 97 06/22/2023 06:14 AM    CO2 29 06/22/2023 06:14 AM    GLUCOSE 134 (H) 06/22/2023 06:14 AM    BUN 6 (L) 06/22/2023 06:14 AM    CREATININE 0.52 06/22/2023 06:14 AM             Impression: 63-year-old female status post ground-level fall hardware appears intact on imaging.  She does have compression fractures at T12-L4 on CT.  We attempted to obtain an MRI yesterday but per the patient she was unable to fit into the scanner comfortably.  Recommend consult to interventional radiology to see if T12 and L3 are amenable to kyphoplasty.  If not recommend pain control, do not recommend any surgical intervention at this stage.  Patient will follow-up as an outpatient with Dr. Austin's PA's.  We will also organize an outpatient MRI scanner as the patient knows she can fit into the large bore MRI at [facility]    Plan: Interventional radiology consult  At patient follow-up with Dr. Austin's PA next    Casper Granado MD  Orthopedic Spine Surgeon

## 2023-06-22 NOTE — RESPIRATORY CARE
COPD EDUCATION by COPD CLINICAL EDUCATOR  6/22/2023 at 8:29 AM by Natalee Pierre, RRT     Patient reviewed by COPD education team. Patient does not have a history or diagnosis of COPD and is a non-smoker Never).  Therefore, patient does not qualify for the COPD program. (Asthma [facility])

## 2023-06-22 NOTE — HOSPITAL COURSE
"Jenifer Ramos is a 63 y.o. female with past medical history of severe obesity, paroxysmal A-fib status post 2 ablations, chronic anticoagulation with Eliquis, factor V Leiden, diastolic heart failure, asthma, hypothyroidism pulmonary hypertension, thyroid disorder, CVA 1 year ago, sleep apnea, diabetes on insulin, osteoarthritis, debility, peripheral neuropathy, who presented 6/20/2023 with complaints of acute on chronic lower back pain worsening with movement up to 10 out of 10 after she felt dizzy, tripped and fell earlier today.  She had some head trauma, but denies loss of consciousness.  She states after the stroke she has difficulties with coordination and has to have eyes open every time she is moving with her walker.  Last week or so she has been having difficulties functioning, and her arms and legs were \"doing funny things\", demonstrating flapping movements with her hands.  Her  is a retired anesthesiologist, and her DPOA, presented at bedside.  He noted that she is less mobile than usual in the last 4 days.  At this time she was placed on 2 L nasal cannula.  According to her she is using oxygen only at night with CPAP.  In blood work there is anemia with hemoglobin 10.4, which is about baseline.  Platelets went down to 138.  Mild hyponatremia 132, blood sugar 241.  X-ray of right tibia and fibula are negative for fracture.  Left shoulder x-ray negative.  Chest x-ray showed cardiomegaly with mild interstitial edema  CT of L-spine showed T12 L4 deformity, recommended MRI of lumbar spine.  Apparently patient already had kyphoplasty at L1 and L2 levels.  Status post posterior fusion and laminectomy L4 S1.  CT head showed age indeterminant right occipital lobe infarct, likely subacute.  No acute hemorrhage.     "

## 2023-06-23 NOTE — DISCHARGE SUMMARY
"Discharge Summary    CHIEF COMPLAINT ON ADMISSION  Chief Complaint   Patient presents with    GLF     Pt fell backwards after tripping on walker. -LOC, + head trauma, + eliquis       Reason for Admission  EMS     Admission Date  6/20/2023    CODE STATUS  Prior    HPI & HOSPITAL COURSE  This is a 63 y.o. female here with ground-level fall  Jenifer Ramos is a 63 y.o. female with past medical history of severe obesity, paroxysmal A-fib status post 2 ablations, chronic anticoagulation with Eliquis, factor V Leiden, diastolic heart failure, asthma, hypothyroidism pulmonary hypertension, thyroid disorder, CVA 1 year ago, sleep apnea, diabetes on insulin, osteoarthritis, debility, peripheral neuropathy, who presented 6/20/2023 with complaints of acute on chronic lower back pain worsening with movement up to 10 out of 10 after she felt dizzy, tripped and fell earlier today.  She had some head trauma, but denies loss of consciousness.  She states after the stroke she has difficulties with coordination and has to have eyes open every time she is moving with her walker.  Last week or so she has been having difficulties functioning, and her arms and legs were \"doing funny things\", demonstrating flapping movements with her hands.  Her  is a retired anesthesiologist, and her DPOA, presented at bedside.  He noted that she is less mobile than usual in the last 4 days.  At this time she was placed on 2 L nasal cannula.  According to her she is using oxygen only at night with CPAP.  In blood work there is anemia with hemoglobin 10.4, which is about baseline.  Platelets went down to 138.  Mild hyponatremia 132, blood sugar 241.  X-ray of right tibia and fibula are negative for fracture.  Left shoulder x-ray negative.  Chest x-ray showed cardiomegaly with mild interstitial edema  CT of L-spine showed T12 L4 deformity, recommended MRI of lumbar spine.  Apparently patient already had kyphoplasty at L1 and L2 levels.  Status " post posterior fusion and laminectomy L4 S1.  CT head showed age indeterminant right occipital lobe infarct, likely subacute.  No acute hemorrhage.     Patient subsequently had MRI of the brain performed without contrast there is no evidence of acute injury.  Furthermore patient had attempted MRI of the lumbar spine however was not able to complete exam.  Had long discussion with Dr. Granado with spine surgery who she is already been established with.  He will set her up as outpatient for possible compression fracture intervention pending MRI which will also be performed as outpatient.  All findings were discussed in detail with patient and also patient's  via telephone.  Patient did not require additional medications upon discharge.  Patient was discharged without issue.  Patient will follow-up with spine surgery.  Therefore, she is discharged in good and stable condition to home with close outpatient follow-up.    The patient met 2-midnight criteria for an inpatient stay at the time of discharge.    Discharge Date  6/22/2023    FOLLOW UP ITEMS POST DISCHARGE  Take all medication as prescribed  Go to all follow-up appointments as indicated    DISCHARGE DIAGNOSES  Principal Problem:    Lumbar compression fracture, closed, initial encounter (HCC) (POA: Yes)  Active Problems:    Sleep apnea (POA: Yes)    Factor V deficiency (HCC) (POA: Yes)      Overview: On Coumadin    DM (diabetes mellitus) (HCC) (POA: Yes)    Atrial fibrillation (HCC) (POA: Yes)    Hypothyroidism (POA: Yes)    GERD (gastroesophageal reflux disease) (POA: Yes)    Hyponatremia (POA: Yes)    Acute on chronic respiratory failure with hypoxia (HCC) (POA: Unknown)    Morbid obesity (HCC) (POA: Unknown)    Thrombocytopenia (HCC) (POA: Unknown)    History of stroke (POA: Unknown)  Resolved Problems:    * No resolved hospital problems. *      FOLLOW UP    Adrián Duckworth M.D.  [address] [place] [id number] [phone]    Schedule an  appointment as soon as possible for a visit  As needed    Casper Granado M.D.  [address] [place] [id number] [phone]    Go to  Their office will call you      MEDICATIONS ON DISCHARGE     Medication List        CONTINUE taking these medications        Instructions   aspirin EC 81 MG Tbec  Commonly known as: ECOTRIN   Take 81 mg by mouth every day.  Dose: 81 mg     carBAMazepine 200 MG Tabs  Commonly known as: TEGRETOL   Take 200 mg by mouth 3 times a day.  Dose: 200 mg     Eliquis 5mg Tabs  Generic drug: apixaban   Take 5 mg by mouth 2 times a day.  Dose: 5 mg     febuxostat 40 MG Tabs  Commonly known as: ULORIC   Take 40 mg by mouth at bedtime.  Dose: 40 mg     furosemide 20 MG Tabs  Commonly known as: LASIX   Take 40-60 mg by mouth every day. * take an extra 20 mg ( mid day) if weight is > 285 lbs  Dose: 40-60 mg     gabapentin 300 MG Caps  Commonly known as: NEURONTIN   Take 600 mg by mouth 3 times a day. * may take an extra 300 mg  ( 600 mg) if needed  Dose: 600 mg     hydrOXYzine HCl 25 MG Tabs  Commonly known as: ATARAX   Take 25 mg by mouth at bedtime.  Dose: 25 mg     Insulin Degludec 200 UNIT/ML Sopn   Inject 160 Units under the skin every day.  Dose: 160 Units     insulin lispro 100 UNIT/ML  Commonly known as: HumaLOG   Inject 40-80 Units under the skin see administration instructions.  FSBS 4-6times daily prn  > 250 = 40 units  > 325 = 60 units  > 400  = 80 units  Dose: 40-80 Units     levalbuterol 45 MCG/ACT inhaler  Commonly known as: XOPENEX HFA   Inhale 2 Puffs 4 times a day as needed for Shortness of Breath.  Dose: 2 Puff     levothyroxine 112 MCG Tabs  Commonly known as: SYNTHROID   Take 112 mcg by mouth every morning on an empty stomach.  Dose: 112 mcg     lidocaine 5 % Ptch  Commonly known as: LIDODERM   Place 1 Patch on the skin every 24 hours. * top of left foot*  Dose: 1 Patch     magnesium oxide 400 MG Tabs tablet  Commonly known as: MAG-OX   Take 400 mg by mouth every day.  Dose:  400 mg     montelukast 10 MG Tabs  Commonly known as: SINGULAIR   Take 10 mg by mouth every evening.  Dose: 10 mg     Nurtec 75 MG Tbdp  Generic drug: Rimegepant Sulfate   Take 75 mg by mouth 1 time a day as needed. Indications: Migraine Headache  Dose: 75 mg     oxyCODONE immediate-release 5 MG Tabs  Commonly known as: ROXICODONE   Take 5-15 mg by mouth every four hours as needed. * depending on pain level    15 mg = qhs Scheduled  Dose: 5-15 mg     pantoprazole 40 MG Tbec  Commonly known as: PROTONIX   Take 40 mg by mouth every day.  Dose: 40 mg     potassium chloride SA 10 MEQ Tbcr  Commonly known as: K-DUR   Take 40-60 mEq by mouth every day. * take an extra 20 meq if an extra dose of lasix is taken*  Dose: 40-60 mEq     promethazine 25 MG Tabs  Commonly known as: PHENERGAN   Take 25 mg by mouth every 6 hours as needed for Nausea/Vomiting.  Dose: 25 mg     rosuvastatin 20 MG Tabs  Commonly known as: CRESTOR   Take 20 mg by mouth every evening.  Dose: 20 mg     Trelegy Ellipta 100-62.5-25 MCG/ACT Aepb inhalation  Generic drug: fluticasone-umeclidin-vilant   Inhale 1 Inhalation. every day.  Dose: 1 Inhalation      verapamil 80 MG Tabs  Commonly known as: ISOPTIN   Take 80 mg by mouth 2 times a day.  Dose: 80 mg              Allergies  Allergies   Allergen Reactions    Azithromycin Anaphylaxis    Erythromycin Anaphylaxis    Other Misc Anaphylaxis     Flu vaccine    Penicillins Anaphylaxis     As a child    Pistachio Anaphylaxis    Sulfa Drugs Anaphylaxis    Tetraglycine Anaphylaxis    Other Drug Unspecified     Long acting benzodiazepines only single dose otherwise combative     Physostigmine Unspecified     Abdomen extreme swelling    Tape Rash and Itching     tegaderm ok  Blisters with others    Zanaflex [Tizanidine Hcl] Unspecified     Falling asleep mid sentence    Albuterol Palpitations    Atorvastatin Unspecified     Extreme joint pain    Chlorhexidine Rash     blistering    Lamisil [Terbinafine Hcl]  Unspecified     Pancreatitis     Morphine Unspecified     Not effective       DIET  No orders of the defined types were placed in this encounter.      ACTIVITY  As tolerated.  Weight bearing as tolerated    CONSULTATIONS  Spine surgery    PROCEDURES  None    LABORATORY  Lab Results   Component Value Date    SODIUM 135 06/22/2023    POTASSIUM 4.1 06/22/2023    CHLORIDE 97 06/22/2023    CO2 29 06/22/2023    GLUCOSE 134 (H) 06/22/2023    BUN 6 (L) 06/22/2023    CREATININE 0.52 06/22/2023        Lab Results   Component Value Date    WBC 4.4 (L) 06/22/2023    HEMOGLOBIN 10.1 (L) 06/22/2023    HEMATOCRIT 35.1 (L) 06/22/2023    PLATELETCT 147 (L) 06/22/2023      Please note that this dictation was created using voice recognition software. I have made every reasonable attempt to correct obvious errors, but I expect that there are errors of grammar and possibly context that I did not discover before finalizing the note.    Total time of the discharge process exceeds 35 minutes.

## 2023-12-06 ENCOUNTER — APPOINTMENT (OUTPATIENT)
Dept: RADIOLOGY | Facility: MEDICAL CENTER | Age: 63
End: 2023-12-06
Attending: STUDENT IN AN ORGANIZED HEALTH CARE EDUCATION/TRAINING PROGRAM
Payer: MEDICARE

## 2023-12-06 ENCOUNTER — HOSPITAL ENCOUNTER (EMERGENCY)
Facility: MEDICAL CENTER | Age: 63
End: 2023-12-06
Attending: STUDENT IN AN ORGANIZED HEALTH CARE EDUCATION/TRAINING PROGRAM
Payer: MEDICARE

## 2023-12-06 VITALS
BODY MASS INDEX: 40.32 KG/M2 | RESPIRATION RATE: 20 BRPM | OXYGEN SATURATION: 95 % | SYSTOLIC BLOOD PRESSURE: 118 MMHG | DIASTOLIC BLOOD PRESSURE: 60 MMHG | HEIGHT: 68 IN | HEART RATE: 85 BPM | TEMPERATURE: 97.1 F | WEIGHT: 266 LBS
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
## 2023-12-06 PROCEDURE — 700117 HCHG RX CONTRAST REV CODE 255: Performed by: STUDENT IN AN ORGANIZED HEALTH CARE EDUCATION/TRAINING PROGRAM

## 2023-12-06 PROCEDURE — 99284 EMERGENCY DEPT VISIT MOD MDM: CPT

## 2023-12-06 PROCEDURE — 700101 HCHG RX REV CODE 250: Performed by: STUDENT IN AN ORGANIZED HEALTH CARE EDUCATION/TRAINING PROGRAM

## 2023-12-06 PROCEDURE — 73590 X-RAY EXAM OF LOWER LEG: CPT | Mod: RT

## 2023-12-06 PROCEDURE — 90715 TDAP VACCINE 7 YRS/> IM: CPT | Performed by: STUDENT IN AN ORGANIZED HEALTH CARE EDUCATION/TRAINING PROGRAM

## 2023-12-06 PROCEDURE — 700102 HCHG RX REV CODE 250 W/ 637 OVERRIDE(OP): Performed by: STUDENT IN AN ORGANIZED HEALTH CARE EDUCATION/TRAINING PROGRAM

## 2023-12-06 PROCEDURE — 700111 HCHG RX REV CODE 636 W/ 250 OVERRIDE (IP): Performed by: STUDENT IN AN ORGANIZED HEALTH CARE EDUCATION/TRAINING PROGRAM

## 2023-12-06 PROCEDURE — 70450 CT HEAD/BRAIN W/O DYE: CPT

## 2023-12-06 PROCEDURE — 71260 CT THORAX DX C+: CPT

## 2023-12-06 PROCEDURE — A9270 NON-COVERED ITEM OR SERVICE: HCPCS | Performed by: STUDENT IN AN ORGANIZED HEALTH CARE EDUCATION/TRAINING PROGRAM

## 2023-12-06 PROCEDURE — 36415 COLL VENOUS BLD VENIPUNCTURE: CPT

## 2023-12-06 PROCEDURE — 90471 IMMUNIZATION ADMIN: CPT

## 2023-12-06 RX ORDER — IBUPROFEN 600 MG/1
600 TABLET ORAL ONCE
Status: COMPLETED | OUTPATIENT
Start: 2023-12-06 | End: 2023-12-06

## 2023-12-06 RX ORDER — LIDOCAINE 4 G/G
1 PATCH TOPICAL ONCE
Status: DISCONTINUED | OUTPATIENT
Start: 2023-12-06 | End: 2023-12-06 | Stop reason: HOSPADM

## 2023-12-06 RX ORDER — ACETAMINOPHEN 325 MG/1
650 TABLET ORAL ONCE
Status: COMPLETED | OUTPATIENT
Start: 2023-12-06 | End: 2023-12-06

## 2023-12-06 RX ADMIN — ACETAMINOPHEN 650 MG: 325 TABLET, FILM COATED ORAL at 04:47

## 2023-12-06 RX ADMIN — IOHEXOL 75 ML: 350 INJECTION, SOLUTION INTRAVENOUS at 06:47

## 2023-12-06 RX ADMIN — IBUPROFEN 600 MG: 600 TABLET, FILM COATED ORAL at 04:50

## 2023-12-06 RX ADMIN — CLOSTRIDIUM TETANI TOXOID ANTIGEN (FORMALDEHYDE INACTIVATED), CORYNEBACTERIUM DIPHTHERIAE TOXOID ANTIGEN (FORMALDEHYDE INACTIVATED), BORDETELLA PERTUSSIS TOXOID ANTIGEN (GLUTARALDEHYDE INACTIVATED), BORDETELLA PERTUSSIS FILAMENTOUS HEMAGGLUTININ ANTIGEN (FORMALDEHYDE INACTIVATED), BORDETELLA PERTUSSIS PERTACTIN ANTIGEN, AND BORDETELLA PERTUSSIS FIMBRIAE 2/3 ANTIGEN 0.5 ML: 5; 2; 2.5; 5; 3; 5 INJECTION, SUSPENSION INTRAMUSCULAR at 05:06

## 2023-12-06 RX ADMIN — LIDOCAINE 1 PATCH: 4 PATCH TOPICAL at 04:50

## 2023-12-06 ASSESSMENT — PAIN DESCRIPTION - DESCRIPTORS: DESCRIPTORS: ACHING

## 2023-12-06 ASSESSMENT — PAIN DESCRIPTION - PAIN TYPE
TYPE: ACUTE PAIN
TYPE: ACUTE PAIN

## 2023-12-06 ASSESSMENT — FIBROSIS 4 INDEX: FIB4 SCORE: 2.67

## 2023-12-06 NOTE — ED TRIAGE NOTES
".  Chief Complaint   Patient presents with    T-5000 GLF    Breast Pain     Left breast       63 yr Patient BIB [facility] from home to [facility]  for above complaint. Per patient was walking with walker and felt like the wheel locked up and she went over the walker hit her right shin. There is an abrasion to the area. - Head, +thinners, -LOC     Pt placed on monitor,   Patient and staff wearing appropriate PPE    /66   Pulse 85   Temp 35.9 °C (96.7 °F) (Temporal)   Resp 18   Ht 1.727 m (5' 8\")   Wt 121 kg (266 lb)   SpO2 94%   BMI 40.45 kg/m²     "

## 2023-12-06 NOTE — DISCHARGE INSTRUCTIONS
Your x-rays and CAT scans did not show any signs of acute injury.  We updated your tetanus shot.  As we discussed you should follow-up with your primary care provider.  If you have recurrent fall, chest pain, shortness of breath, fever you should return to the emergency room.

## 2023-12-06 NOTE — ED PROVIDER NOTES
ED Provider Note    CHIEF COMPLAINT  Chief Complaint   Patient presents with    T-5000 GLF    Breast Pain     Left breast       EXTERNAL RECORDS REVIEWED  Inpatient Notes admission from 6/2023 patient was admitted for fall and to have mild hyponatremia unremarkable imaging of lower back    HPI/ROS  LIMITATION TO HISTORY     OUTSIDE HISTORIAN(S):      Jenifer Ramos is a 63 y.o. female who presents after fall.  Patient says that the wheel of her walker locked up causing her to fall forward onto her left side.  Patient denies head strike or loss conscious.  Patient denies preceding headache, chest pain, shortness of breath, dizziness, palpitations.  Patient complains of pain over her left chest wall.  Patient also notes that she scraped her right lower leg.  Patient says she has been followed for chronic left foot wound infections currently on antibiotics.  Patient denies other recent illness including fever, chills, respiratory changes, vomiting, diarrhea, bloody stool, urinary changes.  Patient says she has been taking all of her prescribed medications.    PAST MEDICAL HISTORY   has a past medical history of Arrhythmia, Arthritis, ASTHMA (4/24/2013), Back pain (4/24/2013), Blood clotting disorder ([facility]), Disorder of thyroid, DM (diabetes mellitus) ([facility]) (4/24/2013), Factor V deficiency ([facility]) (04/24/2013), Palpitations (4/24/2013), Pneumonia (1999), Psychiatric problem, Sleep apnea (4/24/2013), Snoring, Urinary incontinence, and Varicose vein (4/24/2013).    SURGICAL HISTORY   has a past surgical history that includes hysterectomy, total abdominal (2003); oophorectomy; tonsillectomy; cholecystectomy; fusion, spine, lumbar, plif; other cardiac surgery; other orthopedic surgery (2018); knee arthroplasty total (Left, 2013); knee arthroplasty total (Right, 2013); reconstr total shoulder implant (Left, 11/27/2019); and clavicle distal excision (11/27/2019).    FAMILY HISTORY  Family History   Problem Relation Age of  Onset    Heart Disease Mother 75        atrial fibrillation    Lung Disease Father 69        astma, DM, lukemia    Hypertension Sister        SOCIAL HISTORY  Social History     Tobacco Use    Smoking status: Never    Smokeless tobacco: Never   Substance and Sexual Activity    Alcohol use: Not Currently    Drug use: No    Sexual activity: Not on file       CURRENT MEDICATIONS  Home Medications       Reviewed by David Heath R.N. (Registered Nurse) on 12/06/23 at 0411  Med List Status: Not Addressed     Medication Last Dose Status   apixaban (ELIQUIS) 5mg Tab  Active   aspirin EC (ECOTRIN) 81 MG Tablet Delayed Response  Active   carBAMazepine (TEGRETOL) 200 MG Tab  Active   febuxostat (ULORIC) 40 MG Tab  Active   fluticasone-umeclidin-vilant (TRELEGY ELLIPTA) 100-62.5-25 MCG/ACT AEROSOL POWDER, BREATH ACTIVATED inhalation  Active   furosemide (LASIX) 20 MG Tab  Active   gabapentin (NEURONTIN) 300 MG Cap  Active   hydrOXYzine HCl (ATARAX) 25 MG Tab  Active   Insulin Degludec 200 UNIT/ML Solution Pen-injector  Active   insulin lispro (HUMALOG) 100 UNIT/ML  Active   levalbuterol (XOPENEX HFA) 45 MCG/ACT inhaler  Active   levothyroxine (SYNTHROID) 112 MCG Tab  Active   lidocaine (LIDODERM) 5 % Patch  Active   magnesium oxide (MAG-OX) 400 MG Tab tablet  Active   montelukast (SINGULAIR) 10 MG TABS  Active   oxyCODONE immediate-release (ROXICODONE) 5 MG Tab  Active   pantoprazole (PROTONIX) 40 MG Tablet Delayed Response  Active   potassium chloride SA (K-DUR) 10 MEQ Tab CR  Active   promethazine (PHENERGAN) 25 MG Tab  Active   Rimegepant Sulfate (NURTEC) 75 MG TABLET DISPERSIBLE  Active   rosuvastatin (CRESTOR) 20 MG Tab  Active   verapamil (ISOPTIN) 80 MG Tab  Active                    ALLERGIES  Allergies   Allergen Reactions    Azithromycin Anaphylaxis    Erythromycin Anaphylaxis    Other Misc Anaphylaxis     Flu vaccine    Penicillins Anaphylaxis     As a child    Pistachio Anaphylaxis    Sulfa Drugs Anaphylaxis     "Tetraglycine Anaphylaxis    Other Drug Unspecified     Long acting benzodiazepines only single dose otherwise combative     Physostigmine Unspecified     Abdomen extreme swelling    Tape Rash and Itching     tegaderm ok  Blisters with others    Zanaflex [Tizanidine Hcl] Unspecified     Falling asleep mid sentence    Albuterol Palpitations    Atorvastatin Unspecified     Extreme joint pain    Chlorhexidine Rash     blistering    Lamisil [Terbinafine Hcl] Unspecified     Pancreatitis     Morphine Unspecified     Not effective       PHYSICAL EXAM  VITAL SIGNS: /55   Pulse 79   Temp 35.9 °C (96.7 °F) (Temporal)   Resp 18   Ht 1.727 m (5' 8\")   Wt 121 kg (266 lb)   SpO2 94%   BMI 40.45 kg/m²    Constitutional: Chronically ill-appearing  HENT: No signs of trauma, Bilateral external ears normal, Nose normal.   Eyes: Pupils are equal and reactive, Conjunctiva normal, Non-icteric.   Neck: Normal range of motion, No tenderness, Supple, No stridor.   Cardiovascular: Regular rate and rhythm, no murmurs.   Thorax & Lungs: Normal breath sounds, No respiratory distress, No wheezing, slight ecchymosis over left breast with left chest wall tenderness  Abdomen: Bowel sounds normal, Soft, No tenderness, No masses, No pulsatile masses.   Skin: Warm, Dry, No erythema, No rash.   Back: No bony tenderness, No CVA tenderness.   Extremities: Intact distal pulses, No edema, abrasion over right shin with mild tenderness no deformity no cyanosis, ulceration over right medial plantar foot  Musculoskeletal: Good range of motion in all major joints. No tenderness to palpation or major deformities noted.   Neurologic: Alert , Normal motor function, Normal sensory function, No focal deficits noted.       DIAGNOSTIC STUDIES / PROCEDURES  EKG      LABS      RADIOLOGY    Radiologist interpretation:   CT-CHEST (THORAX) WITH   Final Result         1.  Hazy groundglass pulmonary opacities suggesting component of cord edema or atypical " infiltrate.   2.  Enlarged main pulmonary artery, appearance suggesting pulmonary arterial hypertension   3.  Changes of cirrhosis   4.  Atherosclerosis without visualized atherosclerotic coronary artery calcifications.   5.  Pulmonary nodules, see nodule follow-up recommendations below.      Fleischner Society pulmonary nodule recommendations:   Low Risk: CT at 3-6 months, then consider CT at 18-24 months      High Risk: CT at 3-6 months, then at 18-24 months      Comments: Use most suspicious nodule as guide to management. Follow-up intervals may vary according to size and risk.      Low Risk - Minimal or absent history of smoking and of other known risk factors.      High Risk - History of smoking or of other known risk factors.      Note: These recommendations do not apply to lung cancer screening, patients with immunosuppression, or patients with known primary cancer.      Fleischner Society 2017 Guidelines for Management of Incidentally Detected Pulmonary Nodules in Adults      CT-HEAD W/O   Final Result         1.  No acute intracranial abnormality is identified, there are nonspecific white matter changes, commonly associated with small vessel ischemic disease.  Associated mild cerebral atrophy is noted.   2.  Atherosclerosis.         DX-TIBIA AND FIBULA RIGHT   Final Result         1.  No acute traumatic bony injury.   2.  Atherosclerosis            COURSE & MEDICAL DECISION MAKING    ED Observation Status? Yes; I am placing the patient in to an observation status due to a diagnostic uncertainty as well as therapeutic intensity. Patient placed in observation status at 4:25 AM, 12/6/2023.     Observation plan is as follows: Monitor response to analgesics follow-up on radiographic imaging and patient's ability to ambulate safely    Upon Reevaluation, the patient's condition has: Improved; and will be discharged.    Patient discharged from ED Observation status at 0700 (Time) 12/6/23 (Date).     INITIAL  ASSESSMENT, COURSE AND PLAN  Care Narrative: No history exam to suggest syncope fall consistent with accidental fall.  Given patient's anticoagulated status will obtain CT head she has no external signs of trauma normal neurologic exam.  Patient has ulceration across left foot currently following with wound care and on antibiotics no systemic signs of infection.  No history exam to suggest cervical spine injury negative Nexus criteria.  Patient does not appear intoxicated or distracted.  With left chest wall tenderness and anticoagulated status will obtain CT chest to assess for intrathoracic injury.  Obtained x-ray of right lower extremity which showed no signs of fracture or dislocation.  With skin break patient given Tdap.  Patient provided analgesics.    CT imaging without signs of acute injury.  Incidental findings were discussed with patient.  Patient did have bilateral lung infiltrates versus edema stable oxygenation no respiratory distress no fever or productive cough.  Discussed with patient return precautions.        ADDITIONAL PROBLEM LIST    DISPOSITION AND DISCUSSIONS  .     Decision tools and prescription drugs considered including, but not limited to: NEXUS criteria negative .    FINAL DIAGNOSIS  1. Fall, initial encounter           Electronically signed by: Shoaib Cornejo D.O., 12/6/2023 4:31 AM

## 2023-12-06 NOTE — ED NOTES
Pt escorted outside via wheelchair, son arrived to pick her up. Pt declined supplimental oxygen as she states she only uses it at night and wont be using it in the car. Pt states she's unhappy with the care received. This RN apologized as I spent the last 20 minutes looking for the walker that she told me I lost. However son brought the walker upon arrival so all belongings actually ARE accounted for upon exit. Pt continues to be unhappy with the diagnosis given however nothing this RN did helped. Pt left with no further incident.

## 2023-12-27 ENCOUNTER — APPOINTMENT (OUTPATIENT)
Dept: RADIOLOGY | Facility: MEDICAL CENTER | Age: 63
DRG: 193 | End: 2023-12-27
Attending: STUDENT IN AN ORGANIZED HEALTH CARE EDUCATION/TRAINING PROGRAM
Payer: MEDICARE

## 2023-12-27 ENCOUNTER — HOSPITAL ENCOUNTER (INPATIENT)
Facility: MEDICAL CENTER | Age: 63
LOS: 2 days | DRG: 193 | End: 2023-12-30
Attending: STUDENT IN AN ORGANIZED HEALTH CARE EDUCATION/TRAINING PROGRAM | Admitting: STUDENT IN AN ORGANIZED HEALTH CARE EDUCATION/TRAINING PROGRAM
Payer: MEDICARE

## 2023-12-27 DIAGNOSIS — Z79.4 TYPE 2 DIABETES MELLITUS WITH DIABETIC NEUROPATHIC ARTHROPATHY, WITH LONG-TERM CURRENT USE OF INSULIN (HCC): ICD-10-CM

## 2023-12-27 DIAGNOSIS — E11.621 CHRONIC ULCER OF LEFT FOOT DUE TO DIABETES MELLITUS (HCC): ICD-10-CM

## 2023-12-27 DIAGNOSIS — J10.1 INFLUENZA A: ICD-10-CM

## 2023-12-27 DIAGNOSIS — Z86.73 HISTORY OF STROKE: ICD-10-CM

## 2023-12-27 DIAGNOSIS — R41.82 ALTERED MENTAL STATUS, UNSPECIFIED ALTERED MENTAL STATUS TYPE: ICD-10-CM

## 2023-12-27 DIAGNOSIS — J96.21 ACUTE ON CHRONIC RESPIRATORY FAILURE WITH HYPOXEMIA (HCC): ICD-10-CM

## 2023-12-27 DIAGNOSIS — J96.01 ACUTE HYPOXEMIC RESPIRATORY FAILURE (HCC): ICD-10-CM

## 2023-12-27 DIAGNOSIS — E11.610 TYPE 2 DIABETES MELLITUS WITH DIABETIC NEUROPATHIC ARTHROPATHY, WITH LONG-TERM CURRENT USE OF INSULIN (HCC): ICD-10-CM

## 2023-12-27 DIAGNOSIS — I50.33 ACUTE ON CHRONIC DIASTOLIC HEART FAILURE (HCC): ICD-10-CM

## 2023-12-27 DIAGNOSIS — L97.529 CHRONIC ULCER OF LEFT FOOT DUE TO DIABETES MELLITUS (HCC): ICD-10-CM

## 2023-12-27 LAB
ALBUMIN SERPL BCP-MCNC: 3.3 G/DL (ref 3.2–4.9)
ALBUMIN/GLOB SERPL: 1 G/DL
ALP SERPL-CCNC: 173 U/L (ref 30–99)
ALT SERPL-CCNC: 16 U/L (ref 2–50)
ANION GAP SERPL CALC-SCNC: 9 MMOL/L (ref 7–16)
ANISOCYTOSIS BLD QL SMEAR: ABNORMAL
AST SERPL-CCNC: 41 U/L (ref 12–45)
BASOPHILS # BLD AUTO: 0.6 % (ref 0–1.8)
BASOPHILS # BLD: 0.02 K/UL (ref 0–0.12)
BILIRUB SERPL-MCNC: 0.4 MG/DL (ref 0.1–1.5)
BUN SERPL-MCNC: 7 MG/DL (ref 8–22)
CALCIUM ALBUM COR SERPL-MCNC: 9.3 MG/DL (ref 8.5–10.5)
CALCIUM SERPL-MCNC: 8.7 MG/DL (ref 8.5–10.5)
CHLORIDE SERPL-SCNC: 97 MMOL/L (ref 96–112)
CO2 SERPL-SCNC: 26 MMOL/L (ref 20–33)
COMMENT 1642: NORMAL
CREAT SERPL-MCNC: 0.64 MG/DL (ref 0.5–1.4)
CRP SERPL HS-MCNC: 7.47 MG/DL (ref 0–0.75)
EOSINOPHIL # BLD AUTO: 0 K/UL (ref 0–0.51)
EOSINOPHIL NFR BLD: 0 % (ref 0–6.9)
ERYTHROCYTE [DISTWIDTH] IN BLOOD BY AUTOMATED COUNT: 55.7 FL (ref 35.9–50)
ETHANOL BLD-MCNC: <10.1 MG/DL
FLUAV RNA SPEC QL NAA+PROBE: POSITIVE
FLUBV RNA SPEC QL NAA+PROBE: NEGATIVE
GFR SERPLBLD CREATININE-BSD FMLA CKD-EPI: 97 ML/MIN/1.73 M 2
GLOBULIN SER CALC-MCNC: 3.3 G/DL (ref 1.9–3.5)
GLUCOSE BLD STRIP.AUTO-MCNC: 202 MG/DL (ref 65–99)
GLUCOSE SERPL-MCNC: 222 MG/DL (ref 65–99)
HCT VFR BLD AUTO: 39.4 % (ref 37–47)
HGB BLD-MCNC: 11.5 G/DL (ref 12–16)
HYPOCHROMIA BLD QL SMEAR: ABNORMAL
IMM GRANULOCYTES # BLD AUTO: 0.01 K/UL (ref 0–0.11)
IMM GRANULOCYTES NFR BLD AUTO: 0.3 % (ref 0–0.9)
LACTATE SERPL-SCNC: 1.8 MMOL/L (ref 0.5–2)
LYMPHOCYTES # BLD AUTO: 0.31 K/UL (ref 1–4.8)
LYMPHOCYTES NFR BLD: 8.8 % (ref 22–41)
MCH RBC QN AUTO: 23.7 PG (ref 27–33)
MCHC RBC AUTO-ENTMCNC: 29.2 G/DL (ref 32.2–35.5)
MCV RBC AUTO: 81.1 FL (ref 81.4–97.8)
MICROCYTES BLD QL SMEAR: ABNORMAL
MONOCYTES # BLD AUTO: 0.61 K/UL (ref 0–0.85)
MONOCYTES NFR BLD AUTO: 17.4 % (ref 0–13.4)
MORPHOLOGY BLD-IMP: NORMAL
NEUTROPHILS # BLD AUTO: 2.56 K/UL (ref 1.82–7.42)
NEUTROPHILS NFR BLD: 72.9 % (ref 44–72)
NRBC # BLD AUTO: 0 K/UL
NRBC BLD-RTO: 0 /100 WBC (ref 0–0.2)
OVALOCYTES BLD QL SMEAR: NORMAL
PLATELET # BLD AUTO: 124 K/UL (ref 164–446)
PLATELET BLD QL SMEAR: NORMAL
PMV BLD AUTO: 10.5 FL (ref 9–12.9)
POIKILOCYTOSIS BLD QL SMEAR: NORMAL
POTASSIUM SERPL-SCNC: 4.4 MMOL/L (ref 3.6–5.5)
PROT SERPL-MCNC: 6.6 G/DL (ref 6–8.2)
RBC # BLD AUTO: 4.86 M/UL (ref 4.2–5.4)
RBC BLD AUTO: PRESENT
RSV RNA SPEC QL NAA+PROBE: NEGATIVE
SARS-COV-2 RNA RESP QL NAA+PROBE: NOTDETECTED
SODIUM SERPL-SCNC: 132 MMOL/L (ref 135–145)
WBC # BLD AUTO: 3.5 K/UL (ref 4.8–10.8)

## 2023-12-27 PROCEDURE — 83880 ASSAY OF NATRIURETIC PEPTIDE: CPT

## 2023-12-27 PROCEDURE — 86140 C-REACTIVE PROTEIN: CPT

## 2023-12-27 PROCEDURE — 83605 ASSAY OF LACTIC ACID: CPT

## 2023-12-27 PROCEDURE — 87040 BLOOD CULTURE FOR BACTERIA: CPT

## 2023-12-27 PROCEDURE — 73630 X-RAY EXAM OF FOOT: CPT | Mod: LT

## 2023-12-27 PROCEDURE — C9803 HOPD COVID-19 SPEC COLLECT: HCPCS

## 2023-12-27 PROCEDURE — 82077 ASSAY SPEC XCP UR&BREATH IA: CPT

## 2023-12-27 PROCEDURE — 700105 HCHG RX REV CODE 258: Performed by: STUDENT IN AN ORGANIZED HEALTH CARE EDUCATION/TRAINING PROGRAM

## 2023-12-27 PROCEDURE — 80053 COMPREHEN METABOLIC PANEL: CPT

## 2023-12-27 PROCEDURE — 83036 HEMOGLOBIN GLYCOSYLATED A1C: CPT

## 2023-12-27 PROCEDURE — 36415 COLL VENOUS BLD VENIPUNCTURE: CPT

## 2023-12-27 PROCEDURE — 71045 X-RAY EXAM CHEST 1 VIEW: CPT

## 2023-12-27 PROCEDURE — 82962 GLUCOSE BLOOD TEST: CPT

## 2023-12-27 PROCEDURE — 84443 ASSAY THYROID STIM HORMONE: CPT

## 2023-12-27 PROCEDURE — 85025 COMPLETE CBC W/AUTO DIFF WBC: CPT

## 2023-12-27 PROCEDURE — 93005 ELECTROCARDIOGRAM TRACING: CPT | Performed by: STUDENT IN AN ORGANIZED HEALTH CARE EDUCATION/TRAINING PROGRAM

## 2023-12-27 PROCEDURE — 99285 EMERGENCY DEPT VISIT HI MDM: CPT

## 2023-12-27 PROCEDURE — 83735 ASSAY OF MAGNESIUM: CPT

## 2023-12-27 PROCEDURE — 0241U HCHG SARS-COV-2 COVID-19 NFCT DS RESP RNA 4 TRGT ED POC: CPT

## 2023-12-27 PROCEDURE — 85652 RBC SED RATE AUTOMATED: CPT

## 2023-12-27 RX ORDER — SODIUM CHLORIDE 9 MG/ML
1000 INJECTION, SOLUTION INTRAVENOUS ONCE
Status: COMPLETED | OUTPATIENT
Start: 2023-12-27 | End: 2023-12-28

## 2023-12-27 RX ORDER — ACETAMINOPHEN 500 MG
1000 TABLET ORAL ONCE
Status: ACTIVE | OUTPATIENT
Start: 2023-12-27 | End: 2023-12-28

## 2023-12-27 RX ADMIN — SODIUM CHLORIDE 1000 ML: 9 INJECTION, SOLUTION INTRAVENOUS at 23:15

## 2023-12-27 ASSESSMENT — FIBROSIS 4 INDEX: FIB4 SCORE: 2.67

## 2023-12-28 PROBLEM — J10.1 INFLUENZA A: Status: ACTIVE | Noted: 2023-12-28

## 2023-12-28 PROBLEM — L97.529: Status: ACTIVE | Noted: 2023-12-28

## 2023-12-28 PROBLEM — E11.621: Status: ACTIVE | Noted: 2023-12-28

## 2023-12-28 PROBLEM — J96.21 ACUTE ON CHRONIC RESPIRATORY FAILURE WITH HYPOXEMIA (HCC): Status: ACTIVE | Noted: 2023-12-28

## 2023-12-28 LAB
APPEARANCE UR: CLEAR
BILIRUB UR QL STRIP.AUTO: NEGATIVE
COLOR UR: YELLOW
ERYTHROCYTE [SEDIMENTATION RATE] IN BLOOD BY WESTERGREN METHOD: 13 MM/HOUR (ref 0–25)
EST. AVERAGE GLUCOSE BLD GHB EST-MCNC: 295 MG/DL
GLUCOSE BLD STRIP.AUTO-MCNC: 164 MG/DL (ref 65–99)
GLUCOSE BLD STRIP.AUTO-MCNC: 181 MG/DL (ref 65–99)
GLUCOSE BLD STRIP.AUTO-MCNC: 190 MG/DL (ref 65–99)
GLUCOSE BLD STRIP.AUTO-MCNC: 213 MG/DL (ref 65–99)
GLUCOSE UR STRIP.AUTO-MCNC: NEGATIVE MG/DL
HBA1C MFR BLD: 11.9 % (ref 4–5.6)
KETONES UR STRIP.AUTO-MCNC: NEGATIVE MG/DL
LEUKOCYTE ESTERASE UR QL STRIP.AUTO: NEGATIVE
MAGNESIUM SERPL-MCNC: 1.9 MG/DL (ref 1.5–2.5)
MICRO URNS: NORMAL
NITRITE UR QL STRIP.AUTO: NEGATIVE
NT-PROBNP SERPL IA-MCNC: 4500 PG/ML (ref 0–125)
PH UR STRIP.AUTO: 5.5 [PH] (ref 5–8)
PROT UR QL STRIP: NEGATIVE MG/DL
RBC UR QL AUTO: NEGATIVE
SP GR UR STRIP.AUTO: 1.01
TSH SERPL DL<=0.005 MIU/L-ACNC: 1.28 UIU/ML (ref 0.38–5.33)
UROBILINOGEN UR STRIP.AUTO-MCNC: 0.2 MG/DL

## 2023-12-28 PROCEDURE — 96372 THER/PROPH/DIAG INJ SC/IM: CPT

## 2023-12-28 PROCEDURE — 81003 URINALYSIS AUTO W/O SCOPE: CPT

## 2023-12-28 PROCEDURE — 99223 1ST HOSP IP/OBS HIGH 75: CPT | Mod: AI,GC | Performed by: STUDENT IN AN ORGANIZED HEALTH CARE EDUCATION/TRAINING PROGRAM

## 2023-12-28 PROCEDURE — A9270 NON-COVERED ITEM OR SERVICE: HCPCS

## 2023-12-28 PROCEDURE — 700111 HCHG RX REV CODE 636 W/ 250 OVERRIDE (IP): Mod: JZ | Performed by: STUDENT IN AN ORGANIZED HEALTH CARE EDUCATION/TRAINING PROGRAM

## 2023-12-28 PROCEDURE — 96374 THER/PROPH/DIAG INJ IV PUSH: CPT

## 2023-12-28 PROCEDURE — A9270 NON-COVERED ITEM OR SERVICE: HCPCS | Performed by: STUDENT IN AN ORGANIZED HEALTH CARE EDUCATION/TRAINING PROGRAM

## 2023-12-28 PROCEDURE — 36415 COLL VENOUS BLD VENIPUNCTURE: CPT

## 2023-12-28 PROCEDURE — 770020 HCHG ROOM/CARE - TELE (206)

## 2023-12-28 PROCEDURE — 82962 GLUCOSE BLOOD TEST: CPT | Mod: 91

## 2023-12-28 PROCEDURE — 700102 HCHG RX REV CODE 250 W/ 637 OVERRIDE(OP): Performed by: STUDENT IN AN ORGANIZED HEALTH CARE EDUCATION/TRAINING PROGRAM

## 2023-12-28 PROCEDURE — 700111 HCHG RX REV CODE 636 W/ 250 OVERRIDE (IP): Mod: JZ

## 2023-12-28 PROCEDURE — 700102 HCHG RX REV CODE 250 W/ 637 OVERRIDE(OP)

## 2023-12-28 RX ORDER — CLINDAMYCIN HYDROCHLORIDE 150 MG/1
300 CAPSULE ORAL 4 TIMES DAILY
Status: DISCONTINUED | OUTPATIENT
Start: 2023-12-28 | End: 2023-12-30

## 2023-12-28 RX ORDER — LEVALBUTEROL INHALATION SOLUTION 1.25 MG/3ML
1.25 SOLUTION RESPIRATORY (INHALATION)
Status: DISCONTINUED | OUTPATIENT
Start: 2023-12-28 | End: 2023-12-30 | Stop reason: HOSPADM

## 2023-12-28 RX ORDER — GABAPENTIN 300 MG/1
600 CAPSULE ORAL 3 TIMES DAILY
Status: DISCONTINUED | OUTPATIENT
Start: 2023-12-28 | End: 2023-12-30

## 2023-12-28 RX ORDER — OMEPRAZOLE 20 MG/1
20 CAPSULE, DELAYED RELEASE ORAL DAILY
Status: DISCONTINUED | OUTPATIENT
Start: 2023-12-28 | End: 2023-12-30

## 2023-12-28 RX ORDER — BISACODYL 10 MG
10 SUPPOSITORY, RECTAL RECTAL
Status: DISCONTINUED | OUTPATIENT
Start: 2023-12-28 | End: 2023-12-30 | Stop reason: HOSPADM

## 2023-12-28 RX ORDER — FUROSEMIDE 10 MG/ML
80 INJECTION INTRAMUSCULAR; INTRAVENOUS ONCE
Status: COMPLETED | OUTPATIENT
Start: 2023-12-28 | End: 2023-12-28

## 2023-12-28 RX ORDER — ONDANSETRON 2 MG/ML
4 INJECTION INTRAMUSCULAR; INTRAVENOUS EVERY 4 HOURS PRN
Status: DISCONTINUED | OUTPATIENT
Start: 2023-12-28 | End: 2023-12-30 | Stop reason: HOSPADM

## 2023-12-28 RX ORDER — LEVOTHYROXINE SODIUM 112 UG/1
112 TABLET ORAL
Status: DISCONTINUED | OUTPATIENT
Start: 2023-12-28 | End: 2023-12-30

## 2023-12-28 RX ORDER — LANOLIN ALCOHOL/MO/W.PET/CERES
400 CREAM (GRAM) TOPICAL DAILY
Status: DISCONTINUED | OUTPATIENT
Start: 2023-12-28 | End: 2023-12-30

## 2023-12-28 RX ORDER — ROSUVASTATIN CALCIUM 20 MG/1
20 TABLET, COATED ORAL EVERY EVENING
Status: DISCONTINUED | OUTPATIENT
Start: 2023-12-28 | End: 2023-12-30

## 2023-12-28 RX ORDER — CLINDAMYCIN HYDROCHLORIDE 300 MG/1
1 CAPSULE ORAL 4 TIMES DAILY
Status: ON HOLD | COMMUNITY
End: 2023-12-30

## 2023-12-28 RX ORDER — OXYCODONE HYDROCHLORIDE 10 MG/1
10 TABLET ORAL
Status: DISCONTINUED | OUTPATIENT
Start: 2023-12-28 | End: 2023-12-30 | Stop reason: HOSPADM

## 2023-12-28 RX ORDER — OXYCODONE HYDROCHLORIDE 5 MG/1
5 TABLET ORAL
Status: DISCONTINUED | OUTPATIENT
Start: 2023-12-28 | End: 2023-12-30 | Stop reason: HOSPADM

## 2023-12-28 RX ORDER — LABETALOL HYDROCHLORIDE 5 MG/ML
10 INJECTION, SOLUTION INTRAVENOUS EVERY 4 HOURS PRN
Status: DISCONTINUED | OUTPATIENT
Start: 2023-12-28 | End: 2023-12-30 | Stop reason: HOSPADM

## 2023-12-28 RX ORDER — VERAPAMIL HYDROCHLORIDE 80 MG/1
80 TABLET ORAL
Status: DISCONTINUED | OUTPATIENT
Start: 2023-12-28 | End: 2023-12-30

## 2023-12-28 RX ORDER — FUROSEMIDE 10 MG/ML
40 INJECTION INTRAMUSCULAR; INTRAVENOUS
Status: DISCONTINUED | OUTPATIENT
Start: 2023-12-28 | End: 2023-12-30 | Stop reason: HOSPADM

## 2023-12-28 RX ORDER — MONTELUKAST SODIUM 10 MG/1
10 TABLET ORAL EVERY EVENING
Status: DISCONTINUED | OUTPATIENT
Start: 2023-12-28 | End: 2023-12-30

## 2023-12-28 RX ORDER — ACETAMINOPHEN 325 MG/1
650 TABLET ORAL EVERY 6 HOURS PRN
Status: DISCONTINUED | OUTPATIENT
Start: 2023-12-28 | End: 2023-12-30

## 2023-12-28 RX ORDER — POTASSIUM CHLORIDE 20 MEQ/1
40 TABLET, EXTENDED RELEASE ORAL EVERY EVENING
Status: DISCONTINUED | OUTPATIENT
Start: 2023-12-29 | End: 2023-12-30

## 2023-12-28 RX ORDER — FEBUXOSTAT 40 MG/1
40 TABLET, FILM COATED ORAL DAILY
Status: DISCONTINUED | OUTPATIENT
Start: 2023-12-28 | End: 2023-12-30

## 2023-12-28 RX ORDER — CARBAMAZEPINE 200 MG/1
200 TABLET ORAL 3 TIMES DAILY
Status: DISCONTINUED | OUTPATIENT
Start: 2023-12-28 | End: 2023-12-30

## 2023-12-28 RX ORDER — AMOXICILLIN 250 MG
2 CAPSULE ORAL 2 TIMES DAILY
Status: DISCONTINUED | OUTPATIENT
Start: 2023-12-28 | End: 2023-12-30 | Stop reason: HOSPADM

## 2023-12-28 RX ORDER — ONDANSETRON 4 MG/1
4 TABLET, ORALLY DISINTEGRATING ORAL EVERY 4 HOURS PRN
Status: DISCONTINUED | OUTPATIENT
Start: 2023-12-28 | End: 2023-12-30 | Stop reason: HOSPADM

## 2023-12-28 RX ORDER — ASPIRIN 81 MG/1
81 TABLET ORAL DAILY
Status: DISCONTINUED | OUTPATIENT
Start: 2023-12-28 | End: 2023-12-30

## 2023-12-28 RX ORDER — LIDOCAINE 4 G/G
1 PATCH TOPICAL EVERY 24 HOURS
Status: DISCONTINUED | OUTPATIENT
Start: 2023-12-28 | End: 2023-12-30

## 2023-12-28 RX ORDER — POLYETHYLENE GLYCOL 3350 17 G/17G
1 POWDER, FOR SOLUTION ORAL
Status: DISCONTINUED | OUTPATIENT
Start: 2023-12-28 | End: 2023-12-30 | Stop reason: HOSPADM

## 2023-12-28 RX ORDER — LEVALBUTEROL TARTRATE 45 UG/1
2 AEROSOL, METERED ORAL 4 TIMES DAILY PRN
Status: DISCONTINUED | OUTPATIENT
Start: 2023-12-28 | End: 2023-12-28

## 2023-12-28 RX ORDER — OSELTAMIVIR PHOSPHATE 75 MG/1
75 CAPSULE ORAL 2 TIMES DAILY
Status: DISCONTINUED | OUTPATIENT
Start: 2023-12-28 | End: 2023-12-30

## 2023-12-28 RX ADMIN — ASPIRIN 81 MG: 81 TABLET, COATED ORAL at 06:54

## 2023-12-28 RX ADMIN — LEVOTHYROXINE SODIUM 112 MCG: 0.11 TABLET ORAL at 10:51

## 2023-12-28 RX ADMIN — DOCUSATE SODIUM 50 MG AND SENNOSIDES 8.6 MG 2 TABLET: 8.6; 5 TABLET, FILM COATED ORAL at 06:54

## 2023-12-28 RX ADMIN — CLINDAMYCIN HYDROCHLORIDE 300 MG: 150 CAPSULE ORAL at 20:31

## 2023-12-28 RX ADMIN — FUROSEMIDE 80 MG: 10 INJECTION, SOLUTION INTRAVENOUS at 04:33

## 2023-12-28 RX ADMIN — MONTELUKAST 10 MG: 10 TABLET, FILM COATED ORAL at 17:32

## 2023-12-28 RX ADMIN — APIXABAN 5 MG: 5 TABLET, FILM COATED ORAL at 17:33

## 2023-12-28 RX ADMIN — INSULIN HUMAN 3 UNITS: 100 INJECTION, SOLUTION PARENTERAL at 20:35

## 2023-12-28 RX ADMIN — VERAPAMIL HYDROCHLORIDE 80 MG: 80 TABLET ORAL at 20:30

## 2023-12-28 RX ADMIN — Medication 400 MG: at 10:16

## 2023-12-28 RX ADMIN — FEBUXOSTAT 40 MG: 40 TABLET, FILM COATED ORAL at 10:52

## 2023-12-28 RX ADMIN — FLUTICASONE FUROATE, UMECLIDINIUM BROMIDE AND VILANTEROL TRIFENATATE 1 PUFF: 100; 62.5; 25 POWDER RESPIRATORY (INHALATION) at 10:53

## 2023-12-28 RX ADMIN — OSELTAMIVIR PHOSPHATE 75 MG: 75 CAPSULE ORAL at 13:03

## 2023-12-28 RX ADMIN — CARBAMAZEPINE 200 MG: 200 TABLET ORAL at 10:50

## 2023-12-28 RX ADMIN — ROSUVASTATIN CALCIUM 20 MG: 20 TABLET, FILM COATED ORAL at 17:32

## 2023-12-28 RX ADMIN — INSULIN GLARGINE-YFGN 24 UNITS: 100 INJECTION, SOLUTION SUBCUTANEOUS at 17:36

## 2023-12-28 RX ADMIN — OXYCODONE HYDROCHLORIDE 10 MG: 10 TABLET ORAL at 16:31

## 2023-12-28 RX ADMIN — INSULIN HUMAN 3 UNITS: 100 INJECTION, SOLUTION PARENTERAL at 17:37

## 2023-12-28 RX ADMIN — INSULIN HUMAN 4 UNITS: 100 INJECTION, SOLUTION PARENTERAL at 08:33

## 2023-12-28 RX ADMIN — CARBAMAZEPINE 200 MG: 200 TABLET ORAL at 20:31

## 2023-12-28 RX ADMIN — OMEPRAZOLE 20 MG: 20 CAPSULE, DELAYED RELEASE ORAL at 06:55

## 2023-12-28 RX ADMIN — OXYCODONE HYDROCHLORIDE 10 MG: 10 TABLET ORAL at 10:49

## 2023-12-28 RX ADMIN — OSELTAMIVIR PHOSPHATE 75 MG: 75 CAPSULE ORAL at 04:35

## 2023-12-28 RX ADMIN — CLINDAMYCIN HYDROCHLORIDE 300 MG: 150 CAPSULE ORAL at 10:17

## 2023-12-28 RX ADMIN — GABAPENTIN 600 MG: 300 CAPSULE ORAL at 17:33

## 2023-12-28 RX ADMIN — APIXABAN 5 MG: 5 TABLET, FILM COATED ORAL at 06:54

## 2023-12-28 RX ADMIN — CARBAMAZEPINE 200 MG: 200 TABLET ORAL at 16:30

## 2023-12-28 RX ADMIN — FUROSEMIDE 40 MG: 10 INJECTION, SOLUTION INTRAVENOUS at 16:32

## 2023-12-28 RX ADMIN — INSULIN HUMAN 3 UNITS: 100 INJECTION, SOLUTION PARENTERAL at 12:58

## 2023-12-28 RX ADMIN — GABAPENTIN 600 MG: 300 CAPSULE ORAL at 12:46

## 2023-12-28 RX ADMIN — CLINDAMYCIN HYDROCHLORIDE 300 MG: 150 CAPSULE ORAL at 16:32

## 2023-12-28 ASSESSMENT — COGNITIVE AND FUNCTIONAL STATUS - GENERAL
DAILY ACTIVITIY SCORE: 12
SUGGESTED CMS G CODE MODIFIER DAILY ACTIVITY: CL
MOVING TO AND FROM BED TO CHAIR: A LOT
PERSONAL GROOMING: A LOT
WALKING IN HOSPITAL ROOM: A LOT
EATING MEALS: A LOT
CLIMB 3 TO 5 STEPS WITH RAILING: A LOT
DRESSING REGULAR LOWER BODY CLOTHING: A LOT
DRESSING REGULAR UPPER BODY CLOTHING: A LOT
MOVING FROM LYING ON BACK TO SITTING ON SIDE OF FLAT BED: A LOT
MOBILITY SCORE: 12
HELP NEEDED FOR BATHING: A LOT
TURNING FROM BACK TO SIDE WHILE IN FLAT BAD: A LOT
SUGGESTED CMS G CODE MODIFIER MOBILITY: CL
STANDING UP FROM CHAIR USING ARMS: A LOT
TOILETING: A LOT

## 2023-12-28 ASSESSMENT — ENCOUNTER SYMPTOMS
SHORTNESS OF BREATH: 1
LOSS OF CONSCIOUSNESS: 0
FEVER: 0
HEADACHES: 0
DOUBLE VISION: 0
FOCAL WEAKNESS: 1
VOMITING: 0
DIZZINESS: 0
CHILLS: 0
NAUSEA: 0
SPUTUM PRODUCTION: 0
BLURRED VISION: 0
SEIZURES: 0
BACK PAIN: 1
ABDOMINAL PAIN: 0
WHEEZING: 0
PALPITATIONS: 0
DIAPHORESIS: 1

## 2023-12-28 ASSESSMENT — PAIN DESCRIPTION - PAIN TYPE
TYPE: ACUTE PAIN

## 2023-12-28 ASSESSMENT — COPD QUESTIONNAIRES
DURING THE PAST 4 WEEKS HOW MUCH DID YOU FEEL SHORT OF BREATH: NONE/LITTLE OF THE TIME
HAVE YOU SMOKED AT LEAST 100 CIGARETTES IN YOUR ENTIRE LIFE: NO/DON'T KNOW
DO YOU EVER COUGH UP ANY MUCUS OR PHLEGM?: NO/ONLY WITH OCCASIONAL COLDS OR INFECTIONS
COPD SCREENING SCORE: 2

## 2023-12-28 ASSESSMENT — CHA2DS2 SCORE
DIABETES: YES
VASCULAR DISEASE: NO
CHA2DS2 VASC SCORE: 6
AGE 65 TO 74: YES
HYPERTENSION: NO
CHF OR LEFT VENTRICULAR DYSFUNCTION: YES
SEX: FEMALE
AGE 75 OR GREATER: NO
PRIOR STROKE OR TIA OR THROMBOEMBOLISM: YES

## 2023-12-28 ASSESSMENT — FIBROSIS 4 INDEX: FIB4 SCORE: 5.37

## 2023-12-28 ASSESSMENT — LIFESTYLE VARIABLES: SUBSTANCE_ABUSE: 0

## 2023-12-28 NOTE — ASSESSMENT & PLAN NOTE
Chest x-ray demonstrates diffuse interstitial prominence; patient states she has been taking more liquids than normal.  Patient has bilateral lower extremity edema.  - Lasix  - I's and O's  - Monitor weights  - Fluid restriction

## 2023-12-28 NOTE — DISCHARGE PLANNING
Anticipate post acute services to facilitate a successful transition to community. Please consider a PM&R referral to assist with post acute care discharge planning.

## 2023-12-28 NOTE — ED NOTES
Checked on bed, connected to monitor,asleep   with unlabored respirations. Vital signs is stable.    No current needs identified.  Gurney in low position, side rail up for pt safety. Call light within reach.

## 2023-12-28 NOTE — ED TRIAGE NOTES
"Chief Complaint   Patient presents with    Weakness     BIB EMS from home; reported by spouse of having increase body weakness; sometimes confuse during conversation; had a left foot infection x1 week and was on ABX. On home oxygen at 2L PRN for sleep apnea; on thinners; H/o of stroke- with left arm deficit; Aox3 GCS14     /58   Pulse 80   Temp (!) 38.7 °C (101.6 °F) (Axillary)   Resp 19   Ht 1.727 m (5' 8\")   Wt 121 kg (266 lb 12.1 oz)   SpO2 (!) 3%   BMI 40.56 kg/m²         "

## 2023-12-28 NOTE — ASSESSMENT & PLAN NOTE
Due to influenza A pneumonia versus volume overload from diastolic heart failure exacerbation.  Baseline oxygenation 2 L at home.  States she is compliant with medications.  - Admit to telemetry  - RT protocol  - Isolation precautions  - Aspiration precautions  - Fall precautions  - Tamiflu

## 2023-12-28 NOTE — ED NOTES
Break RN: Patient non compliant and continuously repositioning and getting limbs through ER gurney rail. X3 MAX assist to move patient

## 2023-12-28 NOTE — ED NOTES
Med rec is complete per  by phone [phone]       Allergies reviewed.    Has patient had any outside antibiotics in the last 30 days? Y    Any Anticoagulants (rivaroxaban, apixaban, edoxaban, dabigatran, warfarin, enoxaparin) taken in the last 14 days? Y  Anticoagulant name: Eliquis, Last dose: 12/27/23 @ 08:00 am.        Pharmacy/Pharmacies Pt utilizes : [facility] [phone]

## 2023-12-28 NOTE — H&P
"[facility] History & Physical Note    Date of Service  12/28/2023    Attending: Linda Lunsford M.d.  Senior Resident: Dr. Marybeth Salvador MD  Contact Number: [phone]    Primary Care Physician  Adrián Duckworth M.D.    Consultants  None    Specialist Names: NA    Code Status  Full Code    Chief Complaint  Chief Complaint   Patient presents with    Weakness     BIB EMS from home; reported by spouse of having increase body weakness; sometimes confuse during conversation; had a left foot infection x1 week and was on ABX. On home oxygen at 2L PRN for sleep apnea; on thinners; H/o of stroke- with left arm deficit; Aox3 GCS14       History of Presenting Illness (HPI):   Jenifer Ramos is a 63 y.o. female with past medical history of severe obesity, paroxysmal A-fib status post 2 ablations, chronic anticoagulation with Eliquis, factor V Leiden, diastolic heart failure, asthma, hypothyroidism, pulmonary hypertension, thyroid disorder, CVA 1 year ago, sleep apnea, diabetes on insulin, osteoarthritis, debility, peripheral neuropathy  who presented 12/27/2023 with weakness, confusion, shortness of breath for the last couple of days.  Per patient, her son has been sick with \"a cold\".  States she does not remember how long she has had the left foot infection.  States that she had recently had a stroke with left arm deficit.  Denies fever, chills, chest pain, palpitations, abdominal pain, diarrhea.    In the ED, patient AAO x 3, moaning in pain.  When asked what hurt, she could not really reply.  Temperature 101.6, pulse 80, respiratory rate 19, blood pressure 116/58, saturating 93% on 3 L.  CBC remarkable for WBC 3.5, hemoglobin 11.5, MCV 81.1, platelet count 124.  CHEM panel remarkable for sodium 132, glucose 222, alkaline phosphatase 173.  Lactic acid 1.8.  Diagnostic alcohol less than 10.1 CRP 7.47.  Respiratory panel positive for influenza A.  Chest x-ray remarkable for diffuse interstitial " prominence but stable cardiomegaly.  X-ray of left foot demonstrated no definite acute osseous abnormality.  In the ED, blood cultures x 2 collected; patient was good 1 L NS bolus, acetaminophen.  Patient will be admitted for acute on chronic hypoxic respiratory failure secondary to influenza A pneumonia versus volume overload.    I discussed the plan of care with patient.    Review of Systems  Review of Systems   Constitutional:  Positive for diaphoresis and malaise/fatigue. Negative for chills and fever.   Eyes:  Negative for blurred vision and double vision.   Respiratory:  Positive for shortness of breath. Negative for sputum production and wheezing.    Cardiovascular:  Negative for chest pain, palpitations and leg swelling.   Gastrointestinal:  Negative for abdominal pain, nausea and vomiting.   Genitourinary:  Negative for dysuria.   Musculoskeletal:  Positive for back pain.   Neurological:  Positive for focal weakness. Negative for dizziness, seizures, loss of consciousness and headaches.   Psychiatric/Behavioral:  Negative for substance abuse.        Past Medical History   has a past medical history of Arrhythmia, Arthritis, ASTHMA (4/24/2013), Back pain (4/24/2013), Blood clotting disorder ([facility]), Disorder of thyroid, DM (diabetes mellitus) ([facility]) (4/24/2013), Factor V deficiency ([facility]) (04/24/2013), Palpitations (4/24/2013), Pneumonia (1999), Psychiatric problem, Sleep apnea (4/24/2013), Snoring, Urinary incontinence, and Varicose vein (4/24/2013).    Surgical History   has a past surgical history that includes hysterectomy, total abdominal (2003); oophorectomy; tonsillectomy; cholecystectomy; fusion, spine, lumbar, plif; other cardiac surgery; other orthopedic surgery (2018); knee arthroplasty total (Left, 2013); knee arthroplasty total (Right, 2013); pr reconstr total shoulder implant (Left, 11/27/2019); and clavicle distal excision (11/27/2019).     Family History  family history includes Heart Disease  (age of onset: 75) in her mother; Hypertension in her sister; Lung Disease (age of onset: 69) in her father.   Family history reviewed with patient.     Social History  Tobacco: Denies  Alcohol: Denies  Recreational drugs (illegal or prescription): Denies  Employment: None  Living Situation: With   Recent Travel: Denies  Primary Care Provider: Reviewed    Other (stressors, spirituality, exposures): Denies    Allergies  Allergies   Allergen Reactions    Azithromycin Anaphylaxis    Erythromycin Anaphylaxis    Other Misc Anaphylaxis     Flu vaccine    Penicillins Anaphylaxis     As a child    Pistachio Anaphylaxis    Sulfa Drugs Anaphylaxis    Tetraglycine Anaphylaxis    Other Drug Unspecified     Long acting benzodiazepines only single dose otherwise combative     Physostigmine Unspecified     Abdomen extreme swelling    Tape Rash and Itching     tegaderm ok  Blisters with others    Zanaflex [Tizanidine Hcl] Unspecified     Falling asleep mid sentence    Albuterol Palpitations    Atorvastatin Unspecified     Extreme joint pain    Chlorhexidine Rash     blistering    Lamisil [Terbinafine Hcl] Unspecified     Pancreatitis     Morphine Unspecified     Not effective       Medications  Prior to Admission Medications   Prescriptions Last Dose Informant Patient Reported? Taking?   Insulin Degludec 200 UNIT/ML Solution Pen-injector  Patient, Family Member Yes No   Sig: Inject 160 Units under the skin every day.   Rimegepant Sulfate (NURTEC) 75 MG TABLET DISPERSIBLE  Patient, Family Member Yes No   Sig: Take 75 mg by mouth 1 time a day as needed. Indications: Migraine Headache   apixaban (ELIQUIS) 5mg Tab  Patient, Family Member Yes No   Sig: Take 5 mg by mouth 2 times a day.   aspirin EC (ECOTRIN) 81 MG Tablet Delayed Response  Patient, Family Member Yes No   Sig: Take 81 mg by mouth every day.   carBAMazepine (TEGRETOL) 200 MG Tab  Patient, Family Member Yes No   Sig: Take 200 mg by mouth 3 times a day.   febuxostat  (ULORIC) 40 MG Tab  Patient, Family Member Yes No   Sig: Take 40 mg by mouth at bedtime.   fluticasone-umeclidin-vilant (TRELEGY ELLIPTA) 100-62.5-25 MCG/ACT AEROSOL POWDER, BREATH ACTIVATED inhalation  Patient, Family Member Yes No   Sig: Inhale 1 Inhalation. every day.   furosemide (LASIX) 20 MG Tab  Patient, Family Member Yes No   Sig: Take 40-60 mg by mouth every day. * take an extra 20 mg ( mid day) if weight is > 285 lbs   gabapentin (NEURONTIN) 300 MG Cap  Patient, Family Member Yes No   Sig: Take 600 mg by mouth 3 times a day. * may take an extra 300 mg  ( 600 mg) if needed   hydrOXYzine HCl (ATARAX) 25 MG Tab  Patient, Family Member Yes No   Sig: Take 25 mg by mouth at bedtime.   insulin lispro (HUMALOG) 100 UNIT/ML  Patient, Family Member Yes No   Sig: Inject 40-80 Units under the skin see administration instructions.  FSBS 4-6times daily prn  > 250 = 40 units  > 325 = 60 units  > 400  = 80 units   levalbuterol (XOPENEX HFA) 45 MCG/ACT inhaler  Patient, Family Member Yes No   Sig: Inhale 2 Puffs 4 times a day as needed for Shortness of Breath.   levothyroxine (SYNTHROID) 112 MCG Tab  Patient, Family Member Yes No   Sig: Take 112 mcg by mouth every morning on an empty stomach.   lidocaine (LIDODERM) 5 % Patch  Patient, Family Member Yes No   Sig: Place 1 Patch on the skin every 24 hours. * top of left foot*   magnesium oxide (MAG-OX) 400 MG Tab tablet  Patient, Family Member Yes No   Sig: Take 400 mg by mouth every day.   montelukast (SINGULAIR) 10 MG TABS  Patient, Family Member Yes No   Sig: Take 10 mg by mouth every evening.   oxyCODONE immediate-release (ROXICODONE) 5 MG Tab  Patient, Family Member Yes No   Sig: Take 5-15 mg by mouth every four hours as needed. * depending on pain level    15 mg = qhs Scheduled   pantoprazole (PROTONIX) 40 MG Tablet Delayed Response  Patient, Family Member Yes No   Sig: Take 40 mg by mouth every day.   potassium chloride SA (K-DUR) 10 MEQ Tab CR  Patient, Family Member  Yes No   Sig: Take 40-60 mEq by mouth every day. * take an extra 20 meq if an extra dose of lasix is taken*   promethazine (PHENERGAN) 25 MG Tab  Patient, Family Member Yes No   Sig: Take 25 mg by mouth every 6 hours as needed for Nausea/Vomiting.   rosuvastatin (CRESTOR) 20 MG Tab  Patient, Family Member Yes No   Sig: Take 20 mg by mouth every evening.   verapamil (ISOPTIN) 80 MG Tab  Patient, Family Member Yes No   Sig: Take 80 mg by mouth 2 times a day.      Facility-Administered Medications: None       Physical Exam  Temp:  [37.8 °C (100 °F)-38.7 °C (101.6 °F)] 37.8 °C (100 °F)  Pulse:  [79-99] 99  Resp:  [19-26] 19  BP: (116-136)/(58-89) 136/89  SpO2:  [93 %-96 %] 95 %  Blood Pressure : 130/61   Temperature: 38 °C (100.4 °F)   Pulse: 79   Respiration: 20   Pulse Oximetry: 96 %       Physical Exam  Vitals and nursing note reviewed.   Constitutional:       General: She is awake.      Appearance: She is morbidly obese.      Interventions: Nasal cannula in place.   HENT:      Head: Normocephalic and atraumatic.      Nose: Nose normal.      Mouth/Throat:      Mouth: Mucous membranes are dry.      Pharynx: Oropharynx is clear.   Eyes:      Extraocular Movements: Extraocular movements intact.      Pupils: Pupils are equal, round, and reactive to light.   Cardiovascular:      Rate and Rhythm: Normal rate and regular rhythm.      Pulses: Normal pulses.      Heart sounds: Heart sounds are distant (morbidly obese).   Pulmonary:      Effort: Respiratory distress present.      Breath sounds: Normal breath sounds. No wheezing.   Chest:      Chest wall: No tenderness.   Abdominal:      General: Abdomen is flat. Bowel sounds are normal. There is no distension.      Palpations: Abdomen is soft.      Tenderness: There is no abdominal tenderness. There is no guarding.   Musculoskeletal:      Right lower le+ Pitting Edema present.      Left lower le+ Pitting Edema present.   Skin:     General: Skin is warm and dry.    Neurological:      General: No focal deficit present.      Mental Status: She is alert and oriented to person, place, and time.      Cranial Nerves: No cranial nerve deficit.   Psychiatric:         Speech: Speech is delayed.         Laboratory:  Recent Labs     12/27/23 2216   WBC 3.5*   RBC 4.86   HEMOGLOBIN 11.5*   HEMATOCRIT 39.4   MCV 81.1*   MCH 23.7*   MCHC 29.2*   RDW 55.7*   PLATELETCT 124*   MPV 10.5     Recent Labs     12/27/23 2216   SODIUM 132*   POTASSIUM 4.4   CHLORIDE 97   CO2 26   GLUCOSE 222*   BUN 7*   CREATININE 0.64   CALCIUM 8.7     Recent Labs     12/27/23 2216   ALTSGPT 16   ASTSGOT 41   ALKPHOSPHAT 173*   TBILIRUBIN 0.4   GLUCOSE 222*         Imaging:  DX-FOOT-COMPLETE 3+ LEFT   Final Result         No definite acute osseous abnormality but the evaluation limited due to osseous demineralization.      DX-CHEST-LIMITED (1 VIEW)   Final Result         Diffuse interstitial prominence related to mild fluid overload.      Stable cardiomegaly.      EC-ECHOCARDIOGRAM COMPLETE W/O CONT    (Results Pending)       X-Ray:  My impression is: Diffused interstitial prominence    Assessment/Plan:  Problem Representation:   63 y.o. female with past medical history of severe obesity, paroxysmal A-fib status post 2 ablations, chronic anticoagulation with Eliquis, factor V Leiden, diastolic heart failure, asthma, hypothyroidism pulmonary hypertension, thyroid disorder, CVA 1 year ago, sleep apnea, diabetes on insulin, osteoarthritis, debility, peripheral neuropathy before acute on chronic hypoxic respiratory failure secondary to influenza versus volume overload from diastolic heart failure.    I anticipate this patient will require at least two midnights for appropriate medical management, necessitating inpatient admission because acute on chronic hypoxic respiratory failure, volume overload    Patient will need a Telemetry bed on MEDICAL service .  The need is secondary to acute on chronic hypoxic  respiratory failure.    * Acute on chronic respiratory failure with hypoxemia (HCC)- (present on admission)  Assessment & Plan  Due to influenza A pneumonia versus volume overload from diastolic heart failure exacerbation.  Baseline oxygenation 2 L at home.  States she is compliant with medications.  - Admit to telemetry  - RT protocol  - Isolation precautions  - Aspiration precautions  - Fall precautions  - Tamiflu    Influenza A  Assessment & Plan  Positive on respiratory panel, with worsening respiratory symptoms  - See acute on chronic hypoxic respiratory failure    Acute on chronic diastolic heart failure (HCC)- (present on admission)  Assessment & Plan  Patient states she has been taking more fluids than normal.  Last echo was more than a year ago  - Echo  - BNP: 4500  - Lipid panel  - Lasix  - I's and O's  - Daily weights  - Fluid restriction    Chronic ulcer of left foot due to diabetes mellitus (HCC)  Assessment & Plan  Was apparently on antibiotics; wound looks pink with granulation tissue; no pus or active bleeding noted  - Wound care    Pulmonary hypertension (HCC)- (present on admission)  Assessment & Plan  Chest x-ray demonstrates diffuse interstitial prominence; patient states she has been taking more liquids than normal.  Patient has bilateral lower extremity edema.  - Lasix  - I's and O's  - Monitor weights  - Fluid restriction    History of stroke- (present on admission)  Assessment & Plan  History of; with left-sided weakness  - Consider PT/OT    Thrombocytopenia (HCC)- (present on admission)  Assessment & Plan  Chronic history of; history of cirrhosis  - Continue to monitor    GERD (gastroesophageal reflux disease)- (present on admission)  Assessment & Plan  - Continue home pantoprazole    Hypothyroidism- (present on admission)  Assessment & Plan  - Continue home levothyroxine  - TSH    Atrial fibrillation (HCC)- (present on admission)  Assessment & Plan  Status post 2 ablations  - Continue home  Eliquis  - Continue home verapamil    DM (diabetes mellitus) ([facility])- (present on admission)  Assessment & Plan  A1c 5/2023: 10.2. Needs med rec  - Hold home medications  - Insulin sliding scale  - Weight based glargine  - Hypoglycemia protocol  - Fingerstick glucose  - A1c    Factor V deficiency ([facility])- (present on admission)  Assessment & Plan  - Continue home Eliquis    Sleep apnea- (present on admission)  Assessment & Plan  - Continue with CPAP if able        VTE prophylaxis: SCDs/TEDs

## 2023-12-28 NOTE — ASSESSMENT & PLAN NOTE
Positive on respiratory panel, with worsening respiratory symptoms  - See acute on chronic hypoxic respiratory failure

## 2023-12-28 NOTE — PROGRESS NOTES
Patient admitted after midnight by Marcelo Salvador and Jonn, please see H&P for full details.    Pt seen and examined by myself, today having adequate urinary response to IV Lasix, still on 3L o2 but subjectively SHOB feels a little better. Reported baseline 2L at home. Will continue diuresis and watch renal function. Discussed her home medications and current condition with her , who is also currently sick at home.     Hospital Course:  Jenifer Ramos is a 63 y.o. female w/ h/o obesity, factor V Leiden, parox Afib s/p ablation, chronic anticoag with Eliquis, diastolic heart failure, asthma, hypothyroidism, CVA 2022, MARCI, diabetes who was admitted 12/27/2023 for weakness, confusion, shortness of breath. Was admitted to hospitalist for acute on chronic hypoxemic respiratory failure secondary to flu A infection and diastolic heart failure exacerbation.     Assessment and plan:  Active Hospital Problems    Diagnosis     *Acute on chronic respiratory failure with hypoxemia (HCC) [J96.21]     Influenza A [J10.1]     Chronic ulcer of left foot due to diabetes mellitus (HCC) [E11.621, L97.529]     Thrombocytopenia (HCC) [D69.6]     History of stroke [Z86.73]     Acute on chronic diastolic heart failure (HCC) [I50.33]     Pulmonary hypertension (HCC) [I27.20]     Hypothyroidism [E03.9]     GERD (gastroesophageal reflux disease) [K21.9]     Atrial fibrillation (HCC) [I48.91]     Sleep apnea [G47.30]     Factor V deficiency (HCC) [D68.2]     DM (diabetes mellitus) ([facility]) [E11.9]      Plan:   -strict I&O  -daily weights  -echocardiogram  -IV diuresis with Lasix  -Tamiflu  -Follow cultures  -Trend BMP.  -Resume home medications    Dispo:Inpatient telemetry    Patient was discussed with bedside RN/SW     BEVERLY Araujo

## 2023-12-28 NOTE — ASSESSMENT & PLAN NOTE
Patient states she has been taking more fluids than normal.  Last echo was more than a year ago  - Echo  - BNP: 4500  - Lipid panel  - Lasix  - I's and O's  - Daily weights  - Fluid restriction

## 2023-12-28 NOTE — ASSESSMENT & PLAN NOTE
A1c 5/2023: 10.2. Needs med rec  - Hold home medications  - Insulin sliding scale  - Weight based glargine  - Hypoglycemia protocol  - Fingerstick glucose  - A1c

## 2023-12-28 NOTE — ED NOTES
Checked on bed, connected to monitor, asleep  with unlabored respirations. Vital signs is stable.   Denied any new complaints. No current needs identified.  Gurney in low position, side rail up for pt safety. Call light within reach.

## 2023-12-28 NOTE — ED NOTES
Pt transported by RN, belongings and paperwork at bedside, VS stable, pt placed on portable monitor and full oxygen tank, NAD

## 2023-12-28 NOTE — ASSESSMENT & PLAN NOTE
Was apparently on antibiotics; wound looks pink with granulation tissue; no pus or active bleeding noted  - Wound care    12/29/23-continue clindamycin only for short course of 5 days total

## 2023-12-28 NOTE — ED NOTES
Bedside report received by RN Kamryn    Oxygen: 3L NC  Fall Risk status: sign on door, bed in lowest position/locked, call light within reach  Patient Mentation:A+O x 4  Continuous cardiac monitoring: NSR  Precautions in place due to Flu A +

## 2023-12-28 NOTE — ED NOTES
Checked on bed, connected to monitor, asleep  with unlabored respirations. Vital signs is stable.    No current needs identified.  Gurney in low position, side rail up for pt safety. Call light within reach.

## 2023-12-28 NOTE — ED NOTES
Pt resting on hospital bed, connected to monitor, VS stable, NAD, aware of POC, call light within reach

## 2023-12-28 NOTE — ED NOTES
Bedside report given to the next shift RAMA Perez for continuity of care and management.  Provided opportunity to asks questions.  Pt connected to monitor  All pt belongings at bedside    Contraptions: IV on right arm  Alert and Oriented: X4  Ambulatory: no  Oxygen: 3L  Pending: bed assignment

## 2023-12-29 ENCOUNTER — PATIENT OUTREACH (OUTPATIENT)
Dept: SCHEDULING | Facility: IMAGING CENTER | Age: 63
End: 2023-12-29
Payer: MEDICARE

## 2023-12-29 PROBLEM — D61.818 PANCYTOPENIA (HCC): Status: ACTIVE | Noted: 2022-08-12

## 2023-12-29 LAB
ALBUMIN SERPL BCP-MCNC: 3.2 G/DL (ref 3.2–4.9)
ALBUMIN/GLOB SERPL: 1.1 G/DL
ALP SERPL-CCNC: 151 U/L (ref 30–99)
ALT SERPL-CCNC: 17 U/L (ref 2–50)
ANION GAP SERPL CALC-SCNC: 8 MMOL/L (ref 7–16)
AST SERPL-CCNC: 42 U/L (ref 12–45)
BASOPHILS # BLD AUTO: 0.5 % (ref 0–1.8)
BASOPHILS # BLD: 0.01 K/UL (ref 0–0.12)
BILIRUB SERPL-MCNC: 0.3 MG/DL (ref 0.1–1.5)
BUN SERPL-MCNC: 8 MG/DL (ref 8–22)
CALCIUM ALBUM COR SERPL-MCNC: 9.3 MG/DL (ref 8.5–10.5)
CALCIUM SERPL-MCNC: 8.7 MG/DL (ref 8.5–10.5)
CHLORIDE SERPL-SCNC: 96 MMOL/L (ref 96–112)
CHOLEST SERPL-MCNC: 111 MG/DL (ref 100–199)
CO2 SERPL-SCNC: 30 MMOL/L (ref 20–33)
CREAT SERPL-MCNC: 0.57 MG/DL (ref 0.5–1.4)
EOSINOPHIL # BLD AUTO: 0 K/UL (ref 0–0.51)
EOSINOPHIL NFR BLD: 0 % (ref 0–6.9)
ERYTHROCYTE [DISTWIDTH] IN BLOOD BY AUTOMATED COUNT: 54.8 FL (ref 35.9–50)
GFR SERPLBLD CREATININE-BSD FMLA CKD-EPI: 100 ML/MIN/1.73 M 2
GLOBULIN SER CALC-MCNC: 3 G/DL (ref 1.9–3.5)
GLUCOSE BLD STRIP.AUTO-MCNC: 120 MG/DL (ref 65–99)
GLUCOSE BLD STRIP.AUTO-MCNC: 156 MG/DL (ref 65–99)
GLUCOSE BLD STRIP.AUTO-MCNC: 178 MG/DL (ref 65–99)
GLUCOSE BLD STRIP.AUTO-MCNC: 231 MG/DL (ref 65–99)
GLUCOSE SERPL-MCNC: 135 MG/DL (ref 65–99)
HCT VFR BLD AUTO: 37.3 % (ref 37–47)
HDLC SERPL-MCNC: 22 MG/DL
HGB BLD-MCNC: 11.1 G/DL (ref 12–16)
IMM GRANULOCYTES # BLD AUTO: 0.01 K/UL (ref 0–0.11)
IMM GRANULOCYTES NFR BLD AUTO: 0.5 % (ref 0–0.9)
LDLC SERPL CALC-MCNC: 66 MG/DL
LYMPHOCYTES # BLD AUTO: 0.78 K/UL (ref 1–4.8)
LYMPHOCYTES NFR BLD: 39.4 % (ref 22–41)
MCH RBC QN AUTO: 23.8 PG (ref 27–33)
MCHC RBC AUTO-ENTMCNC: 29.8 G/DL (ref 32.2–35.5)
MCV RBC AUTO: 80 FL (ref 81.4–97.8)
MONOCYTES # BLD AUTO: 0.46 K/UL (ref 0–0.85)
MONOCYTES NFR BLD AUTO: 23.2 % (ref 0–13.4)
NEUTROPHILS # BLD AUTO: 0.72 K/UL (ref 1.82–7.42)
NEUTROPHILS NFR BLD: 36.4 % (ref 44–72)
NRBC # BLD AUTO: 0 K/UL
NRBC BLD-RTO: 0 /100 WBC (ref 0–0.2)
PLATELET # BLD AUTO: 115 K/UL (ref 164–446)
PMV BLD AUTO: 10.1 FL (ref 9–12.9)
POTASSIUM SERPL-SCNC: 3.5 MMOL/L (ref 3.6–5.5)
PROT SERPL-MCNC: 6.2 G/DL (ref 6–8.2)
RBC # BLD AUTO: 4.66 M/UL (ref 4.2–5.4)
SODIUM SERPL-SCNC: 134 MMOL/L (ref 135–145)
TRIGL SERPL-MCNC: 117 MG/DL (ref 0–149)
WBC # BLD AUTO: 2 K/UL (ref 4.8–10.8)

## 2023-12-29 PROCEDURE — 700102 HCHG RX REV CODE 250 W/ 637 OVERRIDE(OP)

## 2023-12-29 PROCEDURE — 99233 SBSQ HOSP IP/OBS HIGH 50: CPT | Performed by: INTERNAL MEDICINE

## 2023-12-29 PROCEDURE — 80061 LIPID PANEL: CPT

## 2023-12-29 PROCEDURE — 97163 PT EVAL HIGH COMPLEX 45 MIN: CPT

## 2023-12-29 PROCEDURE — 82962 GLUCOSE BLOOD TEST: CPT | Mod: 91

## 2023-12-29 PROCEDURE — A9270 NON-COVERED ITEM OR SERVICE: HCPCS

## 2023-12-29 PROCEDURE — 94640 AIRWAY INHALATION TREATMENT: CPT

## 2023-12-29 PROCEDURE — 700101 HCHG RX REV CODE 250

## 2023-12-29 PROCEDURE — 94760 N-INVAS EAR/PLS OXIMETRY 1: CPT

## 2023-12-29 PROCEDURE — 97167 OT EVAL HIGH COMPLEX 60 MIN: CPT

## 2023-12-29 PROCEDURE — 85025 COMPLETE CBC W/AUTO DIFF WBC: CPT

## 2023-12-29 PROCEDURE — 80053 COMPREHEN METABOLIC PANEL: CPT

## 2023-12-29 PROCEDURE — A9270 NON-COVERED ITEM OR SERVICE: HCPCS | Performed by: STUDENT IN AN ORGANIZED HEALTH CARE EDUCATION/TRAINING PROGRAM

## 2023-12-29 PROCEDURE — 770020 HCHG ROOM/CARE - TELE (206)

## 2023-12-29 PROCEDURE — 36415 COLL VENOUS BLD VENIPUNCTURE: CPT

## 2023-12-29 PROCEDURE — 700102 HCHG RX REV CODE 250 W/ 637 OVERRIDE(OP): Performed by: STUDENT IN AN ORGANIZED HEALTH CARE EDUCATION/TRAINING PROGRAM

## 2023-12-29 PROCEDURE — 51798 US URINE CAPACITY MEASURE: CPT

## 2023-12-29 PROCEDURE — 700111 HCHG RX REV CODE 636 W/ 250 OVERRIDE (IP): Mod: JZ

## 2023-12-29 RX ADMIN — APIXABAN 5 MG: 5 TABLET, FILM COATED ORAL at 05:40

## 2023-12-29 RX ADMIN — OMEPRAZOLE 20 MG: 20 CAPSULE, DELAYED RELEASE ORAL at 05:46

## 2023-12-29 RX ADMIN — GABAPENTIN 600 MG: 300 CAPSULE ORAL at 05:40

## 2023-12-29 RX ADMIN — INSULIN HUMAN 3 UNITS: 100 INJECTION, SOLUTION PARENTERAL at 08:22

## 2023-12-29 RX ADMIN — CARBAMAZEPINE 200 MG: 200 TABLET ORAL at 08:21

## 2023-12-29 RX ADMIN — LIDOCAINE 1 PATCH: 4 PATCH TOPICAL at 05:41

## 2023-12-29 RX ADMIN — GABAPENTIN 600 MG: 300 CAPSULE ORAL at 17:38

## 2023-12-29 RX ADMIN — FUROSEMIDE 40 MG: 10 INJECTION, SOLUTION INTRAVENOUS at 16:20

## 2023-12-29 RX ADMIN — FEBUXOSTAT 40 MG: 40 TABLET, FILM COATED ORAL at 05:41

## 2023-12-29 RX ADMIN — ASPIRIN 81 MG: 81 TABLET, COATED ORAL at 05:39

## 2023-12-29 RX ADMIN — INSULIN HUMAN 3 UNITS: 100 INJECTION, SOLUTION PARENTERAL at 17:39

## 2023-12-29 RX ADMIN — CLINDAMYCIN HYDROCHLORIDE 300 MG: 150 CAPSULE ORAL at 21:42

## 2023-12-29 RX ADMIN — CARBAMAZEPINE 200 MG: 200 TABLET ORAL at 16:20

## 2023-12-29 RX ADMIN — GABAPENTIN 600 MG: 300 CAPSULE ORAL at 13:11

## 2023-12-29 RX ADMIN — LEVOTHYROXINE SODIUM 112 MCG: 0.11 TABLET ORAL at 05:40

## 2023-12-29 RX ADMIN — CLINDAMYCIN HYDROCHLORIDE 300 MG: 150 CAPSULE ORAL at 08:25

## 2023-12-29 RX ADMIN — OXYCODONE 5 MG: 5 TABLET ORAL at 22:07

## 2023-12-29 RX ADMIN — INSULIN GLARGINE-YFGN 24 UNITS: 100 INJECTION, SOLUTION SUBCUTANEOUS at 17:41

## 2023-12-29 RX ADMIN — DOCUSATE SODIUM 50 MG AND SENNOSIDES 8.6 MG 2 TABLET: 8.6; 5 TABLET, FILM COATED ORAL at 17:38

## 2023-12-29 RX ADMIN — CLINDAMYCIN HYDROCHLORIDE 300 MG: 150 CAPSULE ORAL at 16:20

## 2023-12-29 RX ADMIN — MONTELUKAST 10 MG: 10 TABLET, FILM COATED ORAL at 17:38

## 2023-12-29 RX ADMIN — OXYCODONE HYDROCHLORIDE 10 MG: 10 TABLET ORAL at 02:44

## 2023-12-29 RX ADMIN — CLINDAMYCIN HYDROCHLORIDE 300 MG: 150 CAPSULE ORAL at 02:18

## 2023-12-29 RX ADMIN — FUROSEMIDE 40 MG: 10 INJECTION, SOLUTION INTRAVENOUS at 05:40

## 2023-12-29 RX ADMIN — OSELTAMIVIR PHOSPHATE 75 MG: 75 CAPSULE ORAL at 16:20

## 2023-12-29 RX ADMIN — VERAPAMIL HYDROCHLORIDE 80 MG: 80 TABLET ORAL at 21:42

## 2023-12-29 RX ADMIN — OSELTAMIVIR PHOSPHATE 75 MG: 75 CAPSULE ORAL at 02:18

## 2023-12-29 RX ADMIN — CARBAMAZEPINE 200 MG: 200 TABLET ORAL at 21:42

## 2023-12-29 RX ADMIN — DOCUSATE SODIUM 50 MG AND SENNOSIDES 8.6 MG 2 TABLET: 8.6; 5 TABLET, FILM COATED ORAL at 05:40

## 2023-12-29 RX ADMIN — ROSUVASTATIN CALCIUM 20 MG: 20 TABLET, FILM COATED ORAL at 17:38

## 2023-12-29 RX ADMIN — APIXABAN 5 MG: 5 TABLET, FILM COATED ORAL at 17:38

## 2023-12-29 RX ADMIN — Medication 400 MG: at 05:44

## 2023-12-29 RX ADMIN — FLUTICASONE FUROATE, UMECLIDINIUM BROMIDE AND VILANTEROL TRIFENATATE 1 PUFF: 100; 62.5; 25 POWDER RESPIRATORY (INHALATION) at 09:16

## 2023-12-29 RX ADMIN — LEVALBUTEROL 1.25 MG: 1.25 SOLUTION RESPIRATORY (INHALATION) at 10:02

## 2023-12-29 RX ADMIN — POTASSIUM CHLORIDE 40 MEQ: 1500 TABLET, EXTENDED RELEASE ORAL at 17:38

## 2023-12-29 RX ADMIN — INSULIN HUMAN 4 UNITS: 100 INJECTION, SOLUTION PARENTERAL at 13:12

## 2023-12-29 ASSESSMENT — COGNITIVE AND FUNCTIONAL STATUS - GENERAL
SUGGESTED CMS G CODE MODIFIER MOBILITY: CL
PERSONAL GROOMING: A LITTLE
MOVING TO AND FROM BED TO CHAIR: UNABLE
CLIMB 3 TO 5 STEPS WITH RAILING: A LOT
TURNING FROM BACK TO SIDE WHILE IN FLAT BAD: UNABLE
STANDING UP FROM CHAIR USING ARMS: A LITTLE
DAILY ACTIVITIY SCORE: 16
DRESSING REGULAR UPPER BODY CLOTHING: A LITTLE
HELP NEEDED FOR BATHING: A LOT
SUGGESTED CMS G CODE MODIFIER DAILY ACTIVITY: CK
WALKING IN HOSPITAL ROOM: A LITTLE
MOBILITY SCORE: 11
MOVING FROM LYING ON BACK TO SITTING ON SIDE OF FLAT BED: UNABLE
DRESSING REGULAR LOWER BODY CLOTHING: A LOT
TOILETING: A LOT

## 2023-12-29 ASSESSMENT — GAIT ASSESSMENTS
DEVIATION: BRADYKINETIC;SHUFFLED GAIT
ASSISTIVE DEVICE: FRONT WHEEL WALKER
DISTANCE (FEET): 5
GAIT LEVEL OF ASSIST: CONTACT GUARD ASSIST

## 2023-12-29 ASSESSMENT — ENCOUNTER SYMPTOMS
COUGH: 1
SHORTNESS OF BREATH: 1
CHILLS: 0
NAUSEA: 0
WHEEZING: 0
VOMITING: 0
FEVER: 0
DIARRHEA: 0
DIZZINESS: 1
PALPITATIONS: 0
SPUTUM PRODUCTION: 1
BACK PAIN: 0
ABDOMINAL PAIN: 0
SORE THROAT: 0
HEADACHES: 0
NERVOUS/ANXIOUS: 0

## 2023-12-29 ASSESSMENT — PAIN DESCRIPTION - PAIN TYPE: TYPE: ACUTE PAIN

## 2023-12-29 ASSESSMENT — FIBROSIS 4 INDEX: FIB4 SCORE: 5.76

## 2023-12-29 ASSESSMENT — ACTIVITIES OF DAILY LIVING (ADL): TOILETING: INDEPENDENT

## 2023-12-29 NOTE — THERAPY
Physical Therapy   Initial Evaluation     Patient Name: Jenifer Ramos  Age:  63 y.o., Sex:  female  Medical Record #: [id number]  Today's Date: 12/29/2023     Precautions  Precautions: Fall Risk  Comments: hx of CVA with residual L deficits    Assessment  Patient is 63 y.o. female admitted for weakness and confusion, found to be (+) for influenza and has a persistent L medial foot wound. Today, pt required Min A to CGA to complete mobility, Mod A to return LE BTB which pt reports spouse assists with at baseline. Pt will benefit from acute PT interventions to address impairments noted below. Currently requires placement    Plan    Physical Therapy Initial Treatment Plan   Treatment Plan : Bed Mobility, Gait Training, Neuro Re-Education / Balance, Self Care / Home Evaluation, Stair Training, Therapeutic Activities, Therapeutic Exercise  Treatment Frequency: 3 Times per Week  Duration: Until Therapy Goals Met       Discharge Recommendations: Recommend post-acute placement for additional physical therapy services prior to discharge home         12/29/23 1030   Precautions   Precautions Fall Risk   Comments hx of CVA with residual L deficits   Vitals   O2 Delivery Device Silicone Nasal Cannula   Pain 0 - 10 Group   Therapist Pain Assessment During Activity;Nurse Notified  (numbness in B LE from sitting on BSC)   Prior Living Situation   Prior Services Intermittent Physical Support for ADL Per Family   Housing / Facility 1 Story House   Steps Into Home 1  (4 inch threshold step)   Steps In Home 0   Bathroom Set up Walk In Shower;Shower Chair   Equipment Owned Front-Wheel Walker   Lives with - Patient's Self Care Capacity Spouse;Adult Children   Comments reports her Spouse and Son assist her with whatever needed since her stroke 2 years agao   Prior Level of Functional Mobility   Bed Mobility Independent   Transfer Status Independent   Ambulation Independent   Ambulation Distance limited household   Assistive Devices  "Used Front-Wheel Walker   Stairs Unable To Determine At This Time   Comments Pt quickly stated that she does not feel capable of negotiating step into home any time soon   Cognition    Level of Consciousness Alert   Comments received sitting in BSC. slightly self limiting with mobility. reports \"I feel my  and son are tired of dealing with taking care of me.\"   Active ROM Lower Body    Active ROM Lower Body  X   Comments B LE limited by body habitus and weakness   Strength Lower Body   Lower Body Strength  X   Comments B LE grossly assessed at 4-/5, endurance limited   Balance Assessment   Sitting Balance (Static) Fair +   Sitting Balance (Dynamic) Fair +   Standing Balance (Static) Fair   Standing Balance (Dynamic) Fair -   Weight Shift Sitting Fair   Weight Shift Standing Fair   Comments w/ FWW   Bed Mobility    Supine to Sit   (on BSC)   Sit to Supine Moderate Assist  (to B LE which pt reports spouse assists with at baseline)   Scooting Maximal Assist  (supine, CGA seated)   Gait Analysis   Gait Level Of Assist Contact Guard Assist   Assistive Device Front Wheel Walker   Distance (Feet) 5   # of Times Distance was Traveled 1   Deviation Bradykinetic;Shuffled Gait   # of Stairs Climbed 0   Weight Bearing Status no restrictions   Functional Mobility   Sit to Stand Minimal Assist   Bed, Chair, Wheelchair Transfer Minimal Assist   Transfer Method Stand Step   How much difficulty does the patient currently have...   Turning over in bed (including adjusting bedclothes, sheets and blankets)? 1   Sitting down on and standing up from a chair with arms (e.g., wheelchair, bedside commode, etc.) 1   Moving from lying on back to sitting on the side of the bed? 1   How much help from another person does the patient currently need...   Moving to and from a bed to a chair (including a wheelchair)? 3   Need to walk in a hospital room? 3   Climbing 3-5 steps with a railing? 2   6 clicks Mobility Score 11   Short Term Goals  "   Short Term Goal # 1 Pt will perform supine <> sit with HOB flat, no railing and Min A in 6 vistis   Short Term Goal # 2 pt will perform sit <> stand and functional transfers with FWW and SPV in 6 vistis   Short Term Goal # 3 pt will ambulate > 50 ft with FWW and SPV to access home environment in 6 visits   Short Term Goal # 4 pt will negotiate 1 step with FWW and Min A to enter/exit home in 6 visits   Education Group   Education Provided Role of Physical Therapist   Role of Physical Therapist Patient Response Patient;Acceptance;Explanation;Verbal Demonstration   Physical Therapy Initial Treatment Plan    Treatment Plan  Bed Mobility;Gait Training;Neuro Re-Education / Balance;Self Care / Home Evaluation;Stair Training;Therapeutic Activities;Therapeutic Exercise   Treatment Frequency 3 Times per Week   Duration Until Therapy Goals Met   Problem List    Problems Impaired Bed Mobility;Impaired Transfers;Impaired Ambulation;Functional Strength Deficit;Impaired Balance;Decreased Activity Tolerance   Anticipated Discharge Equipment and Recommendations   Discharge Recommendations Recommend post-acute placement for additional physical therapy services prior to discharge home

## 2023-12-29 NOTE — DISCHARGE PLANNING
[facility] Transitional Care Coordination    Referral from:  Angela  Insurance Provider on Facesheet:Janelle  Potential Rehab Diagnosis: TBD    Chart review indicates patient may have on going medical management and may have therapy needs to possibly meet inpatient rehab facility criteria with the goal of returning to community.    D/C support: spouse , will need to verify d/c support     Physiatry consultation pended per protocol.   Will require therapy evaluations when medically appropriate.          Thank you for the referral.

## 2023-12-29 NOTE — THERAPY
"Occupational Therapy   Initial Evaluation     Patient Name: Jenifer Ramos  Age:  63 y.o., Sex:  female  Medical Record #: [id number]  Today's Date: 12/29/2023     Precautions: Fall Risk  Comments: hx of CVA with residual L deficits    Assessment  Patient is 63 y.o. female admitted for weakness and confusion. PMHx obesity, Afib, chronic anti coag, factor IV Leiden, HF, asthma, hypothyroidism, pulmonary HTN, DM, OA, peripheral neuropathy and a CVA w/L residual deficits.   This admission pt is dx w/acute hypoxemic respiratory failure, influenza A, AMS, and chronic ulcer of L foot.   Today pt presents w/generalized deconditioning, poor activity tolerance and intermittent confusion impacting her performance w/ADL's. Currently recommend post acute placement    Plan  Occupational Therapy Initial Treatment Plan   Treatment Interventions: Self Care / Activities of Daily Living, Adaptive Equipment, Manual Therapy Techniques, Neuro Re-Education / Balance, Therapeutic Exercises, Therapeutic Activity  Treatment Frequency: 3 Times per Week  Duration: Until Therapy Goals Met    DC Equipment Recommendations: Unable to determine at this time  Discharge Recommendations: Recommend post-acute placement for additional occupational therapy services prior to discharge home     Subjective  \" I can't walk\"      Objective     12/29/23 1027   Charge Group   OT Evaluation OT Evaluation High   Total Time Spent   OT Time Spent Yes   OT Evaluation (Minutes) 12   OT Total Time Spent (Calculated) 12   Initial Contact Note    Initial Contact Note Order Received and Verified, Occupational Therapy Evaluation in Progress with Full Report to Follow.   Prior Living Situation   Prior Services Intermittent Physical Support for ADL Per Family   Housing / Facility 1 Story House   Steps Into Home 1   Steps In Home 0   Bathroom Set up Walk In Shower;Shower Chair   Equipment Owned Front-Wheel Walker   Lives with - Patient's Self Care Capacity Adult " Children;Spouse   Comments reports her Spouse and Son assist her with whatever needed since her stroke 2 years agao   Prior Level of ADL Function   Self Feeding Independent   Grooming / Hygiene Independent   Bathing Requires Assist   Dressing Independent   Toileting Independent   Comments pt reports family assists when she needs it   Prior Level of IADL Function   Medication Management Requires Assist   Laundry Requires Assist   Kitchen Mobility Requires Assist   Finances Requires Assist   Home Management Requires Assist   Shopping Requires Assist   Prior Level Of Mobility Supervision With Device in Home   History of Falls   History of Falls Yes   Precautions   Precautions Fall Risk   Comments hx of CVA with residual L deficits   Pain 0 - 10 Group   Location Generalized   Therapist Pain Assessment During Activity;Nurse Notified   Cognition    Cognition / Consciousness X   Level of Consciousness Alert   Ability To Follow Commands 1 Step   Safety Awareness Impaired   New Learning Impaired   Attention Impaired   Comments Generllay self limiting at times made tangential comments   Passive ROM Upper Body   Passive ROM Upper Body X   Comments Chronic L shoulder limitations w/flexion and external rotation RUE WFL   Active ROM Upper Body   Active ROM Upper Body  X   Comments see above   Strength Upper Body   Upper Body Strength  X   Comments RUE 4-/5 LUE 3/5   Sensation Upper Body   Upper Extremity Sensation  Not Tested   Coordination Upper Body   Coordination X   Comments RUE WFL; LUE poor gross coordination w/residual weakness   Balance Assessment   Sitting Balance (Static) Fair +   Sitting Balance (Dynamic) Fair +   Standing Balance (Static) Fair   Standing Balance (Dynamic) Fair -   Weight Shift Sitting Fair   Weight Shift Standing Poor   Comments w/fww   Bed Mobility    Sit to Supine Moderate Assist   ADL Assessment   Grooming Minimal Assist;Seated   Upper Body Dressing Moderate Assist   Lower Body Dressing Maximal  Assist   Toileting Maximal Assist   How much help from another person does the patient currently need...   6 Clicks Daily Activity Score 16   Functional Mobility   Sit to Stand Minimal Assist   Bed, Chair, Wheelchair Transfer Minimal Assist   Toilet Transfers Minimal Assist   Mobility BSC>sit>stand steps forward then BTB   Visual Perception   Visual Perception  Not Tested   Activity Tolerance   Comments poor   Patient / Family Goals   Patient / Family Goal #1 to get a phone   Short Term Goals   Short Term Goal # 1 pt will complete grooming seated w/set up   Short Term Goal # 2 pt will complete toilet txf w/spv and kimi care w/spv   Short Term Goal # 3 pt will complete LB dressing w/min A   Education Group   Role of Occupational Therapist Patient Response Patient;Acceptance;Explanation;Demonstration   Occupational Therapy Initial Treatment Plan    Treatment Interventions Self Care / Activities of Daily Living;Adaptive Equipment;Manual Therapy Techniques;Neuro Re-Education / Balance;Therapeutic Exercises;Therapeutic Activity   Treatment Frequency 3 Times per Week   Duration Until Therapy Goals Met   Problem List   Problem List Decreased Active Daily Living Skills;Decreased Upper Extremity Strength Right;Decreased Upper Extremity Strength Left;Decreased Upper Extremity AROM Left;Decreased Functional Mobility;Decreased Activity Tolerance;Safety Awareness Deficits / Cognition;Impaired Coordination Left Upper Extremity;Impaired Postural Control / Balance   Anticipated Discharge Equipment and Recommendations   DC Equipment Recommendations Unable to determine at this time   Discharge Recommendations Recommend post-acute placement for additional occupational therapy services prior to discharge home   Interdisciplinary Plan of Care Collaboration   IDT Collaboration with  Nursing;Physical Therapist   Patient Position at End of Therapy In Bed;Tray Table within Reach;Phone within Reach   Collaboration Comments RN aware of OT  eval and pts efforts   Session Information   Date / Session Number  12/29 #1 (1/3, 1/4/24)

## 2023-12-29 NOTE — PROGRESS NOTES
Patient brought to tele 7 by this ACLS RN. Patient A&Ox3, disoriented to time. Patient on 3L nasal cannula. Patient drowsy and withdrawn, difficult to get responses. No acute signs of distress. Patient educated on plan of care and family updated. Call light and personal belongings within reach.

## 2023-12-29 NOTE — PROGRESS NOTES
Bedside report received at 1900 and assumed care of patient. Patient is resting in bed, somnolent, and complains of pain with movement. Patient is A&O x3, disoriented to time. Patient on 3L NC. Tele monitoring in place. Educated on fall risk, all fall precautions in place. Call light and belongings within reach, bed locked and in lowest position, denied other needs at this time. Hourly rounding in place.

## 2023-12-29 NOTE — PROGRESS NOTES
Hospital Medicine Daily Progress Note    Date of Service  12/29/2023    Chief Complaint  Jenifer Ramos is a 63 y.o. female admitted 12/27/2023 with weakness    Hospital Course  63-year-old female past medical history obesity, paroxysmal atrial fibrillation status post 2 ablations, chronic anticoagulation with Eliquis, factor V Leyden, diastolic heart failure, asthma, hypothyroidism, pulmonary hypertension, CVA year ago, MARCI, type 2 insulin-dependent diabetes, debility, peripheral neuropathy and osteoarthritis presented 4/27/2023 generalized weakness, confusion, shortness of breath for few days.  Please refer to H&P of Dr. Salvador for further details.    In emergency room she did also report her son was sick with a cold at home.  Vitals in the emergency room febrile one 1.6, heart rate 80, respiratory rate 19, blood pressure 116/58, SpO2 93% on 3 L of oxygen, WBC 3.5, hemoglobin 9.5, MCV 81.9, platelets 124, sodium 132, glucose 222, alk phos 173, lactic acid 1.8 CRP 7.7  Respiratory panel positive for influenza A  Chest x-ray diffuse interstitial prominence cardiomegaly  X-ray of the left foot no acute findings  She started on Tamiflu, Lasix 40 mg twice daily IV  Blood cultures remain no growth to date from 12/27/2023  She has pancytopenia.  ANC 0.72.  Will need close monitoring.  Most likely pancytopenia related to viral influenza.  Holding Granix, but to be considered if NAC continues to decrease  She is on clindamycin possible for left infection ??      Interval Problem Update  Pt mentation has improved, but she still looks tired and lethargic.  is a physician, and pt also has medical background  Patient is very motivated to get better  From previous CVA a year ago left-sided deficits.  She has done therapy at home  However this time influenza has truly crushed her  Again she is very motivated to get better  Blood cultures remain no growth to date.  Sodium 134, potassium 3.5.  Continue  replacement  She is allergic to many medications and antibiotics  Will continue clindamycin for now.  Her left heel wound looks dry.  I cannot probe to bone.  It does not look as osteomyelitis, just a pressure wound.  Son has been taking care of his wound for some time.  Continue very short course of clindamycin to minimize the risk of C. difficile    I have discussed this patient's plan of care and discharge plan at IDT rounds today with Case Management, Nursing, Nursing leadership, and other members of the IDT team.    Consultants/Specialty  PMR    Code Status  Full Code    Disposition  The patient is not medically cleared for discharge to home or a post-acute facility.      I have placed the appropriate orders for post-discharge needs.    Review of Systems  Review of Systems   Constitutional:  Positive for malaise/fatigue. Negative for chills and fever.   HENT:  Negative for sore throat.    Respiratory:  Positive for cough, sputum production and shortness of breath. Negative for wheezing.    Cardiovascular:  Positive for leg swelling. Negative for chest pain and palpitations.   Gastrointestinal:  Negative for abdominal pain, diarrhea, nausea and vomiting.   Genitourinary:  Negative for dysuria and urgency.   Musculoskeletal:  Negative for back pain.   Neurological:  Positive for dizziness. Negative for headaches.   Psychiatric/Behavioral:  The patient is not nervous/anxious.         Physical Exam  Temp:  [36 °C (96.8 °F)-36.5 °C (97.7 °F)] 36.5 °C (97.7 °F)  Pulse:  [65-86] 70  Resp:  [12-18] 18  BP: (101-133)/(59-80) 124/66  SpO2:  [91 %-99 %] 98 %    Physical Exam  Vitals and nursing note reviewed.   Constitutional:       Appearance: She is obese. She is ill-appearing. She is not toxic-appearing.   HENT:      Head: Normocephalic and atraumatic.   Eyes:      General: No scleral icterus.  Cardiovascular:      Rate and Rhythm: Normal rate.      Heart sounds: Murmur heard.   Pulmonary:      Effort: Pulmonary effort  is normal. No respiratory distress.      Breath sounds: No wheezing or rales.   Abdominal:      General: There is no distension.      Palpations: Abdomen is soft.      Tenderness: There is no abdominal tenderness. There is no guarding.   Musculoskeletal:         General: No swelling.      Comments: Left heel wound dry   Skin:     Coloration: Skin is pale.   Neurological:      Mental Status: She is oriented to person, place, and time.      Cranial Nerves: No cranial nerve deficit.      Motor: Weakness present.   Psychiatric:         Mood and Affect: Mood normal.         Behavior: Behavior normal.         Thought Content: Thought content normal.         Judgment: Judgment normal.         Fluids    Intake/Output Summary (Last 24 hours) at 12/29/2023 1815  Last data filed at 12/29/2023 1227  Gross per 24 hour   Intake 120 ml   Output 1400 ml   Net -1280 ml       Laboratory  Recent Labs     12/27/23 2216 12/29/23  0236   WBC 3.5* 2.0*   RBC 4.86 4.66   HEMOGLOBIN 11.5* 11.1*   HEMATOCRIT 39.4 37.3   MCV 81.1* 80.0*   MCH 23.7* 23.8*   MCHC 29.2* 29.8*   RDW 55.7* 54.8*   PLATELETCT 124* 115*   MPV 10.5 10.1     Recent Labs     12/27/23 2216 12/29/23  0236   SODIUM 132* 134*   POTASSIUM 4.4 3.5*   CHLORIDE 97 96   CO2 26 30   GLUCOSE 222* 135*   BUN 7* 8   CREATININE 0.64 0.57   CALCIUM 8.7 8.7             Recent Labs     12/29/23  0236   TRIGLYCERIDE 117   HDL 22*   LDL 66       Imaging  DX-FOOT-COMPLETE 3+ LEFT   Final Result         No definite acute osseous abnormality but the evaluation limited due to osseous demineralization.      DX-CHEST-LIMITED (1 VIEW)   Final Result         Diffuse interstitial prominence related to mild fluid overload.      Stable cardiomegaly.      EC-ECHOCARDIOGRAM COMPLETE W/O CONT    (Results Pending)        Assessment/Plan  * Acute on chronic respiratory failure with hypoxemia (HCC)- (present on admission)  Assessment & Plan  Due to influenza A pneumonia versus volume overload from  diastolic heart failure exacerbation.  Baseline oxygenation 2 L at home.  States she is compliant with medications.  - Admit to telemetry  - RT protocol  - Isolation precautions  - Aspiration precautions  - Fall precautions  - Tamiflu    Chronic ulcer of left foot due to diabetes mellitus (HCC)- (present on admission)  Assessment & Plan  Was apparently on antibiotics; wound looks pink with granulation tissue; no pus or active bleeding noted  - Wound care    12/29/23-continue clindamycin only for short course of 5 days total    Influenza A- (present on admission)  Assessment & Plan  Positive on respiratory panel, with worsening respiratory symptoms  - See acute on chronic hypoxic respiratory failure    History of stroke- (present on admission)  Assessment & Plan  History of; with left-sided weakness  - Consider PT/OT    Thrombocytopenia (HCC)- (present on admission)  Assessment & Plan  Chronic history of; history of cirrhosis  - Continue to monitor    Acute on chronic diastolic heart failure (HCC)- (present on admission)  Assessment & Plan  Patient states she has been taking more fluids than normal.  Last echo was more than a year ago  - Echo  - BNP: 4500  - Lipid panel  - Lasix  - I's and O's  - Daily weights  - Fluid restriction    Pulmonary hypertension (HCC)- (present on admission)  Assessment & Plan  Chest x-ray demonstrates diffuse interstitial prominence; patient states she has been taking more liquids than normal.  Patient has bilateral lower extremity edema.  - Lasix  - I's and O's  - Monitor weights  - Fluid restriction    Pancytopenia (HCC)- (present on admission)  Assessment & Plan  At this time is multifactorial.  NAC 0.75  Medications, viral exposure  TSH is normal  B12 to follow    GERD (gastroesophageal reflux disease)- (present on admission)  Assessment & Plan  - Continue home pantoprazole    Hypothyroidism- (present on admission)  Assessment & Plan  - Continue home levothyroxine  - TSH    Atrial  fibrillation (HCC)- (present on admission)  Assessment & Plan  Status post 2 ablations  - Continue home Eliquis  - Continue home verapamil    DM (diabetes mellitus) (HCC)- (present on admission)  Assessment & Plan  A1c 5/2023: 10.2. Needs med rec  - Hold home medications  - Insulin sliding scale  - Weight based glargine  - Hypoglycemia protocol  - Fingerstick glucose  - A1c    Factor V deficiency (HCC)- (present on admission)  Assessment & Plan  - Continue home Eliquis    Sleep apnea- (present on admission)  Assessment & Plan  - Continue with CPAP if able         VTE prophylaxis:    therapeutic anticoagulation with eliquis 5 mg BID      I have performed a physical exam and reviewed and updated ROS and Plan today (12/29/2023). In review of yesterday's note (12/28/2023), there are no changes except as documented above.      Greater than 51 minutes spent prepping to see patient (e.g. review of tests) obtaining and/or reviewing separately obtained history. Performing a medically appropriate examination and/ evaluation.  Counseling and educating the patient/family/caregiver.  Ordering medications, tests, or procedures.  Referring and communicating with other health care professionals.  Documenting clinical information in EPIC.  Independently interpreting results and communicating results to patient/family/caregiver.  Care coordination.

## 2023-12-29 NOTE — DIETARY
NUTRITION SERVICES: BMI - Pt with BMI >40 (=Body mass index is 41.53 kg/m².), Class III obesity. Weight loss counseling not appropriate in acute care setting. RECOMMEND - If appropriate at DC and per pt preferences, please refer to outpatient nutrition services for weight management.

## 2023-12-29 NOTE — ED PROVIDER NOTES
CHIEF COMPLAINT  Chief Complaint   Patient presents with    Weakness     BIB EMS from home; reported by spouse of having increase body weakness; sometimes confuse during conversation; had a left foot infection x1 week and was on ABX. On home oxygen at 2L PRN for sleep apnea; on thinners; H/o of stroke- with left arm deficit; Aox3 GCS14       LIMITATION TO HISTORY   Select:     FERDINAND Ramos is a 63 y.o. female who presents to the Emergency Department for evaluation of generalized weakness some confusion is mild patient's  is a physician has been with the patient the last few days he reports he feels under the weather patient has not had any falls does not struck her head she denies a headache, she uses 2 L/min of nasal cannula at night and has a left foot wound and she is on clindamycin is seeing a podiatrist in the last few weeks does not have a history of osteomyelitis patient noted to be hypoxic requiring oxygen    OUTSIDE HISTORIAN(S):  Select:    EXTERNAL RECORDS REVIEWED  Select:       PAST MEDICAL HISTORY  Past Medical History:   Diagnosis Date    Arrhythmia     hx a-fib    Arthritis     generalized    ASTHMA 4/24/2013    Back pain 4/24/2013    Blood clotting disorder (HCC)     Disorder of thyroid     DM (diabetes mellitus) (Formerly [facility]) 4/24/2013    insulin    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    SURGICAL HISTORY  Past Surgical History:   Procedure Laterality Date    PB RECONSTR TOTAL SHOULDER IMPLANT Left 11/27/2019    Procedure: ARTHROPLASTY, SHOULDER, TOTAL-REVERSE;  Surgeon: Hugo Beltran M.D.;  Location: SURGERY [facility];  Service: Orthopedics    CLAVICLE DISTAL EXCISION  11/27/2019    Procedure: EXCISION, CLAVICLE, DISTAL-RESECTION;  Surgeon: Hugo Beltran M.D.;  Location: SURGERY  [facility];  Service: Orthopedics    OTHER ORTHOPEDIC SURGERY  2018    elbow    KNEE ARTHROPLASTY TOTAL Left 2013    KNEE ARTHROPLASTY TOTAL Right 2013    HYSTERECTOMY, TOTAL ABDOMINAL  2003    CHOLECYSTECTOMY      FUSION, SPINE, LUMBAR, PLIF      OOPHORECTOMY      OTHER CARDIAC SURGERY      ablation    TONSILLECTOMY           FAMILY HISTORY  Family History   Problem Relation Age of Onset    Heart Disease Mother 75        atrial fibrillation    Lung Disease Father 69        astma, DM, lukemia    Hypertension Sister           SOCIAL HISTORY  Social History     Socioeconomic History    Marital status:      Spouse name: Not on file    Number of children: Not on file    Years of education: Not on file    Highest education level: Not on file   Occupational History    Not on file   Tobacco Use    Smoking status: Never    Smokeless tobacco: Never   Substance and Sexual Activity    Alcohol use: Not Currently    Drug use: No    Sexual activity: Not on file   Other Topics Concern    Not on file   Social History Narrative    Not on file     Social Determinants of Health     Financial Resource Strain: Not on file   Food Insecurity: Not on file   Transportation Needs: Not on file   Physical Activity: Not on file   Stress: Not on file   Social Connections: Not on file   Intimate Partner Violence: Not on file   Housing Stability: Not on file         CURRENT MEDICATIONS  No current facility-administered medications on file prior to encounter.     Current Outpatient Medications on File Prior to Encounter   Medication Sig Dispense Refill    clindamycin (CLEOCIN) 300 MG Cap Take 1 Capsule by mouth 4 times a day.      apixaban (ELIQUIS) 5mg Tab Take 5 mg by mouth 2 times a day.      pantoprazole (PROTONIX) 40 MG Tablet Delayed Response Take 40 mg by mouth every evening.      rosuvastatin (CRESTOR) 20 MG Tab Take 20 mg by mouth every evening.      hydrOXYzine HCl (ATARAX) 25 MG Tab Take 25 mg by mouth at bedtime.       magnesium oxide (MAG-OX) 400 MG Tab tablet Take 400 mg by mouth every day.      verapamil (ISOPTIN) 80 MG Tab Take 80 mg by mouth at bedtime.      lidocaine (LIDODERM) 5 % Patch Place 1 Patch on the skin every 24 hours. * top of left foot*      promethazine (PHENERGAN) 25 MG Tab Take 25 mg by mouth every 6 hours as needed for Nausea/Vomiting.      Rimegepant Sulfate (NURTEC) 75 MG TABLET DISPERSIBLE Take 75 mg by mouth 1 time a day as needed. Indications: Migraine Headache      oxyCODONE immediate-release (ROXICODONE) 5 MG Tab Take 5-15 mg by mouth every four hours as needed for Severe Pain. * depending on pain level    15 mg = qhs Scheduled      carBAMazepine (TEGRETOL) 200 MG Tab Take 200 mg by mouth 3 times a day.      potassium chloride SA (K-DUR) 10 MEQ Tab CR Take 40-60 mEq by mouth every evening. * take an extra 20 meq if an extra dose of lasix is taken*      fluticasone-umeclidin-vilant (TRELEGY ELLIPTA) 100-62.5-25 MCG/ACT AEROSOL POWDER, BREATH ACTIVATED inhalation Inhale 1 Inhalation every evening.      furosemide (LASIX) 20 MG Tab Take 20 mg by mouth 2 times a day. * take an extra 20 mg ( mid day) if weight is > 285 lbs      insulin lispro (HUMALOG) 100 UNIT/ML Inject 40-80 Units under the skin see administration instructions.  FSBS 4-6times daily prn  > 250 = 40 units  > 325 = 60 units  > 400  = 80 units      levalbuterol (XOPENEX HFA) 45 MCG/ACT inhaler Inhale 2 Puffs 4 times a day as needed for Shortness of Breath.      Insulin Degludec 200 UNIT/ML Solution Pen-injector Inject 160 Units under the skin every evening.      gabapentin (NEURONTIN) 300 MG Cap Take 600 mg by mouth 3 times a day. * may take an extra 300 mg  ( 600 mg) if needed      febuxostat (ULORIC) 40 MG Tab Take 40 mg by mouth every day.      montelukast (SINGULAIR) 10 MG TABS Take 10 mg by mouth every evening.      levothyroxine (SYNTHROID) 112 MCG Tab Take 112 mcg by mouth every morning on an empty stomach.    "          ALLERGIES  Allergies   Allergen Reactions    Azithromycin Anaphylaxis    Erythromycin Anaphylaxis    Other Misc Anaphylaxis     Flu vaccine    Penicillins Anaphylaxis     As a child    Pistachio Anaphylaxis    Sulfa Drugs Anaphylaxis    Tetraglycine Anaphylaxis    Other Drug Unspecified     Long acting benzodiazepines only single dose otherwise combative     Physostigmine Unspecified     Abdomen extreme swelling    Tape Rash and Itching     tegaderm ok  Blisters with others    Zanaflex [Tizanidine Hcl] Unspecified     Falling asleep mid sentence    Albuterol Palpitations    Atorvastatin Unspecified     Extreme joint pain    Chlorhexidine Rash     blistering    Lamisil [Terbinafine Hcl] Unspecified     Pancreatitis     Morphine Unspecified     Not effective       PHYSICAL EXAM  VITAL SIGNS:/64   Pulse 66   Temp 36.5 °C (97.7 °F) (Temporal)   Resp 16   Ht 1.727 m (5' 8\")   Wt 124 kg (273 lb 2.4 oz)   SpO2 96%   BMI 41.53 kg/m²       GENERAL: Somnolent but arousable, nasal cannula in place  HEAD: Normocephalic and atraumatic  NECK: Normal range of motion, without meningismus  EYES: Pupils Equal, Round, Reactive to Light, extraocular movements intact, conjunctiva white  ENT: Mucous membranes moist, oropharynx clear  PULMONARY: Normal effort, clear to auscultation  CARDIOVASCULAR: No murmurs, clicks or rubs, peripheral pulses 2+  ABDOMINAL: Soft, non-tender, no guarding or rigidity present, no pulsatile masses  BACK: no midline tenderness, no costovertebral tenderness  NEUROLOGICAL: Somnolent but arousable oriented to person but not place or time follows commands moves all 4 extremities otherwise grossly non-focal neurological examination, speech normal, gait normal  EXTREMITIES: There is a superficial wound on the right posterior aspect of the right heel without any erythema warmth or tenderness no edema, normal to inspection  SKIN: Warm and dry.  PSYCHIATRIC: Affect is appropriate    DIAGNOSTIC " STUDIES / PROCEDURES  EKG  EKG Interpretation: I independently reviewed the below EKG and did not see signs of a STEMI  10:25 PM  6:30 AM  Rate of 76  Normal sinus rhythm    Results for orders placed or performed during the hospital encounter of 23   EKG   Result Value Ref Range    Report       [facility] Emergency Dept.    Test Date:  2023  Pt Name:    LEONEL LINDER               Department: ER  MRN:        [id number]                      Room:        14 H  Gender:     Female                       Technician: [id number]  :        1960                   Requested By:ESTHER RUFFIN  Order #:    [id number]                    Reading MD:    Measurements  Intervals                                Axis  Rate:       76                           P:          30  MT:         212                          QRS:        175  QRSD:       168                          T:          -13  QT:         414  QTc:        466    Interpretive Statements  Sinus rhythm  Borderline prolonged MT interval  Nonspecific intraventricular conduction delay  Minimal ST depression, inferior leads  Baseline wander in lead(s) V1  Compared to ECG 2023 21:10:29  Intraventricular conduction delay now present  ST (T wave) deviation now present  Atrial flutter no longer present  2:1 AV b lock no longer present  Right bundle-branch block no longer present           LABS  Labs Reviewed   CBC WITH DIFFERENTIAL - Abnormal; Notable for the following components:       Result Value    WBC 3.5 (*)     Hemoglobin 11.5 (*)     MCV 81.1 (*)     MCH 23.7 (*)     MCHC 29.2 (*)     RDW 55.7 (*)     Platelet Count 124 (*)     Neutrophils-Polys 72.90 (*)     Lymphocytes 8.80 (*)     Monocytes 17.40 (*)     Lymphs (Absolute) 0.31 (*)     All other components within normal limits   COMP METABOLIC PANEL - Abnormal; Notable for the following components:    Sodium 132 (*)     Glucose 222 (*)     Bun 7 (*)     Alkaline Phosphatase 173 (*)      All other components within normal limits   CRP QUANTITIVE (NON-CARDIAC) - Abnormal; Notable for the following components:    Stat C-Reactive Protein 7.47 (*)     All other components within normal limits   PROBRAIN NATRIURETIC PEPTIDE, NT - Abnormal; Notable for the following components:    NT-proBNP 4500 (*)     All other components within normal limits   HEMOGLOBIN A1C - Abnormal; Notable for the following components:    Glycohemoglobin 11.9 (*)     All other components within normal limits    Narrative:     Droplet,Contact   CBC WITH DIFFERENTIAL - Abnormal; Notable for the following components:    WBC 2.0 (*)     Hemoglobin 11.1 (*)     MCV 80.0 (*)     MCH 23.8 (*)     MCHC 29.8 (*)     RDW 54.8 (*)     Platelet Count 115 (*)     Neutrophils-Polys 36.40 (*)     Monocytes 23.20 (*)     Neutrophils (Absolute) 0.72 (*)     Lymphs (Absolute) 0.78 (*)     All other components within normal limits    Narrative:     Droplet,Contact   COMP METABOLIC PANEL - Abnormal; Notable for the following components:    Sodium 134 (*)     Potassium 3.5 (*)     Glucose 135 (*)     Alkaline Phosphatase 151 (*)     All other components within normal limits    Narrative:     Droplet,Contact   LIPID PROFILE - Abnormal; Notable for the following components:    HDL 22 (*)     All other components within normal limits    Narrative:     Droplet,Contact   POCT GLUCOSE DEVICE RESULTS - Abnormal; Notable for the following components:    POC Glucose, Blood 202 (*)     All other components within normal limits   POC COV-2, FLU A/B, RSV BY PCR - Abnormal; Notable for the following components:    POC Influenza A RNA, PCR POSITIVE (*)     All other components within normal limits   POCT GLUCOSE DEVICE RESULTS - Abnormal; Notable for the following components:    POC Glucose, Blood 213 (*)     All other components within normal limits   POCT GLUCOSE DEVICE RESULTS - Abnormal; Notable for the following components:    POC Glucose, Blood 190 (*)      "All other components within normal limits   POCT GLUCOSE DEVICE RESULTS - Abnormal; Notable for the following components:    POC Glucose, Blood 164 (*)     All other components within normal limits   POCT GLUCOSE DEVICE RESULTS - Abnormal; Notable for the following components:    POC Glucose, Blood 181 (*)     All other components within normal limits   DIAGNOSTIC ALCOHOL   URINALYSIS   BLOOD CULTURE    Narrative:     Per Hospital Policy: Only change Specimen Src: to \"Line\" if                  specified by physician order.                  No site indicated   LACTIC ACID   SED RATE   ESTIMATED GFR   PLATELET ESTIMATE   MORPHOLOGY   PERIPHERAL SMEAR REVIEW   DIFFERENTIAL COMMENT   MAGNESIUM   TSH    Narrative:     Droplet,Contact   ESTIMATED GFR    Narrative:     Droplet,Contact   BLOOD CULTURE    Narrative:     Per Hospital Policy: Only change Specimen Src: to \"Line\" if                  specified by physician order.   POCT GLUCOSE   POCT COV-2, FLU A/B, RSV BY PCR         RADIOLOGY  I independently reviewed and interpreted the images obtained today in the ER.  Patient has some pulmonary edema on chest radiograph    Radiologist interpretation:   DX-FOOT-COMPLETE 3+ LEFT   Final Result         No definite acute osseous abnormality but the evaluation limited due to osseous demineralization.      DX-CHEST-LIMITED (1 VIEW)   Final Result         Diffuse interstitial prominence related to mild fluid overload.      Stable cardiomegaly.      EC-ECHOCARDIOGRAM COMPLETE W/O CONT    (Results Pending)        COURSE & MEDICAL DECISION MAKING    ED COURSE:        INTERVENTIONS BY ME:  Medications   acetaminophen (Tylenol) tablet 1,000 mg (0 mg Oral Held 12/27/23 2230)   senna-docusate (Pericolace Or Senokot S) 8.6-50 MG per tablet 2 Tablet (2 Tablets Oral Given 12/29/23 0540)     And   polyethylene glycol/lytes (Miralax) Packet 1 Packet (has no administration in time range)     And   magnesium hydroxide (Milk Of Magnesia) " suspension 30 mL (has no administration in time range)     And   bisacodyl (Dulcolax) suppository 10 mg (has no administration in time range)   Respiratory Therapy Consult (has no administration in time range)   acetaminophen (Tylenol) tablet 650 mg (has no administration in time range)   labetalol (Normodyne/Trandate) injection 10 mg (has no administration in time range)   ondansetron (Zofran) syringe/vial injection 4 mg (has no administration in time range)   ondansetron (Zofran ODT) dispertab 4 mg (has no administration in time range)   apixaban (Eliquis) tablet 5 mg (5 mg Oral Given 12/29/23 0540)   aspirin EC tablet 81 mg (81 mg Oral Given 12/29/23 0539)   omeprazole (PriLOSEC) capsule 20 mg (20 mg Oral Given 12/29/23 0546)   rosuvastatin (Crestor) tablet 20 mg (20 mg Oral Given 12/28/23 1732)   oseltamivir (Tamiflu) capsule 75 mg (75 mg Oral Given 12/29/23 0218)   insulin regular (HumuLIN R,NovoLIN R) injection (3 Units Subcutaneous Given 12/28/23 2035)     And   dextrose 10 % BOLUS 25 g (has no administration in time range)   insulin GLARGINE (Lantus,Semglee) injection (24 Units Subcutaneous Given 12/28/23 1736)   carBAMazepine (TEGretol) tablet 200 mg (200 mg Oral Given 12/28/23 2031)   fluticasone-umeclidinium-vilanterol (Trelegy Ellipta) 100-62.5-25 mcg/act inhaler 1 Puff (1 Puff Inhalation Given 12/28/23 1053)   gabapentin (Neurontin) capsule 600 mg (600 mg Oral Given 12/29/23 0540)   verapamil (Isoptin) tablet 80 mg (80 mg Oral Given 12/28/23 2030)   montelukast (Singulair) tablet 10 mg (10 mg Oral Given 12/28/23 1732)   levothyroxine (Synthroid) tablet 112 mcg (112 mcg Oral Given 12/29/23 0540)   lidocaine (Asperflex) 4 % patch 1 Patch (1 Patch Transdermal Patch Applied 12/29/23 0541)   magnesium oxide tablet 400 mg (400 mg Oral Given 12/29/23 0544)   potassium chloride SA (Kdur) tablet 40 mEq (has no administration in time range)   Pharmacy Consult Request ...Pain Management Review 1 Each (has no  administration in time range)   oxyCODONE immediate-release (Roxicodone) tablet 5 mg ( Oral See Alternative 12/29/23 0244)     Or   oxyCODONE immediate release (Roxicodone) tablet 10 mg (10 mg Oral Given 12/29/23 0244)   clindamycin (Cleocin) capsule 300 mg (300 mg Oral Given 12/29/23 0218)   febuxostat (Uloric) tablet 40 mg (40 mg Oral Given 12/29/23 0541)   levalbuterol (Xopenex) 1.25 MG/3ML nebulizer solution 1.25 mg (has no administration in time range)   furosemide (Lasix) injection 40 mg (40 mg Intravenous Given 12/29/23 0540)   NS (Bolus) 0.9 % infusion 1,000 mL (0 mL Intravenous Stopped 12/28/23 0000)   furosemide (Lasix) injection 80 mg (80 mg Intravenous Given 12/28/23 0433)           INITIAL ASSESSMENT, COURSE AND PLAN  Care Narrative:       Patient presented mildly hypoxic and somnolent but arousable with no focal neurologic deficits on exam, given the gradual onset of her symptoms and that she had no focal neurologic deficits that her primary neurologic dysfunction is somnolence do not suspect intracranial mass or cerebrovascular accident additionally patient had no trauma no signs of trauma no reported trauma do not suspect intracranial bleeding so CT scan is not obtained do suspect her altered mental status is secondary to encephalopathy likely secondary to influenza.    Regarding patient's hypoxia do suspect this is secondary to fluid overload she had pulmonary edema on her chest radiograph and some mild peripheral edema low risk well score do not suspect pulmonary embolism.    Discussed with pharmacist need for Tamiflu at this time and nurse does not feel comfortable giving the patient oral medications     ADDITIONAL PROBLEM LIST    DISPOSITION AND DISCUSSIONS  I have discussed management of the patient with the following physicians and DIANA's: I disccused the mangament and decision to admit this patient with the resident physician Regis who accepted admission    Discussion of management with other  QHP or appropriate source(s): Discussed Tamiflu with hospitalist        FINAL DIAGNOSIS  1. Acute hypoxemic respiratory failure (HCC)    2. Influenza A    3. Altered mental status, unspecified altered mental status type    4. Acute on chronic respiratory failure with hypoxemia (HCC)    5. Chronic ulcer of left foot due to diabetes mellitus (HCC)    6. Acute on chronic diastolic heart failure (HCC)             Electronically signed by: Scooby Levine DO ,6:29 AM 12/29/23

## 2023-12-29 NOTE — DOCUMENTATION QUERY
[facility]                                                                       Query Response Note      PATIENT:               LEONEL LINDER  ACCT #:                  [id number]  MRN:                     [id number]  :                      1960  ADMIT DATE:       2023 9:53 PM  DISCH DATE:          RESPONDING  PROVIDER #:        [id number]           QUERY TEXT:    Please provide additional clinical indicators supportive of your documented diagnosis of acute on chronic hypoxemic respiratory failure              The patient's Clinical Indicators include:   ED: On home oxygen  at 2L PRN for sleep apnea, noted to be hypoxic requiring oxygen pulmonary:  Normal effort, clear to auscultation  some pulmonary edema on chest radiograph     HP:  acute on chronic hypoxemic respiratory failure secondary to flu a infection as well as diastolic heart failure exacerbation.  Positive for shortness of breath    Epic vitals -:  02 saturation: 93-95% Respiration 19-26 3L oxygen    o2 sat 92-98% Respiration 17-23 2.5-3 L oxygen    02sat 95-96% Respiration 16 2.5 L oxygen     Treatment: oxygen IV diuresis     Risk Factors: history of asthma sleep apnea, acute on chronic diastolic congestive heart failure, influenza A , wears oxygen at night     Guillermina Perkins RN BSN  Clinical Documentation   [email or web address]  Connect via [email or web address] Messenger  Options provided:   -- acute on chronic hypoxemic respiratory failure, please document additional clinical indicators   -- acute on chronic hypoxemic respiratory failure does not exist and amended documentation provided in the medical record   -- Other explanation, (please specify other explanation)      Query created by: Guillermina Foreman on 2023 7:12 AM    RESPONSE TEXT:    acute on chronic hypoxemic respiratory failure exists -          Electronically signed  by:  SADIA PLAZA 12/29/2023 9:05 AM

## 2023-12-29 NOTE — HOSPITAL COURSE
63-year-old female past medical history obesity, paroxysmal atrial fibrillation status post 2 ablations, chronic anticoagulation with Eliquis, factor V [place], diastolic heart failure, asthma, hypothyroidism, pulmonary hypertension, CVA year ago, MARCI, type 2 insulin-dependent diabetes, debility, peripheral neuropathy and osteoarthritis presented 4/27/2023 generalized weakness, confusion, shortness of breath for few days.  Please refer to H&P of Dr. Salvador for further details.    In emergency room she did also report her son was sick with a cold at home.  Vitals in the emergency room febrile one 1.6, heart rate 80, respiratory rate 19, blood pressure 116/58, SpO2 93% on 3 L of oxygen, WBC 3.5, hemoglobin 9.5, MCV 81.9, platelets 124, sodium 132, glucose 222, alk phos 173, lactic acid 1.8 CRP 7.7  Respiratory panel positive for influenza A  Chest x-ray diffuse interstitial prominence cardiomegaly  X-ray of the left foot no acute findings  She started on Tamiflu, Lasix 40 mg twice daily IV  Blood cultures remain no growth to date from 12/27/2023  She has pancytopenia.  ANC 0.72.  Will need close monitoring.  Most likely pancytopenia related to viral influenza.  Holding Granix, but to be considered if NAC continues to decrease  She is on clindamycin possible for left infection ??

## 2023-12-29 NOTE — CARE PLAN
The patient is Watcher - Medium risk of patient condition declining or worsening    Shift Goals  Clinical Goals: Comfort, Echo, rest  Patient Goals: Rest  Family Goals: NA  Progress made toward(s) clinical / shift goals:      Problem: Care Map:  Admission Optimal Outcome for the Heart Failure Patient  Goal: Admission:  Optimal Care of the heart failure patient  Outcome: Progressing     Problem: Care Map:  Day 1 Optimal Outcome for the Heart Failure Patient  Goal: Day 1:  Optimal Care of the heart failure patient  Outcome: Progressing     Problem: Knowledge Deficit - Standard  Goal: Patient and family/care givers will demonstrate understanding of plan of care, disease process/condition, diagnostic tests and medications  Outcome: Progressing     Problem: Pain - Standard  Goal: Alleviation of pain or a reduction in pain to the patient’s comfort goal  Outcome: Progressing     Problem: Skin Integrity  Goal: Skin integrity is maintained or improved  Outcome: Progressing     Problem: Fall Risk  Goal: Patient will remain free from falls  Outcome: Progressing       Patient is not progressing towards the following goals:

## 2023-12-29 NOTE — PROGRESS NOTES
4 Eyes Skin Assessment Completed by RAMA Land and RAMA Sheehan.    Head WDL  Ears Redness and Blanching  Nose WDL  Mouth WDL  Neck Redness and Blanching  Breast/Chest WDL  Shoulder Blades Redness and Blanching  Spine WDL  (R) Arm/Elbow/Hand Bruising  (L) Arm/Elbow/Hand Redness and Blanching  Abdomen WDL  Groin Redness and Blanching  Scrotum/Coccyx/Buttocks Redness and Blanching  (R) Leg Redness and Abrasion  (L) Leg Redness and Blanching  (R) Heel/Foot/Toe Redness and Blanching  (L) Heel/Foot/Toe Ulcer(s)                      Devices In Places Tele Box      Interventions In Place NC W/Ear Foams and Pillows    Possible Skin Injury Yes    Pictures Uploaded Into Epic Yes  Wound Consult Placed Yes  RN Wound Prevention Protocol Ordered Yes

## 2023-12-30 ENCOUNTER — HOSPITAL ENCOUNTER (INPATIENT)
Facility: REHABILITATION | Age: 63
LOS: 7 days | DRG: 291 | End: 2024-01-06
Attending: PHYSICAL MEDICINE & REHABILITATION | Admitting: PHYSICAL MEDICINE & REHABILITATION
Payer: MEDICARE

## 2023-12-30 ENCOUNTER — APPOINTMENT (OUTPATIENT)
Dept: CARDIOLOGY | Facility: MEDICAL CENTER | Age: 63
DRG: 193 | End: 2023-12-30
Attending: INTERNAL MEDICINE
Payer: MEDICARE

## 2023-12-30 VITALS
WEIGHT: 273.15 LBS | HEART RATE: 69 BPM | RESPIRATION RATE: 18 BRPM | BODY MASS INDEX: 41.4 KG/M2 | SYSTOLIC BLOOD PRESSURE: 109 MMHG | DIASTOLIC BLOOD PRESSURE: 63 MMHG | HEIGHT: 68 IN | OXYGEN SATURATION: 97 % | TEMPERATURE: 97.3 F

## 2023-12-30 DIAGNOSIS — K21.9 GASTROESOPHAGEAL REFLUX DISEASE, UNSPECIFIED WHETHER ESOPHAGITIS PRESENT: ICD-10-CM

## 2023-12-30 DIAGNOSIS — E11.621 CHRONIC ULCER OF LEFT FOOT DUE TO DIABETES MELLITUS (HCC): ICD-10-CM

## 2023-12-30 DIAGNOSIS — E03.9 HYPOTHYROIDISM, UNSPECIFIED TYPE: ICD-10-CM

## 2023-12-30 DIAGNOSIS — Z86.73 HISTORY OF STROKE: ICD-10-CM

## 2023-12-30 DIAGNOSIS — L97.529 CHRONIC ULCER OF LEFT FOOT DUE TO DIABETES MELLITUS (HCC): ICD-10-CM

## 2023-12-30 DIAGNOSIS — I10 ESSENTIAL HYPERTENSION: ICD-10-CM

## 2023-12-30 DIAGNOSIS — Z79.4 TYPE 2 DIABETES MELLITUS WITH DIABETIC NEUROPATHIC ARTHROPATHY, WITH LONG-TERM CURRENT USE OF INSULIN (HCC): ICD-10-CM

## 2023-12-30 DIAGNOSIS — I48.0 PAF (PAROXYSMAL ATRIAL FIBRILLATION) (HCC): ICD-10-CM

## 2023-12-30 DIAGNOSIS — M10.9 GOUT, UNSPECIFIED CAUSE, UNSPECIFIED CHRONICITY, UNSPECIFIED SITE: ICD-10-CM

## 2023-12-30 DIAGNOSIS — I50.33 ACUTE ON CHRONIC DIASTOLIC HEART FAILURE (HCC): ICD-10-CM

## 2023-12-30 DIAGNOSIS — Z79.4 DIABETES MELLITUS TYPE 2, INSULIN DEPENDENT (HCC): ICD-10-CM

## 2023-12-30 DIAGNOSIS — L89.623 PRESSURE INJURY OF LEFT HEEL, STAGE 3 (HCC): ICD-10-CM

## 2023-12-30 DIAGNOSIS — E11.9 DIABETES MELLITUS TYPE 2, INSULIN DEPENDENT (HCC): ICD-10-CM

## 2023-12-30 DIAGNOSIS — E11.610 TYPE 2 DIABETES MELLITUS WITH DIABETIC NEUROPATHIC ARTHROPATHY, WITH LONG-TERM CURRENT USE OF INSULIN (HCC): ICD-10-CM

## 2023-12-30 DIAGNOSIS — I42.9 CHF WITH CARDIOMYOPATHY (HCC): ICD-10-CM

## 2023-12-30 DIAGNOSIS — R60.0 LOWER EXTREMITY EDEMA: ICD-10-CM

## 2023-12-30 DIAGNOSIS — I50.9 CHF WITH CARDIOMYOPATHY (HCC): ICD-10-CM

## 2023-12-30 PROBLEM — I50.32 DIASTOLIC DYSFUNCTION WITH CHRONIC HEART FAILURE (HCC): Status: ACTIVE | Noted: 2023-05-12

## 2023-12-30 LAB
ALBUMIN SERPL BCP-MCNC: 3.1 G/DL (ref 3.2–4.9)
BASOPHILS # BLD AUTO: 0.5 % (ref 0–1.8)
BASOPHILS # BLD: 0.01 K/UL (ref 0–0.12)
BUN SERPL-MCNC: 11 MG/DL (ref 8–22)
CALCIUM ALBUM COR SERPL-MCNC: 9.4 MG/DL (ref 8.5–10.5)
CALCIUM SERPL-MCNC: 8.7 MG/DL (ref 8.5–10.5)
CHLORIDE SERPL-SCNC: 94 MMOL/L (ref 96–112)
CO2 SERPL-SCNC: 30 MMOL/L (ref 20–33)
CREAT SERPL-MCNC: 0.55 MG/DL (ref 0.5–1.4)
EOSINOPHIL # BLD AUTO: 0 K/UL (ref 0–0.51)
EOSINOPHIL NFR BLD: 0 % (ref 0–6.9)
ERYTHROCYTE [DISTWIDTH] IN BLOOD BY AUTOMATED COUNT: 54.4 FL (ref 35.9–50)
ERYTHROCYTE [DISTWIDTH] IN BLOOD BY AUTOMATED COUNT: 54.4 FL (ref 35.9–50)
GFR SERPLBLD CREATININE-BSD FMLA CKD-EPI: 101 ML/MIN/1.73 M 2
GLUCOSE BLD STRIP.AUTO-MCNC: 192 MG/DL (ref 65–99)
GLUCOSE BLD STRIP.AUTO-MCNC: 204 MG/DL (ref 65–99)
GLUCOSE BLD STRIP.AUTO-MCNC: 234 MG/DL (ref 65–99)
GLUCOSE SERPL-MCNC: 150 MG/DL (ref 65–99)
HCT VFR BLD AUTO: 38.8 % (ref 37–47)
HCT VFR BLD AUTO: 38.8 % (ref 37–47)
HGB BLD-MCNC: 11.6 G/DL (ref 12–16)
HGB BLD-MCNC: 11.6 G/DL (ref 12–16)
IMM GRANULOCYTES # BLD AUTO: 0.01 K/UL (ref 0–0.11)
IMM GRANULOCYTES NFR BLD AUTO: 0.5 % (ref 0–0.9)
LV EJECT FRACT  99904: 60
LV EJECT FRACT MOD 2C 99903: 64.39
LV EJECT FRACT MOD 4C 99902: 58.43
LV EJECT FRACT MOD BP 99901: 60.21
LYMPHOCYTES # BLD AUTO: 0.6 K/UL (ref 1–4.8)
LYMPHOCYTES NFR BLD: 30.5 % (ref 22–41)
MAGNESIUM SERPL-MCNC: 1.8 MG/DL (ref 1.5–2.5)
MCH RBC QN AUTO: 23.9 PG (ref 27–33)
MCH RBC QN AUTO: 23.9 PG (ref 27–33)
MCHC RBC AUTO-ENTMCNC: 29.9 G/DL (ref 32.2–35.5)
MCHC RBC AUTO-ENTMCNC: 29.9 G/DL (ref 32.2–35.5)
MCV RBC AUTO: 79.8 FL (ref 81.4–97.8)
MCV RBC AUTO: 79.8 FL (ref 81.4–97.8)
MONOCYTES # BLD AUTO: 0.27 K/UL (ref 0–0.85)
MONOCYTES NFR BLD AUTO: 13.7 % (ref 0–13.4)
NEUTROPHILS # BLD AUTO: 1.08 K/UL (ref 1.82–7.42)
NEUTROPHILS NFR BLD: 54.8 % (ref 44–72)
NRBC # BLD AUTO: 0 K/UL
NRBC BLD-RTO: 0 /100 WBC (ref 0–0.2)
PHOSPHATE SERPL-MCNC: 2.6 MG/DL (ref 2.5–4.5)
PLATELET # BLD AUTO: 132 K/UL (ref 164–446)
PLATELET # BLD AUTO: 132 K/UL (ref 164–446)
PMV BLD AUTO: 10.3 FL (ref 9–12.9)
PMV BLD AUTO: 10.3 FL (ref 9–12.9)
POTASSIUM SERPL-SCNC: 3.8 MMOL/L (ref 3.6–5.5)
RBC # BLD AUTO: 4.86 M/UL (ref 4.2–5.4)
RBC # BLD AUTO: 4.86 M/UL (ref 4.2–5.4)
SODIUM SERPL-SCNC: 135 MMOL/L (ref 135–145)
VIT B12 SERPL-MCNC: 455 PG/ML (ref 211–911)
WBC # BLD AUTO: 1.9 K/UL (ref 4.8–10.8)
WBC # BLD AUTO: 1.9 K/UL (ref 4.8–10.8)

## 2023-12-30 PROCEDURE — 82607 VITAMIN B-12: CPT

## 2023-12-30 PROCEDURE — A9270 NON-COVERED ITEM OR SERVICE: HCPCS | Performed by: PHYSICAL MEDICINE & REHABILITATION

## 2023-12-30 PROCEDURE — 700102 HCHG RX REV CODE 250 W/ 637 OVERRIDE(OP): Performed by: PHYSICAL MEDICINE & REHABILITATION

## 2023-12-30 PROCEDURE — 700111 HCHG RX REV CODE 636 W/ 250 OVERRIDE (IP): Mod: JZ

## 2023-12-30 PROCEDURE — 93306 TTE W/DOPPLER COMPLETE: CPT | Mod: 26 | Performed by: INTERNAL MEDICINE

## 2023-12-30 PROCEDURE — 700111 HCHG RX REV CODE 636 W/ 250 OVERRIDE (IP): Mod: JZ | Performed by: HOSPITALIST

## 2023-12-30 PROCEDURE — 36415 COLL VENOUS BLD VENIPUNCTURE: CPT

## 2023-12-30 PROCEDURE — A9270 NON-COVERED ITEM OR SERVICE: HCPCS

## 2023-12-30 PROCEDURE — 770010 HCHG ROOM/CARE - REHAB SEMI PRIVAT*

## 2023-12-30 PROCEDURE — A9270 NON-COVERED ITEM OR SERVICE: HCPCS | Performed by: STUDENT IN AN ORGANIZED HEALTH CARE EDUCATION/TRAINING PROGRAM

## 2023-12-30 PROCEDURE — 93306 TTE W/DOPPLER COMPLETE: CPT

## 2023-12-30 PROCEDURE — 85025 COMPLETE CBC W/AUTO DIFF WBC: CPT

## 2023-12-30 PROCEDURE — 94760 N-INVAS EAR/PLS OXIMETRY 1: CPT

## 2023-12-30 PROCEDURE — 99239 HOSP IP/OBS DSCHRG MGMT >30: CPT | Performed by: INTERNAL MEDICINE

## 2023-12-30 PROCEDURE — 82962 GLUCOSE BLOOD TEST: CPT

## 2023-12-30 PROCEDURE — 83735 ASSAY OF MAGNESIUM: CPT

## 2023-12-30 PROCEDURE — 700102 HCHG RX REV CODE 250 W/ 637 OVERRIDE(OP): Performed by: STUDENT IN AN ORGANIZED HEALTH CARE EDUCATION/TRAINING PROGRAM

## 2023-12-30 PROCEDURE — 700102 HCHG RX REV CODE 250 W/ 637 OVERRIDE(OP)

## 2023-12-30 PROCEDURE — 99223 1ST HOSP IP/OBS HIGH 75: CPT | Performed by: PHYSICAL MEDICINE & REHABILITATION

## 2023-12-30 PROCEDURE — 82962 GLUCOSE BLOOD TEST: CPT | Mod: 91

## 2023-12-30 PROCEDURE — 700101 HCHG RX REV CODE 250

## 2023-12-30 PROCEDURE — 80069 RENAL FUNCTION PANEL: CPT

## 2023-12-30 RX ORDER — OXYCODONE HYDROCHLORIDE 10 MG/1
10 TABLET ORAL EVERY 4 HOURS PRN
Status: DISCONTINUED | OUTPATIENT
Start: 2023-12-30 | End: 2024-01-06 | Stop reason: HOSPADM

## 2023-12-30 RX ORDER — POTASSIUM CHLORIDE 20 MEQ/1
40 TABLET, EXTENDED RELEASE ORAL EVERY EVENING
Status: COMPLETED | OUTPATIENT
Start: 2023-12-30 | End: 2023-12-31

## 2023-12-30 RX ORDER — ROSUVASTATIN CALCIUM 10 MG/1
20 TABLET, COATED ORAL EVERY EVENING
Status: DISCONTINUED | OUTPATIENT
Start: 2023-12-30 | End: 2024-01-06 | Stop reason: HOSPADM

## 2023-12-30 RX ORDER — CARBAMAZEPINE 200 MG/1
200 TABLET ORAL 3 TIMES DAILY
Status: CANCELLED | OUTPATIENT
Start: 2023-12-30

## 2023-12-30 RX ORDER — OMEPRAZOLE 20 MG/1
20 CAPSULE, DELAYED RELEASE ORAL DAILY
Status: CANCELLED | OUTPATIENT
Start: 2023-12-31

## 2023-12-30 RX ORDER — VERAPAMIL HYDROCHLORIDE 80 MG/1
80 TABLET ORAL
Status: CANCELLED | OUTPATIENT
Start: 2023-12-30

## 2023-12-30 RX ORDER — ACETAMINOPHEN 325 MG/1
650 TABLET ORAL EVERY 6 HOURS PRN
Status: CANCELLED | OUTPATIENT
Start: 2023-12-30

## 2023-12-30 RX ORDER — INSULIN GLARGINE 100 [IU]/ML
24 INJECTION, SOLUTION SUBCUTANEOUS EVERY EVENING
Status: ON HOLD | DISCHARGE
Start: 2023-12-30 | End: 2024-01-05

## 2023-12-30 RX ORDER — CARBAMAZEPINE 100 MG/1
200 TABLET, CHEWABLE ORAL 3 TIMES DAILY
Status: DISCONTINUED | OUTPATIENT
Start: 2023-12-30 | End: 2023-12-30

## 2023-12-30 RX ORDER — FEBUXOSTAT 40 MG/1
40 TABLET, FILM COATED ORAL DAILY
Status: DISCONTINUED | OUTPATIENT
Start: 2023-12-31 | End: 2024-01-02

## 2023-12-30 RX ORDER — LEVALBUTEROL INHALATION SOLUTION 1.25 MG/3ML
1.25 SOLUTION RESPIRATORY (INHALATION)
Status: DISCONTINUED | OUTPATIENT
Start: 2023-12-30 | End: 2024-01-06 | Stop reason: HOSPADM

## 2023-12-30 RX ORDER — DEXTROSE MONOHYDRATE 25 G/50ML
25 INJECTION, SOLUTION INTRAVENOUS
Status: CANCELLED | OUTPATIENT
Start: 2023-12-30

## 2023-12-30 RX ORDER — ROSUVASTATIN CALCIUM 20 MG/1
20 TABLET, COATED ORAL EVERY EVENING
Status: CANCELLED | OUTPATIENT
Start: 2023-12-30

## 2023-12-30 RX ORDER — LANOLIN ALCOHOL/MO/W.PET/CERES
400 CREAM (GRAM) TOPICAL DAILY
Status: CANCELLED | OUTPATIENT
Start: 2023-12-31

## 2023-12-30 RX ORDER — ECHINACEA PURPUREA EXTRACT 125 MG
2 TABLET ORAL PRN
Status: DISCONTINUED | OUTPATIENT
Start: 2023-12-30 | End: 2024-01-06 | Stop reason: HOSPADM

## 2023-12-30 RX ORDER — ONDANSETRON 2 MG/ML
4 INJECTION INTRAMUSCULAR; INTRAVENOUS 4 TIMES DAILY PRN
Status: DISCONTINUED | OUTPATIENT
Start: 2023-12-30 | End: 2024-01-06 | Stop reason: HOSPADM

## 2023-12-30 RX ORDER — VERAPAMIL HYDROCHLORIDE 80 MG/1
80 TABLET ORAL
Status: DISCONTINUED | OUTPATIENT
Start: 2023-12-30 | End: 2024-01-06 | Stop reason: HOSPADM

## 2023-12-30 RX ORDER — AMOXICILLIN 250 MG
2 CAPSULE ORAL 2 TIMES DAILY
Qty: 30 TABLET | Refills: 0 | Status: ON HOLD | OUTPATIENT
Start: 2023-12-30 | End: 2024-01-05

## 2023-12-30 RX ORDER — LIDOCAINE 4 G/G
1 PATCH TOPICAL EVERY 24 HOURS
Status: DISCONTINUED | OUTPATIENT
Start: 2023-12-31 | End: 2024-01-06 | Stop reason: HOSPADM

## 2023-12-30 RX ORDER — OSELTAMIVIR PHOSPHATE 75 MG/1
75 CAPSULE ORAL 2 TIMES DAILY
Qty: 4 CAPSULE | Refills: 0 | Status: ON HOLD | OUTPATIENT
Start: 2023-12-30 | End: 2024-01-05

## 2023-12-30 RX ORDER — ASPIRIN 81 MG/1
81 TABLET ORAL DAILY
Status: DISCONTINUED | OUTPATIENT
Start: 2023-12-31 | End: 2024-01-06 | Stop reason: HOSPADM

## 2023-12-30 RX ORDER — POLYETHYLENE GLYCOL 3350 17 G/17G
17 POWDER, FOR SOLUTION ORAL
Refills: 3 | Status: ON HOLD
Start: 2023-12-30 | End: 2024-01-05

## 2023-12-30 RX ORDER — OMEPRAZOLE 20 MG/1
20 CAPSULE, DELAYED RELEASE ORAL DAILY
Status: DISCONTINUED | OUTPATIENT
Start: 2023-12-31 | End: 2024-01-06 | Stop reason: HOSPADM

## 2023-12-30 RX ORDER — POTASSIUM CHLORIDE 20 MEQ/1
40 TABLET, EXTENDED RELEASE ORAL EVERY EVENING
Status: CANCELLED | OUTPATIENT
Start: 2023-12-30

## 2023-12-30 RX ORDER — LEVOTHYROXINE SODIUM 112 UG/1
112 TABLET ORAL
Status: CANCELLED | OUTPATIENT
Start: 2023-12-31

## 2023-12-30 RX ORDER — GABAPENTIN 300 MG/1
600 CAPSULE ORAL 3 TIMES DAILY
Status: DISCONTINUED | OUTPATIENT
Start: 2023-12-30 | End: 2024-01-06 | Stop reason: HOSPADM

## 2023-12-30 RX ORDER — OSELTAMIVIR PHOSPHATE 75 MG/1
75 CAPSULE ORAL 2 TIMES DAILY
Status: CANCELLED | OUTPATIENT
Start: 2023-12-30 | End: 2024-01-02

## 2023-12-30 RX ORDER — OXYCODONE HYDROCHLORIDE 5 MG/1
5 TABLET ORAL EVERY 4 HOURS PRN
Status: DISCONTINUED | OUTPATIENT
Start: 2023-12-30 | End: 2024-01-06 | Stop reason: HOSPADM

## 2023-12-30 RX ORDER — FUROSEMIDE 40 MG/1
40 TABLET ORAL DAILY
Status: ON HOLD
Start: 2023-12-30 | End: 2024-01-05

## 2023-12-30 RX ORDER — ASPIRIN 81 MG/1
81 TABLET ORAL DAILY
Status: CANCELLED | OUTPATIENT
Start: 2023-12-31

## 2023-12-30 RX ORDER — FEBUXOSTAT 40 MG/1
40 TABLET, FILM COATED ORAL DAILY
Status: CANCELLED | OUTPATIENT
Start: 2023-12-31

## 2023-12-30 RX ORDER — LIDOCAINE 4 G/G
1 PATCH TOPICAL EVERY 24 HOURS
Status: CANCELLED | OUTPATIENT
Start: 2023-12-31

## 2023-12-30 RX ORDER — GABAPENTIN 300 MG/1
600 CAPSULE ORAL 3 TIMES DAILY
Status: CANCELLED | OUTPATIENT
Start: 2023-12-30

## 2023-12-30 RX ORDER — TRAZODONE HYDROCHLORIDE 50 MG/1
50 TABLET ORAL
Status: DISCONTINUED | OUTPATIENT
Start: 2023-12-30 | End: 2024-01-06 | Stop reason: HOSPADM

## 2023-12-30 RX ORDER — LEVALBUTEROL INHALATION SOLUTION 0.63 MG/3ML
0.63 SOLUTION RESPIRATORY (INHALATION)
Status: DISCONTINUED | OUTPATIENT
Start: 2023-12-30 | End: 2023-12-30 | Stop reason: CLARIF

## 2023-12-30 RX ORDER — LEVALBUTEROL INHALATION SOLUTION 1.25 MG/3ML
1.25 SOLUTION RESPIRATORY (INHALATION) EVERY 6 HOURS PRN
Qty: 24 ML | Status: ON HOLD
Start: 2023-12-30 | End: 2024-01-05

## 2023-12-30 RX ORDER — DEXTROSE MONOHYDRATE 25 G/50ML
25 INJECTION, SOLUTION INTRAVENOUS
Status: DISCONTINUED | OUTPATIENT
Start: 2023-12-30 | End: 2024-01-06 | Stop reason: HOSPADM

## 2023-12-30 RX ORDER — LANOLIN ALCOHOL/MO/W.PET/CERES
400 CREAM (GRAM) TOPICAL DAILY
Status: DISCONTINUED | OUTPATIENT
Start: 2023-12-31 | End: 2024-01-03

## 2023-12-30 RX ORDER — FUROSEMIDE 40 MG/1
80 TABLET ORAL
Status: DISCONTINUED | OUTPATIENT
Start: 2023-12-30 | End: 2023-12-31

## 2023-12-30 RX ORDER — MONTELUKAST SODIUM 10 MG/1
10 TABLET ORAL EVERY EVENING
Status: DISCONTINUED | OUTPATIENT
Start: 2023-12-30 | End: 2024-01-06 | Stop reason: HOSPADM

## 2023-12-30 RX ORDER — MONTELUKAST SODIUM 10 MG/1
10 TABLET ORAL EVERY EVENING
Status: CANCELLED | OUTPATIENT
Start: 2023-12-30

## 2023-12-30 RX ORDER — OSELTAMIVIR PHOSPHATE 75 MG/1
75 CAPSULE ORAL 2 TIMES DAILY
Status: COMPLETED | OUTPATIENT
Start: 2023-12-30 | End: 2024-01-01

## 2023-12-30 RX ORDER — FUROSEMIDE 40 MG/1
80 TABLET ORAL
Status: CANCELLED | OUTPATIENT
Start: 2023-12-30

## 2023-12-30 RX ORDER — CARBOXYMETHYLCELLULOSE SODIUM 5 MG/ML
1 SOLUTION/ DROPS OPHTHALMIC PRN
Status: DISCONTINUED | OUTPATIENT
Start: 2023-12-30 | End: 2024-01-06 | Stop reason: HOSPADM

## 2023-12-30 RX ORDER — ONDANSETRON 4 MG/1
4 TABLET, ORALLY DISINTEGRATING ORAL 4 TIMES DAILY PRN
Status: DISCONTINUED | OUTPATIENT
Start: 2023-12-30 | End: 2024-01-06 | Stop reason: HOSPADM

## 2023-12-30 RX ORDER — CLINDAMYCIN HYDROCHLORIDE 150 MG/1
300 CAPSULE ORAL 4 TIMES DAILY
Status: CANCELLED | OUTPATIENT
Start: 2023-12-30 | End: 2024-01-02

## 2023-12-30 RX ORDER — ACETAMINOPHEN 325 MG/1
650 TABLET ORAL EVERY 6 HOURS PRN
Status: DISCONTINUED | OUTPATIENT
Start: 2023-12-30 | End: 2024-01-06 | Stop reason: HOSPADM

## 2023-12-30 RX ORDER — LEVOTHYROXINE SODIUM 112 UG/1
112 TABLET ORAL
Status: DISCONTINUED | OUTPATIENT
Start: 2023-12-31 | End: 2024-01-06 | Stop reason: HOSPADM

## 2023-12-30 RX ORDER — OMEPRAZOLE 20 MG/1
20 CAPSULE, DELAYED RELEASE ORAL DAILY
Status: DISCONTINUED | OUTPATIENT
Start: 2023-12-30 | End: 2023-12-30

## 2023-12-30 RX ORDER — CLINDAMYCIN HYDROCHLORIDE 150 MG/1
300 CAPSULE ORAL 4 TIMES DAILY
Status: DISCONTINUED | OUTPATIENT
Start: 2023-12-30 | End: 2023-12-31

## 2023-12-30 RX ADMIN — FEBUXOSTAT 40 MG: 40 TABLET, FILM COATED ORAL at 04:34

## 2023-12-30 RX ADMIN — OSELTAMIVIR PHOSPHATE 75 MG: 75 CAPSULE ORAL at 04:34

## 2023-12-30 RX ADMIN — INSULIN GLARGINE-YFGN 24 UNITS: 100 INJECTION, SOLUTION SUBCUTANEOUS at 21:59

## 2023-12-30 RX ADMIN — OMEPRAZOLE 20 MG: 20 CAPSULE, DELAYED RELEASE ORAL at 04:34

## 2023-12-30 RX ADMIN — INSULIN HUMAN 3 UNITS: 100 INJECTION, SOLUTION PARENTERAL at 22:00

## 2023-12-30 RX ADMIN — OXYCODONE HYDROCHLORIDE 10 MG: 10 TABLET ORAL at 23:14

## 2023-12-30 RX ADMIN — POTASSIUM CHLORIDE 40 MEQ: 1500 TABLET, EXTENDED RELEASE ORAL at 21:29

## 2023-12-30 RX ADMIN — TBO-FILGRASTIM 480 MCG: 480 INJECTION, SOLUTION SUBCUTANEOUS at 16:58

## 2023-12-30 RX ADMIN — GABAPENTIN 600 MG: 300 CAPSULE ORAL at 04:33

## 2023-12-30 RX ADMIN — GABAPENTIN 600 MG: 300 CAPSULE ORAL at 11:05

## 2023-12-30 RX ADMIN — OXYCODONE HYDROCHLORIDE 10 MG: 10 TABLET ORAL at 11:04

## 2023-12-30 RX ADMIN — INSULIN HUMAN 4 UNITS: 100 INJECTION, SOLUTION PARENTERAL at 17:36

## 2023-12-30 RX ADMIN — FLUTICASONE FUROATE, UMECLIDINIUM BROMIDE AND VILANTEROL TRIFENATATE 1 PUFF: 100; 62.5; 25 POWDER RESPIRATORY (INHALATION) at 11:52

## 2023-12-30 RX ADMIN — LEVOTHYROXINE SODIUM 112 MCG: 0.11 TABLET ORAL at 04:34

## 2023-12-30 RX ADMIN — APIXABAN 5 MG: 5 TABLET, FILM COATED ORAL at 04:34

## 2023-12-30 RX ADMIN — FUROSEMIDE 40 MG: 10 INJECTION, SOLUTION INTRAVENOUS at 04:34

## 2023-12-30 RX ADMIN — OXYCODONE HYDROCHLORIDE 10 MG: 10 TABLET ORAL at 04:51

## 2023-12-30 RX ADMIN — Medication 400 MG: at 04:33

## 2023-12-30 RX ADMIN — CLINDAMYCIN HYDROCHLORIDE 300 MG: 150 CAPSULE ORAL at 03:30

## 2023-12-30 RX ADMIN — FUROSEMIDE 80 MG: 40 TABLET ORAL at 15:56

## 2023-12-30 RX ADMIN — ASPIRIN 81 MG: 81 TABLET, COATED ORAL at 04:33

## 2023-12-30 RX ADMIN — APIXABAN 5 MG: 5 TABLET, FILM COATED ORAL at 21:28

## 2023-12-30 RX ADMIN — GABAPENTIN 600 MG: 300 CAPSULE ORAL at 21:28

## 2023-12-30 RX ADMIN — VERAPAMIL HYDROCHLORIDE 80 MG: 80 TABLET ORAL at 21:41

## 2023-12-30 RX ADMIN — CARBAMAZEPINE 200 MG: 200 TABLET ORAL at 09:00

## 2023-12-30 RX ADMIN — OSELTAMIVIR 75 MG: 75 CAPSULE ORAL at 21:29

## 2023-12-30 RX ADMIN — GABAPENTIN 600 MG: 300 CAPSULE ORAL at 15:56

## 2023-12-30 RX ADMIN — LIDOCAINE 1 PATCH: 4 PATCH TOPICAL at 04:34

## 2023-12-30 RX ADMIN — ROSUVASTATIN CALCIUM 20 MG: 10 TABLET, FILM COATED ORAL at 21:29

## 2023-12-30 RX ADMIN — MONTELUKAST 10 MG: 10 TABLET, FILM COATED ORAL at 21:39

## 2023-12-30 RX ADMIN — CLINDAMYCIN HYDROCHLORIDE 300 MG: 150 CAPSULE ORAL at 09:00

## 2023-12-30 ASSESSMENT — PATIENT HEALTH QUESTIONNAIRE - PHQ9
2. FEELING DOWN, DEPRESSED, IRRITABLE, OR HOPELESS: NOT AT ALL
1. LITTLE INTEREST OR PLEASURE IN DOING THINGS: NOT AT ALL
2. FEELING DOWN, DEPRESSED, IRRITABLE, OR HOPELESS: NOT AT ALL
1. LITTLE INTEREST OR PLEASURE IN DOING THINGS: NOT AT ALL
SUM OF ALL RESPONSES TO PHQ9 QUESTIONS 1 AND 2: 0
SUM OF ALL RESPONSES TO PHQ9 QUESTIONS 1 AND 2: 0

## 2023-12-30 ASSESSMENT — CHA2DS2 SCORE
DIABETES: YES
CHA2DS2 VASC SCORE: 6
SEX: FEMALE
HYPERTENSION: NO
AGE 65 TO 74: YES
AGE 75 OR GREATER: NO
PRIOR STROKE OR TIA OR THROMBOEMBOLISM: YES
CHF OR LEFT VENTRICULAR DYSFUNCTION: YES
VASCULAR DISEASE: NO

## 2023-12-30 ASSESSMENT — LIFESTYLE VARIABLES
CONSUMPTION TOTAL: NEGATIVE
AVERAGE NUMBER OF DAYS PER WEEK YOU HAVE A DRINK CONTAINING ALCOHOL: 0
TOTAL SCORE: 0
HAVE PEOPLE ANNOYED YOU BY CRITICIZING YOUR DRINKING: NO
ON A TYPICAL DAY WHEN YOU DRINK ALCOHOL HOW MANY DRINKS DO YOU HAVE: 0
HOW MANY TIMES IN THE PAST YEAR HAVE YOU HAD 5 OR MORE DRINKS IN A DAY: 0
ALCOHOL_USE: NO
HAVE YOU EVER FELT YOU SHOULD CUT DOWN ON YOUR DRINKING: NO
EVER FELT BAD OR GUILTY ABOUT YOUR DRINKING: NO
TOTAL SCORE: 0
TOTAL SCORE: 0
EVER HAD A DRINK FIRST THING IN THE MORNING TO STEADY YOUR NERVES TO GET RID OF A HANGOVER: NO
EVER_SMOKED: NEVER

## 2023-12-30 ASSESSMENT — FIBROSIS 4 INDEX: FIB4 SCORE: 5.02

## 2023-12-30 ASSESSMENT — PAIN DESCRIPTION - PAIN TYPE
TYPE: ACUTE PAIN
TYPE: ACUTE PAIN

## 2023-12-30 NOTE — CARE PLAN
The patient is Stable - Low risk of patient condition declining or worsening    Shift Goals  Clinical Goals: Maintain comfort and safety  Patient Goals: rest  Family Goals: NA    Progress made toward(s) clinical / shift goals:    Patient educated on the importance of keeping up with her medications and self care. Patient encouraged to use the incentive spirometer and move in her bed as much as possible.     Problem: Care Map:  Admission Optimal Outcome for the Heart Failure Patient  Goal: Admission:  Optimal Care of the heart failure patient  Outcome: Progressing     Problem: Care Map:  Day 3 Optimal Outcome for the Heart Failure Patient  Goal: Day 3:  Optimal Care of the heart failure patient  Outcome: Progressing     Problem: Knowledge Deficit - Standard  Goal: Patient and family/care givers will demonstrate understanding of plan of care, disease process/condition, diagnostic tests and medications  Outcome: Progressing     Problem: Pain - Standard  Goal: Alleviation of pain or a reduction in pain to the patient’s comfort goal  Outcome: Progressing     Problem: Skin Integrity  Goal: Skin integrity is maintained or improved  Outcome: Progressing     Problem: Fall Risk  Goal: Patient will remain free from falls  Outcome: Progressing       Patient is not progressing towards the following goals:

## 2023-12-30 NOTE — PROGRESS NOTES
1400 Pt arrived at [facility] from [facility] via transport. Dr. Brand to follow. Initial assessment initiated. Pt oriented to room and facility routine and safety measures; pt education binder provided and discussed. Pt A/O x 4, continent/ incontinent of bowel and bladder.  Min assist for transfers. All wounds photographed and documented; photos uploaded to . Pt denies pain or discomfort at this time. Pt positioned for comfort in bed. Call light within reach, safety measures in place. Will continue to monitor.

## 2023-12-30 NOTE — FLOWSHEET NOTE
12/30/23 1349   Events/Summary/Plan   Events/Summary/Plan 02 pulse ox check. Started on flutter valve(Aerobika)   Vital Signs   Pulse 80   Respiration 18   Pulse Oximetry 96 %   $ Pulse Oximetry (Spot Check) Yes   Oxygen   O2 (LPM) 1.5   O2 Delivery Device Silicone Nasal Cannula

## 2023-12-30 NOTE — PREADMISSION SCREENING NOTE
Pre-Admission Screening Form    Patient Information:   Name: Jenifer Ramos     MRN: [id number]       : 1960      Age: 63 y.o.   Gender: female      Race: White [7]       Marital Status:  [2]  Family Contact: Demetrio Ramos,Yossi Cowna        Relationship: Spouse [17]  Son [15]  Son [15]  Home Phone:     [phone]           Cell Phone: [phone]  Advanced Directives: Copy in Chart  Code Status:  FULL  Current Attending Provider: Jennifer Varela M.D.  Referring Physician: Dr. Miller       Physiatrist Consult: Dr. Novak        Referral Date: 2023  Primary Payor Source:  MEDICARE  Secondary Payor Source:      Medical Information:   Date of Admission to Acute Care Sett[place]2023  Room Number: T725/01  Rehabilitation Diagnosis: 0009 - Cardiac  Immunization History   Administered Date(s) Administered    Tdap Vaccine 2023     Allergies   Allergen Reactions    Azithromycin Anaphylaxis    Erythromycin Anaphylaxis    Other Misc Anaphylaxis     Flu vaccine    Penicillins Anaphylaxis     As a child    Pistachio Anaphylaxis    Sulfa Drugs Anaphylaxis    Tetraglycine Anaphylaxis    Other Drug Unspecified     Long acting benzodiazepines only single dose otherwise combative     Physostigmine Unspecified     Abdomen extreme swelling    Tape Rash and Itching     tegaderm ok  Blisters with others    Zanaflex [Tizanidine Hcl] Unspecified     Falling asleep mid sentence    Albuterol Palpitations    Atorvastatin Unspecified     Extreme joint pain    Chlorhexidine Rash     blistering    Lamisil [Terbinafine Hcl] Unspecified     Pancreatitis     Morphine Unspecified     Not effective     Past Medical History:   Diagnosis Date    Arrhythmia     hx a-fib    Arthritis     generalized    ASTHMA 2013    Back pain 2013    Blood clotting disorder (HCC)     Disorder of thyroid     DM (diabetes mellitus) (HCC) 2013    insulin    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    Palpitations 4/24/2013    Pneumonia 1999    Psychiatric problem     depression    Sleep apnea 4/24/2013    bipap    Snoring     Urinary incontinence     Varicose vein 4/24/2013     Past Surgical History:   Procedure Laterality Date    PB RECONSTR TOTAL SHOULDER IMPLANT Left 11/27/2019    Procedure: ARTHROPLASTY, SHOULDER, TOTAL-REVERSE;  Surgeon: Hugo Beltran M.D.;  Location: SURGERY [facility];  Service: Orthopedics    CLAVICLE DISTAL EXCISION  11/27/2019    Procedure: EXCISION, CLAVICLE, DISTAL-RESECTION;  Surgeon: Hugo Beltran M.D.;  Location: SURGERY [facility];  Service: Orthopedics    OTHER ORTHOPEDIC SURGERY  2018    elbow    KNEE ARTHROPLASTY TOTAL Left 2013    KNEE ARTHROPLASTY TOTAL Right 2013    HYSTERECTOMY, TOTAL ABDOMINAL  2003    CHOLECYSTECTOMY      FUSION, SPINE, LUMBAR, PLIF      OOPHORECTOMY      OTHER CARDIAC SURGERY      ablation    TONSILLECTOMY         History Leading to Admission, Conditions that Caused the Need for Rehab (CMS):     Marybeth Stacy M.D.  [facility]     H&P     Attested     Date of Service: 12/28/2023  1:46 AM         Attestation signed by Linda Lunsford M.D. at 12/28/2023  4:08 AM     I have seen and examined the patient and reviewed the case by the resident physician, Dr Salvador. I have also reviewed the patient's history, ROS, physical exam, laboratory and radiology findings. I agree with the general assessments, plans, and discussions as outlined by the resident notes below and in the orders except as noted in the attending comments below. I was physically present/availble for the critical portions of the exam/evaluation as necessary and appropriate. I am actively involved in the patient's care. Please see Resident Physician History and Physical for additional details.     Additional Attending Comments:   Evaluated for acute on chronic hypoxemic respiratory failure secondary to flu a  infection as well as diastolic heart failure exacerbation.  I/O, echocardiogram, daily weights, IV diuresis.  BMP ordered monitor kidney function while on diuretics.         Co-morbidities:  as listed above and below   Potential Risk - Complications: Contractures, Deep Vein Thrombosis, Pain, Perceptual Impairment, Pneumonia, and pulmonary and cardiac risk   Level of Risk: High    Ongoing Medical Management Needed (Medical/Nursing Needs):   Patient Active Problem List    Diagnosis Date Noted    Acute on chronic respiratory failure with hypoxemia ([facility]) 12/28/2023    Influenza A 12/28/2023    Chronic ulcer of left foot due to diabetes mellitus ([facility]) 12/28/2023    Morbid obesity ([facility]) 06/21/2023    Thrombocytopenia ([facility]) 06/21/2023    History of stroke 06/21/2023    Lumbar compression fracture, closed, initial encounter ([facility]) 06/20/2023    A-fib ([facility]) 05/12/2023    Acute on chronic diastolic heart failure ([facility]) 05/12/2023    Acute on chronic respiratory failure with hypoxia ([facility]) 05/12/2023    Electrolyte abnormality 05/12/2023    Pulmonary hypertension ([facility]) 08/19/2022    Pancytopenia ([facility]) 08/12/2022    Lower extremity edema 08/12/2022    Labile blood pressure 08/12/2022    COVID-19 08/12/2022    Dehydration 08/11/2022    Pulmonary nodule 07/21/2022    Cirrhosis ([facility]) 07/21/2022    Hypothyroidism 07/21/2022    GERD (gastroesophageal reflux disease) 07/21/2022    Microcytic anemia 07/21/2022    Hyponatremia 07/21/2022    Hypokalemia 07/21/2022    Dyspnea 07/21/2022    SOB (shortness of breath) 07/21/2022    Chest pain 07/20/2022    Postoperative pain 11/27/2019    Atrial fibrillation ([facility]) 01/12/2015    Back pain 04/24/2013    Asthma with exacerbation 04/24/2013    Varicose vein 04/24/2013    Palpitations 04/24/2013    Sleep apnea 04/24/2013    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    DM (diabetes mellitus) ([facility]) 04/24/2013       Current Vital Signs:   Temperature: 36.3 °C (97.3 °F) Pulse: 69 Respiration: 18 Blood  "Pressure : 109/63  Weight: 124 kg (273 lb 2.4 oz) Height: 172.7 cm (5' 8\")  Pulse Oximetry: 97 % O2 (LPM): 2.5      Completed Laboratory Reports:  Recent Labs     12/27/23  2216 12/29/23  0236 12/30/23  0657   WBC 3.5* 2.0* 1.9*  1.9*   HEMOGLOBIN 11.5* 11.1* 11.6*  11.6*   HEMATOCRIT 39.4 37.3 38.8  38.8   PLATELETCT 124* 115* 132*  132*   SODIUM 132* 134* 135   POTASSIUM 4.4 3.5* 3.8   BUN 7* 8 11   CREATININE 0.64 0.57 0.55   ALBUMIN 3.3 3.2 3.1*   GLUCOSE 222* 135* 150*     Additional Labs: Not Applicable    Prior Living Situation:   Housing / Facility: 1 Story House (1 step to main entrance)  Steps Into Home: 1 (4 inch threshold step)  Steps In Home: 0  Lives with - Patient's Self Care Capacity: Spouse, Adult Children  Equipment Owned: Front-Wheel Walker    Prior Level of Function / Living Situation:   Physical Therapy: Prior Services: Intermittent Physical Support for ADL Per Family  Housing / Facility: 1 Story House (1 step to main entrance)  Steps Into Home: 1 (4 inch threshold step)  Steps In Home: 0  Bathroom Set up: Walk In Shower, Shower Chair  Equipment Owned: Front-Wheel Walker  Lives with - Patient's Self Care Capacity: Spouse, Adult Children  Bed Mobility: Independent  Transfer Status: Independent  Ambulation: Independent  Assistive Devices Used: Front-Wheel Walker  Stairs: Unable To Determine At This Time  Current Level of Function:   Gait Level Of Assist: Contact Guard Assist  Assistive Device: Front Wheel Walker  Distance (Feet): 5  Deviation: Bradykinetic, Shuffled Gait  # of Stairs Climbed: 0  Weight Bearing Status: no restrictions  Supine to Sit:  (on BSC)  Sit to Supine: Moderate Assist (to B LE which pt reports spouse assists with at baseline)  Scooting: Maximal Assist (supine, CGA seated)  Sit to Stand: Minimal Assist  Bed, Chair, Wheelchair Transfer: Minimal Assist  Toilet Transfers: Minimal Assist  Transfer Method: Stand Step     Occupational Therapy:   Self Feeding: " Independent  Grooming / Hygiene: Independent  Bathing: Requires Assist  Dressing: Independent  Toileting: Independent  Medication Management: Requires Assist  Laundry: Requires Assist  Kitchen Mobility: Requires Assist  Finances: Requires Assist  Home Management: Requires Assist  Shopping: Requires Assist  Prior Level Of Mobility: Supervision With Device in Home  Prior Services: Intermittent Physical Support for ADL Per Family  Housing / Facility: 1 Story House (1 step to main entrance)  Current Level of Function:   Upper Body Dressing: Moderate Assist  Lower Body Dressing: Maximal Assist  Toileting: Maximal Assist  Speech Language Pathology:      Rehabilitation Prognosis/Potential: Good  Estimated Length of Stay: 10-14 days    Nursing:      Continent    Scope/Intensity of Services Recommended:  Physical Therapy: 1.5 hr / day  5 days / week. Therapeutic Interventions Required: Maximize Endurance, Mobility, Strength, and Safety  Occupational Therapy: 1.5 hr / day 5 days / week. Therapeutic Interventions Required: Maximize Self Care, ADLs, IADLs, and Energy Conservation  Rehabilitation Nursin/7. Therapeutic Interventions Required: Monitor Pain, Skin, Wound(s), Vital Signs, Intake and Output, Labs, Safety, and Family Training  Rehabilitation Physician: 3 - 5 days / week. Therapeutic Interventions Required: Medical Management  Respiratory Care: evaluate . Therapeutic Interventions Required: Pulmonary Toileting and O2 Weaning  Dietician: consult . Therapeutic Interventions Required: nutritional need     She requires 24-hour rehabilitation nursing to manage skin care, wound, nutrition and fluid intake, pulmonary hygiene, pain control, safety, medication management, and patient/family goals. In addition, rehabilitation nursing will reiterate and reinforce therapy skills and equipment use, including ADLs, as well as provide education to the patient and family. Jenifer Ramos is willing to participate in and is  able to tolerate the proposed plan of care.    Rehabilitation Goals and Plan (Expected frequency & duration of treatment in the IRF):   Return to the Community, Modified Independent Level of Care, Minimal Assist Level of Care, Outpatient Support, and Family Able to Provide 24/7 Assistance  Anticipated Date of Rehabilitation Admission: 12/30/2023  Patient/Family oriented IRF level of care/facility/plan: Yes  Patient/Family willing to participate in IRF care/facility/plan: Yes  Patient able to tolerate IRF level of care proposed: Yes  Patient has potential to benefit IRF level of care proposed: Yes  Comments: Not Applicable    Special Needs or Precautions - Medical Necessity:  Safety Concerns/Precautions:  Fall Risk / High Risk for Falls and Balance  Complex Wound Care: chronic  Requires Oxygen  Cardiac Precautions  Current Medications:    Current Facility-Administered Medications Ordered in Epic   Medication Dose Route Frequency Provider Last Rate Last Admin    senna-docusate (Pericolace Or Senokot S) 8.6-50 MG per tablet 2 Tablet  2 Tablet Oral BID Marybeth Stacy M.D.   2 Tablet at 12/29/23 1738    And    polyethylene glycol/lytes (Miralax) Packet 1 Packet  1 Packet Oral QDAY PRN Marybeth Stacy M.D.        And    magnesium hydroxide (Milk Of Magnesia) suspension 30 mL  30 mL Oral QDAY PRN Marybeth Stacy M.D.        And    bisacodyl (Dulcolax) suppository 10 mg  10 mg Rectal QDAY PRN Marybeth Stacy M.D.        Respiratory Therapy Consult   Nebulization Continuous RT Marybeth Stacy M.D.        acetaminophen (Tylenol) tablet 650 mg  650 mg Oral Q6HRS PRN Marybeth Stacy M.D.        labetalol (Normodyne/Trandate) injection 10 mg  10 mg Intravenous Q4HRS PRN Marybeth Stacy M.D.        ondansetron (Zofran) syringe/vial injection 4 mg  4 mg Intravenous Q4HRS PRN Marybeth Stacy M.D.        ondansetron (Zofran ODT) dispertab 4 mg   4 mg Oral Q4HRS PRN Marybeth Stacy M.D.        apixaban (Eliquis) tablet 5 mg  5 mg Oral BID Marybeth Stacy M.D.   5 mg at 12/30/23 0434    aspirin EC tablet 81 mg  81 mg Oral DAILY Marybeth Stacy M.D.   81 mg at 12/30/23 0433    omeprazole (PriLOSEC) capsule 20 mg  20 mg Oral DAILY Marybeth Stacy M.D.   20 mg at 12/30/23 0434    rosuvastatin (Crestor) tablet 20 mg  20 mg Oral Q EVENING Marybeth Stacy M.D.   20 mg at 12/29/23 1738    oseltamivir (Tamiflu) capsule 75 mg  75 mg Oral BID Marybeth Stacy M.D.   75 mg at 12/30/23 0434    insulin regular (HumuLIN R,NovoLIN R) injection  3-14 Units Subcutaneous 4X/DAY ACHS Marybeth Stacy M.D.   3 Units at 12/29/23 1739    And    dextrose 10 % BOLUS 25 g  25 g Intravenous Q15 MIN PRN Marybeth Stacy M.D.        insulin GLARGINE (Lantus,Semglee) injection  0.2 Units/kg/day Subcutaneous Q EVENING Marybeth Stacy M.D.   24 Units at 12/29/23 1741    carBAMazepine (TEGretol) tablet 200 mg  200 mg Oral TID Camron Raymundon, A.P.R.N.   200 mg at 12/30/23 0900    fluticasone-umeclidinium-vilanterol (Trelegy Ellipta) 100-62.5-25 mcg/act inhaler 1 Puff  1 Puff Inhalation QDAILY (RT) Camron Raymundon, A.P.R.N.   1 Puff at 12/29/23 0916    gabapentin (Neurontin) capsule 600 mg  600 mg Oral TID Camron Raymundon, A.P.R.N.   600 mg at 12/30/23 0433    verapamil (Isoptin) tablet 80 mg  80 mg Oral QHS Camron Raymundon, A.P.R.N.   80 mg at 12/29/23 2142    montelukast (Singulair) tablet 10 mg  10 mg Oral Q EVENING JELLY Araujo.P.R.N.   10 mg at 12/29/23 1738    levothyroxine (Synthroid) tablet 112 mcg  112 mcg Oral AM ES JELLY Araujo.P.R.N.   112 mcg at 12/30/23 0434    lidocaine (Asperflex) 4 % patch 1 Patch  1 Patch Transdermal Q24HRS JELLY Araujo.P.R.N.   1 Patch at 12/30/23 0434    magnesium oxide tablet 400 mg  400 mg Oral DAILY MATEO AraujoP.R.N.    400 mg at 12/30/23 0433    potassium chloride SA (Kdur) tablet 40 mEq  40 mEq Oral Q EVENING Camron JELLY Miller, A.P.R.N.   40 mEq at 12/29/23 1738    Pharmacy Consult Request ...Pain Management Review 1 Each  1 Each Other PHARMACY TO DOSE Camron REAVES Angela, A.P.R.N.        oxyCODONE immediate-release (Roxicodone) tablet 5 mg  5 mg Oral Q3HRS PRN Camron JELLY Jhanin, A.P.R.N.   5 mg at 12/29/23 2207    Or    oxyCODONE immediate release (Roxicodone) tablet 10 mg  10 mg Oral Q3HRS PRN Camronjose Miller, A.P.R.N.   10 mg at 12/30/23 0451    clindamycin (Cleocin) capsule 300 mg  300 mg Oral 4XDAY Camron JELLY Miller, A.P.R.N.   300 mg at 12/30/23 0900    febuxostat (Uloric) tablet 40 mg  40 mg Oral DAILY Camron JELLY Raymundon, A.P.R.N.   40 mg at 12/30/23 0434    levalbuterol (Xopenex) 1.25 MG/3ML nebulizer solution 1.25 mg  1.25 mg Nebulization Q6HRS PRN (RT) Camron JELLY Raymundon, A.P.R.N.   1.25 mg at 12/29/23 1002    furosemide (Lasix) injection 40 mg  40 mg Intravenous BID DIURETIC Camron JELLY Raymundon, A.P.R.N.   40 mg at 12/30/23 0434     Current Outpatient Medications Ordered in Epic   Medication Sig Dispense Refill    aspirin EC (ECOTRIN) 81 MG Tablet Delayed Response Take 1 Tablet by mouth every day. 30 Tablet     insulin glargine 100 UNIT/ML Solution Pen-injector injection Inject 24 Units under the skin every evening.      insulin regular (HUMULIN R) 100 Unit/mL Solution Inject 3-14 Units under the skin 4 Times a Day,Before Meals and at Bedtime. 10 mL     levalbuterol (XOPENEX) 1.25 MG/3ML Nebu Soln Take 3 mL by nebulization every 6 hours as needed for Shortness of Breath (wheezing). 24 mL     oseltamivir (TAMIFLU) 75 MG Cap Take 1 Capsule by mouth 2 times a day for 4 doses. 4 Capsule 0    senna-docusate (PERICOLACE OR SENOKOT S) 8.6-50 MG Tab Take 2 Tablets by mouth 2 times a day. 30 Tablet 0    polyethylene glycol/lytes (MIRALAX) Pack Take 1 Packet by mouth 1 time a day as needed (if sennosides and docusate ineffective  after 24 hours).  3    furosemide (LASIX) 40 MG Tab Take 1 Tablet by mouth every day.       Diet:   DIET ORDERS (From admission to next 24h)       Start     Ordered    12/30/23 0853  Diet Order Diet: Consistent CHO (Diabetic); Second Modifier: (optional): 2 Gram Sodium; Calorie modifications: (optional): 2200 kcals; Nutrient modifications: (optional): High Protein  ALL MEALS        Question Answer Comment   Diet: Consistent CHO (Diabetic)    Second Modifier: (optional) 2 Gram Sodium    Calorie modifications: (optional) 2200 kcals    Nutrient modifications: (optional) High Protein        12/30/23 0852                    Anticipated Discharge Destination / Patient/Family Goal:  Destination: Home with Assistance Support System: Family   Anticipated home health services: OT and PT  Previously used HH service/ provider: Not Applicable  Anticipated DME Needs: Not Applicable  Outpatient Services: PT  Alternative resources to address additional identified needs:   No future appointments.    Arpit Zamora  Clinical Admissions Coordinator     Discharge Planning     Signed     Date of Service: 12/30/2023 10:26 AM      Following for post acute services. Spoke with Dr. Ramos  about goals and expectation for IRF level of care. Discussed therapy plan for 3 hours per day 5 days a week. Discussed therapy schedules 30/45 or 60 minute sessions typically 1.5 hours between breakfast and lunch and another 1.5 hours between lunch and dinner. Discussed PT/OT and SLP roles. Discussed potential length of stay. Discussed comprehensive medical surveillance by physiatry as well as hospitalist team. Discussed patient to nursing ratio. Discussed insurance Medicare  coverage for the program. Discussed visitation and clothing requirements. Spouse/son will be primary support for Jenifer  to return to the community. Discussed participation with therapy program when visiting.  Family/ patient rehab goals are  to achieve maximal independence  with  transfers and mobility, self care. Home is a 1 story with 1 step(s) to enter.  Discussed discharge planner role and team conference. DME in the home includes a FWW walk in shower .  In the event of additional support need HH/OP .  Dr. Ramos is agreeable to transfer when medically cleared.                  Pre-Screen Completed: 12/30/2023 10:29 AM Arpit Zamora

## 2023-12-30 NOTE — PROGRESS NOTES
Bedside report received at 1900 and assumed care of patient. Patient asleep in bed, when roused denied pain. A&O x4 with confusion and delayed responses. On 2.5L O2 NC. Tele monitoring in place. Educated on fall risk, all fall precautions in place. Call light within reach, bed locked and in lowest position, denied other needs at this time. Hourly rounding in place.

## 2023-12-30 NOTE — DISCHARGE PLANNING
Case Management Discharge Planning    Admission Date: 12/27/2023  GMLOS: 4.1  ALOS: 2    6-Clicks ADL Score: 16  6-Clicks Mobility Score: 11  PT and/or OT Eval ordered: Yes  Post-acute Referrals Ordered: Yes  Post-acute Choice Obtained: Yes  Has referral(s) been sent to post-acute provider:  Yes      Anticipated Discharge Dispo: Discharge Disposition: Disch to  rehab facility or distinct part unit (62)        Action(s) Taken: Updated Provider/Nurse on Discharge Plan, Patient Conference, and Transport Arranged  GMT ETA 1300 Pt is aware, Family has been updated. COBRA and other pertinent forms completed/    Escalations Completed: None    Medically Clear: Yes    Next Steps: Anticipate transfer time 1330    Barriers to Discharge: None    Is the patient up for discharge tomorrow: Today @1330

## 2023-12-30 NOTE — FLOWSHEET NOTE
12/30/23 1342   Events/Summary/Plan   Events/Summary/Plan RT Consult   Vital Signs   Pulse 83   Respiration 18   Pulse Oximetry 98 %   $ Pulse Oximetry (Spot Check) Yes   Respiratory Assessment   Level of Consciousness Alert   Chest Exam   Work Of Breathing / Effort Within Normal Limits   Breath Sounds   RUL Breath Sounds Diminished   RML Breath Sounds Diminished   RLL Breath Sounds Diminished   DANIELLE Breath Sounds Diminished   LLL Breath Sounds Diminished   Secretions   Cough Non Productive;Congested;Strong   Oxygen   O2 (LPM) 2.5  (titrated to 1.5)   O2 Delivery Device Silicone Nasal Cannula   Smoking History   Have you ever smoked Never

## 2023-12-30 NOTE — DISCHARGE PLANNING
Case Management Discharge Planning    Admission Date: 12/27/2023  GMLOS: 4.1  ALOS: 1    6-Clicks ADL Score: 16  6-Clicks Mobility Score: 11  PT and/or OT Eval ordered: Yes  Post-acute Referrals Ordered: Yes  Post-acute Choice Obtained: Yes  Has referral(s) been sent to post-acute provider:  Yes      Anticipated Discharge Dispo: Discharge Disposition: Disch to IP rehab facility or distinct part unit (62)    DME Needed: No    Action(s) Taken: Updated Provider/Nurse on Discharge Plan, DC Assessment Complete (See below), Choice obtained, and Referral(s) sent    Escalations Completed: None    Medically Clear: No    Next Steps: CM will continue to assist Pt with discharge needs.  Will anticipate discharge to Inpatient Rehab once medically cleared.    Barriers to Discharge: Medical clearance and Pending Placement    Is the patient up for discharge tomorrow: No      Care Transition Team Assessment    RN CM met with Pt at bedside to obtain assessment informations. Demographics verified. RN CM introduced self and purpose of visit.   Pt lives with  and son in a 1 story house with 1 step to main entrance  Pt has  and son for support.  Pt has RADU WATTS M.D. for PCP  Pt needs assistance with ADLs prior to hospitalization.  Pt owns and uses wheelchair, FWW, oxygen concentrator. Pt said she uses 2LPM oxygen at night.  Pt also provided her physical address: [address] [place] [id number]      Information Source  Orientation Level: Oriented X4  Information Given By: Patient  Who is responsible for making decisions for patient? : Patient      Elopement Risk  Legal Hold: No  Ambulatory or Self Mobile in Wheelchair: No-Not an Elopement Risk  Elopement Risk: Not at Risk for Elopement    Interdisciplinary Discharge Planning  Primary Care Physician: RADU WATTS M.D.  Lives with - Patient's Self Care Capacity: Spouse, Adult Children  Support Systems: Children, Spouse / Significant Other  Housing /  Facility: [address] [facility] (1 step to main entrance)  Prior Services: Intermittent Physical Support for ADL Per Family  Durable Medical Equipment: Walker, Other - Specify (wheelchair, FWW, oxygen concentrator)    Discharge Preparedness  What is your plan after discharge?: Home with help  What are your discharge supports?: Spouse, Child  Prior Functional Level: Needs Assist with Activities of Daily Living, Independent with Medication Management, Uses Wheelchair, Uses Walker    Functional Assesment  Prior Functional Level: Needs Assist with Activities of Daily Living, Independent with Medication Management, Uses Wheelchair, Uses Walker    Finances  Financial Barriers to Discharge: No  Prescription Coverage: Yes       Psychological Assessment  History of Substance Abuse: None  History of Psychiatric Problems: No         Anticipated Discharge Information  Discharge Disposition: Disch to IP rehab facility or distinct part unit (62)

## 2023-12-30 NOTE — H&P
Physical Medicine & Rehabilitation  History and Physical (H&P)  &     Post Admission Physician Evaluation (EDEL)       Date of Admission: 12/30/2023  Date of Service: 12/30/2023   Jenifer Ramos  [id number]    [facility] Code to Support Admission: 0009 - Cardiac  Etiologic Diagnosis: CHF with cardiomyopathy (HCC)    _______________________________________________    Chief Complaint: CHF    History of Present Illness:    Patient is a 63-year-old female with a past medical history of morbid obesity, paroxysmal atrial fibrillation, chronic anticoagulation on Eliquis, factor V Leiden COVID diastolic heart failure, asthma, hypothyroidism, pulmonary hypertension, remote history of CVA, MARCI, type 2 insulin-dependent diabetes, debility, peripheral neuropathy, osteoarthritis who presented on 12/28/2023 with generalized weakness, confusion, shortness of breath.  Patient reported her son was sick at home with a cold, patient was found to be positive for influenza A.  Chest x-ray showed diffuse interstitial prominence and cardiomegaly, she was also found to have a left foot wound.  X-ray of the left foot showed no acute findings.  Patient was started on Tamiflu, Lasix for CHF and fluid overload.  Patient was found to be pancytopenic, most likely related to influenza.  Blood cultures were taken, no growth to date.    On arrival to [facility] rehab, patient reports overall doing well.  She is on 1.5 L nasal cannula oxygen.  She reports that she has had her left foot wound for about 6 weeks.  She reports that she has been on Tegretol for about a year and a half for trigeminal neuralgia after her stroke.  Her stroke affected her left side.  She has left-sided weakness which she reports is her baseline.    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    Palpitations 4/24/2013    Pneumonia 1999    Psychiatric problem     depression    Sleep apnea 4/24/2013    bipap    Snoring     Urinary incontinence     Varicose vein 4/24/2013       Past Surgical History:  Past Surgical History:   Procedure Laterality Date    PB RECONSTR TOTAL SHOULDER IMPLANT Left 11/27/2019    Procedure: ARTHROPLASTY, SHOULDER, TOTAL-REVERSE;  Surgeon: Hugo Beltran M.D.;  Location: SURGERY [facility];  Service: Orthopedics    CLAVICLE DISTAL EXCISION  11/27/2019    Procedure: EXCISION, CLAVICLE, DISTAL-RESECTION;  Surgeon: Hugo Beltran M.D.;  Location: SURGERY [facility];  Service: Orthopedics    OTHER ORTHOPEDIC SURGERY  2018    elbow    KNEE ARTHROPLASTY TOTAL Left 2013    KNEE ARTHROPLASTY TOTAL Right 2013    HYSTERECTOMY, TOTAL ABDOMINAL  2003    CHOLECYSTECTOMY      FUSION, SPINE, LUMBAR, PLIF      OOPHORECTOMY      OTHER CARDIAC SURGERY      ablation    TONSILLECTOMY         Family History:  Family History   Problem Relation Age of Onset    Heart Disease Mother 75        atrial fibrillation    Lung Disease Father 69        astma, DM, lukemia    Hypertension Sister        Medications:  Current Facility-Administered Medications   Medication Dose    apixaban (Eliquis) tablet 5 mg  5 mg    [START ON 12/31/2023] aspirin EC tablet 81 mg  81 mg    carBAMazepine (TEGretol) tablet 200 mg  200 mg    clindamycin (Cleocin) capsule 300 mg  300 mg    [START ON 12/31/2023] febuxostat (Uloric) tablet 40 mg  40 mg    [START ON 12/31/2023] fluticasone-umeclidinium-vilanterol (Trelegy Ellipta) 100-62.5-25 mcg/act inhaler 1 Puff  1 Puff    gabapentin (Neurontin) capsule 600 mg  600 mg    insulin GLARGINE (Lantus,Semglee) injection  0.2 Units/kg/day    insulin regular (HumuLIN R,NovoLIN R) injection  3-14 Units     And    dextrose 50% (D50W) injection 25 g  25 g    [START ON 12/31/2023] levothyroxine (Synthroid) tablet 112 mcg  112 mcg    [START ON 12/31/2023] lidocaine (Asperflex) 4 % patch 1 Patch  1 Patch    [START ON 12/31/2023] magnesium oxide tablet 400 mg  400 mg    montelukast (Singulair) tablet 10 mg  10 mg    [START ON 12/31/2023] omeprazole (PriLOSEC) capsule 20 mg  20 mg    oseltamivir (Tamiflu) capsule 75 mg  75 mg    potassium chloride SA (Kdur) tablet 40 mEq  40 mEq    rosuvastatin (Crestor) tablet 20 mg  20 mg    verapamil (Isoptin) tablet 80 mg  80 mg    acetaminophen (Tylenol) tablet 650 mg  650 mg    furosemide (Lasix) tablet 80 mg  80 mg    Respiratory Therapy Consult      Pharmacy Consult Request ...Pain Management Review 1 Each  1 Each    omeprazole (PriLOSEC) capsule 20 mg  20 mg    traZODone (Desyrel) tablet 50 mg  50 mg    sodium chloride (Ocean) 0.65 % nasal spray 2 Spray  2 Spray    ondansetron (Zofran ODT) dispertab 4 mg  4 mg    Or    ondansetron (Zofran) syringe/vial injection 4 mg  4 mg    carboxymethylcellulose (Refresh Tears) 0.5 % ophthalmic drops 1 Drop  1 Drop       Allergies:  Azithromycin, Erythromycin, Other misc, Penicillins, Pistachio, Sulfa drugs, Tetraglycine, Other drug, Physostigmine, Tape, Zanaflex [tizanidine hcl], Albuterol, Atorvastatin, Chlorhexidine, Lamisil [terbinafine hcl], and Morphine    Psychosocial History:  Single-story home, one-step to enter, no steps inside, uses a front wheel walker, lives with her spouse and son    Level of Function Prior to Disability:  Previously independent with front wheel walker    Level of Function Prior to Admission to [facility]:  Physical Therapy: Prior Services: Intermittent Physical Support for ADL Per Family  Housing / Facility: 1 Story House (1 step to main entrance)  Steps Into Home: 1 (4 inch threshold step)  Steps In Home: 0  Bathroom Set up: Walk In Shower, Shower Chair  Equipment Owned: Front-Wheel  Walker  Lives with - Patient's Self Care Capacity: Spouse, Adult Children  Bed Mobility: Independent  Transfer Status: Independent  Ambulation: Independent  Assistive Devices Used: Front-Wheel Walker  Stairs: Unable To Determine At This Time  Current Level of Function:   Gait Level Of Assist: Contact Guard Assist  Assistive Device: Front Wheel Walker  Distance (Feet): 5  Deviation: Bradykinetic, Shuffled Gait  # of Stairs Climbed: 0  Weight Bearing Status: no restrictions  Supine to Sit:  (on BSC)  Sit to Supine: Moderate Assist (to B LE which pt reports spouse assists with at baseline)  Scooting: Maximal Assist (supine, CGA seated)  Sit to Stand: Minimal Assist  Bed, Chair, Wheelchair Transfer: Minimal Assist  Toilet Transfers: Minimal Assist  Transfer Method: Stand Step     Occupational Therapy:   Self Feeding: Independent  Grooming / Hygiene: Independent  Bathing: Requires Assist  Dressing: Independent  Toileting: Independent  Medication Management: Requires Assist  Laundry: Requires Assist  Kitchen Mobility: Requires Assist  Finances: Requires Assist  Home Management: Requires Assist  Shopping: Requires Assist  Prior Level Of Mobility: Supervision With Device in Home  Prior Services: Intermittent Physical Support for ADL Per Family  Housing / Facility: [address] [place] [address] (1 step to main entrance)  Current Level of Function:   Upper Body Dressing: Moderate Assist  Lower Body Dressing: Maximal Assist  Toileting: Maximal Assist    CURRENT LEVEL OF FUNCTION:   Same as level of function prior to admission to [facility]    Physical Examination:   There were no vitals filed for this visit.  Gen: NAD  Head:  NC/AT  Eyes/ Nose/ Mouth: PERRLA, moist mucous membranes  Cardio: RRR, good distal perfusion, warm extremities.  3/6 systolic ejection murmur  Pulm: normal respiratory effort, no cyanosis   Abd: Soft NTND, negative borborygmi   Ext: No peripheral edema. No calf tenderness. No clubbing.  Left heel has  stage III ulcer    Mental status: answers questions appropriately follows commands  Speech: fluent, no aphasia or dysarthria    Motor:      Upper Extremity  Myotome R L   Shoulder flexion C5 5 4/5   Elbow flexion C5 5 4/5   Wrist extension C6 5 4/5   Elbow extension C7 5 4/5   Finger flexion C8 5 45   Finger abduction T1 5 4/5     Lower Extremity Myotome R L   Hip flexion L2 4/5 4/5   Knee extension L3 4/5 4/5   Ankle dorsiflexion L4 5 5   Toe extension L5 5 5   Ankle plantarflexion S1 5 5     Radiology:  Echocardiogram 12/30/2023  Mild concentric LVH with preserved LV systolic function, normal   estimated LVEF 60%  Diastolic function abnormal but difficult to assess grading given   mitral annular calcifications  Normal RV size and systolic function  Moderate mitral annular calcifications with mild stenosis, mean   gradient 5mmHg at 70bpm  No pericardial effusion  Unable to estimate RVSP  Normal IVC size    Left foot XR 12/27/2023  No definite acute osseous abnormality but the evaluation limited due to osseous demineralization.     Laboratory Values:  Recent Labs     12/27/23 2216 12/29/23 0236 12/30/23  0657   SODIUM 132* 134* 135   POTASSIUM 4.4 3.5* 3.8   CHLORIDE 97 96 94*   CO2 26 30 30   GLUCOSE 222* 135* 150*   BUN 7* 8 11   CREATININE 0.64 0.57 0.55   CALCIUM 8.7 8.7 8.7     Recent Labs     12/27/23 2216 12/29/23 0236 12/30/23  0657   WBC 3.5* 2.0* 1.9*  1.9*   RBC 4.86 4.66 4.86  4.86   HEMOGLOBIN 11.5* 11.1* 11.6*  11.6*   HEMATOCRIT 39.4 37.3 38.8  38.8   MCV 81.1* 80.0* 79.8*  79.8*   MCH 23.7* 23.8* 23.9*  23.9*   MCHC 29.2* 29.8* 29.9*  29.9*   RDW 55.7* 54.8* 54.4*  54.4*   PLATELETCT 124* 115* 132*  132*   MPV 10.5 10.1 10.3  10.3           Primary Rehabilitation Diagnosis:    This patient is a 63 y.o. female admitted for acute inpatient rehabilitation with CHF with cardiomyopathy (HCC).    Impairments:   ADLs/IADLs  Mobility    Secondary Diagnosis/Medical Co-morbidities Affecting  Function  CHF, Flu, pancytopenia, Left foot wound     Relevant Changes Since Preadmission Evaluation:    Status unchanged    The patient's rehabilitation potential is Good  The patient's medical prognosis is good    Rehabilitation Plan:   Discussion and Recommendations:   1. The patient requires an acute inpatient rehabilitation program with a coordinated program of care at an intensity and frequency not available at a lower level of care. This recommendation is substantiated by the patient's medical physicians who recommend that the patient's intervention and assessment of medical issues needs to be done at an acute level of care for patient's safety and maximum outcome.   2. A coordinated program of care will be supplied by an interdisciplinary team of physical therapy, occupational therapy, rehab physician, rehab nursing, and, if needed, speech therapy and rehab psychology. Rehab team presents a patient-specific rehabilitation and education program concentrating on prevention of future problems related to accessibility, mobility, skin, bowel, bladder, sexuality, and psychosocial and medical/surgical problems.   3. Need for Rehabilitation Physician: The rehab physician will be evaluating the patient on a multi-weekly basis to help coordinate the program of care. The rehab physician communicates between medical physicians, therapists, and nurses to maximize the patient's potential outcome. Specific areas in which the rehab physician will be providing daily assessment include the following:   A. Assessing the patient's heart rate and blood pressure response (vitals monitoring) to activity and making adjustments in medications or conservative measures as needed.   B. The rehab physician will be assessing the frequency at which the program can be increased to allow the patient to reach optimal functional outcome.   C. The rehab physician will also provide assessments in daily skin care, especially in light of  patient's impairments in mobility.   D. The rehab physician will provide special expertise in understanding how to work with functional impairment and recommend appropriate interventions, compensatory techniques, and education that will facilitate the patient's outcome.   4. Rehab R.N.   The rehab RN will be working with patient to carry over in room mobility and activities of daily living when the patient is not in 3 hours of skilled therapy. Rehab nursing will be working in conjunction with rehab physician to address all the medical issues above and continue to assess laboratory work and discuss abnormalities with the treating physicians, assess vitals, and response to activity, and discuss and report abnormalities with the rehab physician. Rehab RN will also continue daily skin care, supervise bladder/bowel program, instruct in medication administration, and ensure patient safety.   5. Rehab Therapy: Therapies to treat at intensity and frequency of (may change after completion of evaluation by all therapeutic disciplines):       PT:  Physical therapy to address mobility, transfer, gait training and evaluation for adaptive equipment needs 1.5 hour/day at least 5 days/week for the duration of the ELOS (see below)       OT:  Occupational therapy to address ADLs, self-care, home management training, functional mobility/transfers and assistive device evaluation, and community re-integration 1.5  hour/day at least 5 days/week for the duration of the ELOS (see below).        Medical management / Rehabilitation Issues/ Adverse Potential as part of rehabilitation plan     Rehabilitation Issues/Adverse Potential  1.  Cardiac debility. Patient demonstrates functional deficits in strength, balance, coordination, and ADL's. Patient is admitted to [facility] for comprehensive rehabilitation therapy as described below.   Rehabilitation nursing monitors bowel and bladder control, educates on medication  administration, co-morbidities and monitors patient safety.    2.  Neurostimulants: None at this time but continue to assess daily for need to initiate should status change.    3.  DVT prophylaxis:  Patient is on eliquis for anticoagulation upon transfer. Encourage OOB. Monitor daily for signs and symptoms of DVT including but not limited to swelling and pain to prevent the development of DVT that may interfere with therapies.    4.  GI prophylaxis:  On prilosec to prevent gastritis/dyspepsia which may interfere with therapies.    5.  Pain: as below     6.  Nutrition/Dysphagia: Dietician monitors nutrient intake, recommend supplements prn and provide nutrition education to pt/family to promote optimal nutrition for wound healing/recovery.     7.  Bladder/bowel:  Start bowel and bladder program as described below, to prevent constipation, urinary retention (which may lead to UTI), and urinary incontinence (which will impact upon pt's functional independence).   - TV Q3h while awake with post void bladder scans, I&O cath for PVRs >400  - up to commode after meal     8.  Skin/dermal ulcer prophylaxis: Monitor for new skin conditions with q.2 h. turns as required to prevent the development of skin breakdown.     9.  Cognition/Behavior: As needed psychologist provides adjustment counseling to illness and psychosocial barriers that may be potential barriers to rehabilitation.     10. Respiratory therapy: RT performs O2 management prn, breathing retraining, pulmonary hygiene and bronchospasm management prn to optimize participation in therapies.     Medical Co-Morbidities/Adverse Potential Affecting Function:    Debility secondary to CHF  -Preserved ejection fraction of 60%  -Diastolic heart failure, has cardiac murmur on exam, reports history of mitral regurg  -BNP 4500, rechecking in a.m.  -Lasix 80 mg p.o. daily  -Consult to hospitalist to help manage  -Admit to IPR for PT and OT for mobility and ADLs. Per guidelines,  15 hours per week between PT, OT and SLP.    Acute on chronic respiratory failure with hypoxemia  -Secondary to influenza A pneumonia versus volume overload from diastolic heart failure exacerbation  -Started on Tamiflu  -Currently on 1.5 L no cannula oxygen    Pancytopenia  -Secondary to flu versus side effect from Tegretol  -Stopping Tegretol  -Monitor with serial labs  -ANC improving, does not require isolation/reverse precautions  -Consult to hospitalist to help manage    Diabetes mellitus  -Consult to hospitalist to manage  -Sliding scale insulin 0 to 14 units  -Glargine 0.2 units/kg/day    A-fib, history of stroke  -Eliquis 5 mg twice daily  -Aspirin 81 mg daily  -Rosuvastatin 20 mg q. evening    Left foot wound  -Wound care  -Clindamycin 300 mg 4 times daily    Pain control  -Tylenol 650 mg every 6 hours as needed  -Gabapentin 600 mg 3 times daily  -Lidocaine patch    COPD  -Singulair 10 mg q. Evening  -Xopenex every 4 hours as needed  -Trelegy Ellipta 1 puff daily     Hypertension  -Verapamil 80 mg nightly    Hypothyroidism  -Synthroid 112 mcg every morning    History of gout  -Uloric 40 mg daily    Skin - Patient at risk for skin breakdown due to debility in areas including sacrum, achilles, elbows and head in addition to other sites. Nursing to assess skin daily.     GI Ppx - Patient on Prilosec for GERD prophylaxis. Patient on Senna-docusate for constipation prophylaxis.     DVT Ppx - Patient Eliquis on transfer.    I personally performed a complete drug regimen review and no potential clinically significant medication issues were identified.     Goals/Expected Level of Function Based on Current Medical and Functional Status:  (may change based on patient's medical status and rate of impairment recovery)  Transfers:   Modified Independent  Mobility/Gait:   Modified Independent  ADL's:   Minimal Assistance    DISPOSITION: Discharge to pre-morbid independent living setting with the supportive care of  patient's spouse.    ELOS: 14-21 days  ____________________________________    Ananht Novak DO MS  ABPMR - Physical Medicine & Rehabilitation   ____________________________________    Pt was seen today for 75 min, and entire time spent in face-to-face contact was >50% in counseling and coordination of care as detailed in A/P above.

## 2023-12-30 NOTE — PROGRESS NOTES
Pt. D/c'd with [facility] to [facility] rehab. Discharge teaching completed, no further questions or concerns. All personal belongings with pt. Pt is A&OX4. No signs of distress. All lines removed, tele box removed and returned, and monitors notified.

## 2023-12-30 NOTE — DISCHARGE PLANNING
Following for post acute services. Spoke with Dr. Ramos  about goals and expectation for IRF level of care. Discussed therapy plan for 3 hours per day 5 days a week. Discussed therapy schedules 30/45 or 60 minute sessions typically 1.5 hours between breakfast and lunch and another 1.5 hours between lunch and dinner. Discussed PT/OT and SLP roles. Discussed potential length of stay. Discussed comprehensive medical surveillance by physiatry as well as hospitalist team. Discussed patient to nursing ratio. Discussed insurance Medicare  coverage for the program. Discussed visitation and clothing requirements. Spouse/son will be primary support for Jenifer  to return to the community. Discussed participation with therapy program when visiting.  Family/ patient rehab goals are  to achieve maximal independence  with transfers and mobility, self care. Home is a 1 story with 1 step(s) to enter.  Discussed discharge planner role and team conference. DME in the home includes a FWW walk in shower .  In the event of additional support need HH/OP .  Dr. Ramos is agreeable to transfer when medically cleared.

## 2023-12-30 NOTE — ASSESSMENT & PLAN NOTE
At this time is multifactorial.  NAC 0.75  Medications, viral exposure  TSH is normal  B12 to follow

## 2023-12-31 ENCOUNTER — APPOINTMENT (OUTPATIENT)
Dept: PHYSICAL THERAPY | Facility: REHABILITATION | Age: 63
DRG: 291 | End: 2023-12-31
Attending: PHYSICAL MEDICINE & REHABILITATION
Payer: MEDICARE

## 2023-12-31 ENCOUNTER — APPOINTMENT (OUTPATIENT)
Dept: OCCUPATIONAL THERAPY | Facility: REHABILITATION | Age: 63
DRG: 291 | End: 2023-12-31
Attending: PHYSICAL MEDICINE & REHABILITATION
Payer: MEDICARE

## 2023-12-31 PROBLEM — I48.0 PAF (PAROXYSMAL ATRIAL FIBRILLATION) (HCC): Status: ACTIVE | Noted: 2023-05-12

## 2023-12-31 LAB
ALBUMIN SERPL BCP-MCNC: 3.3 G/DL (ref 3.2–4.9)
ALBUMIN/GLOB SERPL: 1.1 G/DL
ALP SERPL-CCNC: 147 U/L (ref 30–99)
ALT SERPL-CCNC: 18 U/L (ref 2–50)
ANION GAP SERPL CALC-SCNC: 9 MMOL/L (ref 7–16)
AST SERPL-CCNC: 36 U/L (ref 12–45)
BASOPHILS # BLD AUTO: 0.1 % (ref 0–1.8)
BASOPHILS # BLD: 0.01 K/UL (ref 0–0.12)
BILIRUB SERPL-MCNC: 0.4 MG/DL (ref 0.1–1.5)
BUN SERPL-MCNC: 11 MG/DL (ref 8–22)
CALCIUM ALBUM COR SERPL-MCNC: 9.4 MG/DL (ref 8.5–10.5)
CALCIUM SERPL-MCNC: 8.8 MG/DL (ref 8.5–10.5)
CHLORIDE SERPL-SCNC: 96 MMOL/L (ref 96–112)
CO2 SERPL-SCNC: 32 MMOL/L (ref 20–33)
CREAT SERPL-MCNC: 0.56 MG/DL (ref 0.5–1.4)
EOSINOPHIL # BLD AUTO: 0 K/UL (ref 0–0.51)
EOSINOPHIL NFR BLD: 0 % (ref 0–6.9)
ERYTHROCYTE [DISTWIDTH] IN BLOOD BY AUTOMATED COUNT: 54.4 FL (ref 35.9–50)
GFR SERPLBLD CREATININE-BSD FMLA CKD-EPI: 101 ML/MIN/1.73 M 2
GLOBULIN SER CALC-MCNC: 3.1 G/DL (ref 1.9–3.5)
GLUCOSE BLD STRIP.AUTO-MCNC: 130 MG/DL (ref 65–99)
GLUCOSE BLD STRIP.AUTO-MCNC: 141 MG/DL (ref 65–99)
GLUCOSE BLD STRIP.AUTO-MCNC: 153 MG/DL (ref 65–99)
GLUCOSE BLD STRIP.AUTO-MCNC: 156 MG/DL (ref 65–99)
GLUCOSE BLD STRIP.AUTO-MCNC: 158 MG/DL (ref 65–99)
GLUCOSE SERPL-MCNC: 144 MG/DL (ref 65–99)
HCT VFR BLD AUTO: 40 % (ref 37–47)
HGB BLD-MCNC: 12 G/DL (ref 12–16)
IMM GRANULOCYTES # BLD AUTO: 0.11 K/UL (ref 0–0.11)
IMM GRANULOCYTES NFR BLD AUTO: 1.4 % (ref 0–0.9)
LYMPHOCYTES # BLD AUTO: 0.74 K/UL (ref 1–4.8)
LYMPHOCYTES NFR BLD: 9.6 % (ref 22–41)
MAGNESIUM SERPL-MCNC: 1.7 MG/DL (ref 1.5–2.5)
MCH RBC QN AUTO: 23.8 PG (ref 27–33)
MCHC RBC AUTO-ENTMCNC: 30 G/DL (ref 32.2–35.5)
MCV RBC AUTO: 79.4 FL (ref 81.4–97.8)
MONOCYTES # BLD AUTO: 0.42 K/UL (ref 0–0.85)
MONOCYTES NFR BLD AUTO: 5.5 % (ref 0–13.4)
NEUTROPHILS # BLD AUTO: 6.41 K/UL (ref 1.82–7.42)
NEUTROPHILS NFR BLD: 83.4 % (ref 44–72)
NRBC # BLD AUTO: 0 K/UL
NRBC BLD-RTO: 0 /100 WBC (ref 0–0.2)
NT-PROBNP SERPL IA-MCNC: 158 PG/ML (ref 0–125)
PLATELET # BLD AUTO: 139 K/UL (ref 164–446)
PMV BLD AUTO: 10.8 FL (ref 9–12.9)
POTASSIUM SERPL-SCNC: 3.7 MMOL/L (ref 3.6–5.5)
PREALB SERPL-MCNC: 12 MG/DL (ref 18–38)
PROT SERPL-MCNC: 6.4 G/DL (ref 6–8.2)
RBC # BLD AUTO: 5.04 M/UL (ref 4.2–5.4)
SODIUM SERPL-SCNC: 137 MMOL/L (ref 135–145)
WBC # BLD AUTO: 7.7 K/UL (ref 4.8–10.8)

## 2023-12-31 PROCEDURE — A9270 NON-COVERED ITEM OR SERVICE: HCPCS | Performed by: HOSPITALIST

## 2023-12-31 PROCEDURE — 97530 THERAPEUTIC ACTIVITIES: CPT

## 2023-12-31 PROCEDURE — 99222 1ST HOSP IP/OBS MODERATE 55: CPT | Performed by: HOSPITALIST

## 2023-12-31 PROCEDURE — 99233 SBSQ HOSP IP/OBS HIGH 50: CPT | Performed by: PHYSICAL MEDICINE & REHABILITATION

## 2023-12-31 PROCEDURE — 770010 HCHG ROOM/CARE - REHAB SEMI PRIVAT*

## 2023-12-31 PROCEDURE — 700102 HCHG RX REV CODE 250 W/ 637 OVERRIDE(OP): Performed by: HOSPITALIST

## 2023-12-31 PROCEDURE — 80053 COMPREHEN METABOLIC PANEL: CPT

## 2023-12-31 PROCEDURE — 94640 AIRWAY INHALATION TREATMENT: CPT

## 2023-12-31 PROCEDURE — 97162 PT EVAL MOD COMPLEX 30 MIN: CPT

## 2023-12-31 PROCEDURE — 97535 SELF CARE MNGMENT TRAINING: CPT

## 2023-12-31 PROCEDURE — 83735 ASSAY OF MAGNESIUM: CPT

## 2023-12-31 PROCEDURE — 82962 GLUCOSE BLOOD TEST: CPT | Mod: 91

## 2023-12-31 PROCEDURE — 84134 ASSAY OF PREALBUMIN: CPT

## 2023-12-31 PROCEDURE — 700102 HCHG RX REV CODE 250 W/ 637 OVERRIDE(OP): Performed by: PHYSICAL MEDICINE & REHABILITATION

## 2023-12-31 PROCEDURE — 83880 ASSAY OF NATRIURETIC PEPTIDE: CPT

## 2023-12-31 PROCEDURE — A9270 NON-COVERED ITEM OR SERVICE: HCPCS | Performed by: PHYSICAL MEDICINE & REHABILITATION

## 2023-12-31 PROCEDURE — 94760 N-INVAS EAR/PLS OXIMETRY 1: CPT

## 2023-12-31 PROCEDURE — 700101 HCHG RX REV CODE 250: Performed by: PHYSICAL MEDICINE & REHABILITATION

## 2023-12-31 PROCEDURE — 85025 COMPLETE CBC W/AUTO DIFF WBC: CPT

## 2023-12-31 PROCEDURE — 97166 OT EVAL MOD COMPLEX 45 MIN: CPT

## 2023-12-31 PROCEDURE — 36415 COLL VENOUS BLD VENIPUNCTURE: CPT

## 2023-12-31 RX ORDER — IBUPROFEN 200 MG
TABLET ORAL 2 TIMES DAILY
Status: DISCONTINUED | OUTPATIENT
Start: 2023-12-31 | End: 2023-12-31

## 2023-12-31 RX ORDER — POTASSIUM CHLORIDE 20 MEQ/1
20 TABLET, EXTENDED RELEASE ORAL DAILY
Status: DISCONTINUED | OUTPATIENT
Start: 2024-01-01 | End: 2024-01-06 | Stop reason: HOSPADM

## 2023-12-31 RX ORDER — BACITRACIN ZINC AND POLYMYXIN B SULFATE 500; 1000 [USP'U]/G; [USP'U]/G
OINTMENT TOPICAL 2 TIMES DAILY
Status: DISCONTINUED | OUTPATIENT
Start: 2023-12-31 | End: 2024-01-06 | Stop reason: HOSPADM

## 2023-12-31 RX ORDER — FUROSEMIDE 40 MG/1
40 TABLET ORAL
Status: DISCONTINUED | OUTPATIENT
Start: 2024-01-01 | End: 2024-01-02

## 2023-12-31 RX ORDER — INSULIN LISPRO 100 [IU]/ML
2-12 INJECTION, SOLUTION INTRAVENOUS; SUBCUTANEOUS
Status: DISCONTINUED | OUTPATIENT
Start: 2023-12-31 | End: 2024-01-06 | Stop reason: HOSPADM

## 2023-12-31 RX ORDER — METHOCARBAMOL 500 MG/1
500 TABLET, FILM COATED ORAL 4 TIMES DAILY
Status: DISCONTINUED | OUTPATIENT
Start: 2023-12-31 | End: 2024-01-06 | Stop reason: HOSPADM

## 2023-12-31 RX ADMIN — MONTELUKAST 10 MG: 10 TABLET, FILM COATED ORAL at 21:08

## 2023-12-31 RX ADMIN — OMEPRAZOLE 20 MG: 20 CAPSULE, DELAYED RELEASE ORAL at 08:37

## 2023-12-31 RX ADMIN — OXYCODONE HYDROCHLORIDE 10 MG: 10 TABLET ORAL at 17:10

## 2023-12-31 RX ADMIN — VERAPAMIL HYDROCHLORIDE 80 MG: 80 TABLET ORAL at 21:08

## 2023-12-31 RX ADMIN — METHOCARBAMOL 500 MG: 500 TABLET ORAL at 21:08

## 2023-12-31 RX ADMIN — OSELTAMIVIR 75 MG: 75 CAPSULE ORAL at 06:01

## 2023-12-31 RX ADMIN — METHOCARBAMOL 500 MG: 500 TABLET ORAL at 12:45

## 2023-12-31 RX ADMIN — GABAPENTIN 600 MG: 300 CAPSULE ORAL at 16:02

## 2023-12-31 RX ADMIN — GABAPENTIN 600 MG: 300 CAPSULE ORAL at 08:37

## 2023-12-31 RX ADMIN — INSULIN LISPRO 2 UNITS: 100 INJECTION, SOLUTION INTRAVENOUS; SUBCUTANEOUS at 21:17

## 2023-12-31 RX ADMIN — OXYCODONE HYDROCHLORIDE 10 MG: 10 TABLET ORAL at 03:22

## 2023-12-31 RX ADMIN — APIXABAN 5 MG: 5 TABLET, FILM COATED ORAL at 08:37

## 2023-12-31 RX ADMIN — ASPIRIN 81 MG: 81 TABLET, COATED ORAL at 08:37

## 2023-12-31 RX ADMIN — BACITRACIN ZINC AND POLYMYXIN B SULFATE: at 12:45

## 2023-12-31 RX ADMIN — LEVOTHYROXINE SODIUM 112 MCG: 0.11 TABLET ORAL at 06:01

## 2023-12-31 RX ADMIN — FEBUXOSTAT 40 MG: 40 TABLET, FILM COATED ORAL at 08:37

## 2023-12-31 RX ADMIN — OXYCODONE HYDROCHLORIDE 10 MG: 10 TABLET ORAL at 21:19

## 2023-12-31 RX ADMIN — INSULIN GLARGINE-YFGN 24 UNITS: 100 INJECTION, SOLUTION SUBCUTANEOUS at 21:17

## 2023-12-31 RX ADMIN — GABAPENTIN 600 MG: 300 CAPSULE ORAL at 21:07

## 2023-12-31 RX ADMIN — APIXABAN 5 MG: 5 TABLET, FILM COATED ORAL at 21:08

## 2023-12-31 RX ADMIN — Medication 400 MG: at 08:37

## 2023-12-31 RX ADMIN — LIDOCAINE 1 PATCH: 4 PATCH TOPICAL at 10:08

## 2023-12-31 RX ADMIN — ROSUVASTATIN CALCIUM 20 MG: 10 TABLET, FILM COATED ORAL at 21:08

## 2023-12-31 RX ADMIN — OXYCODONE HYDROCHLORIDE 10 MG: 10 TABLET ORAL at 10:09

## 2023-12-31 RX ADMIN — FUROSEMIDE 80 MG: 40 TABLET ORAL at 06:01

## 2023-12-31 RX ADMIN — OSELTAMIVIR 75 MG: 75 CAPSULE ORAL at 17:10

## 2023-12-31 RX ADMIN — POTASSIUM CHLORIDE 40 MEQ: 1500 TABLET, EXTENDED RELEASE ORAL at 21:08

## 2023-12-31 ASSESSMENT — PAIN DESCRIPTION - PAIN TYPE
TYPE: ACUTE PAIN

## 2023-12-31 ASSESSMENT — ACTIVITIES OF DAILY LIVING (ADL)
BED_CHAIR_WHEELCHAIR_TRANSFER_DESCRIPTION: ADAPTIVE EQUIPMENT;SUPERVISION FOR SAFETY;INITIAL PREPARATION FOR TASK
BED_CHAIR_WHEELCHAIR_TRANSFER_DESCRIPTION: ADAPTIVE EQUIPMENT;SET-UP OF EQUIPMENT;SUPERVISION FOR SAFETY
TOILET_TRANSFER_DESCRIPTION: GRAB BAR;INCREASED TIME;INITIAL PREPARATION FOR TASK;SUPERVISION FOR SAFETY;SET-UP OF EQUIPMENT
TOILETING: REQUIRES ASSIST
BED_CHAIR_WHEELCHAIR_TRANSFER_DESCRIPTION: ADAPTIVE EQUIPMENT;INCREASED TIME;INITIAL PREPARATION FOR TASK;SET-UP OF EQUIPMENT;SUPERVISION FOR SAFETY

## 2023-12-31 ASSESSMENT — GAIT ASSESSMENTS
DISTANCE (FEET): 25
GAIT LEVEL OF ASSIST: CONTACT GUARD ASSIST
DEVIATION: ANTALGIC;BRADYKINETIC;SHUFFLED GAIT
DEVIATION: BRADYKINETIC;SHUFFLED GAIT
ASSISTIVE DEVICE: FRONT WHEEL WALKER
GAIT LEVEL OF ASSIST: CONTACT GUARD ASSIST
ASSISTIVE DEVICE: FRONT WHEEL WALKER
DISTANCE (FEET): 25

## 2023-12-31 ASSESSMENT — FIBROSIS 4 INDEX: FIB4 SCORE: 3.97

## 2023-12-31 ASSESSMENT — BRIEF INTERVIEW FOR MENTAL STATUS (BIMS)
BIMS SUMMARY SCORE: 15
INITIAL REPETITION OF BED BLUE SOCK - FIRST ATTEMPT: 3
ASKED TO RECALL BLUE: YES, NO CUE REQUIRED
WHAT MONTH IS IT: ACCURATE WITHIN 5 DAYS
WHAT YEAR IS IT: CORRECT
ASKED TO RECALL BED: YES, NO CUE REQUIRED
ASKED TO RECALL SOCK: YES, NO CUE REQUIRED
WHAT DAY OF THE WEEK IS IT: CORRECT

## 2023-12-31 NOTE — CONSULTS
DATE OF SERVICE:  12/31/2023    REQUESTING PHYSICIAN:  Ananth Novak DO    CHIEF COMPLAINT / REASON FOR CONSULTATION:   Hypertension  Afib  Diabetes  Pancytopenia    HISTORY OF PRESENT ILLNESS:  This is a 62 y/o female with a PMH significant for hypertension, PAF, diabetes, hx of factor V Leiden on Eliquis, hx of COVID, diastolic heart failure, asthma, hypothyroidism, remote history of CVA, MARCI, peripheral neuropathy, and osteoarthritis who presented on 12/28/2023 with generalized weakness, confusion, shortness of breath.  Patient reported her son was sick at home with a cold.  Patient was found to be positive for influenza A.  Chest x-ray showed diffuse interstitial prominence and cardiomegaly.  She was also found to have a left foot wound. X-ray of the left foot showed no acute findings. Patient was started on Tamiflu and Lasix for fluid overload.  Patient was found to be pancytopenic, suspected 2nd to the flu and/or Tegretol.  Blood cultures were taken, no growth to date.       Because of the patient's weakness and debility, Rehab was consulted, evaluated the patient, and was deemed a good Rehab candidate.  The patient was transferred over to the Rehab facility on 12/30/2022.      The patient denies fever, chills, nausea, vomiting, headaches, blurry vision, or chest pain.    REVIEW OF SYSTEMS: All review of systems are negative pre AMA and CMS criteria except for that stated in the HPI.    PAST MEDICAL HISTORY:  Past Medical History:   Diagnosis Date    Arrhythmia     hx a-fib    Arthritis     generalized    ASTHMA 4/24/2013    Back pain 4/24/2013    Blood clotting disorder (HCC)     Disorder of thyroid     DM (diabetes mellitus) ([facility]) 4/24/2013    insulin    Factor V deficiency ([facility]) 04/24/2013    Palpitations 4/24/2013    Pneumonia 1999    Psychiatric problem     depression    Sleep apnea 4/24/2013    bipap    Snoring     Urinary incontinence     Varicose vein 4/24/2013       PAST SURGICAL HISTORY:  Past  Surgical History:   Procedure Laterality Date    PB RECONSTR TOTAL SHOULDER IMPLANT Left 11/27/2019    Procedure: ARTHROPLASTY, SHOULDER, TOTAL-REVERSE;  Surgeon: Hugo Beltran M.D.;  Location: SURGERY [facility];  Service: Orthopedics    CLAVICLE DISTAL EXCISION  11/27/2019    Procedure: EXCISION, CLAVICLE, DISTAL-RESECTION;  Surgeon: Hugo Beltran M.D.;  Location: SURGERY [facility];  Service: Orthopedics    OTHER ORTHOPEDIC SURGERY  2018    elbow    KNEE ARTHROPLASTY TOTAL Left 2013    KNEE ARTHROPLASTY TOTAL Right 2013    HYSTERECTOMY, TOTAL ABDOMINAL  2003    CHOLECYSTECTOMY      FUSION, SPINE, LUMBAR, PLIF      OOPHORECTOMY      OTHER CARDIAC SURGERY      ablation    TONSILLECTOMY         Allergies   Allergen Reactions    Azithromycin Anaphylaxis    Erythromycin Anaphylaxis    Other Misc Anaphylaxis     Flu vaccine    Penicillins Anaphylaxis     As a child    Pistachio Anaphylaxis    Sulfa Drugs Anaphylaxis    Tetraglycine Anaphylaxis    Other Drug Unspecified     Long acting benzodiazepines only single dose otherwise combative     Physostigmine Unspecified     Abdomen extreme swelling    Tape Rash and Itching     tegaderm ok  Blisters with others    Zanaflex [Tizanidine Hcl] Unspecified     Falling asleep mid sentence    Albuterol Palpitations    Atorvastatin Unspecified     Extreme joint pain    Chlorhexidine Rash     blistering    Lamisil [Terbinafine Hcl] Unspecified     Pancreatitis     Morphine Unspecified     Not effective       CURRENT MEDICATIONS:    Current Facility-Administered Medications:     methocarbamol    bacitracin-polymyxin b    apixaban    aspirin    febuxostat    fluticasone-umeclidinium-vilanterol    gabapentin    insulin GLARGINE    insulin regular **AND** POC blood glucose manual result **AND** NOTIFY MD and PharmD **AND** Administer 20 grams of glucose (approximately 8 ounces of fruit juice) every 15 minutes PRN FSBG less than 70 mg/dL **AND** dextrose bolus     levothyroxine    lidocaine    magnesium oxide    montelukast    omeprazole    oseltamivir    potassium chloride SA    rosuvastatin    verapamil    acetaminophen    Respiratory Therapy Consult    Pharmacy Consult Request    traZODone    sodium chloride    ondansetron **OR** ondansetron    carboxymethylcellulose    levalbuterol    oxyCODONE immediate-release **OR** oxyCODONE immediate release    Social History     Socioeconomic History    Marital status:    Tobacco Use    Smoking status: Never    Smokeless tobacco: Never   Substance and Sexual Activity    Alcohol use: Not Currently    Drug use: No       FAMILY HISTORY:  was reviewed and is not pertinent to this consultation.    PHYSICAL EXAMINATION:  VITAL SIGNS:  Temp is 98.6, blood pressure is 130/80, heart rate is 88, respiratory rate is 16.  GENERAL:  Patient was lying in bed in no distress.  HEENT:  Pupils were equal, round and reactive to light and accomodation.  Oral mucosa was pink and moist.  NECK:  Soft.  Supple.  No JVD.  HEART:  Regular rate and rhythm.  Normal S1 and S2.  There was a murmur noted.  LUNGS:  Are clear to auscultation bilaterally.  ABDOMEN:  Soft, non tender, non distended.  Bowels sound were positive in all four quadrants.  EXTREMITIES:  No clubbing, cyanosis.  There was noted B/L LE edema.  NEUROLOGIC:  Cranial nerves two through twelve were grossly intact.    LABS:  Lab Results   Component Value Date/Time    SODIUM 137 12/31/2023 05:19 AM    POTASSIUM 3.7 12/31/2023 05:19 AM    CHLORIDE 96 12/31/2023 05:19 AM    CO2 32 12/31/2023 05:19 AM    GLUCOSE 144 (H) 12/31/2023 05:19 AM    BUN 11 12/31/2023 05:19 AM    CREATININE 0.56 12/31/2023 05:19 AM      Lab Results   Component Value Date/Time    WBC 7.7 12/31/2023 05:19 AM    RBC 5.04 12/31/2023 05:19 AM    HEMOGLOBIN 12.0 12/31/2023 05:19 AM    HEMATOCRIT 40.0 12/31/2023 05:19 AM    MCV 79.4 (L) 12/31/2023 05:19 AM    MCH 23.8 (L) 12/31/2023 05:19 AM    MCHC 30.0 (L) 12/31/2023 05:19  AM    MPV 10.8 12/31/2023 05:19 AM    NEUTSPOLYS 83.40 (H) 12/31/2023 05:19 AM    LYMPHOCYTES 9.60 (L) 12/31/2023 05:19 AM    MONOCYTES 5.50 12/31/2023 05:19 AM    EOSINOPHILS 0.00 12/31/2023 05:19 AM    BASOPHILS 0.10 12/31/2023 05:19 AM    HYPOCHROMIA 1+ 12/27/2023 10:16 PM    ANISOCYTOSIS 1+ 12/27/2023 10:16 PM      Lab Results   Component Value Date/Time    PROTHROMBTM 16.4 (H) 06/20/2023 04:19 PM    INR 1.34 (H) 06/20/2023 04:19 PM        PAF (paroxysmal atrial fibrillation) (HCC)  HR ok   Hx of 2 ablations  Pt states the afib was resolved after the last ablation  Exam shows NSR  Cont Verapamil  Cont Eliquis  Cont Lasix & K+ supplements  Cont to monitor    Chronic ulcer of left foot due to diabetes mellitus (HCC)  Has hx  2nd to diabetes  Recent Xrays neg for acute  Was placed on Clinda --> now off    Diabetes mellitus type 2, insulin dependent (HCC)  Hba1c: 11.9 (12/27)  BS labile: 120-234  Cont Glargine: 24 units qhs  Pt has a diminished appetite and didn't eat breakfast or lunch today 12/31  Will be cautious on increasing med until pt eating better and more consistently  Note: home meds include Tresiba 120 units daily and Humalog 40-60 units per SS  Cont to monitor    Factor V deficiency (HCC)  Cont Eliquis     GERD (gastroesophageal reflux disease)  Cont Prilosec     History of stroke  Hx CVA   Cont Eliquis and ASA  Cont Crestor    Hypothyroidism  TSH ok (12/27)  Cont Synthroid     Influenza A  Recently diagnosed  Cont Tamiflu (thru 1/1)     Pancytopenia (HCC)  WBC's: 3.5 --> 2.0 --> 1.9 (12/30) --> Granix --> 7.7 (12.31)  Hb: 11.6 --> 12.0 (wnl)  Plts: 115 --> 132 --> 139  ? 2nd to Tegretol -- now d/c'd  ? 2nd to Influenza (being treated)  S/P Granix x 1 on 12/30  WIll check other meds that may have a contributing factor  Cont to monitor    Essential hypertension  BP ok  Cont Verapamil  Note: on high dose Lasix --> decreasing dose 12/31  Cont to monitor    Diastolic dysfunction with chronic heart failure  (HCC)  Echo (12/27): EF 60%, previous GII DD, RVSP could not be calculated  BNP: 4500 (12/27) --> 158 (12/31)  Has BLE edema -- currently better than at home  Lungs CTA  Cont Lasix: 80 mg daily --> will decrease to 40 mg daily  Cont KCL: 40 meq daily --> will decrease to 20 meq daily  Note: home meds include Lasix 40-80 mg daily at home (depends on her swelling)  K+: 3.8 (12/30)     Sleep apnea  Has MARCI  Uses O2 per NC at night (doesn't tolerate CPAP)      This case has been discussed with the attending Physiatrist.    Thank you for the consultation.  Will follow the patient with you.

## 2023-12-31 NOTE — FLOWSHEET NOTE
12/31/23 0650   Events/Summary/Plan   Events/Summary/Plan mdi   Vital Signs   Pulse 87   Respiration 18   Pulse Oximetry 91 %   $ Pulse Oximetry (Spot Check) Yes   Respiratory Assessment   Level of Consciousness Alert   Chest Exam   Work Of Breathing / Effort Within Normal Limits   Breath Sounds   RUL Breath Sounds Diminished   RML Breath Sounds Diminished   RLL Breath Sounds Diminished   DANIELLE Breath Sounds Diminished   LLL Breath Sounds Diminished   Secretions   Cough Productive   How Sputum Obtained Expectorated;Spontaneous   Sputum Amount Scant   Sputum Color Clear   Sputum Consistency Thin   Oxygen   O2 (LPM) 1.5   O2 Delivery Device Silicone Nasal Cannula

## 2023-12-31 NOTE — ASSESSMENT & PLAN NOTE
X-ray 12/27/23 no definite acute osseous abnormality but evaluation limited due to osseous demineralization  Completed Clinda  MRI 1/5/24 multifocal bone infarcts, no osteomyelitis, no soft tissue mass or fluid collection  Wound care and pain control per Physiatry  Needs Ortho eval

## 2023-12-31 NOTE — CARE PLAN
The patient is Watcher - Medium risk of patient condition declining or worsening    Shift Goals  Clinical Goals: Safety, pain management  Patient Goals: Safety  Family Goals: Education      Problem: Knowledge Deficit - Standard  Goal: Patient and family/care givers will demonstrate understanding of plan of care, disease process/condition, diagnostic tests and medications  Outcome: Progressing    Patient educated and states understanding of POC and disease process.  All questions answered at this time.  Patient is former Rehab RN.     Problem: Hemodynamics  Goal: Patient's hemodynamics, fluid balance and neurologic status will be stable or improve  Outcome: Progressing    Patient receiving scheduled lasix.

## 2023-12-31 NOTE — PROGRESS NOTES
"  Physical Medicine & Rehabilitation Progress Note    Encounter Date: 12/31/2023    Chief Complaint: CHF    Interval Events (Subjective):  Admission labs much improved. She did get one dose of ganix yesterday. Per DC summary clindamycin can be Dc'd so I will stop that today. She is having Tension headaches and reports sore muscles around her left shoulder. She requested Nurtec or Qulipta, which are not on our formulary. Will try methocarbamol to relax her shoulders. She also requests neosporin for her right shin scab.    Objective:  VITAL SIGNS: /80   Pulse 88   Temp 37 °C (98.6 °F) (Oral)   Resp 16   Ht 1.727 m (5' 8\")   Wt 120 kg (264 lb 8.8 oz)   SpO2 94%   BMI 40.22 kg/m²   Gen: NAD  Head:  NC/AT  Eyes/ Nose/ Mouth: PERRLA, moist mucous membranes  Cardio: RRR, good distal perfusion, warm extremities  Pulm: normal respiratory effort, no cyanosis   Abd: Soft NTND, negative borborygmi   Ext: No peripheral edema. No calf tenderness. No clubbing. Wound on Left heel, Scab on R shin.       Laboratory Values:  Recent Results (from the past 72 hour(s))   POCT glucose device results    Collection Time: 12/28/23 12:56 PM   Result Value Ref Range    POC Glucose, Blood 190 (H) 65 - 99 mg/dL   POCT glucose device results    Collection Time: 12/28/23  5:36 PM   Result Value Ref Range    POC Glucose, Blood 164 (H) 65 - 99 mg/dL   POCT glucose device results    Collection Time: 12/28/23  8:34 PM   Result Value Ref Range    POC Glucose, Blood 181 (H) 65 - 99 mg/dL   CBC with Differential    Collection Time: 12/29/23  2:36 AM   Result Value Ref Range    WBC 2.0 (L) 4.8 - 10.8 K/uL    RBC 4.66 4.20 - 5.40 M/uL    Hemoglobin 11.1 (L) 12.0 - 16.0 g/dL    Hematocrit 37.3 37.0 - 47.0 %    MCV 80.0 (L) 81.4 - 97.8 fL    MCH 23.8 (L) 27.0 - 33.0 pg    MCHC 29.8 (L) 32.2 - 35.5 g/dL    RDW 54.8 (H) 35.9 - 50.0 fL    Platelet Count 115 (L) 164 - 446 K/uL    MPV 10.1 9.0 - 12.9 fL    Neutrophils-Polys 36.40 (L) 44.00 - 72.00 % "    Lymphocytes 39.40 22.00 - 41.00 %    Monocytes 23.20 (H) 0.00 - 13.40 %    Eosinophils 0.00 0.00 - 6.90 %    Basophils 0.50 0.00 - 1.80 %    Immature Granulocytes 0.50 0.00 - 0.90 %    Nucleated RBC 0.00 0.00 - 0.20 /100 WBC    Neutrophils (Absolute) 0.72 (L) 1.82 - 7.42 K/uL    Lymphs (Absolute) 0.78 (L) 1.00 - 4.80 K/uL    Monos (Absolute) 0.46 0.00 - 0.85 K/uL    Eos (Absolute) 0.00 0.00 - 0.51 K/uL    Baso (Absolute) 0.01 0.00 - 0.12 K/uL    Immature Granulocytes (abs) 0.01 0.00 - 0.11 K/uL    NRBC (Absolute) 0.00 K/uL   Comp Metabolic Panel (CMP)    Collection Time: 12/29/23  2:36 AM   Result Value Ref Range    Sodium 134 (L) 135 - 145 mmol/L    Potassium 3.5 (L) 3.6 - 5.5 mmol/L    Chloride 96 96 - 112 mmol/L    Co2 30 20 - 33 mmol/L    Anion Gap 8.0 7.0 - 16.0    Glucose 135 (H) 65 - 99 mg/dL    Bun 8 8 - 22 mg/dL    Creatinine 0.57 0.50 - 1.40 mg/dL    Calcium 8.7 8.5 - 10.5 mg/dL    Correct Calcium 9.3 8.5 - 10.5 mg/dL    AST(SGOT) 42 12 - 45 U/L    ALT(SGPT) 17 2 - 50 U/L    Alkaline Phosphatase 151 (H) 30 - 99 U/L    Total Bilirubin 0.3 0.1 - 1.5 mg/dL    Albumin 3.2 3.2 - 4.9 g/dL    Total Protein 6.2 6.0 - 8.2 g/dL    Globulin 3.0 1.9 - 3.5 g/dL    A-G Ratio 1.1 g/dL   Lipid Profile    Collection Time: 12/29/23  2:36 AM   Result Value Ref Range    Cholesterol,Tot 111 100 - 199 mg/dL    Triglycerides 117 0 - 149 mg/dL    HDL 22 (A) >=40 mg/dL    LDL 66 <100 mg/dL   ESTIMATED GFR    Collection Time: 12/29/23  2:36 AM   Result Value Ref Range    GFR (CKD-EPI) 100 >60 mL/min/1.73 m 2   POCT glucose device results    Collection Time: 12/29/23  8:15 AM   Result Value Ref Range    POC Glucose, Blood 178 (H) 65 - 99 mg/dL   POCT glucose device results    Collection Time: 12/29/23  1:07 PM   Result Value Ref Range    POC Glucose, Blood 231 (H) 65 - 99 mg/dL   POCT glucose device results    Collection Time: 12/29/23  5:22 PM   Result Value Ref Range    POC Glucose, Blood 156 (H) 65 - 99 mg/dL   POCT glucose  device results    Collection Time: 12/29/23  9:51 PM   Result Value Ref Range    POC Glucose, Blood 120 (H) 65 - 99 mg/dL   CBC WITHOUT DIFFERENTIAL    Collection Time: 12/30/23  6:57 AM   Result Value Ref Range    WBC 1.9 (LL) 4.8 - 10.8 K/uL    RBC 4.86 4.20 - 5.40 M/uL    Hemoglobin 11.6 (L) 12.0 - 16.0 g/dL    Hematocrit 38.8 37.0 - 47.0 %    MCV 79.8 (L) 81.4 - 97.8 fL    MCH 23.9 (L) 27.0 - 33.0 pg    MCHC 29.9 (L) 32.2 - 35.5 g/dL    RDW 54.4 (H) 35.9 - 50.0 fL    Platelet Count 132 (L) 164 - 446 K/uL    MPV 10.3 9.0 - 12.9 fL   Renal Function Panel    Collection Time: 12/30/23  6:57 AM   Result Value Ref Range    Sodium 135 135 - 145 mmol/L    Potassium 3.8 3.6 - 5.5 mmol/L    Chloride 94 (L) 96 - 112 mmol/L    Co2 30 20 - 33 mmol/L    Glucose 150 (H) 65 - 99 mg/dL    Creatinine 0.55 0.50 - 1.40 mg/dL    Bun 11 8 - 22 mg/dL    Calcium 8.7 8.5 - 10.5 mg/dL    Correct Calcium 9.4 8.5 - 10.5 mg/dL    Phosphorus 2.6 2.5 - 4.5 mg/dL    Albumin 3.1 (L) 3.2 - 4.9 g/dL   MAGNESIUM    Collection Time: 12/30/23  6:57 AM   Result Value Ref Range    Magnesium 1.8 1.5 - 2.5 mg/dL   VITAMIN B12    Collection Time: 12/30/23  6:57 AM   Result Value Ref Range    Vitamin B12 -True Cobalamin 455 211 - 911 pg/mL   ESTIMATED GFR    Collection Time: 12/30/23  6:57 AM   Result Value Ref Range    GFR (CKD-EPI) 101 >60 mL/min/1.73 m 2   CBC WITH DIFFERENTIAL    Collection Time: 12/30/23  6:57 AM   Result Value Ref Range    WBC 1.9 (LL) 4.8 - 10.8 K/uL    RBC 4.86 4.20 - 5.40 M/uL    Hemoglobin 11.6 (L) 12.0 - 16.0 g/dL    Hematocrit 38.8 37.0 - 47.0 %    MCV 79.8 (L) 81.4 - 97.8 fL    MCH 23.9 (L) 27.0 - 33.0 pg    MCHC 29.9 (L) 32.2 - 35.5 g/dL    RDW 54.4 (H) 35.9 - 50.0 fL    Platelet Count 132 (L) 164 - 446 K/uL    MPV 10.3 9.0 - 12.9 fL    Neutrophils-Polys 54.80 44.00 - 72.00 %    Lymphocytes 30.50 22.00 - 41.00 %    Monocytes 13.70 (H) 0.00 - 13.40 %    Eosinophils 0.00 0.00 - 6.90 %    Basophils 0.50 0.00 - 1.80 %     Immature Granulocytes 0.50 0.00 - 0.90 %    Nucleated RBC 0.00 0.00 - 0.20 /100 WBC    Neutrophils (Absolute) 1.08 (L) 1.82 - 7.42 K/uL    Lymphs (Absolute) 0.60 (L) 1.00 - 4.80 K/uL    Monos (Absolute) 0.27 0.00 - 0.85 K/uL    Eos (Absolute) 0.00 0.00 - 0.51 K/uL    Baso (Absolute) 0.01 0.00 - 0.12 K/uL    Immature Granulocytes (abs) 0.01 0.00 - 0.11 K/uL    NRBC (Absolute) 0.00 K/uL   POCT glucose device results    Collection Time: 12/30/23  8:20 AM   Result Value Ref Range    POC Glucose, Blood 141 (H) 65 - 99 mg/dL   POCT glucose device results    Collection Time: 12/30/23 11:43 AM   Result Value Ref Range    POC Glucose, Blood 234 (H) 65 - 99 mg/dL   EC-ECHOCARDIOGRAM COMPLETE W/O CONT    Collection Time: 12/30/23  1:50 PM   Result Value Ref Range    Eject.Frac. MOD BP 60.21     Eject.Frac. MOD 4C 58.43     Eject.Frac. MOD 2C 64.39     Left Ventrical Ejection Fraction 60    POCT glucose device results    Collection Time: 12/30/23  5:35 PM   Result Value Ref Range    POC Glucose, Blood 204 (H) 65 - 99 mg/dL   POCT glucose device results    Collection Time: 12/30/23  9:44 PM   Result Value Ref Range    POC Glucose, Blood 192 (H) 65 - 99 mg/dL   CBC with Differential    Collection Time: 12/31/23  5:19 AM   Result Value Ref Range    WBC 7.7 4.8 - 10.8 K/uL    RBC 5.04 4.20 - 5.40 M/uL    Hemoglobin 12.0 12.0 - 16.0 g/dL    Hematocrit 40.0 37.0 - 47.0 %    MCV 79.4 (L) 81.4 - 97.8 fL    MCH 23.8 (L) 27.0 - 33.0 pg    MCHC 30.0 (L) 32.2 - 35.5 g/dL    RDW 54.4 (H) 35.9 - 50.0 fL    Platelet Count 139 (L) 164 - 446 K/uL    MPV 10.8 9.0 - 12.9 fL    Neutrophils-Polys 83.40 (H) 44.00 - 72.00 %    Lymphocytes 9.60 (L) 22.00 - 41.00 %    Monocytes 5.50 0.00 - 13.40 %    Eosinophils 0.00 0.00 - 6.90 %    Basophils 0.10 0.00 - 1.80 %    Immature Granulocytes 1.40 (H) 0.00 - 0.90 %    Nucleated RBC 0.00 0.00 - 0.20 /100 WBC    Neutrophils (Absolute) 6.41 1.82 - 7.42 K/uL    Lymphs (Absolute) 0.74 (L) 1.00 - 4.80 K/uL     Monos (Absolute) 0.42 0.00 - 0.85 K/uL    Eos (Absolute) 0.00 0.00 - 0.51 K/uL    Baso (Absolute) 0.01 0.00 - 0.12 K/uL    Immature Granulocytes (abs) 0.11 0.00 - 0.11 K/uL    NRBC (Absolute) 0.00 K/uL   proBrain Natriuretic Peptide, NT    Collection Time: 12/31/23  5:19 AM   Result Value Ref Range    NT-proBNP 158 (H) 0 - 125 pg/mL   Comp Metabolic Panel (CMP)    Collection Time: 12/31/23  5:19 AM   Result Value Ref Range    Sodium 137 135 - 145 mmol/L    Potassium 3.7 3.6 - 5.5 mmol/L    Chloride 96 96 - 112 mmol/L    Co2 32 20 - 33 mmol/L    Anion Gap 9.0 7.0 - 16.0    Glucose 144 (H) 65 - 99 mg/dL    Bun 11 8 - 22 mg/dL    Creatinine 0.56 0.50 - 1.40 mg/dL    Calcium 8.8 8.5 - 10.5 mg/dL    Correct Calcium 9.4 8.5 - 10.5 mg/dL    AST(SGOT) 36 12 - 45 U/L    ALT(SGPT) 18 2 - 50 U/L    Alkaline Phosphatase 147 (H) 30 - 99 U/L    Total Bilirubin 0.4 0.1 - 1.5 mg/dL    Albumin 3.3 3.2 - 4.9 g/dL    Total Protein 6.4 6.0 - 8.2 g/dL    Globulin 3.1 1.9 - 3.5 g/dL    A-G Ratio 1.1 g/dL   Magnesium    Collection Time: 12/31/23  5:19 AM   Result Value Ref Range    Magnesium 1.7 1.5 - 2.5 mg/dL   Prealbumin    Collection Time: 12/31/23  5:19 AM   Result Value Ref Range    Pre-Albumin 12.0 (L) 18.0 - 38.0 mg/dL   ESTIMATED GFR    Collection Time: 12/31/23  5:19 AM   Result Value Ref Range    GFR (CKD-EPI) 101 >60 mL/min/1.73 m 2   POCT glucose device results    Collection Time: 12/31/23  8:08 AM   Result Value Ref Range    POC Glucose, Blood 158 (H) 65 - 99 mg/dL       Medications:  Scheduled Medications   Medication Dose Frequency    apixaban  5 mg BID    aspirin  81 mg DAILY    clindamycin  300 mg 4X/DAY    febuxostat  40 mg DAILY    fluticasone-umeclidinium-vilanterol  1 Puff QDAILY (RT)    gabapentin  600 mg TID    insulin GLARGINE  0.2 Units/kg/day Q EVENING    insulin regular  3-14 Units 4X/DAY ACHS    levothyroxine  112 mcg AM ES    lidocaine  1 Patch Q24HRS    magnesium oxide  400 mg DAILY    montelukast  10 mg Q  EVENING    omeprazole  20 mg DAILY    oseltamivir  75 mg BID    potassium chloride SA  40 mEq Q EVENING    rosuvastatin  20 mg Q EVENING    verapamil  80 mg QHS    furosemide  80 mg Q DAY    Pharmacy Consult Request  1 Each PHARMACY TO DOSE     PRN medications: insulin regular **AND** POC blood glucose manual result **AND** NOTIFY MD and PharmD **AND** Administer 20 grams of glucose (approximately 8 ounces of fruit juice) every 15 minutes PRN FSBG less than 70 mg/dL **AND** dextrose bolus, acetaminophen, Respiratory Therapy Consult, traZODone, sodium chloride, ondansetron **OR** ondansetron, carboxymethylcellulose, levalbuterol, oxyCODONE immediate-release **OR** oxyCODONE immediate release    Diet:  Current Diet Order   Procedures    Diet Order Diet: Consistent CHO (Diabetic)       Assessment:  Active Hospital Problems    Diagnosis     Diabetes mellitus type 2, insulin dependent (HCC)     Essential hypertension     Chronic ulcer of left foot due to diabetes mellitus (HCC)     Influenza A     History of stroke     A-fib (HCC)     Diastolic dysfunction with chronic heart failure (HCC)     Pancytopenia (HCC)     Cirrhosis (HCC)     Hypothyroidism     GERD (gastroesophageal reflux disease)     Sleep apnea     Factor V deficiency (HCC)        Medical Decision Making and Plan:  Debility secondary to CHF  -Preserved ejection fraction of 60%  -Diastolic heart failure, has cardiac murmur on exam, reports history of mitral regurg  -BNP 4500 --> 158 on admit labs   -Lasix 80 mg p.o. daily Dc'd 12/31  -Consult to hospitalist to help manage  -Admit to [facility] for PT and OT for mobility and ADLs. Per guidelines, 15 hours per week between PT, OT and SLP.     Acute on chronic respiratory failure with hypoxemia  -Secondary to influenza A pneumonia versus volume overload from diastolic heart failure exacerbation  -Started on Tamiflu  -Currently on 1.5 L no cannula oxygen     Pancytopenia  -Secondary to flu versus side effect from  Tegretol  -Stopping Tegretol  -Monitor with serial labs  -ANC improving, does not require isolation/reverse precautions  -Consult to hospitalist to help manage  - Received Granix x1, counts much improved on 12/31     Diabetes mellitus  -Consult to hospitalist to manage  -Sliding scale insulin 0 to 14 units  -Glargine 0.2 units/kg/day     A-fib, history of stroke  -Eliquis 5 mg twice daily  -Aspirin 81 mg daily  -Rosuvastatin 20 mg q. evening     Left foot wound  -Wound care  -Clindamycin 300 mg 4 times daily     Pain control  -Tylenol 650 mg every 6 hours as needed  -Gabapentin 600 mg 3 times daily  -Lidocaine patch  - Adding methocarbamol 500 mg QID     COPD  -Singulair 10 mg q. Evening  -Xopenex every 4 hours as needed  -Trelegy Ellipta 1 puff daily      Hypertension  -Verapamil 80 mg nightly     Hypothyroidism  -Synthroid 112 mcg every morning     History of gout  -Uloric 40 mg daily     Skin - Patient at risk for skin breakdown due to debility in areas including sacrum, achilles, elbows and head in addition to other sites. Nursing to assess skin daily.      GI Ppx - Patient on Prilosec for GERD prophylaxis. Patient on Senna-docusate for constipation prophylaxis.      DVT Ppx - Patient Eliquis on transfer.    ____________________________________    Ananth Novak DO MS  [facility] - Physical Medicine & Rehabilitation   ____________________________________    Total time:  >50 minutes.  I spent greater than 50% of the time for patient care and coordination on this date, including unit/floor time, and face-to-face time with the patient as per assessment and plan above.

## 2023-12-31 NOTE — ASSESSMENT & PLAN NOTE
HbA1c 11.9  Observe serum glucose trends on Lantus and SSI  Outpt meds include Tresiba 120 u qd and Humalog 40-60 u per SSI

## 2023-12-31 NOTE — CARE PLAN
The patient is Stable - Low risk of patient condition declining or worsening    Shift Goals  Clinical Goals: Safety, pain control  Patient Goals: Safety  Family Goals: Education    Progress made toward(s) clinical / shift goals:    Problem: Knowledge Deficit - Standard  Goal: Patient and family/care givers will demonstrate understanding of plan of care, disease process/condition, diagnostic tests and medications  Outcome: Progressing   Patient educated on the POC and medications administered. All questions and concerns addressed at this time   Problem: Fall Risk - Rehab  Goal: Patient will remain free from falls  Outcome: Progressing   Bed is locked and in low position. Call light and belongings within reach  Problem: Pain - Standard  Goal: Alleviation of pain or a reduction in pain to the patient’s comfort goal  Outcome: Progressing  0-10 pain scale. Patient is able to verbalize pain and discomfort. Patient was provided with nonpharmacological and pharmacological pain interventions . PRN oxycodone administered per pain scale.   Patient is not progressing towards the following goals:       Birth Control Pills Pregnancy And Lactation Text: This medication should be avoided if pregnant and for the first 30 days post-partum.

## 2023-12-31 NOTE — PROGRESS NOTES
4 Eyes Skin Assessment Completed by RAMA Klein and RAMA Rubio.    Head WDL  Ears WDL  Nose WDL  Mouth WDL  Neck WDL  Breast/Chest WDL  Shoulder Blades WDL  Spine WDL  (R) Arm/Elbow/Hand WDL  (L) Arm/Elbow/Hand WDL  Abdomen Abrasion  Groin WDL  Scrotum/Coccyx/Buttocks Redness  (R) Leg Scab, Edema  (L) Leg Edema  (R) Heel/Foot/Toe Redness  (L) Heel/Foot/Toe Ulcer(s)          Devices In Places WDL      Interventions In Place Waffle Overlay    Possible Skin Injury Yes    Pictures Uploaded Into Epic Yes  Wound Consult Placed Yes  RN Wound Prevention Protocol Ordered Yes

## 2023-12-31 NOTE — PROGRESS NOTES
NURSING DAILY NOTE    Name: Jenifer Ramos   Date of Admission: 12/30/2023   Admitting Diagnosis: No Principal Problem: There is no principal problem currently on the Problem List. Please update the Problem List and refresh.  Attending Physician: Doron Brand M.d.  Allergies: Azithromycin, Erythromycin, Other misc, Penicillins, Pistachio, Sulfa drugs, Tetraglycine, Other drug, Physostigmine, Tape, Zanaflex [tizanidine hcl], Albuterol, Atorvastatin, Chlorhexidine, Lamisil [terbinafine hcl], and Morphine    Safety  Patient Assist     Patient Precautions     Precaution Comments     Bed Transfer Status     Toilet Transfer Status      Assistive Devices     Oxygen  Nasal Cannula  Diet/Therapeutic Dining  No Active Diet Orders    Pill Administration  whole  Agitated Behavioral Scale     ABS Level of Severity       Fall Risk  Has the patient had a fall this admission?      Sophia Smith Fall Risk Scoring  11, MODERATE RISK  Fall Risk Safety Measures  bed alarm, chair alarm, and poor balance    Vitals  Temperature: 36.7 °C (98 °F)  Temp src: Oral  Pulse: 70  Respiration: 12  Blood Pressure : 120/73  Blood Pressure MAP (Calculated): 89 MM HG  BP Location: Right, Upper Arm  Patient BP Position: Supine     Oxygen  Pulse Oximetry: 93 %  O2 (LPM): 1.5  O2 Delivery Device: Nasal Cannula    Bowel and Bladder  Last Bowel Movement     Stool Type     Bowel Device     Continent  Bladder: Did not void   Bowel: No movement  Bladder Function     Genitourinary Assessment        Skin  Guille Score   16  Sensory Interventions   Bed Types: Standard/Trauma Mattress  Moisture Interventions  Moisturizers/Barriers: Moisturizer       Pain  Pain Rating Scale     Pain Location     Pain Location Orientation     Pain Interventions        ADLs    Bathing      Linen Change      Personal Hygiene     Chlorhexidine Bath      Oral Care     Teeth/Dentures     Shave     Nutrition  Percentage Eaten     Environmental Precautions     Patient Turns/Positioning  Patient Turns Self from Side to Side  Patient Turns Assistance/Tolerance     Bed Positions     Head of Bed Elevated         Psychosocial/Neurologic Assessment  Psychosocial Assessment     Neurologic Assessment  Level of Consciousness: Alert (sitting on edge of bed)       Cardio/Pulmonary Assessment  Edema      Respiratory Breath Sounds  RUL Breath Sounds: Diminished  RML Breath Sounds: Diminished  RLL Breath Sounds: Diminished  DANIELLE Breath Sounds: Diminished  LLL Breath Sounds: Diminished  Cardiac Assessment

## 2023-12-31 NOTE — PROGRESS NOTES
NURSING DAILY NOTE    Name: Jenifer Ramos   Date of Admission: 12/30/2023   Admitting Diagnosis: No Principal Problem: There is no principal problem currently on the Problem List. Please update the Problem List and refresh.  Attending Physician: Doron Brand M.d.  Allergies: Azithromycin, Erythromycin, Other misc, Penicillins, Pistachio, Sulfa drugs, Tetraglycine, Other drug, Physostigmine, Tape, Zanaflex [tizanidine hcl], Albuterol, Atorvastatin, Chlorhexidine, Lamisil [terbinafine hcl], and Morphine    Safety  Patient Assist  mod assist  Patient Precautions     Precaution Comments     Bed Transfer Status     Toilet Transfer Status      Assistive Devices  Rails, Walker - front wheel, Wheelchair  Oxygen  Nasal Cannula  Diet/Therapeutic Dining  No Active Diet Orders    Pill Administration  whole  Agitated Behavioral Scale     ABS Level of Severity       Fall Risk  Has the patient had a fall this admission?   No  Sophia Smith Fall Risk Scoring  17, HIGH RISK  Fall Risk Safety Measures  bed alarm, chair alarm, poor balance, and low vision/ hearing    Vitals  Temperature: 36.7 °C (98.1 °F)  Temp src: Oral  Pulse: 71  Respiration: 16  Blood Pressure : 111/65  Blood Pressure MAP (Calculated): 80 MM HG  BP Location: Right, Upper Arm  Patient BP Position: Supine     Oxygen  Pulse Oximetry: 92 %  O2 (LPM): 1.5  O2 Delivery Device: Nasal Cannula    Bowel and Bladder  Last Bowel Movement  12/30/23 (per report)  Stool Type     Bowel Device     Continent  Bladder: Did not void   Bowel: No movement  Bladder Function  Urine Void (mL):  (moderate)  Number of Times Voided: 1  Urine Color: Yellow  Genitourinary Assessment   Bladder Assessment (WDL):  Within Defined Limits  Urine Color: Yellow  Bladder Scan: Post Void  $ Bladder Scan Results (mL): 1    Skin  Guille Score   16  Sensory Interventions   Bed Types: Standard/Trauma Mattress  Skin Preventative  Measures: Pillows in Use for Support / Positioning, Waffle Overlay  Moisture Interventions  Moisturizers/Barriers: Moisturizer       Pain  Pain Rating Scale  8 - Awful, hard to do anything  Pain Location  Arm  Pain Location Orientation  Right, Left  Pain Interventions   Medication (see MAR)    ADLs    Bathing      Linen Change      Personal Hygiene  Moist Antionette Wipes, Perineal Care  Chlorhexidine Bath      Oral Care     Teeth/Dentures     Shave     Nutrition Percentage Eaten     Environmental Precautions  Bed in Low Position, Treaded Slipper Socks on Patient  Patient Turns/Positioning  Patient Turns Self from Side to Side  Patient Turns Assistance/Tolerance     Bed Positions  Bed Controls On  Head of Bed Elevated  Self regulated      Psychosocial/Neurologic Assessment  Psychosocial Assessment  Psychosocial (WDL):  Within Defined Limits  Neurologic Assessment  Neuro (WDL): Within Defined Limits  Level of Consciousness: Alert  Orientation Level: Oriented X4  Cognition: Appropriate safety awareness  Speech: Clear  Motor Function/Sensation Assessment: Motor strength  Muscle Strength Right Arm: Good Strength Against Gravity and Moderate Resistance  Muscle Strength Left Arm: Fair Strength against Gravity but No Resistance  Muscle Strength Right Leg: Good Strength Against Gravity and Moderate Resistance  Muscle Strength Left Leg: Fair Strength against Gravity but No Resistance  EENT (WDL):  WDL Except    Cardio/Pulmonary Assessment  Edema   RLE Edema: 1+  LLE Edema: 1+  Respiratory Breath Sounds  RUL Breath Sounds: Diminished  RML Breath Sounds: Diminished  RLL Breath Sounds: Diminished  DANIELLE Breath Sounds: Diminished  LLL Breath Sounds: Diminished  Cardiac Assessment   Cardiac (WDL):  WDL Except

## 2023-12-31 NOTE — THERAPY
"Occupational Therapy  Daily Treatment     Patient Name: Jenifer Ramos  Age:  63 y.o., Sex:  female  Medical Record #: [id number]  Today's Date: 12/31/2023     Precautions  Precautions: Fall Risk  Comments: L foot wound         Subjective  Pt supine in bed upon arrival, reporting fatigue and migraine. Agreeable to education and orientation to rehab stay.   \"I haven't had ankles or knees since I was 23.. . . I don't know what else they want from me.....  I am pretty close to baseline\"    Objective     12/31/23 1331   OT Charge Group   OT Self Care / ADL (Units) 2   OT Total Time Spent   OT Individual Total Time Spent (Mins) 30   Interdisciplinary Plan of Care Collaboration   Patient Position at End of Therapy In Bed     Pt provided education re: role of OT, safety with ADLs, energy conservation, rehab goals, and POC. Pt reported that she is unable to manage the 4 inch threshold to WIS at home, but is agreeable to remediating this deficit area to maximize independence and safety.     Pt on 1.5 LPM via silicone nasal cannula during this session, corrected in flow sheet.     Assessment  Pt asked appropriate questions and was receptive to education re: role of OT, safety with ADLs, energy conservation, rehab goals, POC, and CLOF. She was left with no further questions at this time. She was limited by migraine during this session.     Strengths: Able to follow instructions, Willingly participates in therapeutic activities  Barriers: Decreased endurance, Fatigue, Generalized weakness, Home accessibility, Impaired activity tolerance, Limited mobility    Plan  IRF-SALLY toileting and shower scores tomorrow 1/1   Assess BUE strength/ROM  FWW level ADLs    DME       Passport items to be completed:  Perform bathroom transfers, complete dressing, complete feeding, get ready for the day, prepare a simple meal, participate in household tasks, adapt home for safety needs, demonstrate home exercise program, complete caregiver " training     Occupational Therapy Goals (Active)       Problem: Dressing       Dates: Start:  12/31/23         Goal: STG-Within one week, patient will dress UB with supervision.        Dates: Start:  12/31/23            Goal: STG-Within one week, patient will dress LB with SBA.        Dates: Start:  12/31/23               Problem: OT Long Term Goals       Dates: Start:  12/31/23         Goal: LTG-By discharge, patient will complete bathing routine with supervision.        Dates: Start:  12/31/23            Goal: LTG-By discharge, patient will dress LB with supervision-mod I.        Dates: Start:  12/31/23            Goal: LTG-By discharge, patient will complete toileting (hygiene and pant management) and toilet transfer with supervision-mod I.        Dates: Start:  12/31/23               Problem: Toileting       Dates: Start:  12/31/23         Goal: STG-Within one week, patient will complete toileting (hygiene and pant management) and toilet transfer with CGA-SBA.        Dates: Start:  12/31/23

## 2023-12-31 NOTE — THERAPY
Physical Therapy   Initial Evaluation     Patient Name: Jenifer Ramos  Age:  63 y.o., Sex:  female  Medical Record #: [id number]  Today's Date: 12/31/2023     Subjective    Patient pleasant and agreeable to participate. Reports headache and neck pain, RN notified and provided pain medication.      Objective       12/31/23 0931   PT Charge Group   PT Therapeutic Activities (Units) 1   PT Evaluation PT Evaluation Mod   PT Total Time Spent   PT Individual Total Time Spent (Mins) 60   Prior Living Situation   Prior Services Intermittent Physical Support for ADL Per Family   Housing / Facility 1 Story House   Steps Into Home 1  (4inch threshold step to enter)   Steps In Home 0   Rail None   Equipment Owned Front-Wheel Walker;4-Wheel Walker   Lives with - Patient's Self Care Capacity Spouse;Adult Children   Prior Level of Functional Mobility   Bed Mobility Required Assist   Transfer Status Independent   Ambulation Independent   Distance Ambulation (Feet)   (patient reports household distance ambulation with FWW)   Assistive Devices Used Front-Wheel Walker   Stairs Required Assist   Prior Functioning: Everyday Activities   Self Care Needed some help   Indoor Mobility (Ambulation) Independent   Stairs Needed some help   Functional Cognition Needed some help   Prior Device Use Walker   Strength Lower Body   Comments Strength globally 4/5 in B LE except L hip flexion 3-/5   Sensation Lower Body   Comments Patient reports neuropathy at B feet, described as burning pain as well as new onset of numbess at B LE (unable to localize); light touch sensation appears intact   Balance Assessment   Sitting Balance (Static) Good   Sitting Balance (Dynamic) Fair +   Standing Balance (Static) Fair   Standing Balance (Dynamic) Poor   Weight Shift Sitting Good   Weight Shift Standing Fair   Comments UE support required for dynamic standing balance   Bed Mobility    Supine to Sit Maximal Assist   Sit to Supine Moderate Assist   Sit to  Stand Minimal Assist   Roll Left and Right   Physical Assistance Level 51%-75%   CARE Score - Roll Left and Right 2   Roll Left and Right Discharge Goal   Discharge Goal 6   Sit to Lying   Physical Assistance Level 51%-75%   CARE Score - Sit to Lying 2   Sit to Lying Discharge Goal   Discharge Goal 4   Lying to Sitting on Side of Bed   Physical Assistance Level 51%-75%   CARE Score - Lying to Sitting on Side of Bed 2   Lying to Sitting on Side of Bed Discharge Goal   Discharge Goal 3   Sit to Stand   Physical Assistance Level 25% or less   CARE Score - Sit to Stand 3   Sit to Stand Discharge Goal   Discharge Goal 6   Chair/Bed-to-Chair Transfer   Assistance Needed Adaptive equipment   Physical Assistance Level 25% or less   CARE Score - Chair/Bed-to-Chair Transfer 3   Chair/Bed-to-Chair Transfer Discharge Goal   Discharge Goal 6   Car Transfer   Reason if not Attempted Safety concerns   CARE Score - Car Transfer 88   Car Transfer Discharge Goal   Discharge Goal 3   Walk 10 Feet   Assistance Needed Incidental touching;Adaptive equipment   CARE Score - Walk 10 Feet 4   Walk 10 Feet Discharge Goal   Discharge Goal 6   Walk 50 Feet with Two Turns   Reason if not Attempted Safety concerns   CARE Score - Walk 50 Feet with Two Turns 88   Walk 50 Feet with Two Turns Discharge Goal   Discharge Goal 6   Walk 150 Feet   Reason if not Attempted Safety concerns   CARE Score - Walk 150 Feet 88   Walk 150 Feet Discharge Goal   Discharge Goal 4   Walking 10 Feet on Uneven Surfaces   Reason if not Attempted Safety concerns   CARE Score - Walking 10 Feet on Uneven Surfaces 88   Walking 10 Feet on Uneven Surfaces Discharge Goal   Discharge Goal 4   1 Step (Curb)   Reason if not Attempted Safety concerns   CARE Score - 1 Step (Curb) 88   1 Step (Curb) Discharge Goal   Discharge Goal 4   4 Steps   Reason if not Attempted Safety concerns   CARE Score - 4 Steps 88   4 Steps Discharge Goal   Discharge Goal 4   12 Steps   Reason if not  Attempted Safety concerns   CARE Score - 12 Steps 88   12 Steps Discharge Goal   Discharge Goal 3   Picking Up Object   Reason if not Attempted Safety concerns   CARE Score - Picking Up Object 88   Picking Up Object Discharge Goal   Discharge Goal 4   Wheel 50 Feet with Two Turns   Reason if not Attempted Activity not applicable   CARE Score - Wheel 50 Feet with Two Turns 9   Wheel 50 Feet with Two Turns Discharge Goal   Discharge Goal 9   Wheel 150 Feet   Reason if not Attempted Activity not applicable   CARE Score - Wheel 150 Feet 9   Wheel 150 Feet Discharge Goal   Discharge Goal 9   Gait Functional Level of Assist    Gait Level Of Assist Contact Guard Assist  (occasional min A for walker management)   Assistive Device Front Wheel Walker   Distance (Feet) 25   # of Times Distance was Traveled 1   Deviation Antalgic;Bradykinetic;Shuffled Gait   Stairs Functional Level of Assist   Level of Assist with Stairs Unable to Participate   Transfer Functional Level of Assist   Bed, Chair, Wheelchair Transfer Minimal Assist   Bed Chair Wheelchair Transfer Description Adaptive equipment;Set-up of equipment;Supervision for safety  (FWW)   Problem List    Problems Pain;Impaired Bed Mobility;Impaired Transfers;Impaired Ambulation;Functional Strength Deficit;Impaired Balance;Impaired Coordination;Decreased Activity Tolerance   Precautions   Precautions Fall Risk   Comments L foot wound   Current Discharge Plan   Current Discharge Plan Return to Prior Living Situation   Interdisciplinary Plan of Care Collaboration   IDT Collaboration with  Occupational Therapist;Nursing   Patient Position at End of Therapy In Bed;Bed Alarm On;Call Light within Reach;Tray Table within Reach;Phone within Reach   Collaboration Comments Collaborated with RN re: patient's request for pain meds; Collaborated with OT re: CLOF   Benefit   Therapy Benefit Patient Would Benefit from Inpatient Rehabilitation Physical Therapy to Maximize Functional  [place] with ADLs, IADLs and Mobility.   Strengths & Barriers   Strengths Able to follow instructions;Alert and oriented;Good insight into deficits/needs;Pleasant and cooperative;Willingly participates in therapeutic activities   Barriers Decreased endurance;Fatigue;Generalized weakness;Impaired activity tolerance;Impaired balance;Pain       Assessment  Patient is 63 y.o. female with a diagnosis of influenza A and CHF exacerbation. She has a wound on L foot (no acute findings on x-ray; has been present for 6 weeks). She has history of CVA with residual mild L sided weakness, a-fib, type II DM and peripheral neuropathy. She reports that she receives physical help from her  and son at home, primarily for ADLs, stairs and bed mobility. She reports that she has been largely inactive since having a stroke.     Patient presents to rehab with deficits in all functional mobility due to generalized weakness, impaired activity tolerance/endurance and impaired balance. She required significant time to complete tasks as well as cuing to stay on task and for redirection. Patient very pleasant and agreeable to participate in therapy.     Plan  Recommend Physical Therapy  minutes per day 5-7 days per week for 10-14 days for the following treatments:  PT Gait Training, PT Therapeutic Exercises, PT Neuro Re-Ed/Balance, and PT Therapeutic Activity.    Passport items to be completed:  Get in/out of bed safely, in/out of a vehicle, safely use mobility device, walk or wheel around home/community, navigate up and down stairs, show how to get up/down from the ground, ensure home is accessible, demonstrate HEP, complete caregiver training    Goals:  Long term and short term goals have been discussed with patient and they are in agreement.    Physical Therapy Problems (Active)       Problem: Balance       Dates: Start:  12/31/23         Goal: STG-Within one week, patient will maintain static standing without UE support for  at least 30 seconds, supervision.        Dates: Start:  12/31/23               Problem: Mobility       Dates: Start:  12/31/23         Goal: STG-Within one week, patient will ambulate at least 50 feet with FWW and SBA.        Dates: Start:  12/31/23            Goal: STG-Within one week, patient will ambulate up/down a curb with FWW and min A.        Dates: Start:  12/31/23               Problem: Mobility Transfers       Dates: Start:  12/31/23         Goal: STG-Within one week, patient will transfer bed to chair with LRAD and supervision.        Dates: Start:  12/31/23               Problem: PT-Long Term Goals       Dates: Start:  12/31/23         Goal: LTG-By discharge, patient will tolerate standing for at least 1 minute without UE, while reaching outside [facility] to perform ADLs with supervision.        Dates: Start:  12/31/23            Goal: LTG-By discharge, patient will ambulate at least 150 feet with FWW and supervision.        Dates: Start:  12/31/23            Goal: LTG-By discharge, patient will transfer one surface to another with LRAD and mod I.        Dates: Start:  12/31/23            Goal: LTG-By discharge, patient will transfer in/out of a car with LRAD and min A.        Dates: Start:  12/31/23            Goal: LTG-By discharge, patient will negotiate up and down a 4inch step with FWW and CGA (to simulate home entry).        Dates: Start:  12/31/23

## 2023-12-31 NOTE — DISCHARGE SUMMARY
Discharge Summary    CHIEF COMPLAINT ON ADMISSION  Chief Complaint   Patient presents with    Weakness     BIB EMS from home; reported by spouse of having increase body weakness; sometimes confuse during conversation; had a left foot infection x1 week and was on ABX. On home oxygen at 2L PRN for sleep apnea; on thinners; H/o of stroke- with left arm deficit; Aox3 GCS14       Reason for Admission  EMS    Admission Date  12/27/2023     CODE STATUS  FULL    HPI & HOSPITAL COURSE    63-year-old female past medical history obesity, paroxysmal atrial fibrillation status post 2 ablations, chronic anticoagulation with Eliquis, factor V Ley[place], diastolic heart failure, asthma, hypothyroidism, pulmonary hypertension, CVA year ago, MARCI, type 2 insulin-dependent diabetes, debility, peripheral neuropathy and osteoarthritis presented 12/27/2023 generalized weakness, confusion, shortness of breath for few days.    In emergency room she did also report her son was sick with a cold at home.  Vitals in the emergency room febrile one 1.6, heart rate 80, respiratory rate 19, blood pressure 116/58, SpO2 93% on 3 L of oxygen, WBC 3.5, hemoglobin 9.5, MCV 81.9, platelets 124, sodium 132, glucose 222, alk phos 173, lactic acid 1.8 CRP 7.7  Respiratory panel positive for influenza A, she was started on Tamiflu.  Chest x-ray diffuse interstitial prominence cardiomegaly, echo performed and showed normal EF, mild concentric LVH, diastolic function abnormal but unable to grade, mild mitral stenosis.   X-ray of the left foot no acute findings  She was diuresed, then transitioned back to oral lasix.   Blood cultures remain no growth to date from 12/27/2023  She has pancytopenia.  ANC 0.72 but now improving.  Most likely pancytopenia related to viral influenza +/-Tegretol (which she takes for trigeminal neuralgia).  She is on clindamycin possible for left infection and has been on this for 2 weeks now, I do not think the wound is acutely infected  and can likely dc clindamycin for now. Monitor CBC and wound closely.  Needs wound consult at rehab.       Therefore, she is discharged in fair and stable condition to an inpatient rehabilitation hospital.    The patient met 2-midnight criteria for an inpatient stay at the time of discharge.      FOLLOW UP ITEMS POST DISCHARGE  Monitor ANC  Wound care at rehab  Monitor fluid status and renal function/electrolytes while transitioning to oral lasix    DISCHARGE DIAGNOSES  Active Problems:    Sleep apnea (POA: Yes)    Factor V deficiency (HCC) (POA: Yes)    Hypothyroidism (POA: Yes)    GERD (gastroesophageal reflux disease) (POA: Yes)    Pancytopenia (HCC) (POA: Yes)    Pulmonary hypertension (HCC) (POA: Yes)    Acute on chronic diastolic heart failure (HCC) (POA: Yes)    History of stroke (POA: Yes)    Influenza A (POA: Yes)    Chronic ulcer of left foot due to diabetes mellitus (HCC) (POA: Yes)  Resolved Problems:    * No resolved hospital problems. *      FOLLOW UP  Future Appointments   Date Time Provider Department Center   12/31/2023  7:00 AM Vibha Flores OT [facility] None   12/31/2023  9:30 AM Quyen Gambino, PT [facility] None   12/31/2023  1:30 PM Vibha Flores, OT [facility] None   12/31/2023  3:00 PM Quyen Gambino, PT [facility] None     MATEO PerazaPANDREA  [address] [facility] [address]  [place] [address]  [phone]    Go on 1/18/2024  Please go to your follow up appointment with Cornelia Juarez AYadiPANDREA on Thursday January 18,2024 check in at 11:45am for a 12:00pm appointment.    Adrián Duckworth M.D.  [address] [place] [id number] [phone]    Schedule an appointment as soon as possible for a visit in 2 week(s)      [address] [place] [address]  [place] [address]  [phone]  Go in 1 week(s)        MEDICATIONS ON DISCHARGE     Medication List        START taking these medications        Instructions   insulin glargine 100 UNIT/ML Sopn injection  Generic drug:  insulin glargine   Inject 24 Units under the skin every evening.  Dose: 24 Units     insulin regular 100 Unit/mL Soln  Commonly known as: Humulin R   Inject 3-14 Units under the skin 4 Times a Day,Before Meals and at Bedtime.  Dose: 3-14 Units     levalbuterol 1.25 MG/3ML Nebu  Commonly known as: Xopenex   Take 3 mL by nebulization every 6 hours as needed for Shortness of Breath (wheezing).  Dose: 1.25 mg     oseltamivir 75 MG Caps  Commonly known as: Tamiflu   Take 1 Capsule by mouth 2 times a day for 4 doses.  Dose: 75 mg     polyethylene glycol/lytes Pack  Commonly known as: Miralax   Take 1 Packet by mouth 1 time a day as needed (if sennosides and docusate ineffective after 24 hours).  Dose: 17 g     senna-docusate 8.6-50 MG Tabs  Commonly known as: Pericolace Or Senokot S   Take 2 Tablets by mouth 2 times a day.  Dose: 2 Tablet            CHANGE how you take these medications        Instructions   furosemide 40 MG Tabs  What changed:   medication strength  how much to take  when to take this  additional instructions  Commonly known as: Lasix   Take 1 Tablet by mouth every day.  Dose: 40 mg            CONTINUE taking these medications        Instructions   aspirin EC 81 MG Tbec  Commonly known as: Ecotrin   Take 1 Tablet by mouth every day.  Dose: 81 mg     carBAMazepine 200 MG Tabs  Commonly known as: TEGretol   Take 200 mg by mouth 3 times a day.  Dose: 200 mg     Eliquis 5mg Tabs  Generic drug: apixaban   Take 5 mg by mouth 2 times a day.  Dose: 5 mg     febuxostat 40 MG Tabs  Commonly known as: Uloric   Take 40 mg by mouth every day.  Dose: 40 mg     gabapentin 300 MG Caps  Commonly known as: Neurontin   Take 600 mg by mouth 3 times a day. * may take an extra 300 mg  ( 600 mg) if needed  Dose: 600 mg     hydrOXYzine HCl 25 MG Tabs  Commonly known as: Atarax   Take 25 mg by mouth at bedtime.  Dose: 25 mg     levalbuterol 45 MCG/ACT inhaler  Commonly known as: Xopenex HFA   Inhale 2 Puffs 4 times a day as  needed for Shortness of Breath.  Dose: 2 Puff     levothyroxine 112 MCG Tabs  Commonly known as: Synthroid   Take 112 mcg by mouth every morning on an empty stomach.  Dose: 112 mcg     lidocaine 5 % Ptch  Commonly known as: Lidoderm   Place 1 Patch on the skin every 24 hours. * top of left foot*  Dose: 1 Patch     magnesium oxide 400 MG Tabs tablet  Commonly known as: Mag-Ox   Take 400 mg by mouth every day.  Dose: 400 mg     montelukast 10 MG Tabs  Commonly known as: Singulair   Take 10 mg by mouth every evening.  Dose: 10 mg     Nurtec 75 MG Tbdp  Generic drug: Rimegepant Sulfate   Take 75 mg by mouth 1 time a day as needed. Indications: Migraine Headache  Dose: 75 mg     oxyCODONE immediate-release 5 MG Tabs  Commonly known as: Roxicodone   Take 5-15 mg by mouth every four hours as needed for Severe Pain. * depending on pain level    15 mg = qhs Scheduled  Dose: 5-15 mg     pantoprazole 40 MG Tbec  Commonly known as: Protonix   Take 40 mg by mouth every evening.  Dose: 40 mg     potassium chloride SA 10 MEQ Tbcr  Commonly known as: K-Dur   Take 40-60 mEq by mouth every evening. * take an extra 20 meq if an extra dose of lasix is taken*  Dose: 40-60 mEq     promethazine 25 MG Tabs  Commonly known as: Phenergan   Take 25 mg by mouth every 6 hours as needed for Nausea/Vomiting.  Dose: 25 mg     rosuvastatin 20 MG Tabs  Commonly known as: Crestor   Take 20 mg by mouth every evening.  Dose: 20 mg     Trelegy Ellipta 100-62.5-25 mcg/act inhaler  Generic drug: fluticasone-umeclidinium-vilanterol   Inhale 1 Inhalation every evening.  Dose: 1 Inhalation      verapamil 80 MG Tabs  Commonly known as: Isoptin   Take 80 mg by mouth at bedtime.  Dose: 80 mg            STOP taking these medications      clindamycin 300 MG Caps  Commonly known as: Cleocin     Insulin Degludec 200 UNIT/ML Sopn     insulin lispro 100 UNIT/ML  Commonly known as: HumaLOG              Allergies  Allergies   Allergen Reactions    Azithromycin  Anaphylaxis    Erythromycin Anaphylaxis    Other Misc Anaphylaxis     Flu vaccine    Penicillins Anaphylaxis     As a child    Pistachio Anaphylaxis    Sulfa Drugs Anaphylaxis    Tetraglycine Anaphylaxis    Other Drug Unspecified     Long acting benzodiazepines only single dose otherwise combative     Physostigmine Unspecified     Abdomen extreme swelling    Tape Rash and Itching     tegaderm ok  Blisters with others    Zanaflex [Tizanidine Hcl] Unspecified     Falling asleep mid sentence    Albuterol Palpitations    Atorvastatin Unspecified     Extreme joint pain    Chlorhexidine Rash     blistering    Lamisil [Terbinafine Hcl] Unspecified     Pancreatitis     Morphine Unspecified     Not effective       DIET  No orders of the defined types were placed in this encounter.      ACTIVITY  As tolerated and directed by rehab.  Weight bearing as tolerated    LINES, DRAINS, AND WOUNDS  This is an automated list. Peripheral IVs will be removed prior to discharge.       Wound 06/21/23 Pressure Injury Foot Left (Active)   Site Assessment Brown;[place]illey 12/29/23 0930   Periwound Assessment Normal Temperature;Callused;Crusted 12/29/23 0930   Margins Unattached edges 12/29/23 0930   Closure Adhesive bandage 12/29/23 0930   Drainage Amount Scant 12/29/23 0930   Drainage Description Brown 12/29/23 0930   Treatments Irrigation;Site care;Offloading 12/29/23 0930   Wound Cleansing Normal Saline Irrigation 12/29/23 0930   Periwound Protectant Mepitel 12/29/23 0930   Dressing Status Clean;Dry;Intact 12/29/23 0930   Dressing Changed New 12/29/23 0930       Wound 08/16/22 Groin Left (Active)       Wound 08/16/22 Heel Left (Active)       Wound 06/21/23 Other (comment) Groin Bilateral moisture associated skin damage (Active)                  MENTAL STATUS ON TRANSFER             CONSULTATIONS  None    PROCEDURES  None    Discharge exam:  Physical Exam  Constitutional:       General: She is not in acute distress.     Appearance: She is obese.  She is not toxic-appearing or diaphoretic.   HENT:      Head: Normocephalic and atraumatic.      Nose: Nose normal.      Mouth/Throat:      Mouth: Mucous membranes are moist.   Eyes:      General: No scleral icterus.     Conjunctiva/sclera: Conjunctivae normal.   Cardiovascular:      Rate and Rhythm: Normal rate and regular rhythm.      Heart sounds: No murmur heard.     No friction rub. No gallop.   Pulmonary:      Effort: Pulmonary effort is normal.      Breath sounds: Normal breath sounds.   Abdominal:      General: Bowel sounds are normal. There is no distension.      Palpations: Abdomen is soft.      Tenderness: There is no abdominal tenderness.   Musculoskeletal:      Cervical back: Normal range of motion.      Right lower leg: Edema present.      Left lower leg: Edema present.   Skin:     Coloration: Skin is not jaundiced.      Findings: No rash.      Comments: L heel wound without surrounding erythema or significant drainage   Neurological:      Mental Status: She is alert and oriented to person, place, and time. Mental status is at baseline.   Psychiatric:         Mood and Affect: Mood normal.         Behavior: Behavior normal.           LABORATORY  Lab Results   Component Value Date    SODIUM 135 12/30/2023    POTASSIUM 3.8 12/30/2023    CHLORIDE 94 (L) 12/30/2023    CO2 30 12/30/2023    GLUCOSE 150 (H) 12/30/2023    BUN 11 12/30/2023    CREATININE 0.55 12/30/2023        Lab Results   Component Value Date    WBC 1.9 (LL) 12/30/2023    WBC 1.9 (LL) 12/30/2023    HEMOGLOBIN 11.6 (L) 12/30/2023    HEMOGLOBIN 11.6 (L) 12/30/2023    HEMATOCRIT 38.8 12/30/2023    HEMATOCRIT 38.8 12/30/2023    PLATELETCT 132 (L) 12/30/2023    PLATELETCT 132 (L) 12/30/2023      EC-ECHOCARDIOGRAM COMPLETE W/O CONT  Transthoracic  Echo Report    Echocardiography Laboratory    CONCLUSIONS  Mild concentric LVH with preserved LV systolic function, normal   estimated LVEF 60%  Diastolic function abnormal but difficult to assess grading  given   mitral annular calcifications  Normal RV size and systolic function  Moderate mitral annular calcifications with mild stenosis, mean   gradient 5mmHg at 70bpm  No pericardial effusion  Unable to estimate RVSP  Normal IVC size    LEONEL LINDER  Exam Date:         2023                      12:09  Exam Location:     Inpatient  Priority:          Routine    Ordering Physician:        VICENTA IQBAL  Referring Physician:       [id number] Whatley  Sonographer:               Marleny Johnson ERNST    Age:    63     Gender:    F  MRN:    [id number]  :    1960  BSA:    2.33   Ht (in):    68     Wt (lb):    273  Exam Type:     Complete    Indications:     Unspecified systolic (congestive) heart failure  ICD Codes:       I50.20    CPT Codes:       28485    BP:   109    /   63     HR:   71  Technical Quality:       Technically difficult study -                            adequate information is obtained    MEASUREMENTS  (Male / Female) Normal Values  2D ECHO  LV Diastolic Diameter PLAX        3.7 cm                4.2 - 5.9 / 3.9 - 5.3   cm  LV Systolic Diameter PLAX         1.9 cm                2.1 - 4.0 cm  IVS Diastolic Thickness           1.2 cm                  LVPW Diastolic Thickness          1.2 cm                  LVOT Diameter                     2.5 cm                  Estimated LV Ejection Fraction    60 %                    LV Ejection Fraction MOD BP       60.2 %                >= 55  %  LV Ejection Fraction MOD 4C       58.4 %                  LV Ejection Fraction MOD 2C       64.4 %                  LV Ejection Fraction 4C AL        59.7 %                  LV Ejection Fraction 2C AL        64 %                    LA Volume Index                   32.3 cm3/m2           16 - 28 cm3/m2    DOPPLER  AV Peak Velocity                  1.8 m/s                 AV Peak Gradient                  12.8 mmHg               AV Mean Gradient                  7 mmHg                  LVOT Peak Velocity                 1.2 m/s                 AV Area Cont Eq vti               3.3 cm2                 MV Velocity Time Integral         50.9 cm                 Mitral E Point Velocity           1.4 m/s                 Mitral E to A Ratio               1.1                     MV Pressure Half Time             139 ms                  MV Area PHT                       1.6 cm2                 MV Deceleration Time              476 ms                  LV E' Lateral Velocity            5.8 cm/s                Mitral E to LV E' Lateral Ratio   24.3                    LV E' Septal Velocity             3.1 cm/s                Mitral E to LV E' Septal Ratio    44.4                      * Indicates values subject to auto-interpretation  LV EF:  60    %    FINDINGS  Left Ventricle  Normal left ventricular chamber size. Mild concentric left ventricular   hypertrophy 1.2cm. Normal left ventricular systolic function. The left   ventricular ejection fraction is visually estimated to be 60%. Normal   regional wall motion. Indeterminate diastolic function given MAC.    Right Ventricle  The right ventricle is normal in size and systolic function.    Right Atrium  The right atrium is normal in size. Normal inferior vena cava size and   inspiratory collapse.    Left Atrium  The left atrium is normal in size. Left atrial volume index is 32 mL/sq   m.    Mitral Valve  Structurally normal mitral valve without regurgitation.  Moderate   mitral annular calcification. mild stenosis, mean gradient 5mmHg at   70bpm    Aortic Valve  Structurally normal aortic valve without significant stenosis or   regurgitation.    Tricuspid Valve  Structurally normal tricuspid valve without significant stenosis or   regurgitation.  Unable to estimate right ventricular systolic pressure   due to an inadequate tricuspid regurgitant jet.    Pulmonic Valve  Structurally normal pulmonic valve without significant stenosis or   regurgitation.    Pericardium  No pericardial  effusion.    Aorta  Normal aortic root for body surface area. The ascending aorta diameter   is 2.9 cm.    William Santiago MD  (Electronically Signed)  Final Date:     30 December 2023                   13:57        Total time of the discharge process exceeds 33 minutes.

## 2023-12-31 NOTE — PROGRESS NOTES
Patient experiencing 6/10 pain in her right arm. Patient was offered tylenol prn as it was her only pain medication available to her. Patient refused and requested to have Roxicodone 5-10mg as she has been receiving this at the [facility] prior to admission. On call, Ananth Novak, notified via volt at 2229. New orders received at 2230 for Roxicodone 5-10 mg prn q4h for pain (See MAR).

## 2023-12-31 NOTE — THERAPY
Occupational Therapy   Initial Evaluation     Patient Name: Jenifer Ramos  Age:  63 y.o., Sex:  female  Medical Record #: [id number]  Today's Date: 12/31/2023     Subjective  Pt supine in bed upon arrival, agreeable to dressing routine but politely declined shower and toileting.      Objective     12/31/23 0701   OT Charge Group   Charges Yes   OT Self Care / ADL (Units) 1   OT Evaluation OT Evaluation Mod   OT Total Time Spent   OT Individual Total Time Spent (Mins) 60   Prior Living Situation   Prior Services Intermittent Physical Support for ADL Per Family   Housing / Facility 1 Story House   Steps Into Home 1  (4 inch threshold)   Bathroom Set up   (4 inch threshold for WIS, no GB)   Equipment Owned Reacher;4-Wheel Walker;Front-Wheel Walker   Lives with - Patient's Self Care Capacity Spouse;Adult Children   Prior Level of ADL Function   Self Feeding Independent   Grooming / Hygiene Requires Assist   Bathing Requires Assist  (pt requires that she cannot access her shower and sponge bathes with assistance)   Dressing Requires Assist   Toileting Requires Assist  (reports incontinence at baseline)   Prior Level of IADL Function   Laundry Requires Assist   Kitchen Mobility Requires Assist   Prior Level Of Mobility Supervision With Device in Home;Supervision With Device in Community   Driving / Transportation Unable To Determine At This Time   Prior Functioning: Everyday Activities   Self Care Needed some help   Indoor Mobility (Ambulation) Independent   Stairs Needed some help   Functional Cognition Needed some help   Prior Device Use Walker   Vitals   O2 (LPM) 1.5   O2 Delivery Device Silicone Nasal Cannula   Confusion Assessment Method (CAM)   Is there evidence of an acute change in mental status from the patient's baseline? Yes   Inattention Behavior present, fluctuates (comes and goes, changes in severity)   Disorganized thinking Behavior present, fluctuates (comes and goes, changes in severity)   Altered  level of consciousness Behavior not present   Strength Upper Body   Comments pt reports impaired UE strength, LUE strength < RUE strength   Bed Mobility    Supine to Sit Moderate Assist   Sit to Stand Minimal Assist   Eating   Assistance Needed Set-up / clean-up   Physical Assistance Level No physical assistance   CARE Score - Eating 5   Eating Discharge Goal   Discharge Goal 6   Oral Hygiene   Reason if not Attempted Refused to perform   CARE Score - Oral Hygiene 7   Oral Hygiene Discharge Goal   Discharge Goal 6   Shower/Bathe Self   Reason if not Attempted Refused to perform   CARE Score - Shower/Bathe Self 7   Shower/Bathe Self Discharge Goal   Discharge Goal 4   Upper Body Dressing   Assistance Needed Physical assistance   Physical Assistance Level 25% or less   CARE Score - Upper Body Dressing 3   Upper Body Dressing Discharge Goal   Discharge Goal 6   Lower Body Dressing   Assistance Needed Physical assistance   Physical Assistance Level 26%-50%   CARE Score - Lower Body Dressing 3   Lower Body Dressing Discharge Goal   Discharge Goal 4   Putting On/Taking Off Footwear   Assistance Needed Physical assistance   Physical Assistance Level Total assistance   CARE Score - Putting On/Taking Off Footwear 1   Putting On/Taking Off Footwear Discharge Goal   Discharge Goal 6   Toileting Hygiene   Reason if not Attempted Refused to perform   CARE Score - Toileting Hygiene 7   Toileting Hygiene Discharge Goal   Discharge Goal 5   Toilet Transfer   Reason if not Attempted Refused to perform   CARE Score - Toilet Transfer 7   Toilet Transfer Discharge Goal   Discharge Goal 4   Hearing, Speech, and Vision   Ability to Hear Adequate   Ability to See in Adequate Light Adequate   Expression of Ideas and Wants Without difficulty   Understanding Verbal and Non-Verbal Content Understands   Functional Level of Assist   Grooming Seated;Standby Assist   Upper Body Dressing Minimal Assist  (min A to doff, SBA to don; seated  "throughout; set-up)   Upper Body Dressing Description Set-up of equipment;Supervision for safety   Lower Body Dressing   (total A for socks, mod A to thread BLE into pant legs and min A with pants over hips while standing at FWW)   Bed, Chair, Wheelchair Transfer Minimal Assist  (EOB > w/c via FWW)   Bed Chair Wheelchair Transfer Description Adaptive equipment;Increased time;Initial preparation for task;Set-up of equipment;Supervision for safety   Precautions   Precautions Fall Risk   Comments L foot wound   Current Discharge Plan   Current Discharge Plan Return to Prior Living Situation   Benefit    Therapy Benefit Patient Would Benefit from Inpatient Rehab Occupational Therapy to Maximize [place] with ADLs, IADLs and Functional Mobility.   Interdisciplinary Plan of Care Collaboration   IDT Collaboration with  Nursing;Physical Therapist   Patient Position at End of Therapy Seated;Chair Alarm On;Self Releasing Lap Belt Applied;Tray Table within Reach;Call Light within Reach  (RN present)   Collaboration Comments txfr of care to RN; PT re: CLOF   Strengths & Barriers   Strengths Able to follow instructions;Willingly participates in therapeutic activities   Barriers Decreased endurance;Fatigue;Generalized weakness;Home accessibility;Impaired activity tolerance;Limited mobility   Occupational Therapist Assigned   Assigned OT / Treatment Time / Comments no 7AM therapy please     Assessment  Per H&P, patient is \"a 63-year-old female with a past medical history of morbid obesity, paroxysmal atrial fibrillation, chronic anticoagulation on Eliquis, factor V Leiden COVID diastolic heart failure, asthma, hypothyroidism, pulmonary hypertension, remote history of CVA, MARCI, type 2 insulin-dependent diabetes, debility, peripheral neuropathy, osteoarthritis who presented on 12/28/2023 with generalized weakness, confusion, shortness of breath.  Patient reported her son was sick at home with a cold, patient was found to be " "positive for influenza A.  Chest x-ray showed diffuse interstitial prominence and cardiomegaly, she was also found to have a left foot wound.  X-ray of the left foot showed no acute findings.  Patient was started on Tamiflu, Lasix for CHF and fluid overload.  Patient was found to be pancytopenic, most likely related to influenza.  Blood cultures were taken, no growth to date.     On arrival to [facility], patient reports overall doing well.  She is on 1.5 L nasal cannula oxygen.  She reports that she has had her left foot wound for about 6 weeks.  She reports that she has been on Tegretol for about a year and a half for trigeminal neuralgia after her stroke.  Her stroke affected her left side.  She has left-sided weakness which she reports is her baseline.\"    Patient reports that she resides in a 1 story home with her adult son and her . She reported that she received support for ADLs from her family and was dependent in IADLs. She reported that her shower has been inaccessible, so she completes sponge baths at home with physical assistance. Additionally, she reported that she was incontinent (stress) at home. At the time of OT session, patient required min-mod A for morning dressing routine. She requested assist with L side pant management, but was able to use LUE functionally for FWW fxl txfrs. She was limited by weakness and endurance. She will benefit from daily skilled OT to address the above deficits to maximize safety and independence prior to d/c to home with support from family.     Plan  IRF-SALLY toileting and shower scores tomorrow 1/1   Assess BUE strength/ROM  FWW level ADLs    Recommend Occupational Therapy  minutes per day 5-7 days per week for 2-3 weeks for the following treatments:  OT Self Care/ADL, OT Neuro Re-Ed/Balance, OT Therapeutic Activity, and OT Therapeutic Exercise.    Passport items to be completed:  Perform bathroom transfers, complete dressing, complete feeding, get " ready for the day, prepare a simple meal, participate in household tasks, adapt home for safety needs, demonstrate home exercise program, complete caregiver training     Goals:  Long term and short term goals have been discussed with patient and they are in agreement.    Occupational Therapy Goals (Active)       Problem: Dressing       Dates: Start:  12/31/23         Goal: STG-Within one week, patient will dress UB with supervision.        Dates: Start:  12/31/23            Goal: STG-Within one week, patient will dress LB with SBA.        Dates: Start:  12/31/23               Problem: OT Long Term Goals       Dates: Start:  12/31/23         Goal: LTG-By discharge, patient will complete bathing routine with supervision.        Dates: Start:  12/31/23            Goal: LTG-By discharge, patient will dress LB with supervision-mod I.        Dates: Start:  12/31/23            Goal: LTG-By discharge, patient will complete toileting (hygiene and pant management) and toilet transfer with supervision-mod I.        Dates: Start:  12/31/23               Problem: Toileting       Dates: Start:  12/31/23         Goal: STG-Within one week, patient will complete toileting (hygiene and pant management) and toilet transfer with CGA-SBA.        Dates: Start:  12/31/23

## 2023-12-31 NOTE — ASSESSMENT & PLAN NOTE
Likely 2/2 meds w/ possible culprits including Tegretol, Eliquis, ASA, Uloric, Lasix, Robaxin, Singulair, Omeprazole, and Crestor  Tegretol was discontinued w/ improvement, then resumed 1/4/24 per Dr. Brand  Monitor need for G-CSF, PRBC, and/or PLT transfusion  S/P Granix x 1 on 12/30/23 for leukopenia  Closely follow WBC/ANC, H/H, and PLT

## 2023-12-31 NOTE — ASSESSMENT & PLAN NOTE
Echo 12/30/23 EF 60%  BNP resolving  Continue Lasix  Cont Lasix: 80 mg daily --> 40 mg daily  Cont KCL: 40 meq daily --> 20 meq daily  Note: home meds include Lasix 40-80 mg daily at home (depends on her swelling)  K+: 3.8 (12/30)

## 2023-12-31 NOTE — CARE PLAN
The patient is Stable - Low risk of patient condition declining or worsening         Progress made toward(s) clinical / shift goals: Pt's VSS, skin intact, admission questions complete, all questions addressed.

## 2024-01-01 ENCOUNTER — APPOINTMENT (OUTPATIENT)
Dept: PHYSICAL THERAPY | Facility: REHABILITATION | Age: 64
DRG: 291 | End: 2024-01-01
Attending: PHYSICAL MEDICINE & REHABILITATION
Payer: MEDICARE

## 2024-01-01 ENCOUNTER — APPOINTMENT (OUTPATIENT)
Dept: OCCUPATIONAL THERAPY | Facility: REHABILITATION | Age: 64
DRG: 291 | End: 2024-01-01
Attending: PHYSICAL MEDICINE & REHABILITATION
Payer: MEDICARE

## 2024-01-01 LAB
BACTERIA BLD CULT: NORMAL
GLUCOSE BLD STRIP.AUTO-MCNC: 108 MG/DL (ref 65–99)
GLUCOSE BLD STRIP.AUTO-MCNC: 137 MG/DL (ref 65–99)
GLUCOSE BLD STRIP.AUTO-MCNC: 170 MG/DL (ref 65–99)
GLUCOSE BLD STRIP.AUTO-MCNC: 187 MG/DL (ref 65–99)
SIGNIFICANT IND 70042: NORMAL
SITE SITE: NORMAL
SOURCE SOURCE: NORMAL

## 2024-01-01 PROCEDURE — 97530 THERAPEUTIC ACTIVITIES: CPT | Mod: CQ

## 2024-01-01 PROCEDURE — 94760 N-INVAS EAR/PLS OXIMETRY 1: CPT

## 2024-01-01 PROCEDURE — A9270 NON-COVERED ITEM OR SERVICE: HCPCS | Performed by: PHYSICAL MEDICINE & REHABILITATION

## 2024-01-01 PROCEDURE — 700102 HCHG RX REV CODE 250 W/ 637 OVERRIDE(OP): Mod: JZ | Performed by: HOSPITALIST

## 2024-01-01 PROCEDURE — 94640 AIRWAY INHALATION TREATMENT: CPT

## 2024-01-01 PROCEDURE — 770010 HCHG ROOM/CARE - REHAB SEMI PRIVAT*

## 2024-01-01 PROCEDURE — 82962 GLUCOSE BLOOD TEST: CPT

## 2024-01-01 PROCEDURE — 97116 GAIT TRAINING THERAPY: CPT

## 2024-01-01 PROCEDURE — 700101 HCHG RX REV CODE 250: Performed by: PHYSICAL MEDICINE & REHABILITATION

## 2024-01-01 PROCEDURE — 97597 DBRDMT OPN WND 1ST 20 CM/<: CPT

## 2024-01-01 PROCEDURE — A9270 NON-COVERED ITEM OR SERVICE: HCPCS | Mod: JZ | Performed by: HOSPITALIST

## 2024-01-01 PROCEDURE — 99232 SBSQ HOSP IP/OBS MODERATE 35: CPT | Performed by: HOSPITALIST

## 2024-01-01 PROCEDURE — 97535 SELF CARE MNGMENT TRAINING: CPT

## 2024-01-01 PROCEDURE — 97110 THERAPEUTIC EXERCISES: CPT | Mod: CQ

## 2024-01-01 PROCEDURE — 700102 HCHG RX REV CODE 250 W/ 637 OVERRIDE(OP): Performed by: PHYSICAL MEDICINE & REHABILITATION

## 2024-01-01 RX ADMIN — VERAPAMIL HYDROCHLORIDE 80 MG: 80 TABLET ORAL at 21:15

## 2024-01-01 RX ADMIN — ASPIRIN 81 MG: 81 TABLET, COATED ORAL at 08:43

## 2024-01-01 RX ADMIN — OSELTAMIVIR 75 MG: 75 CAPSULE ORAL at 05:58

## 2024-01-01 RX ADMIN — GABAPENTIN 600 MG: 300 CAPSULE ORAL at 14:19

## 2024-01-01 RX ADMIN — POTASSIUM CHLORIDE 20 MEQ: 1500 TABLET, EXTENDED RELEASE ORAL at 10:11

## 2024-01-01 RX ADMIN — GABAPENTIN 600 MG: 300 CAPSULE ORAL at 21:12

## 2024-01-01 RX ADMIN — METHOCARBAMOL 500 MG: 500 TABLET ORAL at 08:43

## 2024-01-01 RX ADMIN — APIXABAN 5 MG: 5 TABLET, FILM COATED ORAL at 08:43

## 2024-01-01 RX ADMIN — ROSUVASTATIN CALCIUM 20 MG: 10 TABLET, FILM COATED ORAL at 21:12

## 2024-01-01 RX ADMIN — OSELTAMIVIR 75 MG: 75 CAPSULE ORAL at 17:32

## 2024-01-01 RX ADMIN — BACITRACIN ZINC AND POLYMYXIN B SULFATE: at 21:13

## 2024-01-01 RX ADMIN — MONTELUKAST 10 MG: 10 TABLET, FILM COATED ORAL at 21:12

## 2024-01-01 RX ADMIN — OXYCODONE HYDROCHLORIDE 10 MG: 10 TABLET ORAL at 21:16

## 2024-01-01 RX ADMIN — INSULIN LISPRO 2 UNITS: 100 INJECTION, SOLUTION INTRAVENOUS; SUBCUTANEOUS at 21:21

## 2024-01-01 RX ADMIN — INSULIN LISPRO 2 UNITS: 100 INJECTION, SOLUTION INTRAVENOUS; SUBCUTANEOUS at 11:17

## 2024-01-01 RX ADMIN — APIXABAN 5 MG: 5 TABLET, FILM COATED ORAL at 21:12

## 2024-01-01 RX ADMIN — FUROSEMIDE 40 MG: 40 TABLET ORAL at 05:58

## 2024-01-01 RX ADMIN — INSULIN GLARGINE-YFGN 24 UNITS: 100 INJECTION, SOLUTION SUBCUTANEOUS at 21:20

## 2024-01-01 RX ADMIN — METHOCARBAMOL 500 MG: 500 TABLET ORAL at 12:49

## 2024-01-01 RX ADMIN — METHOCARBAMOL 500 MG: 500 TABLET ORAL at 21:12

## 2024-01-01 RX ADMIN — Medication 400 MG: at 08:43

## 2024-01-01 RX ADMIN — GABAPENTIN 600 MG: 300 CAPSULE ORAL at 08:43

## 2024-01-01 RX ADMIN — OXYCODONE HYDROCHLORIDE 10 MG: 10 TABLET ORAL at 12:49

## 2024-01-01 RX ADMIN — FEBUXOSTAT 40 MG: 40 TABLET, FILM COATED ORAL at 08:43

## 2024-01-01 RX ADMIN — BACITRACIN ZINC AND POLYMYXIN B SULFATE: at 08:43

## 2024-01-01 RX ADMIN — LEVOTHYROXINE SODIUM 112 MCG: 0.11 TABLET ORAL at 05:58

## 2024-01-01 RX ADMIN — OMEPRAZOLE 20 MG: 20 CAPSULE, DELAYED RELEASE ORAL at 08:43

## 2024-01-01 RX ADMIN — METHOCARBAMOL 500 MG: 500 TABLET ORAL at 17:32

## 2024-01-01 RX ADMIN — LIDOCAINE 1 PATCH: 4 PATCH TOPICAL at 08:44

## 2024-01-01 ASSESSMENT — GAIT ASSESSMENTS
GAIT LEVEL OF ASSIST: CONTACT GUARD ASSIST
ASSISTIVE DEVICE: FRONT WHEEL WALKER
DEVIATION: DECREASED BASE OF SUPPORT;DECREASED HEEL STRIKE;DECREASED TOE OFF

## 2024-01-01 ASSESSMENT — ACTIVITIES OF DAILY LIVING (ADL)
BED_CHAIR_WHEELCHAIR_TRANSFER_DESCRIPTION: ADAPTIVE EQUIPMENT;INCREASED TIME;SET-UP OF EQUIPMENT;SUPERVISION FOR SAFETY;VERBAL CUEING
TOILET_TRANSFER_DESCRIPTION: ADAPTIVE EQUIPMENT;INCREASED TIME;INITIAL PREPARATION FOR TASK;SUPERVISION FOR SAFETY;VERBAL CUEING
BED_CHAIR_WHEELCHAIR_TRANSFER_DESCRIPTION: ADAPTIVE EQUIPMENT;INCREASED TIME;INITIAL PREPARATION FOR TASK;SUPERVISION FOR SAFETY;VERBAL CUEING
BED_CHAIR_WHEELCHAIR_TRANSFER_DESCRIPTION: ADAPTIVE EQUIPMENT;INCREASED TIME;SET-UP OF EQUIPMENT

## 2024-01-01 ASSESSMENT — ENCOUNTER SYMPTOMS
NERVOUS/ANXIOUS: 0
DIARRHEA: 0
CHILLS: 0
SHORTNESS OF BREATH: 0
VOMITING: 0
FEVER: 0
ABDOMINAL PAIN: 0
NAUSEA: 0

## 2024-01-01 ASSESSMENT — PAIN DESCRIPTION - PAIN TYPE: TYPE: ACUTE PAIN

## 2024-01-01 NOTE — PROGRESS NOTES
[facility] Medicine Daily Progress Note    Date of Service  1/1/2024    Chief Complaint:  Hypertension  Afib  Diabetes  Pancytopenia    Interval History:  Discusses about her BS trending better recently.    Review of Systems  Review of Systems   Constitutional:  Negative for chills and fever.   Respiratory:  Negative for shortness of breath.    Cardiovascular:  Negative for chest pain.   Gastrointestinal:  Negative for abdominal pain, diarrhea, nausea and vomiting.   Psychiatric/Behavioral:  The patient is not nervous/anxious.         Physical Exam  Temp:  [36.6 °C (97.9 °F)-37 °C (98.6 °F)] 36.8 °C (98.3 °F)  Pulse:  [71-88] 80  Resp:  [16-18] 18  BP: (118-130)/(66-80) 118/69  SpO2:  [92 %-94 %] 93 %    Physical Exam  Vitals and nursing note reviewed.   Constitutional:       Appearance: Normal appearance.   HENT:      Head: Atraumatic.   Eyes:      Conjunctiva/sclera: Conjunctivae normal.      Pupils: Pupils are equal, round, and reactive to light.   Cardiovascular:      Rate and Rhythm: Normal rate and regular rhythm.      Heart sounds: Murmur heard.   Pulmonary:      Effort: Pulmonary effort is normal.      Breath sounds: No stridor. No wheezing or rales.   Abdominal:      General: There is no distension.      Palpations: Abdomen is soft.      Tenderness: There is no abdominal tenderness.   Musculoskeletal:      Cervical back: Normal range of motion and neck supple.      Right lower leg: Edema present.      Left lower leg: Edema present.   Skin:     General: Skin is warm and dry.      Findings: No rash.   Neurological:      Mental Status: She is alert and oriented to person, place, and time.   Psychiatric:         Mood and Affect: Mood normal.         Behavior: Behavior normal.         Fluids    Intake/Output Summary (Last 24 hours) at 1/1/2024 0945  Last data filed at 1/1/2024 0555  Gross per 24 hour   Intake 720 ml   Output --   Net 720 ml       Laboratory  Recent Labs     12/30/23  0657 12/31/23  0519   WBC 1.9*   1.9* 7.7   RBC 4.86  4.86 5.04   HEMOGLOBIN 11.6*  11.6* 12.0   HEMATOCRIT 38.8  38.8 40.0   MCV 79.8*  79.8* 79.4*   MCH 23.9*  23.9* 23.8*   MCHC 29.9*  29.9* 30.0*   RDW 54.4*  54.4* 54.4*   PLATELETCT 132*  132* 139*   MPV 10.3  10.3 10.8     Recent Labs     12/30/23  0657 12/31/23  0519   SODIUM 135 137   POTASSIUM 3.8 3.7   CHLORIDE 94* 96   CO2 30 32   GLUCOSE 150* 144*   BUN 11 11   CREATININE 0.55 0.56   CALCIUM 8.7 8.8                   Imaging    Assessment/Plan  Essential hypertension  Assessment & Plan  BP ok  Cont Verapamil  Note: on Lasix --> dose decreased recently  Cont to monitor    Diabetes mellitus type 2, insulin dependent (HCC)  Assessment & Plan  Hba1c: 11.9 (12/27)  BS lreently better: 130-156  Cont Glargine: 24 units qhs  Pt has an occ diminished appetite and didn't eat breakfast or lunch on 12/31  Will be cautious on increasing med until pt eating better and more consistently  Note: home meds include Tresiba 120 units daily and Humalog 40-60 units per SS  Cont to monitor    Chronic ulcer of left foot due to diabetes mellitus (HCC)- (present on admission)  Assessment & Plan  Has hx  2nd to diabetes  Recent Xrays neg for acute  Was placed on Clinda --> now off    Influenza A- (present on admission)  Assessment & Plan  Recently diagnosed  Cont Tamiflu (thru 1/1)     History of stroke- (present on admission)  Assessment & Plan  Hx CVA   Cont Eliquis and ASA  Cont Crestor    Diastolic dysfunction with chronic heart failure (HCC)- (present on admission)  Assessment & Plan  Echo (12/27): EF 60%, previous GII DD, RVSP could not be calculated  BNP: 4500 (12/27) --> 158 (12/31)  Has BLE edema -- currently better than at home  Lungs CTA  Cont Lasix: 80 mg daily --> 40 mg daily  Cont KCL: 40 meq daily --> 20 meq daily  Note: home meds include Lasix 40-80 mg daily at home (depends on her swelling)  K+: 3.8 (12/30)     PAF (paroxysmal atrial fibrillation) (HCC)- (present on  admission)  Assessment & Plan  HR ok   Hx of 2 ablations  Pt states the afib was resolved after the last ablation  Exam shows NSR  Cont Verapamil  Cont Eliquis  Cont Lasix & K+ supplements  Cont to monitor    Pancytopenia (HCC)- (present on admission)  Assessment & Plan  WBC's: 3.5 --> 2.0 --> 1.9 (12/30) --> Granix --> 7.7 (12.31)  Hb: 11.6 --> 12.0 (wnl)  Plts: 115 --> 132 --> 139  ? 2nd to Tegretol -- now d/c'd  ? 2nd to Influenza (being treated)  S/P Granix x 1 on 12/30  Cont to monitor    GERD (gastroesophageal reflux disease)- (present on admission)  Assessment & Plan  Cont Prilosec     Hypothyroidism- (present on admission)  Assessment & Plan  TSH ok (12/27)  Cont Synthroid     Factor V deficiency (HCC)- (present on admission)  Assessment & Plan  Cont Eliquis     Sleep apnea- (present on admission)  Assessment & Plan  Has MARCI  Uses O2 per NC at night (doesn't tolerate CPAP)

## 2024-01-01 NOTE — PROGRESS NOTES
Patient care assumed. Report received from [facility]. Patient is alert and calm, resting in bed. Call light and bedside table within reach. Will continue to monitor.

## 2024-01-01 NOTE — DIETARY
NUTRITION SERVICES - Alert received for newly identified wound to left heel present on admit. Wound team consult pending, will await wound staging to make recommendations if appropriate. RD will monitor per dept policy.

## 2024-01-01 NOTE — WOUND TEAM
[facility]  Inpatient Services  Initial Wound and Skin Care Evaluation    Admission Date: 12/30/2023     Last order of IP CONSULT TO WOUND CARE was found on 12/30/2023 from Hospital Encounter on 12/30/2023     HPI, PMH, SH: Reviewed    Past Surgical History:   Procedure Laterality Date    PB RECONSTR TOTAL SHOULDER IMPLANT Left 11/27/2019    Procedure: ARTHROPLASTY, SHOULDER, TOTAL-REVERSE;  Surgeon: Hugo Beltran M.D.;  Location: SURGERY [facility];  Service: Orthopedics    CLAVICLE DISTAL EXCISION  11/27/2019    Procedure: EXCISION, CLAVICLE, DISTAL-RESECTION;  Surgeon: Hugo Beltran M.D.;  Location: SURGERY [facility];  Service: Orthopedics    OTHER ORTHOPEDIC SURGERY  2018    elbow    KNEE ARTHROPLASTY TOTAL Left 2013    KNEE ARTHROPLASTY TOTAL Right 2013    HYSTERECTOMY, TOTAL ABDOMINAL  2003    CHOLECYSTECTOMY      FUSION, SPINE, LUMBAR, PLIF      OOPHORECTOMY      OTHER CARDIAC SURGERY      ablation    TONSILLECTOMY       Social History     Tobacco Use    Smoking status: Never    Smokeless tobacco: Never   Substance Use Topics    Alcohol use: Not Currently     No chief complaint on file.    Diagnosis: Acute on chronic respiratory failure with hypoxemia (HCC) [J96.21]  CHF with cardiomyopathy (HCC) [I50.9, I42.9]    Unit where seen by Wound Team: [facility]05/01     WOUND CONSULT RELATED TO:  Left posterior medial heel    WOUND TEAM PLAN OF CARE - Frequency of Follow-up:   Nursing to follow dressing orders written for wound care. Contact wound team if area fails to progress, deteriorates or with any questions/concerns if something comes up before next scheduled follow up (See below as to whether wound is following and frequency of wound follow up)   Weekly - left heel    WOUND HISTORY:   Patient states it was likely a pressure ulcer from when she was in bed while at home.        WOUND ASSESSMENT/LDA  Wound 06/21/23 Pressure Injury Foot Left POA Stage 3 (Active)   Date First  Assessed/Time First Assessed: 06/21/23 0000   Present on Original Admission: Yes  Hand Hygiene Completed: Yes  Primary Wound Type: Pressure Injury  Location: Foot  Laterality: Left  Wound Description (Comments): POA Stage 3      Assessments 1/1/2024  3:00 PM   Wound Image        Site Assessment Pink;Red;White   Periwound Assessment Clean;Dry;Intact   Margins Defined edges;Attached edges   Closure Secondary intention   Drainage Amount Small   Drainage Description Serosanguineous   Treatments CSWD - Conservative Sharp Wound Debridement;Cleansed;Irrigation;Site care;Offloading   Offloading/DME Heel float boot;Prafo   Wound Cleansing Approved Wound Cleanser   Periwound Protectant Not Applicable   Dressing Status Clean;Dry;Intact   Dressing Changed New   Dressing Cleansing/Solutions Not Applicable   Dressing Options Hydrofera Blue Ready;Absorbent Abdominal Pad;Dry Roll Gauze   Dressing Change/Treatment Frequency Daily, and As Needed   NEXT Dressing Change/Treatment Date 01/02/24   NEXT Weekly Photo (Inpatient Only) 01/02/24   Wound Team Following Weekly   Wound Length (cm) 5 cm   Wound Width (cm) 2 cm   Wound Depth (cm) 0.2 cm   Wound Surface Area (cm^2) 10 cm^2   Wound Volume (cm^3) 2 cm^3   Post-Procedure Length (cm) 5.2 cm   Post-Procedure Width (cm) 2.2 cm   Post-Procedure Depth (cm) 0.3 cm   Post-Procedure Surface Area (cm^2) 11.44 cm^2   Post-Procedure Volume (cm^3) 3.432 cm^3   Shape oblong   Wound Odor None   Pulses Left;DP;PT;1+   WOUND NURSE ONLY - Time Spent with Patient (mins) 45        Vascular:    TAMIR:   No results found.    Lab Values:    Lab Results   Component Value Date/Time    WBC 7.7 12/31/2023 05:19 AM    RBC 5.04 12/31/2023 05:19 AM    HEMOGLOBIN 12.0 12/31/2023 05:19 AM    HEMATOCRIT 40.0 12/31/2023 05:19 AM    CREACTPROT 7.47 (H) 12/27/2023 10:16 PM    SEDRATEWES 13 12/27/2023 10:16 PM    HBA1C 11.9 (H) 12/27/2023 10:44 PM         Culture Results show:  No results found for this or any previous  visit (from the past 720 hour(s)).    Pain Level/Medicated:  None, Tolerated without pain medication       INTERVENTIONS BY WOUND TEAM:  Chart and images reviewed. Discussed with bedside RN. All areas of concern (based on picture review, LDA review and discussion with bedside RN) have been thoroughly assessed. Documentation of areas based on significant findings. This RN in to assess patient. Performed standard wound care which includes appropriate positioning, dressing removal and non-selective debridement. Pictures and measurements obtained weekly if/when required.    Wound:  LEFT POSTERIOR MEDIAL HEEL  Preparation for Dressing removal: Removed without difficulty  Cleansed/Non-selectively Debrided with:  Wound cleanser and Gauze  Non-Excisional Conservative Sharp debridement: Slough, Eschar, Non-Viable/Devitalized tissue, and Callus debrided away using curette < 20cm2 debrided down to viable tissue.  Scant bleeding noted, controlled with manual pressure.  Kimi wound: Cleansed with Wound cleanser and Gauze, Prepped with No Sting  Primary Dressing:  Hydrofera blue  Secondary (Outer) Dressing: ABD and rolled gauze.    Advanced Wound Care Discharge Planning  Number of Clinicians necessary to complete wound care: 1  Is patient requiring IV pain medications for dressing changes:  No   Length of time for dressing change 10 min. (This does not include chart review, pre-medication time, set up, clean up or time spent charting.)    Interdisciplinary consultation: Patient, Bedside RN (Nisha), N/A.  Pressure injury and staging reviewed with N/A.    EVALUATION / RATIONALE FOR TREATMENT:     Date:  01/01/24  Wound Status:  Initial evaluation    Patient has stage 3 pressure injury to her left heel that is POA.  Left heel wound bed has adherent slough along with kimi-wound callus that was macerated,  CSWD needed for removal of the necrotic tissue and kimi-wound callus.  Slough removed with curette.  Hydrofera Blue applied for the  hydrophilic polyurethane foam which contains ethylene oxide used as a bactericidal, fungicidal, and sporicidal disinfectant. Hydrofera Blue also aids in maintaining a moist wound environment. The absorption properties of this dressing are important in collecting exudates and bacteria from the injured area. These harmful fluid secretions bind to the dressing removing it from the wound without the foam sticking to the wound causing more harm.         Goals: Steady decrease in wound area and depth weekly.    NURSING PLAN OF CARE ORDERS:  Dressing changes: See Dressing Care orders    NUTRITION RECOMMENDATIONS   Wound Team Recommendations:  Protein supplements  Arginine powder    DIET ORDERS (From admission to next 24h)       Start     Ordered    12/31/23 0751  Diet Order Diet: Consistent CHO (Diabetic)  ALL MEALS        Question:  Diet:  Answer:  Consistent CHO (Diabetic)    12/31/23 0750                    PREVENTATIVE INTERVENTIONS:    Q shift Guille - performed per nursing policy  Q shift pressure point assessments - performed per nursing policy    Surface/Positioning  Low Airloss - Currently in Place  Reposition q 2 hours - Currently in Place    Offloading/Redistribution  Float Heels off Bed with Pillows - Currently in Place       PRAFO - Ordered      Respiratory  Silicone O2 tubing - Currently in Place  Gray Foam Ear protectors - Currently in Place    Containment/Moisture Prevention    Dri-tamra pad - Currently in Place    Mobilization      Up to chair, Ambulate , and PT/OT      Anticipated discharge plans:  Home Health Care and Outpatient Wound Center        Vac Discharge Needs:  Vac Discharge plan is purely a recommendation from wound team and not a requirement for discharge unless otherwise stated by physician.  Not Applicable Pt not on a wound vac

## 2024-01-01 NOTE — THERAPY
Occupational Therapy  Daily Treatment     Patient Name: Jenifer Ramos  Age:  63 y.o., Sex:  female  Medical Record #: [id number]  Today's Date: 1/1/2024     Precautions  Precautions: (P) Fall Risk  Comments: (P) L foot wound         Subjective    Pt encountered for OT sitting in WC in room. Currently on droplet precautions. Pt agreeable to participate in ADLs.      Objective       01/01/24 1031   OT Charge Group   OT Self Care / ADL (Units) 2   OT Total Time Spent   OT Individual Total Time Spent (Mins) 30   Precautions   Precautions Fall Risk   Comments L foot wound   Functional Level of Assist   Lower Body Dressing Maximal Assist   Lower Body Dressing Description Sock aid;Verbal cueing;Increased time  ([place] to don 1/2 socks using a sock aid (pt owns, but unsure its location). SBA to doff 2 socks using dressing stick.)   Interdisciplinary Plan of Care Collaboration   IDT Collaboration with  Occupational Therapist   Patient Position at End of Therapy Seated;Chair Alarm On;Call Light within Reach;Tray Table within Reach;Phone within Reach         Assessment    Good understanding of use of AE for LBD to increase independence and decrease CG burden; however, pt had some difficulties threading 1/2 sock onto sock aide d/t BUE weakness Would benefit from cont practice.       Strengths: Able to follow instructions, Willingly participates in therapeutic activities  Barriers: Decreased endurance, Fatigue, Generalized weakness, Home accessibility, Impaired activity tolerance, Limited mobility    Plan    Cont ADLs (AE training), functional transfers, and thera act/ex to maximize functional recovery for safe DC home.       DME   Tub transfer bench vs shower chair (TBD based on progress)    Passport items to be completed:  Perform bathroom transfers, complete dressing, complete feeding, get ready for the day, prepare a simple meal, participate in household tasks, adapt home for safety needs, demonstrate home exercise program,  complete caregiver training     Occupational Therapy Goals (Active)       Problem: Dressing       Dates: Start:  12/31/23         Goal: STG-Within one week, patient will dress UB with supervision.        Dates: Start:  12/31/23            Goal: STG-Within one week, patient will dress LB with SBA.        Dates: Start:  12/31/23               Problem: OT Long Term Goals       Dates: Start:  12/31/23         Goal: LTG-By discharge, patient will complete bathing routine with supervision.        Dates: Start:  12/31/23            Goal: LTG-By discharge, patient will dress LB with supervision-mod I.        Dates: Start:  12/31/23            Goal: LTG-By discharge, patient will complete toileting (hygiene and pant management) and toilet transfer with supervision-mod I.        Dates: Start:  12/31/23               Problem: Toileting       Dates: Start:  12/31/23         Goal: STG-Within one week, patient will complete toileting (hygiene and pant management) and toilet transfer with CGA-SBA.        Dates: Start:  12/31/23

## 2024-01-01 NOTE — THERAPY
"Physical Therapy   Daily Treatment     Patient Name: Jenifer Ramos  Age:  63 y.o., Sex:  female  Medical Record #: [id number]  Today's Date: 1/1/2024     Precautions  Precautions: (P) Fall Risk  Comments: (P) L foot wound with PRAFO, flu +    Subjective    Patient seated in w/c and reporting L shoulder pain requesting hot pack.     Objective       01/01/24 1301   PT Charge Group   PT Gait Training (Units) 1   PT Therapeutic Activities (Units) 3   PT Total Time Spent   PT Individual Total Time Spent (Mins) 60   Precautions   Precautions Fall Risk   Comments L foot wound with PRAFO, flu +   Pain 0 - 10 Group   Location Shoulder;Back   Location Orientation Left;Posterior   Gait Functional Level of Assist    Gait Level Of Assist Contact Guard Assist  (to SBA)   Assistive Device Front Wheel Walker   Distance (Feet)   (25 ftx1, 20 ftx1)   Deviation Decreased Base Of Support;Decreased Heel Strike;Decreased Toe Off  (slow jodie with increased double leg stance time)   Stairs Functional Level of Assist   Level of Assist with Stairs Contact Guard Assist   # of Stairs Climbed 1  (4\" step with FWW)   Stairs Description Limited by fatigue  (step to with RLE leading, performed at bedside)   Transfer Functional Level of Assist   Bed, Chair, Wheelchair Transfer Contact Guard Assist  (to SBA)   Bed Chair Wheelchair Transfer Description Adaptive equipment;Increased time;Initial preparation for task;Supervision for safety;Verbal cueing  (stand step transfer with FWW)   Toilet Transfers Contact Guard Assist   Toilet Transfer Description Adaptive equipment;Increased time;Initial preparation for task;Supervision for safety;Verbal cueing  (FWW approach, difficulty using grab bar due to LUE weakness)   Bed Mobility    Sit to Supine Minimal Assist  (for LLE)   Sit to Stand Standby Assist   Scooting Standby Assist   Rolling Standby Assist   Interdisciplinary Plan of Care Collaboration   IDT Collaboration with  Nursing   Patient Position " "at End of Therapy In Bed;Bed Alarm On;Call Light within Reach;Tray Table within Reach;Phone within Reach   Collaboration Comments isolation clarification   Physical Therapist Assigned   Assigned PT / Treatment Time / Comments Jocelyn can do 90 minute sessions     Education on PRAFO as per wound care, patient required to use due to L foot wound. Also discussed home entry and d/c planning.    4\" step with FWW and CGA as described above x1; patient electing to step down on same side of step.    Applied hot pack to L shoulder for pain management.    Assessment    Patient tolerated session well, continues to require max extra time to complete activities due to verbose nature requiring redirection throughout session. Can be self-limiting at times requiring encouragement to participate in tasks she doesn't think she can do.    Strengths: Able to follow instructions, Alert and oriented, Good insight into deficits/needs, Pleasant and cooperative, Willingly participates in therapeutic activities  Barriers: Decreased endurance, Fatigue, Generalized weakness, Impaired activity tolerance, Impaired balance, Pain    Plan    Outcome measure (5 x STS, TUG, 10m walk test), gait training with FWW, stair training (she has one 4 inch step to enter her home), overall activity tolerance/endurance, balance training, LE strengthening      DME        Passport items to be completed:  Get in/out of bed safely, in/out of a vehicle, safely use mobility device, walk or wheel around home/community, navigate up and down stairs, show how to get up/down from the ground, ensure home is accessible, demonstrate HEP, complete caregiver training    Physical Therapy Problems (Active)       Problem: Balance       Dates: Start:  12/31/23         Goal: STG-Within one week, patient will maintain static standing without UE support for at least 30 seconds, supervision.        Dates: Start:  12/31/23               Problem: Mobility       Dates: Start:  12/31/23   "       Goal: STG-Within one week, patient will ambulate at least 50 feet with FWW and SBA.        Dates: Start:  12/31/23            Goal: STG-Within one week, patient will ambulate up/down a curb with FWW and min A.        Dates: Start:  12/31/23               Problem: Mobility Transfers       Dates: Start:  12/31/23         Goal: STG-Within one week, patient will transfer bed to chair with LRAD and supervision.        Dates: Start:  12/31/23               Problem: PT-Long Term Goals       Dates: Start:  12/31/23         Goal: LTG-By discharge, patient will tolerate standing for at least 1 minute without UE, while reaching outside [facility] to perform ADLs with supervision.        Dates: Start:  12/31/23            Goal: LTG-By discharge, patient will ambulate at least 150 feet with FWW and supervision.        Dates: Start:  12/31/23            Goal: LTG-By discharge, patient will transfer one surface to another with LRAD and mod I.        Dates: Start:  12/31/23            Goal: LTG-By discharge, patient will transfer in/out of a car with LRAD and min A.        Dates: Start:  12/31/23            Goal: LTG-By discharge, patient will negotiate up and down a 4inch step with FWW and CGA (to simulate home entry).        Dates: Start:  12/31/23

## 2024-01-01 NOTE — THERAPY
Physical Therapy   Daily Treatment     Patient Name: Jenifer Ramos  Age:  63 y.o., Sex:  female  Medical Record #: [id number]  Today's Date: 12/31/2023     Precautions  Precautions: Fall Risk  Comments: L foot wound    Subjective    Patient pleasant and agreeable to participate. Requests to use bathroom.      Objective       12/31/23 1501   PT Charge Group   PT Therapeutic Activities (Units) 2   PT Total Time Spent   PT Individual Total Time Spent (Mins) 30   Gait Functional Level of Assist    Gait Level Of Assist Contact Guard Assist   Assistive Device Front Wheel Walker   Distance (Feet) 25   # of Times Distance was Traveled 1   Deviation Bradykinetic;Shuffled Gait   Transfer Functional Level of Assist   Bed, Chair, Wheelchair Transfer Contact Guard Assist   Bed Chair Wheelchair Transfer Description Adaptive equipment;Supervision for safety;Initial preparation for task  (Stand step with FWW)   Toilet Transfers Minimal Assist   Toilet Transfer Description Grab bar;Increased time;Initial preparation for task;Supervision for safety;Set-up of equipment   Bed Mobility    Supine to Sit Moderate Assist   Sit to Supine Moderate Assist   Sit to Stand Contact Guard Assist   Interdisciplinary Plan of Care Collaboration   IDT Collaboration with  Occupational Therapist   Patient Position at End of Therapy In Bed;Bed Alarm On;Call Light within Reach;Tray Table within Reach;Phone within Reach   Collaboration Comments Collaborated with OT re: CLOF         Assessment    Session limited by need to use bathroom as well as patient's distractibility. She required frequent cues to redirect. Required min A for sit <> stand from toilet due to L UE weakness (grab bar is on patient's left side, utilized locked WC arm rest with R UE to assist).     Strengths: Able to follow instructions, Alert and oriented, Good insight into deficits/needs, Pleasant and cooperative, Willingly participates in therapeutic activities  Barriers: Decreased  endurance, Fatigue, Generalized weakness, Impaired activity tolerance, Impaired balance, Pain    Plan    Outcome measure (5 x STS, TUG, 10m walk test), gait training with FWW, stair training when appropriate (she has one 4 inch step to enter her home), overall activity tolerance/endurance, balance training, LE strengthening    Passport items to be completed:  Get in/out of bed safely, in/out of a vehicle, safely use mobility device, walk or wheel around home/community, navigate up and down stairs, show how to get up/down from the ground, ensure home is accessible, demonstrate HEP, complete caregiver training    Physical Therapy Problems (Active)       Problem: Balance       Dates: Start:  12/31/23         Goal: STG-Within one week, patient will maintain static standing without UE support for at least 30 seconds, supervision.        Dates: Start:  12/31/23               Problem: Mobility       Dates: Start:  12/31/23         Goal: STG-Within one week, patient will ambulate at least 50 feet with FWW and SBA.        Dates: Start:  12/31/23            Goal: STG-Within one week, patient will ambulate up/down a curb with FWW and min A.        Dates: Start:  12/31/23               Problem: Mobility Transfers       Dates: Start:  12/31/23         Goal: STG-Within one week, patient will transfer bed to chair with LRAD and supervision.        Dates: Start:  12/31/23               Problem: PT-Long Term Goals       Dates: Start:  12/31/23         Goal: LTG-By discharge, patient will tolerate standing for at least 1 minute without UE, while reaching outside [facility] to perform ADLs with supervision.        Dates: Start:  12/31/23            Goal: LTG-By discharge, patient will ambulate at least 150 feet with FWW and supervision.        Dates: Start:  12/31/23            Goal: LTG-By discharge, patient will transfer one surface to another with LRAD and mod I.        Dates: Start:  12/31/23            Goal: LTG-By discharge, patient  will transfer in/out of a car with LRAD and min A.        Dates: Start:  12/31/23            Goal: LTG-By discharge, patient will negotiate up and down a 4inch step with FWW and CGA (to simulate home entry).        Dates: Start:  12/31/23

## 2024-01-01 NOTE — CARE PLAN
The patient is Stable - Low risk of patient condition declining or worsening    Shift Goals  Clinical Goals: safety  Patient Goals: pain management  Family Goals: Education    Problem: Fall Risk - Rehab  Goal: Patient will remain free from falls  Outcome: Progressing Pt uses call light consistently and appropriately. Waits for assistance does not attempt self transfer this shift. Able to verbalize needs.     Problem: Bladder / Voiding  Goal: Patient will establish and maintain regular urinary output  Outcome: Progressing patient has been continent time voiding every couple hours this shift.

## 2024-01-01 NOTE — FLOWSHEET NOTE
01/01/24 0736   Events/Summary/Plan   Events/Summary/Plan spot check done pt remains on 1 liters mdi given   Vital Signs   Pulse 80   Respiration 18   Pulse Oximetry 93 %   $ Pulse Oximetry (Spot Check) Yes   Respiratory Assessment   Respiratory Pattern Within Normal Limits   Level of Consciousness Alert   Chest Exam   Work Of Breathing / Effort Within Normal Limits   Breath Sounds   RUL Breath Sounds Clear   RML Breath Sounds Clear;Diminished   RLL Breath Sounds Clear;Diminished   DANIELLE Breath Sounds Diminished   LLL Breath Sounds Diminished   Secretions   Cough Strong;Congested   How Sputum Obtained Spontaneous   Sputum Amount Unable to Evaluate   Sputum Color Unable to Evaluate   Sputum Consistency Unable to Evaluate   Oxygen   O2 (LPM) 1   O2 Delivery Device Nasal Cannula

## 2024-01-01 NOTE — CARE PLAN
"The patient is Stable - Low risk of patient condition declining or worsening    Shift Goals  Clinical Goals: Safety, pain management  Patient Goals: Safety  Family Goals: Education    Patient is not progressing towards the following goals:    Problem: Fall Risk - Rehab  Goal: Patient will remain free from falls  Outcome: Not Met  Note: Sophia Smith Fall risk Assessment Score: 18    High fall risk Interventions   - Alarming seatbelt  - Bed and strip alarm   - Yellow sign by the door   - Yellow wrist band \"Fall risk\"  - Do not leave patient unattended in the bathroom  - Fall risk education provided     "

## 2024-01-01 NOTE — THERAPY
"Physical Therapy   Daily Treatment     Patient Name: Jenifer Ramos  Age:  63 y.o., Sex:  female  Medical Record #: [id number]  Today's Date: 1/1/2024     Precautions  Precautions: Fall Risk  Comments: L foot wound    Subjective    Pt resting in the bed, willing to participate in light exercises and transfer to the wc    \"I wish I had a way to keep this arm up when I'm in the chair\"     Objective       01/01/24 1001   PT Charge Group   PT Therapeutic Exercise (Units) 1   PT Therapeutic Activities (Units) 1   Supervising Physical Therapist Jocelyn alejo   PT Total Time Spent   PT Individual Total Time Spent (Mins) 30   Transfer Functional Level of Assist   Bed, Chair, Wheelchair Transfer Contact Guard Assist   Bed Chair Wheelchair Transfer Description Adaptive equipment;Increased time;Set-up of equipment;Supervision for safety;Verbal cueing   Supine Lower Body Exercise   Supine Lower Body Exercises Yes   Hip Adduction  2 sets of 10;Bilateral   Straight Leg Raises Front;2 sets of 10;Bilateral   Knee to Chest 1 set of 10;Bilateral   Ankle Pumps 1 set of 10;Reciprocal   Gluteal Isometrics 1 set of 10   Quadriceps Isometrics 1 set of 10;Bilateral   Bed Mobility    Supine to Sit Moderate Assist  (HOBE)   Interdisciplinary Plan of Care Collaboration   IDT Collaboration with  Nursing   Patient Position at End of Therapy Seated;Chair Alarm On;Call Light within Reach;Tray Table within Reach   Collaboration Comments wound care, CLOF       Added arm tray to support pt's L UE when seated in wc    Assessment    Pt requires redirection to task, able to transfer with set up and CGA  Strengths: Able to follow instructions, Alert and oriented, Good insight into deficits/needs, Pleasant and cooperative, Willingly participates in therapeutic activities  Barriers: Decreased endurance, Fatigue, Generalized weakness, Impaired activity tolerance, Impaired balance, Pain    Plan    Outcome measure (5 x STS, TUG, 10m walk test), gait training " with FWW, stair training when appropriate (she has one 4 inch step to enter her home), overall activity tolerance/endurance, balance training, LE strengthening     DME       Passport items to be completed:  Get in/out of bed safely, in/out of a vehicle, safely use mobility device, walk or wheel around home/community, navigate up and down stairs, show how to get up/down from the ground, ensure home is accessible, demonstrate HEP, complete caregiver training    Physical Therapy Problems (Active)       Problem: Balance       Dates: Start:  12/31/23         Goal: STG-Within one week, patient will maintain static standing without UE support for at least 30 seconds, supervision.        Dates: Start:  12/31/23               Problem: Mobility       Dates: Start:  12/31/23         Goal: STG-Within one week, patient will ambulate at least 50 feet with FWW and SBA.        Dates: Start:  12/31/23            Goal: STG-Within one week, patient will ambulate up/down a curb with FWW and min A.        Dates: Start:  12/31/23               Problem: Mobility Transfers       Dates: Start:  12/31/23         Goal: STG-Within one week, patient will transfer bed to chair with LRAD and supervision.        Dates: Start:  12/31/23               Problem: PT-Long Term Goals       Dates: Start:  12/31/23         Goal: LTG-By discharge, patient will tolerate standing for at least 1 minute without UE, while reaching outside [facility] to perform ADLs with supervision.        Dates: Start:  12/31/23            Goal: LTG-By discharge, patient will ambulate at least 150 feet with FWW and supervision.        Dates: Start:  12/31/23            Goal: LTG-By discharge, patient will transfer one surface to another with LRAD and mod I.        Dates: Start:  12/31/23            Goal: LTG-By discharge, patient will transfer in/out of a car with LRAD and min A.        Dates: Start:  12/31/23            Goal: LTG-By discharge, patient will negotiate up and down a  4inch step with FWW and CGA (to simulate home entry).        Dates: Start:  12/31/23

## 2024-01-01 NOTE — THERAPY
Occupational Therapy  Daily Treatment     Patient Name: Jenifer Ramos  Age:  63 y.o., Sex:  female  Medical Record #: [id number]  Today's Date: 1/1/2024     Precautions  Precautions: Fall Risk  Comments: L foot wound         Subjective    Pt was seated in w/c w  and agreeable for therapy.      Objective       01/01/24 0830   OT Charge Group   OT Self Care / ADL (Units) 4   OT Total Time Spent   OT Individual Total Time Spent (Mins) 60   Shower/Bathe Self   Assistance Needed Physical assistance   Physical Assistance Level 51%-75%   CARE Score - Shower/Bathe Self 2   Functional Level of Assist   Bathing Moderate Assist   Bathing Description Grab bar;Hand held shower;Tub bench;Assit with back;Assit wtih lower extremities;Assit with perineal;Increased time;Set up for wound protection   Upper Body Dressing Contact Guard Assist   Upper Body Dressing Description Assist with closures;Increased time;Set-up of equipment   Lower Body Dressing Moderate Assist   Lower Body Dressing Description Assist with threading into pant leg;Increased time;Set-up of equipment   Bed, Chair, Wheelchair Transfer Contact Guard Assist   Bed Chair Wheelchair Transfer Description Adaptive equipment;Increased time;Set-up of equipment   Interdisciplinary Plan of Care Collaboration   IDT Collaboration with  Nursing   Patient Position at End of Therapy In Bed;Bed Alarm On;Call Light within Reach;Tray Table within Reach;Phone within Reach   Collaboration Comments replacing would cover on L heel     Bathing IRF SALLY score d/t refusal in previous tx.     Assessment    Pt tolerated session fairly w/ focus on ADLs and activity pacing.  Pt was able to wash hair and arms, however pt communicated to  who was present to observed pt capability that she was not able to continue and requested  to complete tasks for her. Encouraged  to let pt perform tasks as much as possible to decrease CG burden, however unsure if CG heard or  understood me. Needed assistance w/ drying back, kimi area, and LB post bathing tasks. Pt was able to doff/papo shirt, doff pants and briefs w/out assistance, however needed Mod A to papo w/ AE available on left side; Max A for papo/doff socks. Pt was able to use BUE during dressing and bathing tasks, however does become fatigued quickly. Pt needs redirection to complete tasks at hand as well as encouragement to attempt to tasks.         Strengths: Able to follow instructions, Willingly participates in therapeutic activities  Barriers: Decreased endurance, Fatigue, Generalized weakness, Home accessibility, Impaired activity tolerance, Limited mobility    Plan    Cont ADLs (AE training), functional transfers, and thera act/ex to maximize functional recovery for safe DC home.        DME       Passport items to be completed:  Perform bathroom transfers, complete dressing, complete feeding, get ready for the day, prepare a simple meal, participate in household tasks, adapt home for safety needs, demonstrate home exercise program, complete caregiver training     Occupational Therapy Goals (Active)       Problem: Dressing       Dates: Start:  12/31/23         Goal: STG-Within one week, patient will dress UB with supervision.        Dates: Start:  12/31/23            Goal: STG-Within one week, patient will dress LB with SBA.        Dates: Start:  12/31/23               Problem: OT Long Term Goals       Dates: Start:  12/31/23         Goal: LTG-By discharge, patient will complete bathing routine with supervision.        Dates: Start:  12/31/23            Goal: LTG-By discharge, patient will dress LB with supervision-mod I.        Dates: Start:  12/31/23            Goal: LTG-By discharge, patient will complete toileting (hygiene and pant management) and toilet transfer with supervision-mod I.        Dates: Start:  12/31/23               Problem: Toileting       Dates: Start:  12/31/23         Goal: STG-Within one week,  patient will complete toileting (hygiene and pant management) and toilet transfer with CGA-SBA.        Dates: Start:  12/31/23

## 2024-01-01 NOTE — PROGRESS NOTES
..                                                         NURSING DAILY NOTE    Name: Jenifer Ramos   Date of Admission: 12/30/2023   Admitting Diagnosis: No Principal Problem: There is no principal problem currently on the Problem List. Please update the Problem List and refresh.  Attending Physician: Doron Brand M.d.  Allergies: Azithromycin, Erythromycin, Other misc, Penicillins, Pistachio, Sulfa drugs, Tetraglycine, Other drug, Physostigmine, Tape, Zanaflex [tizanidine hcl], Albuterol, Atorvastatin, Chlorhexidine, Lamisil [terbinafine hcl], and Morphine    Safety  Patient Assist  mod assist  Patient Precautions  Fall Risk  Precaution Comments  L foot wound  Bed Transfer Status  Contact Guard Assist  Toilet Transfer Status   Minimal Assist  Assistive Devices  Rails, Walker - front wheel, Wheelchair  Oxygen  Silicone Nasal Cannula  Diet/Therapeutic Dining  Current Diet Order   Procedures    Diet Order Diet: Consistent CHO (Diabetic)     Pill Administration  whole  Agitated Behavioral Scale     ABS Level of Severity       Fall Risk  Has the patient had a fall this admission?   No  Sophia Smith Fall Risk Scoring  18, HIGH RISK  Fall Risk Safety Measures  bed alarm, chair alarm, seatbelt alarm, and poor balance    Vitals  Temperature: 36.6 °C (97.9 °F)  Temp src: Oral  Pulse: 84  Respiration: 16  Blood Pressure : 121/70  Blood Pressure MAP (Calculated): 87 MM HG  BP Location: Right, Upper Arm  Patient BP Position: Supine     Oxygen  Pulse Oximetry: 94 %  O2 (LPM): 1.5  O2 Delivery Device: Silicone Nasal Cannula    Bowel and Bladder  Last Bowel Movement  12/30/23 (per report)  Stool Type     Bowel Device     Continent  Bladder: Did not void   Bowel: No movement  Bladder Function  Urine Void (mL):  (moderate)  Number of Times Voided: 1  Urine Color: Yellow  Genitourinary Assessment   Bladder Assessment (WDL):  Within Defined Limits  Urine Color: Yellow  Bladder Scan: Post Void  $ Bladder Scan  Results (mL): 1    Skin  Guille Score   17  Sensory Interventions   Bed Types: Standard/Trauma Mattress  Skin Preventative Measures: Pillows in Use for Support / Positioning  Moisture Interventions  Moisturizers/Barriers: Moisturizer       Pain  Pain Rating Scale  8 - Awful, hard to do anything  Pain Location  Head, Leg  Pain Location Orientation  Anterior, Right, Left  Pain Interventions   Medication (see MAR)    ADLs    Bathing      Linen Change      Personal Hygiene  Moist Antionette Wipes, Perineal Care  Chlorhexidine Bath      Oral Care     Teeth/Dentures     Shave     Nutrition Percentage Eaten  *  * Meal *  *, Breakfast, 0% Consumed, Refused  Environmental Precautions  Bed in Low Position, Treaded Slipper Socks on Patient  Patient Turns/Positioning  Patient Turns Self from Side to Side  Patient Turns Assistance/Tolerance     Bed Positions  Bed Controls On  Head of Bed Elevated  Self regulated      Psychosocial/Neurologic Assessment  Psychosocial Assessment  Psychosocial (WDL):  Within Defined Limits  Neurologic Assessment  Neuro (WDL): Exceptions to WDL  Level of Consciousness: Alert  Orientation Level: Oriented X4  Cognition: Appropriate safety awareness  Speech: Clear  Motor Function/Sensation Assessment: Motor strength  Muscle Strength Right Arm: Good Strength Against Gravity and Moderate Resistance  Muscle Strength Left Arm: Fair Strength against Gravity but No Resistance  Muscle Strength Right Leg: Good Strength Against Gravity and Moderate Resistance  Muscle Strength Left Leg: Fair Strength against Gravity but No Resistance  EENT (WDL):  WDL Except    Cardio/Pulmonary Assessment  Edema   RLE Edema: 2+  LLE Edema: 2+  Respiratory Breath Sounds  RUL Breath Sounds: Diminished  RML Breath Sounds: Diminished  RLL Breath Sounds: Diminished  DANIELLE Breath Sounds: Diminished  LLL Breath Sounds: Diminished  Cardiac Assessment   Cardiac (WDL):  WDL Except

## 2024-01-02 ENCOUNTER — APPOINTMENT (OUTPATIENT)
Dept: PHYSICAL THERAPY | Facility: REHABILITATION | Age: 64
DRG: 291 | End: 2024-01-02
Attending: PHYSICAL MEDICINE & REHABILITATION
Payer: MEDICARE

## 2024-01-02 ENCOUNTER — APPOINTMENT (OUTPATIENT)
Dept: OCCUPATIONAL THERAPY | Facility: REHABILITATION | Age: 64
DRG: 291 | End: 2024-01-02
Attending: PHYSICAL MEDICINE & REHABILITATION
Payer: MEDICARE

## 2024-01-02 LAB
ANISOCYTOSIS BLD QL SMEAR: ABNORMAL
BACTERIA BLD CULT: NORMAL
BASOPHILS # BLD AUTO: 0.4 % (ref 0–1.8)
BASOPHILS # BLD: 0.02 K/UL (ref 0–0.12)
COMMENT 1642: NORMAL
EOSINOPHIL # BLD AUTO: 0 K/UL (ref 0–0.51)
EOSINOPHIL NFR BLD: 0 % (ref 0–6.9)
ERYTHROCYTE [DISTWIDTH] IN BLOOD BY AUTOMATED COUNT: 54.8 FL (ref 35.9–50)
GLUCOSE BLD STRIP.AUTO-MCNC: 143 MG/DL (ref 65–99)
GLUCOSE BLD STRIP.AUTO-MCNC: 164 MG/DL (ref 65–99)
GLUCOSE BLD STRIP.AUTO-MCNC: 171 MG/DL (ref 65–99)
GLUCOSE BLD STRIP.AUTO-MCNC: 191 MG/DL (ref 65–99)
HCT VFR BLD AUTO: 38.5 % (ref 37–47)
HGB BLD-MCNC: 11.4 G/DL (ref 12–16)
HYPOCHROMIA BLD QL SMEAR: ABNORMAL
IMM GRANULOCYTES # BLD AUTO: 0.03 K/UL (ref 0–0.11)
IMM GRANULOCYTES NFR BLD AUTO: 0.5 % (ref 0–0.9)
LYMPHOCYTES # BLD AUTO: 1.19 K/UL (ref 1–4.8)
LYMPHOCYTES NFR BLD: 21.8 % (ref 22–41)
MCH RBC QN AUTO: 23.8 PG (ref 27–33)
MCHC RBC AUTO-ENTMCNC: 29.6 G/DL (ref 32.2–35.5)
MCV RBC AUTO: 80.5 FL (ref 81.4–97.8)
MICROCYTES BLD QL SMEAR: ABNORMAL
MONOCYTES # BLD AUTO: 0.4 K/UL (ref 0–0.85)
MONOCYTES NFR BLD AUTO: 7.3 % (ref 0–13.4)
MORPHOLOGY BLD-IMP: NORMAL
NEUTROPHILS # BLD AUTO: 3.83 K/UL (ref 1.82–7.42)
NEUTROPHILS NFR BLD: 70 % (ref 44–72)
NRBC # BLD AUTO: 0 K/UL
NRBC BLD-RTO: 0 /100 WBC (ref 0–0.2)
OVALOCYTES BLD QL SMEAR: NORMAL
PLATELET # BLD AUTO: 105 K/UL (ref 164–446)
PLATELET BLD QL SMEAR: NORMAL
PMV BLD AUTO: 11.3 FL (ref 9–12.9)
RBC # BLD AUTO: 4.78 M/UL (ref 4.2–5.4)
RBC BLD AUTO: PRESENT
SIGNIFICANT IND 70042: NORMAL
SITE SITE: NORMAL
SOURCE SOURCE: NORMAL
TOXIC GRANULES BLD QL SMEAR: NORMAL
WBC # BLD AUTO: 5.5 K/UL (ref 4.8–10.8)
WBC TOXIC VACUOLES BLD QL SMEAR: NORMAL

## 2024-01-02 PROCEDURE — 700102 HCHG RX REV CODE 250 W/ 637 OVERRIDE(OP): Performed by: PHYSICAL MEDICINE & REHABILITATION

## 2024-01-02 PROCEDURE — 97112 NEUROMUSCULAR REEDUCATION: CPT

## 2024-01-02 PROCEDURE — A9270 NON-COVERED ITEM OR SERVICE: HCPCS | Performed by: PHYSICAL MEDICINE & REHABILITATION

## 2024-01-02 PROCEDURE — 99232 SBSQ HOSP IP/OBS MODERATE 35: CPT | Performed by: HOSPITALIST

## 2024-01-02 PROCEDURE — 97116 GAIT TRAINING THERAPY: CPT

## 2024-01-02 PROCEDURE — 82962 GLUCOSE BLOOD TEST: CPT | Mod: 91

## 2024-01-02 PROCEDURE — 97535 SELF CARE MNGMENT TRAINING: CPT

## 2024-01-02 PROCEDURE — 700101 HCHG RX REV CODE 250: Performed by: PHYSICAL MEDICINE & REHABILITATION

## 2024-01-02 PROCEDURE — 94760 N-INVAS EAR/PLS OXIMETRY 1: CPT

## 2024-01-02 PROCEDURE — 85025 COMPLETE CBC W/AUTO DIFF WBC: CPT

## 2024-01-02 PROCEDURE — 97530 THERAPEUTIC ACTIVITIES: CPT

## 2024-01-02 PROCEDURE — A9270 NON-COVERED ITEM OR SERVICE: HCPCS | Mod: JZ | Performed by: HOSPITALIST

## 2024-01-02 PROCEDURE — 700102 HCHG RX REV CODE 250 W/ 637 OVERRIDE(OP): Mod: JZ | Performed by: HOSPITALIST

## 2024-01-02 PROCEDURE — 94640 AIRWAY INHALATION TREATMENT: CPT

## 2024-01-02 PROCEDURE — 36415 COLL VENOUS BLD VENIPUNCTURE: CPT

## 2024-01-02 PROCEDURE — 770010 HCHG ROOM/CARE - REHAB SEMI PRIVAT*

## 2024-01-02 PROCEDURE — 99233 SBSQ HOSP IP/OBS HIGH 50: CPT | Performed by: PHYSICAL MEDICINE & REHABILITATION

## 2024-01-02 RX ORDER — FEBUXOSTAT 40 MG/1
40 TABLET, FILM COATED ORAL
Status: DISCONTINUED | OUTPATIENT
Start: 2024-01-03 | End: 2024-01-02

## 2024-01-02 RX ORDER — FUROSEMIDE 40 MG/1
40 TABLET ORAL DAILY
Status: DISCONTINUED | OUTPATIENT
Start: 2024-01-03 | End: 2024-01-04

## 2024-01-02 RX ORDER — FUROSEMIDE 40 MG/1
40 TABLET ORAL DAILY
Status: DISCONTINUED | OUTPATIENT
Start: 2024-01-03 | End: 2024-01-02

## 2024-01-02 RX ORDER — PROMETHAZINE HYDROCHLORIDE 25 MG/1
25 TABLET ORAL EVERY 6 HOURS PRN
Status: DISCONTINUED | OUTPATIENT
Start: 2024-01-02 | End: 2024-01-06 | Stop reason: HOSPADM

## 2024-01-02 RX ORDER — FEBUXOSTAT 40 MG/1
40 TABLET, FILM COATED ORAL
Status: DISCONTINUED | OUTPATIENT
Start: 2024-01-02 | End: 2024-01-06 | Stop reason: HOSPADM

## 2024-01-02 RX ADMIN — OXYCODONE HYDROCHLORIDE 10 MG: 10 TABLET ORAL at 21:34

## 2024-01-02 RX ADMIN — OXYCODONE HYDROCHLORIDE 10 MG: 10 TABLET ORAL at 00:57

## 2024-01-02 RX ADMIN — GABAPENTIN 600 MG: 300 CAPSULE ORAL at 08:17

## 2024-01-02 RX ADMIN — METHOCARBAMOL 500 MG: 500 TABLET ORAL at 14:04

## 2024-01-02 RX ADMIN — INSULIN GLARGINE-YFGN 24 UNITS: 100 INJECTION, SOLUTION SUBCUTANEOUS at 21:31

## 2024-01-02 RX ADMIN — OMEPRAZOLE 20 MG: 20 CAPSULE, DELAYED RELEASE ORAL at 08:16

## 2024-01-02 RX ADMIN — GABAPENTIN 600 MG: 300 CAPSULE ORAL at 21:28

## 2024-01-02 RX ADMIN — METHOCARBAMOL 500 MG: 500 TABLET ORAL at 21:28

## 2024-01-02 RX ADMIN — FUROSEMIDE 40 MG: 40 TABLET ORAL at 05:59

## 2024-01-02 RX ADMIN — APIXABAN 5 MG: 5 TABLET, FILM COATED ORAL at 08:17

## 2024-01-02 RX ADMIN — INSULIN LISPRO 2 UNITS: 100 INJECTION, SOLUTION INTRAVENOUS; SUBCUTANEOUS at 21:31

## 2024-01-02 RX ADMIN — OXYCODONE HYDROCHLORIDE 10 MG: 10 TABLET ORAL at 17:08

## 2024-01-02 RX ADMIN — METHOCARBAMOL 500 MG: 500 TABLET ORAL at 16:52

## 2024-01-02 RX ADMIN — MONTELUKAST 10 MG: 10 TABLET, FILM COATED ORAL at 21:28

## 2024-01-02 RX ADMIN — BACITRACIN ZINC AND POLYMYXIN B SULFATE: at 08:37

## 2024-01-02 RX ADMIN — VERAPAMIL HYDROCHLORIDE 80 MG: 80 TABLET ORAL at 21:34

## 2024-01-02 RX ADMIN — FEBUXOSTAT 40 MG: 40 TABLET, FILM COATED ORAL at 21:28

## 2024-01-02 RX ADMIN — BACITRACIN ZINC AND POLYMYXIN B SULFATE: at 21:31

## 2024-01-02 RX ADMIN — POTASSIUM CHLORIDE 20 MEQ: 1500 TABLET, EXTENDED RELEASE ORAL at 08:17

## 2024-01-02 RX ADMIN — ASPIRIN 81 MG: 81 TABLET, COATED ORAL at 08:17

## 2024-01-02 RX ADMIN — METHOCARBAMOL 500 MG: 500 TABLET ORAL at 08:17

## 2024-01-02 RX ADMIN — LIDOCAINE 1 PATCH: 4 PATCH TOPICAL at 08:29

## 2024-01-02 RX ADMIN — Medication 400 MG: at 08:17

## 2024-01-02 RX ADMIN — INSULIN LISPRO 2 UNITS: 100 INJECTION, SOLUTION INTRAVENOUS; SUBCUTANEOUS at 08:26

## 2024-01-02 RX ADMIN — TRAZODONE HYDROCHLORIDE 50 MG: 50 TABLET ORAL at 00:57

## 2024-01-02 RX ADMIN — APIXABAN 5 MG: 5 TABLET, FILM COATED ORAL at 21:28

## 2024-01-02 RX ADMIN — INSULIN LISPRO 2 UNITS: 100 INJECTION, SOLUTION INTRAVENOUS; SUBCUTANEOUS at 16:52

## 2024-01-02 RX ADMIN — GABAPENTIN 600 MG: 300 CAPSULE ORAL at 14:03

## 2024-01-02 RX ADMIN — ROSUVASTATIN CALCIUM 20 MG: 10 TABLET, FILM COATED ORAL at 21:28

## 2024-01-02 RX ADMIN — LEVOTHYROXINE SODIUM 112 MCG: 0.11 TABLET ORAL at 05:59

## 2024-01-02 ASSESSMENT — PAIN DESCRIPTION - PAIN TYPE
TYPE: ACUTE PAIN
TYPE: ACUTE PAIN

## 2024-01-02 ASSESSMENT — BALANCE ASSESSMENTS
LONG VERSION TOTAL SCORE (MAX 56): 25
TRANSFERS: 3
PLACE ALTERNATE FOOT ON STEP OR STOOL WHILE STANDING UNSUPPORTED: 0
STANDING ON ONE LEG: 0
REACHING FORWARD WITH OUTSTRETCHED ARM WHILE STANDING: 3
SITTING UNSUPPORTED: 4
STANDING UNSUPPORTED ONE FOOT IN FRONT: 2
SITTING TO STANDING: 3
LONG VERSION TOTAL SCORE (MAX 56): 25
TURN 360 DEGREES: 0
PICK UP OBJECT FROM THE FLOOR FROM A STANDING POSITION: 0
STANDING TO SITTING: 2
STANDING UNSUPPORTED WITH EYES CLOSED: 2
MEDICARE IMPAIRMENT PERCENTAGE: 55
STANDING UNSUPPORTED WITH FEET TOGETHER: 2
LOOK OVER LEFT AND RIGHT SHOULDERS WHILE STANDING: 2
STANDING UNSUPPORTED: 2

## 2024-01-02 ASSESSMENT — 10 METER WALK TEST (10METWT)
TRIAL 3: TIME TO WALK 10 METERS: 51.4
AVERAGE VELOCITY - METERS PER SECOND: 0.12
AVERAGE TIME - SECONDS: 51.4
TRIAL 2: TIME TO WALK 10 METERS: 49.6
TRIAL 1: TIME TO WALK 10 METERS: 53.2

## 2024-01-02 ASSESSMENT — ENCOUNTER SYMPTOMS
FEVER: 0
CHILLS: 0
VOMITING: 0
NAUSEA: 0
ABDOMINAL PAIN: 0
SHORTNESS OF BREATH: 0

## 2024-01-02 ASSESSMENT — ACTIVITIES OF DAILY LIVING (ADL)
TUB_SHOWER_TRANSFER_DESCRIPTION: GRAB BAR;SHOWER BENCH;SUPERVISION FOR SAFETY
TOILETING_LEVEL_OF_ASSIST_DESCRIPTION: ADAPTIVE EQUIPMENT;GRAB BAR;INCREASED TIME
TOILET_TRANSFER_DESCRIPTION: GRAB BAR;INCREASED TIME
TOILET_TRANSFER_DESCRIPTION: GRAB BAR;INCREASED TIME
BED_CHAIR_WHEELCHAIR_TRANSFER_DESCRIPTION: ADAPTIVE EQUIPMENT;INCREASED TIME;SET-UP OF EQUIPMENT
TOILETING_LEVEL_OF_ASSIST_DESCRIPTION: ADAPTIVE EQUIPMENT;GRAB BAR;INCREASED TIME;SUPERVISION FOR SAFETY

## 2024-01-02 NOTE — PROGRESS NOTES
Hospital Medicine Daily Progress Note        Chief Complaint:  Hypertension  Diabetes  Pancytopenia    Interval History:  Pt requesting to change timing of Lasix and Uloric.  Labs reviewed.    Review of Systems  Review of Systems   Constitutional:  Negative for chills and fever.   HENT: Negative.     Eyes: Negative.    Respiratory:  Negative for cough and shortness of breath.    Cardiovascular:  Negative for chest pain and palpitations.   Gastrointestinal:  Negative for abdominal pain, nausea and vomiting.   Musculoskeletal:         Wound pain   Skin:  Negative for itching and rash.   Endo/Heme/Allergies:  Negative for polydipsia. Does not bruise/bleed easily.        Physical Exam  Temp:  [36.1 °C (97 °F)-36.6 °C (97.8 °F)] 36.6 °C (97.8 °F)  Pulse:  [73-90] 74  Resp:  [16-18] 16  BP: (113-128)/(58-80) 125/58  SpO2:  [94 %-97 %] 94 %    Physical Exam  Vitals reviewed.   Constitutional:       General: She is not in acute distress.     Appearance: Normal appearance. She is not ill-appearing.   HENT:      Head: Normocephalic and atraumatic.      Right Ear: External ear normal.      Left Ear: External ear normal.      Nose: Nose normal.      Mouth/Throat:      Pharynx: Oropharynx is clear.   Eyes:      General:         Right eye: No discharge.         Left eye: No discharge.      Extraocular Movements: Extraocular movements intact.      Conjunctiva/sclera: Conjunctivae normal.   Cardiovascular:      Rate and Rhythm: Normal rate and regular rhythm.   Pulmonary:      Effort: No respiratory distress.      Breath sounds: No wheezing.      Comments: Decreased BS  Abdominal:      General: Bowel sounds are normal. There is no distension.      Palpations: Abdomen is soft.      Tenderness: There is no abdominal tenderness.   Musculoskeletal:      Cervical back: Normal range of motion and neck supple.      Right lower leg: Edema present.      Left lower leg: Edema present.   Skin:     General: Skin is warm and dry.    Neurological:      Mental Status: She is alert and oriented to person, place, and time.         Fluids    Intake/Output Summary (Last 24 hours) at 1/3/2024 1331  Last data filed at 1/2/2024 2127  Gross per 24 hour   Intake 240 ml   Output --   Net 240 ml       Laboratory  Recent Labs     01/02/24  0552 01/03/24  0533   WBC 5.5 4.4*   RBC 4.78 4.58   HEMOGLOBIN 11.4* 11.0*   HEMATOCRIT 38.5 37.4   MCV 80.5* 81.7   MCH 23.8* 24.0*   MCHC 29.6* 29.4*   RDW 54.8* 57.3*   PLATELETCT 105* 118*   MPV 11.3 11.7     Recent Labs     01/03/24  0533   SODIUM 136   POTASSIUM 3.1*   CHLORIDE 95*   CO2 31   GLUCOSE 163*   BUN 10   CREATININE 0.56   CALCIUM 8.6                 Assessment/Plan  Gout  Assessment & Plan  On Uloric  Monitor for flare-ups    Edema  Assessment & Plan  Echo 12/30/23 EF 60%  BNP resolving  Continue Lasix  Outpt meds include Lasix 40-80 mg qd     Essential hypertension  Assessment & Plan  On Verapamil and Lasix  Observe blood pressure trends    Diabetes mellitus type 2, insulin dependent (HCC)  Assessment & Plan  HbA1c 11.9  On Lantus and SSI  Observe serum glucose trends  Outpt meds include Tresiba 120 u qd and Humalog 40-60 u per SSI    Chronic ulcer of left foot due to diabetes mellitus (HCC)- (present on admission)  Assessment & Plan  X-ray 12/27/23 no definite acute osseous abnormality but evaluation limited due to osseous demineralization  Completed Clinda  Consider MRI  Wound care and pain control per Physiatry    Influenza A- (present on admission)  Assessment & Plan  +PCR 12/27/23  Completed Tamiflu 1/1/24    History of stroke- (present on admission)  Assessment & Plan  On ASA, Eliquis, and Crestor    PAF (paroxysmal atrial fibrillation) (HCC)- (present on admission)  Assessment & Plan  H/O ablation x 2  Rate controlled on Verapamil  Anticoagulated on Eliquis    Pancytopenia (HCC)- (present on admission)  Assessment & Plan  Likely 2/2 Tegretol, has been discontinued  Monitor WBC/ANC, H/H, and  PLT  S/P Granix x 1 on 12/30/23 for leukopenia  Check F/U labs in AM    GERD (gastroesophageal reflux disease)- (present on admission)  Assessment & Plan  On Omeprazole    Hypothyroidism- (present on admission)  Assessment & Plan  Euthyroid on Synthroid    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

## 2024-01-02 NOTE — CARE PLAN
The patient is Stable - Low risk of patient condition declining or worsening    Shift Goals  Clinical Goals: safety  Patient Goals: pain control, sleep well  Family Goals: Education    Progress made toward(s) clinical / shift goals:    Problem: Fall Risk - Rehab  Goal: Patient will remain free from falls  Outcome: Progressing. Patient bed in lowest locked position with bed alarm on and call light within reach. Patient calls appropriately with call light for needs and has not attempted to self transfer over shift.      Problem: Pain - Standard  Goal: Alleviation of pain or a reduction in pain to the patient’s comfort goal  Outcome: Progressing. Patient states left shoulder and posterior neck pain over shift, requesting prn oxycodone 10 mg twice over shift. Patient stated 8/10 pain decreased to a 5/10 pain with each dose of pain medication, which was tolerable per patient. Patient has prn oxycodone 5-10 mg available as needed every 4 hours for pain.

## 2024-01-02 NOTE — THERAPY
"Occupational Therapy  Daily Treatment     Patient Name: Jenifer Ramos  Age:  63 y.o., Sex:  female  Medical Record #: [id number]  Today's Date: 1/2/2024     Precautions  Precautions: Fall Risk  Comments: L foot wound with PRAFO, flu +         Subjective    \"I just want to go home already\" Pt was laying in bed agreeable for therapy.      Objective       01/02/24 0931   OT Charge Group   OT Self Care / ADL (Units) 2   OT Total Time Spent   OT Individual Total Time Spent (Mins) 30   Functional Level of Assist   Grooming Supervision  (wash face w/ wash cloth, comb hair, and brush teeth)   Grooming Description Seated in wheelchair at sink;Increased time   Toileting Supervision   Toileting Description Adaptive equipment;Grab bar;Increased time   Bed, Chair, Wheelchair Transfer Standby Assist   Bed Chair Wheelchair Transfer Description Adaptive equipment;Increased time;Set-up of equipment   Toilet Transfers Standby Assist   Toilet Transfer Description Grab bar;Increased time   Bed Mobility    Supine to Sit Minimal Assist  (HOB slightly elevated)   Sit to Supine Minimal Assist   Scooting Standby Assist   Interdisciplinary Plan of Care Collaboration   Patient Position at End of Therapy In Bed;Call Light within Reach;Tray Table within Reach;Phone within Reach         Assessment    Pt tolerated session well w/ focus on ADLs. Min A for supine<>sit w/ SBA for STS using FWW for stand pivot transfer. Increase time needed for toileting tasks and pt was able to use personal toilet aid for kimi care. Pt continued grooming/hygiene at sink level w/ increase time d/t redirection to complete tasks and slow movements.     Strengths: Able to follow instructions, Willingly participates in therapeutic activities  Barriers: Decreased endurance, Fatigue, Generalized weakness, Home accessibility, Impaired activity tolerance, Limited mobility    Plan    Cont ADLs (AE training), functional transfers, and thera act/ex to maximize functional " recovery for safe DC home.       DME       Passport items to be completed:  Perform bathroom transfers, complete dressing, complete feeding, get ready for the day, prepare a simple meal, participate in household tasks, adapt home for safety needs, demonstrate home exercise program, complete caregiver training     Occupational Therapy Goals (Active)       Problem: Dressing       Dates: Start:  12/31/23         Goal: STG-Within one week, patient will dress UB with supervision.        Dates: Start:  12/31/23            Goal: STG-Within one week, patient will dress LB with SBA.        Dates: Start:  12/31/23               Problem: OT Long Term Goals       Dates: Start:  12/31/23         Goal: LTG-By discharge, patient will complete bathing routine with supervision.        Dates: Start:  12/31/23            Goal: LTG-By discharge, patient will dress LB with supervision-mod I.        Dates: Start:  12/31/23            Goal: LTG-By discharge, patient will complete toileting (hygiene and pant management) and toilet transfer with supervision-mod I.        Dates: Start:  12/31/23               Problem: Toileting       Dates: Start:  12/31/23         Goal: STG-Within one week, patient will complete toileting (hygiene and pant management) and toilet transfer with CGA-SBA.        Dates: Start:  12/31/23

## 2024-01-02 NOTE — FLOWSHEET NOTE
01/02/24 0740   Events/Summary/Plan   Events/Summary/Plan SpO2 check   Vital Signs   Pulse 80   Respiration 18   Pulse Oximetry 93 %   $ Pulse Oximetry (Spot Check) Yes   Respiratory Assessment   Respiratory Pattern Within Normal Limits   Level of Consciousness Alert   Chest Exam   Work Of Breathing / Effort Within Normal Limits   Oxygen   O2 (LPM) 1   O2 Delivery Device Nasal Cannula   Room Air Challenge Fail  (Room air SpO2=86%)

## 2024-01-02 NOTE — CARE PLAN
Problem: VTE Prevention  Goal: Patient will remain free from venous thromboembolism (VTE)  Outcome: Progressing  Note: Pt is up and walking, participates in therapies, and up to table for all meals.   The patient is Stable - Low risk of patient condition declining or worsening    Shift Goals  Clinical Goals: safety  Patient Goals: pain control, sleep well  Family Goals: Education    Progress made toward(s) clinical / shift goals:  no s/s dvt    Patient is not progressing towards the following goals:

## 2024-01-02 NOTE — PROGRESS NOTES
NURSING DAILY NOTE    Name: Jenifer Ramos  Date of Admission: 12/30/2023  Admitting Diagnosis: No Principal Problem: There is no principal problem currently on the Problem List. Please update the Problem List and refresh.  Attending Physician: Doron Brand M.d.  Allergies: Azithromycin, Erythromycin, Other misc, Penicillins, Pistachio, Sulfa drugs, Tetraglycine, Other drug, Physostigmine, Tape, Zanaflex [tizanidine hcl], Albuterol, Atorvastatin, Chlorhexidine, Lamisil [terbinafine hcl], and Morphine    Safety  Patient Assist  omod  Patient Precautions  oFall Risk  Precaution Comments  oL foot wound with PRAFO, flu +  Bed Transfer Status  oContact Guard Assist (to SBA)  Toilet Transfer Status  oContact Guard Assist  Assistive Devices  oRails, Walker - front wheel, Wheelchair  Oxygen  oNasal Cannula  Diet/Therapeutic Dining  o  Current Diet Order   Procedures    Diet Order Diet: Consistent CHO (Diabetic)     Pill Administration  owhole  Agitated Behavioral Scale  o   ABS Level of Severity  o     Fall Risk  Has the patient had a fall this admission?  Lana  Sophia Smith Fall Risk Scoring  o18, HIGH RISK  Fall Risk Safety Measures  joseph alarm and chair alarm    Vitals  Temperature: 36.7 °C (98.1 °F)  Temp src: Temporal  Pulse: 72  Respiration: 17  Blood Pressure : 104/54  Blood Pressure MAP (Calculated): 71 MM HG  BP Location: Right, Upper Arm  Patient BP Position: Supine    Oxygen  Pulse Oximetry: 92 %  O2 (LPM): 1  O2 Delivery Device: Nasal Cannula    Bowel and Bladder  Last Bowel Movement  o01/02/24  Stool Type  oType 5: Soft blob with clear cut edges (passed easily)  Bowel Device  oBathroom  Continent  oBladder: Did not void  oBowel: No movement  Bladder Function  oUrine Void (mL):  (Moderate)  Number of Times Voided: 1  Urine Color: Yellow  Genitourinary Assessment  oBladder Assessment (WDL):  Within Defined Limits  Llamas Catheter: Not Applicable  Urine Color: Yellow  Bladder Device:  Bathroom  Bladder Scan: Post Void  $ Bladder Scan Results (mL): 1    Skin  Guille Score  o 18  Sensory Interventions  o Bed Types: Standard/Trauma Mattress  Skin Preventative Measures: Pillows in Use for Support / Positioning, Silicone Oxygen Tubing in Use  Moisture Interventions  oMoisturizers/Barriers: Barrier Wipes      Pain  Pain Rating Scale  o8 - Awful, hard to do anything  Pain Location  oShoulder, Neck, Back  Pain Location Orientation  oLeft, Posterior  Pain Interventions  oMedication (see MAR), Heat Applied, Emotional Support    ADLs    Bathing  o   Linen Change  o   Personal Hygiene  oMoist Antionette Wipes, Perineal Care  Chlorhexidine Bath  o   Oral Care  oBrushed Teeth  Teeth/Dentures  o   Shave  o   Nutrition Percentage Eaten  o*  * Meal *  *, Breakfast, 0% Consumed, Refused  Environmental Precautions  oTreaded Slipper Socks on Patient, Bed in Low Position  Patient Turns/Positioning  oPatient Turns Self from Side to Side  Patient Turns Assistance/Tolerance  o   Bed Positions  Racquel Controls On, Bed Locked  Head of Bed Elevated  oLess than 30 degrees      Psychosocial/Neurologic Assessment  Psychosocial Assessment  oPsychosocial (WDL):  WDL Except  Patient Behaviors: Anxious, Irritable  Neurologic Assessment  oNeuro (WDL): Exceptions to WDL  Level of Consciousness: Alert  Orientation Level: Oriented X4  Cognition: Appropriate safety awareness  Speech: Clear  Motor Function/Sensation Assessment: Motor strength  Muscle Strength Right Arm: Good Strength Against Gravity and Moderate Resistance  Muscle Strength Left Arm: Fair Strength against Gravity but No Resistance  Muscle Strength Right Leg: Good Strength Against Gravity and Moderate Resistance  Muscle Strength Left Leg: Fair Strength against Gravity but No Resistance  oEENT (WDL):  WDL Except    Cardio/Pulmonary Assessment  Edema  oRLE Edema: 2+  LLE Edema: 2+  Respiratory Breath Sounds  oRUL Breath Sounds: Clear  RML Breath Sounds: Diminished  RLL Breath  Sounds: Diminished  DANIELLE Breath Sounds: Clear  LLL Breath Sounds: Diminished  Cardiac Assessment  oCardiac (WDL):  WDL Except

## 2024-01-02 NOTE — THERAPY
"Occupational Therapy  Daily Treatment     Patient Name: Jenifer Ramos  Age:  63 y.o., Sex:  female  Medical Record #: [id number]  Today's Date: 1/2/2024     Precautions  Precautions: Fall Risk  Comments: L foot wound with PRAFO, flu +         Subjective    \"I'm scared that I'm going to get cold.\" Pt initially resistant to completing shower, but with encouragement and use of several towels following, pt agreeable.     Objective       01/02/24 1231   OT Charge Group   OT Self Care / ADL (Units) 4   OT Total Time Spent   OT Individual Total Time Spent (Mins) 60   Vitals   Pulse 93   Patient BP Position Sitting   Blood Pressure  130/81   Pulse Oximetry 88 %   O2 Delivery Device None - Room Air   Vitals Comments Pt placed back on 1 L for safety during bathing.   Cognition    Level of Consciousness Alert   Functional Level of Assist   Bathing Minimal Assist   Bathing Description Grab bar;Hand held shower;Long handled bath tool;Tub bench;Assit with back;Assit with perineal;Increased time;Initial preparation for task;Set-up of equipment;Set up for wound protection;Verbal cueing;Supervision for safety  (Min A with washing rear in standing and some water management with rinsing due to instability with standing. TA for waterproofing L-foot, successfully. Pt able to reach all remaining parts with increased time seated on fold down shower bench.)   Upper Body Dressing Minimal Assist   Upper Body Dressing Description Assit with threading arms through sleeves;Increased time;Supervision for safety;Verbal cueing  (Min A for clearing L-elbow with threading due to poor ROM, pt able to complete all remaining parts)   Lower Body Dressing Moderate Assist   Lower Body Dressing Description Grab bar;Reacher;Verbal cueing;Supervision for safety;Set-up of equipment;Increased time  (SBA for doffing using AE, Mod A for donning due to fatigue and time constraints.)   Toileting Supervision   Toileting Description Adaptive equipment;Grab " bar;Increased time;Supervision for safety   Toilet Transfers Standby Assist   Toilet Transfer Description Grab bar;Increased time   Tub / Shower Transfers Standby Assist   Tub Shower Transfer Description Grab bar;Shower bench;Supervision for safety   Interdisciplinary Plan of Care Collaboration   IDT Collaboration with  Nursing;Certified Nursing Assistant   Patient Position at End of Therapy Seated  (Pass off to CNA at end of session for completing LBD)   Collaboration Comments Isolation clearing clarification, noted red rashing under R-abdominal fold, per RN, barrier cream applied. Also noted some redness on rear with cream applied there as well.         Assessment    Pt tolerated OT session fairly well with session focused on ADLs and functional transfers. Pt limited with LUE ROM and strength, unable to functionally reach up to shoulder level in bathing and dressing. Increased time require due to fatigue, slow movements, and verbosity.   Strengths: Able to follow instructions, Willingly participates in therapeutic activities  Barriers: Decreased endurance, Fatigue, Generalized weakness, Home accessibility, Impaired activity tolerance, Limited mobility    Plan    Cont ADLs (AE training), functional transfers, and thera act/ex to maximize functional recovery for safe DC home.        DME        Passport items to be completed:  Perform bathroom transfers, complete dressing, complete feeding, get ready for the day, prepare a simple meal, participate in household tasks, adapt home for safety needs, demonstrate home exercise program, complete caregiver training     Occupational Therapy Goals (Active)       Problem: Dressing       Dates: Start:  12/31/23         Goal: STG-Within one week, patient will dress UB with supervision.        Dates: Start:  12/31/23            Goal: STG-Within one week, patient will dress LB with SBA.        Dates: Start:  12/31/23               Problem: OT Long Term Goals       Dates: Start:   12/31/23         Goal: LTG-By discharge, patient will complete bathing routine with supervision.        Dates: Start:  12/31/23            Goal: LTG-By discharge, patient will dress LB with supervision-mod I.        Dates: Start:  12/31/23            Goal: LTG-By discharge, patient will complete toileting (hygiene and pant management) and toilet transfer with supervision-mod I.        Dates: Start:  12/31/23               Problem: Toileting       Dates: Start:  12/31/23         Goal: STG-Within one week, patient will complete toileting (hygiene and pant management) and toilet transfer with CGA-SBA.        Dates: Start:  12/31/23

## 2024-01-02 NOTE — PROGRESS NOTES
"  Physical Medicine & Rehabilitation Progress Note    Encounter Date: 1/2/2024    Chief Complaint: Decreased mobility, weakness    Interval Events (Subjective):  Patient sitting up in room. She reports she is doing OK. She reports she is worried about the lasix causing her weakness again. Discussed would speak with hospitalist. Denies NVD.     Objective:  VITAL SIGNS: /60   Pulse 78   Temp 36.8 °C (98.2 °F)   Resp 18   Ht 1.727 m (5' 7.99\")   Wt 116 kg (256 lb)   SpO2 93%   BMI 38.93 kg/m²   Gen: NAD  Psych: Mood and affect appropriate  CV: RRR, 0 edema  Resp: CTAB, no upper airway sounds  Abd: NTND  Neuro: AOx3, following commands    Laboratory Values:  Recent Results (from the past 72 hour(s))   POCT glucose device results    Collection Time: 12/30/23 11:43 AM   Result Value Ref Range    POC Glucose, Blood 234 (H) 65 - 99 mg/dL   EC-ECHOCARDIOGRAM COMPLETE W/O CONT    Collection Time: 12/30/23  1:50 PM   Result Value Ref Range    Eject.Frac. MOD BP 60.21     Eject.Frac. MOD 4C 58.43     Eject.Frac. MOD 2C 64.39     Left Ventrical Ejection Fraction 60    POCT glucose device results    Collection Time: 12/30/23  5:35 PM   Result Value Ref Range    POC Glucose, Blood 204 (H) 65 - 99 mg/dL   POCT glucose device results    Collection Time: 12/30/23  9:44 PM   Result Value Ref Range    POC Glucose, Blood 192 (H) 65 - 99 mg/dL   CBC with Differential    Collection Time: 12/31/23  5:19 AM   Result Value Ref Range    WBC 7.7 4.8 - 10.8 K/uL    RBC 5.04 4.20 - 5.40 M/uL    Hemoglobin 12.0 12.0 - 16.0 g/dL    Hematocrit 40.0 37.0 - 47.0 %    MCV 79.4 (L) 81.4 - 97.8 fL    MCH 23.8 (L) 27.0 - 33.0 pg    MCHC 30.0 (L) 32.2 - 35.5 g/dL    RDW 54.4 (H) 35.9 - 50.0 fL    Platelet Count 139 (L) 164 - 446 K/uL    MPV 10.8 9.0 - 12.9 fL    Neutrophils-Polys 83.40 (H) 44.00 - 72.00 %    Lymphocytes 9.60 (L) 22.00 - 41.00 %    Monocytes 5.50 0.00 - 13.40 %    Eosinophils 0.00 0.00 - 6.90 %    Basophils 0.10 0.00 - 1.80 % "    Immature Granulocytes 1.40 (H) 0.00 - 0.90 %    Nucleated RBC 0.00 0.00 - 0.20 /100 WBC    Neutrophils (Absolute) 6.41 1.82 - 7.42 K/uL    Lymphs (Absolute) 0.74 (L) 1.00 - 4.80 K/uL    Monos (Absolute) 0.42 0.00 - 0.85 K/uL    Eos (Absolute) 0.00 0.00 - 0.51 K/uL    Baso (Absolute) 0.01 0.00 - 0.12 K/uL    Immature Granulocytes (abs) 0.11 0.00 - 0.11 K/uL    NRBC (Absolute) 0.00 K/uL   proBrain Natriuretic Peptide, NT    Collection Time: 12/31/23  5:19 AM   Result Value Ref Range    NT-proBNP 158 (H) 0 - 125 pg/mL   Comp Metabolic Panel (CMP)    Collection Time: 12/31/23  5:19 AM   Result Value Ref Range    Sodium 137 135 - 145 mmol/L    Potassium 3.7 3.6 - 5.5 mmol/L    Chloride 96 96 - 112 mmol/L    Co2 32 20 - 33 mmol/L    Anion Gap 9.0 7.0 - 16.0    Glucose 144 (H) 65 - 99 mg/dL    Bun 11 8 - 22 mg/dL    Creatinine 0.56 0.50 - 1.40 mg/dL    Calcium 8.8 8.5 - 10.5 mg/dL    Correct Calcium 9.4 8.5 - 10.5 mg/dL    AST(SGOT) 36 12 - 45 U/L    ALT(SGPT) 18 2 - 50 U/L    Alkaline Phosphatase 147 (H) 30 - 99 U/L    Total Bilirubin 0.4 0.1 - 1.5 mg/dL    Albumin 3.3 3.2 - 4.9 g/dL    Total Protein 6.4 6.0 - 8.2 g/dL    Globulin 3.1 1.9 - 3.5 g/dL    A-G Ratio 1.1 g/dL   Magnesium    Collection Time: 12/31/23  5:19 AM   Result Value Ref Range    Magnesium 1.7 1.5 - 2.5 mg/dL   Prealbumin    Collection Time: 12/31/23  5:19 AM   Result Value Ref Range    Pre-Albumin 12.0 (L) 18.0 - 38.0 mg/dL   ESTIMATED GFR    Collection Time: 12/31/23  5:19 AM   Result Value Ref Range    GFR (CKD-EPI) 101 >60 mL/min/1.73 m 2   POCT glucose device results    Collection Time: 12/31/23  8:08 AM   Result Value Ref Range    POC Glucose, Blood 158 (H) 65 - 99 mg/dL   POCT glucose device results    Collection Time: 12/31/23 11:25 AM   Result Value Ref Range    POC Glucose, Blood 156 (H) 65 - 99 mg/dL   POCT glucose device results    Collection Time: 12/31/23  5:17 PM   Result Value Ref Range    POC Glucose, Blood 130 (H) 65 - 99 mg/dL    POCT glucose device results    Collection Time: 12/31/23  9:06 PM   Result Value Ref Range    POC Glucose, Blood 153 (H) 65 - 99 mg/dL   POCT glucose device results    Collection Time: 01/01/24  7:30 AM   Result Value Ref Range    POC Glucose, Blood 137 (H) 65 - 99 mg/dL   POCT glucose device results    Collection Time: 01/01/24 11:16 AM   Result Value Ref Range    POC Glucose, Blood 170 (H) 65 - 99 mg/dL   POCT glucose device results    Collection Time: 01/01/24  5:19 PM   Result Value Ref Range    POC Glucose, Blood 108 (H) 65 - 99 mg/dL   POCT glucose device results    Collection Time: 01/01/24  9:19 PM   Result Value Ref Range    POC Glucose, Blood 187 (H) 65 - 99 mg/dL   CBC WITH DIFFERENTIAL    Collection Time: 01/02/24  5:52 AM   Result Value Ref Range    WBC 5.5 4.8 - 10.8 K/uL    RBC 4.78 4.20 - 5.40 M/uL    Hemoglobin 11.4 (L) 12.0 - 16.0 g/dL    Hematocrit 38.5 37.0 - 47.0 %    MCV 80.5 (L) 81.4 - 97.8 fL    MCH 23.8 (L) 27.0 - 33.0 pg    MCHC 29.6 (L) 32.2 - 35.5 g/dL    RDW 54.8 (H) 35.9 - 50.0 fL    Platelet Count 105 (L) 164 - 446 K/uL    MPV 11.3 9.0 - 12.9 fL    Neutrophils-Polys 70.00 44.00 - 72.00 %    Lymphocytes 21.80 (L) 22.00 - 41.00 %    Monocytes 7.30 0.00 - 13.40 %    Eosinophils 0.00 0.00 - 6.90 %    Basophils 0.40 0.00 - 1.80 %    Immature Granulocytes 0.50 0.00 - 0.90 %    Nucleated RBC 0.00 0.00 - 0.20 /100 WBC    Neutrophils (Absolute) 3.83 1.82 - 7.42 K/uL    Lymphs (Absolute) 1.19 1.00 - 4.80 K/uL    Monos (Absolute) 0.40 0.00 - 0.85 K/uL    Eos (Absolute) 0.00 0.00 - 0.51 K/uL    Baso (Absolute) 0.02 0.00 - 0.12 K/uL    Immature Granulocytes (abs) 0.03 0.00 - 0.11 K/uL    NRBC (Absolute) 0.00 K/uL    Hypochromia 1+     Anisocytosis 1+     Microcytosis 2+ (A)    PLATELET ESTIMATE    Collection Time: 01/02/24  5:52 AM   Result Value Ref Range    Plt Estimation Decreased    MORPHOLOGY    Collection Time: 01/02/24  5:52 AM   Result Value Ref Range    RBC Morphology Present      Ovalocytes 1+     Toxic Gran Few     Toxic Vacuoles Few    PERIPHERAL SMEAR REVIEW    Collection Time: 01/02/24  5:52 AM   Result Value Ref Range    Peripheral Smear Review see below    DIFFERENTIAL COMMENT    Collection Time: 01/02/24  5:52 AM   Result Value Ref Range    Comments-Diff see below    POCT glucose device results    Collection Time: 01/02/24  8:16 AM   Result Value Ref Range    POC Glucose, Blood 171 (H) 65 - 99 mg/dL       Medications:  Scheduled Medications   Medication Dose Frequency    methocarbamol  500 mg 4X/DAY    bacitracin-polymyxin b   BID    insulin lispro  2-12 Units 4X/DAY ACHS    furosemide  40 mg Q DAY    potassium chloride SA  20 mEq DAILY    apixaban  5 mg BID    aspirin  81 mg DAILY    febuxostat  40 mg DAILY    fluticasone-umeclidinium-vilanterol  1 Puff QDAILY (RT)    gabapentin  600 mg TID    insulin GLARGINE  0.2 Units/kg/day Q EVENING    levothyroxine  112 mcg AM ES    lidocaine  1 Patch Q24HRS    magnesium oxide  400 mg DAILY    montelukast  10 mg Q EVENING    omeprazole  20 mg DAILY    rosuvastatin  20 mg Q EVENING    verapamil  80 mg QHS    Pharmacy Consult Request  1 Each PHARMACY TO DOSE     PRN medications: promethazine, [DISCONTINUED] insulin regular **AND** POC blood glucose manual result **AND** NOTIFY MD and PharmD **AND** Administer 20 grams of glucose (approximately 8 ounces of fruit juice) every 15 minutes PRN FSBG less than 70 mg/dL **AND** dextrose bolus, acetaminophen, Respiratory Therapy Consult, traZODone, sodium chloride, ondansetron **OR** ondansetron, carboxymethylcellulose, levalbuterol, oxyCODONE immediate-release **OR** oxyCODONE immediate release    Diet:  Current Diet Order   Procedures    Diet Order Diet: Consistent CHO (Diabetic)       Medical Decision Making and Plan:  Adapted from Dr. Novak's A&P  Debility secondary to CHF  -Preserved ejection fraction of 60%  -Diastolic heart failure, has cardiac murmur on exam, reports history of mitral  regurg  -BNP 4500 --> 158 on admit labs   -Lasix reduced to 40 mg on 1/1  -Consult to hospitalist to help manage  -Admit to [facility] for PT and OT for mobility and ADLs. Per guidelines, 15 hours per week between PT, OT and SLP.     Acute on chronic respiratory failure with hypoxemia  -Secondary to influenza A pneumonia versus volume overload from diastolic heart failure exacerbation  -Started on Tamiflu. Completed Tamiflu     Pancytopenia  -Secondary to flu versus side effect from Tegretol  -Stopping Tegretol  -Monitor with serial labs  -ANC improving, does not require isolation/reverse precautions  -Consult to hospitalist to help manage  - Received Granix x1, counts much improved on 12/31     Diabetes mellitus  -Consult to hospitalist to manage  -Sliding scale insulin 0 to 14 units  -Glargine 24 U and SSI     A-fib, history of stroke  -Eliquis 5 mg twice daily  -Aspirin 81 mg daily  -Rosuvastatin 20 mg q. evening     Left foot wound  -Wound care  -Clindamycin 300 mg 4 times daily. Completed     Pain control  -Tylenol 650 mg every 6 hours as needed  -Gabapentin 600 mg 3 times daily  -Lidocaine patch  - Adding methocarbamol 500 mg QID     COPD  -Singulair 10 mg q. Evening  -Xopenex every 4 hours as needed  -Trelegy Ellipta 1 puff daily      Hypertension  -Verapamil 80 mg nightly     Hypothyroidism  -Synthroid 112 mcg every morning     Nausea  History of reaction to Zofran  Will change to Phenergan     History of gout  -Uloric 40 mg daily     Skin - Patient at risk for skin breakdown due to debility in areas including sacrum, achilles, elbows and head in addition to other sites. Nursing to assess skin daily.      GI Ppx - Patient on Prilosec for GERD prophylaxis. Patient on Senna-docusate for constipation prophylaxis.      DVT Ppx - Patient Eliquis on transfer.  ____________________________________    T. Orlando Brand MD/PhD  [facility] - Physical Medicine & Rehabilitation   [facility] - Brain Injury Medicine    ____________________________________    Total time:  50 minutes. Time spent included pre-rounding review of vitals and tests, unit/floor time, face-to-face time with the patient including physical examination, care coordination, counseling of patient and/or family, ordering medications/procedures/tests, discussion with CM, PT, OT, SLP and/or other healthcare providers, and documentation in the electronic medical record. Topics discussed included discharge planning, completed clindamycin, Verapamil, and change Uloric

## 2024-01-03 ENCOUNTER — APPOINTMENT (OUTPATIENT)
Dept: OCCUPATIONAL THERAPY | Facility: REHABILITATION | Age: 64
DRG: 291 | End: 2024-01-03
Attending: PHYSICAL MEDICINE & REHABILITATION
Payer: MEDICARE

## 2024-01-03 ENCOUNTER — APPOINTMENT (OUTPATIENT)
Dept: PHYSICAL THERAPY | Facility: REHABILITATION | Age: 64
DRG: 291 | End: 2024-01-03
Attending: PHYSICAL MEDICINE & REHABILITATION
Payer: MEDICARE

## 2024-01-03 PROBLEM — M10.9 GOUT: Status: ACTIVE | Noted: 2024-01-03

## 2024-01-03 PROBLEM — R60.9 EDEMA: Status: ACTIVE | Noted: 2024-01-03

## 2024-01-03 LAB
ANION GAP SERPL CALC-SCNC: 10 MMOL/L (ref 7–16)
BUN SERPL-MCNC: 10 MG/DL (ref 8–22)
CALCIUM SERPL-MCNC: 8.6 MG/DL (ref 8.5–10.5)
CHLORIDE SERPL-SCNC: 95 MMOL/L (ref 96–112)
CO2 SERPL-SCNC: 31 MMOL/L (ref 20–33)
CREAT SERPL-MCNC: 0.56 MG/DL (ref 0.5–1.4)
ERYTHROCYTE [DISTWIDTH] IN BLOOD BY AUTOMATED COUNT: 57.3 FL (ref 35.9–50)
GFR SERPLBLD CREATININE-BSD FMLA CKD-EPI: 101 ML/MIN/1.73 M 2
GLUCOSE BLD STRIP.AUTO-MCNC: 143 MG/DL (ref 65–99)
GLUCOSE BLD STRIP.AUTO-MCNC: 175 MG/DL (ref 65–99)
GLUCOSE BLD STRIP.AUTO-MCNC: 193 MG/DL (ref 65–99)
GLUCOSE BLD STRIP.AUTO-MCNC: 210 MG/DL (ref 65–99)
GLUCOSE SERPL-MCNC: 163 MG/DL (ref 65–99)
HCT VFR BLD AUTO: 37.4 % (ref 37–47)
HGB BLD-MCNC: 11 G/DL (ref 12–16)
MAGNESIUM SERPL-MCNC: 1.9 MG/DL (ref 1.5–2.5)
MCH RBC QN AUTO: 24 PG (ref 27–33)
MCHC RBC AUTO-ENTMCNC: 29.4 G/DL (ref 32.2–35.5)
MCV RBC AUTO: 81.7 FL (ref 81.4–97.8)
PHOSPHATE SERPL-MCNC: 3.2 MG/DL (ref 2.5–4.5)
PLATELET # BLD AUTO: 118 K/UL (ref 164–446)
PMV BLD AUTO: 11.7 FL (ref 9–12.9)
POTASSIUM SERPL-SCNC: 3.1 MMOL/L (ref 3.6–5.5)
RBC # BLD AUTO: 4.58 M/UL (ref 4.2–5.4)
SODIUM SERPL-SCNC: 136 MMOL/L (ref 135–145)
WBC # BLD AUTO: 4.4 K/UL (ref 4.8–10.8)

## 2024-01-03 PROCEDURE — A9270 NON-COVERED ITEM OR SERVICE: HCPCS | Performed by: PHYSICAL MEDICINE & REHABILITATION

## 2024-01-03 PROCEDURE — 97110 THERAPEUTIC EXERCISES: CPT | Mod: CO

## 2024-01-03 PROCEDURE — 94760 N-INVAS EAR/PLS OXIMETRY 1: CPT

## 2024-01-03 PROCEDURE — 84100 ASSAY OF PHOSPHORUS: CPT

## 2024-01-03 PROCEDURE — 97116 GAIT TRAINING THERAPY: CPT

## 2024-01-03 PROCEDURE — 99232 SBSQ HOSP IP/OBS MODERATE 35: CPT | Performed by: HOSPITALIST

## 2024-01-03 PROCEDURE — A9270 NON-COVERED ITEM OR SERVICE: HCPCS | Mod: JZ | Performed by: HOSPITALIST

## 2024-01-03 PROCEDURE — 80048 BASIC METABOLIC PNL TOTAL CA: CPT

## 2024-01-03 PROCEDURE — 97535 SELF CARE MNGMENT TRAINING: CPT

## 2024-01-03 PROCEDURE — 97530 THERAPEUTIC ACTIVITIES: CPT

## 2024-01-03 PROCEDURE — 36415 COLL VENOUS BLD VENIPUNCTURE: CPT

## 2024-01-03 PROCEDURE — 82962 GLUCOSE BLOOD TEST: CPT | Mod: 91

## 2024-01-03 PROCEDURE — 85027 COMPLETE CBC AUTOMATED: CPT

## 2024-01-03 PROCEDURE — 97530 THERAPEUTIC ACTIVITIES: CPT | Mod: CO

## 2024-01-03 PROCEDURE — 99232 SBSQ HOSP IP/OBS MODERATE 35: CPT | Performed by: PHYSICAL MEDICINE & REHABILITATION

## 2024-01-03 PROCEDURE — 97110 THERAPEUTIC EXERCISES: CPT

## 2024-01-03 PROCEDURE — 700102 HCHG RX REV CODE 250 W/ 637 OVERRIDE(OP): Mod: JZ | Performed by: HOSPITALIST

## 2024-01-03 PROCEDURE — 83735 ASSAY OF MAGNESIUM: CPT

## 2024-01-03 PROCEDURE — 700102 HCHG RX REV CODE 250 W/ 637 OVERRIDE(OP): Performed by: PHYSICAL MEDICINE & REHABILITATION

## 2024-01-03 PROCEDURE — 770010 HCHG ROOM/CARE - REHAB SEMI PRIVAT*

## 2024-01-03 PROCEDURE — 700101 HCHG RX REV CODE 250: Performed by: PHYSICAL MEDICINE & REHABILITATION

## 2024-01-03 RX ORDER — POTASSIUM CHLORIDE 20 MEQ/1
40 TABLET, EXTENDED RELEASE ORAL ONCE
Status: COMPLETED | OUTPATIENT
Start: 2024-01-03 | End: 2024-01-03

## 2024-01-03 RX ADMIN — LIDOCAINE 1 PATCH: 4 PATCH TOPICAL at 08:18

## 2024-01-03 RX ADMIN — BACITRACIN ZINC AND POLYMYXIN B SULFATE: at 08:21

## 2024-01-03 RX ADMIN — POTASSIUM CHLORIDE 20 MEQ: 1500 TABLET, EXTENDED RELEASE ORAL at 08:22

## 2024-01-03 RX ADMIN — VERAPAMIL HYDROCHLORIDE 80 MG: 80 TABLET ORAL at 21:29

## 2024-01-03 RX ADMIN — FUROSEMIDE 40 MG: 40 TABLET ORAL at 08:23

## 2024-01-03 RX ADMIN — LEVALBUTEROL 1.25 MG: 1.25 SOLUTION RESPIRATORY (INHALATION) at 00:31

## 2024-01-03 RX ADMIN — METHOCARBAMOL 500 MG: 500 TABLET ORAL at 21:30

## 2024-01-03 RX ADMIN — OXYCODONE HYDROCHLORIDE 10 MG: 10 TABLET ORAL at 21:31

## 2024-01-03 RX ADMIN — FEBUXOSTAT 40 MG: 40 TABLET, FILM COATED ORAL at 21:30

## 2024-01-03 RX ADMIN — MONTELUKAST 10 MG: 10 TABLET, FILM COATED ORAL at 21:30

## 2024-01-03 RX ADMIN — APIXABAN 5 MG: 5 TABLET, FILM COATED ORAL at 08:20

## 2024-01-03 RX ADMIN — POTASSIUM CHLORIDE 40 MEQ: 1500 TABLET, EXTENDED RELEASE ORAL at 13:36

## 2024-01-03 RX ADMIN — GABAPENTIN 600 MG: 300 CAPSULE ORAL at 14:51

## 2024-01-03 RX ADMIN — APIXABAN 5 MG: 5 TABLET, FILM COATED ORAL at 21:30

## 2024-01-03 RX ADMIN — INSULIN LISPRO 2 UNITS: 100 INJECTION, SOLUTION INTRAVENOUS; SUBCUTANEOUS at 17:16

## 2024-01-03 RX ADMIN — OMEPRAZOLE 20 MG: 20 CAPSULE, DELAYED RELEASE ORAL at 08:21

## 2024-01-03 RX ADMIN — METHOCARBAMOL 500 MG: 500 TABLET ORAL at 17:15

## 2024-01-03 RX ADMIN — ASPIRIN 81 MG: 81 TABLET, COATED ORAL at 08:22

## 2024-01-03 RX ADMIN — Medication 400 MG: at 08:23

## 2024-01-03 RX ADMIN — GABAPENTIN 600 MG: 300 CAPSULE ORAL at 08:21

## 2024-01-03 RX ADMIN — INSULIN LISPRO 2 UNITS: 100 INJECTION, SOLUTION INTRAVENOUS; SUBCUTANEOUS at 21:42

## 2024-01-03 RX ADMIN — LEVOTHYROXINE SODIUM 112 MCG: 0.11 TABLET ORAL at 05:51

## 2024-01-03 RX ADMIN — BACITRACIN ZINC AND POLYMYXIN B SULFATE: at 21:36

## 2024-01-03 RX ADMIN — GABAPENTIN 600 MG: 300 CAPSULE ORAL at 21:30

## 2024-01-03 RX ADMIN — INSULIN LISPRO 4 UNITS: 100 INJECTION, SOLUTION INTRAVENOUS; SUBCUTANEOUS at 11:19

## 2024-01-03 RX ADMIN — METHOCARBAMOL 500 MG: 500 TABLET ORAL at 08:23

## 2024-01-03 RX ADMIN — ROSUVASTATIN CALCIUM 20 MG: 10 TABLET, FILM COATED ORAL at 21:30

## 2024-01-03 RX ADMIN — METHOCARBAMOL 500 MG: 500 TABLET ORAL at 13:36

## 2024-01-03 ASSESSMENT — ENCOUNTER SYMPTOMS
NAUSEA: 0
ABDOMINAL PAIN: 0
SHORTNESS OF BREATH: 0
BRUISES/BLEEDS EASILY: 0
COUGH: 0
VOMITING: 0
POLYDIPSIA: 0
FEVER: 0
PALPITATIONS: 0
EYES NEGATIVE: 1
CHILLS: 0

## 2024-01-03 ASSESSMENT — GAIT ASSESSMENTS
ASSISTIVE DEVICE: FRONT WHEEL WALKER
GAIT LEVEL OF ASSIST: STANDBY ASSIST
DEVIATION: DECREASED BASE OF SUPPORT;DECREASED HEEL STRIKE;DECREASED TOE OFF

## 2024-01-03 ASSESSMENT — PAIN DESCRIPTION - PAIN TYPE
TYPE: ACUTE PAIN
TYPE: ACUTE PAIN

## 2024-01-03 ASSESSMENT — ACTIVITIES OF DAILY LIVING (ADL)
TOILET_TRANSFER_DESCRIPTION: ADAPTIVE EQUIPMENT;INCREASED TIME;INITIAL PREPARATION FOR TASK;SUPERVISION FOR SAFETY
TUB_SHOWER_TRANSFER_DESCRIPTION: GRAB BAR;SHOWER BENCH;INCREASED TIME;SUPERVISION FOR SAFETY;VERBAL CUEING
BED_CHAIR_WHEELCHAIR_TRANSFER_DESCRIPTION: ADAPTIVE EQUIPMENT;INCREASED TIME;INITIAL PREPARATION FOR TASK;SUPERVISION FOR SAFETY;VERBAL CUEING

## 2024-01-03 NOTE — PROGRESS NOTES
NURSING DAILY NOTE    Name: Jenifer Ramos   Date of Admission: 12/30/2023   Admitting Diagnosis: No Principal Problem: There is no principal problem currently on the Problem List. Please update the Problem List and refresh.  Attending Physician: Doron Brand M.d.  Allergies: Azithromycin, Erythromycin, Other misc, Penicillins, Pistachio, Sulfa drugs, Tetraglycine, Other drug, Physostigmine, Tape, Zanaflex [tizanidine hcl], Albuterol, Atorvastatin, Chlorhexidine, Lamisil [terbinafine hcl], and Morphine    Safety  Patient Assist  mod  Patient Precautions  Fall Risk  Precaution Comments  L foot wound with PRAFO, flu +  Bed Transfer Status  Standby Assist  Toilet Transfer Status   Standby Assist  Assistive Devices  Rails, Walker - front wheel, Wheelchair  Oxygen  None - Room Air  Diet/Therapeutic Dining  Current Diet Order   Procedures    Diet Order Diet: Consistent CHO (Diabetic)     Pill Administration  whole  Agitated Behavioral Scale     ABS Level of Severity       Fall Risk  Has the patient had a fall this admission?   No  Sophia Smith Fall Risk Scoring  18, HIGH RISK  Fall Risk Safety Measures  bed alarm, chair alarm, and poor balance    Vitals  Temperature: 36.1 °C (97 °F)  Temp src: Temporal  Pulse: 90  Respiration: 18  Blood Pressure : 128/80  Blood Pressure MAP (Calculated): 96 MM HG  BP Location: Right, Upper Arm  Patient BP Position: Sitting     Oxygen  Pulse Oximetry: 94 %  O2 (LPM): 1  O2 Delivery Device: None - Room Air    Bowel and Bladder  Last Bowel Movement  01/02/24  Stool Type  Type 5: Soft blob with clear cut edges (passed easily)  Bowel Device  Bathroom  Continent  Bladder: Did not void   Bowel: No movement  Bladder Function  Urine Void (mL):  (Moderate)  Number of Times Voided: 1  Urine Color: Yellow  Genitourinary Assessment   Bladder Assessment (WDL):  WDL Except  Llamas Catheter: Not Applicable  Urine Color:  Yellow  Bladder Device: Bathroom  Bladder Scan: Post Void  $ Bladder Scan Results (mL): 1    Skin  Guille Score   18  Sensory Interventions   Bed Types: Standard/Trauma Mattress  Skin Preventative Measures: Pillows in Use for Support / Positioning  Moisture Interventions  Moisturizers/Barriers: Barrier Wipes      Pain  Pain Rating Scale  7 - Focus of attention, prevents doing daily activities  Pain Location  Shoulder  Pain Location Orientation  Left  Pain Interventions   Medication (see MAR)    ADLs    Bathing      Linen Change      Personal Hygiene  Moist Antionette Wipes, Perineal Care  Chlorhexidine Bath      Oral Care  Brushed Teeth  Teeth/Dentures     Shave     Nutrition Percentage Eaten  *  * Meal *  *, Breakfast, 0% Consumed, Refused  Environmental Precautions  Treaded Slipper Socks on Patient, Personal Belongings, Wastebasket, Call Bell etc. in Easy Reach, Transferred to Stronger Side, Report Given to Other Health Care Providers Regarding Fall Risk, Bed in Low Position  Patient Turns/Positioning  Patient Turns Self from Side to Side  Patient Turns Assistance/Tolerance     Bed Positions  Bed Controls On  Head of Bed Elevated  Self regulated      Psychosocial/Neurologic Assessment  Psychosocial Assessment  Psychosocial (WDL):  WDL Except  Patient Behaviors: Anxious, Irritable  Neurologic Assessment  Neuro (WDL): Exceptions to WDL  Level of Consciousness: Alert  Orientation Level: Oriented X4  Cognition: Appropriate safety awareness  Speech: Clear  Motor Function/Sensation Assessment: Motor strength  Muscle Strength Right Arm: Good Strength Against Gravity and Moderate Resistance  Muscle Strength Left Arm: Fair Strength against Gravity but No Resistance  Muscle Strength Right Leg: Good Strength Against Gravity and Moderate Resistance  Muscle Strength Left Leg: Fair Strength against Gravity but No Resistance  EENT (WDL):  WDL Except    Cardio/Pulmonary Assessment  Edema   RLE Edema: 2+  LLE Edema: 2+  Respiratory  Breath Sounds  RUL Breath Sounds: Clear  RML Breath Sounds: Diminished  RLL Breath Sounds: Diminished  DANIELLE Breath Sounds: Clear  LLL Breath Sounds: Diminished  Cardiac Assessment   Cardiac (WDL):  WDL Except (hx afib, chf)

## 2024-01-03 NOTE — FLOWSHEET NOTE
01/03/24 0803   Events/Summary/Plan   Events/Summary/Plan SpO2 check   Vital Signs   Pulse 74   Respiration 16   Pulse Oximetry 94 %   $ Pulse Oximetry (Spot Check) Yes   Respiratory Assessment   Respiratory Pattern Within Normal Limits   Level of Consciousness Alert   Oxygen   O2 (LPM) 1   O2 Delivery Device Nasal Cannula

## 2024-01-03 NOTE — PROGRESS NOTES
Hospital Medicine Daily Progress Note        Chief Complaint:  Hypertension  Diabetes  Pancytopenia    Interval History:  No overnight events.  Labs reviewed.    Review of Systems  Review of Systems   Constitutional:  Negative for chills and fever.   HENT: Negative.     Eyes: Negative.    Respiratory:  Negative for cough and shortness of breath.    Cardiovascular:  Negative for chest pain and palpitations.   Gastrointestinal:  Negative for abdominal pain, nausea and vomiting.   Musculoskeletal:         Wound pain   Skin:  Negative for itching and rash.   Endo/Heme/Allergies:  Negative for polydipsia. Does not bruise/bleed easily.        Physical Exam  Temp:  [36.1 °C (97 °F)-36.6 °C (97.8 °F)] 36.6 °C (97.8 °F)  Pulse:  [73-90] 74  Resp:  [16-18] 16  BP: (113-128)/(58-80) 125/58  SpO2:  [94 %-97 %] 94 %    Physical Exam  Vitals reviewed.   Constitutional:       General: She is not in acute distress.     Appearance: Normal appearance. She is not ill-appearing.   HENT:      Head: Normocephalic and atraumatic.      Right Ear: External ear normal.      Left Ear: External ear normal.      Nose: Nose normal.      Mouth/Throat:      Pharynx: Oropharynx is clear.   Eyes:      General:         Right eye: No discharge.         Left eye: No discharge.      Extraocular Movements: Extraocular movements intact.      Conjunctiva/sclera: Conjunctivae normal.   Cardiovascular:      Rate and Rhythm: Normal rate and regular rhythm.   Pulmonary:      Effort: No respiratory distress.      Breath sounds: No wheezing.      Comments: Decreased BS  Abdominal:      General: Bowel sounds are normal. There is no distension.      Palpations: Abdomen is soft.      Tenderness: There is no abdominal tenderness.   Musculoskeletal:      Cervical back: Normal range of motion and neck supple.      Right lower leg: Edema present.      Left lower leg: Edema present.   Skin:     General: Skin is warm and dry.   Neurological:      Mental Status: She is  alert and oriented to person, place, and time.         Fluids    Intake/Output Summary (Last 24 hours) at 1/3/2024 1340  Last data filed at 1/2/2024 2127  Gross per 24 hour   Intake 240 ml   Output --   Net 240 ml       Laboratory  Recent Labs     01/02/24  0552 01/03/24  0533   WBC 5.5 4.4*   RBC 4.78 4.58   HEMOGLOBIN 11.4* 11.0*   HEMATOCRIT 38.5 37.4   MCV 80.5* 81.7   MCH 23.8* 24.0*   MCHC 29.6* 29.4*   RDW 54.8* 57.3*   PLATELETCT 105* 118*   MPV 11.3 11.7     Recent Labs     01/03/24  0533   SODIUM 136   POTASSIUM 3.1*   CHLORIDE 95*   CO2 31   GLUCOSE 163*   BUN 10   CREATININE 0.56   CALCIUM 8.6                 Assessment/Plan  Gout  Assessment & Plan  On Uloric  Monitor for acute flare-ups    Edema  Assessment & Plan  Echo 12/30/23 EF 60%  BNP resolving  Continue Lasix  Outpt meds include Lasix 40-80 mg qd     Essential hypertension  Assessment & Plan  On Verapamil and Lasix  Observe blood pressure trends    Diabetes mellitus type 2, insulin dependent (HCC)  Assessment & Plan  HbA1c 11.9  Increase Lantus to optimize serum glucose control  Continue SSI  Outpt meds include Tresiba 120 u qd and Humalog 40-60 u per SSI    Chronic ulcer of left foot due to diabetes mellitus (HCC)- (present on admission)  Assessment & Plan  X-ray 12/27/23 no definite acute osseous abnormality but evaluation limited due to osseous demineralization  Completed Clinda  Consider MRI  Wound care and pain control per Physiatry    Influenza A- (present on admission)  Assessment & Plan  +PCR 12/27/23  Completed Tamiflu 1/1/24    History of stroke- (present on admission)  Assessment & Plan  On ASA, Eliquis, and Crestor    PAF (paroxysmal atrial fibrillation) (HCC)- (present on admission)  Assessment & Plan  H/O ablation x 2  Rate controlled on Verapamil  Anticoagulated on Eliquis    Pancytopenia (HCC)- (present on admission)  Assessment & Plan  Likely 2/2 Tegretol, has been discontinued  Monitor WBC/ANC, H/H, and PLT  S/P Granix x 1 on  12/30/23 for leukopenia    Hypokalemia- (present on admission)  Assessment & Plan  2/2 Lasix  Replete K+  Continue daily KCl  Mg++ normal  D/C MgOx    GERD (gastroesophageal reflux disease)- (present on admission)  Assessment & Plan  On Omeprazole    Hypothyroidism- (present on admission)  Assessment & Plan  Euthyroid on Synthroid    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

## 2024-01-03 NOTE — THERAPY
Occupational Therapy  Daily Treatment     Patient Name: Jenifer Ramos  Age:  63 y.o., Sex:  female  Medical Record #: [id number]  Today's Date: 1/3/2024     Precautions  Precautions: (P) Fall Risk  Comments: (P) L foot wound with PRAFO, flu +    (no longer on droplet precaution as of 01/03)         Subjective    Pt received from PT in [facility]. Pleasant and agreeable to discussed and practice bathroom transfers.      Objective       01/03/24 1031   OT Charge Group   OT Self Care / ADL (Units) 2   OT Total Time Spent   OT Individual Total Time Spent (Mins) 30   Precautions   Precautions Fall Risk   Comments L foot wound with PRAFO, flu +    (no longer on droplet precaution as of 01/03)   Functional Level of Assist   Tub / Shower Transfers Contact Guard Assist   Tub Shower Transfer Description Grab bar;Shower bench;Increased time;Supervision for safety;Verbal cueing  (dry shower transfer in ADL bathroom to practice step-in shower transfers (home set-up). Pt demo at [facility] for stand step transfer from WC to step-in shower using GB (within shower / outside shower). Discussed home recommendations.)   Interdisciplinary Plan of Care Collaboration   IDT Collaboration with  Physical Therapist;Physician   Patient Position at End of Therapy In Bed;Bed Alarm On;Call Light within Reach;Tray Table within Reach;Phone within Reach   OT DME Recommendations   Additional Equipment Other (Comments)  (Fixated GB in shower)     Bathroom recommendations: OTR requested  provide a picture of home bathroom set-up to confirm recommendations. Current recommendations based on pt report; remove glass door, install fixated GB (no suction cup GB). Shower chair (pt already owns).     Assessment    Overall, pt demo at [facility] for walk-in shower transfer using GB. May benefit to trial bathroom transfers using a FWW if GB are not installed prior to DC.       Strengths: Able to follow instructions, Willingly participates in therapeutic  activities  Barriers: Decreased endurance, Fatigue, Generalized weakness, Home accessibility, Impaired activity tolerance, Limited mobility    Plan    Cont ADLs, functional transfers, and thera act/ex to maximize functional recovery for safe DC home.       DME  OT DME Recommendations  Additional Equipment: (P) Other (Comments) (Fixated GB in shower)    Passport items to be completed:  Perform bathroom transfers, complete dressing, complete feeding, get ready for the day, prepare a simple meal, participate in household tasks, adapt home for safety needs, demonstrate home exercise program, complete caregiver training     Occupational Therapy Goals (Active)       Problem: Dressing       Dates: Start:  12/31/23         Goal: STG-Within one week, patient will dress UB with supervision.        Dates: Start:  12/31/23            Goal: STG-Within one week, patient will dress LB with SBA.        Dates: Start:  12/31/23               Problem: OT Long Term Goals       Dates: Start:  12/31/23         Goal: LTG-By discharge, patient will complete bathing routine with supervision.        Dates: Start:  12/31/23            Goal: LTG-By discharge, patient will dress LB with supervision-mod I.        Dates: Start:  12/31/23            Goal: LTG-By discharge, patient will complete toileting (hygiene and pant management) and toilet transfer with supervision-mod I.        Dates: Start:  12/31/23               Problem: Toileting       Dates: Start:  12/31/23         Goal: STG-Within one week, patient will complete toileting (hygiene and pant management) and toilet transfer with [facility]-SBA.        Dates: Start:  12/31/23

## 2024-01-03 NOTE — THERAPY
Physical Therapy   Daily Treatment     Patient Name: Jenifer Ramos  Age:  63 y.o., Sex:  female  Medical Record #: [id number]  Today's Date: 1/3/2024     Precautions  Precautions: (P) Fall Risk  Comments: (P) L foot wound with PRAFO, flu +    Subjective    Patient seated in w/c; therapy rescheduled due to late breakfast tray.     Objective       01/03/24 0901   PT Charge Group   PT Gait Training (Units) 1   PT Therapeutic Exercise (Units) 2   PT Therapeutic Activities (Units) 3   PT Total Time Spent   PT Individual Total Time Spent (Mins) 90   Precautions   Precautions Fall Risk   Comments L foot wound with PRAFO, flu +   Gait Functional Level of Assist    Gait Level Of Assist Standby Assist   Assistive Device Front Wheel Walker   Distance (Feet)   (25 ftx3, 30 ftx2)   Deviation Decreased Base Of Support;Decreased Heel Strike;Decreased Toe Off  (slow jodie with increased double leg stance time)   Transfer Functional Level of Assist   Bed, Chair, Wheelchair Transfer Standby Assist   Bed Chair Wheelchair Transfer Description Adaptive equipment;Increased time;Initial preparation for task;Supervision for safety;Verbal cueing  (stand step transfer with FWW)   Toilet Transfers Standby Assist   Toilet Transfer Description Adaptive equipment;Increased time;Initial preparation for task;Supervision for safety  (FWW approach)   Supine Lower Body Exercise   Bridges Two Legged;2 sets of 10   Straight Leg Raises 2 sets of 10;Bilateral  (2# ankle weight on LLE)   Other Exercises BLE LTRs 2x10, hooklying UE circles with 2# dumbbell 2x10   Bed Mobility    Supine to Sit Standby Assist  (on therapy mat)   Sit to Supine Standby Assist  (on therapy mat)   Sit to Stand Standby Assist   Scooting Standby Assist   Rolling Standby Assist   Interdisciplinary Plan of Care Collaboration   IDT Collaboration with  Occupational Therapist   Patient Position at End of Therapy Seated;Self Releasing Lap Belt Applied;Chair Alarm On  (with OT)    Collaboration Comments handoff of care to OT     Toileting at  FWW level with SBA, standing hand hygiene with SBA and intermittent UE support on FWW/counter    Blocked rolling 2x10 each direction with SBA and contralateral UE holding 2# dumbbell.    Nustep with BLEs and RUE level 0 for 10 minutes, 0.14 miles    Assessment    Patient tolerated session well, performing all mobility with SBA and no hands on assistance, no LOBs or buckling noted. Reports feeling better than baseline and is pleased with progress made thus far.    Strengths: Able to follow instructions, Alert and oriented, Good insight into deficits/needs, Pleasant and cooperative, Willingly participates in therapeutic activities  Barriers: Decreased endurance, Fatigue, Generalized weakness, Impaired activity tolerance, Impaired balance, Pain    Plan    gait training with FWW, stair training (she has one 4 inch step to enter her home), overall activity tolerance/endurance, balance training, LE strengthening       DME        Passport items to be completed:  Get in/out of bed safely, in/out of a vehicle, safely use mobility device, walk or wheel around home/community, navigate up and down stairs, show how to get up/down from the ground, ensure home is accessible, demonstrate HEP, complete caregiver training    Physical Therapy Problems (Active)       Problem: Balance       Dates: Start:  12/31/23         Goal: STG-Within one week, patient will maintain static standing without UE support for at least 30 seconds, supervision.        Dates: Start:  12/31/23               Problem: Mobility       Dates: Start:  12/31/23         Goal: STG-Within one week, patient will ambulate at least 50 feet with FWW and SBA.        Dates: Start:  12/31/23            Goal: STG-Within one week, patient will ambulate up/down a curb with FWW and min A.        Dates: Start:  12/31/23               Problem: Mobility Transfers       Dates: Start:  12/31/23         Goal: STG-Within  one week, patient will transfer bed to chair with LRAD and supervision.        Dates: Start:  12/31/23               Problem: PT-Long Term Goals       Dates: Start:  12/31/23         Goal: LTG-By discharge, patient will tolerate standing for at least 1 minute without UE, while reaching outside [facility] to perform ADLs with supervision.        Dates: Start:  12/31/23            Goal: LTG-By discharge, patient will ambulate at least 150 feet with FWW and supervision.        Dates: Start:  12/31/23            Goal: LTG-By discharge, patient will transfer one surface to another with LRAD and mod I.        Dates: Start:  12/31/23            Goal: LTG-By discharge, patient will transfer in/out of a car with LRAD and min A.        Dates: Start:  12/31/23            Goal: LTG-By discharge, patient will negotiate up and down a 4inch step with FWW and CGA (to simulate home entry).        Dates: Start:  12/31/23

## 2024-01-03 NOTE — THERAPY
"Occupational Therapy  Daily Treatment     Patient Name: Jenifer Ramos  Age:  63 y.o., Sex:  female  Medical Record #: [id number]  Today's Date: 1/3/2024     Precautions  Precautions: Fall Risk  Comments: L foot wound with PRAFO, flu + (no longer on droplet precaution as of 01/03)         Subjective    \" I need to use the bathroom .\"   \" My shoulder feels so much better now .\" Stated at end of session   Objective       01/03/24 1331   OT Charge Group   OT Self Care / ADL (Units) 2   OT Therapy Activity (Units) 1   OT Therapeutic Exercise (Units) 1   OT Total Time Spent   OT Individual Total Time Spent (Mins) 60   Precautions   Precautions Fall Risk   Comments L foot wound with PRAFO, flu + (no longer on droplet precaution as of 01/03)   Functional Level of Assist   Eating Independent   Grooming Supervision   Grooming Description Seated in wheelchair at sink   Toileting Supervision   Bed, Chair, Wheelchair Transfer Standby Assist  (FWW edge of bed to w/c)   Toilet Transfers Supervised   Sitting Upper Body Exercises   Upper Extremity Bike Level 1 Resistance  (x5 minutes x 1  1.5 minutes passist  8.5 active)   Bed Mobility    Supine to Sit Standby Assist   Interdisciplinary Plan of Care Collaboration   Patient Position at End of Therapy Seated;Chair Alarm On;Self Releasing Lap Belt Applied;Call Light within Reach;Tray Table within Reach  (eating late lunch as she had slept through lunchtime)         Assessment     Very verbose through out the 60 minute session        Initially slow to arouse but as activity progressed  alertness levels improved and she was much more engaged in activity      Strengths: Able to follow instructions, Willingly participates in therapeutic activities  Barriers: Decreased endurance, Fatigue, Generalized weakness, Home accessibility, Impaired activity tolerance, Limited mobility    Plan    Cont ADLs, functional transfers, and thera act/ex to maximize functional recovery for safe IN home.   "        DME  OT DME Recommendations  Additional Equipment: Other (Comments) (Fixated GB in shower)        Occupational Therapy Goals (Active)       Problem: Dressing       Dates: Start:  12/31/23         Goal: STG-Within one week, patient will dress UB with supervision.        Dates: Start:  12/31/23            Goal: STG-Within one week, patient will dress LB with SBA.        Dates: Start:  12/31/23               Problem: OT Long Term Goals       Dates: Start:  12/31/23         Goal: LTG-By discharge, patient will complete bathing routine with supervision.        Dates: Start:  12/31/23            Goal: LTG-By discharge, patient will dress LB with supervision-mod I.        Dates: Start:  12/31/23            Goal: LTG-By discharge, patient will complete toileting (hygiene and pant management) and toilet transfer with supervision-mod I.        Dates: Start:  12/31/23               Problem: Toileting       Dates: Start:  12/31/23         Goal: STG-Within one week, patient will complete toileting (hygiene and pant management) and toilet transfer with CGA-SBA.        Dates: Start:  12/31/23

## 2024-01-03 NOTE — CARE PLAN
"The patient is Stable - Low risk of patient condition declining or worsening    Shift Goals  Clinical Goals: safety  Patient Goals: pain control, sleep well  Family Goals: Education    Patient is not progressing towards the following goals:    Problem: Fall Risk - Rehab  Goal: Patient will remain free from falls  Outcome: Not Met  Note: Sophia Smith Fall risk Assessment Score: 18    High fall risk Interventions   - Alarming seatbelt  - Bed and strip alarm   - Yellow sign by the door   - Yellow wrist band \"Fall risk\"  - Do not leave patient unattended in the bathroom  - Fall risk education provided       "

## 2024-01-03 NOTE — PROGRESS NOTES
"  Physical Medicine & Rehabilitation Progress Note    Encounter Date: 1/3/2024    Chief Complaint: Decreased mobility, weakness    Interval Events (Subjective):  Patient sitting up in room. She reports she is doing OK. She reports swelling is stable. Discussed would continue to monitor on Lasix. Denies pain at rest    Objective:  VITAL SIGNS: /58   Pulse 74   Temp 36.6 °C (97.8 °F) (Temporal)   Resp 16   Ht 1.727 m (5' 7.99\")   Wt 116 kg (256 lb)   SpO2 94%   BMI 38.93 kg/m²   Gen: NAD  Psych: Mood and affect appropriate  CV: RRR, 1+ edema  Resp: CTAB, no upper airway sounds  Abd: NTND  Neuro: AOx4, following commands    Laboratory Values:  Recent Results (from the past 72 hour(s))   POCT glucose device results    Collection Time: 12/31/23  5:17 PM   Result Value Ref Range    POC Glucose, Blood 130 (H) 65 - 99 mg/dL   POCT glucose device results    Collection Time: 12/31/23  9:06 PM   Result Value Ref Range    POC Glucose, Blood 153 (H) 65 - 99 mg/dL   POCT glucose device results    Collection Time: 01/01/24  7:30 AM   Result Value Ref Range    POC Glucose, Blood 137 (H) 65 - 99 mg/dL   POCT glucose device results    Collection Time: 01/01/24 11:16 AM   Result Value Ref Range    POC Glucose, Blood 170 (H) 65 - 99 mg/dL   POCT glucose device results    Collection Time: 01/01/24  5:19 PM   Result Value Ref Range    POC Glucose, Blood 108 (H) 65 - 99 mg/dL   POCT glucose device results    Collection Time: 01/01/24  9:19 PM   Result Value Ref Range    POC Glucose, Blood 187 (H) 65 - 99 mg/dL   CBC WITH DIFFERENTIAL    Collection Time: 01/02/24  5:52 AM   Result Value Ref Range    WBC 5.5 4.8 - 10.8 K/uL    RBC 4.78 4.20 - 5.40 M/uL    Hemoglobin 11.4 (L) 12.0 - 16.0 g/dL    Hematocrit 38.5 37.0 - 47.0 %    MCV 80.5 (L) 81.4 - 97.8 fL    MCH 23.8 (L) 27.0 - 33.0 pg    MCHC 29.6 (L) 32.2 - 35.5 g/dL    RDW 54.8 (H) 35.9 - 50.0 fL    Platelet Count 105 (L) 164 - 446 K/uL    MPV 11.3 9.0 - 12.9 fL    " Neutrophils-Polys 70.00 44.00 - 72.00 %    Lymphocytes 21.80 (L) 22.00 - 41.00 %    Monocytes 7.30 0.00 - 13.40 %    Eosinophils 0.00 0.00 - 6.90 %    Basophils 0.40 0.00 - 1.80 %    Immature Granulocytes 0.50 0.00 - 0.90 %    Nucleated RBC 0.00 0.00 - 0.20 /100 WBC    Neutrophils (Absolute) 3.83 1.82 - 7.42 K/uL    Lymphs (Absolute) 1.19 1.00 - 4.80 K/uL    Monos (Absolute) 0.40 0.00 - 0.85 K/uL    Eos (Absolute) 0.00 0.00 - 0.51 K/uL    Baso (Absolute) 0.02 0.00 - 0.12 K/uL    Immature Granulocytes (abs) 0.03 0.00 - 0.11 K/uL    NRBC (Absolute) 0.00 K/uL    Hypochromia 1+     Anisocytosis 1+     Microcytosis 2+ (A)    PLATELET ESTIMATE    Collection Time: 01/02/24  5:52 AM   Result Value Ref Range    Plt Estimation Decreased    MORPHOLOGY    Collection Time: 01/02/24  5:52 AM   Result Value Ref Range    RBC Morphology Present     Ovalocytes 1+     Toxic Gran Few     Toxic Vacuoles Few    PERIPHERAL SMEAR REVIEW    Collection Time: 01/02/24  5:52 AM   Result Value Ref Range    Peripheral Smear Review see below    DIFFERENTIAL COMMENT    Collection Time: 01/02/24  5:52 AM   Result Value Ref Range    Comments-Diff see below    POCT glucose device results    Collection Time: 01/02/24  8:16 AM   Result Value Ref Range    POC Glucose, Blood 171 (H) 65 - 99 mg/dL   POCT glucose device results    Collection Time: 01/02/24 11:16 AM   Result Value Ref Range    POC Glucose, Blood 143 (H) 65 - 99 mg/dL   POCT glucose device results    Collection Time: 01/02/24  4:51 PM   Result Value Ref Range    POC Glucose, Blood 164 (H) 65 - 99 mg/dL   POCT glucose device results    Collection Time: 01/02/24  9:26 PM   Result Value Ref Range    POC Glucose, Blood 191 (H) 65 - 99 mg/dL   MAGNESIUM    Collection Time: 01/03/24  5:33 AM   Result Value Ref Range    Magnesium 1.9 1.5 - 2.5 mg/dL   PHOSPHORUS    Collection Time: 01/03/24  5:33 AM   Result Value Ref Range    Phosphorus 3.2 2.5 - 4.5 mg/dL   CBC WITHOUT DIFFERENTIAL    Collection  Time: 01/03/24  5:33 AM   Result Value Ref Range    WBC 4.4 (L) 4.8 - 10.8 K/uL    RBC 4.58 4.20 - 5.40 M/uL    Hemoglobin 11.0 (L) 12.0 - 16.0 g/dL    Hematocrit 37.4 37.0 - 47.0 %    MCV 81.7 81.4 - 97.8 fL    MCH 24.0 (L) 27.0 - 33.0 pg    MCHC 29.4 (L) 32.2 - 35.5 g/dL    RDW 57.3 (H) 35.9 - 50.0 fL    Platelet Count 118 (L) 164 - 446 K/uL    MPV 11.7 9.0 - 12.9 fL   Basic Metabolic Panel    Collection Time: 01/03/24  5:33 AM   Result Value Ref Range    Sodium 136 135 - 145 mmol/L    Potassium 3.1 (L) 3.6 - 5.5 mmol/L    Chloride 95 (L) 96 - 112 mmol/L    Co2 31 20 - 33 mmol/L    Glucose 163 (H) 65 - 99 mg/dL    Bun 10 8 - 22 mg/dL    Creatinine 0.56 0.50 - 1.40 mg/dL    Calcium 8.6 8.5 - 10.5 mg/dL    Anion Gap 10.0 7.0 - 16.0   ESTIMATED GFR    Collection Time: 01/03/24  5:33 AM   Result Value Ref Range    GFR (CKD-EPI) 101 >60 mL/min/1.73 m 2   POCT glucose device results    Collection Time: 01/03/24  7:30 AM   Result Value Ref Range    POC Glucose, Blood 143 (H) 65 - 99 mg/dL   POCT glucose device results    Collection Time: 01/03/24 11:17 AM   Result Value Ref Range    POC Glucose, Blood 210 (H) 65 - 99 mg/dL       Medications:  Scheduled Medications   Medication Dose Frequency    febuxostat  40 mg QHS    furosemide  40 mg DAILY    methocarbamol  500 mg 4X/DAY    bacitracin-polymyxin b   BID    insulin lispro  2-12 Units 4X/DAY ACHS    potassium chloride SA  20 mEq DAILY    apixaban  5 mg BID    aspirin  81 mg DAILY    fluticasone-umeclidinium-vilanterol  1 Puff QDAILY (RT)    gabapentin  600 mg TID    insulin GLARGINE  0.2 Units/kg/day Q EVENING    levothyroxine  112 mcg AM ES    lidocaine  1 Patch Q24HRS    magnesium oxide  400 mg DAILY    montelukast  10 mg Q EVENING    omeprazole  20 mg DAILY    rosuvastatin  20 mg Q EVENING    verapamil  80 mg QHS     PRN medications: promethazine, [DISCONTINUED] insulin regular **AND** POC blood glucose manual result **AND** NOTIFY MD and PharmD **AND** Administer 20  grams of glucose (approximately 8 ounces of fruit juice) every 15 minutes PRN FSBG less than 70 mg/dL **AND** dextrose bolus, acetaminophen, Respiratory Therapy Consult, traZODone, sodium chloride, ondansetron **OR** ondansetron, carboxymethylcellulose, levalbuterol, oxyCODONE immediate-release **OR** oxyCODONE immediate release    Diet:  Current Diet Order   Procedures    Diet Order Diet: Consistent CHO (Diabetic)       Medical Decision Making and Plan:  Adapted from Dr. Novak's A&P  Debility secondary to CHF  -Preserved ejection fraction of 60%  -Diastolic heart failure, has cardiac murmur on exam, reports history of mitral regurg  -BNP 4500 --> 158 on admit labs   -Lasix reduced to 40 mg on 1/1  -Consult to hospitalist to help manage  -Admit to [facility] for PT and OT for mobility and ADLs. Per guidelines, 15 hours per week between PT, OT and SLP.     Acute on chronic respiratory failure with hypoxemia  -Secondary to influenza A pneumonia versus volume overload from diastolic heart failure exacerbation  -Started on Tamiflu. Completed Tamiflu     Pancytopenia  -Secondary to flu versus side effect from Tegretol  -Stopping Tegretol  -Monitor with serial labs  -ANC improving, does not require isolation/reverse precautions  -Consult to hospitalist to help manage  - Received Granix x1, counts much improved on 12/31. Repeat 1/3, improving     Hypokalemia   3.1 on 1/3/23, given extra dose of 20 mEq    Diabetes mellitus  -Consult to hospitalist to manage  -Sliding scale insulin 0 to 14 units  -Continue Glargine 24  U and SSI     A-fib, history of stroke  -Eliquis 5 mg twice daily  -Aspirin 81 mg daily  -Rosuvastatin 20 mg q. evening     Left foot wound  -Wound care  -Clindamycin 300 mg 4 times daily. Completed     Pain control  -Tylenol 650 mg every 6 hours as needed  -Gabapentin 600 mg 3 times daily  -Lidocaine patch  - Adding methocarbamol 500 mg QID     COPD  -Singulair 10 mg q. Evening  -Xopenex every 4 hours as  needed  -Trelegy Ellipta 1 puff daily      Hypertension  -Verapamil 80 mg nightly. Continue Verapamil      Hypothyroidism  -Synthroid 112 mcg every morning     Nausea  History of reaction to Zofran  Will change to Phenergan. Continue Phenergan    History of gout  -Uloric 40 mg daily     Skin - Patient at risk for skin breakdown due to debility in areas including sacrum, achilles, elbows and head in addition to other sites. Nursing to assess skin daily.      GI Ppx - Patient on Prilosec for GERD prophylaxis. Patient on Senna-docusate for constipation prophylaxis.      DVT Ppx - Patient Eliquis on transfer.  ____________________________________    T. Orlando Brand MD/PhD  [facility] - Physical Medicine & Rehabilitation   [facility] - Brain Injury Medicine   ____________________________________

## 2024-01-03 NOTE — ASSESSMENT & PLAN NOTE
Echo 12/30/23 EF 60%  BNP resolving  Continue Lasix as tolerated by blood pressure and renal function  Outpt meds include Lasix 40-80 mg qd

## 2024-01-03 NOTE — CARE PLAN
Problem: Fall Risk - Rehab  Goal: Patient will remain free from falls  Note: Pt uses call light consistently and appropriately. Waits for assistance does not attempt self transfer this shift. Able to verbalize needs.     Problem: Infection  Goal: Patient will remain free from infection  Note: Patient remains free from s/s infection; afebrile.  Will continue to monitor.   The patient is Stable - Low risk of patient condition declining or worsening    Shift Goals  Clinical Goals: Safety  Patient Goals: pain control, sleep well  Family Goals: Education

## 2024-01-03 NOTE — THERAPY
Physical Therapy   Daily Treatment     Patient Name: Jenifer Ramos  Age:  63 y.o., Sex:  female  Medical Record #: [id number]  Today's Date: 1/2/2024     Precautions  Precautions: Fall Risk  Comments: L foot wound with PRAFO, flu +    Subjective    Patient is agreeable to participate     Objective       01/02/24 1401   PT Charge Group   PT Gait Training (Units) 2   PT Neuromuscular Re-Education / Balance (Units) 2   PT Therapeutic Activities (Units) 2   PT Total Time Spent   PT Individual Total Time Spent (Mins) 90   Stairs Functional Level of Assist   Level of Assist with Stairs Contact Guard Assist   # of Stairs Climbed 1  (4 inch curb step)   Stairs Description Extra time;Limited by fatigue;Safety concerns;Supervision for safety;Verbal cueing;Walker   Bed Mobility    Sit to Supine Minimal Assist   Sit to Stand Standby Assist   Outcome Measures   Outcome Measures Utilized 5x STS;10 Meter Walk Test;Sanders Balance Scale  (TUG)   5x STS (Five Times Sit to Stand Test)   Height of Sitting Surface (inches) 18   5x Sit to STand (seconds) 47.8   Score Definition Fall Risk   10 Meter Walk Test   Normal - Trial 1 53.2 seconds   Normal - Trial 2 49.6 seconds   Normal - Trial 3 51.4 seconds   Normal Average Time 51.4 seconds   Normal Average Velocity (m/s) 0.12   Sanders Balance Scale   Sitting Unsupported (Score 0-4) 4   Change Of Positon: Sitting To Standing (Score 0-4) 3   Change Of Positon: Standing To Sitting (Score 0-4) 2   Transfers (Score 0-4) 3   Standing Unsupported (Score 0-4) 2   Standing With Eyes Closed (Score 0-4) 2   Standing With Feet Together (Score 0-4) 2   Tandem Standing (Score 0-4) 2   Standing On One Leg (Score 0-4) 0   Turning Trunk (Feet Fixed) (Score 0-4) 2   Retrieving Objects From Floor (Score 0-4) 0   Turning 360 Degrees (Score 0-4) 0   Stool Stepping (Score 0-4) 0   Reaching Forward While Standing (Score 0-4) 3   Sanders Balance Total Score (0-56) 25   Interdisciplinary Plan of Care Collaboration  "  Patient Position at End of Therapy In Bed;Bed Alarm On;Call Light within Reach;Tray Table within Reach;Phone within Reach     Timed \"Up and Go\" Test (TUG)  TUG-Standard: Time Walking Aid used?    Timed Trial 1:  105 seconds a walker   Timed Trial 2:  96 seconds a walker   Mean Time 101 seconds HIGH FALL RISK? Yes     Interpretation of Results for TUG Standard:  Ÿ Anna Parmar, 2000: greater than or equal to 13.5 sec correlated with fall risk in older adults.  Ÿ Sravanthi, 2004: greater than 11.1 sec correlated with increased risk for falls in vestibular patients of all ages.      Assessment    Performed objective measures per plan; patient is a high fall risk in each category. She is aware of her limitations and demonstrates good safety with transfers, gait, balance, and curb step. She will benefit from PT to improve conditioning and safety with pending return to home.     Strengths: Able to follow instructions, Alert and oriented, Good insight into deficits/needs, Pleasant and cooperative, Willingly participates in therapeutic activities  Barriers: Decreased endurance, Fatigue, Generalized weakness, Impaired activity tolerance, Impaired balance, Pain    Plan    gait training with FWW, stair training (she has one 4 inch step to enter her home), overall activity tolerance/endurance, balance training, LE strengthening      DME       Passport items to be completed:  Get in/out of bed safely, in/out of a vehicle, safely use mobility device, walk or wheel around home/community, navigate up and down stairs, show how to get up/down from the ground, ensure home is accessible, demonstrate HEP, complete caregiver training    Physical Therapy Problems (Active)       Problem: Balance       Dates: Start:  12/31/23         Goal: STG-Within one week, patient will maintain static standing without UE support for at least 30 seconds, supervision.        Dates: Start:  12/31/23               Problem: Mobility       Dates: Start:  " 12/31/23         Goal: STG-Within one week, patient will ambulate at least 50 feet with FWW and SBA.        Dates: Start:  12/31/23            Goal: STG-Within one week, patient will ambulate up/down a curb with FWW and min A.        Dates: Start:  12/31/23               Problem: Mobility Transfers       Dates: Start:  12/31/23         Goal: STG-Within one week, patient will transfer bed to chair with LRAD and supervision.        Dates: Start:  12/31/23               Problem: PT-Long Term Goals       Dates: Start:  12/31/23         Goal: LTG-By discharge, patient will tolerate standing for at least 1 minute without UE, while reaching outside CHANO to perform ADLs with supervision.        Dates: Start:  12/31/23            Goal: LTG-By discharge, patient will ambulate at least 150 feet with FWW and supervision.        Dates: Start:  12/31/23            Goal: LTG-By discharge, patient will transfer one surface to another with LRAD and mod I.        Dates: Start:  12/31/23            Goal: LTG-By discharge, patient will transfer in/out of a car with LRAD and min A.        Dates: Start:  12/31/23            Goal: LTG-By discharge, patient will negotiate up and down a 4inch step with FWW and CGA (to simulate home entry).        Dates: Start:  12/31/23

## 2024-01-03 NOTE — PROGRESS NOTES
..                                                         NURSING DAILY NOTE    Name: Jenifer Ramos   Date of Admission: 12/30/2023   Admitting Diagnosis: No Principal Problem: There is no principal problem currently on the Problem List. Please update the Problem List and refresh.  Attending Physician: Doron Brand M.d.  Allergies: Azithromycin, Erythromycin, Other misc, Penicillins, Pistachio, Sulfa drugs, Tetraglycine, Other drug, Physostigmine, Tape, Zanaflex [tizanidine hcl], Albuterol, Atorvastatin, Chlorhexidine, Lamisil [terbinafine hcl], and Morphine    Safety  Patient Assist  mod  Patient Precautions  Fall Risk  Precaution Comments  L foot wound with PRAFO, flu +  Bed Transfer Status  Standby Assist  Toilet Transfer Status   Standby Assist  Assistive Devices  Rails, Walker - front wheel, Wheelchair  Oxygen  None - Room Air  Diet/Therapeutic Dining  Current Diet Order   Procedures    Diet Order Diet: Consistent CHO (Diabetic)     Pill Administration  whole  Agitated Behavioral Scale     ABS Level of Severity       Fall Risk  Has the patient had a fall this admission?   No  Sophia Smith Fall Risk Scoring  18, HIGH RISK  Fall Risk Safety Measures  bed alarm, chair alarm, seatbelt alarm, poor balance, low vision/ hearing, and ok to leave pt in bathroom    Vitals  Temperature: 36.1 °C (97 °F)  Temp src: Temporal  Pulse: 90  Respiration: 18  Blood Pressure : 113/68  Blood Pressure MAP (Calculated): 83 MM HG  BP Location: Right, Upper Arm  Patient BP Position: Sitting     Oxygen  Pulse Oximetry: 94 %  O2 (LPM): 1  O2 Delivery Device: None - Room Air    Bowel and Bladder  Last Bowel Movement  01/02/24  Stool Type  Type 5: Soft blob with clear cut edges (passed easily)  Bowel Device  Bathroom  Continent  Bladder: Did not void   Bowel: No movement  Bladder Function  Urine Void (mL):  (Moderate)  Number of Times Voided: 1  Urine Color: Yellow  Genitourinary Assessment   Bladder Assessment (WDL):   Within Defined Limits  Llamas Catheter: Not Applicable  Urine Color: Yellow  Bladder Device: Bathroom  Bladder Scan: Post Void  $ Bladder Scan Results (mL): 1    Skin  Guille Score   18  Sensory Interventions   Bed Types: Standard/Trauma Mattress  Skin Preventative Measures: Pillows in Use for Support / Positioning  Moisture Interventions  Moisturizers/Barriers: Barrier Wipes      Pain  Pain Rating Scale  7 - Focus of attention, prevents doing daily activities  Pain Location  Neck  Pain Location Orientation  Posterior  Pain Interventions   Medication (see MAR)    ADLs    Bathing      Linen Change      Personal Hygiene  Moist Antionette Wipes, Perineal Care  Chlorhexidine Bath      Oral Care  Brushed Teeth  Teeth/Dentures     Shave     Nutrition Percentage Eaten  *  * Meal *  *, Breakfast, 0% Consumed, Refused  Environmental Precautions  Treaded Slipper Socks on Patient, Personal Belongings, Wastebasket, Call Bell etc. in Easy Reach, Transferred to Stronger Side, Report Given to Other Health Care Providers Regarding Fall Risk, Bed in Low Position  Patient Turns/Positioning  Patient Turns Self from Side to Side  Patient Turns Assistance/Tolerance     Bed Positions  Bed Controls On  Head of Bed Elevated  Self regulated      Psychosocial/Neurologic Assessment  Psychosocial Assessment  Psychosocial (WDL):  Within Defined Limits  Patient Behaviors: Anxious, Irritable  Neurologic Assessment  Neuro (WDL): Exceptions to WDL  Level of Consciousness: Alert  Orientation Level: Oriented X4  Cognition: Appropriate safety awareness  Speech: Clear  Motor Function/Sensation Assessment: Motor strength  Muscle Strength Right Arm: Good Strength Against Gravity and Moderate Resistance  Muscle Strength Left Arm: Fair Strength against Gravity but No Resistance  Muscle Strength Right Leg: Good Strength Against Gravity and Moderate Resistance  Muscle Strength Left Leg: Fair Strength against Gravity but No Resistance  EENT (WDL):  WDL  Except    Cardio/Pulmonary Assessment  Edema   RLE Edema: 2+, 3+  LLE Edema: 2+, 3+  Respiratory Breath Sounds  RUL Breath Sounds: Diminished  RML Breath Sounds: Diminished  RLL Breath Sounds: Diminished  DANIELLE Breath Sounds: Diminished  LLL Breath Sounds: Diminished  Cardiac Assessment   Cardiac (WDL):  WDL Except (CHF)

## 2024-01-04 ENCOUNTER — APPOINTMENT (OUTPATIENT)
Dept: PHYSICAL THERAPY | Facility: REHABILITATION | Age: 64
DRG: 291 | End: 2024-01-04
Attending: PHYSICAL MEDICINE & REHABILITATION
Payer: MEDICARE

## 2024-01-04 ENCOUNTER — APPOINTMENT (OUTPATIENT)
Dept: INPATIENT REHAB | Facility: REHABILITATION | Age: 64
DRG: 291 | End: 2024-01-04
Payer: MEDICARE

## 2024-01-04 ENCOUNTER — APPOINTMENT (OUTPATIENT)
Dept: OCCUPATIONAL THERAPY | Facility: REHABILITATION | Age: 64
DRG: 291 | End: 2024-01-04
Attending: PHYSICAL MEDICINE & REHABILITATION
Payer: MEDICARE

## 2024-01-04 LAB
GLUCOSE BLD STRIP.AUTO-MCNC: 132 MG/DL (ref 65–99)
GLUCOSE BLD STRIP.AUTO-MCNC: 135 MG/DL (ref 65–99)
GLUCOSE BLD STRIP.AUTO-MCNC: 184 MG/DL (ref 65–99)
GLUCOSE BLD STRIP.AUTO-MCNC: 199 MG/DL (ref 65–99)

## 2024-01-04 PROCEDURE — 99233 SBSQ HOSP IP/OBS HIGH 50: CPT | Performed by: PHYSICAL MEDICINE & REHABILITATION

## 2024-01-04 PROCEDURE — 94760 N-INVAS EAR/PLS OXIMETRY 1: CPT

## 2024-01-04 PROCEDURE — 97110 THERAPEUTIC EXERCISES: CPT | Mod: CO

## 2024-01-04 PROCEDURE — 97535 SELF CARE MNGMENT TRAINING: CPT | Mod: CO

## 2024-01-04 PROCEDURE — A9270 NON-COVERED ITEM OR SERVICE: HCPCS | Performed by: PHYSICAL MEDICINE & REHABILITATION

## 2024-01-04 PROCEDURE — 99232 SBSQ HOSP IP/OBS MODERATE 35: CPT | Performed by: HOSPITALIST

## 2024-01-04 PROCEDURE — 82962 GLUCOSE BLOOD TEST: CPT | Mod: 91

## 2024-01-04 PROCEDURE — 700102 HCHG RX REV CODE 250 W/ 637 OVERRIDE(OP): Performed by: PHYSICAL MEDICINE & REHABILITATION

## 2024-01-04 PROCEDURE — 94640 AIRWAY INHALATION TREATMENT: CPT

## 2024-01-04 PROCEDURE — 700102 HCHG RX REV CODE 250 W/ 637 OVERRIDE(OP): Performed by: HOSPITALIST

## 2024-01-04 PROCEDURE — 97530 THERAPEUTIC ACTIVITIES: CPT

## 2024-01-04 PROCEDURE — 770010 HCHG ROOM/CARE - REHAB SEMI PRIVAT*

## 2024-01-04 PROCEDURE — A9270 NON-COVERED ITEM OR SERVICE: HCPCS | Performed by: HOSPITALIST

## 2024-01-04 PROCEDURE — 97116 GAIT TRAINING THERAPY: CPT

## 2024-01-04 PROCEDURE — 97110 THERAPEUTIC EXERCISES: CPT

## 2024-01-04 RX ORDER — HYDROXYZINE HYDROCHLORIDE 25 MG/1
25 TABLET, FILM COATED ORAL 3 TIMES DAILY PRN
Status: DISCONTINUED | OUTPATIENT
Start: 2024-01-04 | End: 2024-01-06 | Stop reason: HOSPADM

## 2024-01-04 RX ORDER — FUROSEMIDE 40 MG/1
40 TABLET ORAL DAILY
Status: DISCONTINUED | OUTPATIENT
Start: 2024-01-05 | End: 2024-01-05

## 2024-01-04 RX ORDER — CARBAMAZEPINE 100 MG/1
200 TABLET, CHEWABLE ORAL 3 TIMES DAILY
Status: DISCONTINUED | OUTPATIENT
Start: 2024-01-04 | End: 2024-01-06 | Stop reason: HOSPADM

## 2024-01-04 RX ADMIN — BACITRACIN ZINC AND POLYMYXIN B SULFATE: at 21:03

## 2024-01-04 RX ADMIN — GABAPENTIN 600 MG: 300 CAPSULE ORAL at 21:02

## 2024-01-04 RX ADMIN — VERAPAMIL HYDROCHLORIDE 80 MG: 80 TABLET ORAL at 21:03

## 2024-01-04 RX ADMIN — ROSUVASTATIN CALCIUM 20 MG: 10 TABLET, FILM COATED ORAL at 21:02

## 2024-01-04 RX ADMIN — METHOCARBAMOL 500 MG: 500 TABLET ORAL at 16:46

## 2024-01-04 RX ADMIN — MONTELUKAST 10 MG: 10 TABLET, FILM COATED ORAL at 21:03

## 2024-01-04 RX ADMIN — GABAPENTIN 600 MG: 300 CAPSULE ORAL at 07:51

## 2024-01-04 RX ADMIN — METHOCARBAMOL 500 MG: 500 TABLET ORAL at 14:12

## 2024-01-04 RX ADMIN — FEBUXOSTAT 40 MG: 40 TABLET, FILM COATED ORAL at 21:03

## 2024-01-04 RX ADMIN — APIXABAN 5 MG: 5 TABLET, FILM COATED ORAL at 07:51

## 2024-01-04 RX ADMIN — INSULIN LISPRO 2 UNITS: 100 INJECTION, SOLUTION INTRAVENOUS; SUBCUTANEOUS at 21:09

## 2024-01-04 RX ADMIN — GABAPENTIN 600 MG: 300 CAPSULE ORAL at 14:12

## 2024-01-04 RX ADMIN — BACITRACIN ZINC AND POLYMYXIN B SULFATE: at 07:53

## 2024-01-04 RX ADMIN — METHOCARBAMOL 500 MG: 500 TABLET ORAL at 07:51

## 2024-01-04 RX ADMIN — METHOCARBAMOL 500 MG: 500 TABLET ORAL at 21:02

## 2024-01-04 RX ADMIN — OXYCODONE HYDROCHLORIDE 10 MG: 10 TABLET ORAL at 01:33

## 2024-01-04 RX ADMIN — OMEPRAZOLE 20 MG: 20 CAPSULE, DELAYED RELEASE ORAL at 07:50

## 2024-01-04 RX ADMIN — INSULIN LISPRO 2 UNITS: 100 INJECTION, SOLUTION INTRAVENOUS; SUBCUTANEOUS at 11:42

## 2024-01-04 RX ADMIN — FUROSEMIDE 40 MG: 40 TABLET ORAL at 07:54

## 2024-01-04 RX ADMIN — TRAZODONE HYDROCHLORIDE 50 MG: 50 TABLET ORAL at 01:33

## 2024-01-04 RX ADMIN — CARBAMAZEPINE 200 MG: 100 TABLET, CHEWABLE ORAL at 14:12

## 2024-01-04 RX ADMIN — APIXABAN 5 MG: 5 TABLET, FILM COATED ORAL at 21:02

## 2024-01-04 RX ADMIN — POTASSIUM CHLORIDE 20 MEQ: 1500 TABLET, EXTENDED RELEASE ORAL at 07:51

## 2024-01-04 RX ADMIN — CARBAMAZEPINE 200 MG: 100 TABLET, CHEWABLE ORAL at 21:02

## 2024-01-04 RX ADMIN — ASPIRIN 81 MG: 81 TABLET, COATED ORAL at 07:51

## 2024-01-04 RX ADMIN — LEVOTHYROXINE SODIUM 112 MCG: 0.11 TABLET ORAL at 06:27

## 2024-01-04 SDOH — ECONOMIC STABILITY: TRANSPORTATION INSECURITY
IN THE PAST 12 MONTHS, HAS LACK OF RELIABLE TRANSPORTATION KEPT YOU FROM MEDICAL APPOINTMENTS, MEETINGS, WORK OR FROM GETTING THINGS NEEDED FOR DAILY LIVING?: NO

## 2024-01-04 SDOH — ECONOMIC STABILITY: TRANSPORTATION INSECURITY
IN THE PAST 12 MONTHS, HAS THE LACK OF TRANSPORTATION KEPT YOU FROM MEDICAL APPOINTMENTS OR FROM GETTING MEDICATIONS?: NO

## 2024-01-04 ASSESSMENT — ENCOUNTER SYMPTOMS
EYES NEGATIVE: 1
FEVER: 0
NAUSEA: 0
POLYDIPSIA: 0
SHORTNESS OF BREATH: 0
CHILLS: 0
COUGH: 0
VOMITING: 0
PALPITATIONS: 0
ABDOMINAL PAIN: 0
BRUISES/BLEEDS EASILY: 0

## 2024-01-04 ASSESSMENT — GAIT ASSESSMENTS
GAIT LEVEL OF ASSIST: STANDBY ASSIST
GAIT LEVEL OF ASSIST: STANDBY ASSIST
DISTANCE (FEET): 110
ASSISTIVE DEVICE: FRONT WHEEL WALKER
DEVIATION: DECREASED TOE OFF;DECREASED HEEL STRIKE;BRADYKINETIC
DEVIATION: DECREASED TOE OFF;DECREASED HEEL STRIKE;BRADYKINETIC
ASSISTIVE DEVICE: FRONT WHEEL WALKER
DISTANCE (FEET): 200

## 2024-01-04 ASSESSMENT — ACTIVITIES OF DAILY LIVING (ADL)
TOILET_TRANSFER_DESCRIPTION: ADAPTIVE EQUIPMENT;INCREASED TIME;INITIAL PREPARATION FOR TASK;SUPERVISION FOR SAFETY
TOILETING_LEVEL_OF_ASSIST_DESCRIPTION: ADAPTIVE EQUIPMENT;GRAB BAR;INCREASED TIME;SUPERVISION FOR SAFETY
BED_CHAIR_WHEELCHAIR_TRANSFER_DESCRIPTION: ADAPTIVE EQUIPMENT;INCREASED TIME;INITIAL PREPARATION FOR TASK;SUPERVISION FOR SAFETY;VERBAL CUEING
BED_CHAIR_WHEELCHAIR_TRANSFER_DESCRIPTION: SUPERVISION FOR SAFETY;ADAPTIVE EQUIPMENT;INCREASED TIME

## 2024-01-04 ASSESSMENT — 6 MINUTE WALK TEST (6MWT)
NUMBER OF RESTS: 0
TOTAL DISTANCE WALKED (FT): 195
GAIT SPEED - METERS PER SECOND: 0.17
LEVEL OF ASSISTANCE: MINIMUM ASSISTANCE

## 2024-01-04 ASSESSMENT — PAIN DESCRIPTION - PAIN TYPE: TYPE: ACUTE PAIN

## 2024-01-04 NOTE — CARE PLAN
Problem: Dressing  Goal: STG-Within one week, patient will dress UB with supervision.   Outcome: Progressing  Goal: STG-Within one week, patient will dress LB with SBA.   Outcome: Progressing     Problem: Toileting  Goal: STG-Within one week, patient will complete toileting (hygiene and pant management) and toilet transfer with CGA-SBA.   Outcome: Met

## 2024-01-04 NOTE — PROGRESS NOTES
"  Physical Medicine & Rehabilitation Progress Note    Encounter Date: 1/4/2024    Chief Complaint: Decreased mobility, weakness    Interval Events (Subjective):  Patient sitting up in room. She reports she is having her trigeminal neuralgia pain again. She reports only tegretol has been found to help. Discussed would restart as labs have improved. She reports otherwise some anxiety. Will start Atarax for anxiety     _____________________________________  Interdisciplinary Team Conference   Most recent IDT on 1/4/2024    IDoron M.D./Ph.D., was present and led the interdisciplinary team conference on 1/4/2024.  I led the IDT conference and agree with the IDT conference documentation and plan of care as noted below.     Nursing:  Diet Current Diet Order   Procedures    Diet Order Diet: Consistent CHO (Diabetic)       Eating ADL Independent      % of Last Meal  Oral Nutrition: Lunch, Between % Consumed   Sleep    Bowel Last BM: 01/04/24   Bladder    Barriers to Discharge Home:      Physical Therapy:  Bed Mobility    Transfers Standby Assist  Adaptive equipment, Increased time, Initial preparation for task, Supervision for safety, Verbal cueing (Stand step transfer using FWW)   Mobility Standby Assist   Stairs    Barriers to Discharge Home:  Baseline - self limited    Occupational Therapy:  Grooming Independent (seated at sink oral care  wash/dry face and hands  comb hair)   Bathing Minimal Assist   UB Dressing Stand by Assist   LB Dressing Moderate Assist   Toileting Supervision   Shower & Transfer    Barriers to Discharge Home:  Foot    Respiratory Therapy:  O2 (LPM): 3  O2 Delivery Device: Silicone Nasal Cannula    Case Management:  Continues to work on disposition and DME needs.      Discharge Date/Disposition:  1/6/24  _____________________________________      Objective:  VITAL SIGNS: BP 98/60   Pulse 70   Temp 36.6 °C (97.8 °F) (Tympanic)   Resp 18   Ht 1.727 m (5' 7.99\")   Wt 116 " kg (256 lb)   SpO2 96%   BMI 38.93 kg/m²   Gen: NAD  Psych: Mood and affect appropriate  CV: RRR, 1+ edema  Resp: CTAB, no upper airway sounds  Abd: NTND  Neuro: AOx4, following commands  Unchanged from 1/4/24    Laboratory Values:  Recent Results (from the past 72 hour(s))   POCT glucose device results    Collection Time: 01/01/24  5:19 PM   Result Value Ref Range    POC Glucose, Blood 108 (H) 65 - 99 mg/dL   POCT glucose device results    Collection Time: 01/01/24  9:19 PM   Result Value Ref Range    POC Glucose, Blood 187 (H) 65 - 99 mg/dL   CBC WITH DIFFERENTIAL    Collection Time: 01/02/24  5:52 AM   Result Value Ref Range    WBC 5.5 4.8 - 10.8 K/uL    RBC 4.78 4.20 - 5.40 M/uL    Hemoglobin 11.4 (L) 12.0 - 16.0 g/dL    Hematocrit 38.5 37.0 - 47.0 %    MCV 80.5 (L) 81.4 - 97.8 fL    MCH 23.8 (L) 27.0 - 33.0 pg    MCHC 29.6 (L) 32.2 - 35.5 g/dL    RDW 54.8 (H) 35.9 - 50.0 fL    Platelet Count 105 (L) 164 - 446 K/uL    MPV 11.3 9.0 - 12.9 fL    Neutrophils-Polys 70.00 44.00 - 72.00 %    Lymphocytes 21.80 (L) 22.00 - 41.00 %    Monocytes 7.30 0.00 - 13.40 %    Eosinophils 0.00 0.00 - 6.90 %    Basophils 0.40 0.00 - 1.80 %    Immature Granulocytes 0.50 0.00 - 0.90 %    Nucleated RBC 0.00 0.00 - 0.20 /100 WBC    Neutrophils (Absolute) 3.83 1.82 - 7.42 K/uL    Lymphs (Absolute) 1.19 1.00 - 4.80 K/uL    Monos (Absolute) 0.40 0.00 - 0.85 K/uL    Eos (Absolute) 0.00 0.00 - 0.51 K/uL    Baso (Absolute) 0.02 0.00 - 0.12 K/uL    Immature Granulocytes (abs) 0.03 0.00 - 0.11 K/uL    NRBC (Absolute) 0.00 K/uL    Hypochromia 1+     Anisocytosis 1+     Microcytosis 2+ (A)    PLATELET ESTIMATE    Collection Time: 01/02/24  5:52 AM   Result Value Ref Range    Plt Estimation Decreased    MORPHOLOGY    Collection Time: 01/02/24  5:52 AM   Result Value Ref Range    RBC Morphology Present     Ovalocytes 1+     Toxic Gran Few     Toxic Vacuoles Few    PERIPHERAL SMEAR REVIEW    Collection Time: 01/02/24  5:52 AM   Result Value Ref  Range    Peripheral Smear Review see below    DIFFERENTIAL COMMENT    Collection Time: 01/02/24  5:52 AM   Result Value Ref Range    Comments-Diff see below    POCT glucose device results    Collection Time: 01/02/24  8:16 AM   Result Value Ref Range    POC Glucose, Blood 171 (H) 65 - 99 mg/dL   POCT glucose device results    Collection Time: 01/02/24 11:16 AM   Result Value Ref Range    POC Glucose, Blood 143 (H) 65 - 99 mg/dL   POCT glucose device results    Collection Time: 01/02/24  4:51 PM   Result Value Ref Range    POC Glucose, Blood 164 (H) 65 - 99 mg/dL   POCT glucose device results    Collection Time: 01/02/24  9:26 PM   Result Value Ref Range    POC Glucose, Blood 191 (H) 65 - 99 mg/dL   MAGNESIUM    Collection Time: 01/03/24  5:33 AM   Result Value Ref Range    Magnesium 1.9 1.5 - 2.5 mg/dL   PHOSPHORUS    Collection Time: 01/03/24  5:33 AM   Result Value Ref Range    Phosphorus 3.2 2.5 - 4.5 mg/dL   CBC WITHOUT DIFFERENTIAL    Collection Time: 01/03/24  5:33 AM   Result Value Ref Range    WBC 4.4 (L) 4.8 - 10.8 K/uL    RBC 4.58 4.20 - 5.40 M/uL    Hemoglobin 11.0 (L) 12.0 - 16.0 g/dL    Hematocrit 37.4 37.0 - 47.0 %    MCV 81.7 81.4 - 97.8 fL    MCH 24.0 (L) 27.0 - 33.0 pg    MCHC 29.4 (L) 32.2 - 35.5 g/dL    RDW 57.3 (H) 35.9 - 50.0 fL    Platelet Count 118 (L) 164 - 446 K/uL    MPV 11.7 9.0 - 12.9 fL   Basic Metabolic Panel    Collection Time: 01/03/24  5:33 AM   Result Value Ref Range    Sodium 136 135 - 145 mmol/L    Potassium 3.1 (L) 3.6 - 5.5 mmol/L    Chloride 95 (L) 96 - 112 mmol/L    Co2 31 20 - 33 mmol/L    Glucose 163 (H) 65 - 99 mg/dL    Bun 10 8 - 22 mg/dL    Creatinine 0.56 0.50 - 1.40 mg/dL    Calcium 8.6 8.5 - 10.5 mg/dL    Anion Gap 10.0 7.0 - 16.0   ESTIMATED GFR    Collection Time: 01/03/24  5:33 AM   Result Value Ref Range    GFR (CKD-EPI) 101 >60 mL/min/1.73 m 2   POCT glucose device results    Collection Time: 01/03/24  7:30 AM   Result Value Ref Range    POC Glucose, Blood 143 (H)  65 - 99 mg/dL   POCT glucose device results    Collection Time: 01/03/24 11:17 AM   Result Value Ref Range    POC Glucose, Blood 210 (H) 65 - 99 mg/dL   POCT glucose device results    Collection Time: 01/03/24  5:14 PM   Result Value Ref Range    POC Glucose, Blood 193 (H) 65 - 99 mg/dL   POCT glucose device results    Collection Time: 01/03/24  9:38 PM   Result Value Ref Range    POC Glucose, Blood 175 (H) 65 - 99 mg/dL   POCT glucose device results    Collection Time: 01/04/24  7:19 AM   Result Value Ref Range    POC Glucose, Blood 135 (H) 65 - 99 mg/dL   POCT glucose device results    Collection Time: 01/04/24 11:18 AM   Result Value Ref Range    POC Glucose, Blood 184 (H) 65 - 99 mg/dL       Medications:  Scheduled Medications   Medication Dose Frequency    insulin GLARGINE  26 Units Q EVENING    febuxostat  40 mg QHS    furosemide  40 mg DAILY    methocarbamol  500 mg 4X/DAY    bacitracin-polymyxin b   BID    insulin lispro  2-12 Units 4X/DAY ACHS    potassium chloride SA  20 mEq DAILY    apixaban  5 mg BID    aspirin  81 mg DAILY    fluticasone-umeclidinium-vilanterol  1 Puff QDAILY (RT)    gabapentin  600 mg TID    levothyroxine  112 mcg AM ES    lidocaine  1 Patch Q24HRS    montelukast  10 mg Q EVENING    omeprazole  20 mg DAILY    rosuvastatin  20 mg Q EVENING    verapamil  80 mg QHS     PRN medications: promethazine, [DISCONTINUED] insulin regular **AND** POC blood glucose manual result **AND** NOTIFY MD and PharmD **AND** Administer 20 grams of glucose (approximately 8 ounces of fruit juice) every 15 minutes PRN FSBG less than 70 mg/dL **AND** dextrose bolus, acetaminophen, Respiratory Therapy Consult, traZODone, sodium chloride, ondansetron **OR** ondansetron, carboxymethylcellulose, levalbuterol, oxyCODONE immediate-release **OR** oxyCODONE immediate release    Diet:  Current Diet Order   Procedures    Diet Order Diet: Consistent CHO (Diabetic)       Medical Decision Making and Plan:  Adapted from   [facility]  Debility secondary to CHF  -Preserved ejection fraction of 60%  -Diastolic heart failure, has cardiac murmur on exam, reports history of mitral regurg  -BNP 4500 --> 158 on admit labs   -Lasix reduced to 40 mg on 1/1  -Consult to hospitalist to help manage  -Admit to [facility] for PT and OT for mobility and ADLs. Per guidelines, 15 hours per week between PT, OT and SLP.     Acute on chronic respiratory failure with hypoxemia  -Secondary to influenza A pneumonia versus volume overload from diastolic heart failure exacerbation  -Started on Tamiflu. Completed Tamiflu     Pancytopenia  -Secondary to flu versus side effect from Tegretol  -Stopping Tegretol  -Monitor with serial labs  -ANC improving, does not require isolation/reverse precautions  -Consult to hospitalist to help manage  - Received Granix x1, counts much improved on 12/31. Repeat 1/3, improving  -Restart Tegretol 200 mg TID     Hypokalemia   3.1 on 1/3/23, given extra dose of 20 mEq    Diabetes mellitus  -Consult to hospitalist to manage  -Sliding scale insulin 0 to 14 units  -Continue Glargine 24  U and SSI     A-fib, history of stroke  -Eliquis 5 mg twice daily  -Aspirin 81 mg daily  -Rosuvastatin 20 mg q. evening     Left foot wound  -Wound care  -Clindamycin 300 mg 4 times daily. Completed     Pain control  -Tylenol 650 mg every 6 hours as needed  -Gabapentin 600 mg 3 times daily  -Lidocaine patch  - Adding methocarbamol 500 mg QID     COPD  -Singulair 10 mg q. Evening  -Xopenex every 4 hours as needed  -Trelegy Ellipta 1 puff daily      Hypertension  -Verapamil 80 mg nightly. Continue Verapamil      Hypothyroidism  -Synthroid 112 mcg every morning     Anxiety  PRN Atarax    Nausea  History of reaction to Zofran  Will change to Phenergan. Continue Phenergan    History of gout  -Uloric 40 mg daily     Skin - Patient at risk for skin breakdown due to debility in areas including sacrum, achilles, elbows and head in addition to other sites.  Nursing to assess skin daily.      GI Ppx - Patient on Prilosec for GERD prophylaxis. Patient on Senna-docusate for constipation prophylaxis.      DVT Ppx - Patient Eliquis on transfer.  ____________________________________    T. Orlando Brand MD/PhD  [facility] - Physical Medicine & Rehabilitation   [facility] - Brain Injury Medicine   ____________________________________    Total time:  50 minutes. Time spent included pre-rounding review of vitals and tests, unit/floor time, face-to-face time with the patient including physical examination, care coordination, counseling of patient and/or family, ordering medications/procedures/tests, discussion with CM, PT, OT, SLP and/or other healthcare providers, and documentation in the electronic medical record. Topics discussed included discharge planning, trigeminal neuralgia, anxiety, start Tegretol and start Atarax. Patient was discussed separately in IDT today; please see details above.

## 2024-01-04 NOTE — PROGRESS NOTES
NURSING DAILY NOTE    Name: Jenifer Ramos   Date of Admission: 12/30/2023   Admitting Diagnosis: No Principal Problem: There is no principal problem currently on the Problem List. Please update the Problem List and refresh.  Attending Physician: Doron Brand M.d.  Allergies: Azithromycin, Erythromycin, Other misc, Penicillins, Pistachio, Sulfa drugs, Tetraglycine, Other drug, Physostigmine, Tape, Zanaflex [tizanidine hcl], Albuterol, Atorvastatin, Chlorhexidine, Lamisil [terbinafine hcl], and Morphine    Safety  Patient Assist  mod  Patient Precautions  Fall Risk  Precaution Comments  L foot wound with PRAFO, flu + (no longer on droplet precaution as of 01/03)  Bed Transfer Status  Standby Assist  Toilet Transfer Status   Supervised  Assistive Devices  Rails, Wheelchair  Oxygen  Silicone Nasal Cannula  Diet/Therapeutic Dining  Current Diet Order   Procedures    Diet Order Diet: Consistent CHO (Diabetic)     Pill Administration  whole  Agitated Behavioral Scale  19  ABS Level of Severity  No Agitation    Fall Risk  Has the patient had a fall this admission?   No  Sophia Smith Fall Risk Scoring  18, HIGH RISK  Fall Risk Safety Measures  bed alarm, chair alarm, poor balance, and low vision/ hearing    Vitals  Temperature: 36.6 °C (97.8 °F)  Temp src: Tympanic  Pulse: 70  Respiration: 18  Blood Pressure : 98/60  Blood Pressure MAP (Calculated): 73 MM HG  BP Location: Right, Upper Arm  Patient BP Position: Supine     Oxygen  Pulse Oximetry: 96 %  O2 (LPM): 3  O2 Delivery Device: Silicone Nasal Cannula    Bowel and Bladder  Last Bowel Movement  01/04/24  Stool Type  Type 5: Soft blob with clear cut edges (passed easily)  Bowel Device  Bathroom  Continent  Bladder: Did not void   Bowel: No movement  Bladder Function  Urine Void (mL):  (Moderate)  Number of Times Voided: 1  Urine Color: Yellow  Genitourinary Assessment   Bladder Assessment (WDL):   Within Defined Limits  Llamas Catheter: Not Applicable  Urine Color: Yellow  Bladder Device: Bathroom  Bladder Scan: Post Void  $ Bladder Scan Results (mL): 1    Skin  Guille Score   18  Sensory Interventions   Bed Types: Standard/Trauma Mattress  Skin Preventative Measures: Pillows in Use for Support / Positioning  Moisture Interventions  Moisturizers/Barriers: Barrier Wipes      Pain  Pain Rating Scale  0 - No Pain  Pain Location  Generalized  Pain Location Orientation  Posterior, Left  Pain Interventions   Declines    ADLs    Bathing      Linen Change      Personal Hygiene  Perineal Care, Moist Antionette Wipes  Chlorhexidine Bath      Oral Care  Brushed Teeth  Teeth/Dentures     Shave     Nutrition Percentage Eaten  Lunch, Between % Consumed  Environmental Precautions  Treaded Slipper Socks on Patient, Personal Belongings, Wastebasket, Call Bell etc. in Easy Reach, Bed in Low Position  Patient Turns/Positioning  Patient Turns Self from Side to Side  Patient Turns Assistance/Tolerance     Bed Positions  Bed Controls On  Head of Bed Elevated  Self regulated      Psychosocial/Neurologic Assessment  Psychosocial Assessment  Psychosocial (WDL):  WDL Except  Patient Behaviors: Irritable  Neurologic Assessment  Neuro (WDL): Exceptions to WDL  Level of Consciousness: Alert  Orientation Level: Oriented X4  Cognition: Appropriate safety awareness  Speech: Clear  Pupil Assesment: No  Motor Function/Sensation Assessment: Motor strength  Muscle Strength Right Arm: Good Strength Against Gravity and Moderate Resistance  Muscle Strength Left Arm: Fair Strength against Gravity but No Resistance  Muscle Strength Right Leg: Good Strength Against Gravity and Moderate Resistance  Muscle Strength Left Leg: Fair Strength against Gravity but No Resistance  EENT (WDL):  WDL Except    Cardio/Pulmonary Assessment  Edema   RLE Edema: 2+  LLE Edema: 2+  Respiratory Breath Sounds  RUL Breath Sounds: Clear  RML Breath Sounds: Diminished  RLL  Breath Sounds: Diminished  DANIELLE Breath Sounds: Clear  LLL Breath Sounds: Diminished  Cardiac Assessment   Cardiac (WDL):  WDL Except (hx afib, chf)

## 2024-01-04 NOTE — CARE PLAN
The patient is Stable - Low risk of patient condition declining or worsening    Shift Goals  Clinical Goals: safety  Patient Goals: sleep well, pain control  Family Goals: Education    Progress made toward(s) clinical / shift goals:    Problem: Fall Risk - Rehab  Goal: Patient will remain free from falls  Outcome: Progressing. Patient bed in lowest locked position with bed alarm on and call light within reach. Patient has not attempted to self transfer over shift. Patient calls appropriately with call light for needs.      Problem: Pain - Standard  Goal: Alleviation of pain or a reduction in pain to the patient’s comfort goal  Outcome: Progressing. Patient requested prn oxycodone 10 mg twice over shift for left shoulder and posterior neck pain 8/10, Patient stated prn oxycodone helped reduce pain to 4-5/10 over all. Patient uses heat packs to left shoulder and posterior neck as needed overnight as well.

## 2024-01-04 NOTE — DISCHARGE PLANNING
CASE MANAGEMENT INITIAL ASSESSMENT    Admit Date:  12/30/2023     I met w/ pt and spouse to met with pt to discuss role of case management / discharge planning / team conference.   Patient is a  63 y.o. female transferred from [facility] where she was hospitalized from [facility].     DC plan:  Return home where she lives w/ her spouse, Demetrio and son, Obed in [facility] w/ [address] in [place], [place].  Pt has wc, fww, 4ww and home 02 @ home; family assisted w/ ADL's @ home prior to admission to [facility].  Anticipate home health - L foot wound w/ PRAFO;    Physical address is [address] [place] [id number]     PCP; Adrián Duckworth     Diagnosis: Acute on chronic respiratory failure with hypoxemia ([facility]) [J96.21]  CHF with cardiomyopathy ([facility]) [I50.9, I42.9]    Co-morbidities:   Patient Active Problem List    Diagnosis Date Noted    Edema 01/03/2024    Gout 01/03/2024    CHF with cardiomyopathy (HCC) 12/30/2023    Diabetes mellitus type 2, insulin dependent (HCC) 12/30/2023    Essential hypertension 12/30/2023    Influenza A 12/28/2023    Chronic ulcer of left foot due to diabetes mellitus ([facility]) 12/28/2023    Morbid obesity ([facility]) 06/21/2023    History of stroke 06/21/2023    Lumbar compression fracture, closed, initial encounter ([facility]) 06/20/2023    PAF (paroxysmal atrial fibrillation) ([facility]) 05/12/2023    Diastolic dysfunction with chronic heart failure (HCC) 05/12/2023    Acute on chronic respiratory failure with hypoxia ([facility]) 05/12/2023    Pulmonary hypertension (HCC) 08/19/2022    Pancytopenia (HCC) 08/12/2022    Lower extremity edema 08/12/2022    COVID-19 08/12/2022    Pulmonary nodule 07/21/2022    Cirrhosis (HCC) 07/21/2022    Hypothyroidism 07/21/2022    GERD (gastroesophageal reflux disease) 07/21/2022    Microcytic anemia 07/21/2022    Hypokalemia 07/21/2022    Asthma with exacerbation 04/24/2013    Sleep apnea 04/24/2013    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 Continue to follow patient through hospitalization and provide discharge planning in collaboration with patient, family, physicians and ancillary services.     2. Utilize community resources to ensure a safe discharge.

## 2024-01-04 NOTE — PROGRESS NOTES
NURSING DAILY NOTE    Name: Jenifer Ramos   Date of Admission: 12/30/2023   Admitting Diagnosis: No Principal Problem: There is no principal problem currently on the Problem List. Please update the Problem List and refresh.  Attending Physician: Doron Brand M.d.  Allergies: Azithromycin, Erythromycin, Other misc, Penicillins, Pistachio, Sulfa drugs, Tetraglycine, Other drug, Physostigmine, Tape, Zanaflex [tizanidine hcl], Albuterol, Atorvastatin, Chlorhexidine, Lamisil [terbinafine hcl], and Morphine    Safety  Patient Assist  mod  Patient Precautions  Fall Risk  Precaution Comments  L foot wound with PRAFO, flu + (no longer on droplet precaution as of 01/03)  Bed Transfer Status  Standby Assist (FWW edge of bed to w/c)  Toilet Transfer Status   Supervised  Assistive Devices  Rails, Walker - front wheel, Wheelchair  Oxygen  Nasal Cannula  Diet/Therapeutic Dining  Current Diet Order   Procedures    Diet Order Diet: Consistent CHO (Diabetic)     Pill Administration  whole  Agitated Behavioral Scale  18  ABS Level of Severity  No Agitation    Fall Risk  Has the patient had a fall this admission?   No  Sophia Smith Fall Risk Scoring  18, HIGH RISK  Fall Risk Safety Measures  bed alarm, chair alarm, and poor balance    Vitals  Temperature: 36.3 °C (97.3 °F)  Temp src: Temporal  Pulse: 68  Respiration: 18  Blood Pressure : 122/68  Blood Pressure MAP (Calculated): 86 MM HG  BP Location: Left, Upper Arm  Patient BP Position: Supine     Oxygen  Pulse Oximetry: 95 %  O2 (LPM): 1  O2 Delivery Device: Nasal Cannula    Bowel and Bladder  Last Bowel Movement  01/02/24  Stool Type  Type 5: Soft blob with clear cut edges (passed easily)  Bowel Device  Bathroom  Continent  Bladder: Did not void   Bowel: No movement  Bladder Function  Urine Void (mL):  (Moderate)  Number of Times Voided: 1  Urine Color: Yellow  Genitourinary Assessment   Bladder Assessment  (WDL):  WDL Except  Llamas Catheter: Not Applicable  Urine Color: Yellow  Bladder Device: Bathroom  Bladder Scan: Post Void  $ Bladder Scan Results (mL): 1    Skin  Guille Score   18  Sensory Interventions   Bed Types: Standard/Trauma Mattress  Skin Preventative Measures: Pillows in Use for Support / Positioning  Moisture Interventions  Moisturizers/Barriers: Barrier Wipes      Pain  Pain Rating Scale  0 - No Pain  Pain Location  Generalized  Pain Location Orientation  Posterior  Pain Interventions   Declines    ADLs    Bathing      Linen Change      Personal Hygiene  Moist Antionette Wipes, Perineal Care  Chlorhexidine Bath      Oral Care  Brushed Teeth  Teeth/Dentures     Shave     Nutrition Percentage Eaten  *  * Meal *  *, Breakfast, 0% Consumed, Refused  Environmental Precautions  Treaded Slipper Socks on Patient, Personal Belongings, Wastebasket, Call Bell etc. in Easy Reach, Bed in Low Position  Patient Turns/Positioning  Patient Turns Self from Side to Side  Patient Turns Assistance/Tolerance     Bed Positions  Bed Controls On  Head of Bed Elevated  Self regulated      Psychosocial/Neurologic Assessment  Psychosocial Assessment  Psychosocial (WDL):  WDL Except  Patient Behaviors: Anxious, Irritable  Neurologic Assessment  Neuro (WDL): Exceptions to WDL  Level of Consciousness: Alert  Orientation Level: Oriented X4  Cognition: Appropriate safety awareness  Speech: Clear  Pupil Assesment: No  Motor Function/Sensation Assessment: Motor strength  Muscle Strength Right Arm: Good Strength Against Gravity and Moderate Resistance  Muscle Strength Left Arm: Fair Strength against Gravity but No Resistance  Muscle Strength Right Leg: Good Strength Against Gravity and Moderate Resistance  Muscle Strength Left Leg: Fair Strength against Gravity but No Resistance  EENT (WDL):  WDL Except    Cardio/Pulmonary Assessment  Edema   RLE Edema: 2+  LLE Edema: 2+  Respiratory Breath Sounds  RUL Breath Sounds: Clear  RML Breath Sounds:  Diminished  RLL Breath Sounds: Diminished  DANIELLE Breath Sounds: Clear  LLL Breath Sounds: Diminished  Cardiac Assessment   Cardiac (WDL):  WDL Except (hx afib, chf)

## 2024-01-04 NOTE — CARE PLAN
Problem: Knowledge Deficit - Standard  Goal: Patient and family/care givers will demonstrate understanding of plan of care, disease process/condition, diagnostic tests and medications  Outcome: Progressing     Problem: Pain - Standard  Goal: Alleviation of pain or a reduction in pain to the patient’s comfort goal  Outcome: Progressing   The patient is Watcher - Medium risk of patient condition declining or worsening    Shift Goals  Clinical Goals: Safety, pain control  Patient Goals: Safety, sleep well, pain control  Family Goals: Education

## 2024-01-04 NOTE — CARE PLAN
Problem: Balance  Goal: STG-Within one week, patient will maintain static standing without UE support for at least 30 seconds, supervision.   Outcome: Met     Problem: Mobility  Goal: STG-Within one week, patient will ambulate at least 50 feet with FWW and SBA.   Outcome: Met  Goal: STG-Within one week, patient will ambulate up/down a curb with FWW and min A.   Outcome: Met     Problem: Mobility Transfers  Goal: STG-Within one week, patient will transfer bed to chair with LRAD and supervision.   Outcome: Not Met

## 2024-01-04 NOTE — THERAPY
"Occupational Therapy  Daily Treatment     Patient Name: Jenifer Ramos  Age:  63 y.o., Sex:  female  Medical Record #: [id number]  Today's Date: 1/4/2024     Precautions  Precautions: (P) Fall Risk  Comments: (P) L foot wound with PRAFO, flu + (no longer on droplet precaution as of 01/03)         Subjective    \" I would like to do that again. \" Referring to UE bike      Objective       01/04/24 0831   OT Charge Group   OT Self Care / ADL (Units) 1   OT Therapeutic Exercise (Units) 1   OT Total Time Spent   OT Individual Total Time Spent (Mins) 30   Precautions   Precautions Fall Risk   Comments L foot wound with PRAFO, flu + (no longer on droplet precaution as of 01/03)   Functional Level of Assist   Grooming Independent  (seated at sink oral care  wash/dry face and hands  comb hair)   Sitting Upper Body Exercises   Upper Extremity Bike Level 2 Resistance  (x 5 and 3 minutes  motomed)   Interdisciplinary Plan of Care Collaboration   Patient Position at End of Therapy Seated  (in gym hand off to PT for tx)         Assessment     Reported left UE  cramping after performing UE bike   Strengths: Able to follow instructions, Willingly participates in therapeutic activities  Barriers: Decreased endurance, Fatigue, Generalized weakness, Home accessibility, Impaired activity tolerance, Limited mobility    Plan    Cont ADLs, functional transfers, and thera act/ex to maximize functional recovery for safe DC home.        DME  OT DME Recommendations  Additional Equipment: Other (Comments) (Fixated GB in shower)        Occupational Therapy Goals (Active)       Problem: Dressing       Dates: Start:  12/31/23         Goal: STG-Within one week, patient will dress UB with supervision.        Dates: Start:  12/31/23            Goal: STG-Within one week, patient will dress LB with SBA.        Dates: Start:  12/31/23               Problem: OT Long Term Goals       Dates: Start:  12/31/23         Goal: LTG-By discharge, patient will " complete bathing routine with supervision.        Dates: Start:  12/31/23            Goal: LTG-By discharge, patient will dress LB with supervision-mod I.        Dates: Start:  12/31/23            Goal: LTG-By discharge, patient will complete toileting (hygiene and pant management) and toilet transfer with supervision-mod I.        Dates: Start:  12/31/23               Problem: Toileting       Dates: Start:  12/31/23         Goal: STG-Within one week, patient will complete toileting (hygiene and pant management) and toilet transfer with CGA-SBA.        Dates: Start:  12/31/23

## 2024-01-04 NOTE — FLOWSHEET NOTE
01/04/24 0641   Events/Summary/Plan   Events/Summary/Plan mdi   Vital Signs   Pulse 71   Respiration 18   Pulse Oximetry 97 %   $ Pulse Oximetry (Spot Check) Yes   Respiratory Assessment   Level of Consciousness Alert   Chest Exam   Work Of Breathing / Effort Within Normal Limits   Breath Sounds   RUL Breath Sounds Clear   RML Breath Sounds Diminished   RLL Breath Sounds Diminished   DANIELLE Breath Sounds Clear   LLL Breath Sounds Diminished   Oxygen   O2 (LPM) 3   O2 Delivery Device Silicone Nasal Cannula

## 2024-01-04 NOTE — THERAPY
Occupational Therapy  Daily Treatment     Patient Name: Jenifer Ramos  Age:  63 y.o., Sex:  female  Medical Record #: [id number]  Today's Date: 1/4/2024     Precautions  Precautions: (P) Fall Risk  Comments: (P) L foot wound with PRAFO, flu + (no longer on droplet precaution as of 01/03)         Subjective    Pt encountered for OT supine in bed. Pt declined a shower, but agreeable to work on dressing tasks.      Objective       01/04/24 1031   OT Charge Group   OT Self Care / ADL (Units) 4   OT Total Time Spent   OT Individual Total Time Spent (Mins) 60   Precautions   Precautions Fall Risk   Comments L foot wound with PRAFO, flu + (no longer on droplet precaution as of 01/03)   Functional Level of Assist   Upper Body Dressing Stand by Assist   Upper Body Dressing Description Increased time;Supervision for safety  (Set-up for UBD. Pt able to thread arms and pull over her head in sitting.)   Lower Body Dressing Moderate Assist   Lower Body Dressing Description Reacher;Grab bar;Assist with threading into pant leg;Assist with closures;Increased time;Supervision for safety;Initial preparation for task  (SBA for doffing clothes using AE. Jerel to thread the L leg into pants, able to complete the rest using AE. Jerel needed to don and fasten shoes.)   Toileting Supervision   Toileting Description Adaptive equipment;Grab bar;Increased time;Supervision for safety  (FWW and GB for STS. Pt uses wipping aid for hygiene.)   Bed, Chair, Wheelchair Transfer Standby Assist   Bed Chair Wheelchair Transfer Description Adaptive equipment;Increased time;Initial preparation for task;Supervision for safety;Verbal cueing  (Stand step transfer using FWW)   Toilet Transfers Supervised   Toilet Transfer Description Adaptive equipment;Increased time;Initial preparation for task;Supervision for safety  (FWW and GB for support with STS)   Tub / Shower Transfers Standby Assist   Interdisciplinary Plan of Care Collaboration   IDT Collaboration  with  Nursing   Patient Position at End of Therapy In Bed;Bed Alarm On;Call Light within Reach;Tray Table within Reach;Phone within Reach   Collaboration Comments afternoon med pass     Functional mobility at the FWW level from bed <> bathroom.     Assessment    Overall, good tolerance to ADLs using AE. Pt with good demo at CGA for functional transfers using the FWW and [place] to SBA for full body dressing using AE.     Strengths: Able to follow instructions, Willingly participates in therapeutic activities  Barriers: Decreased endurance, Fatigue, Generalized weakness, Home accessibility, Impaired activity tolerance, Limited mobility    Plan    Cont ADLs, functional transfers, and thera act/ex to maximize functional recovery for safe DC home.       DME  OT DME Recommendations  Additional Equipment: Other (Comments) (Fixated GB in shower)    Passport items to be completed:  Perform bathroom transfers, complete dressing, complete feeding, get ready for the day, prepare a simple meal, participate in household tasks, adapt home for safety needs, demonstrate home exercise program, complete caregiver training     Occupational Therapy Goals (Active)       Problem: Dressing       Dates: Start:  12/31/23         Goal: STG-Within one week, patient will dress UB with supervision.        Dates: Start:  12/31/23            Goal: STG-Within one week, patient will dress LB with SBA.        Dates: Start:  12/31/23               Problem: OT Long Term Goals       Dates: Start:  12/31/23         Goal: LTG-By discharge, patient will complete bathing routine with supervision.        Dates: Start:  12/31/23            Goal: LTG-By discharge, patient will dress LB with supervision-mod I.        Dates: Start:  12/31/23            Goal: LTG-By discharge, patient will complete toileting (hygiene and pant management) and toilet transfer with supervision-mod I.        Dates: Start:  12/31/23               Problem: Toileting       Dates: Start:   12/31/23         Goal: STG-Within one week, patient will complete toileting (hygiene and pant management) and toilet transfer with CGA-SBA.        Dates: Start:  12/31/23

## 2024-01-04 NOTE — PROGRESS NOTES
Hospital Medicine Daily Progress Note        Chief Complaint:  Hypertension  Diabetes  Pancytopenia    Interval History:  No 24 hour clinical changes.    Review of Systems  Review of Systems   Constitutional:  Negative for chills and fever.   HENT: Negative.     Eyes: Negative.    Respiratory:  Negative for cough and shortness of breath.    Cardiovascular:  Negative for chest pain and palpitations.   Gastrointestinal:  Negative for abdominal pain, nausea and vomiting.   Musculoskeletal:         Wound pain   Skin:  Negative for itching and rash.   Endo/Heme/Allergies:  Negative for polydipsia. Does not bruise/bleed easily.        Physical Exam  Temp:  [36.3 °C (97.3 °F)-36.8 °C (98.2 °F)] 36.6 °C (97.8 °F)  Pulse:  [63-71] 70  Resp:  [18-20] 18  BP: ()/(60-68) 98/60  SpO2:  [94 %-97 %] 96 %    Physical Exam  Vitals reviewed.   Constitutional:       General: She is not in acute distress.     Appearance: Normal appearance. She is not ill-appearing.   HENT:      Head: Normocephalic and atraumatic.      Right Ear: External ear normal.      Left Ear: External ear normal.      Nose: Nose normal.      Mouth/Throat:      Pharynx: Oropharynx is clear.   Eyes:      General:         Right eye: No discharge.         Left eye: No discharge.      Extraocular Movements: Extraocular movements intact.      Conjunctiva/sclera: Conjunctivae normal.   Cardiovascular:      Rate and Rhythm: Normal rate and regular rhythm.   Pulmonary:      Effort: No respiratory distress.      Breath sounds: No wheezing.      Comments: Decreased BS  Abdominal:      General: Bowel sounds are normal. There is no distension.      Palpations: Abdomen is soft.      Tenderness: There is no abdominal tenderness.   Musculoskeletal:      Cervical back: Normal range of motion and neck supple.      Right lower leg: Edema present.      Left lower leg: Edema present.   Skin:     General: Skin is warm and dry.   Neurological:      Mental Status: She is alert and  oriented to person, place, and time.         Fluids    Intake/Output Summary (Last 24 hours) at 1/4/2024 1232  Last data filed at 1/4/2024 1200  Gross per 24 hour   Intake 840 ml   Output --   Net 840 ml       Laboratory  Recent Labs     01/02/24  0552 01/03/24  0533   WBC 5.5 4.4*   RBC 4.78 4.58   HEMOGLOBIN 11.4* 11.0*   HEMATOCRIT 38.5 37.4   MCV 80.5* 81.7   MCH 23.8* 24.0*   MCHC 29.6* 29.4*   RDW 54.8* 57.3*   PLATELETCT 105* 118*   MPV 11.3 11.7     Recent Labs     01/03/24  0533   SODIUM 136   POTASSIUM 3.1*   CHLORIDE 95*   CO2 31   GLUCOSE 163*   BUN 10   CREATININE 0.56   CALCIUM 8.6                 Assessment/Plan  Gout  Assessment & Plan  On Uloric  Monitor for acute flare-ups    Edema  Assessment & Plan  Echo 12/30/23 EF 60%  BNP resolving  Continue Lasix  Outpt meds include Lasix 40-80 mg qd     Essential hypertension  Assessment & Plan  On Verapamil and Lasix  Observe blood pressure trends    Diabetes mellitus type 2, insulin dependent (HCC)  Assessment & Plan  HbA1c 11.9  Observe serum glucose trends on increased Lantus  Continue SSI  Outpt meds include Tresiba 120 u qd and Humalog 40-60 u per SSI    Chronic ulcer of left foot due to diabetes mellitus (HCC)- (present on admission)  Assessment & Plan  X-ray 12/27/23 no definite acute osseous abnormality but evaluation limited due to osseous demineralization  Completed Clinda  Recommend MRI (or Bone Scan if body habitus or weight is an issue) given elevated Alk Phos, discussed w/ Dr. Brand (Primary Service)  Wound care and pain control per Physiatry    Influenza A- (present on admission)  Assessment & Plan  +PCR 12/27/23  Completed Tamiflu 1/1/24    History of stroke- (present on admission)  Assessment & Plan  On ASA, Eliquis, and Crestor    PAF (paroxysmal atrial fibrillation) (HCC)- (present on admission)  Assessment & Plan  H/O ablation x 2  Rate controlled on Verapamil  Anticoagulated on Eliquis    Pancytopenia (HCC)- (present on  admission)  Assessment & Plan  Likely 2/2 meds w/ possible culprits including Tegretol, Eliquis, ASA, Uloric, Lasix, Robaxin, Singulair, Omeprazole, and Crestor  Tegretol was discontinued, now resumed 1/4/24  Monitor need for G-CSF, PRBC, and/or PLT transfusion  S/P Granix x 1 on 12/30/23 for leukopenia  Follow WBC/ANC, H/H, and PLT  Check F/U labs in AM    Hypokalemia- (present on admission)  Assessment & Plan  2/2 Lasix  K+ repleted  Continue daily KCl while on diuretics  Mg++ normal, discontinued MgOx  Check F/U labs in AM    GERD (gastroesophageal reflux disease)- (present on admission)  Assessment & Plan  On Omeprazole    Hypothyroidism- (present on admission)  Assessment & Plan  Euthyroid on Synthroid    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

## 2024-01-04 NOTE — THERAPY
"Physical Therapy   Daily Treatment     Patient Name: Jenifer Ramos  Age:  63 y.o., Sex:  female  Medical Record #: [id number]  Today's Date: 1/4/2024     Precautions  Precautions: Fall Risk  Comments: L foot wound with PRAFO, flu + (no longer on droplet precaution as of 01/03)    Subjective    Pt is on his wc after OT session. She is agreeable to participate.     Objective       01/04/24 0901   PT Charge Group   Charges Yes   PT Gait Training (Units) 1   PT Therapeutic Exercise (Units) 1   PT Therapeutic Activities (Units) 2   PT Total Time Spent   PT Individual Total Time Spent (Mins) 60   Precautions   Precautions Fall Risk   Comments L foot wound with PRAFO, flu + (no longer on droplet precaution as of 01/03)   Gait Functional Level of Assist    Gait Level Of Assist Standby Assist   Assistive Device Front Wheel Walker   Distance (Feet) 110   # of Times Distance was Traveled 1   Deviation Decreased Toe Off;Decreased Heel Strike;Bradykinetic   Stairs Functional Level of Assist   Level of Assist with Stairs Standby Assist   # of Stairs Climbed 1  (x 2 reps, 4\" step)   Stairs Description Verbal cueing;Supervision for safety;Safety concerns;Limited by fatigue;Extra time;Assist device/equipment   Transfer Functional Level of Assist   Bed, Chair, Wheelchair Transfer Standby Assist   Bed Chair Wheelchair Transfer Description Supervision for safety;Adaptive equipment;Increased time   Sitting Lower Body Exercises   Nustep Resistance Level 3  (10 mins, 499 steps)   Bed Mobility    Sit to Supine Standby Assist   Sit to Stand Standby Assist   Scooting Standby Assist   Rolling Standby Assist   Interdisciplinary Plan of Care Collaboration   Patient Position at End of Therapy In Bed;Bed Alarm On;Call Light within Reach;Tray Table within Reach;Phone within Reach         Assessment    Gait training using FWW, SBA with occasional cueing for increasing foot clearance. She easily fatigues needing pacing. Performed 4\" step " negotiation using FWW, SBA x 2 reps. She demonstrated steady dynamic balance without knee buckling during descend. She is slowly progressing her functional activity tolerance but needs occasional rest break in between task performance.  Strengths: Able to follow instructions, Alert and oriented, Good insight into deficits/needs, Pleasant and cooperative, Willingly participates in therapeutic activities  Barriers: Decreased endurance, Fatigue, Generalized weakness, Impaired activity tolerance, Impaired balance, Pain    Plan       Gait training with FWW, car transfer, overall activity tolerance/endurance, balance training, LE strengthening         DME  PT DME Recommendations  Additional Equipment: Other (Comments) (Fixated GB in shower)    Passport items to be completed:  Get in/out of bed safely, in/out of a vehicle, safely use mobility device, walk or wheel around home/community, navigate up and down stairs, show how to get up/down from the ground, ensure home is accessible, demonstrate HEP, complete caregiver training    Physical Therapy Problems (Active)       Problem: Balance       Dates: Start:  12/31/23         Goal: STG-Within one week, patient will maintain static standing without UE support for at least 30 seconds, supervision.        Dates: Start:  12/31/23               Problem: Mobility       Dates: Start:  12/31/23         Goal: STG-Within one week, patient will ambulate at least 50 feet with FWW and SBA.        Dates: Start:  12/31/23            Goal: STG-Within one week, patient will ambulate up/down a curb with FWW and min A.        Dates: Start:  12/31/23               Problem: Mobility Transfers       Dates: Start:  12/31/23         Goal: STG-Within one week, patient will transfer bed to chair with LRAD and supervision.        Dates: Start:  12/31/23               Problem: PT-Long Term Goals       Dates: Start:  12/31/23         Goal: LTG-By discharge, patient will tolerate standing for at least 1  minute without UE, while reaching outside [facility] to perform ADLs with supervision.        Dates: Start:  12/31/23            Goal: LTG-By discharge, patient will ambulate at least 150 feet with FWW and supervision.        Dates: Start:  12/31/23            Goal: LTG-By discharge, patient will transfer one surface to another with LRAD and mod I.        Dates: Start:  12/31/23            Goal: LTG-By discharge, patient will transfer in/out of a car with LRAD and min A.        Dates: Start:  12/31/23            Goal: LTG-By discharge, patient will negotiate up and down a 4inch step with FWW and CGA (to simulate home entry).        Dates: Start:  12/31/23

## 2024-01-04 NOTE — DIETARY
Nutrition services:     Pt noted to have DM dx w/ A1c of 11.9 (12/27/23). Pt reports that in the past her A1c was < 6 when she was compliant with diet. POC glucose since yesterday has been 135-210. Hospitalist following. Lantus was increased.     Pt relies on family to grocery shop and provide assistance for pt. Pt said that her  and son would benefit from DM diet ed. Pt's family does not visit pt consistently. Per pt, son said that he would purchase appropriate foods for DM diet (specifically fresh fruits and veggies) if he had information in writing. DM diet H/O left w/ pt at bedside.     During visit, pt expressed frustration w/ her current living situation because pt's son and  do not provide adequate assistance.     RD will follow per dept guidelines.

## 2024-01-05 ENCOUNTER — APPOINTMENT (OUTPATIENT)
Dept: OCCUPATIONAL THERAPY | Facility: REHABILITATION | Age: 64
DRG: 291 | End: 2024-01-05
Attending: PHYSICAL MEDICINE & REHABILITATION
Payer: MEDICARE

## 2024-01-05 ENCOUNTER — APPOINTMENT (OUTPATIENT)
Dept: RADIOLOGY | Facility: MEDICAL CENTER | Age: 64
End: 2024-01-05
Attending: PHYSICAL MEDICINE & REHABILITATION
Payer: MEDICARE

## 2024-01-05 ENCOUNTER — APPOINTMENT (OUTPATIENT)
Dept: PHYSICAL THERAPY | Facility: REHABILITATION | Age: 64
DRG: 291 | End: 2024-01-05
Attending: PHYSICAL MEDICINE & REHABILITATION
Payer: MEDICARE

## 2024-01-05 ENCOUNTER — APPOINTMENT (OUTPATIENT)
Dept: INPATIENT REHAB | Facility: REHABILITATION | Age: 64
DRG: 291 | End: 2024-01-05
Payer: MEDICARE

## 2024-01-05 LAB
ALBUMIN SERPL BCP-MCNC: 3.3 G/DL (ref 3.2–4.9)
ALBUMIN/GLOB SERPL: 1.1 G/DL
ALP SERPL-CCNC: 135 U/L (ref 30–99)
ALT SERPL-CCNC: 12 U/L (ref 2–50)
ANION GAP SERPL CALC-SCNC: 10 MMOL/L (ref 7–16)
ANISOCYTOSIS BLD QL SMEAR: ABNORMAL
AST SERPL-CCNC: 21 U/L (ref 12–45)
BASOPHILS # BLD AUTO: 0.6 % (ref 0–1.8)
BASOPHILS # BLD: 0.02 K/UL (ref 0–0.12)
BILIRUB SERPL-MCNC: 0.5 MG/DL (ref 0.1–1.5)
BUN SERPL-MCNC: 9 MG/DL (ref 8–22)
CALCIUM ALBUM COR SERPL-MCNC: 9.8 MG/DL (ref 8.5–10.5)
CALCIUM SERPL-MCNC: 9.2 MG/DL (ref 8.5–10.5)
CHLORIDE SERPL-SCNC: 99 MMOL/L (ref 96–112)
CO2 SERPL-SCNC: 29 MMOL/L (ref 20–33)
COMMENT 1642: NORMAL
CREAT SERPL-MCNC: 0.51 MG/DL (ref 0.5–1.4)
EOSINOPHIL # BLD AUTO: 0 K/UL (ref 0–0.51)
EOSINOPHIL NFR BLD: 0 % (ref 0–6.9)
ERYTHROCYTE [DISTWIDTH] IN BLOOD BY AUTOMATED COUNT: 55.3 FL (ref 35.9–50)
GFR SERPLBLD CREATININE-BSD FMLA CKD-EPI: 103 ML/MIN/1.73 M 2
GLOBULIN SER CALC-MCNC: 2.9 G/DL (ref 1.9–3.5)
GLUCOSE BLD STRIP.AUTO-MCNC: 134 MG/DL (ref 65–99)
GLUCOSE BLD STRIP.AUTO-MCNC: 230 MG/DL (ref 65–99)
GLUCOSE SERPL-MCNC: 149 MG/DL (ref 65–99)
HCT VFR BLD AUTO: 35.6 % (ref 37–47)
HGB BLD-MCNC: 10.6 G/DL (ref 12–16)
HYPOCHROMIA BLD QL SMEAR: ABNORMAL
IMM GRANULOCYTES # BLD AUTO: 0.02 K/UL (ref 0–0.11)
IMM GRANULOCYTES NFR BLD AUTO: 0.6 % (ref 0–0.9)
LYMPHOCYTES # BLD AUTO: 0.87 K/UL (ref 1–4.8)
LYMPHOCYTES NFR BLD: 26.9 % (ref 22–41)
MCH RBC QN AUTO: 23.8 PG (ref 27–33)
MCHC RBC AUTO-ENTMCNC: 29.8 G/DL (ref 32.2–35.5)
MCV RBC AUTO: 80 FL (ref 81.4–97.8)
MICROCYTES BLD QL SMEAR: ABNORMAL
MONOCYTES # BLD AUTO: 0.43 K/UL (ref 0–0.85)
MONOCYTES NFR BLD AUTO: 13.3 % (ref 0–13.4)
MORPHOLOGY BLD-IMP: NORMAL
NEUTROPHILS # BLD AUTO: 1.89 K/UL (ref 1.82–7.42)
NEUTROPHILS NFR BLD: 58.6 % (ref 44–72)
NRBC # BLD AUTO: 0 K/UL
NRBC BLD-RTO: 0 /100 WBC (ref 0–0.2)
OVALOCYTES BLD QL SMEAR: NORMAL
PLATELET # BLD AUTO: 128 K/UL (ref 164–446)
PLATELET BLD QL SMEAR: NORMAL
PMV BLD AUTO: 11.2 FL (ref 9–12.9)
POIKILOCYTOSIS BLD QL SMEAR: NORMAL
POTASSIUM SERPL-SCNC: 3.5 MMOL/L (ref 3.6–5.5)
PROT SERPL-MCNC: 6.2 G/DL (ref 6–8.2)
RBC # BLD AUTO: 4.45 M/UL (ref 4.2–5.4)
RBC BLD AUTO: PRESENT
SODIUM SERPL-SCNC: 138 MMOL/L (ref 135–145)
WBC # BLD AUTO: 3.2 K/UL (ref 4.8–10.8)

## 2024-01-05 PROCEDURE — 94640 AIRWAY INHALATION TREATMENT: CPT

## 2024-01-05 PROCEDURE — 73718 MRI LOWER EXTREMITY W/O DYE: CPT | Mod: LT

## 2024-01-05 PROCEDURE — 36415 COLL VENOUS BLD VENIPUNCTURE: CPT

## 2024-01-05 PROCEDURE — A9270 NON-COVERED ITEM OR SERVICE: HCPCS | Performed by: PHYSICAL MEDICINE & REHABILITATION

## 2024-01-05 PROCEDURE — A9270 NON-COVERED ITEM OR SERVICE: HCPCS | Performed by: HOSPITALIST

## 2024-01-05 PROCEDURE — 700102 HCHG RX REV CODE 250 W/ 637 OVERRIDE(OP): Performed by: PHYSICAL MEDICINE & REHABILITATION

## 2024-01-05 PROCEDURE — 700101 HCHG RX REV CODE 250: Performed by: PHYSICAL MEDICINE & REHABILITATION

## 2024-01-05 PROCEDURE — 85025 COMPLETE CBC W/AUTO DIFF WBC: CPT

## 2024-01-05 PROCEDURE — 700102 HCHG RX REV CODE 250 W/ 637 OVERRIDE(OP): Performed by: HOSPITALIST

## 2024-01-05 PROCEDURE — 80053 COMPREHEN METABOLIC PANEL: CPT

## 2024-01-05 PROCEDURE — 99232 SBSQ HOSP IP/OBS MODERATE 35: CPT | Performed by: HOSPITALIST

## 2024-01-05 PROCEDURE — 97530 THERAPEUTIC ACTIVITIES: CPT | Mod: CQ

## 2024-01-05 PROCEDURE — 82962 GLUCOSE BLOOD TEST: CPT | Mod: 91

## 2024-01-05 PROCEDURE — 99233 SBSQ HOSP IP/OBS HIGH 50: CPT | Performed by: PHYSICAL MEDICINE & REHABILITATION

## 2024-01-05 PROCEDURE — 770010 HCHG ROOM/CARE - REHAB SEMI PRIVAT*

## 2024-01-05 PROCEDURE — 97535 SELF CARE MNGMENT TRAINING: CPT

## 2024-01-05 PROCEDURE — 97116 GAIT TRAINING THERAPY: CPT | Mod: CQ

## 2024-01-05 PROCEDURE — 94760 N-INVAS EAR/PLS OXIMETRY 1: CPT

## 2024-01-05 RX ORDER — PROMETHAZINE HYDROCHLORIDE 25 MG/1
25 TABLET ORAL EVERY 6 HOURS PRN
Qty: 30 TABLET | Refills: 0 | Status: SHIPPED | OUTPATIENT
Start: 2024-01-05

## 2024-01-05 RX ORDER — OXYCODONE HYDROCHLORIDE 10 MG/1
10 TABLET ORAL EVERY 6 HOURS PRN
Qty: 28 TABLET | Refills: 0 | Status: SHIPPED | OUTPATIENT
Start: 2024-01-05 | End: 2024-01-12

## 2024-01-05 RX ORDER — GABAPENTIN 300 MG/1
600 CAPSULE ORAL 3 TIMES DAILY
Qty: 180 CAPSULE | Refills: 2 | Status: SHIPPED | OUTPATIENT
Start: 2024-01-05

## 2024-01-05 RX ORDER — ROSUVASTATIN CALCIUM 20 MG/1
20 TABLET, COATED ORAL EVERY EVENING
Qty: 30 TABLET | Refills: 2 | Status: SHIPPED | OUTPATIENT
Start: 2024-01-05

## 2024-01-05 RX ORDER — FLUTICASONE FUROATE, UMECLIDINIUM BROMIDE AND VILANTEROL TRIFENATATE 100; 62.5; 25 UG/1; UG/1; UG/1
1 POWDER RESPIRATORY (INHALATION) EVERY EVENING
Qty: 28 EACH | Refills: 2 | Status: SHIPPED | OUTPATIENT
Start: 2024-01-05

## 2024-01-05 RX ORDER — INSULIN GLARGINE 100 [IU]/ML
26 INJECTION, SOLUTION SUBCUTANEOUS EVERY EVENING
Qty: 15 ML | Refills: 2 | Status: ACTIVE | OUTPATIENT
Start: 2024-01-05

## 2024-01-05 RX ORDER — VERAPAMIL HYDROCHLORIDE 80 MG/1
80 TABLET ORAL
Qty: 90 TABLET | Refills: 2 | Status: SHIPPED | OUTPATIENT
Start: 2024-01-05

## 2024-01-05 RX ORDER — FEBUXOSTAT 40 MG/1
40 TABLET, FILM COATED ORAL DAILY
Qty: 30 TABLET | Refills: 2 | Status: SHIPPED | OUTPATIENT
Start: 2024-01-05

## 2024-01-05 RX ORDER — POTASSIUM CHLORIDE 20 MEQ/1
40 TABLET, EXTENDED RELEASE ORAL ONCE
Status: COMPLETED | OUTPATIENT
Start: 2024-01-05 | End: 2024-01-05

## 2024-01-05 RX ORDER — CLONAZEPAM 1 MG/1
1 TABLET ORAL ONCE
Status: COMPLETED | OUTPATIENT
Start: 2024-01-05 | End: 2024-01-05

## 2024-01-05 RX ORDER — CARBAMAZEPINE 200 MG/1
200 TABLET ORAL 3 TIMES DAILY
Qty: 90 TABLET | Refills: 2 | Status: SHIPPED | OUTPATIENT
Start: 2024-01-05

## 2024-01-05 RX ORDER — FUROSEMIDE 40 MG/1
40 TABLET ORAL DAILY
Qty: 30 TABLET | Refills: 2 | Status: SHIPPED | OUTPATIENT
Start: 2024-01-05

## 2024-01-05 RX ORDER — MONTELUKAST SODIUM 10 MG/1
10 TABLET ORAL EVERY EVENING
Qty: 30 TABLET | Refills: 2 | Status: SHIPPED | OUTPATIENT
Start: 2024-01-05

## 2024-01-05 RX ORDER — FUROSEMIDE 40 MG/1
40 TABLET ORAL DAILY
Status: DISCONTINUED | OUTPATIENT
Start: 2024-01-06 | End: 2024-01-06 | Stop reason: HOSPADM

## 2024-01-05 RX ORDER — LEVOTHYROXINE SODIUM 112 UG/1
112 TABLET ORAL
Qty: 30 TABLET | Refills: 2 | Status: SHIPPED | OUTPATIENT
Start: 2024-01-05

## 2024-01-05 RX ORDER — POTASSIUM CHLORIDE 750 MG/1
20 TABLET, EXTENDED RELEASE ORAL EVERY EVENING
Qty: 90 TABLET | Refills: 3 | Status: SHIPPED | OUTPATIENT
Start: 2024-01-05

## 2024-01-05 RX ORDER — HYDROXYZINE HYDROCHLORIDE 25 MG/1
25 TABLET, FILM COATED ORAL
Qty: 30 TABLET | Refills: 2 | Status: SHIPPED | OUTPATIENT
Start: 2024-01-05

## 2024-01-05 RX ORDER — METHOCARBAMOL 500 MG/1
500 TABLET, FILM COATED ORAL 4 TIMES DAILY
Qty: 60 TABLET | Refills: 0 | Status: SHIPPED | OUTPATIENT
Start: 2024-01-05

## 2024-01-05 RX ADMIN — OXYCODONE HYDROCHLORIDE 10 MG: 10 TABLET ORAL at 04:03

## 2024-01-05 RX ADMIN — BACITRACIN ZINC AND POLYMYXIN B SULFATE: at 20:56

## 2024-01-05 RX ADMIN — GABAPENTIN 600 MG: 300 CAPSULE ORAL at 08:34

## 2024-01-05 RX ADMIN — INSULIN LISPRO 4 UNITS: 100 INJECTION, SOLUTION INTRAVENOUS; SUBCUTANEOUS at 21:03

## 2024-01-05 RX ADMIN — POTASSIUM CHLORIDE 20 MEQ: 1500 TABLET, EXTENDED RELEASE ORAL at 08:34

## 2024-01-05 RX ADMIN — METHOCARBAMOL 500 MG: 500 TABLET ORAL at 14:15

## 2024-01-05 RX ADMIN — METHOCARBAMOL 500 MG: 500 TABLET ORAL at 08:34

## 2024-01-05 RX ADMIN — CARBAMAZEPINE 200 MG: 100 TABLET, CHEWABLE ORAL at 08:34

## 2024-01-05 RX ADMIN — MONTELUKAST 10 MG: 10 TABLET, FILM COATED ORAL at 20:55

## 2024-01-05 RX ADMIN — LIDOCAINE 1 PATCH: 4 PATCH TOPICAL at 08:34

## 2024-01-05 RX ADMIN — VERAPAMIL HYDROCHLORIDE 80 MG: 80 TABLET ORAL at 20:56

## 2024-01-05 RX ADMIN — CARBAMAZEPINE 200 MG: 100 TABLET, CHEWABLE ORAL at 20:55

## 2024-01-05 RX ADMIN — APIXABAN 5 MG: 5 TABLET, FILM COATED ORAL at 08:34

## 2024-01-05 RX ADMIN — METHOCARBAMOL 500 MG: 500 TABLET ORAL at 20:56

## 2024-01-05 RX ADMIN — CLONAZEPAM 0.5 MG: 1 TABLET ORAL at 11:07

## 2024-01-05 RX ADMIN — ROSUVASTATIN CALCIUM 20 MG: 10 TABLET, FILM COATED ORAL at 20:55

## 2024-01-05 RX ADMIN — FUROSEMIDE 40 MG: 40 TABLET ORAL at 08:34

## 2024-01-05 RX ADMIN — BACITRACIN ZINC AND POLYMYXIN B SULFATE: at 08:42

## 2024-01-05 RX ADMIN — GABAPENTIN 600 MG: 300 CAPSULE ORAL at 20:56

## 2024-01-05 RX ADMIN — GABAPENTIN 600 MG: 300 CAPSULE ORAL at 14:15

## 2024-01-05 RX ADMIN — POTASSIUM CHLORIDE 40 MEQ: 1500 TABLET, EXTENDED RELEASE ORAL at 09:33

## 2024-01-05 RX ADMIN — ASPIRIN 81 MG: 81 TABLET, COATED ORAL at 08:34

## 2024-01-05 RX ADMIN — FEBUXOSTAT 40 MG: 40 TABLET, FILM COATED ORAL at 20:55

## 2024-01-05 RX ADMIN — APIXABAN 5 MG: 5 TABLET, FILM COATED ORAL at 20:55

## 2024-01-05 RX ADMIN — CARBAMAZEPINE 200 MG: 100 TABLET, CHEWABLE ORAL at 14:15

## 2024-01-05 RX ADMIN — LEVOTHYROXINE SODIUM 112 MCG: 0.11 TABLET ORAL at 05:36

## 2024-01-05 RX ADMIN — PROMETHAZINE HYDROCHLORIDE 25 MG: 25 TABLET ORAL at 10:49

## 2024-01-05 RX ADMIN — OMEPRAZOLE 20 MG: 20 CAPSULE, DELAYED RELEASE ORAL at 08:34

## 2024-01-05 ASSESSMENT — BRIEF INTERVIEW FOR MENTAL STATUS (BIMS)
ASKED TO RECALL BLUE: YES, NO CUE REQUIRED
WHAT YEAR IS IT: CORRECT
ASKED TO RECALL BED: YES, NO CUE REQUIRED
WHAT DAY OF THE WEEK IS IT: CORRECT
WHAT YEAR IS IT: CORRECT
ASKED TO RECALL BED: YES, NO CUE REQUIRED
INITIAL REPETITION OF BED BLUE SOCK - FIRST ATTEMPT: 3
ASKED TO RECALL SOCK: YES, NO CUE REQUIRED
WHAT DAY OF THE WEEK IS IT: CORRECT
WHAT MONTH IS IT: ACCURATE WITHIN 5 DAYS
INITIAL REPETITION OF BED BLUE SOCK - FIRST ATTEMPT: 3
ASKED TO RECALL SOCK: YES, NO CUE REQUIRED
WHAT MONTH IS IT: ACCURATE WITHIN 5 DAYS
BIMS SUMMARY SCORE: 15
ASKED TO RECALL BLUE: YES, NO CUE REQUIRED
BIMS SUMMARY SCORE: 15

## 2024-01-05 ASSESSMENT — ENCOUNTER SYMPTOMS
EYES NEGATIVE: 1
BRUISES/BLEEDS EASILY: 0
SHORTNESS OF BREATH: 0
FEVER: 0
ABDOMINAL PAIN: 0
CHILLS: 0
COUGH: 0
PALPITATIONS: 0
POLYDIPSIA: 0
NAUSEA: 0
VOMITING: 0

## 2024-01-05 ASSESSMENT — PAIN SCALES - PAIN ASSESSMENT IN ADVANCED DEMENTIA (PAINAD)
BODYLANGUAGE: RELAXED
BODYLANGUAGE: RELAXED
BREATHING: NORMAL
BREATHING: NORMAL
FACIALEXPRESSION: SMILING OR INEXPRESSIVE
CONSOLABILITY: NO NEED TO CONSOLE
CONSOLABILITY: NO NEED TO CONSOLE
TOTALSCORE: 0
FACIALEXPRESSION: SMILING OR INEXPRESSIVE
TOTALSCORE: 0

## 2024-01-05 ASSESSMENT — ACTIVITIES OF DAILY LIVING (ADL)
TOILET_TRANSFER_LEVEL_OF_ASSIST: ABLE TO COMPLETE TOILET TRANSFER WITHOUT ASSIST
TOILETING_LEVEL_OF_ASSIST_DESCRIPTION: ADAPTIVE EQUIPMENT
TUB_SHOWER_TRANSFER_DESCRIPTION: GRAB BAR;INCREASED TIME;SHOWER BENCH
TOILETING_LEVEL_OF_ASSIST: ABLE TO COMPLETE TOILETING WITHOUT ASSIST
BED_CHAIR_WHEELCHAIR_TRANSFER_DESCRIPTION: ADAPTIVE EQUIPMENT
TOILET_TRANSFER_DESCRIPTION: GRAB BAR
BED_CHAIR_WHEELCHAIR_TRANSFER_DESCRIPTION: ADAPTIVE EQUIPMENT;INCREASED TIME;INITIAL PREPARATION FOR TASK;SUPERVISION FOR SAFETY
SHOWER_TRANSFER_LEVEL_OF_ASSIST: REQUIRES SUPERVISION WITH SHOWER TRANSFER

## 2024-01-05 ASSESSMENT — GAIT ASSESSMENTS
ASSISTIVE DEVICE: FRONT WHEEL WALKER
GAIT LEVEL OF ASSIST: STANDBY ASSIST
DEVIATION: DECREASED TOE OFF;DECREASED HEEL STRIKE;BRADYKINETIC

## 2024-01-05 ASSESSMENT — PAIN DESCRIPTION - PAIN TYPE: TYPE: ACUTE PAIN

## 2024-01-05 ASSESSMENT — PAIN SCALES - WONG BAKER
WONGBAKER_NUMERICALRESPONSE: DOESN'T HURT AT ALL
WONGBAKER_NUMERICALRESPONSE: DOESN'T HURT AT ALL

## 2024-01-05 NOTE — THERAPY
Physical Therapy   Daily Treatment     Patient Name: Jenifer Ramos  Age:  63 y.o., Sex:  female  Medical Record #: [id number]  Today's Date: 1/5/2024     Precautions  Precautions: Fall Risk  Comments: L foot wound with PRAFO, flu + (no longer on droplet precaution as of 01/03)    Subjective    Pt seated in front of the sink w/ MD rounding, agreeable to session.     Objective       01/05/24 1031   Therapy Missed   Missed Therapy (Minutes) 15   Reason For Missed Therapy Medical - Patient on Hold from Therapy  (transport to MRI)   PT Charge Group   PT Gait Training (Units) 2   PT Therapeutic Activities (Units) 1   Supervising Physical Therapist Jocelyn Shelton   PT Total Time Spent   PT Individual Total Time Spent (Mins) 45   Cognition    Level of Consciousness Alert   Gait Functional Level of Assist    Gait Level Of Assist Standby Assist   Assistive Device Front Wheel Walker   Distance (Feet)   (100+180)   # of Times Distance was Traveled 2   Deviation Decreased Toe Off;Decreased Heel Strike;Bradykinetic   Transfer Functional Level of Assist   Bed, Chair, Wheelchair Transfer Standby Assist   Bed Chair Wheelchair Transfer Description Adaptive equipment;Increased time;Initial preparation for task;Supervision for safety   Bed Mobility    Supine to Sit Standby Assist   Sit to Supine Standby Assist   Sit to Stand Standby Assist   Scooting Standby Assist   Rolling Standby Assist     Completed car xfer into [facility] w/ CGA.     Assessment    Session was terminated early d/t transportation arrived for MRI, pt is SBA/CGA overall at mobility.      Strengths: Able to follow instructions, Alert and oriented, Good insight into deficits/needs, Pleasant and cooperative, Willingly participates in therapeutic activities  Barriers: Decreased endurance, Fatigue, Generalized weakness, Impaired activity tolerance, Impaired balance, Pain    Plan    D/c to home tmr follow up w/ HHPT.    DME  PT DME Recommendations  Additional  Equipment: Other (Comments) (Fixated GB in shower)    Passport items to be completed:  Get in/out of bed safely, in/out of a vehicle, safely use mobility device, walk or wheel around home/community, navigate up and down stairs, show how to get up/down from the ground, ensure home is accessible, demonstrate HEP, complete caregiver training    Physical Therapy Problems (Active)       Problem: Mobility Transfers       Dates: Start:  12/31/23         Goal: STG-Within one week, patient will transfer bed to chair with LRAD and supervision.        Dates: Start:  12/31/23               Problem: PT-Long Term Goals       Dates: Start:  12/31/23         Goal: LTG-By discharge, patient will tolerate standing for at least 1 minute without UE, while reaching outside CHANO to perform ADLs with supervision.        Dates: Start:  12/31/23            Goal: LTG-By discharge, patient will ambulate at least 150 feet with FWW and supervision.        Dates: Start:  12/31/23            Goal: LTG-By discharge, patient will transfer one surface to another with LRAD and mod I.        Dates: Start:  12/31/23            Goal: LTG-By discharge, patient will transfer in/out of a car with LRAD and min A.        Dates: Start:  12/31/23            Goal: LTG-By discharge, patient will negotiate up and down a 4inch step with FWW and CGA (to simulate home entry).        Dates: Start:  12/31/23

## 2024-01-05 NOTE — THERAPY
Physical Therapy   Daily Treatment     Patient Name: Jenifer Ramso  Age:  63 y.o., Sex:  female  Medical Record #: [id number]  Today's Date: 1/4/2024     Precautions  Precautions: Fall Risk  Comments: L foot wound with PRAFO, flu + (no longer on droplet precaution as of 01/03)    Subjective    Patient is agreeable to participate, willing to work on walking     Objective       01/04/24 1301   PT Charge Group   PT Gait Training (Units) 2   PT Total Time Spent   PT Individual Total Time Spent (Mins) 30   Gait Functional Level of Assist    Gait Level Of Assist Standby Assist   Assistive Device Front Wheel Walker   Distance (Feet) 200   # of Times Distance was Traveled 2   Deviation Decreased Toe Off;Decreased Heel Strike;Bradykinetic   Bed Mobility    Supine to Sit Contact Guard Assist   Sit to Stand Standby Assist   Outcome Measures   Outcome Measures Utilized 6 Minute Walk Test   6 Minute Walk Test   Distance (feet) 195   Gait Speed (meters per second) 0.17   Number of Rests 0   Level of Assistance 4  (wheelchair follow for seated rest break at end of time)   Interdisciplinary Plan of Care Collaboration   Patient Position at End of Therapy Seated;Chair Alarm On;Self Releasing Lap Belt Applied;Call Light within Reach;Tray Table within Reach;Phone within Reach         Assessment    Patient demonstrates slow gait speed, although her endurance is good. She was able to remain conversant throughout gait with mild shortness of breath. Did not respond to cues to attempt walking without talking.     Strengths: Able to follow instructions, Alert and oriented, Good insight into deficits/needs, Pleasant and cooperative, Willingly participates in therapeutic activities  Barriers: Decreased endurance, Fatigue, Generalized weakness, Impaired activity tolerance, Impaired balance, Pain    Plan    Gait training with FWW, car transfer, overall activity tolerance/endurance, balance training, LE strengthening     DME  PT DME  Recommendations  Additional Equipment: Other (Comments) (Fixated GB in shower)    Passport items to be completed:  Get in/out of bed safely, in/out of a vehicle, safely use mobility device, walk or wheel around home/community, navigate up and down stairs, show how to get up/down from the ground, ensure home is accessible, demonstrate HEP, complete caregiver training    Physical Therapy Problems (Active)       Problem: Mobility Transfers       Dates: Start:  12/31/23         Goal: STG-Within one week, patient will transfer bed to chair with LRAD and supervision.        Dates: Start:  12/31/23               Problem: PT-Long Term Goals       Dates: Start:  12/31/23         Goal: LTG-By discharge, patient will tolerate standing for at least 1 minute without UE, while reaching outside CHANO to perform ADLs with supervision.        Dates: Start:  12/31/23            Goal: LTG-By discharge, patient will ambulate at least 150 feet with FWW and supervision.        Dates: Start:  12/31/23            Goal: LTG-By discharge, patient will transfer one surface to another with LRAD and mod I.        Dates: Start:  12/31/23            Goal: LTG-By discharge, patient will transfer in/out of a car with LRAD and min A.        Dates: Start:  12/31/23            Goal: LTG-By discharge, patient will negotiate up and down a 4inch step with FWW and CGA (to simulate home entry).        Dates: Start:  12/31/23

## 2024-01-05 NOTE — CARE PLAN
Problem: Dressing  Goal: STG-Within one week, patient will dress LB with SBA.   Outcome: Not Met     Problem: Dressing  Goal: STG-Within one week, patient will dress UB with supervision.   Outcome: Met     Problem: OT Long Term Goals  Goal: LTG-By discharge, patient will complete toileting (hygiene and pant management) and toilet transfer with supervision-mod I.   Outcome: Met

## 2024-01-05 NOTE — PROGRESS NOTES
Klonopin 1 mg ordered by Dr. Brand, patient requested 0.5mg. Physician made aware of dose change. Half tab wasted with Charge nurse.

## 2024-01-05 NOTE — THERAPY
"Occupational Therapy  Daily Treatment     Patient Name: Jenifer Ramos  Age:  63 y.o., Sex:  female  Medical Record #: [id number]  Today's Date: 1/5/2024     Precautions  Precautions: Fall Risk  Comments: L foot wound with PRAFO, flu + (no longer on droplet precaution as of 01/03)         Subjective    \"Now that the ice pick is no longer piercing the side of my head, I think I could do a shower.\"      Objective       01/05/24 0931   OT Charge Group   Charges Yes   OT Self Care / ADL (Units) 4   OT Total Time Spent   OT Individual Total Time Spent (Mins) 60   Precautions   Precautions Fall Risk   Cognition    Cognition / Consciousness WDL   Level of Consciousness Alert   Functional Level of Assist   Bathing Minimal Assist   Bathing Description Grab bar;Hand held shower   Upper Body Dressing Stand by Assist   Upper Body Dressing Description Increased time;Supervision for safety   Lower Body Dressing Moderate Assist   Lower Body Dressing Description Reacher;Grab bar   Toileting Supervision   Toileting Description Adaptive equipment   Bed, Chair, Wheelchair Transfer Standby Assist   Bed Chair Wheelchair Transfer Description Adaptive equipment   Toilet Transfers Supervised   Toilet Transfer Description Grab bar   Tub / Shower Transfers Standby Assist   Tub Shower Transfer Description Grab bar;Increased time;Shower bench   Balance   Sitting Balance (Static) Good   Sitting Balance (Dynamic) Fair +   Standing Balance (Static) Fair   Standing Balance (Dynamic) Fair -   Weight Shift Sitting Good   Weight Shift Standing Fair   Skilled Intervention Compensatory Strategies   Interdisciplinary Plan of Care Collaboration   IDT Collaboration with  Nursing   Patient Position at End of Therapy Seated   Eating   Assistance Needed Independent   CARE Score - Eating 6   Oral Hygiene   Assistance Needed Adaptive equipment   CARE Score - Oral Hygiene 6   Toileting Hygiene   Assistance Needed Supervision   CARE Score - Toileting Hygiene 4 " "  Shower/Bathe Self   Assistance Needed Physical assistance   Physical Assistance Level 25% or less   CARE Score - Shower/Bathe Self 3   Upper Body Dressing   Assistance Needed Set-up / clean-up   CARE Score - Upper Body Dressing 5   Lower Body Dressing   Assistance Needed Physical assistance   Physical Assistance Level 25% or less   CARE Score - Lower Body Dressing 3   Putting On/Taking Off Footwear   Assistance Needed Physical assistance   Physical Assistance Level 51%-75%   CARE Score - Putting On/Taking Off Footwear 2   Toilet Transfer   Assistance Needed Supervision   CARE Score - Toilet Transfer 4   Cognitive Pattern Assessment   Cognitive Pattern Assessment Used BIMS   Brief Interview for Mental Status (BIMS)   Repetition of Three Words (First Attempt) 3   Temporal Orientation: Year Correct   Temporal Orientation: Month Accurate within 5 days   Temporal Orientation: Day Correct   Recall: \"Sock\" Yes, no cue required   Recall: \"Blue\" Yes, no cue required   Recall: \"Bed\" Yes, no cue required   BIMS Summary Score 15   Confusion Assessment Method (CAM)   Is there evidence of an acute change in mental status from the patient's baseline? Yes   Inattention Behavior not present   Disorganized thinking Behavior not present   Altered level of consciousness Behavior not present   Discharge Summary    Discharge Location  Home   Patient Discharging with Assist of Spouse / Significant Other   Level of Supervision Required Intermittent Supervision   Recommended Equipment for Discharge Front-Wheeled Walker   Recommended Services Upon Discharge Home Health Occupational Therapy   Long Term Goals Met 1   Long Term Goals Not Met 3   Criteria for Termination of Services Maximum Function Achieved for Inpatient Rehabilitation   Discharge Instructions to Patient   Level of Assist Required for Eating Able to Complete Eating without Assist   Level of Assist Required for Grooming Able to Complete Grooming without Assist   Level of " Assist Required for Dressing Requires Physical Assist with Dressing   Level of Assist Required for Toileting Able to Complete Toileting without Assist   Level of Assist Required for Toilet Transfer Able to Complete Toilet Transfer without Assist   Equipment for Toilet Transfer Other   Level of Assist Required for Bathing Requires Physical Assist with Bathing   Equipment for Bathing Hand Held Shower Head;Grab Bars in Tub / Shower   Level of Assist Required for Shower Transfer Requires Supervision with Shower Transfer   Equipment for Shower Transfer Tub Transfer Bench   Level of Assist Required for Home Mgmt Requires Physical Assist with Home Management   Level of Assist Required for Meal Prep Requires Physical Assist with Meal Preparation   Driving Please Contact Physician Prior to Driving   Home Exercise Program None Issued       Assessment    Patient tolerated discharge shower/dressing activities with baseline performance noted with showering and LB dressing with increased standing tolerance reported during showering task.     Strengths: Able to follow instructions, Willingly participates in therapeutic activities  Barriers: Decreased endurance, Fatigue, Generalized weakness, Home accessibility, Impaired activity tolerance, Limited mobility    Plan    D/C home with home health and family support.     DME  OT DME Recommendations  Additional Equipment: Other (Comments) (Fixated GB in shower)      Occupational Therapy Goals (Active)       Problem: Dressing       Dates: Start:  12/31/23         Goal: STG-Within one week, patient will dress LB with SBA.        Dates: Start:  12/31/23

## 2024-01-05 NOTE — DISCHARGE SUMMARY
Physical Medicine & Rehabilitation Discharge Summary    Admission Date: 12/30/2023    Discharge Date: 1/6/2024     Attending Provider: Gian Menendez DO for Doron Brand MD/PhD    Admission Diagnosis:   Active Hospital Problems    Diagnosis     Edema     Gout     Diabetes mellitus type 2, insulin dependent (HCC)     Essential hypertension     Chronic ulcer of left foot due to diabetes mellitus (HCC)     Influenza A     History of stroke     PAF (paroxysmal atrial fibrillation) (HCC)     Diastolic dysfunction with chronic heart failure (HCC)     Pancytopenia (HCC)     Cirrhosis (HCC)     Hypothyroidism     GERD (gastroesophageal reflux disease)     Hypokalemia     Sleep apnea     Factor V deficiency (HCC)     Asthma with exacerbation        Discharge Diagnosis:  Active Hospital Problems    Diagnosis     Edema     Gout     Diabetes mellitus type 2, insulin dependent (HCC)     Essential hypertension     Chronic ulcer of left foot due to diabetes mellitus (HCC)     Influenza A     History of stroke     PAF (paroxysmal atrial fibrillation) (HCC)     Diastolic dysfunction with chronic heart failure (HCC)     Pancytopenia (HCC)     Cirrhosis (HCC)     Hypothyroidism     GERD (gastroesophageal reflux disease)     Hypokalemia     Sleep apnea     Factor V deficiency (HCC)     Asthma with exacerbation        HPI per Admission History & Physical:  Patient is a 63-year-old female with a past medical history of morbid obesity, paroxysmal atrial fibrillation, chronic anticoagulation on Eliquis, factor V Leiden COVID diastolic heart failure, asthma, hypothyroidism, pulmonary hypertension, remote history of CVA, MARCI, type 2 insulin-dependent diabetes, debility, peripheral neuropathy, osteoarthritis who presented on 12/28/2023 with generalized weakness, confusion, shortness of breath.  Patient reported her son was sick at home with a cold, patient was found to be positive for influenza A.  Chest x-ray showed diffuse  interstitial prominence and cardiomegaly, she was also found to have a left foot wound.  X-ray of the left foot showed no acute findings.  Patient was started on Tamiflu, Lasix for CHF and fluid overload.  Patient was found to be pancytopenic, most likely related to influenza.  Blood cultures were taken, no growth to date.       Patient was admitted to [facility] on 12/30/2023.       Gen: NAD  Psych: Mood and affect appropriate  CV: RRR with murmur, 1+ edema  Resp: rhonchi lower lobes, no upper airway sounds  Abd: NTND  Neuro: AOx4, following commands    Hospital Course by Problem List:  Debility secondary to CHF  -Preserved ejection fraction of 60%  -Diastolic heart failure, has cardiac murmur on exam, reports history of mitral regurg  -BNP 4500 --> 158 on admit labs   -Lasix reduced to 40 mg on 1/1  -Consult to hospitalist to help manage  -Admit to [facility] for PT and OT for mobility and ADLs. Per guidelines, 15 hours per week between PT, OT and SLP.     Acute on chronic respiratory failure with hypoxemia  -Secondary to influenza A pneumonia versus volume overload from diastolic heart failure exacerbation  -Started on Tamiflu. Completed Tamiflu     Pancytopenia  -Secondary to flu versus side effect from Tegretol  -Stopping Tegretol  -Monitor with serial labs  -ANC improving, does not require isolation/reverse precautions  -Consult to hospitalist to help manage  - Received Granix x1, counts much improved on 12/31. Repeat 1/3, improving  -Restart Tegretol 200 mg TID     Hypokalemia   3.1 on 1/3/23, given extra dose of 20 mEq     Diabetes mellitus  -Consult to hospitalist to manage  -Sliding scale insulin 0 to 14 units  -Continue Glargine 24  U and SSI     A-fib, history of stroke  -Eliquis 5 mg twice daily  -Aspirin 81 mg daily  -Rosuvastatin 20 mg q. evening     Left foot wound  -Wound care  -Clindamycin 300 mg 4 times daily. Completed  -Dusky appearance with bruising on 1/4, MRI ordered 1/5/23, Will  rule out underlying process. Discussed with patient with contrast and pre-procedure Klonopin.   I discussed the risks and benefits of using opiate medications for pain control.  I discussed the risk of addiction, potential for overdose, and respiratory depression (and the potential need for opiate antagonist therapy if this occurs).  I encouraged the patient to take this medication sparingly with the expressed goal of weaning off the medication as soon as is clinically appropriate.  I informed the patient that we are only able to provide up to a 14 day supply of these medications at discharge and that they will be responsible for requesting any refills needed from their primary care provider or their surgeon.  We discussed the need to safely secure these medications to prevent theft, inadvertent ingestion, or misuse.  Any unused medication should be immediately disposed of through a sanctioned medication disposal program.  We discussed adjunctive pain medications and conservative therapies at length.I answered the patient's questions regarding this treatment, and the patient indicated understanding and willingness to proceed.     Pain control  -Tylenol 650 mg every 6 hours as needed  -Gabapentin 600 mg 3 times daily  -Lidocaine patch  - Adding methocarbamol 500 mg QID     COPD  -Singulair 10 mg q. Evening  -Xopenex every 4 hours as needed  -Trelegy Ellipta 1 puff daily      Hypertension  -Verapamil 80 mg nightly. Continue Verapamil      Hypothyroidism  -Synthroid 112 mcg every morning     Anxiety  PRN Atarax     Nausea  History of reaction to Zofran  Will change to Phenergan. Continue Phenergan     History of gout  -Uloric 40 mg daily     Skin - Patient at risk for skin breakdown due to debility in areas including sacrum, achilles, elbows and head in addition to other sites. Nursing to assess skin daily.      GI Ppx - Patient on Prilosec for GERD prophylaxis. Patient on Senna-docusate for constipation prophylaxis.       DVT Ppx - Patient Eliquis on transfer.    Functional Status at Discharge  Eating:  Independent  Eating Description:     Grooming:  Independent (seated at sink oral care  wash/dry face and hands  comb hair)  Grooming Description:  Seated in wheelchair at sink  Bathing:  Minimal Assist  Bathing Description:  Grab bar, Hand held shower, Long handled bath tool, Tub bench, Assit with back, Assit with perineal, Increased time, Initial preparation for task, Set-up of equipment, Set up for wound protection, Verbal cueing, Supervision for safety (Min A with washing rear in standing and some water management with rinsing due to instability with standing. TA for waterproofing L-foot, successfully. Pt able to reach all remaining parts with increased time seated on fold down shower bench.)  Upper Body Dressing:  Stand by Assist  Upper Body Dressing Description:  Increased time, Supervision for safety (Set-up for UBD. Pt able to thread arms and pull over her head in sitting.)  Lower Body Dressing:  Moderate Assist  Lower Body Dressing Description:  Reacher, Grab bar, Assist with threading into pant leg, Assist with closures, Increased time, Supervision for safety, Initial preparation for task (SBA for doffing clothes using AE. Jerel to thread the L leg into pants, able to complete the rest using AE. Jerel needed to don and fasten shoes.)     Walk:  Standby Assist  Distance Walked:   (100+180)  Number of Times Distance Was Traveled:  2  Assistive Device:  Front Wheel Walker  Gait Deviation:  Decreased Toe Off, Decreased Heel Strike, Bradykinetic  Wheelchair:     Distance Propelled:      Wheelchair Description:     Stairs Standby Assist  Stairs Description Verbal cueing, Supervision for safety, Safety concerns, Limited by fatigue, Extra time, Assist device/equipment    Gian JUARES D.O., personally performed a complete drug regimen review and no potential clinically significant medication issues were identified.   Discharge  Medication:     Medication List        START taking these medications        Instructions   methocarbamol 500 MG Tabs  Commonly known as: Robaxin   Take 1 Tablet by mouth 4 times a day.  Dose: 500 mg            CHANGE how you take these medications        Instructions   insulin glargine 100 UNIT/ML Sopn injection  What changed: how much to take  Generic drug: insulin glargine   Inject 26 Units under the skin every evening.  Dose: 26 Units     oxyCODONE immediate release 10 MG immediate release tablet  What changed:   medication strength  how much to take  when to take this  additional instructions  Commonly known as: Roxicodone   Take 1 Tablet by mouth every 6 hours as needed for Severe Pain for up to 7 days.  Dose: 10 mg     potassium chloride SA 10 MEQ Tbcr  What changed:   how much to take  additional instructions  Commonly known as: K-Dur   Take 2 Tablets by mouth every evening.  Dose: 20 mEq     Trelegy Ellipta 100-62.5-25 mcg/act inhaler  What changed: how much to take  Generic drug: fluticasone-umeclidinium-vilanterol   Inhale 1 Puff every evening.  Dose: 1 Puff            CONTINUE taking these medications        Instructions   apixaban 5mg Tabs  Commonly known as: Eliquis   Take 1 Tablet by mouth 2 times a day.  Dose: 5 mg     aspirin EC 81 MG Tbec  Commonly known as: Ecotrin   Take 1 Tablet by mouth every day.  Dose: 81 mg     carBAMazepine 200 MG Tabs  Commonly known as: TEGretol   Take 1 Tablet by mouth 3 times a day.  Dose: 200 mg     febuxostat 40 MG Tabs  Commonly known as: Uloric   Take 1 Tablet by mouth every day.  Dose: 40 mg     furosemide 40 MG Tabs  Commonly known as: Lasix   Take 1 Tablet by mouth every day.  Dose: 40 mg     gabapentin 300 MG Caps  Commonly known as: Neurontin   Take 2 Capsules by mouth 3 times a day. * may take an extra 300 mg  ( 600 mg) if needed  Dose: 600 mg     hydrOXYzine HCl 25 MG Tabs  Commonly known as: Atarax   Take 1 Tablet by mouth at bedtime.  Dose: 25 mg      insulin regular 100 Unit/mL Soln  Commonly known as: Humulin R   Inject 3-14 Units under the skin 4 Times a Day,Before Meals and at Bedtime.  Dose: 3-14 Units     levothyroxine 112 MCG Tabs  Commonly known as: Synthroid   Take 1 Tablet by mouth every morning on an empty stomach.  Dose: 112 mcg     magnesium oxide 400 MG Tabs tablet  Commonly known as: Mag-Ox   Take 400 mg by mouth every day.  Dose: 400 mg     montelukast 10 MG Tabs  Commonly known as: Singulair   Take 1 Tablet by mouth every evening.  Dose: 10 mg     Nurtec 75 MG Tbdp  Generic drug: Rimegepant Sulfate   Take 75 mg by mouth 1 time a day as needed. Indications: Migraine Headache  Dose: 75 mg     pantoprazole 40 MG Tbec  Commonly known as: Protonix   Take 40 mg by mouth every evening.  Dose: 40 mg     promethazine 25 MG Tabs  Commonly known as: Phenergan   Take 1 Tablet by mouth every 6 hours as needed for Nausea/Vomiting.  Dose: 25 mg     rosuvastatin 20 MG Tabs  Commonly known as: Crestor   Take 1 Tablet by mouth every evening.  Dose: 20 mg     verapamil 80 MG Tabs  Commonly known as: Isoptin   Take 1 Tablet by mouth at bedtime.  Dose: 80 mg            STOP taking these medications      levalbuterol 1.25 MG/3ML Nebu  Commonly known as: Xopenex     levalbuterol 45 MCG/ACT inhaler  Commonly known as: Xopenex HFA     lidocaine 5 % Ptch  Commonly known as: Lidoderm     oseltamivir 75 MG Caps  Commonly known as: Tamiflu     polyethylene glycol/lytes Pack  Commonly known as: Miralax     senna-docusate 8.6-50 MG Tabs  Commonly known as: Pericolace Or Senokot S              Discharge Diet:  Current Diet Order   Procedures    Diet Order Diet: Consistent CHO (Diabetic)       Discharge Activity:  As tolerated     Disposition:  Patient to discharge home with family support and community resources.    Equipment:  FWW, WC    Follow-up & Discharge Instructions:  Follow up with your primary care provider (PCP) within 7-10 days of discharge to review your  medications and take over your care.     If you develop chest pain, fever, chills, change in neurologic function (weakness, sensation changes, vision changes), or other concerning sxs, seek immediate medical attention or call 911.      Future Appointments   Date Time Provider Department Center   1/5/2024  1:00 PM Jocelyn Shelton, PT [facility] None   1/5/2024  1:00 PM Damon Chaparro, MS,OTR/L [facility] None   1/6/2024  7:00 AM REHAB NON-THERAPY PROVIDER [facility]1 None   1/7/2024  7:00 AM REHAB NON-THERAPY PROVIDER [facility]1 None   1/8/2024  7:00 AM REHAB NON-THERAPY PROVIDER [facility]1 None   1/9/2024  7:00 AM REHAB NON-THERAPY PROVIDER [facility]1 None   1/10/2024  7:00 AM REHAB NON-THERAPY PROVIDER [facility]1 None   1/11/2024  7:00 AM REHAB NON-THERAPY PROVIDER [facility]1 None   1/12/2024  7:00 AM REHAB NON-THERAPY PROVIDER [facility]1 None   1/13/2024  7:00 AM REHAB NON-THERAPY PROVIDER [facility]1 None   1/14/2024  7:00 AM REHAB NON-THERAPY PROVIDER [facility]1 None   1/15/2024  7:00 AM REHAB NON-THERAPY PROVIDER [facility]1 None   1/16/2024  7:00 AM REHAB NON-THERAPY PROVIDER [facility]1 None   1/17/2024  7:00 AM REHAB NON-THERAPY PROVIDER [facility]1 None   1/18/2024  7:00 AM REHAB NON-THERAPY PROVIDER [facility]1 None   1/19/2024  7:00 AM REHAB NON-THERAPY PROVIDER [facility]1 None   1/20/2024  7:00 AM REHAB NON-THERAPY PROVIDER [facility]1 None   1/21/2024  7:00 AM REHAB NON-THERAPY PROVIDER [facility]1 None   1/22/2024  7:00 AM REHAB NON-THERAPY PROVIDER [facility]1 None   1/23/2024  7:00 AM REHAB NON-THERAPY PROVIDER [facility]1 None   1/24/2024  7:00 AM REHAB NON-THERAPY PROVIDER [facility]1 None   1/25/2024  7:00 AM REHAB NON-THERAPY PROVIDER [facility]1 None       Condition on Discharge:  Good    More than 31 minutes was spent on discharging this patient, including face-to-face time, prescription management, and the dictation of this note.    Gian Menendez D.O.    Date of Service: 1/6/2024

## 2024-01-05 NOTE — CARE PLAN
"The patient is Watcher - Medium risk of patient condition declining or worsening    Shift Goals  Clinical Goals: safety  Patient Goals: safety, sleep  Family Goals: Education    Problem: Fall Risk - Rehab  Goal: Patient will remain free from falls  Outcome: Progressing  Note: Sophia Smith Fall risk Assessment Score: 23    High fall risk Interventions   - Alarming seatbelt  - Bed and strip alarm   - Yellow sign by the door   - Yellow wrist band \"Fall risk\"  - Room near to the nurse station  - Do not leave patient unattended in the bathroom  - Fall risk education provided   Patient uses call light consistently and appropriately this shift.  Waits for assistance when needed and does not attempt self transfer.  Able to verbalize needs.  Will continue to monitor.     Problem: Skin Integrity  Goal: Skin integrity is maintained or improved  Outcome: Progressing  Note: Heel boots in place bilaterally. Applied bacitracin to scab on right shin with no evidence of bleeding noted. Patient's skin remains intact and free from new or accidental injury this shift.  Will continue to monitor.     "

## 2024-01-05 NOTE — PROGRESS NOTES
NURSING DAILY NOTE    Name: Jenifer Ramos   Date of Admission: 12/30/2023   Admitting Diagnosis: No Principal Problem: There is no principal problem currently on the Problem List. Please update the Problem List and refresh.  Attending Physician: Doron Brand M.d.  Allergies: Azithromycin, Erythromycin, Other misc, Penicillins, Pistachio, Sulfa drugs, Tetraglycine, Other drug, Physostigmine, Tape, Zanaflex [tizanidine hcl], Albuterol, Atorvastatin, Chlorhexidine, Lamisil [terbinafine hcl], and Morphine    Safety  Patient Assist  sba  Patient Precautions  Fall Risk  Precaution Comments  L foot wound with PRAFO, flu + (no longer on droplet precaution as of 01/03)  Bed Transfer Status  Standby Assist  Toilet Transfer Status   Supervised  Assistive Devices  Rails, Walker - front wheel, Wheelchair  Oxygen  Silicone Nasal Cannula  Diet/Therapeutic Dining  Current Diet Order   Procedures    Diet Order Diet: Consistent CHO (Diabetic)     Pill Administration  whole  Agitated Behavioral Scale  19  ABS Level of Severity  No Agitation    Fall Risk  Has the patient had a fall this admission?   No  Sophia Smith Fall Risk Scoring  23, HIGH RISK  Fall Risk Safety Measures  bed alarm, chair alarm, seatbelt alarm, and poor balance    Vitals  Temperature: 36.8 °C (98.3 °F)  Temp src: Oral  Pulse: 72  Respiration: 18  Blood Pressure : 123/66  Blood Pressure MAP (Calculated): 85 MM HG  BP Location: Left, Upper Arm  Patient BP Position: Supine     Oxygen  Pulse Oximetry: 94 %  O2 (LPM): 1  O2 Delivery Device: Silicone Nasal Cannula    Bowel and Bladder  Last Bowel Movement  01/04/24  Stool Type  Type 5: Soft blob with clear cut edges (passed easily)  Bowel Device  Bathroom  Continent  Bladder: Did not void   Bowel: No movement  Bladder Function  Urine Void (mL):  (moderate)  Number of Times Voided: 1  Urine Color: Yellow  Genitourinary Assessment   Bladder  Assessment (WDL):  Within Defined Limits  Llamas Catheter: Not Applicable  Urine Color: Yellow  Bladder Device: Bathroom  Bladder Scan: Post Void  $ Bladder Scan Results (mL): 1    Skin  Guille Score   17  Sensory Interventions   Bed Types: Standard/Trauma Mattress  Skin Preventative Measures: Heel Float Boots in Use , Pillows in Use for Support / Positioning  Moisture Interventions  Moisturizers/Barriers: Barrier Wipes      Pain  Pain Rating Scale  8 - Awful, hard to do anything  Pain Location  Generalized  Pain Location Orientation  Mid, Upper, Lower  Pain Interventions   Declines    ADLs    Bathing      Linen Change      Personal Hygiene  Moist Antionette Wipes, Perineal Care  Chlorhexidine Bath      Oral Care  Brushed Teeth  Teeth/Dentures     Shave     Nutrition Percentage Eaten  Lunch, Between % Consumed  Environmental Precautions  Bed in Low Position, Treaded Slipper Socks on Patient  Patient Turns/Positioning  Patient Turns Self from Side to Side  Patient Turns Assistance/Tolerance     Bed Positions  Bed Controls On  Head of Bed Elevated  Self regulated      Psychosocial/Neurologic Assessment  Psychosocial Assessment  Psychosocial (WDL):  WDL Except  Patient Behaviors: Irritable  Neurologic Assessment  Neuro (WDL): Exceptions to WDL  Level of Consciousness: Alert  Orientation Level: Oriented X4  Cognition: Appropriate safety awareness  Speech: Clear  Pupil Assesment: No  Motor Function/Sensation Assessment: Motor strength  Muscle Strength Right Arm: Good Strength Against Gravity and Moderate Resistance  Muscle Strength Left Arm: Fair Strength against Gravity but No Resistance  Muscle Strength Right Leg: Good Strength Against Gravity and Moderate Resistance  Muscle Strength Left Leg: Fair Strength against Gravity but No Resistance  EENT (WDL):  WDL Except    Cardio/Pulmonary Assessment  Edema   RLE Edema: 2+  LLE Edema: 2+  Respiratory Breath Sounds  RUL Breath Sounds: Clear  RML Breath Sounds: Diminished  RLL  Breath Sounds: Diminished  DANIELLE Breath Sounds: Clear  LLL Breath Sounds: Diminished  Cardiac Assessment   Cardiac (WDL):  WDL Except (hx afib, chf)

## 2024-01-05 NOTE — FLOWSHEET NOTE
01/05/24 0740   Events/Summary/Plan   Events/Summary/Plan SpO2 check, Begin room air trial   Vital Signs   Pulse 66   Respiration 16   Pulse Oximetry 99 %   $ Pulse Oximetry (Spot Check) Yes   Respiratory Assessment   Respiratory Pattern Within Normal Limits   Level of Consciousness Alert   Chest Exam   Work Of Breathing / Effort Within Normal Limits   Secretions   Cough Strong;Non Productive   Oxygen   O2 (LPM) 1   O2 Delivery Device Nasal Cannula

## 2024-01-05 NOTE — PROGRESS NOTES
"  Physical Medicine & Rehabilitation Progress Note    Encounter Date: 1/5/2024    Chief Complaint: Decreased mobility, weakness    Interval Events (Subjective):  Patient sitting up in room. She had discoloration of her foot. Ordered MRI to rule out underlying process. She reports claustrophobia, will order Klonopin.  Discussed would have later this morning. Still plan for discharge tomorrow.      _____________________________________  Interdisciplinary Team Conference   Most recent IDT on 1/4/2024    Discharge Date/Disposition:  1/6/24  _____________________________________      Objective:  VITAL SIGNS: /66   Pulse 66   Temp 36.8 °C (98.3 °F) (Oral)   Resp 16   Ht 1.727 m (5' 7.99\")   Wt 116 kg (256 lb)   SpO2 99%   BMI 38.93 kg/m²   Gen: NAD  Psych: Mood and affect appropriate  CV: RRR, 1+ edema  Resp: CTAB, no upper airway sounds  Abd: NTND  Neuro: AOx4, following commands    Laboratory Values:  Recent Results (from the past 72 hour(s))   POCT glucose device results    Collection Time: 01/02/24 11:16 AM   Result Value Ref Range    POC Glucose, Blood 143 (H) 65 - 99 mg/dL   POCT glucose device results    Collection Time: 01/02/24  4:51 PM   Result Value Ref Range    POC Glucose, Blood 164 (H) 65 - 99 mg/dL   POCT glucose device results    Collection Time: 01/02/24  9:26 PM   Result Value Ref Range    POC Glucose, Blood 191 (H) 65 - 99 mg/dL   MAGNESIUM    Collection Time: 01/03/24  5:33 AM   Result Value Ref Range    Magnesium 1.9 1.5 - 2.5 mg/dL   PHOSPHORUS    Collection Time: 01/03/24  5:33 AM   Result Value Ref Range    Phosphorus 3.2 2.5 - 4.5 mg/dL   CBC WITHOUT DIFFERENTIAL    Collection Time: 01/03/24  5:33 AM   Result Value Ref Range    WBC 4.4 (L) 4.8 - 10.8 K/uL    RBC 4.58 4.20 - 5.40 M/uL    Hemoglobin 11.0 (L) 12.0 - 16.0 g/dL    Hematocrit 37.4 37.0 - 47.0 %    MCV 81.7 81.4 - 97.8 fL    MCH 24.0 (L) 27.0 - 33.0 pg    MCHC 29.4 (L) 32.2 - 35.5 g/dL    RDW 57.3 (H) 35.9 - 50.0 fL    " Platelet Count 118 (L) 164 - 446 K/uL    MPV 11.7 9.0 - 12.9 fL   Basic Metabolic Panel    Collection Time: 01/03/24  5:33 AM   Result Value Ref Range    Sodium 136 135 - 145 mmol/L    Potassium 3.1 (L) 3.6 - 5.5 mmol/L    Chloride 95 (L) 96 - 112 mmol/L    Co2 31 20 - 33 mmol/L    Glucose 163 (H) 65 - 99 mg/dL    Bun 10 8 - 22 mg/dL    Creatinine 0.56 0.50 - 1.40 mg/dL    Calcium 8.6 8.5 - 10.5 mg/dL    Anion Gap 10.0 7.0 - 16.0   ESTIMATED GFR    Collection Time: 01/03/24  5:33 AM   Result Value Ref Range    GFR (CKD-EPI) 101 >60 mL/min/1.73 m 2   POCT glucose device results    Collection Time: 01/03/24  7:30 AM   Result Value Ref Range    POC Glucose, Blood 143 (H) 65 - 99 mg/dL   POCT glucose device results    Collection Time: 01/03/24 11:17 AM   Result Value Ref Range    POC Glucose, Blood 210 (H) 65 - 99 mg/dL   POCT glucose device results    Collection Time: 01/03/24  5:14 PM   Result Value Ref Range    POC Glucose, Blood 193 (H) 65 - 99 mg/dL   POCT glucose device results    Collection Time: 01/03/24  9:38 PM   Result Value Ref Range    POC Glucose, Blood 175 (H) 65 - 99 mg/dL   POCT glucose device results    Collection Time: 01/04/24  7:19 AM   Result Value Ref Range    POC Glucose, Blood 135 (H) 65 - 99 mg/dL   POCT glucose device results    Collection Time: 01/04/24 11:18 AM   Result Value Ref Range    POC Glucose, Blood 184 (H) 65 - 99 mg/dL   POCT glucose device results    Collection Time: 01/04/24  4:46 PM   Result Value Ref Range    POC Glucose, Blood 132 (H) 65 - 99 mg/dL   POCT glucose device results    Collection Time: 01/04/24  9:08 PM   Result Value Ref Range    POC Glucose, Blood 199 (H) 65 - 99 mg/dL   CBC WITH DIFFERENTIAL    Collection Time: 01/05/24  5:19 AM   Result Value Ref Range    WBC 3.2 (L) 4.8 - 10.8 K/uL    RBC 4.45 4.20 - 5.40 M/uL    Hemoglobin 10.6 (L) 12.0 - 16.0 g/dL    Hematocrit 35.6 (L) 37.0 - 47.0 %    MCV 80.0 (L) 81.4 - 97.8 fL    MCH 23.8 (L) 27.0 - 33.0 pg    MCHC 29.8  (L) 32.2 - 35.5 g/dL    RDW 55.3 (H) 35.9 - 50.0 fL    Platelet Count 128 (L) 164 - 446 K/uL    MPV 11.2 9.0 - 12.9 fL    Neutrophils-Polys 58.60 44.00 - 72.00 %    Lymphocytes 26.90 22.00 - 41.00 %    Monocytes 13.30 0.00 - 13.40 %    Eosinophils 0.00 0.00 - 6.90 %    Basophils 0.60 0.00 - 1.80 %    Immature Granulocytes 0.60 0.00 - 0.90 %    Nucleated RBC 0.00 0.00 - 0.20 /100 WBC    Neutrophils (Absolute) 1.89 1.82 - 7.42 K/uL    Lymphs (Absolute) 0.87 (L) 1.00 - 4.80 K/uL    Monos (Absolute) 0.43 0.00 - 0.85 K/uL    Eos (Absolute) 0.00 0.00 - 0.51 K/uL    Baso (Absolute) 0.02 0.00 - 0.12 K/uL    Immature Granulocytes (abs) 0.02 0.00 - 0.11 K/uL    NRBC (Absolute) 0.00 K/uL    Hypochromia 1+     Anisocytosis 1+     Microcytosis 1+    Comp Metabolic Panel    Collection Time: 01/05/24  5:19 AM   Result Value Ref Range    Sodium 138 135 - 145 mmol/L    Potassium 3.5 (L) 3.6 - 5.5 mmol/L    Chloride 99 96 - 112 mmol/L    Co2 29 20 - 33 mmol/L    Anion Gap 10.0 7.0 - 16.0    Glucose 149 (H) 65 - 99 mg/dL    Bun 9 8 - 22 mg/dL    Creatinine 0.51 0.50 - 1.40 mg/dL    Calcium 9.2 8.5 - 10.5 mg/dL    Correct Calcium 9.8 8.5 - 10.5 mg/dL    AST(SGOT) 21 12 - 45 U/L    ALT(SGPT) 12 2 - 50 U/L    Alkaline Phosphatase 135 (H) 30 - 99 U/L    Total Bilirubin 0.5 0.1 - 1.5 mg/dL    Albumin 3.3 3.2 - 4.9 g/dL    Total Protein 6.2 6.0 - 8.2 g/dL    Globulin 2.9 1.9 - 3.5 g/dL    A-G Ratio 1.1 g/dL   ESTIMATED GFR    Collection Time: 01/05/24  5:19 AM   Result Value Ref Range    GFR (CKD-EPI) 103 >60 mL/min/1.73 m 2   PLATELET ESTIMATE    Collection Time: 01/05/24  5:19 AM   Result Value Ref Range    Plt Estimation Decreased    MORPHOLOGY    Collection Time: 01/05/24  5:19 AM   Result Value Ref Range    RBC Morphology Present     Poikilocytosis 1+     Ovalocytes 1+    PERIPHERAL SMEAR REVIEW    Collection Time: 01/05/24  5:19 AM   Result Value Ref Range    Peripheral Smear Review see below    DIFFERENTIAL COMMENT    Collection  Time: 01/05/24  5:19 AM   Result Value Ref Range    Comments-Diff see below    POCT glucose device results    Collection Time: 01/05/24  7:10 AM   Result Value Ref Range    POC Glucose, Blood 134 (H) 65 - 99 mg/dL       Medications:  Scheduled Medications   Medication Dose Frequency    clonazePAM  1 mg Once    carBAMazepine  200 mg TID    furosemide  40 mg DAILY    insulin GLARGINE  26 Units Q EVENING    febuxostat  40 mg QHS    methocarbamol  500 mg 4X/DAY    bacitracin-polymyxin b   BID    insulin lispro  2-12 Units 4X/DAY ACHS    potassium chloride SA  20 mEq DAILY    apixaban  5 mg BID    aspirin  81 mg DAILY    fluticasone-umeclidinium-vilanterol  1 Puff QDAILY (RT)    gabapentin  600 mg TID    levothyroxine  112 mcg AM ES    lidocaine  1 Patch Q24HRS    montelukast  10 mg Q EVENING    omeprazole  20 mg DAILY    rosuvastatin  20 mg Q EVENING    verapamil  80 mg QHS     PRN medications: hydrOXYzine HCl, promethazine, [DISCONTINUED] insulin regular **AND** POC blood glucose manual result **AND** NOTIFY MD and PharmD **AND** Administer 20 grams of glucose (approximately 8 ounces of fruit juice) every 15 minutes PRN FSBG less than 70 mg/dL **AND** dextrose bolus, acetaminophen, Respiratory Therapy Consult, traZODone, sodium chloride, ondansetron **OR** ondansetron, carboxymethylcellulose, levalbuterol, oxyCODONE immediate-release **OR** oxyCODONE immediate release    Diet:  Current Diet Order   Procedures    Diet Order Diet: Consistent CHO (Diabetic)       Medical Decision Making and Plan:  Adapted from Dr. Novak's A&P  Debility secondary to CHF  -Preserved ejection fraction of 60%  -Diastolic heart failure, has cardiac murmur on exam, reports history of mitral regurg  -BNP 4500 --> 158 on admit labs   -Lasix reduced to 40 mg on 1/1  -Consult to hospitalist to help manage  -Admit to [facility] for PT and OT for mobility and ADLs. Per guidelines, 15 hours per week between PT, OT and SLP.     Acute on chronic respiratory  failure with hypoxemia  -Secondary to influenza A pneumonia versus volume overload from diastolic heart failure exacerbation  -Started on Tamiflu. Completed Tamiflu     Pancytopenia  -Secondary to flu versus side effect from Tegretol  -Stopping Tegretol  -Monitor with serial labs  -ANC improving, does not require isolation/reverse precautions  -Consult to hospitalist to help manage  - Received Granix x1, counts much improved on 12/31. Repeat 1/3, improving  -Restart Tegretol 200 mg TID     Hypokalemia   3.1 on 1/3/23, given extra dose of 20 mEq    Diabetes mellitus  -Consult to hospitalist to manage  -Sliding scale insulin 0 to 14 units  -Continue Glargine 24  U and SSI     A-fib, history of stroke  -Eliquis 5 mg twice daily  -Aspirin 81 mg daily  -Rosuvastatin 20 mg q. evening     Left foot wound  -Wound care  -Clindamycin 300 mg 4 times daily. Completed  -Dusky appearance with bruising on 1/4, MRI ordered 1/5/23, Will rule out underlying process. Discussed with patient with contrast and pre-procedure Klonopin.      Pain control  -Tylenol 650 mg every 6 hours as needed  -Gabapentin 600 mg 3 times daily  -Lidocaine patch  - Adding methocarbamol 500 mg QID     COPD  -Singulair 10 mg q. Evening  -Xopenex every 4 hours as needed  -Trelegy Ellipta 1 puff daily      Hypertension  -Verapamil 80 mg nightly. Continue Verapamil      Hypothyroidism  -Synthroid 112 mcg every morning     Anxiety  PRN Atarax    Nausea  History of reaction to Zofran  Will change to Phenergan. Continue Phenergan    History of gout  -Uloric 40 mg daily     Skin - Patient at risk for skin breakdown due to debility in areas including sacrum, achilles, elbows and head in addition to other sites. Nursing to assess skin daily.      GI Ppx - Patient on Prilosec for GERD prophylaxis. Patient on Senna-docusate for constipation prophylaxis.      DVT Ppx - Patient Eliquis on transfer.  ____________________________________    T. Orlando Brand MD/PhD  [facility]  - Physical Medicine & Rehabilitation   [facility] - Brain Injury Medicine   ____________________________________    Total time:  50 minutes. Time spent included pre-rounding review of vitals and tests, unit/floor time, face-to-face time with the patient including physical examination, care coordination, counseling of patient and/or family, ordering medications/procedures/tests, discussion with CM, PT, OT, SLP and/or other healthcare providers, and documentation in the electronic medical record. Topics discussed included discharge planning, discharge medications, MRI of foot, klonopin, and discussed anxiety.

## 2024-01-05 NOTE — PROGRESS NOTES
Hospital Medicine Daily Progress Note        Chief Complaint:  Hypertension  Diabetes  Pancytopenia    Interval History:  No new problems reported.  Labs reviewed.    Review of Systems  Review of Systems   Constitutional:  Negative for chills and fever.   HENT: Negative.     Eyes: Negative.    Respiratory:  Negative for cough and shortness of breath.    Cardiovascular:  Negative for chest pain and palpitations.   Gastrointestinal:  Negative for abdominal pain, nausea and vomiting.   Musculoskeletal:         Wound pain   Skin:  Negative for itching and rash.   Endo/Heme/Allergies:  Negative for polydipsia. Does not bruise/bleed easily.        Physical Exam  Temp:  [36.4 °C (97.6 °F)-36.8 °C (98.3 °F)] 36.8 °C (98.3 °F)  Pulse:  [66-78] 66  Resp:  [16-18] 16  BP: (104-123)/(65-72) 123/66  SpO2:  [88 %-99 %] 99 %    Physical Exam  Vitals reviewed.   Constitutional:       General: She is not in acute distress.     Appearance: Normal appearance. She is not ill-appearing.   HENT:      Head: Normocephalic and atraumatic.      Right Ear: External ear normal.      Left Ear: External ear normal.      Nose: Nose normal.      Mouth/Throat:      Pharynx: Oropharynx is clear.   Eyes:      General:         Right eye: No discharge.         Left eye: No discharge.      Extraocular Movements: Extraocular movements intact.      Conjunctiva/sclera: Conjunctivae normal.   Cardiovascular:      Rate and Rhythm: Normal rate and regular rhythm.   Pulmonary:      Effort: No respiratory distress.      Breath sounds: No wheezing.      Comments: Decreased BS  Abdominal:      General: Bowel sounds are normal. There is no distension.      Palpations: Abdomen is soft.      Tenderness: There is no abdominal tenderness.   Musculoskeletal:      Cervical back: Normal range of motion and neck supple.      Right lower leg: Edema present.      Left lower leg: Edema present.   Skin:     General: Skin is warm and dry.   Neurological:      Mental Status:  She is alert and oriented to person, place, and time.         Fluids    Intake/Output Summary (Last 24 hours) at 1/5/2024 0908  Last data filed at 1/5/2024 0538  Gross per 24 hour   Intake 920 ml   Output --   Net 920 ml       Laboratory  Recent Labs     01/03/24  0533 01/05/24  0519   WBC 4.4* 3.2*   RBC 4.58 4.45   HEMOGLOBIN 11.0* 10.6*   HEMATOCRIT 37.4 35.6*   MCV 81.7 80.0*   MCH 24.0* 23.8*   MCHC 29.4* 29.8*   RDW 57.3* 55.3*   PLATELETCT 118* 128*   MPV 11.7 11.2     Recent Labs     01/03/24  0533 01/05/24  0519   SODIUM 136 138   POTASSIUM 3.1* 3.5*   CHLORIDE 95* 99   CO2 31 29   GLUCOSE 163* 149*   BUN 10 9   CREATININE 0.56 0.51   CALCIUM 8.6 9.2                 Assessment/Plan  Gout  Assessment & Plan  On Uloric  Monitor for acute flare-ups    Edema  Assessment & Plan  Echo 12/30/23 EF 60%  BNP resolving  Continue Lasix as tolerated  Outpt meds include Lasix 40-80 mg qd     Essential hypertension  Assessment & Plan  On Verapamil and Lasix  Observe blood pressure trends    Diabetes mellitus type 2, insulin dependent (HCC)  Assessment & Plan  HbA1c 11.9  Observe serum glucose trends on increased Lantus  Continue SSI  Outpt meds include Tresiba 120 u qd and Humalog 40-60 u per SSI    Chronic ulcer of left foot due to diabetes mellitus (HCC)- (present on admission)  Assessment & Plan  X-ray 12/27/23 no definite acute osseous abnormality but evaluation limited due to osseous demineralization  Completed Clinda  MRI pending  Wound care and pain control per Physiatry    Influenza A- (present on admission)  Assessment & Plan  +PCR 12/27/23  Completed Tamiflu 1/1/24    History of stroke- (present on admission)  Assessment & Plan  On ASA, Eliquis, and Crestor    PAF (paroxysmal atrial fibrillation) (HCC)- (present on admission)  Assessment & Plan  H/O ablation x 2  Rate controlled on Verapamil  Anticoagulated on Eliquis    Pancytopenia (HCC)- (present on admission)  Assessment & Plan  Likely 2/2 meds w/ possible  culprits including Tegretol, Eliquis, ASA, Uloric, Lasix, Robaxin, Singulair, Omeprazole, and Crestor  Tegretol was discontinued, then resumed 1/4/24 per Physiatry  Monitor need for G-CSF, PRBC, and/or PLT transfusion  S/P Granix x 1 on 12/30/23 for leukopenia  Follow WBC/ANC, H/H, and PLT    Hypokalemia- (present on admission)  Assessment & Plan  2/2 Lasix  Replete K+ again  Continue daily KCl while on diuretics    GERD (gastroesophageal reflux disease)- (present on admission)  Assessment & Plan  On Omeprazole    Hypothyroidism- (present on admission)  Assessment & Plan  Euthyroid on Synthroid    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

## 2024-01-05 NOTE — FLOWSHEET NOTE
01/04/24 1747   Events/Summary/Plan   Events/Summary/Plan RA pulse ox check   Vital Signs   Pulse Oximetry 88 %   $ Pulse Oximetry (Spot Check) Yes   Oxygen   O2 Delivery Device Room air w/o2 available

## 2024-01-05 NOTE — THERAPY
Missed Therapy    Patient Name: Jenifer Ramos  Age:  63 y.o., Sex:  female  Medical Record #: [id number]  Today's Date: 1/5/2024    Discussed missed therapy with MD via telephone read back, PT, and .        01/05/24 1301   Therapy Missed   Missed Therapy (Minutes) 60   Reason For Missed Therapy Medical - Patient on Hold from Therapy  (Pt out of facility for MRI appointment.)

## 2024-01-05 NOTE — DISCHARGE PLANNING
Case Management/IDT follow up. Order given to DPA for home health /[facility]    IDT continues to recommend IRF level of care as patient continue to make progress with all therapies. dc date set for 1/6    I met with pt and her  Demetrio; they both appeared to be extremely tired and were unable to keep their eyes open when I spoke with them; pt had her head laying on table while we talked;  discussed family training - spouse is not interested; encouraged them to hire private care givers - provided them w/ names of agencies; discussed possibility of applying for Medicaid - they report they are over income.     DC needs: home health for PT/OT/SLP/RN/ SW. Choice is w/ [facility]. Follow up with PCP; pt has all dme including home 02.     Pt has used out pt wound care clinic @ [facility] - they prefer home health inititally.     Reviewed signed copy of IMM and answered all questions.    Dc date /disposition:   dc 1/6; home w/ spouse and son;  spouse to transport home.

## 2024-01-05 NOTE — CARE PLAN
The patient is Stable - Low risk of patient condition declining or worsening    Shift Goals  Clinical Goals: Safety, wound care, BG monitoring  Patient Goals: Rest  Family Goals: Education    Progress made toward(s) clinical / shift goals:   this am, patient out of thge building for the lunch BG assessment.    Patient is not progressing towards the following goals:      Problem: Respiratory  Goal: Patient will understand use and administration of respiratory medications to improve respiratory function  Outcome: Not Progressing  Note: Patient continues to use Oxygen throughout day shift.     Problem: Psychosocial  Goal: Patient's ability to verbalize feelings about condition will improve  Outcome: Progressing  Flowsheets (Taken 1/5/2024 1313)  Condition Will Improve: Discussed coping with medical condition and its effects  Note: Patient understands the MRI process, requested anti-anxiety medication prior to MRI for claustrophobia.

## 2024-01-05 NOTE — FLOWSHEET NOTE
01/04/24 1748   Events/Summary/Plan   Events/Summary/Plan Placed on 1L   Oxygen   O2 (LPM) 1   O2 Delivery Device Silicone Nasal Cannula

## 2024-01-06 ENCOUNTER — APPOINTMENT (OUTPATIENT)
Dept: INPATIENT REHAB | Facility: REHABILITATION | Age: 64
End: 2024-01-06
Payer: MEDICARE

## 2024-01-06 VITALS
RESPIRATION RATE: 18 BRPM | HEIGHT: 68 IN | HEART RATE: 71 BPM | SYSTOLIC BLOOD PRESSURE: 110 MMHG | OXYGEN SATURATION: 96 % | WEIGHT: 256 LBS | BODY MASS INDEX: 38.8 KG/M2 | DIASTOLIC BLOOD PRESSURE: 59 MMHG | TEMPERATURE: 97.5 F

## 2024-01-06 PROCEDURE — A9270 NON-COVERED ITEM OR SERVICE: HCPCS | Performed by: PHYSICAL MEDICINE & REHABILITATION

## 2024-01-06 PROCEDURE — 82962 GLUCOSE BLOOD TEST: CPT

## 2024-01-06 PROCEDURE — 99239 HOSP IP/OBS DSCHRG MGMT >30: CPT | Performed by: GENERAL PRACTICE

## 2024-01-06 PROCEDURE — 99232 SBSQ HOSP IP/OBS MODERATE 35: CPT | Performed by: HOSPITALIST

## 2024-01-06 PROCEDURE — 700102 HCHG RX REV CODE 250 W/ 637 OVERRIDE(OP): Mod: JZ | Performed by: HOSPITALIST

## 2024-01-06 PROCEDURE — 700102 HCHG RX REV CODE 250 W/ 637 OVERRIDE(OP): Performed by: PHYSICAL MEDICINE & REHABILITATION

## 2024-01-06 PROCEDURE — 94640 AIRWAY INHALATION TREATMENT: CPT

## 2024-01-06 PROCEDURE — A9270 NON-COVERED ITEM OR SERVICE: HCPCS | Mod: JZ | Performed by: HOSPITALIST

## 2024-01-06 PROCEDURE — 94760 N-INVAS EAR/PLS OXIMETRY 1: CPT

## 2024-01-06 RX ADMIN — FUROSEMIDE 40 MG: 40 TABLET ORAL at 05:36

## 2024-01-06 RX ADMIN — OMEPRAZOLE 20 MG: 20 CAPSULE, DELAYED RELEASE ORAL at 08:53

## 2024-01-06 RX ADMIN — OXYCODONE HYDROCHLORIDE 10 MG: 10 TABLET ORAL at 08:52

## 2024-01-06 RX ADMIN — POTASSIUM CHLORIDE 20 MEQ: 1500 TABLET, EXTENDED RELEASE ORAL at 08:53

## 2024-01-06 RX ADMIN — BACITRACIN ZINC AND POLYMYXIN B SULFATE: at 09:00

## 2024-01-06 RX ADMIN — LEVOTHYROXINE SODIUM 112 MCG: 0.11 TABLET ORAL at 05:37

## 2024-01-06 RX ADMIN — GABAPENTIN 600 MG: 300 CAPSULE ORAL at 08:53

## 2024-01-06 RX ADMIN — APIXABAN 5 MG: 5 TABLET, FILM COATED ORAL at 08:53

## 2024-01-06 RX ADMIN — METHOCARBAMOL 500 MG: 500 TABLET ORAL at 13:00

## 2024-01-06 RX ADMIN — CARBAMAZEPINE 200 MG: 100 TABLET, CHEWABLE ORAL at 08:53

## 2024-01-06 RX ADMIN — METHOCARBAMOL 500 MG: 500 TABLET ORAL at 08:53

## 2024-01-06 RX ADMIN — OXYCODONE HYDROCHLORIDE 10 MG: 10 TABLET ORAL at 13:06

## 2024-01-06 RX ADMIN — ASPIRIN 81 MG: 81 TABLET, COATED ORAL at 08:53

## 2024-01-06 ASSESSMENT — PATIENT HEALTH QUESTIONNAIRE - PHQ9
SUM OF ALL RESPONSES TO PHQ9 QUESTIONS 1 AND 2: 1
3. TROUBLE FALLING OR STAYING ASLEEP OR SLEEPING TOO MUCH: SEVERAL DAYS
6. FEELING BAD ABOUT YOURSELF - OR THAT YOU ARE A FAILURE OR HAVE LET YOURSELF OR YOUR FAMILY DOWN: SEVERAL DAYS
4. FEELING TIRED OR HAVING LITTLE ENERGY: SEVERAL DAYS
8. MOVING OR SPEAKING SO SLOWLY THAT OTHER PEOPLE COULD HAVE NOTICED. OR THE OPPOSITE, BEING SO FIGETY OR RESTLESS THAT YOU HAVE BEEN MOVING AROUND A LOT MORE THAN USUAL: NOT AT ALL
9. THOUGHTS THAT YOU WOULD BE BETTER OFF DEAD, OR OF HURTING YOURSELF: NOT AT ALL
1. LITTLE INTEREST OR PLEASURE IN DOING THINGS: NOT AT ALL
9. THOUGHTS THAT YOU WOULD BE BETTER OFF DEAD, OR OF HURTING YOURSELF: NOT AT ALL
1. LITTLE INTEREST OR PLEASURE IN DOING THINGS: NOT AT ALL
3. TROUBLE FALLING OR STAYING ASLEEP OR SLEEPING TOO MUCH: SEVERAL DAYS
2. FEELING DOWN, DEPRESSED, IRRITABLE, OR HOPELESS: SEVERAL DAYS
7. TROUBLE CONCENTRATING ON THINGS, SUCH AS READING THE NEWSPAPER OR WATCHING TELEVISION: NOT AT ALL
5. POOR APPETITE OR OVEREATING: SEVERAL DAYS
2. FEELING DOWN, DEPRESSED, IRRITABLE, OR HOPELESS: SEVERAL DAYS
3. TROUBLE FALLING OR STAYING ASLEEP OR SLEEPING TOO MUCH: SEVERAL DAYS
8. MOVING OR SPEAKING SO SLOWLY THAT OTHER PEOPLE COULD HAVE NOTICED. OR THE OPPOSITE, BEING SO FIGETY OR RESTLESS THAT YOU HAVE BEEN MOVING AROUND A LOT MORE THAN USUAL: NOT AT ALL
SUM OF ALL RESPONSES TO PHQ QUESTIONS 1-9: 5
6. FEELING BAD ABOUT YOURSELF - OR THAT YOU ARE A FAILURE OR HAVE LET YOURSELF OR YOUR FAMILY DOWN: SEVERAL DAYS
4. FEELING TIRED OR HAVING LITTLE ENERGY: SEVERAL DAYS
SUM OF ALL RESPONSES TO PHQ QUESTIONS 1-9: 5
1. LITTLE INTEREST OR PLEASURE IN DOING THINGS: NOT AT ALL
2. FEELING DOWN, DEPRESSED, IRRITABLE, OR HOPELESS: SEVERAL DAYS
9. THOUGHTS THAT YOU WOULD BE BETTER OFF DEAD, OR OF HURTING YOURSELF: NOT AT ALL
7. TROUBLE CONCENTRATING ON THINGS, SUCH AS READING THE NEWSPAPER OR WATCHING TELEVISION: NOT AT ALL
5. POOR APPETITE OR OVEREATING: SEVERAL DAYS
5. POOR APPETITE OR OVEREATING: SEVERAL DAYS
7. TROUBLE CONCENTRATING ON THINGS, SUCH AS READING THE NEWSPAPER OR WATCHING TELEVISION: NOT AT ALL
SUM OF ALL RESPONSES TO PHQ9 QUESTIONS 1 AND 2: 1
SUM OF ALL RESPONSES TO PHQ QUESTIONS 1-9: 5
6. FEELING BAD ABOUT YOURSELF - OR THAT YOU ARE A FAILURE OR HAVE LET YOURSELF OR YOUR FAMILY DOWN: SEVERAL DAYS
4. FEELING TIRED OR HAVING LITTLE ENERGY: SEVERAL DAYS
8. MOVING OR SPEAKING SO SLOWLY THAT OTHER PEOPLE COULD HAVE NOTICED. OR THE OPPOSITE, BEING SO FIGETY OR RESTLESS THAT YOU HAVE BEEN MOVING AROUND A LOT MORE THAN USUAL: NOT AT ALL
SUM OF ALL RESPONSES TO PHQ9 QUESTIONS 1 AND 2: 1

## 2024-01-06 ASSESSMENT — ENCOUNTER SYMPTOMS
COUGH: 0
NAUSEA: 0
POLYDIPSIA: 0
SHORTNESS OF BREATH: 0
EYES NEGATIVE: 1
ABDOMINAL PAIN: 0
PALPITATIONS: 0
CHILLS: 0
FEVER: 0
BRUISES/BLEEDS EASILY: 0
VOMITING: 0

## 2024-01-06 NOTE — PROGRESS NOTES
Hospital Medicine Daily Progress Note        Chief Complaint:  Hypertension  Diabetes  Pancytopenia    Interval History:  Pt requesting Neurology consult--defer to Physiatry (primary service).  MRI results reviewed and discussed.    Review of Systems  Review of Systems   Constitutional:  Negative for chills and fever.   HENT: Negative.     Eyes: Negative.    Respiratory:  Negative for cough and shortness of breath.    Cardiovascular:  Negative for chest pain and palpitations.   Gastrointestinal:  Negative for abdominal pain, nausea and vomiting.   Musculoskeletal:         Wound pain   Skin:  Negative for itching and rash.   Endo/Heme/Allergies:  Negative for polydipsia. Does not bruise/bleed easily.        Physical Exam  Temp:  [9 °C (48.2 °F)-36.4 °C (97.5 °F)] 36.4 °C (97.5 °F)  Pulse:  [71-73] 71  Resp:  [18] 18  BP: (110-116)/(59-65) 110/59  SpO2:  [95 %-99 %] 96 %    Physical Exam  Vitals reviewed.   Constitutional:       General: She is not in acute distress.     Appearance: Normal appearance. She is not ill-appearing.   HENT:      Head: Normocephalic and atraumatic.      Right Ear: External ear normal.      Left Ear: External ear normal.      Nose: Nose normal.      Mouth/Throat:      Pharynx: Oropharynx is clear.   Eyes:      General:         Right eye: No discharge.         Left eye: No discharge.      Extraocular Movements: Extraocular movements intact.      Conjunctiva/sclera: Conjunctivae normal.   Cardiovascular:      Rate and Rhythm: Normal rate and regular rhythm.   Pulmonary:      Effort: No respiratory distress.      Breath sounds: No wheezing.      Comments: Decreased BS  Abdominal:      General: Bowel sounds are normal. There is no distension.      Palpations: Abdomen is soft.      Tenderness: There is no abdominal tenderness.   Musculoskeletal:      Cervical back: Normal range of motion and neck supple.      Right lower leg: Edema present.      Left lower leg: Edema present.   Skin:     General:  Skin is warm and dry.   Neurological:      Mental Status: She is alert and oriented to person, place, and time.         Fluids    Intake/Output Summary (Last 24 hours) at 1/6/2024 0957  Last data filed at 1/6/2024 0500  Gross per 24 hour   Intake 360 ml   Output --   Net 360 ml       Laboratory  Recent Labs     01/05/24  0519   WBC 3.2*   RBC 4.45   HEMOGLOBIN 10.6*   HEMATOCRIT 35.6*   MCV 80.0*   MCH 23.8*   MCHC 29.8*   RDW 55.3*   PLATELETCT 128*   MPV 11.2     Recent Labs     01/05/24  0519   SODIUM 138   POTASSIUM 3.5*   CHLORIDE 99   CO2 29   GLUCOSE 149*   BUN 9   CREATININE 0.51   CALCIUM 9.2                 Assessment/Plan  Gout  Assessment & Plan  On Uloric  Monitor for acute flare-ups    Edema  Assessment & Plan  Echo 12/30/23 EF 60%  BNP resolving  Continue Lasix as tolerated by blood pressure and renal function  Outpt meds include Lasix 40-80 mg qd     Essential hypertension  Assessment & Plan  On Verapamil and Lasix  Observe blood pressure trends    Diabetes mellitus type 2, insulin dependent (HCC)  Assessment & Plan  HbA1c 11.9  Observe serum glucose trends on Lantus and SSI  Outpt meds include Tresiba 120 u qd and Humalog 40-60 u per SSI    Chronic ulcer of left foot due to diabetes mellitus (HCC)- (present on admission)  Assessment & Plan  X-ray 12/27/23 no definite acute osseous abnormality but evaluation limited due to osseous demineralization  Completed Clinda  MRI 1/5/24 multifocal bone infarcts, no osteomyelitis, no soft tissue mass or fluid collection  Wound care and pain control per Physiatry  Needs Ortho eval    Influenza A- (present on admission)  Assessment & Plan  +PCR 12/27/23  Completed Tamiflu 1/1/24    History of stroke- (present on admission)  Assessment & Plan  On ASA, Eliquis, and Crestor    PAF (paroxysmal atrial fibrillation) ([facility])- (present on admission)  Assessment & Plan  H/O ablation x 2  Rate controlled on Verapamil  Anticoagulated on Eliquis    Pancytopenia (HCC)- (present  on admission)  Assessment & Plan  Likely 2/2 meds w/ possible culprits including Tegretol, Eliquis, ASA, Uloric, Lasix, Robaxin, Singulair, Omeprazole, and Crestor  Tegretol was discontinued w/ improvement, then resumed 1/4/24 per Dr. 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

## 2024-01-06 NOTE — FLOWSHEET NOTE
01/06/24 0647   Events/Summary/Plan   Events/Summary/Plan mdi   Vital Signs   Pulse 71   Respiration 18   Pulse Oximetry 96 %   $ Pulse Oximetry (Spot Check) Yes   Respiratory Assessment   Level of Consciousness Alert   Chest Exam   Work Of Breathing / Effort Within Normal Limits   Breath Sounds   RUL Breath Sounds Clear   RML Breath Sounds Diminished   RLL Breath Sounds Diminished   DANIELLE Breath Sounds Clear   LLL Breath Sounds Diminished   Oxygen   O2 (LPM) 1   O2 Delivery Device Silicone Nasal Cannula

## 2024-01-06 NOTE — DISCHARGE INSTRUCTIONS
[facility] NURSING DISCHARGE INSTRUCTIONS    Blood Pressure : 110/59  Weight: 116 kg (256 lb)  Nursing recommendations for Jenifer Ramos at time of discharge are as follows:  Client and Family Member verbalized understanding of all discharge instructions and prescriptions.     Review all your home medications and newly ordered medications with your doctor and/or pharmacist. Follow medication instructions as directed by your doctor and/or pharmacist.    Pain Management:   Discharge Pain Medication Instructions:  Comfort Goal: Comfort with Movement, Sleep Comfortably, 0  Notify your primary care provider if pain is unrelieved with these measures, if the pain is new, or increased in intensity.    Discharge Skin Characteristics: Warm, Dry  Discharge Skin Exam:  (wound to left heel and discoloration to left plantar area.)  Wound 06/21/23 Pressure Injury Foot Left POA Stage 3 (Active)   Wound Image      01/01/24 1500   Site Assessment STARLA 01/05/24 2100   Periwound Assessment STARLA 01/05/24 2100   Margins STARLA 01/05/24 2100   Closure Secondary intention 01/01/24 2000   Drainage Amount Small 01/01/24 1500   Drainage Description Serosanguineous 01/01/24 1500   Treatments CSWD - Conservative Sharp Wound Debridement;Cleansed;Irrigation;Site care;Offloading 01/01/24 1500   Offloading/DME Heel float boot 01/01/24 2000   Wound Cleansing Approved Wound Cleanser 01/01/24 1500   Periwound Protectant Not Applicable 01/01/24 1500   Dressing Status Clean;Dry;Intact 01/05/24 2100   Dressing Changed Observed 01/05/24 2100   Dressing Cleansing/Solutions Not Applicable 01/01/24 1500   Dressing Options Hydrofera Blue Ready;Dry Roll Gauze;Absorbent Abdominal Pad 01/01/24 2000   Dressing Change/Treatment Frequency Daily, and As Needed 01/05/24 2100   NEXT Dressing Change/Treatment Date 01/02/24 01/01/24 2000   NEXT Weekly Photo (Inpatient Only) 01/02/24 01/01/24 1500   Wound Team Following Weekly 01/01/24 1500    Wound Length (cm) 5 cm 01/01/24 1500   Wound Width (cm) 2 cm 01/01/24 1500   Wound Depth (cm) 0.2 cm 01/01/24 1500   Wound Surface Area (cm^2) 10 cm^2 01/01/24 1500   Wound Volume (cm^3) 2 cm^3 01/01/24 1500   Post-Procedure Length (cm) 5.2 cm 01/01/24 1500   Post-Procedure Width (cm) 2.2 cm 01/01/24 1500   Post-Procedure Depth (cm) 0.3 cm 01/01/24 1500   Post-Procedure Surface Area (cm^2) 11.44 cm^2 01/01/24 1500   Post-Procedure Volume (cm^3) 3.432 cm^3 01/01/24 1500   Shape oblong 01/01/24 1500   Wound Odor None 01/01/24 1500   Pulses Left;DP;PT;1+ 01/01/24 1500   WOUND NURSE ONLY - Time Spent with Patient (mins) 45 01/01/24 1500       Wound 08/16/22 Groin Left (Active)       Wound 08/16/22 Heel Left (Active)       Wound 06/21/23 Other (comment) Groin Bilateral moisture associated skin damage (Active)     Skin / Wound Care Instructions: Please contact your primary care physician for any change in skin integrity. Increased swelling, redness, pain, drainage and fever.     If You Have Surgical Incisions / Wounds:  Monitor surgical site(s) for signs of increased swelling, redness or symptoms of drainage from the site or fever as this could indicate signs and symptoms of infection. If these symptoms are noted, notifiy your primary care provider.      Discharge Safety Instructions: Should Not Be Left Alone In The House     Discharge Safety Concerns: Balance Problems (Dizziness, Light Headedness), Unsteady Gait, Weakness  The interdisciplinary team has made recommendation that you should not be left alone  in the house due to   Anti-embolic stockings are not required  Discharge Diet: Diabetic     Discharge Liquids: thin  Discharge Bowel Function: Continent  Please contact your primary care physician for any changes in bowel habits.  Discharge Bowel Program:    Discharge Bladder Function: Continent  Discharge Urinary Devices: None  Nursing Discharge Plan:   Influenza Vaccine Indication: Patient Refuses    Case  Management Discharge Instructions:   Discharge Location: Home with Home Health  Agency Name/Address/Phone: [facility]  Home Health: Registered Nurse, Occupational Therapist, Physical Therapist, Speech Therapist,   Outpatient Services: Wound Care (Wound clinic at [facility])  DME Provider/Phone:    Medical Equipment Ordered: Oxygen  Prescription Faxed to:        Discharge Medication Instructions:  Below are the medications your physician expects you to take upon discharge:      Heart Failure, Self-Care  Heart failure is a serious condition. The following information explains the things you need to do to take care of yourself after a heart failure diagnosis. You may be asked to change your diet, take certain medicines, and make other lifestyle changes in order to stay as healthy as possible. Your health care provider may also give you more specific instructions. If you have problems or questions, contact your health care provider.  What are the risks?  Having heart failure puts you at higher risk for certain problems. These problems can get worse if you do not take good care of yourself. Problems may include:  Damage to the kidneys, liver, or lungs.  Malnutrition.  Abnormal heart rhythms.  Blood clotting issues that could cause a stroke.  Supplies needed:  Scale for monitoring weight.  Blood pressure monitor.  Notebook.  Medicines.  How to care for yourself when you have heart failure  Medicines  Take over-the-counter and prescription medicines only as told by your health care provider. Medicines reduce the workload of your heart, slow the progression of heart failure, and improve symptoms. Take your medicines every day.  Do not stop taking your medicine unless your health care provider tells you to do so.  Do not skip any dose of medicine.  Refill your prescriptions before you run out of medicine.  Talk with your health care provider if you cannot afford your medicines.  Eating and  drinking    Eat heart-healthy foods. Talk with a dietitian to make an eating plan that is right for you.  Limit salt (sodium) if told by your health care provider. Sodium restriction may reduce symptoms of heart failure. Ask a dietitian to recommend heart-healthy seasonings.  Use healthy cooking methods instead of frying. Healthy methods include roasting, grilling, broiling, baking, poaching, steaming, and stir-frying.  Choose foods that contain no trans fat and are low in saturated fat and cholesterol. Healthy choices include fresh or frozen fruits and vegetables, fish, lean meats, legumes, fat-free or low-fat dairy products, and whole-grain or high-fiber foods.  Limit your fluid intake, if directed by your health care provider. Fluid restriction may reduce symptoms of heart failure.  Alcohol use  Do not drink alcohol if:  Your health care provider tells you not to drink.  Your heart was damaged by alcohol, or you have severe heart failure.  You are pregnant, may be pregnant, or are planning to become pregnant.  If you drink alcohol:  Limit how much you have to:  0-1 drink a day for women.  0-2 drinks a day for men.  Know how much alcohol is in your drink. In the [place] one drink equals one 12 oz bottle of beer (355 mL), one 5 oz glass of wine (148 mL), or one 1½ oz glass of hard liquor (44 mL).  Lifestyle    Do not use any products that contain nicotine or tobacco. These products include cigarettes, chewing tobacco, and vaping devices, such as e-cigarettes. If you need help quitting, ask your health care provider.  Do not use nicotine gum or patches before talking to your health care provider.  Do not use illegal drugs.  Work with your health care provider to safely reach the right body weight.  Do physical activity if told by your health care provider. Talk to your health care provider before you begin an exercise if:  You are an older adult.  You have severe heart failure.  Learn to manage stress. If you need  help to do this, ask your health care provider.  Participate in or seek physical rehabilitation as needed to keep or improve your independence and quality of life.  Participate in a cardiac rehabilitation program, which is a treatment program to improve your health and well-being through exercise training, education, and counseling.  Plan rest periods when you get tired.  Monitoring important information    Weigh yourself every day. This will help you to notice if too much fluid is building up in your body.  Weigh yourself every morning after you urinate and before you eat breakfast.  Wear the same amount of clothing each time you weigh yourself.  Record your daily weight. Provide your health care provider with your weight record.  Monitor and record your pulse and blood pressure as told by your health care provider.  Dealing with extreme temperatures  If the weather is extremely hot:  Avoid vigorous physical activity.  Use air conditioning or fans, or find a cooler location.  Avoid caffeine and alcohol.  Wear loose-fitting, lightweight, and light-colored clothing.  If the weather is extremely cold:  Avoid vigorous activity.  Layer your clothes.  Wear mittens or gloves, a hat, and a face covering when you go outside.  Avoid alcohol.  Follow these instructions at home:  Stay up to date with vaccines. Pneumococcal and flu (influenza) vaccines are especially important in preventing infections of the airways.  Keep all follow-up visits. This is important.  Contact a health care provider if you:  Gain 2-3 lb (1-1.4 kg) in 24 hours or 5 lb (2.3 kg) in a week.  Have increasing shortness of breath.  Are unable to participate in your usual physical activities.  Get tired easily.  Cough more than normal, especially with physical activity.  Lose your appetite or feel nauseous.  Have any swelling or more swelling in areas such as your hands, feet, ankles, or abdomen.  Are unable to sleep because it is hard to breathe.  Feel  like your heart is beating quickly (palpitations).  Become dizzy or light-headed when you stand up.  Have feelings of depression or sadness.  Get help right away if you:  Have trouble breathing.  Notice, or your family notices, a change in your awareness, such as having trouble staying awake or concentrating.  Have pain or discomfort in your chest.  Have an episode of fainting (syncope).  These symptoms may represent a serious problem that is an emergency. Do not wait to see if the symptoms will go away. Get medical help right away. Call your local emergency services (911 in the [place] Do not drive yourself to the hospital.  Summary  Heart failure is a serious condition. To care for yourself, you may be asked to change your diet, take certain medicines, and make other lifestyle changes.  Take your medicines every day. Do not stop taking them unless your health care provider tells you to do so.  Limit salt and eat heart-healthy foods, such as fresh or frozen fruits and vegetables, fish, lean meats, legumes, fat-free or low-fat dairy products, and whole-grain or high-fiber foods.  Ask your health care provider if you have any alcohol restrictions. You may have to stop drinking alcohol if you have severe heart failure.  Contact your health care provider if you notice problems, such as rapid weight gain or a fast heartbeat. Get help right away if you faint or have chest pain or trouble breathing.  This information is not intended to replace advice given to you by your health care provider. Make sure you discuss any questions you have with your health care provider.  Document Revised: 03/28/2023 Document Reviewed: 07/10/2021  Elsevier Patient Education © 2023 [employer] Inc[employer]    Diabetes Mellitus and Foot Care  Foot care is an important part of your health, especially when you have diabetes. Diabetes may cause you to have problems because of poor blood flow (circulation) to your feet and legs, which can cause your skin  to:  Become thinner and drier.  Break more easily.  Heal more slowly.  Peel and crack.  You may also have nerve damage (neuropathy) in your legs and feet, causing decreased feeling in them. This means that you may not notice minor injuries to your feet that could lead to more serious problems. Noticing and addressing any potential problems early is the best way to prevent future foot problems.  How to care for your feet  Foot hygiene    Wash your feet daily with warm water and mild soap. Do not use hot water. Then, pat your feet and the areas between your toes until they are completely dry. Do not soak your feet as this can dry your skin.  Trim your toenails straight across. Do not dig under them or around the cuticle. File the edges of your nails with an emery board or nail file.  Apply a moisturizing lotion or petroleum jelly to the skin on your feet and to dry, brittle toenails. Use lotion that does not contain alcohol and is unscented. Do not apply lotion between your toes.  Shoes and socks  Wear clean socks or stockings every day. Make sure they are not too tight. Do not wear knee-high stockings since they may decrease blood flow to your legs.  Wear shoes that fit properly and have enough cushioning. Always look in your shoes before you put them on to be sure there are no objects inside.  To break in new shoes, wear them for just a few hours a day. This prevents injuries on your feet.  Wounds, scrapes, corns, and calluses    Check your feet daily for blisters, cuts, bruises, sores, and redness. If you cannot see the bottom of your feet, use a mirror or ask someone for help.  Do not cut corns or calluses or try to remove them with medicine.  If you find a minor scrape, cut, or break in the skin on your feet, keep it and the skin around it clean and dry. You may clean these areas with mild soap and water. Do not clean the area with peroxide, alcohol, or iodine.  If you have a wound, scrape, corn, or callus on  your foot, look at it several times a day to make sure it is healing and not infected. Check for:  Redness, swelling, or pain.  Fluid or blood.  Warmth.  Pus or a bad smell.  General tips  Do not cross your legs. This may decrease blood flow to your feet.  Do not use heating pads or hot water bottles on your feet. They may burn your skin. If you have lost feeling in your feet or legs, you may not know this is happening until it is too late.  Protect your feet from hot and cold by wearing shoes, such as at the beach or on hot pavement.  Schedule a complete foot exam at least once a year (annually) or more often if you have foot problems. Report any cuts, sores, or bruises to your health care provider immediately.  Where to find more information  American Diabetes Association: www.diabetes.org  Association of Diabetes Care & Education Specialists: www.diabeteseducator.org  Contact a health care provider if:  You have a medical condition that increases your risk of infection and you have any cuts, sores, or bruises on your feet.  You have an injury that is not healing.  You have redness on your legs or feet.  You feel burning or tingling in your legs or feet.  You have pain or cramps in your legs and feet.  Your legs or feet are numb.  Your feet always feel cold.  You have pain around any toenails.  Get help right away if:  You have a wound, scrape, corn, or callus on your foot and:  You have pain, swelling, or redness that gets worse.  You have fluid or blood coming from the wound, scrape, corn, or callus.  Your wound, scrape, corn, or callus feels warm to the touch.  You have pus or a bad smell coming from the wound, scrape, corn, or callus.  You have a fever.  You have a red line going up your leg.  Summary  Check your feet every day for blisters, cuts, bruises, sores, and redness.  Apply a moisturizing lotion or petroleum jelly to the skin on your feet and to dry, brittle toenails.  Wear shoes that fit properly and  have enough cushioning.  If you have foot problems, report any cuts, sores, or bruises to your health care provider immediately.  Schedule a complete foot exam at least once a year (annually) or more often if you have foot problems.  This information is not intended to replace advice given to you by your health care provider. Make sure you discuss any questions you have with your health care provider.  Document Revised: 07/08/2021 Document Reviewed: 07/08/2021  [employer] Patient Education © 2023 [employer]    Pain Management after Surgery, Injury or Illness    What should I expect if I have pain?  Some pain may be normal.  It is important to know that it may not be possible to completely eliminate your pain.  Our goal is to help you to manage your pain so that you can get back to your normal routine as soon as possible.  We will work together to create a plan to manage your pain and track the progress of the plan.  If you have questions about your care, please tell your nurse or provider.  How is my pain measured?  Your pain will be measured on a scale of 0 to 10.  The pain rating scale will help you to score your pain based on your ability to function with that pain.  For example, a score of:  0 = No pain  5 = Pain interrupts some activities  10 = Pain is as bad as it can be, nothing else matters  Remember, some pain is expected following illness, injury or surgery.  How will my pain be treated?  You may be offered medication to treat your pain.  You can also use non-medicine treatments to help manage your pain.  If you have severe, uncontrolled pain, you may be prescribed narcotics, also known as opioids.  You have the right to learn about your options and work with your care team to find the best treatment to manage your pain.  Non-Opioid Medicines  Many effective medicines do not need a prescription. Examples include:  Acetaminophen, which you may know as Tylenol®  Ibuprofen, which you may know as Advil® or  Motrin®  Aspirin  Other low risk medicines require a prescription and are helpful for treating pain. Examples include:  o Celecoxib, which you may know as Celebrex®  Alternative Therapies  Alternative therapies can be very helpful in managing pain. You can try:  Ice or heat packs as recommended by your care team.  Massage, relaxation techniques or meditation.  Changing positions in bed.  Watching TV or listening to music.  Opioids, also known as Narcotics  Opioids should only be used to treat severe pain that cannot be controlled by other methods.  Opioids have been shown to increase your risk of complications.  Opioids are highly addictive.  Opioids should only be used in the lowest effective dose, for a limited amount of time.  Opioids require a prescription. Some examples include:  Tramadol, known as Ultram®.  Hydrocodone with acetaminophen, known as Lortab®, Vicodin®, Norco®.  Oxycodone with acetaminophen, known as Percocet®, or Roxicet®.  Oxycodone, known as OxyContin®.  Morphine, known as MS Contin®.  What will happen after I go home from the hospital?  Your care team will discuss your ongoing care plan with you before you leave.  You will be given detailed instructions for any medicine and follow up care.  You may be given a prescription for medicines to take when you go home.  Be sure to tell your care team if you have concerns about caring for yourself at home. Common concerns may include stairs, or living alone.  How should I manage pain when I go home?  Always use non-opioid and non-medicine options first.  Take non-opioid medicine regularly, as instructed by your care team.  Avoid doing things that make your pain worse like heavy lifting or straining.  Use opioids only to treat severe pain that is not controlled by other methods.  Always follow the instructions of your care team.  Always take the lowest effective dose.  Do not take more than prescribed.  Do not mix with sleeping pills or alcohol.  Stop  "taking opioids when the pain can be managed by other methods listed above.  You may also be referred to a pain specialist when needed.    IMPORTANT INFORMATION:  Side effects of Opiates may include:  Sleepiness  Dizziness  Nausea and/or vomiting  Constipation  Decreased breathing  Addiction  Overdose  Death    Opiate dependency and addiction risk:  The risk of dependency increases after 3 days of continuous use.  There are many local resources to help with dependency issues.  Opiate dependency can develop easily. Don’t feel ashamed.  Speak to your care team if you have concerns.  General medicine safety:  Keep all medicines in a safe place, out of sight so they cannot be taken by someone else.  Keep out of reach of children.  Never mix opioids with sleeping pills, over the counter sleep aids or alcohol.  Do not drive while taking opioids.  Be careful at home when cooking, bathing, showering or using stairs.  You may be more likely to hurt yourself or fall.  For anyone taking opioids, watch for excessive sleepiness or decreased breathing as a sign of overdose. Try to arouse the person if you are concerned. Call 911 if you are unable to awaken them OR if their breathing is shallow, slow, or irregular.  Proper medicine disposal:  Empty liquid medicine from containers, open capsules or crush tablets and mix with janae litter, dirt or old coffee grounds. Place the mixture into a sealed bag or container and throw it in the trash.  Take medicines to a local drug takeback center, for a list visit: https://apps.deadiversion.[employer].gov/pubdispsearch/spring/main?execution=e1s2  Use a home drug disposal pouch.  Contact your local pharmacy for help.      Prevent Falls in Your Home    \"Falling once doubles your chance of falling again\"        -Center for Disease Control and Prevention    Falls in the home can lead to serious injury (fractures, brain injuries), hospitalizations, increased medical costs, and could even be fatal.  " "The good news is, there are many precautions you can take to avoid falls in your home and help keep you safe:     If prescribed an assistive device (walker, crutches), use as instructed by the healthcare provider\"   Remove any tripping hazards from your home, including loose cords, throw rugs and clutter  Keep a nightlight on in dark (hallways, bathrooms, etc)   Get up slowly, to make sure you feel okay before getting up  Be aware of any side effects of your medications: some medications may make you dizzy  Place a non-skid rubber mat in your shower or tub-consider a shower bench or chair if unsteady on your feet  Wear supportive shoes or non-skid socks when moving around  Start an exercise program once approved by your provider.  If you are feeling weak following a hospital stay, talk to your doctor about home health or outpatient therapy programs designed to help rebuild your strength and endurance    Depression / Suicide Risk    As you are discharged from this [facility] facility, it is important to learn how to keep safe from harming yourself.    Recognize the warning signs:  Abrupt changes in personality, positive or negative- including increase in energy   Giving away possessions  Change in eating patterns- significant weight changes-  positive or negative  Change in sleeping patterns- unable to sleep or sleeping all the time   Unwillingness or inability to communicate  Depression  Unusual sadness, discouragement and loneliness  Talk of wanting to die  Neglect of personal appearance   Rebelliousness- reckless behavior  Withdrawal from people/activities they love  Confusion- inability to concentrate     If you or a loved one observes any of these behaviors or has concerns about self-harm, here's what you can do:  Talk about it- your feelings and reasons for harming yourself  Remove any means that you might use to hurt yourself (examples: pills, rope, extension cords, firearm)  Get professional help from the " community (Mental Health, Substance Abuse, psychological counseling)  Do not be alone:Call your Safe Contact- someone whom you trust who will be there for you.  Call your local CRISIS HOTLINE 563-9024 or 718-960-2616  Call your local Children's Mobile Crisis Response Team [place] (815) 596-9139 or www.[employer]  Call the toll free National Suicide Prevention Hotlines   National Suicide Prevention Lifeline 207-517-QEEU (3367)  [employer] Line Network 800-SUICIDE ([phone])

## 2024-01-06 NOTE — PROGRESS NOTES
NURSING DAILY NOTE    Name: Jenifer Ramos   Date of Admission: 12/30/2023   Admitting Diagnosis: No Principal Problem: There is no principal problem currently on the Problem List. Please update the Problem List and refresh.  Attending Physician: Doron Brand M.d.  Allergies: Azithromycin, Erythromycin, Other misc, Penicillins, Pistachio, Sulfa drugs, Tetraglycine, Other drug, Physostigmine, Tape, Zanaflex [tizanidine hcl], Albuterol, Atorvastatin, Chlorhexidine, Lamisil [terbinafine hcl], and Morphine    Safety  Patient Assist  SBA  Patient Precautions  Fall Risk  Precaution Comments  L foot wound with PRAFO, flu + (no longer on droplet precaution as of 01/03)  Bed Transfer Status  Standby Assist  Toilet Transfer Status   Supervised  Assistive Devices  Rails, Walker - front wheel, Wheelchair  Oxygen  Nasal Cannula  Diet/Therapeutic Dining  Current Diet Order   Procedures    Diet Order Diet: Consistent CHO (Diabetic)     Pill Administration  whole  Agitated Behavioral Scale  19  ABS Level of Severity  No Agitation    Fall Risk  Has the patient had a fall this admission?   No  Sophia Smith Fall Risk Scoring  22, HIGH RISK  Fall Risk Safety Measures  bed alarm, chair alarm, and poor balance    Vitals  Temperature: (!) 9 °C (48.2 °F)  Temp src: Oral  Pulse: 71  Respiration: 18  Blood Pressure : 116/65  Blood Pressure MAP (Calculated): 82 MM HG  BP Location: Right, Upper Arm  Patient BP Position: Supine     Oxygen  Pulse Oximetry: 99 %  O2 (LPM): 1  O2 Delivery Device: Nasal Cannula    Bowel and Bladder  Last Bowel Movement  01/04/24  Stool Type  Type 5: Soft blob with clear cut edges (passed easily)  Bowel Device  Bathroom  Continent  Bladder: Did not void   Bowel: No movement  Bladder Function  Urine Void (mL):  (moderate)  Number of Times Voided: 1  Urine Color: Yellow  Genitourinary Assessment   Bladder Assessment (WDL):  Within Defined  Limits  Llamas Catheter: Not Applicable  Urine Color: Yellow  Bladder Device: Bathroom  Bladder Scan: Post Void  $ Bladder Scan Results (mL): 1    Skin  Guille Score   17  Sensory Interventions   Bed Types: Standard/Trauma Mattress  Skin Preventative Measures: Waffle Overlay, Heel Float Boots in Use   Moisture Interventions  Moisturizers/Barriers: Barrier Wipes      Pain  Pain Rating Scale  0 - No Pain  Pain Location  Generalized  Pain Location Orientation  Mid, Upper, Lower  Pain Interventions   Declines    ADLs    Bathing    (Pt away for imaging)  Linen Change      Personal Hygiene  Moist Antionette Wipes, Perineal Care  Chlorhexidine Bath      Oral Care  Brushed Teeth  Teeth/Dentures     Shave     Nutrition Percentage Eaten  Lunch, Between % Consumed  Environmental Precautions  Bed in Low Position, Treaded Slipper Socks on Patient  Patient Turns/Positioning  Patient Turns Self from Side to Side  Patient Turns Assistance/Tolerance     Bed Positions  Bed Controls On  Head of Bed Elevated  Self regulated      Psychosocial/Neurologic Assessment  Psychosocial Assessment  Psychosocial (WDL):  Within Defined Limits  Patient Behaviors: Irritable  Neurologic Assessment  Neuro (WDL): Exceptions to WDL  Level of Consciousness: Alert  Orientation Level: Oriented X4  Cognition: Appropriate safety awareness  Speech: Clear  Pupil Assesment: No  Motor Function/Sensation Assessment: Motor strength  Muscle Strength Right Arm: Good Strength Against Gravity and Moderate Resistance  Muscle Strength Left Arm: Fair Strength against Gravity but No Resistance  Muscle Strength Right Leg: Good Strength Against Gravity and Moderate Resistance  Muscle Strength Left Leg: Fair Strength against Gravity but No Resistance  EENT (WDL):  WDL Except    Cardio/Pulmonary Assessment  Edema   RLE Edema: 2+  LLE Edema: 2+  Respiratory Breath Sounds  RUL Breath Sounds: Clear  RML Breath Sounds: Diminished  RLL Breath Sounds: Diminished  DANIELLE Breath Sounds:  Clear  LLL Breath Sounds: Diminished  Cardiac Assessment   Cardiac (WDL):  WDL Except (HX: CHF, AFIB)

## 2024-01-06 NOTE — PROGRESS NOTES
Patient discharged to home per order.  Reviewed all discharge instructions, appointments, discharge medications, and wound care instructions with patient and ; they verbalize understanding.  Education provided in discharge instructions about wound care, medications and Home Safety and Fall Prevention. Discharge paperwork completed; signed copies in chart.  Patient has education binder and all belongings; signed copy in chart.  Pt alert, calm, stable; no change in status from morning assessment.  Patient left facility at 1345 via w/c accompanied by ; escorted to car by staff.  Wound care completed prior to leaving facility and picture taken of wound to Left heel.  Have enjoyed working with this pleasant patient.

## 2024-01-06 NOTE — PROGRESS NOTES
NURSING DAILY NOTE    Name: Jenifer Ramos   Date of Admission: 12/30/2023   Admitting Diagnosis: No Principal Problem: There is no principal problem currently on the Problem List. Please update the Problem List and refresh.  Attending Physician: Doron Brand M.d.  Allergies: Azithromycin, Erythromycin, Other misc, Penicillins, Pistachio, Sulfa drugs, Tetraglycine, Other drug, Physostigmine, Tape, Zanaflex [tizanidine hcl], Albuterol, Atorvastatin, Chlorhexidine, Lamisil [terbinafine hcl], and Morphine    Safety  Patient Assist  SBA  Patient Precautions  Fall Risk  Precaution Comments  L foot wound with PRAFO, flu + (no longer on droplet precaution as of 01/03)  Bed Transfer Status  Standby Assist  Toilet Transfer Status   Supervised  Assistive Devices  Rails, Walker - front wheel, Wheelchair  Oxygen  Nasal Cannula  Diet/Therapeutic Dining  Current Diet Order   Procedures    Diet Order Diet: Consistent CHO (Diabetic)     Pill Administration  whole  Agitated Behavioral Scale  19  ABS Level of Severity  No Agitation    Fall Risk  Has the patient had a fall this admission?   No  Sophia Smith Fall Risk Scoring  23, HIGH RISK  Fall Risk Safety Measures  bed alarm, chair alarm, poor balance, and low vision/ hearing    Vitals  Temperature: 36.4 °C (97.5 °F)  Temp src: Oral  Pulse: 73  Respiration: 18  Blood Pressure : 110/59  Blood Pressure MAP (Calculated): 76 MM HG  BP Location: Right, Upper Arm  Patient BP Position: Supine     Oxygen  Pulse Oximetry: 95 %  O2 (LPM): 1  O2 Delivery Device: Nasal Cannula    Bowel and Bladder  Last Bowel Movement  01/04/24  Stool Type  Type 5: Soft blob with clear cut edges (passed easily)  Bowel Device  Bathroom  Continent  Bladder: Continent void   Bowel: Continent movement  Bladder Function  Urine Void (mL):  (Large)  Number of Times Voided: 1  Urine Color: Yellow  Genitourinary Assessment   Bladder Assessment  (WDL):  Within Defined Limits  Llamas Catheter: Not Applicable  Urine Color: Yellow  Bladder Device: Bathroom  Bladder Scan: Post Void  $ Bladder Scan Results (mL): 1    Skin  Guille Score   17  Sensory Interventions   Bed Types: Standard/Trauma Mattress  Skin Preventative Measures: Waffle Overlay, Heel Float Boots in Use   Moisture Interventions  Moisturizers/Barriers: Barrier Wipes      Pain  Pain Rating Scale  1 - Hardly Notice Pain  Pain Location  Generalized  Pain Location Orientation  Mid, Upper, Lower  Pain Interventions   Declines    ADLs    Bathing    (Pt away for imaging)  Linen Change      Personal Hygiene  Moist Antionette Wipes, Perineal Care  Chlorhexidine Bath      Oral Care  Brushed Teeth  Teeth/Dentures     Shave     Nutrition Percentage Eaten  Lunch, Between % Consumed  Environmental Precautions  Bed in Low Position, Treaded Slipper Socks on Patient  Patient Turns/Positioning  Patient Turns Self from Side to Side  Patient Turns Assistance/Tolerance     Bed Positions  Bed Controls On  Head of Bed Elevated  Self regulated      Psychosocial/Neurologic Assessment  Psychosocial Assessment  Psychosocial (WDL):  Within Defined Limits  Patient Behaviors: Irritable  Neurologic Assessment  Neuro (WDL): Exceptions to WDL  Level of Consciousness: Alert  Orientation Level: Oriented X4  Cognition: Appropriate safety awareness  Speech: Clear  Pupil Assesment: No  Motor Function/Sensation Assessment: Motor strength  Muscle Strength Right Arm: Good Strength Against Gravity and Moderate Resistance  Muscle Strength Left Arm: Fair Strength against Gravity but No Resistance  Muscle Strength Right Leg: Good Strength Against Gravity and Moderate Resistance  Muscle Strength Left Leg: Fair Strength against Gravity but No Resistance  EENT (WDL):  WDL Except    Cardio/Pulmonary Assessment  Edema   RLE Edema: 2+  LLE Edema: 2+  Respiratory Breath Sounds  RUL Breath Sounds: Clear  RML Breath Sounds: Diminished  RLL Breath  Sounds: Diminished  DANIELLE Breath Sounds: Clear  LLL Breath Sounds: Diminished  Cardiac Assessment   Cardiac (WDL):  WDL Except (HX: CHF, AFIB)

## 2024-01-06 NOTE — CARE PLAN
The patient is Stable - Low risk of patient condition declining or worsening    Shift Goals  Clinical Goals: safety  Patient Goals: safety, sleep  Family Goals: Education    Progress made toward(s) clinical / shift goals:      Problem: Discharge Barriers/Planning  Goal: Patient's continuum of care needs are met  Outcome: Met  Flowsheets (Taken 1/6/2024 1146)  Continuum of Care Needs:   Assessed for discharge barriers   Involved patient/family/support system in discharge process   Provided and explained discharge instructions   Communicated discharge barriers to interdisciplinary tream   Collaborated with Case Management/  Note: Pt able to verbalize understanding of discharge instructions and follow-up       Patient is not progressing towards the following goals:none

## 2024-01-06 NOTE — CARE PLAN
"The patient is Stable - Low risk of patient condition declining or worsening    Shift Goals  Clinical Goals: safety  Patient Goals: safety, sleep  Family Goals: Education    Problem: Fall Risk - Rehab  Goal: Patient will remain free from falls  Note: Sophia Smith Fall risk Assessment Score: 23     High fall risk Interventions   - Alarming seatbelt  - Bed and strip alarm   - Yellow sign by the door   - Yellow wrist band \"Fall risk\"  - Room near to the nurse station  - Do not leave patient unattended in the bathroom  - Fall risk education provided   Patient uses call light consistently and appropriately this shift.  Waits for assistance when needed and does not attempt self transfer.  Able to verbalize needs.  Will continue to monitor.     Problem: Skin Integrity  Goal: Skin integrity is maintained or improved  Note: Heel boots in place bilaterally. Applied bacitracin to scab on right shin with no evidence of bleeding noted. Patient's skin remains intact and free from new or accidental injury this shift. Will continue to monitor.      "

## 2024-01-07 ENCOUNTER — APPOINTMENT (OUTPATIENT)
Dept: INPATIENT REHAB | Facility: REHABILITATION | Age: 64
End: 2024-01-07
Payer: MEDICARE

## 2024-01-08 LAB
GLUCOSE BLD STRIP.AUTO-MCNC: 141 MG/DL (ref 65–99)
GLUCOSE BLD STRIP.AUTO-MCNC: 146 MG/DL (ref 65–99)

## 2024-01-09 NOTE — DISCHARGE PLANNING
Cm   Received call from patient; she indicates home health has not reached out to her; obtained home health order that was scanned into MEDIA; requested DPA to send order to [facility].     Tc to [facility] /Spoke with Татьяна @ [facility] who states she did not receive order; DPA to send order.  I sent referral in EPIC.     Tc back to [facility] to confirm they received orders/referral; only able to leave message.

## 2024-06-13 ENCOUNTER — APPOINTMENT (OUTPATIENT)
Dept: RADIOLOGY | Facility: MEDICAL CENTER | Age: 64
End: 2024-06-13
Attending: EMERGENCY MEDICINE
Payer: MEDICARE

## 2024-06-13 ENCOUNTER — HOSPITAL ENCOUNTER (OUTPATIENT)
Facility: MEDICAL CENTER | Age: 64
End: 2024-06-15
Attending: EMERGENCY MEDICINE | Admitting: STUDENT IN AN ORGANIZED HEALTH CARE EDUCATION/TRAINING PROGRAM
Payer: MEDICARE

## 2024-06-13 ENCOUNTER — APPOINTMENT (OUTPATIENT)
Dept: RADIOLOGY | Facility: MEDICAL CENTER | Age: 64
End: 2024-06-13
Attending: STUDENT IN AN ORGANIZED HEALTH CARE EDUCATION/TRAINING PROGRAM
Payer: MEDICARE

## 2024-06-13 DIAGNOSIS — Z86.73 HISTORY OF STROKE: ICD-10-CM

## 2024-06-13 DIAGNOSIS — R53.1 GENERALIZED WEAKNESS: ICD-10-CM

## 2024-06-13 DIAGNOSIS — R27.0 ATAXIA: ICD-10-CM

## 2024-06-13 DIAGNOSIS — D61.818 PANCYTOPENIA (HCC): ICD-10-CM

## 2024-06-13 DIAGNOSIS — T88.7XXA MEDICATION SIDE EFFECTS: ICD-10-CM

## 2024-06-13 DIAGNOSIS — Z86.73 HISTORY OF CVA (CEREBROVASCULAR ACCIDENT): ICD-10-CM

## 2024-06-13 DIAGNOSIS — D50.9 MICROCYTIC ANEMIA: ICD-10-CM

## 2024-06-13 DIAGNOSIS — M54.50 LOW BACK PAIN, UNSPECIFIED BACK PAIN LATERALITY, UNSPECIFIED CHRONICITY, UNSPECIFIED WHETHER SCIATICA PRESENT: ICD-10-CM

## 2024-06-13 PROBLEM — R73.9 HYPERGLYCEMIA: Status: ACTIVE | Noted: 2024-06-13

## 2024-06-13 LAB
ALBUMIN SERPL BCP-MCNC: 3.6 G/DL (ref 3.2–4.9)
ALBUMIN/GLOB SERPL: 0.9 G/DL
ALP SERPL-CCNC: 147 U/L (ref 30–99)
ALT SERPL-CCNC: 6 U/L (ref 2–50)
ANION GAP SERPL CALC-SCNC: 14 MMOL/L (ref 7–16)
AST SERPL-CCNC: 19 U/L (ref 12–45)
BASOPHILS # BLD AUTO: 0.6 % (ref 0–1.8)
BASOPHILS # BLD: 0.03 K/UL (ref 0–0.12)
BILIRUB SERPL-MCNC: 0.4 MG/DL (ref 0.1–1.5)
BUN SERPL-MCNC: 8 MG/DL (ref 8–22)
CALCIUM ALBUM COR SERPL-MCNC: 9.8 MG/DL (ref 8.5–10.5)
CALCIUM SERPL-MCNC: 9.5 MG/DL (ref 8.5–10.5)
CHLORIDE SERPL-SCNC: 88 MMOL/L (ref 96–112)
CO2 SERPL-SCNC: 27 MMOL/L (ref 20–33)
CREAT SERPL-MCNC: 0.71 MG/DL (ref 0.5–1.4)
CRP SERPL HS-MCNC: 0.97 MG/DL (ref 0–0.75)
EKG IMPRESSION: NORMAL
EOSINOPHIL # BLD AUTO: 0.06 K/UL (ref 0–0.51)
EOSINOPHIL NFR BLD: 1.3 % (ref 0–6.9)
ERYTHROCYTE [DISTWIDTH] IN BLOOD BY AUTOMATED COUNT: 53.2 FL (ref 35.9–50)
ERYTHROCYTE [SEDIMENTATION RATE] IN BLOOD BY WESTERGREN METHOD: 10 MM/HOUR (ref 0–25)
ETHANOL BLD-MCNC: <10.1 MG/DL
GFR SERPLBLD CREATININE-BSD FMLA CKD-EPI: 94 ML/MIN/1.73 M 2
GLOBULIN SER CALC-MCNC: 4 G/DL (ref 1.9–3.5)
GLUCOSE BLD STRIP.AUTO-MCNC: 176 MG/DL (ref 65–99)
GLUCOSE SERPL-MCNC: 225 MG/DL (ref 65–99)
HCT VFR BLD AUTO: 42.6 % (ref 37–47)
HGB BLD-MCNC: 12.8 G/DL (ref 12–16)
IMM GRANULOCYTES # BLD AUTO: 0.02 K/UL (ref 0–0.11)
IMM GRANULOCYTES NFR BLD AUTO: 0.4 % (ref 0–0.9)
LYMPHOCYTES # BLD AUTO: 0.88 K/UL (ref 1–4.8)
LYMPHOCYTES NFR BLD: 18.9 % (ref 22–41)
MCH RBC QN AUTO: 23.7 PG (ref 27–33)
MCHC RBC AUTO-ENTMCNC: 30 G/DL (ref 32.2–35.5)
MCV RBC AUTO: 79 FL (ref 81.4–97.8)
MONOCYTES # BLD AUTO: 0.51 K/UL (ref 0–0.85)
MONOCYTES NFR BLD AUTO: 11 % (ref 0–13.4)
NEUTROPHILS # BLD AUTO: 3.15 K/UL (ref 1.82–7.42)
NEUTROPHILS NFR BLD: 67.8 % (ref 44–72)
NRBC # BLD AUTO: 0 K/UL
NRBC BLD-RTO: 0 /100 WBC (ref 0–0.2)
NT-PROBNP SERPL IA-MCNC: 365 PG/ML (ref 0–125)
PLATELET # BLD AUTO: 176 K/UL (ref 164–446)
PMV BLD AUTO: 9.8 FL (ref 9–12.9)
POTASSIUM SERPL-SCNC: 4.4 MMOL/L (ref 3.6–5.5)
PROT SERPL-MCNC: 7.6 G/DL (ref 6–8.2)
RBC # BLD AUTO: 5.39 M/UL (ref 4.2–5.4)
SODIUM SERPL-SCNC: 129 MMOL/L (ref 135–145)
TROPONIN T SERPL-MCNC: 20 NG/L (ref 6–19)
WBC # BLD AUTO: 4.7 K/UL (ref 4.8–10.8)

## 2024-06-13 PROCEDURE — 36415 COLL VENOUS BLD VENIPUNCTURE: CPT

## 2024-06-13 PROCEDURE — 770001 HCHG ROOM/CARE - MED/SURG/GYN PRIV*

## 2024-06-13 PROCEDURE — 93005 ELECTROCARDIOGRAM TRACING: CPT | Performed by: EMERGENCY MEDICINE

## 2024-06-13 PROCEDURE — A9579 GAD-BASE MR CONTRAST NOS,1ML: HCPCS | Performed by: STUDENT IN AN ORGANIZED HEALTH CARE EDUCATION/TRAINING PROGRAM

## 2024-06-13 PROCEDURE — 86140 C-REACTIVE PROTEIN: CPT

## 2024-06-13 PROCEDURE — 70498 CT ANGIOGRAPHY NECK: CPT

## 2024-06-13 PROCEDURE — 700102 HCHG RX REV CODE 250 W/ 637 OVERRIDE(OP): Performed by: STUDENT IN AN ORGANIZED HEALTH CARE EDUCATION/TRAINING PROGRAM

## 2024-06-13 PROCEDURE — 700111 HCHG RX REV CODE 636 W/ 250 OVERRIDE (IP): Performed by: STUDENT IN AN ORGANIZED HEALTH CARE EDUCATION/TRAINING PROGRAM

## 2024-06-13 PROCEDURE — 96374 THER/PROPH/DIAG INJ IV PUSH: CPT

## 2024-06-13 PROCEDURE — 80053 COMPREHEN METABOLIC PANEL: CPT

## 2024-06-13 PROCEDURE — 700105 HCHG RX REV CODE 258: Performed by: STUDENT IN AN ORGANIZED HEALTH CARE EDUCATION/TRAINING PROGRAM

## 2024-06-13 PROCEDURE — 83880 ASSAY OF NATRIURETIC PEPTIDE: CPT

## 2024-06-13 PROCEDURE — 72157 MRI CHEST SPINE W/O & W/DYE: CPT

## 2024-06-13 PROCEDURE — 85652 RBC SED RATE AUTOMATED: CPT

## 2024-06-13 PROCEDURE — 99223 1ST HOSP IP/OBS HIGH 75: CPT | Performed by: STUDENT IN AN ORGANIZED HEALTH CARE EDUCATION/TRAINING PROGRAM

## 2024-06-13 PROCEDURE — 70496 CT ANGIOGRAPHY HEAD: CPT

## 2024-06-13 PROCEDURE — 82077 ASSAY SPEC XCP UR&BREATH IA: CPT

## 2024-06-13 PROCEDURE — 700117 HCHG RX CONTRAST REV CODE 255: Performed by: EMERGENCY MEDICINE

## 2024-06-13 PROCEDURE — 85025 COMPLETE CBC W/AUTO DIFF WBC: CPT

## 2024-06-13 PROCEDURE — A9270 NON-COVERED ITEM OR SERVICE: HCPCS | Performed by: STUDENT IN AN ORGANIZED HEALTH CARE EDUCATION/TRAINING PROGRAM

## 2024-06-13 PROCEDURE — 700117 HCHG RX CONTRAST REV CODE 255: Performed by: STUDENT IN AN ORGANIZED HEALTH CARE EDUCATION/TRAINING PROGRAM

## 2024-06-13 PROCEDURE — 72158 MRI LUMBAR SPINE W/O & W/DYE: CPT

## 2024-06-13 PROCEDURE — 99285 EMERGENCY DEPT VISIT HI MDM: CPT

## 2024-06-13 PROCEDURE — 84484 ASSAY OF TROPONIN QUANT: CPT

## 2024-06-13 PROCEDURE — 71045 X-RAY EXAM CHEST 1 VIEW: CPT

## 2024-06-13 PROCEDURE — 82962 GLUCOSE BLOOD TEST: CPT

## 2024-06-13 PROCEDURE — 70551 MRI BRAIN STEM W/O DYE: CPT

## 2024-06-13 RX ORDER — PRAMIPEXOLE DIHYDROCHLORIDE 0.25 MG/1
0.25 TABLET ORAL 2 TIMES DAILY
Status: DISCONTINUED | OUTPATIENT
Start: 2024-06-13 | End: 2024-06-15 | Stop reason: HOSPADM

## 2024-06-13 RX ORDER — INSULIN LISPRO 100 [IU]/ML
0.2 INJECTION, SOLUTION INTRAVENOUS; SUBCUTANEOUS
Status: DISCONTINUED | OUTPATIENT
Start: 2024-06-14 | End: 2024-06-14

## 2024-06-13 RX ORDER — SPIRONOLACTONE 25 MG/1
50 TABLET ORAL DAILY
Status: DISCONTINUED | OUTPATIENT
Start: 2024-06-14 | End: 2024-06-15 | Stop reason: HOSPADM

## 2024-06-13 RX ORDER — ONDANSETRON 4 MG/1
4 TABLET, ORALLY DISINTEGRATING ORAL EVERY 4 HOURS PRN
Status: DISCONTINUED | OUTPATIENT
Start: 2024-06-13 | End: 2024-06-15 | Stop reason: HOSPADM

## 2024-06-13 RX ORDER — MONTELUKAST SODIUM 10 MG/1
10 TABLET ORAL EVERY EVENING
Status: DISCONTINUED | OUTPATIENT
Start: 2024-06-13 | End: 2024-06-15 | Stop reason: HOSPADM

## 2024-06-13 RX ORDER — OXYCODONE HYDROCHLORIDE 10 MG/1
10 TABLET ORAL 4 TIMES DAILY PRN
COMMUNITY

## 2024-06-13 RX ORDER — LORAZEPAM 2 MG/ML
1 INJECTION INTRAMUSCULAR ONCE
Status: COMPLETED | OUTPATIENT
Start: 2024-06-13 | End: 2024-06-13

## 2024-06-13 RX ORDER — LEVOTHYROXINE SODIUM 112 UG/1
112 TABLET ORAL EVERY MORNING
Status: SHIPPED | COMMUNITY
Start: 2024-04-21 | End: 2024-06-13

## 2024-06-13 RX ORDER — DOCUSATE SODIUM AND SENNOSIDES 8.6; 5 MG/1; MG/1
2 TABLET, FILM COATED ORAL 2 TIMES DAILY PRN
COMMUNITY
Start: 2024-05-28

## 2024-06-13 RX ORDER — OXYCODONE HYDROCHLORIDE 10 MG/1
10 TABLET ORAL 4 TIMES DAILY PRN
Status: DISCONTINUED | OUTPATIENT
Start: 2024-06-13 | End: 2024-06-15 | Stop reason: HOSPADM

## 2024-06-13 RX ORDER — CARBAMAZEPINE 200 MG/1
200 TABLET ORAL 3 TIMES DAILY
Status: DISCONTINUED | OUTPATIENT
Start: 2024-06-13 | End: 2024-06-15 | Stop reason: HOSPADM

## 2024-06-13 RX ORDER — LEVALBUTEROL TARTRATE 45 UG/1
2 AEROSOL, METERED ORAL EVERY 4 HOURS PRN
COMMUNITY
Start: 2024-05-28

## 2024-06-13 RX ORDER — SODIUM CHLORIDE 9 MG/ML
INJECTION, SOLUTION INTRAVENOUS CONTINUOUS
Status: DISCONTINUED | OUTPATIENT
Start: 2024-06-13 | End: 2024-06-14

## 2024-06-13 RX ORDER — INSULIN LISPRO 100 [IU]/ML
1-6 INJECTION, SOLUTION INTRAVENOUS; SUBCUTANEOUS
Status: DISCONTINUED | OUTPATIENT
Start: 2024-06-13 | End: 2024-06-14

## 2024-06-13 RX ORDER — ROSUVASTATIN CALCIUM 20 MG/1
20 TABLET, COATED ORAL EVERY EVENING
Status: DISCONTINUED | OUTPATIENT
Start: 2024-06-13 | End: 2024-06-15 | Stop reason: HOSPADM

## 2024-06-13 RX ORDER — SPIRONOLACTONE 50 MG/1
50 TABLET, FILM COATED ORAL DAILY
COMMUNITY
Start: 2024-05-21

## 2024-06-13 RX ORDER — ACETAMINOPHEN 325 MG/1
650 TABLET ORAL EVERY 6 HOURS PRN
Status: DISCONTINUED | OUTPATIENT
Start: 2024-06-13 | End: 2024-06-15 | Stop reason: HOSPADM

## 2024-06-13 RX ORDER — INSULIN DEGLUDEC 200 U/ML
28 INJECTION, SOLUTION SUBCUTANEOUS
COMMUNITY
Start: 2024-04-21

## 2024-06-13 RX ORDER — LEVOTHYROXINE SODIUM 112 UG/1
112 TABLET ORAL
Status: DISCONTINUED | OUTPATIENT
Start: 2024-06-14 | End: 2024-06-15 | Stop reason: HOSPADM

## 2024-06-13 RX ORDER — GABAPENTIN 300 MG/1
600 CAPSULE ORAL 3 TIMES DAILY
Status: DISCONTINUED | OUTPATIENT
Start: 2024-06-13 | End: 2024-06-15 | Stop reason: HOSPADM

## 2024-06-13 RX ORDER — FEBUXOSTAT 40 MG/1
40 TABLET, FILM COATED ORAL
Status: DISCONTINUED | OUTPATIENT
Start: 2024-06-13 | End: 2024-06-15 | Stop reason: HOSPADM

## 2024-06-13 RX ORDER — LEVALBUTEROL INHALATION SOLUTION 1.25 MG/3ML
1.25 SOLUTION RESPIRATORY (INHALATION) EVERY 4 HOURS PRN
Status: DISCONTINUED | OUTPATIENT
Start: 2024-06-13 | End: 2024-06-15 | Stop reason: HOSPADM

## 2024-06-13 RX ORDER — ONDANSETRON 2 MG/ML
4 INJECTION INTRAMUSCULAR; INTRAVENOUS EVERY 4 HOURS PRN
Status: DISCONTINUED | OUTPATIENT
Start: 2024-06-13 | End: 2024-06-15 | Stop reason: HOSPADM

## 2024-06-13 RX ORDER — DEXTROSE MONOHYDRATE 25 G/50ML
25 INJECTION, SOLUTION INTRAVENOUS
Status: DISCONTINUED | OUTPATIENT
Start: 2024-06-13 | End: 2024-06-14

## 2024-06-13 RX ORDER — INSULIN LISPRO 200 [IU]/ML
5-20 INJECTION, SOLUTION SUBCUTANEOUS
COMMUNITY
Start: 2024-04-21

## 2024-06-13 RX ORDER — LIDOCAINE 50 MG/G
1 PATCH TOPICAL EVERY 24 HOURS
COMMUNITY
Start: 2024-04-03

## 2024-06-13 RX ADMIN — GADOTERIDOL 20 ML: 279.3 INJECTION, SOLUTION INTRAVENOUS at 23:15

## 2024-06-13 RX ADMIN — IOHEXOL 80 ML: 350 INJECTION, SOLUTION INTRAVENOUS at 18:15

## 2024-06-13 RX ADMIN — PRAMIPEXOLE DIHYDROCHLORIDE 0.25 MG: 0.25 TABLET ORAL at 21:33

## 2024-06-13 RX ADMIN — GABAPENTIN 600 MG: 300 CAPSULE ORAL at 21:32

## 2024-06-13 RX ADMIN — MONTELUKAST 10 MG: 10 TABLET, FILM COATED ORAL at 21:33

## 2024-06-13 RX ADMIN — FEBUXOSTAT 40 MG: 40 TABLET ORAL at 21:34

## 2024-06-13 RX ADMIN — FLUTICASONE FUROATE, UMECLIDINIUM BROMIDE AND VILANTEROL TRIFENATATE 1 PUFF: 100; 62.5; 25 POWDER RESPIRATORY (INHALATION) at 21:36

## 2024-06-13 RX ADMIN — CARBAMAZEPINE 200 MG: 200 TABLET ORAL at 21:35

## 2024-06-13 RX ADMIN — ROSUVASTATIN CALCIUM 20 MG: 20 TABLET, FILM COATED ORAL at 21:33

## 2024-06-13 RX ADMIN — SODIUM CHLORIDE: 9 INJECTION, SOLUTION INTRAVENOUS at 21:29

## 2024-06-13 RX ADMIN — LORAZEPAM 1 MG: 2 INJECTION INTRAMUSCULAR; INTRAVENOUS at 21:48

## 2024-06-13 ASSESSMENT — LIFESTYLE VARIABLES
HOW MANY TIMES IN THE PAST YEAR HAVE YOU HAD 5 OR MORE DRINKS IN A DAY: 0
TOTAL SCORE: 0
HAVE YOU EVER FELT YOU SHOULD CUT DOWN ON YOUR DRINKING: NO
TOTAL SCORE: 0
REASON UNABLE TO ASSESS: DOES NOT DRINK
AVERAGE NUMBER OF DAYS PER WEEK YOU HAVE A DRINK CONTAINING ALCOHOL: 0
DOES PATIENT WANT TO STOP DRINKING: CANNOT ASSESS
EVER HAD A DRINK FIRST THING IN THE MORNING TO STEADY YOUR NERVES TO GET RID OF A HANGOVER: NO
ON A TYPICAL DAY WHEN YOU DRINK ALCOHOL HOW MANY DRINKS DO YOU HAVE: 0
CONSUMPTION TOTAL: NEGATIVE
HAVE PEOPLE ANNOYED YOU BY CRITICIZING YOUR DRINKING: NO
TOTAL SCORE: 0
ALCOHOL_USE: NO
EVER FELT BAD OR GUILTY ABOUT YOUR DRINKING: NO

## 2024-06-13 ASSESSMENT — ENCOUNTER SYMPTOMS
EYES NEGATIVE: 1
WEAKNESS: 1
GASTROINTESTINAL NEGATIVE: 1
MUSCULOSKELETAL NEGATIVE: 1
RESPIRATORY NEGATIVE: 1
PSYCHIATRIC NEGATIVE: 1
CARDIOVASCULAR NEGATIVE: 1

## 2024-06-13 ASSESSMENT — SOCIAL DETERMINANTS OF HEALTH (SDOH)
WITHIN THE PAST 12 MONTHS, THE FOOD YOU BOUGHT JUST DIDN'T LAST AND YOU DIDN'T HAVE MONEY TO GET MORE: NEVER TRUE
IN THE PAST 12 MONTHS, HAS THE ELECTRIC, GAS, OIL, OR WATER COMPANY THREATENED TO SHUT OFF SERVICE IN YOUR HOME?: NO
WITHIN THE LAST YEAR, HAVE TO BEEN RAPED OR FORCED TO HAVE ANY KIND OF SEXUAL ACTIVITY BY YOUR PARTNER OR EX-PARTNER?: NO
WITHIN THE PAST 12 MONTHS, YOU WORRIED THAT YOUR FOOD WOULD RUN OUT BEFORE YOU GOT THE MONEY TO BUY MORE: NEVER TRUE
WITHIN THE LAST YEAR, HAVE YOU BEEN AFRAID OF YOUR PARTNER OR EX-PARTNER?: NO
WITHIN THE LAST YEAR, HAVE YOU BEEN HUMILIATED OR EMOTIONALLY ABUSED IN OTHER WAYS BY YOUR PARTNER OR EX-PARTNER?: NO
WITHIN THE LAST YEAR, HAVE YOU BEEN KICKED, HIT, SLAPPED, OR OTHERWISE PHYSICALLY HURT BY YOUR PARTNER OR EX-PARTNER?: NO

## 2024-06-13 ASSESSMENT — PATIENT HEALTH QUESTIONNAIRE - PHQ9
2. FEELING DOWN, DEPRESSED, IRRITABLE, OR HOPELESS: NOT AT ALL
SUM OF ALL RESPONSES TO PHQ9 QUESTIONS 1 AND 2: 0
1. LITTLE INTEREST OR PLEASURE IN DOING THINGS: NOT AT ALL

## 2024-06-13 ASSESSMENT — FIBROSIS 4 INDEX: FIB4 SCORE: 3.13

## 2024-06-13 NOTE — ED TRIAGE NOTES
"Chief Complaint   Patient presents with    Weakness     Generalized X 2 weeks, pt arrives drowsy but oriented, denies fevers or known infections      Pt BIB [facility] for above complaints, VSS on 2L NC, 85% on RA, GCS 15, NAD, .     /79   Pulse 73   Temp 36.6 °C (97.9 °F) (Temporal)   Resp 12   Ht 1.727 m (5' 8\")   Wt 116 kg (256 lb)   SpO2 94%   BMI 38.92 kg/m²     "

## 2024-06-13 NOTE — ED PROVIDER NOTES
ED Provider Note    Primary care provider: Adrián Duckworth M.D.    CHIEF COMPLAINT  Chief Complaint   Patient presents with    Weakness     Generalized X 2 weeks, pt arrives drowsy but oriented, denies fevers or known infections        Additional history obtained from: Son at bedside states that she has had some increasing lethargy over the past 72 hours.  Limitation to History:  Select: : None    HPI  Jenifer Ramos is a 64 y.o. female who presents to the Emergency Department for fatigue and difficulty walking.  She has had lethargy for the past 72 hours, denies any new medications but has been on Mirapex started a few months ago for possible Parkinson's-like features.  She denies any new numbness or new focal weakness.  She states her legs just do not seem to work when she is trying to walk.  She does feel off balance.    External Record Review: Reviewed the most [facility] discharge [facility], the patient was discharged from their services January 6, 2024.  She was initially hospitalized at our facility for 72 hours starting 12/27/2023.  History of obesity, PAF status post 2 ablations, chronic anticoagulation with Eliquis, factor V Leiden deficiency, heart failure, asthma, hypothyroidism, hypertension, history of CVA, MARCI, type 2 diabetes, peripheral neuropathy.  She was hospitalized for generalized weakness.  She was febrile on the admission, chest x-ray showed cardiomegaly, echo was normal ejection fraction.  She did have some concentric LVH and diastolic dysfunction.  She was placed on IV Lasix for diuresis.  Blood cultures were negative.  She was found to have some pancytopenia and this was thought possibly due to Tegretol for trigeminal neuralgia versus viral influenza.    REVIEW OF SYSTEMS  See HPI.     PAST MEDICAL HISTORY   has a past medical history of Arrhythmia, Arthritis, ASTHMA (4/24/2013), Back pain (4/24/2013), Blood clotting disorder (HCC), Disorder of thyroid, DM (diabetes  mellitus) ([facility]) (4/24/2013), Factor V deficiency (HCC) (04/24/2013), Palpitations (4/24/2013), Pneumonia (1999), Psychiatric problem, Sleep apnea (4/24/2013), Snoring, Urinary incontinence, and Varicose vein (4/24/2013).    SURGICAL HISTORY   has a past surgical history that includes hysterectomy, total abdominal (2003); oophorectomy; tonsillectomy; cholecystectomy; fusion, spine, lumbar, plif; other cardiac surgery; other orthopedic surgery (2018); knee arthroplasty total (Left, 2013); knee arthroplasty total (Right, 2013); reconstr total shoulder implant (Left, 11/27/2019); and clavicle distal excision (11/27/2019).    SOCIAL HISTORY  Social History     Tobacco Use    Smoking status: Never    Smokeless tobacco: Never   Substance Use Topics    Alcohol use: Not Currently    Drug use: No      Social History     Substance and Sexual Activity   Drug Use No       FAMILY HISTORY  Family History   Problem Relation Age of Onset    Heart Disease Mother 75        atrial fibrillation    Lung Disease Father 69        astma, DM, lukemia    Hypertension Sister        CURRENT MEDICATIONS  Reviewed.  See Encounter Summary.     ALLERGIES  Allergies   Allergen Reactions    Azithromycin Anaphylaxis    Erythromycin Anaphylaxis    Other Misc Anaphylaxis     Flu vaccine    Penicillins Anaphylaxis     As a child    Pistachio Anaphylaxis    Sulfa Drugs Anaphylaxis    Tetraglycine Anaphylaxis    Other Drug Unspecified     Long acting benzodiazepines only single dose otherwise combative     Physostigmine Unspecified     Abdomen extreme swelling    Tape Rash and Itching     tegaderm ok  Blisters with others    Zanaflex [Tizanidine Hcl] Unspecified     Falling asleep mid sentence    Benzodiazepines Unspecified    Albuterol Palpitations    Atorvastatin Unspecified     Extreme joint pain    Chlorhexidine Rash     blistering    Lamisil [Terbinafine Hcl] Unspecified     Pancreatitis     Morphine Unspecified     Not effective       PHYSICAL  "EXAM  VITAL SIGNS: /63   Pulse 68   Temp 36.5 °C (97.7 °F) (Temporal)   Resp 16   Ht 1.727 m (5' 8\")   Wt 116 kg (256 lb)   SpO2 93%   BMI 38.92 kg/m²   Constitutional: Awake, alert in no apparent distress.  Significantly elevated BMI.  Appears mildly sedated.  HENT: Normocephalic, Bilateral external ears normal. Nose normal.   Eyes: Conjunctiva normal, non-icteric, EOMI.    Thorax & Lungs: Easy unlabored respirations, Clear to ascultation bilaterally.  Cardiovascular: Regular rate, Regular rhythm, No murmurs, rubs or gallops. Bilateral pulses symmetrical.   Abdomen:  Soft, nontender, nondistended, normal active bowel sounds.   :    Skin: Visualized skin is  Dry, No erythema, No rash.   Musculoskeletal:   No cyanosis, clubbing or edema. No leg asymmetry.   Neurologic: Alert, cranial nerves II through XII intact, good strength all 4 extremities, no drift.  Sensation intact to light touch throughout.  Psychiatric: Normal affect, Normal mood  Lymphatic:      EKG   12 lead Interpreted by me  Rhythm:  Normal sinus rhythm   Rate: 67  Axis: normal  Ectopy: none  Conduction: normal  ST Segments: no acute change  T Waves: no acute change  Clinical Impression: Right bundle branch block seen on prior EKGs, otherwise unremarkable EKG without acute changes       RADIOLOGY  I have independently interpreted the diagnostic imaging associated with this visit and am waiting the final reading from the radiologist.   My preliminary interpretation is as follows: No intracranial hemorrhage or LVO.    Radiologist interpretation:   CT-CTA NECK WITH & W/O-POST PROCESSING   Final Result      1.  No evidence of carotid or vertebral arterial occlusion or dissection.      2.  Mild atherosclerotic plaque involving the carotid bifurcations bilaterally. No flow-limiting stenosis.      CT-CTA HEAD WITH & W/O-POST PROCESS   Final Result      No evidence of intracranial vascular occlusion or aneurysm.      DX-CHEST-PORTABLE (1 VIEW) "   Final Result      Cardiac silhouette enlargement.      MR-BRAIN-W/O    (Results Pending)   MR-THORACIC SPINE-WITH & W/O    (Results Pending)   MR-LUMBAR SPINE-WITH & W/O    (Results Pending)       COURSE & MEDICAL DECISION MAKING  Pertinent Labs & Imaging studies reviewed. (See chart for details)    COURSE & MEDICAL DECISION MAKING  Pertinent Labs & Imaging studies reviewed. (See chart for details)    Differential diagnoses include but are not limited to: Concerning for medication side effect versus CVA versus debilitation    3:57 PM - Nursing notes reviewed, patient seen and examined at bedside.    4:30 PM patient was able to ambulate with assistance, was quite off balance.    6:32 PM: Patient still feeling uncoordinated, inability to ambulate.    7:50 PM: Patient reexamined, still does not have any focal neurological deficits, bladder scan shows 367 cc.    Discussion of management with other medical personnel: Dr. Delarosa, hospitalist      Decision tools and prescription drugs considered including, but not limited to: NIH stroke scale 1    Decision Making:  This is a pleasant 64 y.o. year old female who presents with difficulty walking.  The patient initially appeared quite sedated, she is on multiple medications that would certainly cause sedation.  She has excellent strength all 4 extremities without drift, no focal neurological deficits, no saddle paresthesias.  I attempted to ambulate the patient, she was shuffling and uncoordinated.  She did not have any nystagmus or past-pointing, I do not feel this represents a cerebellar CVA but I am concerned about corona radiata or some other smaller lacunar infarct that would cause the patient to have difficulty walking.    I discussed the case with the hospitalist, Dr. Delarosa is concerned about possible cauda equina, the patient apparently has had some difficulty with voiding.  I reassessed the patient, asked her again about any possibility of urinary incontinence,  she states that since her prior stroke, she has urinary urgency and when she starts her stream she cannot stop it.  I do not feel that this really represents anything related to neurogenic bladder, did a bladder scan here which shows 300 cc of retained urine which is not approaching the realm as I would expect for neurogenic bladder.  Currently I do not suspect cauda equina.  Dr. Delarosa will pursue this as an inpatient with MRIs of the L-spine and T-spine.  She also will obtain MRI of the head as this is my predominant concern of possible medication side effect versus acute CVA.    Patient is not a tenecteplase candidate as her NIH stroke scale is quite low and last known well is well over 4 hours.  She is not a IR candidate as she does not have an LVO.      Patient will be hospitalized in guarded condition.      FINAL IMPRESSION  1. Ataxia    2. History of CVA (cerebrovascular accident)    3. Medication side effects

## 2024-06-14 PROBLEM — Z79.4 TYPE 2 DIABETES MELLITUS WITH DIABETIC NEUROPATHY, WITH LONG-TERM CURRENT USE OF INSULIN (HCC): Status: ACTIVE | Noted: 2024-06-13

## 2024-06-14 PROBLEM — E11.40 TYPE 2 DIABETES MELLITUS WITH DIABETIC NEUROPATHY, WITH LONG-TERM CURRENT USE OF INSULIN (HCC): Status: ACTIVE | Noted: 2024-06-13

## 2024-06-14 LAB
ALBUMIN SERPL BCP-MCNC: 3.6 G/DL (ref 3.2–4.9)
ALBUMIN/GLOB SERPL: 1.2 G/DL
ALP SERPL-CCNC: 132 U/L (ref 30–99)
ALT SERPL-CCNC: 7 U/L (ref 2–50)
ANION GAP SERPL CALC-SCNC: 10 MMOL/L (ref 7–16)
ANISOCYTOSIS BLD QL SMEAR: ABNORMAL
AST SERPL-CCNC: 15 U/L (ref 12–45)
BASOPHILS # BLD AUTO: 0.6 % (ref 0–1.8)
BASOPHILS # BLD: 0.02 K/UL (ref 0–0.12)
BILIRUB SERPL-MCNC: 0.3 MG/DL (ref 0.1–1.5)
BUN SERPL-MCNC: 6 MG/DL (ref 8–22)
CALCIUM ALBUM COR SERPL-MCNC: 9.4 MG/DL (ref 8.5–10.5)
CALCIUM SERPL-MCNC: 9.1 MG/DL (ref 8.5–10.5)
CHLORIDE SERPL-SCNC: 93 MMOL/L (ref 96–112)
CO2 SERPL-SCNC: 31 MMOL/L (ref 20–33)
COMMENT 1642: NORMAL
CREAT SERPL-MCNC: 0.61 MG/DL (ref 0.5–1.4)
EOSINOPHIL # BLD AUTO: 0.09 K/UL (ref 0–0.51)
EOSINOPHIL NFR BLD: 2.7 % (ref 0–6.9)
ERYTHROCYTE [DISTWIDTH] IN BLOOD BY AUTOMATED COUNT: 53.6 FL (ref 35.9–50)
GFR SERPLBLD CREATININE-BSD FMLA CKD-EPI: 98 ML/MIN/1.73 M 2
GLOBULIN SER CALC-MCNC: 2.9 G/DL (ref 1.9–3.5)
GLUCOSE BLD STRIP.AUTO-MCNC: 169 MG/DL (ref 65–99)
GLUCOSE BLD STRIP.AUTO-MCNC: 171 MG/DL (ref 65–99)
GLUCOSE BLD STRIP.AUTO-MCNC: 180 MG/DL (ref 65–99)
GLUCOSE BLD STRIP.AUTO-MCNC: 185 MG/DL (ref 65–99)
GLUCOSE SERPL-MCNC: 147 MG/DL (ref 65–99)
HCT VFR BLD AUTO: 39.9 % (ref 37–47)
HGB BLD-MCNC: 11.6 G/DL (ref 12–16)
HYPOCHROMIA BLD QL SMEAR: ABNORMAL
IMM GRANULOCYTES # BLD AUTO: 0.01 K/UL (ref 0–0.11)
IMM GRANULOCYTES NFR BLD AUTO: 0.3 % (ref 0–0.9)
LYMPHOCYTES # BLD AUTO: 0.94 K/UL (ref 1–4.8)
LYMPHOCYTES NFR BLD: 28 % (ref 22–41)
MCH RBC QN AUTO: 23.2 PG (ref 27–33)
MCHC RBC AUTO-ENTMCNC: 29.1 G/DL (ref 32.2–35.5)
MCV RBC AUTO: 79.6 FL (ref 81.4–97.8)
MICROCYTES BLD QL SMEAR: ABNORMAL
MONOCYTES # BLD AUTO: 0.39 K/UL (ref 0–0.85)
MONOCYTES NFR BLD AUTO: 11.6 % (ref 0–13.4)
MORPHOLOGY BLD-IMP: NORMAL
NEUTROPHILS # BLD AUTO: 1.91 K/UL (ref 1.82–7.42)
NEUTROPHILS NFR BLD: 56.8 % (ref 44–72)
NRBC # BLD AUTO: 0 K/UL
NRBC BLD-RTO: 0 /100 WBC (ref 0–0.2)
OVALOCYTES BLD QL SMEAR: NORMAL
PLATELET # BLD AUTO: 155 K/UL (ref 164–446)
PLATELET BLD QL SMEAR: NORMAL
PMV BLD AUTO: 10.5 FL (ref 9–12.9)
POIKILOCYTOSIS BLD QL SMEAR: NORMAL
POTASSIUM SERPL-SCNC: 3.7 MMOL/L (ref 3.6–5.5)
PROT SERPL-MCNC: 6.5 G/DL (ref 6–8.2)
RBC # BLD AUTO: 5.01 M/UL (ref 4.2–5.4)
RBC BLD AUTO: PRESENT
SODIUM SERPL-SCNC: 134 MMOL/L (ref 135–145)
WBC # BLD AUTO: 3.4 K/UL (ref 4.8–10.8)

## 2024-06-14 PROCEDURE — 85025 COMPLETE CBC W/AUTO DIFF WBC: CPT

## 2024-06-14 PROCEDURE — 700102 HCHG RX REV CODE 250 W/ 637 OVERRIDE(OP): Performed by: INTERNAL MEDICINE

## 2024-06-14 PROCEDURE — G0378 HOSPITAL OBSERVATION PER HR: HCPCS

## 2024-06-14 PROCEDURE — 700111 HCHG RX REV CODE 636 W/ 250 OVERRIDE (IP): Mod: JZ | Performed by: INTERNAL MEDICINE

## 2024-06-14 PROCEDURE — 700105 HCHG RX REV CODE 258: Performed by: STUDENT IN AN ORGANIZED HEALTH CARE EDUCATION/TRAINING PROGRAM

## 2024-06-14 PROCEDURE — 97162 PT EVAL MOD COMPLEX 30 MIN: CPT

## 2024-06-14 PROCEDURE — 97535 SELF CARE MNGMENT TRAINING: CPT

## 2024-06-14 PROCEDURE — A9270 NON-COVERED ITEM OR SERVICE: HCPCS | Performed by: INTERNAL MEDICINE

## 2024-06-14 PROCEDURE — 99233 SBSQ HOSP IP/OBS HIGH 50: CPT | Performed by: INTERNAL MEDICINE

## 2024-06-14 PROCEDURE — 97167 OT EVAL HIGH COMPLEX 60 MIN: CPT

## 2024-06-14 PROCEDURE — A9270 NON-COVERED ITEM OR SERVICE: HCPCS | Performed by: STUDENT IN AN ORGANIZED HEALTH CARE EDUCATION/TRAINING PROGRAM

## 2024-06-14 PROCEDURE — 82962 GLUCOSE BLOOD TEST: CPT | Mod: 91

## 2024-06-14 PROCEDURE — 96372 THER/PROPH/DIAG INJ SC/IM: CPT

## 2024-06-14 PROCEDURE — 80053 COMPREHEN METABOLIC PANEL: CPT

## 2024-06-14 PROCEDURE — 700101 HCHG RX REV CODE 250

## 2024-06-14 PROCEDURE — 700102 HCHG RX REV CODE 250 W/ 637 OVERRIDE(OP): Performed by: STUDENT IN AN ORGANIZED HEALTH CARE EDUCATION/TRAINING PROGRAM

## 2024-06-14 RX ORDER — OMEPRAZOLE 20 MG/1
20 CAPSULE, DELAYED RELEASE ORAL DAILY
Status: DISCONTINUED | OUTPATIENT
Start: 2024-06-15 | End: 2024-06-15 | Stop reason: HOSPADM

## 2024-06-14 RX ORDER — INSULIN LISPRO 100 [IU]/ML
0.2 INJECTION, SOLUTION INTRAVENOUS; SUBCUTANEOUS
Status: DISCONTINUED | OUTPATIENT
Start: 2024-06-14 | End: 2024-06-14

## 2024-06-14 RX ORDER — LIDOCAINE 4 G/G
2 PATCH TOPICAL EVERY 24 HOURS
Status: DISCONTINUED | OUTPATIENT
Start: 2024-06-15 | End: 2024-06-15 | Stop reason: HOSPADM

## 2024-06-14 RX ORDER — VERAPAMIL HYDROCHLORIDE 80 MG/1
80 TABLET ORAL
Status: DISCONTINUED | OUTPATIENT
Start: 2024-06-14 | End: 2024-06-14

## 2024-06-14 RX ORDER — ASPIRIN 81 MG/1
81 TABLET ORAL DAILY
Status: DISCONTINUED | OUTPATIENT
Start: 2024-06-15 | End: 2024-06-15 | Stop reason: HOSPADM

## 2024-06-14 RX ORDER — PANTOPRAZOLE SODIUM 40 MG/1
40 TABLET, DELAYED RELEASE ORAL EVERY EVENING
Status: DISCONTINUED | OUTPATIENT
Start: 2024-06-14 | End: 2024-06-14

## 2024-06-14 RX ORDER — FUROSEMIDE 10 MG/ML
40 INJECTION INTRAMUSCULAR; INTRAVENOUS ONCE
Status: DISCONTINUED | OUTPATIENT
Start: 2024-06-14 | End: 2024-06-14

## 2024-06-14 RX ORDER — INSULIN LISPRO 100 [IU]/ML
1-6 INJECTION, SOLUTION INTRAVENOUS; SUBCUTANEOUS
Status: DISCONTINUED | OUTPATIENT
Start: 2024-06-14 | End: 2024-06-15 | Stop reason: HOSPADM

## 2024-06-14 RX ORDER — FUROSEMIDE 10 MG/ML
40 INJECTION INTRAMUSCULAR; INTRAVENOUS
Status: DISCONTINUED | OUTPATIENT
Start: 2024-06-14 | End: 2024-06-15 | Stop reason: HOSPADM

## 2024-06-14 RX ADMIN — INSULIN LISPRO 1 UNITS: 100 INJECTION, SOLUTION INTRAVENOUS; SUBCUTANEOUS at 12:43

## 2024-06-14 RX ADMIN — OXYCODONE HYDROCHLORIDE 10 MG: 10 TABLET ORAL at 23:28

## 2024-06-14 RX ADMIN — FLUTICASONE FUROATE, UMECLIDINIUM BROMIDE AND VILANTEROL TRIFENATATE 1 PUFF: 100; 62.5; 25 POWDER RESPIRATORY (INHALATION) at 17:45

## 2024-06-14 RX ADMIN — CARBAMAZEPINE 200 MG: 200 TABLET ORAL at 15:50

## 2024-06-14 RX ADMIN — LIDOCAINE 2 PATCH: 4 PATCH TOPICAL at 23:46

## 2024-06-14 RX ADMIN — PRAMIPEXOLE DIHYDROCHLORIDE 0.25 MG: 0.25 TABLET ORAL at 17:45

## 2024-06-14 RX ADMIN — APIXABAN 5 MG: 5 TABLET, FILM COATED ORAL at 20:24

## 2024-06-14 RX ADMIN — FEBUXOSTAT 40 MG: 40 TABLET ORAL at 20:25

## 2024-06-14 RX ADMIN — CARBAMAZEPINE 200 MG: 200 TABLET ORAL at 05:09

## 2024-06-14 RX ADMIN — ROSUVASTATIN CALCIUM 20 MG: 20 TABLET, FILM COATED ORAL at 17:27

## 2024-06-14 RX ADMIN — GABAPENTIN 600 MG: 300 CAPSULE ORAL at 12:32

## 2024-06-14 RX ADMIN — LEVOTHYROXINE SODIUM 112 MCG: 0.11 TABLET ORAL at 05:09

## 2024-06-14 RX ADMIN — GABAPENTIN 600 MG: 300 CAPSULE ORAL at 05:09

## 2024-06-14 RX ADMIN — INSULIN LISPRO 1 UNITS: 100 INJECTION, SOLUTION INTRAVENOUS; SUBCUTANEOUS at 17:43

## 2024-06-14 RX ADMIN — PRAMIPEXOLE DIHYDROCHLORIDE 0.25 MG: 0.25 TABLET ORAL at 05:09

## 2024-06-14 RX ADMIN — CARBAMAZEPINE 200 MG: 200 TABLET ORAL at 20:24

## 2024-06-14 RX ADMIN — SPIRONOLACTONE 50 MG: 25 TABLET ORAL at 05:11

## 2024-06-14 RX ADMIN — SODIUM CHLORIDE: 9 INJECTION, SOLUTION INTRAVENOUS at 09:20

## 2024-06-14 RX ADMIN — GABAPENTIN 600 MG: 300 CAPSULE ORAL at 17:27

## 2024-06-14 RX ADMIN — INSULIN LISPRO 1 UNITS: 100 INJECTION, SOLUTION INTRAVENOUS; SUBCUTANEOUS at 20:30

## 2024-06-14 RX ADMIN — OXYCODONE HYDROCHLORIDE 10 MG: 10 TABLET ORAL at 13:50

## 2024-06-14 RX ADMIN — MONTELUKAST 10 MG: 10 TABLET, FILM COATED ORAL at 17:26

## 2024-06-14 RX ADMIN — INSULIN GLARGINE-YFGN 10 UNITS: 100 INJECTION, SOLUTION SUBCUTANEOUS at 17:43

## 2024-06-14 ASSESSMENT — ENCOUNTER SYMPTOMS
FEVER: 0
ABDOMINAL PAIN: 0
SHORTNESS OF BREATH: 0
CONSTIPATION: 0
SPUTUM PRODUCTION: 0
DIARRHEA: 0
DIZZINESS: 1
SORE THROAT: 0
HEARTBURN: 0
NAUSEA: 0
VOMITING: 0
HEADACHES: 0
TINGLING: 0
CHILLS: 0
PALPITATIONS: 0
BACK PAIN: 1
COUGH: 0

## 2024-06-14 ASSESSMENT — COGNITIVE AND FUNCTIONAL STATUS - GENERAL
STANDING UP FROM CHAIR USING ARMS: A LITTLE
MOVING FROM LYING ON BACK TO SITTING ON SIDE OF FLAT BED: A LITTLE
WALKING IN HOSPITAL ROOM: A LOT
MOVING TO AND FROM BED TO CHAIR: A LITTLE
SUGGESTED CMS G CODE MODIFIER DAILY ACTIVITY: CK
CLIMB 3 TO 5 STEPS WITH RAILING: A LOT
DAILY ACTIVITIY SCORE: 18
MOVING TO AND FROM BED TO CHAIR: A LOT
TOILETING: A LOT
CLIMB 3 TO 5 STEPS WITH RAILING: A LOT
MOBILITY SCORE: 17
DRESSING REGULAR LOWER BODY CLOTHING: A LOT
STANDING UP FROM CHAIR USING ARMS: A LOT
MOBILITY SCORE: 12
HELP NEEDED FOR BATHING: A LITTLE
DRESSING REGULAR LOWER BODY CLOTHING: A LOT
DRESSING REGULAR UPPER BODY CLOTHING: A LITTLE
TOILETING: A LOT
MOVING FROM LYING ON BACK TO SITTING ON SIDE OF FLAT BED: A LOT
DRESSING REGULAR UPPER BODY CLOTHING: A LITTLE
SUGGESTED CMS G CODE MODIFIER MOBILITY: CL
TURNING FROM BACK TO SIDE WHILE IN FLAT BAD: A LITTLE
TURNING FROM BACK TO SIDE WHILE IN FLAT BAD: A LOT
HELP NEEDED FOR BATHING: A LOT
DAILY ACTIVITIY SCORE: 17
SUGGESTED CMS G CODE MODIFIER MOBILITY: CK
WALKING IN HOSPITAL ROOM: A LITTLE
SUGGESTED CMS G CODE MODIFIER DAILY ACTIVITY: CK

## 2024-06-14 ASSESSMENT — SOCIAL DETERMINANTS OF HEALTH (SDOH)
WITHIN THE LAST YEAR, HAVE YOU BEEN HUMILIATED OR EMOTIONALLY ABUSED IN OTHER WAYS BY YOUR PARTNER OR EX-PARTNER?: NO
WITHIN THE LAST YEAR, HAVE YOU BEEN KICKED, HIT, SLAPPED, OR OTHERWISE PHYSICALLY HURT BY YOUR PARTNER OR EX-PARTNER?: NO
WITHIN THE LAST YEAR, HAVE YOU BEEN AFRAID OF YOUR PARTNER OR EX-PARTNER?: NO
WITHIN THE LAST YEAR, HAVE TO BEEN RAPED OR FORCED TO HAVE ANY KIND OF SEXUAL ACTIVITY BY YOUR PARTNER OR EX-PARTNER?: NO

## 2024-06-14 ASSESSMENT — GAIT ASSESSMENTS
ASSISTIVE DEVICE: FRONT WHEEL WALKER
DISTANCE (FEET): 10
DEVIATION: BRADYKINETIC;SHUFFLED GAIT
GAIT LEVEL OF ASSIST: STANDBY ASSIST
DISTANCE (FEET): 10

## 2024-06-14 ASSESSMENT — PAIN DESCRIPTION - PAIN TYPE
TYPE: ACUTE PAIN;CHRONIC PAIN

## 2024-06-14 ASSESSMENT — ACTIVITIES OF DAILY LIVING (ADL): TOILETING: REQUIRES ASSIST

## 2024-06-14 ASSESSMENT — FIBROSIS 4 INDEX: FIB4 SCORE: 2.91

## 2024-06-14 NOTE — ASSESSMENT & PLAN NOTE
Resume eliquis   Hold verapamil, because she is slight bradycardic and BP on the low side   Continue to monitor

## 2024-06-14 NOTE — DISCHARGE PLANNING
Case Management Discharge Planning    Admission Date: 6/13/2024  GMLOS: 3.1  ALOS: 0    6-Clicks ADL Score: 18  6-Clicks Mobility Score: 12  PT and/or OT Eval ordered: Yes  Post-acute Referrals Ordered: No  Post-acute Choice Obtained: Yes  Has referral(s) been sent to post-acute provider:  No      Anticipated Discharge Dispo: Discharge Disposition: D/T to home under [facility] care in anticipation of covered skilled care (06)  Discharge Address: [address] [place] [address] [address] [place] [address]  Discharge Contact Phone Number: [phone]    DME Needed: No    Action(s) Taken: Pt voiced to physical therapist that she would like to go back home with [facility].  RAMA SIMONS called [facility] to confirm that patient is on service with them, but [facility] stated that the patient was discharged in Feb 2024. Physical therapist stated she sent a message to MD with recommendation for home health.  RN CM met with patient at the bedside to obtain choice for .  Ortho consult pending. Pending HH order from MD. Choice form faxed to [facility].      [facility]   [facility]        1602  Order placed for [facility].  [facility] sent referral.  Letter given to patient stating she is being discharged with pending  acceptance.      Escalations Completed: None    Medically Clear: No    Next Steps: RN CM to continue to follow for DC planning      Barriers to Discharge: Medical clearance

## 2024-06-14 NOTE — FACE TO FACE
Face to Face Supporting Documentation - Home Health    The encounter with this patient was in whole or in part the primary reason for home health admission.    Date of encounter:   Patient:                    MRN:                       YOB: 2026  Jenifer Ramos  [id number]  1960     Home health to see patient for:  Skilled Nursing care for assessment, interventions & education, Physical Therapy evaluation and treatment, and Occupational therapy evaluation and treatment    Skilled need for:  Exacerbation of Chronic Disease State low back pain,    Skilled nursing interventions to include:  Comment: PT/OT, safety, vitals,     Homebound status evidenced by:  Need the aid of supportive devices such as crutches, canes, wheelchairs or walkers, Require the use of special transportation, or Needs the assistance of another person in order to leave the home. Leaving home requires a considerable and taxing effort. There is a normal inability to leave the home.    Community Physician to provide follow up care: Adrián Duckworth M.D.     Optional Interventions? No      I certify the face to face encounter for this home health care referral meets the CMS requirements and the encounter/clinical assessment with the patient was, in whole, or in part, for the medical condition(s) listed above, which is the primary reason for home health care. Based on my clinical findings: the service(s) are medically necessary, support the need for home health care, and the homebound criteria are met.  I certify that this patient has had a face to face encounter by myself.  Deisi Copeland M.D. - NPI: [id number]

## 2024-06-14 NOTE — CARE PLAN
The patient is Stable - Low risk of patient condition declining or worsening    Shift Goals  Clinical Goals: MRI, safety,comfort  Patient Goals: MRI, rest  Family Goals: comfort    Progress made toward(s) clinical / shift goals:    Problem: Knowledge Deficit - Standard  Goal: Patient and family/care givers will demonstrate understanding of plan of care, disease process/condition, diagnostic tests and medications  Outcome: Progressing     Problem: Fall Risk  Goal: Patient will remain free from falls  Outcome: Progressing  Note: Safety precautions are in place including bed locked and in lowest position, upper bed rails up, bed alarm on, call light within reach, treaded socks on, tray table and personal belongings within reach.       Patient is not progressing towards the following goals:

## 2024-06-14 NOTE — PROGRESS NOTES
Assumed care of pt this morning. Pt is alert and oriented. On 2L NC, RA at baseline. NC was partially out of nose and pt was saturating 94%. Pt is still drowsy so will attempt to wean once awake. SCDs, purewick, q2 turns, bed alarm in place. NPO

## 2024-06-14 NOTE — DISCHARGE PLANNING
Care Transition Team Assessment    RN CM met with patient at the bedside.  Patient A&Ox4.  Pt confirmed address [address] [place] [address].  Pt lives with her spouse Demetrio Ramos and son, Obed Ramos.  Pts home is a single story, with 1 step to enter.  Pt's PCP is Adrián Duckworth and pharmacy is [facility]. Pt has access to transportation through her son.  Pt states she has an AD and her DPOA is her spouse Demetrio Ramos.  Pt has medical coverage through Medicare and [facility].  Pt has a shower chair, FWW and wheelchair at home.  Pt has a hx of stroke, and stated she has some weakness but is able to get around her home with her walker.  She does have a home health aid through [facility] who comes weekly and helps with bathing.  Pt states her walker can't fit into the bathroom to be able to shower.  Pt denies ETOH, smoking or drug use.  Pt denies mental health concerns.  Pt states she has an oxygen concentrator at home, that she owns, and does not use a DME company. Pt stated she doesn't wear oxygen all the time. Pt states she has been to [facility], but denies going to a SNF in the past.  PAS completed #[id number].  RN CM asked DPA to send SNF referrals to [facility], however, pt was changed from inpatient to observation status and will not qualify for SNF at this time.      Information Source  Orientation Level: Oriented X4  Information Given By: Patient  Who is responsible for making decisions for patient? : Patient    Readmission Evaluation  Is this a readmission?: No    Elopement Risk  Legal Hold: No  Ambulatory or Self Mobile in Wheelchair: No-Not an Elopement Risk  Elopement Risk: Not at Risk for Elopement    Interdisciplinary Discharge Planning  Lives with - Patient's Self Care Capacity: Spouse, Adult Children  Patient or legal guardian wants to designate a caregiver: Yes  Caregiver name: Obed Ramos  Caregiver contact info: [phone]  Support Systems: Family Member(s),  Children, Spouse / Significant Other  Housing / Facility: [address]  Name of Care Facility: N/A    Discharge Preparedness  What is your plan after discharge?: Uncertain - pending medical team collaboration  What are your discharge supports?: Child, Spouse  Prior Functional Level: Needs Assist with Activities of Daily Living, Uses Walker  Difficulity with ADLs: Bathing, Dressing, Walking  Difficulity with IADLs: Driving, Keeping track of finances, Laundry, Shopping, Cooking    Functional Assesment  Prior Functional Level: Needs Assist with Activities of Daily Living, Uses Walker    Finances  Financial Barriers to Discharge: No  Prescription Coverage: Yes    Vision / Hearing Impairment  Vision Impairment : Yes  Right Eye Vision: Impaired, Wears Glasses  Left Eye Vision: Impaired, Wears Glasses  Hearing Impairment : Yes  Hearing Impairment: Both Ears  Does Pt Need Special Equipment for the Hearing Impaired?: No         Advance Directive  Advance Directive?: DPOA for Health Care (Per pt, AD at home, DPOA is spouse Demetrio Ramos)  Durable Power of  Name and Contact : Demetrio Ramos [phone]    Domestic Abuse  Have you ever been the victim of abuse or violence?: No  Possible Abuse/Neglect Reported to:: Not Applicable    Psychological Assessment  History of Substance Abuse: None  History of Psychiatric Problems: No  Non-compliant with Treatment: No  Newly Diagnosed Illness: Yes    Discharge Risks or Barriers  Discharge risks or barriers?: Complex medical needs  Patient risk factors: Complex medical needs    Anticipated Discharge Information  Discharge Disposition: D/T to home under HHA care in anticipation of covered skilled care (06)  Discharge Address: [address] [place] [address]  Discharge Contact Phone Number: [phone]

## 2024-06-14 NOTE — ED NOTES
Medication history reviewed with patients son at bedside.   Med rec is complete  Allergies reviewed.     Patient has not had any outpatient antibiotics in the last 30 days.   Anticoagulants: Eliquis 5mg- last dose 6/13/24 0900.       Daniel Caldwell PhT

## 2024-06-14 NOTE — PROGRESS NOTES
1949H- Patient arrived in the unit per abram, accompanied  by transport staff, no hand off received from the ER nurse, called multiple times , no answer.Patient aox4, not in respiratory distress. Patient transferred to bed safely,attached to vs machine, scd's, O2 inhalation at 2 lpm via nasal canula. Instructed on NPO status and she verbalized understanding.IV infiltrated , removed and inserted a new IV aseptically,See flow sheet.  Patient for MRI, screening done. Notify admitting physician that patient is claustrophobic, with order of ativan 1 mg prior to MRI.  Medication started with sips of water, FSBS 176 mg/dl, per admitting Physician held scheduled insulins.  2148H- Lorazepam 1 mg  given, see MAR.  2208H- Patient off unit for MRI.      4 Eyes Skin Assessment Completed by RAMA Ruiz and Raquel RN.    Head WDL  Ears WDL  Nose WDL  Mouth WDL  Neck WDL  Breast/Chest Redness, under left breast  Shoulder Blades urgical scar, left shoulder  Spine WDL  (R) Arm/Elbow/Hand WDL  (L) Arm/Elbow/Hand Bruising and Scar, bruising left hand, surgical scar left elbow  Abdomen Scar, surgical scar  Groin WDL  Scrotum/Coccyx/Buttocks Redness and Blanching  (R) Leg Scab, dryness, discoloration, surgical scar on knee  (L) Leg Scab, dryness, discoloration, surgical scar on knee  (R) Heel/Foot/Toe Redness and Blanching  (L) Heel/Foot/Toe Redness and Blanching          Devices In Places Blood Pressure Cuff, Pulse Ox, and SCD's      Interventions In Place Gray Ear Foams, Heel Mepilex, TAP System, Pillows, and Barrier Cream    Possible Skin Injury No    Pictures Uploaded Into Epic N/A  Wound Consult Placed N/A  RN Wound Prevention Protocol Ordered No

## 2024-06-14 NOTE — THERAPY
"Physical Therapy   Initial Evaluation     Patient Name: Jenifer Ramos  Age:  64 y.o., Sex:  female  Medical Record #: [id number]  Today's Date: 6/14/2024     Precautions  Precautions: Fall Risk    Assessment  Patient is a 64 y.o. female who was admitted with  generalized weakness and falls at home. MRI of brain, thoracic spine, and lumbar spine showed no acute changes. PMH significant for cirrhosis, HTN, Afib, Factor V deficiency, sleep apnea, pulmonary HTN, obesity, prior CVA's, diabetes, CHF.    Pt received in bed and agreeable to PT evaluation. Pt seen in collaboration with OT for evaluation due to anticipated need for 2 skilled assist with significant LE weakness and multiple falls at home. Pt presents with generalized weakness resulting in impaired overall mobility. However, pt also reports she is only a household ambulator at baseline and has trouble getting around. Pt required min A overall with significant extra time to complete STS, transfer, and ambulating a short distance. Discussed HHPT with pt and she is agreeable to this. Will follow for acute PT to progress as able, although suspect she is not far from baseline level.    Plan    Physical Therapy Initial Treatment Plan   Treatment Plan : Bed Mobility, Equipment, Gait Training, Neuro Re-Education / Balance, Self Care / Home Evaluation, Therapeutic Activities, Therapeutic Exercise  Treatment Frequency: 4 Times per Week  Duration: Until Therapy Goals Met    DC Equipment Recommendations: None  Discharge Recommendations: Recommend home health for continued physical therapy services     Subjective    \"I think PT at home would be good\"     Objective       06/14/24 1505   Precautions   Precautions Fall Risk   Vitals   Pulse Oximetry 91 %   O2 Delivery Device None - Room Air   Pain 0 - 10 Group   Therapist Pain Assessment Post Activity Pain Same as Prior to Activity;Nurse Notified  (no specific c/o pain this session, reports generalized pain at baseline) "   Prior Living Situation   Prior Services Intermittent Physical Support for ADL Per Family   Housing / Facility 1 Story House   Steps Into Home 1   Steps In Home 0   Equipment Owned Front-Wheel Walker;Adjustable Bed Without Rails;Wheelchair   Lives with - Patient's Self Care Capacity Spouse;Adult Children   Comments Pt reports assist from son for IADLs and has a bath aide coming 1x/week.   Prior Level of Functional Mobility   Bed Mobility Independent   Transfer Status Independent   Ambulation Independent   Ambulation Distance limited household distances only   Assistive Devices Used Front-Wheel Walker   Comments Intermittent wheelchair follow recently from family when ambulating   History of Falls   History of Falls Yes   Date of Last Fall   (related to admission)   Cognition    Level of Consciousness Alert   Passive ROM Lower Body   Passive ROM Lower Body WDL   Active ROM Lower Body    Active ROM Lower Body  WDL   Strength Lower Body   Comments BLE generalized weakness equal on both sides, overall 3- to 3/5   Sensation Lower Body   Comments Denies any new change in LE sensation   Lower Body Muscle Tone   Lower Body Muscle Tone  WDL   Coordination Lower Body    Coordination Lower Body  WDL   Balance Assessment   Sitting Balance (Static) Fair   Sitting Balance (Dynamic) Fair -   Standing Balance (Static) Poor +   Standing Balance (Dynamic) Poor   Weight Shift Sitting Poor   Weight Shift Standing Poor   Comments with FWW in standing, no overt losses of balance   Bed Mobility    Supine to Sit Minimal Assist   Scooting Minimal Assist   Comments HHA for leverage   Gait Analysis   Gait Level Of Assist Standby Assist   Assistive Device Front Wheel Walker   Distance (Feet) 10   # of Times Distance was Traveled 1   Deviation Bradykinetic;Shuffled Gait   Comments Extra effort and time for ambulation with FWW, but no overt losses of balance. Pt reports only short household distances at baseline   Functional Mobility   Sit to  Stand Contact Guard Assist   Bed, Chair, Wheelchair Transfer Standby Assist   Transfer Method Stand Step   Mobility bed>walk>chair   Comments Extra effort and time for STS from EOB with use of personal FWW with extra supports to push from.   6 Clicks Assessment - How much HELP from from another person do you currently need... (If the patient hasn't done an activity recently, how much help from another person do you think he/she would need if he/she tried?)   Turning from your back to your side while in a flat bed without using bedrails? 3   Moving from lying on your back to sitting on the side of a flat bed without using bedrails? 3   Moving to and from a bed to a chair (including a wheelchair)? 3   Standing up from a chair using your arms (e.g., wheelchair, or bedside chair)? 3   Walking in hospital room? 3   Climbing 3-5 steps with a railing? 2   6 clicks Mobility Score 17   Short Term Goals    Short Term Goal # 1 Pt will transfer with FWW and supervision to progress function in 6 visits.   Short Term Goal # 2 Pt will ambulate 50ft with FWW and supervision to progress towards prior level in 6 visits.   Education Group   Education Provided Role of Physical Therapist   Role of Physical Therapist Patient Response Patient;Acceptance;Explanation;Verbal Demonstration   Physical Therapy Initial Treatment Plan    Treatment Plan  Bed Mobility;Equipment;Gait Training;Neuro Re-Education / Balance;Self Care / Home Evaluation;Therapeutic Activities;Therapeutic Exercise   Treatment Frequency 4 Times per Week   Duration Until Therapy Goals Met   Problem List    Problems Impaired Transfers;Impaired Ambulation;Functional Strength Deficit;Decreased Activity Tolerance   Anticipated Discharge Equipment and Recommendations   DC Equipment Recommendations None   Discharge Recommendations Recommend home health for continued physical therapy services   Interdisciplinary Plan of Care Collaboration   IDT Collaboration with   Nursing;Occupational Therapist   Patient Position at End of Therapy Seated;Chair Alarm On;Call Light within Reach;Tray Table within Reach;Phone within Reach   Collaboration Comments RN updated   Session Information   Date / Session Number  6/14-1 (1/4, 6/20)

## 2024-06-14 NOTE — DISCHARGE PLANNING
Patient rolled back to observation/outpatient status per attending MD determination, Deisi Copeland, and UR committee IPA secondary reviewer, Luis Sweeney. Condition code 44 completed.

## 2024-06-14 NOTE — ASSESSMENT & PLAN NOTE
CT head and neck negative for ICH/LVO  MRI brain without contrast to rule out CVA, MRI L spine to ensure that patient does not have cauda equina syndrome (has purewick in place and is draining urine, bladder scan showing about 350 mL)  I spoke with radiology on call. Pre alejo read overnight shows no evidence of cauda equina syndrome  PT OT

## 2024-06-14 NOTE — ED NOTES
Pt being taken upstairs at this time by transport via gurney. Pt is awake and alert, talking to staff, in no apparent distress at time of transfer. Pt's paperwork and belongings sent upstairs with pt.

## 2024-06-14 NOTE — H&P
"Hospital Medicine History & Physical Note    Date of Service  6/13/2024    Primary Care Physician  Adrián Duckworth M.D.    Consultants  None    Code Status  Full Code    Chief Complaint  Chief Complaint   Patient presents with    Weakness     Generalized X 2 weeks, pt arrives drowsy but oriented, denies fevers or known infections        History of Presenting Illness  Jenifer Ramos is a 64 y.o. female who presented 6/13/2024 with generalized weakness.  Patient has history of factor V Leyden on Eliquis, diabetes, PAF, previous CVA, morbid obesity, hypertension presents to ED for bilateral lower extremity weakness.  She endorses dizziness but states that she has had this chronic dizziness since her CVA.  2 days ago, patient was ambulating and had a mechanical fall.  She fell on her lower back and since then has had lower extremity weakness and difficulties ambulating.  She states it is difficult for her to control her legs and \"tell my legs when to move\" she endorses loss of bowel and bladder function for this duration.  She denies numbness in her groin area.  This morning, she noted that she continued to have lower extremity weakness and loss of bowel bladder control.  She attempted to ambulate but had another mechanical fall.  She decided to come to the ER for evaluation.    In ER, patient found to have normal vital signs.  CT head and neck negative for ICH or large vessel occlusion.      I discussed the plan of care with patient.    Review of Systems  Review of Systems   Constitutional:  Positive for malaise/fatigue.   HENT: Negative.     Eyes: Negative.    Respiratory: Negative.     Cardiovascular: Negative.    Gastrointestinal: Negative.    Genitourinary: Negative.    Musculoskeletal: Negative.    Skin: Negative.    Neurological:  Positive for weakness.   Endo/Heme/Allergies: Negative.    Psychiatric/Behavioral: Negative.         Past Medical History   has a past medical history of Arrhythmia, Arthritis, " ASTHMA (4/24/2013), Back pain (4/24/2013), Blood clotting disorder (HCC), Disorder of thyroid, DM (diabetes mellitus) ([facility]) (4/24/2013), Factor V deficiency ([facility]) (04/24/2013), Palpitations (4/24/2013), Pneumonia (1999), Psychiatric problem, Sleep apnea (4/24/2013), Snoring, Urinary incontinence, and Varicose vein (4/24/2013).    Surgical History   has a past surgical history that includes hysterectomy, total abdominal (2003); oophorectomy; tonsillectomy; cholecystectomy; fusion, spine, lumbar, plif; other cardiac surgery; other orthopedic surgery (2018); knee arthroplasty total (Left, 2013); knee arthroplasty total (Right, 2013); pr reconstr total shoulder implant (Left, 11/27/2019); and clavicle distal excision (11/27/2019).     Family History  family history includes Heart Disease (age of onset: 75) in her mother; Hypertension in her sister; Lung Disease (age of onset: 69) in her father.   Family history reviewed with patient. There is no family history that is pertinent to the chief complaint.     Social History   reports that she has never smoked. She has never used smokeless tobacco. She reports that she does not currently use alcohol. She reports that she does not use drugs.    Allergies  Allergies   Allergen Reactions    Azithromycin Anaphylaxis    Erythromycin Anaphylaxis    Other Misc Anaphylaxis     Flu vaccine    Penicillins Anaphylaxis     As a child    Pistachio Anaphylaxis    Sulfa Drugs Anaphylaxis    Tetraglycine Anaphylaxis    Other Drug Unspecified     Long acting benzodiazepines only single dose otherwise combative     Physostigmine Unspecified     Abdomen extreme swelling    Tape Rash and Itching     tegaderm ok  Blisters with others    Zanaflex [Tizanidine Hcl] Unspecified     Falling asleep mid sentence    Benzodiazepines Unspecified    Albuterol Palpitations    Atorvastatin Unspecified     Extreme joint pain    Chlorhexidine Rash     blistering    Lamisil [Terbinafine Hcl] Unspecified      Pancreatitis     Morphine Unspecified     Not effective       Medications  Prior to Admission Medications   Prescriptions Last Dose Informant Patient Reported? Taking?   Rimegepant Sulfate (NURTEC) 75 MG TABLET DISPERSIBLE  Family Member Yes No   Sig: Take 75 mg by mouth 1 time a day as needed. Indications: Migraine Headache   apixaban (ELIQUIS) 5mg Tab   No No   Sig: Take 1 Tablet by mouth 2 times a day.   aspirin EC (ECOTRIN) 81 MG Tablet Delayed Response   No No   Sig: Take 1 Tablet by mouth every day.   carBAMazepine (TEGRETOL) 200 MG Tab   No No   Sig: Take 1 Tablet by mouth 3 times a day.   febuxostat (ULORIC) 40 MG Tab   No No   Sig: Take 1 Tablet by mouth every day.   fluticasone-umeclidinium-vilanterol (TRELEGY ELLIPTA) 100-62.5-25 mcg/act inhaler   No No   Sig: Inhale 1 Puff every evening.   furosemide (LASIX) 40 MG Tab   No No   Sig: Take 1 Tablet by mouth every day.   gabapentin (NEURONTIN) 300 MG Cap   No No   Sig: Take 2 Capsules by mouth 3 times a day. * may take an extra 300 mg  ( 600 mg) if needed   hydrOXYzine HCl (ATARAX) 25 MG Tab   No No   Sig: Take 1 Tablet by mouth at bedtime.   insulin glargine 100 UNIT/ML Solution Pen-injector injection   No No   Sig: Inject 26 Units under the skin every evening.   insulin regular (HUMULIN R) 100 Unit/mL Solution   No No   Sig: Inject 3-14 Units under the skin 4 Times a Day,Before Meals and at Bedtime.   levothyroxine (SYNTHROID) 112 MCG Tab   No No   Sig: Take 1 Tablet by mouth every morning on an empty stomach.   magnesium oxide (MAG-OX) 400 MG Tab tablet  Family Member Yes No   Sig: Take 400 mg by mouth every day.   methocarbamol (ROBAXIN) 500 MG Tab   No No   Sig: Take 1 Tablet by mouth 4 times a day.   montelukast (SINGULAIR) 10 MG Tab   No No   Sig: Take 1 Tablet by mouth every evening.   pantoprazole (PROTONIX) 40 MG Tablet Delayed Response  Family Member Yes No   Sig: Take 40 mg by mouth every evening.   potassium chloride SA (K-DUR) 10 MEQ Tab CR    No No   Sig: Take 2 Tablets by mouth every evening.   pramipexole (MIRAPEX) 0.125 MG Tab   Yes No   Sig: take 1 Tablet by mouth At Bedtime for 2 weeks. If tolerated, increase to 2 Tablets at bedtime   promethazine (PHENERGAN) 25 MG Tab   No No   Sig: Take 1 Tablet by mouth every 6 hours as needed for Nausea/Vomiting.   rosuvastatin (CRESTOR) 20 MG Tab   No No   Sig: Take 1 Tablet by mouth every evening.   verapamil (ISOPTIN) 80 MG Tab   No No   Sig: Take 1 Tablet by mouth at bedtime.      Facility-Administered Medications: None       Physical Exam  Temp:  [36.6 °C (97.9 °F)] 36.6 °C (97.9 °F)  Pulse:  [70-79] 73  Resp:  [12-20] 14  BP: (103-138)/(55-79) 105/55  SpO2:  [92 %-95 %] 95 %  Blood Pressure : 105/55   Temperature: 36.6 °C (97.9 °F)   Pulse: 73   Respiration: 14   Pulse Oximetry: 95 %       Physical Exam  Constitutional:       Appearance: Normal appearance. She is obese.      Comments: Overall generalized weakness   HENT:      Head: Normocephalic.      Nose: Nose normal.      Mouth/Throat:      Mouth: Mucous membranes are moist.   Eyes:      Pupils: Pupils are equal, round, and reactive to light.   Cardiovascular:      Rate and Rhythm: Normal rate and regular rhythm.      Pulses: Normal pulses.   Pulmonary:      Effort: Pulmonary effort is normal.      Breath sounds: Normal breath sounds.   Abdominal:      General: Abdomen is flat. Bowel sounds are normal.      Palpations: Abdomen is soft.   Musculoskeletal:         General: Swelling present. Normal range of motion.      Cervical back: Neck supple.      Comments: Point tenderness noted on lower back   Skin:     General: Skin is warm.   Neurological:      Mental Status: She is alert and oriented to person, place, and time. Mental status is at baseline.      Comments: Strength equal in all extremities    Psychiatric:         Mood and Affect: Mood normal.         Behavior: Behavior normal.         Thought Content: Thought content normal.         Judgment:  Judgment normal.         Laboratory:  Recent Labs     06/13/24  1459   WBC 4.7*   RBC 5.39   HEMOGLOBIN 12.8   HEMATOCRIT 42.6   MCV 79.0*   MCH 23.7*   MCHC 30.0*   RDW 53.2*   PLATELETCT 176   MPV 9.8     Recent Labs     06/13/24  1459   SODIUM 129*   POTASSIUM 4.4   CHLORIDE 88*   CO2 27   GLUCOSE 225*   BUN 8   CREATININE 0.71   CALCIUM 9.5     Recent Labs     06/13/24  1459   ALTSGPT 6   ASTSGOT 19   ALKPHOSPHAT 147*   TBILIRUBIN 0.4   GLUCOSE 225*         Recent Labs     06/13/24  1459   NTPROBNP 365*         Recent Labs     06/13/24  1459   TROPONINT 20*       Imaging:  CT-CTA NECK WITH & W/O-POST PROCESSING   Final Result      1.  No evidence of carotid or vertebral arterial occlusion or dissection.      2.  Mild atherosclerotic plaque involving the carotid bifurcations bilaterally. No flow-limiting stenosis.      CT-CTA HEAD WITH & W/O-POST PROCESS   Final Result      No evidence of intracranial vascular occlusion or aneurysm.      DX-CHEST-PORTABLE (1 VIEW)   Final Result      Cardiac silhouette enlargement.      MR-BRAIN-W/O    (Results Pending)       X-Ray:  I have personally reviewed the images and compared with prior images.    Assessment/Plan:  Justification for Admission Status  I anticipate this patient is appropriate for observation status at this time because patient  admitted for stroke rule out        * Generalized weakness  Assessment & Plan  CT head and neck negative for ICH/LVO  MRI brain without contrast to rule out CVA, MRI L spine to ensure that patient does not have cauda equina syndrome (has purewick in place and is draining urine, bladder scan showing about 350 mL)  I spoke with radiology on call. 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

## 2024-06-14 NOTE — ASSESSMENT & PLAN NOTE
CT head and neck negative for ICH/LVO  MRI brain without contrast to rule out CVA  PT OT  Meclizine as needed

## 2024-06-14 NOTE — DISCHARGE PLANNING
RN CM delivered Code 44 form to patient at bedside.  Educated patient on status change from inpatient to observation status.  Pt verbalized understanding.

## 2024-06-14 NOTE — ASSESSMENT & PLAN NOTE
Last A1c 11.9 (on 12.2023)   Reeval   Continue glargine 10 U at bedtime and ISSS   She needs to be on oral, trulicity, fargixa

## 2024-06-15 ENCOUNTER — PHARMACY VISIT (OUTPATIENT)
Dept: PHARMACY | Facility: MEDICAL CENTER | Age: 64
End: 2024-06-15
Payer: COMMERCIAL

## 2024-06-15 VITALS
DIASTOLIC BLOOD PRESSURE: 73 MMHG | RESPIRATION RATE: 16 BRPM | BODY MASS INDEX: 38.76 KG/M2 | HEART RATE: 74 BPM | WEIGHT: 255.73 LBS | TEMPERATURE: 97.6 F | OXYGEN SATURATION: 93 % | HEIGHT: 68 IN | SYSTOLIC BLOOD PRESSURE: 130 MMHG

## 2024-06-15 LAB
ALBUMIN SERPL BCP-MCNC: 3.4 G/DL (ref 3.2–4.9)
ALBUMIN/GLOB SERPL: 1.1 G/DL
ALP SERPL-CCNC: 135 U/L (ref 30–99)
ALT SERPL-CCNC: 7 U/L (ref 2–50)
ANION GAP SERPL CALC-SCNC: 12 MMOL/L (ref 7–16)
AST SERPL-CCNC: 13 U/L (ref 12–45)
BILIRUB SERPL-MCNC: 0.4 MG/DL (ref 0.1–1.5)
BUN SERPL-MCNC: 6 MG/DL (ref 8–22)
CALCIUM ALBUM COR SERPL-MCNC: 9.5 MG/DL (ref 8.5–10.5)
CALCIUM SERPL-MCNC: 9 MG/DL (ref 8.5–10.5)
CHLORIDE SERPL-SCNC: 91 MMOL/L (ref 96–112)
CO2 SERPL-SCNC: 26 MMOL/L (ref 20–33)
CREAT SERPL-MCNC: 0.53 MG/DL (ref 0.5–1.4)
ERYTHROCYTE [DISTWIDTH] IN BLOOD BY AUTOMATED COUNT: 51.5 FL (ref 35.9–50)
FERRITIN SERPL-MCNC: 32.8 NG/ML (ref 10–291)
GFR SERPLBLD CREATININE-BSD FMLA CKD-EPI: 102 ML/MIN/1.73 M 2
GLOBULIN SER CALC-MCNC: 3 G/DL (ref 1.9–3.5)
GLUCOSE BLD STRIP.AUTO-MCNC: 145 MG/DL (ref 65–99)
GLUCOSE BLD STRIP.AUTO-MCNC: 188 MG/DL (ref 65–99)
GLUCOSE SERPL-MCNC: 135 MG/DL (ref 65–99)
HCT VFR BLD AUTO: 38.3 % (ref 37–47)
HGB BLD-MCNC: 11.7 G/DL (ref 12–16)
IRON SATN MFR SERPL: 10 % (ref 15–55)
IRON SERPL-MCNC: 31 UG/DL (ref 40–170)
MCH RBC QN AUTO: 23.7 PG (ref 27–33)
MCHC RBC AUTO-ENTMCNC: 30.5 G/DL (ref 32.2–35.5)
MCV RBC AUTO: 77.5 FL (ref 81.4–97.8)
NT-PROBNP SERPL IA-MCNC: 194 PG/ML (ref 0–125)
PLATELET # BLD AUTO: 145 K/UL (ref 164–446)
PMV BLD AUTO: 9.7 FL (ref 9–12.9)
POTASSIUM SERPL-SCNC: 3.9 MMOL/L (ref 3.6–5.5)
PROT SERPL-MCNC: 6.4 G/DL (ref 6–8.2)
RBC # BLD AUTO: 4.94 M/UL (ref 4.2–5.4)
SODIUM SERPL-SCNC: 129 MMOL/L (ref 135–145)
TIBC SERPL-MCNC: 313 UG/DL (ref 250–450)
UIBC SERPL-MCNC: 282 UG/DL (ref 110–370)
WBC # BLD AUTO: 3.7 K/UL (ref 4.8–10.8)

## 2024-06-15 PROCEDURE — 83540 ASSAY OF IRON: CPT

## 2024-06-15 PROCEDURE — G0378 HOSPITAL OBSERVATION PER HR: HCPCS

## 2024-06-15 PROCEDURE — 700102 HCHG RX REV CODE 250 W/ 637 OVERRIDE(OP): Performed by: STUDENT IN AN ORGANIZED HEALTH CARE EDUCATION/TRAINING PROGRAM

## 2024-06-15 PROCEDURE — 99239 HOSP IP/OBS DSCHRG MGMT >30: CPT | Performed by: INTERNAL MEDICINE

## 2024-06-15 PROCEDURE — 83550 IRON BINDING TEST: CPT

## 2024-06-15 PROCEDURE — 80053 COMPREHEN METABOLIC PANEL: CPT

## 2024-06-15 PROCEDURE — A9270 NON-COVERED ITEM OR SERVICE: HCPCS | Performed by: STUDENT IN AN ORGANIZED HEALTH CARE EDUCATION/TRAINING PROGRAM

## 2024-06-15 PROCEDURE — RXMED WILLOW AMBULATORY MEDICATION CHARGE: Performed by: INTERNAL MEDICINE

## 2024-06-15 PROCEDURE — 85027 COMPLETE CBC AUTOMATED: CPT

## 2024-06-15 PROCEDURE — 700111 HCHG RX REV CODE 636 W/ 250 OVERRIDE (IP): Mod: JZ | Performed by: INTERNAL MEDICINE

## 2024-06-15 PROCEDURE — 97530 THERAPEUTIC ACTIVITIES: CPT

## 2024-06-15 PROCEDURE — 82728 ASSAY OF FERRITIN: CPT

## 2024-06-15 PROCEDURE — 96375 TX/PRO/DX INJ NEW DRUG ADDON: CPT

## 2024-06-15 PROCEDURE — 82962 GLUCOSE BLOOD TEST: CPT

## 2024-06-15 PROCEDURE — A9270 NON-COVERED ITEM OR SERVICE: HCPCS | Performed by: INTERNAL MEDICINE

## 2024-06-15 PROCEDURE — 700102 HCHG RX REV CODE 250 W/ 637 OVERRIDE(OP): Performed by: INTERNAL MEDICINE

## 2024-06-15 PROCEDURE — 83880 ASSAY OF NATRIURETIC PEPTIDE: CPT

## 2024-06-15 RX ORDER — AMOXICILLIN 250 MG
2 CAPSULE ORAL EVERY EVENING
Status: DISCONTINUED | OUTPATIENT
Start: 2024-06-15 | End: 2024-06-15 | Stop reason: HOSPADM

## 2024-06-15 RX ORDER — FERROUS GLUCONATE 324(38)MG
324 TABLET ORAL
Status: DISCONTINUED | OUTPATIENT
Start: 2024-06-15 | End: 2024-06-15 | Stop reason: HOSPADM

## 2024-06-15 RX ORDER — FERROUS GLUCONATE 324(38)MG
324 TABLET ORAL
Qty: 30 TABLET | Refills: 0 | Status: SHIPPED | OUTPATIENT
Start: 2024-06-15

## 2024-06-15 RX ORDER — POLYETHYLENE GLYCOL 3350 17 G/17G
1 POWDER, FOR SOLUTION ORAL
Status: DISCONTINUED | OUTPATIENT
Start: 2024-06-15 | End: 2024-06-15 | Stop reason: HOSPADM

## 2024-06-15 RX ADMIN — ASPIRIN 81 MG: 81 TABLET, COATED ORAL at 05:10

## 2024-06-15 RX ADMIN — CARBAMAZEPINE 200 MG: 200 TABLET ORAL at 11:30

## 2024-06-15 RX ADMIN — GABAPENTIN 600 MG: 300 CAPSULE ORAL at 05:11

## 2024-06-15 RX ADMIN — OMEPRAZOLE 20 MG: 20 CAPSULE, DELAYED RELEASE ORAL at 05:10

## 2024-06-15 RX ADMIN — GABAPENTIN 600 MG: 300 CAPSULE ORAL at 11:30

## 2024-06-15 RX ADMIN — APIXABAN 5 MG: 5 TABLET, FILM COATED ORAL at 05:10

## 2024-06-15 RX ADMIN — FERROUS GLUCONATE 324 MG: 324 TABLET ORAL at 09:11

## 2024-06-15 RX ADMIN — PRAMIPEXOLE DIHYDROCHLORIDE 0.25 MG: 0.25 TABLET ORAL at 05:12

## 2024-06-15 RX ADMIN — CARBAMAZEPINE 200 MG: 200 TABLET ORAL at 05:10

## 2024-06-15 RX ADMIN — FUROSEMIDE 40 MG: 10 INJECTION INTRAMUSCULAR; INTRAVENOUS at 05:13

## 2024-06-15 RX ADMIN — LEVOTHYROXINE SODIUM 112 MCG: 0.11 TABLET ORAL at 05:11

## 2024-06-15 RX ADMIN — OXYCODONE HYDROCHLORIDE 10 MG: 10 TABLET ORAL at 05:11

## 2024-06-15 RX ADMIN — SPIRONOLACTONE 50 MG: 25 TABLET ORAL at 05:13

## 2024-06-15 ASSESSMENT — COGNITIVE AND FUNCTIONAL STATUS - GENERAL
WALKING IN HOSPITAL ROOM: A LITTLE
CLIMB 3 TO 5 STEPS WITH RAILING: A LOT
TURNING FROM BACK TO SIDE WHILE IN FLAT BAD: A LITTLE
STANDING UP FROM CHAIR USING ARMS: A LITTLE
MOVING FROM LYING ON BACK TO SITTING ON SIDE OF FLAT BED: A LITTLE
SUGGESTED CMS G CODE MODIFIER MOBILITY: CK
MOVING TO AND FROM BED TO CHAIR: A LITTLE
MOBILITY SCORE: 17

## 2024-06-15 ASSESSMENT — GAIT ASSESSMENTS
GAIT LEVEL OF ASSIST: STANDBY ASSIST
DISTANCE (FEET): 40
ASSISTIVE DEVICE: FRONT WHEEL WALKER
DEVIATION: BRADYKINETIC;STEP TO

## 2024-06-15 NOTE — CARE PLAN
The patient is Watcher - Medium risk of patient condition declining or worsening    Shift Goals  Clinical Goals: safety, advance diet? hydrate  Patient Goals: comfort  Family Goals: mei    Progress made toward(s) clinical / shift goals:      Problem: Knowledge Deficit - Standard  Goal: Patient and family/care givers will demonstrate understanding of plan of care, disease process/condition, diagnostic tests and medications  Outcome: Progressing     Problem: Self Care  Goal: Patient will have the ability to perform ADLs independently or with assistance (bathe, groom, dress, toilet and feed)  Outcome: Progressing     Problem: Fall Risk  Goal: Patient will remain free from falls  Outcome: Progressing     Problem: Pain - Standard  Goal: Alleviation of pain or a reduction in pain to the patient’s comfort goal  6/14/2024 1855 by Margaret Mandel, R.N.  Outcome: Progressing  6/14/2024 1851 by Margarte Mandel REVE.  Note: Patient educated on pain scale 0 to 10 scale, educated on pharmacological and nonpharmacological pain management, repositioning and distraction. Verbalized understanding. Hourly rounding in progress.     Problem: Hemodynamics  Goal: Patient's hemodynamics, fluid balance and neurologic status will be stable or improve  Outcome: Progressing     Problem: Respiratory  Goal: Patient will achieve/maintain optimum respiratory ventilation and gas exchange  Outcome: Progressing     Problem: Fluid Volume  Goal: Fluid volume balance will be maintained  Outcome: Progressing       Patient is not progressing towards the following goals: Patient still requiring 1 LPM Oxygen, room air baseline. Forgetful, needs assistance with ambulating.

## 2024-06-15 NOTE — PROGRESS NOTES
[facility] Medicine Daily Progress Note    Date of Service  6/14/2024    Chief Complaint  Jenifer Ramos is a 64 y.o. female admitted 6/13/2024 with weakness     Hospital Course  64-year-old female with multiple comorbidities including factor V Leyden deficiency on Eliquis, diabetes, paroxysmal atrial fibrillation, previous CVA, morbid obesity, cirrhosis-CORRAL, pancytopenia, diastolic dysfunction heart failure, hypothyroidism, gout, hypertension, type 2 diabetes, MARCI, low back pain, multiple drug allergies, chronic debility, peripheral neuropathy and osteoarthritis presented 6/13/2024 with generalized weakness, back pain, dizziness.  She did report a mechanical fall 2 days prior to admission since then she have had difficulties with ambulating, endorsing loss of bowel and bladder function.  Denies numbness in the groin areas.  In the emergency room vitals are stable, afebrile  WBC 4.7, hemoglobin 12.8, microcytic (MCV 79), platelets 176, sodium 129, potassium 4.4, chloride 88, CO2 27, BUN 8, creatinine 0.71, ALT 6, AST 19, alk phos 147, proBNP 365, troponin 20  She did have stroke workup including CTA neck no evidence of intracranial vascular occlusion or aneurysm, CT head also unremarkable  Today she did complete brain MRI which shows chronic infarcts in the right occipital lobe and left cerebellum, no other interval changes  In addition she did have MRI lumbar which showed postsurgical degenerative changes  MRI thoracic showed diffuse disc bulge at T7/T1 causing mild central canal stenosis.  I did discuss her case with Dr. Granado, neurosurgeon and no further intervention is indicative at this time.  She will need to continue trial of conservative management with physical therapy, pain management.    Interval Problem Update  She is slight somnolent, but still aware of the sorunding. She tells me that she is sensitive to gabapentin. She can moves her legs, but feels like giving out.   I did explain findings with  neurosurgery and no surgery is indicative   I did discuss with PT and seems like pt needs assistance but she still can manage with help at home. Pt would like to go home  She did receive IVF and she is slight overload.   I will start IV lasix. I don't see other signs of CHF exacerbations but she might have some mild degree of CHF exacerbation. ProBNP is elevated for her (BMI 39)   She is requiring some 2 L of oxygen per RN. Continue to wean off oxygen. She can have Co2 retention and high risk for decompensation. CPAP ordered     I have discussed this patient's plan of care and discharge plan at IDT rounds today with Case Management, Nursing, Nursing leadership, and other members of the IDT team.    Consultants/Specialty  neurosurgery    Code Status  Full Code    Disposition  The patient is not medically cleared for discharge to home or a post-acute facility.      I have placed the appropriate orders for post-discharge needs.    Review of Systems  Review of Systems   Constitutional:  Positive for malaise/fatigue. Negative for chills and fever.   HENT:  Negative for sore throat.    Respiratory:  Negative for cough, sputum production and shortness of breath.    Cardiovascular:  Positive for leg swelling. Negative for chest pain and palpitations.   Gastrointestinal:  Negative for abdominal pain, constipation, diarrhea, heartburn, nausea and vomiting.   Genitourinary:  Negative for dysuria and urgency.   Musculoskeletal:  Positive for back pain.   Neurological:  Positive for dizziness. Negative for tingling and headaches.        Physical Exam  Temp:  [35.9 °C (96.7 °F)-36.5 °C (97.7 °F)] 35.9 °C (96.7 °F)  Pulse:  [65-80] 80  Resp:  [15-18] 18  BP: (100-126)/(51-69) 100/51  SpO2:  [91 %-97 %] 95 %    Physical Exam  Vitals and nursing note reviewed.   Constitutional:       General: She is not in acute distress.     Appearance: She is obese. She is not toxic-appearing.      Comments: Drowsy, but she arousal and aware of  medical conditions, meds that she should take    HENT:      Head: Normocephalic and atraumatic.   Eyes:      General: No scleral icterus.  Cardiovascular:      Rate and Rhythm: Normal rate.      Heart sounds: No murmur heard.  Pulmonary:      Effort: Pulmonary effort is normal. No respiratory distress.      Breath sounds: No wheezing or rales.   Abdominal:      General: There is no distension.      Palpations: Abdomen is soft.      Tenderness: There is no abdominal tenderness.   Musculoskeletal:      Right lower leg: Edema present.      Left lower leg: Edema present.   Skin:     Coloration: Skin is pale.   Neurological:      Mental Status: She is oriented to person, place, and time.         Fluids  No intake or output data in the 24 hours ending 06/14/24 1920    Laboratory  Recent Labs     06/13/24  1459 06/14/24  0526   WBC 4.7* 3.4*   RBC 5.39 5.01   HEMOGLOBIN 12.8 11.6*   HEMATOCRIT 42.6 39.9   MCV 79.0* 79.6*   MCH 23.7* 23.2*   MCHC 30.0* 29.1*   RDW 53.2* 53.6*   PLATELETCT 176 155*   MPV 9.8 10.5     Recent Labs     06/13/24  1459 06/14/24  0526   SODIUM 129* 134*   POTASSIUM 4.4 3.7   CHLORIDE 88* 93*   CO2 27 31   GLUCOSE 225* 147*   BUN 8 6*   CREATININE 0.71 0.61   CALCIUM 9.5 9.1                   Imaging  MR-THORACIC SPINE-WITH & W/O   Final Result      1.  There is diffuse disc bulge at T7 T1 causing mild central canal stenosis.   2.  No acute abnormality.   3.  Mild exaggerated thoracic kyphosis.      MR-BRAIN-W/O   Final Result      1.  Chronic infarcts in the right occipital lobe and left cerebellum.   2.  Mild chronic microvascular ischemic disease.   3.  Mild cerebral volume loss.   4.  There has been no significant interval change.      MR-LUMBAR SPINE-WITH & W/O   Final Result      Postsurgical and degenerative changes as described above.      CT-CTA NECK WITH & W/O-POST PROCESSING   Final Result      1.  No evidence of carotid or vertebral arterial occlusion or dissection.      2.  Mild  atherosclerotic plaque involving the carotid bifurcations bilaterally. No flow-limiting stenosis.      CT-CTA HEAD WITH & W/O-POST PROCESS   Final Result      No evidence of intracranial vascular occlusion or aneurysm.      DX-CHEST-PORTABLE (1 VIEW)   Final Result      Cardiac silhouette enlargement.           Assessment/Plan  * Generalized weakness- (present on admission)  Assessment & Plan  CT head and neck negative for ICH/LVO  MRI brain without contrast to rule out CVA, MRI L spine to ensure that patient does not have cauda equina syndrome (has purewick in place and is draining urine, bladder scan showing about 350 mL)  I spoke with radiology on call. Pre alejo read overnight shows no evidence of cauda equina syndrome  PT OT    Low back pain- (present on admission)  Assessment & Plan  MRI as per above, T7 disc bulging, mild canal stenosis  No surgery  Needs conservative tx   Baclofen prn   Pain management  PT/OT - recs for home health     Type 2 diabetes mellitus with diabetic neuropathy, with long-term current use of insulin ([facility])- (present on admission)  Assessment & Plan  Last A1c 11.9 (on 12.2023)   Reeval   Continue glargine 10 U at bedtime and ISSS   She needs to be on oral, trulicity, fargixa     Essential hypertension- (present on admission)  Assessment & Plan  Restart prn  6/14- slight on the low side. Continue to monitor     Morbid obesity ([facility])- (present on admission)  Assessment & Plan  High risk for complications, Co2 retention   Pickwickian syndrome   CPAP ordered   Wean off oxygen     Diastolic dysfunction with chronic heart failure ([facility])- (present on admission)  Assessment & Plan  She is slight volume overload and proBNP is elevated for her body habitus   She might be from IVF received   I will stop IVF and resume IV lasix  Continue close monitoring   She has been admitted in the past for CHF.   Trend proBNP       PAF (paroxysmal atrial fibrillation) ([facility])- (present on admission)  Assessment &  Plan  Resume eliquis   Hold verapamil, because she is slight bradycardic and BP on the low side   Continue to monitor      Pancytopenia (HCC)- (present on admission)  Assessment & Plan  Likely related to CORRAL   She does reports that she drinks, but then she claims, not daily ??   B12 to follow . Tsh normal   She has to be abstinence from alcohol complete     Microcytic anemia- (present on admission)  Assessment & Plan  H/o iron def anemia   Continue PPI  Repeat iron studies   I cannot find EGD or colonoscopy on file   To follow up as OP     Hypothyroidism- (present on admission)  Assessment & Plan  Synthroid  Last TSH normal (5 months ago)     Factor V deficiency (HCC)- (present on admission)  Assessment & Plan  Continue eliquis    Sleep apnea- (present on admission)  Assessment & Plan  CPAP machine ordered          VTE prophylaxis:   SCDs/TEDs   therapeutic anticoagulation with eliquis 5 mg BID      I have performed a physical exam and reviewed and updated ROS and Plan today (6/14/2024). In review of yesterday's note (6/13/2024), there are no changes except as documented above.        Greater than 51 minutes spent prepping to see patient (e.g. review of tests) obtaining and/or reviewing separately obtained history. Performing a medically appropriate examination and/ evaluation.  Counseling and educating the patient/family/caregiver.  Ordering medications, tests, or procedures.  Referring and communicating with other health care professionals.  Documenting clinical information in EPIC.  Independently interpreting results and communicating results to patient/family/caregiver.  Care coordination.

## 2024-06-15 NOTE — PROGRESS NOTES
Received in bed aaox4,denies any discomfort, POC discussed, DC plans today with HH, needs attended.

## 2024-06-15 NOTE — CARE PLAN
Problem: Knowledge Deficit - Standard  Goal: Patient and family/care givers will demonstrate understanding of plan of care, disease process/condition, diagnostic tests and medications  Outcome: Progressing  Note: Safety, dc plans, diurese     Problem: Self Care  Goal: Patient will have the ability to perform ADLs independently or with assistance (bathe, groom, dress, toilet and feed)  Outcome: Progressing  Note: Dc home with family   The patient is Stable - Low risk of patient condition declining or worsening    Shift Goals  Clinical Goals: pain control, safety and comfort  Patient Goals: comfort  Family Goals: no family at bedside    Progress made toward(s) clinical / shift goals:  dc home    Patient is not progressing towards the following goals:

## 2024-06-15 NOTE — ASSESSMENT & PLAN NOTE
H/o iron def anemia   Continue PPI  Repeat iron studies   I cannot find EGD or colonoscopy on file   To follow up as OP

## 2024-06-15 NOTE — ASSESSMENT & PLAN NOTE
MRI as per above, T7 disc bulging, mild canal stenosis  No surgery  Needs conservative tx   Baclofen prn   Pain management  PT/OT - recs for home health

## 2024-06-15 NOTE — THERAPY
"Physical Therapy   Daily Treatment     Patient Name: Jenifer Ramos  Age:  64 y.o., Sex:  female  Medical Record #: [id number]  Today's Date: 6/15/2024     Precautions  Precautions: Fall Risk  Comments: h/o CVA with L sided and vision deficits    Assessment    Pt seen for PT treatment session to address stair negotiation in anticipation of DC home today. Pt demonstrated improved activity tolerance with ability to ambulate 40 ft with slow but ultimately steady gait. Pt unable to safely trail 8\" rise single step but was able to lift R LE up to tap top of step. Reports not feeling comfortable with high of step as hers \"is much lower at home\" (confirmed with son). Pt declined trial of lower step, reports she feels \"comfortable enough\" to get in the house and can continue to work on strengthening and stair negotiation with  therapy. PT will continue to follow while in house.     Plan    Treatment Plan Status: Continue Current Treatment Plan  Type of Treatment: Bed Mobility, Equipment, Gait Training, Neuro Re-Education / Balance, Self Care / Home Evaluation, Therapeutic Activities, Therapeutic Exercise  Treatment Frequency: 4 Times per Week  Treatment Duration: Until Therapy Goals Met    DC Equipment Recommendations: None  Discharge Recommendations: Recommend home health for continued physical therapy services       Objective     06/15/24 1334   Vitals   O2 Delivery Device None - Room Air   Pain 0 - 10 Group   Therapist Pain Assessment During Activity;Nurse Notified  (no pain reported)   Cognition    Level of Consciousness Alert   Comments requires extra time to initiate and complete tasks   Balance   Sitting Balance (Static) Fair +   Sitting Balance (Dynamic) Fair   Standing Balance (Static) Fair   Standing Balance (Dynamic) Fair -   Weight Shift Sitting Fair   Weight Shift Standing Fair   Skilled Intervention Compensatory Strategies;Verbal Cuing   Comments w/ FWW   Bed Mobility    Sit to Supine Contact Guard " "Assist  (Bed flat, pt able to lift LEs into bed herself, utiized bed railing for support)   Scooting Supervised   Skilled Intervention Verbal Cuing;Sequencing   Comments has adjustable bed at home   Gait Analysis   Gait Level Of Assist Standby Assist   Assistive Device Front Wheel Walker   Distance (Feet) 40   # of Times Distance was Traveled 1   Deviation Bradykinetic;Step To   # of Stairs Climbed 0   Weight Bearing Status no restrictions   Skilled Intervention Verbal Cuing   Comments attempted single 8\" step but too high for pt to safely perform. Verbally discussed safe stair negotiation for DC home reports needing ramp access   Functional Mobility   Sit to Stand Standby Assist   Bed, Chair, Wheelchair Transfer Standby Assist   Transfer Method Stand Step   Skilled Intervention Verbal Cuing   6 Clicks Assessment - How much HELP from from another person do you currently need... (If the patient hasn't done an activity recently, how much help from another person do you think he/she would need if he/she tried?)   Turning from your back to your side while in a flat bed without using bedrails? 3   Moving from lying on your back to sitting on the side of a flat bed without using bedrails? 3   Moving to and from a bed to a chair (including a wheelchair)? 3   Standing up from a chair using your arms (e.g., wheelchair, or bedside chair)? 3   Walking in hospital room? 3   Climbing 3-5 steps with a railing? 2   6 clicks Mobility Score 17   Short Term Goals    Short Term Goal # 1 Pt will transfer with FWW and supervision to progress function in 6 visits.   Goal Outcome # 1 Goal met   Short Term Goal # 2 Pt will ambulate 50ft with FWW and supervision to progress towards prior level in 6 visits.   Goal Outcome # 2 Progressing as expected   Physical Therapy Treatment Plan   Physical Therapy Treatment Plan Continue Current Treatment Plan   Anticipated Discharge Equipment and Recommendations   Discharge Recommendations Recommend " home health for continued physical therapy services

## 2024-06-15 NOTE — THERAPY
"Occupational Therapy   Initial Evaluation     Patient Name: Jenifer Ramos  Age:  64 y.o., Sex:  female  Medical Record #: [id number]  Today's Date: 6/14/2024     Precautions: Fall Risk  Comments: h/o CVA with L sided and vision deficits    Assessment    Patient is 64 y.o. female with h/o CVAs, DM, HTN, obesity, factor V Leiden, admitted for generalized weakness & falls. MRIs of brain and spine negative for acute changes (minimal disk bulges and stenosis). Pt seen for OT eval. See grid below for details. Pt has baseline LUE deficits as detailed in UE assessment. Also baseline vision impairment. Pt demos balance impairment and generalized weakness, also baseline but worsened past few days (per pt). Pt was able to mobilize around bed using FWW this session. She appears to be slightly below baseline, but capable of returning home with resumption of family support and HH OT. Pt reports she can no longer access her shower, but is hoping to remodel it. Discussed optimal set-up of no threshold shower with grab bar and possible integrated bench with high weight capacity. OT will follow pt while in-house to maximize functional independence and safety.     Plan    Occupational Therapy Initial Treatment Plan   Treatment Interventions: Self Care / Activities of Daily Living, Adaptive Equipment, Neuro Re-Education / Balance, Therapeutic Activity  Treatment Frequency: 3 Times per Week  Duration: Until Therapy Goals Met    DC Equipment Recommendations: None  Discharge Recommendations: Recommend home health for continued occupational therapy services     Subjective    \"I can't get in my shower anymore because the door isn't wide enough for the walker.\"     Objective     06/14/24 1514   Charge Group   OT Evaluation OT Evaluation High   Total Time Spent   OT Time Spent Yes   OT Evaluation (Minutes) 38   OT Total Time Spent (Calculated) 38    Services   Is patient using  services for this encounter? No "   Initial Contact Note    Initial Contact Note Order Received and Verified, Occupational Therapy Evaluation in Progress with Full Report to Follow.   Prior Living Situation   Prior Services Intermittent Physical Support for ADL Per Family  (Pt  had HH PT, but was discharged in February)   Housing / Facility 1 Story House   Steps Into Home 1   Steps In Home 0   Bathroom Set up Walk In Shower;Shower Chair   Equipment Owned Front-Wheel Walker;Tub / Shower Seat;Hand Held Shower;Sock Aid;Wheelchair;Adjustable Bed Without Rails;Toilet Aide;Versa Frame  (ADA height toilet)   Lives with - Patient's Self Care Capacity Spouse;Adult Children   Comments Pt's son and  provide all caregiving   Prior Level of ADL Function   Self Feeding Independent   Grooming / Hygiene Independent   Bathing Requires Assist   Dressing Requires Assist   Toileting Requires Assist   Prior Level of IADL Function   Medication Management Requires Assist   Laundry Dependent   Finances Dependent   Home Management Dependent   Shopping Dependent   Prior Level Of Mobility Independent With Device in Home  (normally ambulates short distances with FWW)   Driving / Transportation Relatives / Others Provide Transportation  (pt only leaves the house for MD appts and occasionally to visit her elderly mom)   Occupation (Pre-Hospital Vocational) Retired Due To Disability   Precautions   Precautions Fall Risk   Comments h/o CVA with L sided and vision deficits   Vitals   Pulse Oximetry 92 %   O2 (LPM) 1   O2 Delivery Device None - Room Air;Silicone Nasal Cannula   Pain   Pain Scales 0 to 10 Scale    Pain 0 - 10 Group   Therapist Pain Assessment Post Activity Pain Same as Prior to Activity;Nurse Notified  (no c/o pain this session)   Non Verbal Descriptors   Non Verbal Scale  Calm;Unlabored Breathing   Cognition    Cognition / Consciousness X   Level of Consciousness Alert   Comments pt oriented to all but day of month; reports baseline she gets easily  "overwhelmed and cannot multi-task; endorses cognitive changes since CVA   Passive ROM Upper Body   Passive ROM Upper Body X   Comments L shoulder limited to ~30 degrees due to pain, joint stiffness   Active ROM Upper Body   Active ROM Upper Body  X   Dominant Hand Using Non-Dominant Hand;Left   Comments RUE WNL; L shoulder limited by CVA on top of pre-existing orthopedic injury; remainder of LUE WNL   Strength Upper Body   Upper Body Strength  X   Comments RUE WNL; L distal 4-/5 to 4/5; proximal 2-/5   Sensation Upper Body   Upper Extremity Sensation  X   Comments pt reports \"weird, different\" sensation to L hand since prior CVAs   Upper Body Muscle Tone   Upper Body Muscle Tone  WDL   Coordination Upper Body   Coordination X   Fine Motor Coordination mild-moderate impairment L hand   Gross Motor Coordination very limited reach LUE   Balance Assessment   Sitting Balance (Static) Fair   Sitting Balance (Dynamic) Fair -   Standing Balance (Static) Poor +   Standing Balance (Dynamic) Poor +   Weight Shift Sitting Poor   Weight Shift Standing Poor   Comments FWW for standing   Bed Mobility    Supine to Sit Minimal Assist  (HOB fully elevated)   Sit to Supine   (up to chair post)   Scooting Standby Assist  (seated)   Comments pt has adjustable bed at home   ADL Assessment   Eating Supervision  (uses non-dominant R hand)   Grooming Supervision;Seated  (combed hair using R hand)   Lower Body Dressing Total Assist  (don B socks; pt uses sock-aid or has assist at home)   Toileting   (declined need; using Purewick)   How much help from another person does the patient currently need...   Putting on and taking off regular lower body clothing? 2   Bathing (including washing, rinsing, and drying)? 2   Toileting, which includes using a toilet, bedpan, or urinal? 2   Putting on and taking off regular upper body clothing? 3   Taking care of personal grooming such as brushing teeth? 4   Eating meals? 4   6 Clicks Daily Activity Score " "17   Functional Mobility   Sit to Stand Contact Guard Assist   Bed, Chair, Wheelchair Transfer Contact Guard Assist   Transfer Method Stand Step  (FWW)   Mobility Supine > EOB, short gait and transfers with FWW   Distance (Feet) 10   # of Times Distance was Traveled 1   Visual Perception   Visual Perception  X   Comments pt report acuity changes and diplopia since CVAs; states \"sometimes I see 4 of you\"; able to manage small fruit cup and utensil without difficulty this session   Edema / Skin Assessment   Edema / Skin  Not Assessed   Activity Tolerance   Sitting in Chair >10 min; up post   Sitting Edge of Bed 7 min   Standing 4 min   Comments limited by weakness   Patient / Family Goals   Patient / Family Goal #1 \"To walk better\"   Short Term Goals   Short Term Goal # 1 Pt will complete ADL/toilet transfer with SBA   Short Term Goal # 2 Pt will complete toileting with SBA   Short Term Goal # 3 Pt will don pull-over top with supv   Education Group   Education Provided Role of Occupational Therapist   Role of Occupational Therapist Patient Response Patient;Acceptance;Explanation;Verbal Demonstration   Occupational Therapy Initial Treatment Plan    Treatment Interventions Self Care / Activities of Daily Living;Adaptive Equipment;Neuro Re-Education / Balance;Therapeutic Activity   Treatment Frequency 3 Times per Week   Duration Until Therapy Goals Met   Problem List   Problem List Decreased Active Daily Living Skills;Decreased Homemaking Skills;Decreased Functional Mobility;Decreased Activity Tolerance;Impaired Postural Control / Balance;Decreased Upper Extremity Strength Left;Decreased Upper Extremity AROM Left;Decreased Upper Extremity PROM Left;Impaired Coordination Left Upper Extremity;Impaired Sensation Left Upper Extremity;Impaired Cognitive Function;Impaired Vision   Anticipated Discharge Equipment and Recommendations   DC Equipment Recommendations None   Discharge Recommendations Recommend home health for " continued occupational therapy services   Interdisciplinary Plan of Care Collaboration   IDT Collaboration with  Nursing;Physical Therapist   Patient Position at End of Therapy Seated;Chair Alarm On;Call Light within Reach;Tray Table within Reach   Collaboration Comments OT results and recs   Session Information   Date / Session Number  6/14 #1 (1/3, 6/20)

## 2024-06-15 NOTE — ASSESSMENT & PLAN NOTE
She is slight volume overload and proBNP is elevated for her body habitus   She might be from IVF received   I will stop IVF and resume IV lasix  Continue close monitoring   She has been admitted in the past for CHF.   Trend proBNP

## 2024-06-15 NOTE — HOSPITAL COURSE
64-year-old female with multiple comorbidities including factor V Leyden deficiency on Eliquis, diabetes, paroxysmal atrial fibrillation, previous CVA, morbid obesity, cirrhosis-CORRAL, pancytopenia, diastolic dysfunction heart failure, hypothyroidism, gout, hypertension, type 2 diabetes, MARCI, low back pain, multiple drug allergies, chronic debility, peripheral neuropathy and osteoarthritis presented 6/13/2024 with generalized weakness, back pain, dizziness.  She did report a mechanical fall 2 days prior to admission since then she have had difficulties with ambulating, endorsing loss of bowel and bladder function.  Denies numbness in the groin areas.  In the emergency room vitals are stable, afebrile  WBC 4.7, hemoglobin 12.8, microcytic (MCV 79), platelets 176, sodium 129, potassium 4.4, chloride 88, CO2 27, BUN 8, creatinine 0.71, ALT 6, AST 19, alk phos 147, proBNP 365, troponin 20  She did have stroke workup including CTA neck no evidence of intracranial vascular occlusion or aneurysm, CT head also unremarkable  Today she did complete brain MRI which shows chronic infarcts in the right occipital lobe and left cerebellum, no other interval changes  In addition she did have MRI lumbar which showed postsurgical degenerative changes  MRI thoracic showed diffuse disc bulge at T7/T1 causing mild central canal stenosis.  I did discuss her case with Dr. Granado, neurosurgeon and no further intervention is indicative at this time.  She will need to continue trial of conservative management with physical therapy, pain management.

## 2024-06-15 NOTE — DISCHARGE SUMMARY
Discharge Summary    CHIEF COMPLAINT ON ADMISSION  Chief Complaint   Patient presents with    Weakness     Generalized X 2 weeks, pt arrives drowsy but oriented, denies fevers or known infections        Reason for Admission  ems     Admission Date  6/13/2024    CODE STATUS  Full Code    HPI & HOSPITAL COURSE    64-year-old female with multiple comorbidities including factor V Leyden deficiency on Eliquis, diabetes, paroxysmal atrial fibrillation, previous CVA, morbid obesity, cirrhosis-CORRAL, pancytopenia, diastolic dysfunction heart failure, hypothyroidism, gout, hypertension, type 2 diabetes, MARCI, low back pain, multiple drug allergies, chronic debility, peripheral neuropathy and osteoarthritis presented 6/13/2024 with generalized weakness, back pain, dizziness.  She did report a mechanical fall 2 days prior to admission since then she have had difficulties with ambulating, endorsing loss of bowel and bladder function.  Denies numbness in the groin areas.  In the emergency room vitals are stable, afebrile  WBC 4.7, hemoglobin 12.8, microcytic (MCV 79), platelets 176, sodium 129, potassium 4.4, chloride 88, CO2 27, BUN 8, creatinine 0.71, ALT 6, AST 19, alk phos 147, proBNP 365, troponin 20  She did have stroke workup including CTA neck no evidence of intracranial vascular occlusion or aneurysm, CT head also unremarkable  Today she did complete brain MRI which shows chronic infarcts in the right occipital lobe and left cerebellum, no other interval changes  In addition she did have MRI lumbar which showed postsurgical degenerative changes  MRI thoracic showed diffuse disc bulge at T7/T1 causing mild central canal stenosis.  I did discuss her case with Dr. Granado, neurosurgeon and no further intervention is indicative at this time.  She will need to continue trial of conservative management with physical therapy, pain management.  She was kept overnight for pain management and she IV lasix because of IVF received. She  was weaned off oxygen. She has pain meds and lidocaine patch. She will follow up with Dr. Austin as outpatient. Home health ordered   On this admission she was also found iron defiencey anemia and I did start iron supplement for her to continue. She will need more GI evaluation   She will need to follow up with primary care for management of diabetes, weight loss. She is goal directed therapy for CHF which clinically is compensated properly     Therefore, she is discharged in fair and stable condition to home with close outpatient follow-up.    The patient recovered much more quickly than anticipated on admission.    Discharge Date  6/15/24    FOLLOW UP ITEMS POST DISCHARGE  None    DISCHARGE DIAGNOSES  Principal Problem:    Generalized weakness (POA: Yes)  Active Problems:    Sleep apnea (POA: Yes)    Factor V deficiency (HCC) (POA: Yes)    Hypothyroidism (POA: Yes)    Microcytic anemia (POA: Yes)    Pancytopenia (HCC) (POA: Yes)    PAF (paroxysmal atrial fibrillation) (HCC) (POA: Yes)    Diastolic dysfunction with chronic heart failure (HCC) (POA: Yes)    Morbid obesity (HCC) (POA: Yes)    Essential hypertension (POA: Yes)    Type 2 diabetes mellitus with diabetic neuropathy, with long-term current use of insulin (HCC) (POA: Yes)    Low back pain (POA: Yes)  Resolved Problems:    Ataxia (POA: Unknown)      FOLLOW UP  No future appointments.  No follow-up provider specified.    MEDICATIONS ON DISCHARGE     Medication List        START taking these medications        Instructions   ferrous gluconate 324 (38 Fe) MG Tabs  Commonly known as: Fergon   Take 1 Tablet by mouth every morning with breakfast.  Dose: 324 mg            CHANGE how you take these medications        Instructions   aspirin EC 81 MG Tbec  What changed:   when to take this  reasons to take this  Commonly known as: Ecotrin   Take 1 Tablet by mouth every day.  Dose: 81 mg     verapamil 80 MG Tabs  What changed: when to take this  Commonly known as:  Isoptin   Take 1 Tablet by mouth at bedtime.  Dose: 80 mg            CONTINUE taking these medications        Instructions   apixaban 5mg Tabs  Commonly known as: Eliquis   Take 1 Tablet by mouth 2 times a day.  Dose: 5 mg     carBAMazepine 200 MG Tabs  Commonly known as: TEGretol   Take 1 Tablet by mouth 3 times a day.  Dose: 200 mg     febuxostat 40 MG Tabs  Commonly known as: Uloric   Take 1 Tablet by mouth every day.  Dose: 40 mg     furosemide 40 MG Tabs  Commonly known as: Lasix   Take 1 Tablet by mouth every day.  Dose: 40 mg     gabapentin 300 MG Caps  Commonly known as: Neurontin   Take 2 Capsules by mouth 3 times a day. * may take an extra 300 mg  ( 600 mg) if needed  Dose: 600 mg     HumaLOG KwikPen 200 UNIT/ML Sopn  Generic drug: Insulin Lispro   Inject 5-20 Units under the skin 4 Times a Day,Before Meals and at Bedtime.  Dose: 5-20 Units     levalbuterol 45 MCG/ACT inhaler  Commonly known as: Xopenex HFA   Inhale 2 Puffs every four hours as needed for Shortness of Breath.  Dose: 2 Puff     levothyroxine 112 MCG Tabs  Commonly known as: Synthroid   Take 1 Tablet by mouth every morning on an empty stomach.  Dose: 112 mcg     lidocaine 5 % Ptch  Commonly known as: Lidoderm   Place 1 Patch on the skin every 24 hours.  Dose: 1 Patch     montelukast 10 MG Tabs  Commonly known as: Singulair   Take 1 Tablet by mouth every evening.  Dose: 10 mg     Nurtec 75 MG Tbdp  Generic drug: Rimegepant Sulfate   Take 75 mg by mouth 1 time a day as needed (migraine). Indications: Migraine Headache  Dose: 75 mg     oxyCODONE immediate release 10 MG immediate release tablet  Commonly known as: Roxicodone   Take 10 mg by mouth 4 times a day as needed for Moderate Pain.  Dose: 10 mg     pantoprazole 40 MG Tbec  Commonly known as: Protonix   Take 40 mg by mouth every evening.  Dose: 40 mg     pramipexole 0.125 MG Tabs  Commonly known as: Mirapex   Take 0.25 mg by mouth 2 times a day. 2 tablets= 0.25mg  Dose: 0.25 mg      rosuvastatin 20 MG Tabs  Commonly known as: Crestor   Take 1 Tablet by mouth every evening.  Dose: 20 mg     spironolactone 50 MG Tabs  Commonly known as: Aldactone   Take 50 mg by mouth every day.  Dose: 50 mg     Stool Softener/Laxative 50-8.6 MG Tabs  Generic drug: senna-docusate   Take 2 Tablets by mouth 2 times a day as needed for Constipation.  Dose: 2 Tablet     Trelegy Ellipta 100-62.5-25 MCG/ACT inhaler  Generic drug: fluticasone-umeclidinium-vilanterol   Inhale 1 Puff every evening.  Dose: 1 Puff     Tresiba FlexTouch 200 UNIT/ML Sopn  Generic drug: Insulin Degludec   Inject 28 Units under the skin 1 time a day as needed (blood sugar).  Dose: 28 Units            STOP taking these medications      hydrOXYzine HCl 25 MG Tabs  Commonly known as: Atarax     insulin glargine 100 UNIT/ML injection PEN  Generic drug: insulin glargine     insulin regular 100 Unit/mL Soln  Commonly known as: HumuLIN R/NovoLIN R     potassium chloride SA 10 MEQ Tbcr  Commonly known as: K-Dur     promethazine 25 MG Tabs  Commonly known as: Phenergan              Allergies  Allergies   Allergen Reactions    Azithromycin Anaphylaxis    Erythromycin Anaphylaxis    Other Misc Anaphylaxis     Flu vaccine    Penicillins Anaphylaxis     As a child    Pistachio Anaphylaxis    Sulfa Drugs Anaphylaxis    Tetraglycine Anaphylaxis    Other Drug Unspecified     Long acting benzodiazepines only single dose otherwise combative     Physostigmine Unspecified     Abdomen extreme swelling    Tape Rash and Itching     tegaderm ok  Blisters with others    Zanaflex [Tizanidine Hcl] Unspecified     Falling asleep mid sentence    Benzodiazepines Unspecified    Albuterol Palpitations    Atorvastatin Unspecified     Extreme joint pain    Chlorhexidine Rash     blistering    Lamisil [Terbinafine Hcl] Unspecified     Pancreatitis     Morphine Unspecified     Not effective       DIET  Orders Placed This Encounter   Procedures    Diet Order Diet: Consistent CHO  (Diabetic)     Standing Status:   Standing     Number of Occurrences:   1     Order Specific Question:   Diet:     Answer:   Consistent CHO (Diabetic) [4]       ACTIVITY  As tolerated.  Weight bearing as tolerated    CONSULTATIONS  Neurosurgery Dr. Granado    PROCEDURES  None    LABORATORY  Lab Results   Component Value Date    SODIUM 129 (L) 06/15/2024    POTASSIUM 3.9 06/15/2024    CHLORIDE 91 (L) 06/15/2024    CO2 26 06/15/2024    GLUCOSE 135 (H) 06/15/2024    BUN 6 (L) 06/15/2024    CREATININE 0.53 06/15/2024        Lab Results   Component Value Date    WBC 3.7 (L) 06/15/2024    HEMOGLOBIN 11.7 (L) 06/15/2024    HEMATOCRIT 38.3 06/15/2024    PLATELETCT 145 (L) 06/15/2024        Total time of the discharge process exceeds 33 minutes.

## 2024-06-15 NOTE — ASSESSMENT & PLAN NOTE
Likely related to CORRAL   She does reports that she drinks, but then she claims, not daily ??   B12 to follow . Tsh normal   She has to be abstinence from alcohol complete

## 2024-06-15 NOTE — CARE PLAN
The patient is Stable - Low risk of patient condition declining or worsening    Shift Goals  Clinical Goals: pain control, safety and comfort  Patient Goals: comfort  Family Goals: no family at bedside    Progress made toward(s) clinical / shift goals:    Problem: Knowledge Deficit - Standard  Goal: Patient and family/care givers will demonstrate understanding of plan of care, disease process/condition, diagnostic tests and medications  Outcome: Progressing     Problem: Knowledge Deficit - Stroke Education  Goal: Patient's knowledge of stroke and risk factors will improve  Outcome: Progressing     Problem: Pain - Standard  Goal: Alleviation of pain or a reduction in pain to the patient’s comfort goal  Outcome: Progressing   Patient is alert and oriented, on 1L NC.   Fall protocol in effect. Bed in lowest position. Brakes and bed alarm on.   Maintained a clutter free environment. Skid socks on. Call light and personal belongings within reach. SCD's on.   Patient c/o of pain, treated per MAR. Educated on pain scale.   Patient educated on POC. Encouraged verbalize of feelings.   All questions were answered.      Patient is not progressing towards the following goals:

## 2024-06-15 NOTE — ASSESSMENT & PLAN NOTE
High risk for complications, Co2 retention   Pickwickian syndrome   CPAP ordered   Wean off oxygen

## (undated) DEVICE — SODIUM CHL. IRRIGATION 0.9% 3000ML (4EA/CA 65CA/PF)

## (undated) DEVICE — DRAPE STRLE REG TOWEL 18X24 - (10/BX 4BX/CA)"

## (undated) DEVICE — SUTURE 5 TI-CRON HOS-14 - (36/BX)

## (undated) DEVICE — NEEDLE SPINAL NON-SAFETY 18 GA X 3 IN (25EA/BX)

## (undated) DEVICE — DETERGENT RENUZYME PLUS 10 OZ PACKET (50/BX)

## (undated) DEVICE — MASK ANESTHESIA ADULT  - (100/CA)

## (undated) DEVICE — SUTURE 2-0 MONOCRYL CT-1

## (undated) DEVICE — DRAPE U ORTHOPEDIC - (10/BX)

## (undated) DEVICE — TUBE E-T HI-LO CUFF 7.0MM (10EA/PK)

## (undated) DEVICE — DRESSING ABDOMINAL PAD STERILE 8 X 10" (360EA/CA)"

## (undated) DEVICE — HEAD HOLDER JUNIOR/ADULT

## (undated) DEVICE — TIP INTPLS HFLO ML ORFC BTRY - (12/CS)  FOR SURGILAV

## (undated) DEVICE — BLADE SURGICAL #11 - (50/BX)

## (undated) DEVICE — DRAPE SURGICAL U 77X120 - (10/CA)

## (undated) DEVICE — FOAM FACEHOLDER SPIDER (8EA/BX)

## (undated) DEVICE — STAPLER SKIN DISP - (6/BX 10BX/CA) VISISTAT

## (undated) DEVICE — SODIUM CHL IRRIGATION 0.9% 1000ML (12EA/CA)

## (undated) DEVICE — KIT ANESTHESIA W/CIRCUIT & 3/LT BAG W/FILTER (20EA/CA)

## (undated) DEVICE — SPIDER SHOULDER HOLDER (12EA/BX)

## (undated) DEVICE — SUTURE 0 ETHIBOND CT-1 - (12/BX) 18 INCH

## (undated) DEVICE — FIBERWIRE 2.0 (12EA/BX)

## (undated) DEVICE — NEPTUNE 4 PORT MANIFOLD - (20/PK)

## (undated) DEVICE — SET EXTENSION WITH 2 PORTS (48EA/CA) ***PART #2C8610 IS A SUBSTITUTE*****

## (undated) DEVICE — HANDPIECE 10FT INTPLS SCT PLS IRRIGATION HAND CONTROL SET (6/PK)

## (undated) DEVICE — TAPE CLOTH MEDIPORE 6 INCH - (12RL/CA)

## (undated) DEVICE — SLING ORTH UNV TIETX VLFM ARM

## (undated) DEVICE — KIT ROOM DECONTAMINATION

## (undated) DEVICE — TUBE CONNECTING SUCTION - CLEAR PLASTIC STERILE 72 IN (50EA/CA)

## (undated) DEVICE — DRILL 2.5MM

## (undated) DEVICE — SHAVER 5.5 RESECTOR FORMULA (5EA/BX )

## (undated) DEVICE — SUCTION INSTRUMENT YANKAUER BULBOUS TIP W/O VENT (50EA/CA)

## (undated) DEVICE — PIN GUIDE 2.0MM

## (undated) DEVICE — TUBING PUMP WITH CONNECTOR REDEUCE (1EA)

## (undated) DEVICE — LACTATED RINGERS INJ 1000 ML - (14EA/CA 60CA/PF)

## (undated) DEVICE — PACK TOTAL HIP - (1/CA)

## (undated) DEVICE — TUBE CONNECT SUCTION CLEAR 120 X 1/4" (50EA/CA)"

## (undated) DEVICE — BLADE SAGITTAL SAW DUAL CUT 75.0 X 25.0MM (1/EA)

## (undated) DEVICE — PROTECTOR ULNA NERVE - (36PR/CA)

## (undated) DEVICE — DRAPETIBURON SHOULDER W/POUCH - (5EA/CA)

## (undated) DEVICE — SENSOR SPO2 NEO LNCS ADHESIVE (20/BX) SEE USER NOTES

## (undated) DEVICE — BLOCK

## (undated) DEVICE — TRAY SKIN SCRUB PVP WET (20EA/CA) PART #DYND70356 DISCONTINUED

## (undated) DEVICE — TUBING PATIENT W/CONNECTOR REDEUCE (1EA)

## (undated) DEVICE — PAD PREP 24 X 48 CUFFED - (100/CA)

## (undated) DEVICE — GLOVE BIOGEL SZ 7.5 SURGICAL PF LTX - (50PR/BX 4BX/CA)

## (undated) DEVICE — GOWN WARMING STANDARD FLEX - (30/CA)

## (undated) DEVICE — GLOVE BIOGEL SZ 8 SURGICAL PF LTX - (50PR/BX 4BX/CA)

## (undated) DEVICE — ELECTRODE 850 FOAM ADHESIVE - HYDROGEL RADIOTRNSPRNT (50/PK)

## (undated) DEVICE — SET LEADWIRE 5 LEAD BEDSIDE DISPOSABLE ECG (1SET OF 5/EA)

## (undated) DEVICE — PAD LAP STERILE 18 X 18 - (5/PK 40PK/CA)

## (undated) DEVICE — STOCKINETTE IMPERVIOUS 12X48 - STERILELF (10/CA)"

## (undated) DEVICE — TUBING CLEARLINK DUO-VENT - C-FLO (48EA/CA)

## (undated) DEVICE — CANISTER SUCTION 3000ML MECHANICAL FILTER AUTO SHUTOFF MEDI-VAC NONSTERILE LF DISP  (40EA/CA)

## (undated) DEVICE — GLOVE BIOGEL INDICATOR SZ 8 SURGICAL PF LTX - (50/BX 4BX/CA)

## (undated) DEVICE — ELECTRODE DUAL RETURN W/ CORD - (50/PK)

## (undated) DEVICE — DRAPE U SPLIT IMP 54 X 76 - (24/CA)

## (undated) DEVICE — SUTURE GENERAL

## (undated) DEVICE — PROBE, SERFAS 90-S 4.0MM